# Patient Record
Sex: MALE | Race: WHITE | NOT HISPANIC OR LATINO | Employment: UNEMPLOYED | URBAN - METROPOLITAN AREA
[De-identification: names, ages, dates, MRNs, and addresses within clinical notes are randomized per-mention and may not be internally consistent; named-entity substitution may affect disease eponyms.]

---

## 2017-01-03 ENCOUNTER — APPOINTMENT (OUTPATIENT)
Dept: PHYSICAL THERAPY | Facility: REHABILITATION | Age: 52
End: 2017-01-03
Payer: COMMERCIAL

## 2017-01-05 ENCOUNTER — APPOINTMENT (OUTPATIENT)
Dept: PHYSICAL THERAPY | Facility: REHABILITATION | Age: 52
End: 2017-01-05
Payer: COMMERCIAL

## 2017-01-05 PROCEDURE — 97110 THERAPEUTIC EXERCISES: CPT

## 2017-01-10 ENCOUNTER — APPOINTMENT (OUTPATIENT)
Dept: PHYSICAL THERAPY | Facility: REHABILITATION | Age: 52
End: 2017-01-10
Payer: COMMERCIAL

## 2017-01-11 ENCOUNTER — ALLSCRIPTS OFFICE VISIT (OUTPATIENT)
Dept: OTHER | Facility: OTHER | Age: 52
End: 2017-01-11

## 2017-01-12 ENCOUNTER — APPOINTMENT (OUTPATIENT)
Dept: PHYSICAL THERAPY | Facility: REHABILITATION | Age: 52
End: 2017-01-12
Payer: COMMERCIAL

## 2017-01-13 ENCOUNTER — ALLSCRIPTS OFFICE VISIT (OUTPATIENT)
Dept: OTHER | Facility: OTHER | Age: 52
End: 2017-01-13

## 2017-01-17 ENCOUNTER — APPOINTMENT (OUTPATIENT)
Dept: PHYSICAL THERAPY | Facility: REHABILITATION | Age: 52
End: 2017-01-17
Payer: COMMERCIAL

## 2017-01-17 PROCEDURE — 97110 THERAPEUTIC EXERCISES: CPT

## 2017-01-17 PROCEDURE — 97164 PT RE-EVAL EST PLAN CARE: CPT

## 2017-01-18 ENCOUNTER — ALLSCRIPTS OFFICE VISIT (OUTPATIENT)
Dept: OTHER | Facility: OTHER | Age: 52
End: 2017-01-18

## 2017-01-19 ENCOUNTER — APPOINTMENT (OUTPATIENT)
Dept: PHYSICAL THERAPY | Facility: REHABILITATION | Age: 52
End: 2017-01-19
Payer: COMMERCIAL

## 2017-01-19 PROCEDURE — 97110 THERAPEUTIC EXERCISES: CPT

## 2017-01-24 ENCOUNTER — APPOINTMENT (OUTPATIENT)
Dept: PHYSICAL THERAPY | Facility: REHABILITATION | Age: 52
End: 2017-01-24
Payer: COMMERCIAL

## 2017-01-26 ENCOUNTER — APPOINTMENT (OUTPATIENT)
Dept: PHYSICAL THERAPY | Facility: REHABILITATION | Age: 52
End: 2017-01-26
Payer: COMMERCIAL

## 2017-01-31 ENCOUNTER — APPOINTMENT (OUTPATIENT)
Dept: PHYSICAL THERAPY | Facility: REHABILITATION | Age: 52
End: 2017-01-31
Payer: COMMERCIAL

## 2017-02-03 ENCOUNTER — ALLSCRIPTS OFFICE VISIT (OUTPATIENT)
Dept: OTHER | Facility: OTHER | Age: 52
End: 2017-02-03

## 2017-02-03 DIAGNOSIS — I10 ESSENTIAL (PRIMARY) HYPERTENSION: ICD-10-CM

## 2017-02-03 DIAGNOSIS — M10.9 GOUT: ICD-10-CM

## 2017-02-21 ENCOUNTER — ALLSCRIPTS OFFICE VISIT (OUTPATIENT)
Dept: OTHER | Facility: OTHER | Age: 52
End: 2017-02-21

## 2017-02-22 ENCOUNTER — APPOINTMENT (OUTPATIENT)
Dept: LAB | Facility: HOSPITAL | Age: 52
End: 2017-02-22
Payer: COMMERCIAL

## 2017-02-22 DIAGNOSIS — M10.9 GOUT: ICD-10-CM

## 2017-02-22 LAB
ANION GAP SERPL CALCULATED.3IONS-SCNC: 9 MMOL/L (ref 4–13)
BASOPHILS # BLD AUTO: 0.01 THOUSANDS/ΜL (ref 0–0.1)
BASOPHILS NFR BLD AUTO: 0 % (ref 0–1)
BUN SERPL-MCNC: 18 MG/DL (ref 5–25)
CALCIUM SERPL-MCNC: 9 MG/DL (ref 8.3–10.1)
CHLORIDE SERPL-SCNC: 101 MMOL/L (ref 100–108)
CO2 SERPL-SCNC: 27 MMOL/L (ref 21–32)
CREAT SERPL-MCNC: 1.07 MG/DL (ref 0.6–1.3)
EOSINOPHIL # BLD AUTO: 0.04 THOUSAND/ΜL (ref 0–0.61)
EOSINOPHIL NFR BLD AUTO: 1 % (ref 0–6)
ERYTHROCYTE [DISTWIDTH] IN BLOOD BY AUTOMATED COUNT: 14.4 % (ref 11.6–15.1)
GFR SERPL CREATININE-BSD FRML MDRD: >60 ML/MIN/1.73SQ M
GLUCOSE SERPL-MCNC: 95 MG/DL (ref 65–140)
HCT VFR BLD AUTO: 38.9 % (ref 36.5–49.3)
HGB BLD-MCNC: 13.2 G/DL (ref 12–17)
LYMPHOCYTES # BLD AUTO: 1.39 THOUSANDS/ΜL (ref 0.6–4.47)
LYMPHOCYTES NFR BLD AUTO: 21 % (ref 14–44)
MCH RBC QN AUTO: 30.1 PG (ref 26.8–34.3)
MCHC RBC AUTO-ENTMCNC: 33.9 G/DL (ref 31.4–37.4)
MCV RBC AUTO: 89 FL (ref 82–98)
MONOCYTES # BLD AUTO: 0.52 THOUSAND/ΜL (ref 0.17–1.22)
MONOCYTES NFR BLD AUTO: 8 % (ref 4–12)
NEUTROPHILS # BLD AUTO: 4.76 THOUSANDS/ΜL (ref 1.85–7.62)
NEUTS SEG NFR BLD AUTO: 70 % (ref 43–75)
NRBC BLD AUTO-RTO: 0 /100 WBCS
PLATELET # BLD AUTO: 254 THOUSANDS/UL (ref 149–390)
PMV BLD AUTO: 9.6 FL (ref 8.9–12.7)
POTASSIUM SERPL-SCNC: 3.6 MMOL/L (ref 3.5–5.3)
RBC # BLD AUTO: 4.38 MILLION/UL (ref 3.88–5.62)
SODIUM SERPL-SCNC: 137 MMOL/L (ref 136–145)
URATE SERPL-MCNC: 4.8 MG/DL (ref 4.2–8)
WBC # BLD AUTO: 6.73 THOUSAND/UL (ref 4.31–10.16)

## 2017-02-22 PROCEDURE — 36415 COLL VENOUS BLD VENIPUNCTURE: CPT

## 2017-02-22 PROCEDURE — 85025 COMPLETE CBC W/AUTO DIFF WBC: CPT

## 2017-02-22 PROCEDURE — 80048 BASIC METABOLIC PNL TOTAL CA: CPT

## 2017-02-22 PROCEDURE — 84550 ASSAY OF BLOOD/URIC ACID: CPT

## 2017-02-28 ENCOUNTER — ALLSCRIPTS OFFICE VISIT (OUTPATIENT)
Dept: OTHER | Facility: OTHER | Age: 52
End: 2017-02-28

## 2017-04-07 ENCOUNTER — OFFICE VISIT (OUTPATIENT)
Dept: URGENT CARE | Age: 52
End: 2017-04-07
Payer: COMMERCIAL

## 2017-04-07 PROCEDURE — G0382 LEV 3 HOSP TYPE B ED VISIT: HCPCS | Performed by: FAMILY MEDICINE

## 2017-04-07 PROCEDURE — S9083 URGENT CARE CENTER GLOBAL: HCPCS | Performed by: FAMILY MEDICINE

## 2017-04-20 ENCOUNTER — ALLSCRIPTS OFFICE VISIT (OUTPATIENT)
Dept: OTHER | Facility: OTHER | Age: 52
End: 2017-04-20

## 2017-04-26 ENCOUNTER — ALLSCRIPTS OFFICE VISIT (OUTPATIENT)
Dept: OTHER | Facility: OTHER | Age: 52
End: 2017-04-26

## 2017-04-26 DIAGNOSIS — E78.5 HYPERLIPIDEMIA: ICD-10-CM

## 2017-04-26 DIAGNOSIS — K21.9 GASTRO-ESOPHAGEAL REFLUX DISEASE WITHOUT ESOPHAGITIS: ICD-10-CM

## 2017-04-26 DIAGNOSIS — I10 ESSENTIAL (PRIMARY) HYPERTENSION: ICD-10-CM

## 2017-04-26 DIAGNOSIS — M17.12 PRIMARY OSTEOARTHRITIS OF LEFT KNEE: ICD-10-CM

## 2017-05-22 ENCOUNTER — HOSPITAL ENCOUNTER (OUTPATIENT)
Dept: RADIOLOGY | Facility: CLINIC | Age: 52
Discharge: HOME | End: 2017-05-22
Payer: COMMERCIAL

## 2017-05-22 ENCOUNTER — ALLSCRIPTS OFFICE VISIT (OUTPATIENT)
Dept: OTHER | Facility: OTHER | Age: 52
End: 2017-05-22

## 2017-05-22 DIAGNOSIS — M17.12 PRIMARY OSTEOARTHRITIS OF LEFT KNEE: ICD-10-CM

## 2017-05-22 PROCEDURE — 77073 BONE LENGTH STUDIES: CPT

## 2017-06-07 ENCOUNTER — GENERIC CONVERSION - ENCOUNTER (OUTPATIENT)
Dept: OTHER | Facility: OTHER | Age: 52
End: 2017-06-07

## 2017-06-08 ENCOUNTER — ALLSCRIPTS OFFICE VISIT (OUTPATIENT)
Dept: OTHER | Facility: OTHER | Age: 52
End: 2017-06-08

## 2017-06-28 ENCOUNTER — GENERIC CONVERSION - ENCOUNTER (OUTPATIENT)
Dept: OTHER | Facility: OTHER | Age: 52
End: 2017-06-28

## 2017-06-29 ENCOUNTER — GENERIC CONVERSION - ENCOUNTER (OUTPATIENT)
Dept: OTHER | Facility: OTHER | Age: 52
End: 2017-06-29

## 2017-06-30 ENCOUNTER — GENERIC CONVERSION - ENCOUNTER (OUTPATIENT)
Dept: OTHER | Facility: OTHER | Age: 52
End: 2017-06-30

## 2017-07-05 ENCOUNTER — ALLSCRIPTS OFFICE VISIT (OUTPATIENT)
Dept: OTHER | Facility: OTHER | Age: 52
End: 2017-07-05

## 2017-07-18 ENCOUNTER — ALLSCRIPTS OFFICE VISIT (OUTPATIENT)
Dept: OTHER | Facility: OTHER | Age: 52
End: 2017-07-18

## 2017-07-18 DIAGNOSIS — E78.5 HYPERLIPIDEMIA: ICD-10-CM

## 2017-07-18 DIAGNOSIS — M10.9 GOUT: ICD-10-CM

## 2017-07-18 DIAGNOSIS — K21.9 GASTRO-ESOPHAGEAL REFLUX DISEASE WITHOUT ESOPHAGITIS: ICD-10-CM

## 2017-07-21 ENCOUNTER — ALLSCRIPTS OFFICE VISIT (OUTPATIENT)
Dept: OTHER | Facility: OTHER | Age: 52
End: 2017-07-21

## 2017-10-09 ENCOUNTER — GENERIC CONVERSION - ENCOUNTER (OUTPATIENT)
Dept: OTHER | Facility: OTHER | Age: 52
End: 2017-10-09

## 2017-10-17 ENCOUNTER — ALLSCRIPTS OFFICE VISIT (OUTPATIENT)
Dept: OTHER | Facility: OTHER | Age: 52
End: 2017-10-17

## 2017-10-18 NOTE — PROGRESS NOTES
Assessment  1  Benign essential hypertension (401 1) (I10)   2  Allergic rhinitis (477 9) (J30 9)   3  GERD without esophagitis (530 81) (K21 9)    Discussion/Summary  Discussion Summary:   SEE MED LIST  Counseling Documentation With Imm: The patient was counseled regarding diagnostic results,-- instructions for management,-- risk factor reductions,-- prognosis,-- impressions  Chief Complaint  Chief Complaint Free Text Note Form: PT here for follow up, pt stated having sore throat, and off and on headaches, and issues with asthma stated he was in [facility] was in ER on 10/9/2017 due to asthma  History of Present Illness  Hypertension (Follow-Up): The patient presents for follow-up of essential hypertension  The patient states he has been stable with his blood pressure control since the last visit  He has no comorbid illnesses  Symptoms: The patient is currently asymptomatic  Associated symptoms include no headache,-- no focal neurologic deficits-- and-- no memory loss  Medications: The patient is not currently on any medications for his hypertension--lifestyle modification only  -- the patient is adherent with his medication regimen  Medication(s): a calcium channel blocker  The patient is due for a serum creatinine  Review of Systems  Complete-Male:   Constitutional: No fever or chills, feels well, no tiredness, no recent weight gain or weight loss  Eyes: No complaints of eye pain, no red eyes, no discharge from eyes, no itchy eyes  ENT: nasal discharge  Cardiovascular: No complaints of slow heart rate, no fast heart rate, no chest pain, no palpitations, no leg claudication, no lower extremity  Respiratory: No complaints of shortness of breath, no wheezing, no cough, no SOB on exertion, no orthopnea or PND  Gastrointestinal: No complaints of abdominal pain, no constipation, no nausea or vomiting, no diarrhea or bloody stools     Musculoskeletal: joint stiffness, but-- no arthralgias-- and-- no myalgias  Neurological: No compliants of headache, no confusion, no convulsions, no numbness or tingling, no dizziness or fainting, no limb weakness, no difficulty walking  Psychiatric: Is not suicidal, no sleep disturbances, no anxiety or depression, no change in personality, no emotional problems  Endocrine: No complaints of proptosis, no hot flashes, no muscle weakness, no erectile dysfunction, no deepening of the voice, no feelings of weakness  Hematologic/Lymphatic: No complaints of swollen glands, no swollen glands in the neck, does not bleed easily, no easy bruising  Active Problems  1  Allergic rhinitis (477 9) (J30 9)   2  Asthma (493 90) (J45 909)   3  Benign essential hypertension (401 1) (I10)   4  Chest pain (786 50) (R07 9)   5  Chronic bilateral low back pain without sciatica (724 2,338 29) (M54 5,G89 29)   6  DJD (degenerative joint disease) of knee (715 36) (M17 10)   7  Dyspnea on exertion (786 09) (R06 09)   8  GERD without esophagitis (530 81) (K21 9)   9  Globus hystericus (300 11) (F45 8)   10  Gout (274 9) (M10 9)   11  Hyperlipidemia (272 4) (E78 5)   12  Palpitations (785 1) (R00 2)   13  Primary localized osteoarthritis of left knee (715 16) (M17 12)   14  Primary localized osteoarthritis of right knee (715 16) (M17 11)   15  Right ankle effusion (719 07) (M25 471)   16  Right ankle pain, unspecified chronicity (719 47) (M25 571)   17  Tenosynovitis of foot and ankle (727 06) (M65 9)   18  Thyroid disorder (246 9) (E07 9)   19  Ventricular tachycardia (427 1) (I47 2)   20  Vertigo (780 4) (R42)    Past Medical History  1  History of Acute idiopathic gout of left ankle (274 01) (M10 072)   2  History of Acute left ankle pain (719 47) (M25 572)   3  History of Acute middle ear effusion, bilateral (381 00) (H65 193)   4  History of Acute recurrent maxillary sinusitis (461 0) (J01 01)   5  History of Acute URI (465 9) (J67 9)   6  History of Acute URI (443 9) (J25 9)   7  History of Arthritis (V13 4)   8  History of Back spasm (724 8) (M62 830)   9  History of Chronic rhinitis (472 0) (J31 0)   10  History of Chronic Serous Otitis Media (381 10)   11  History of Dyspnea (786 09) (R06 00)   12  History of Foot Pain (Soft Tissue) (729 5)   13  History of Functional murmur (R01 0)   14  History of Hearing problem (V41 2) (H91 90)   15  History of acute bronchitis (V12 69) (Z87 09)   16  History of acute bronchitis (V12 69) (Z87 09)   17  History of allergy (V15 09) (Z88 9)   18  History of backache (V13 59) (Z87 39)   19  History of candidiasis of mouth (V12 09) (Z86 19)   20  History of chronic sinusitis (V12 69) (Z87 09)   21  History of dizziness (V13 89) (Z87 898)   22  History of fatigue (V13 89) (Z87 898)   23  History of gastroesophageal reflux (GERD) (V12 79) (Z87 19)   24  History of gastroesophageal reflux (GERD) (V12 79) (Z87 19)   25  History of gout (V12 29) (Z87 39)   26  History of gout (V12 29) (Z87 39)   27  History of hiatal hernia (V12 79) (Z87 19)   28  History of hypercholesterolemia (V12 29) (Z86 39)   29  History of hypertension (V12 59) (Z86 79)   30  History of laryngitis (V12 69) (Z87 09)   31  History of low back pain (V13 59) (Z87 39)   32  History of sinusitis (V12 69) (Z87 09)   33  History of urinary frequency (V13 09) (Z87 898)   34  History of Knee pain (719 46) (M25 569)   35  History of Left foot pain (729 5) (M79 672)   36  History of Low grade fever (780 60) (R50 9)   37  History of Malaise (780 79) (R53 81)   38  History of Middle ear effusion (381 4) (H65 90)   39  History of Neck pain (723 1) (M54 2)   40  History of Need for influenza vaccination (V04 81) (Z23)   41  History of Need for prophylactic vaccination and inoculation against influenza (V04 81) (Z23)   42  History of Non-toxic multinodular goiter (241 1) (E04 2)   43  History of Palpitations (785 1) (R00 2)   44  History of Papule of skin (709 8) (R23 8)   45   History of Screening for colon cancer (V76 51) (Z12 11)   46  History of Testicular hypogonadism (257 2) (E29 1)   47  History of Urinary urgency (788 63) (R39 15)  Active Problems And Past Medical History Reviewed: The active problems and past medical history were reviewed and updated today  Surgical History  1  History of Appendectomy   2  History of Hernia Repair   3  History of Thyroid Surgery Total Thyroidectomy   4  History of Tonsillectomy  Surgical History Reviewed: The surgical history was reviewed and updated today  Family History  Father    1  Family history of Cancer   2  Family history of Coronary Artery Disease (V17 49)   3  Family history of Lung Cancer (V16 1)  Maternal Grandmother    4  Family history of Stroke Syndrome (V17 1)  Maternal Grandfather    5  Family history of Stroke Syndrome (V17 1)  Paternal Grandfather    6  Family history of Cancer  Cousin    7  Family history of lung cancer (V16 1) (Z80 1)  Family History    8  Family history of Heart Disease (V17 49)   9  Family history of Hypertension (V17 49)  Family History Reviewed: The family history was reviewed and updated today  Social History   · Denied: History of Drug Use   · Marital History - Single   · Never A Smoker   · No alcohol use   · No drug use   · Occupation:   · Travel history  Social History Reviewed: The social history was reviewed and updated today  Current Meds   1  Advair Diskus 500-50 MCG/DOSE Inhalation Aerosol Powder Breath Activated; INHALE 1 PUFF   TWICE DAILY; Therapy: 14LNM9694 to (Claudetta Altes) Recorded   2  Albuterol Sulfate (2 5 MG/3ML) 0 083% Inhalation Nebulization Solution; USE 1 UNIT DOSE IN   NEBULIZER EVERY 4 TO 6 HOURS AS NEEDED; Therapy: 33QYO5528 to (Last Rx:62Ovu9748)  Requested for: 06OBW6965 Ordered   3  Cyclobenzaprine HCl - 5 MG Oral Tablet; TAKE 1 TABLET 3 TIMES DAILY AS NEEDED; Therapy: 05FMT3992 to (Evaluate:14Mar2017)  Requested for: 83CAL6066; Last Rx:90Ofw4494   Ordered   4  DuoNeb 0 5-2 5 (3) MG/3ML Inhalation Solution; Therapy: (Recorded:08Jun2017) to Recorded   5  Esomeprazole Magnesium 40 MG Oral Capsule Delayed Release; TAKE 1 CAPSULE ONCE DAILY; Therapy: 06ACV7421 to (Evaluate:06Jan2018)  Requested for: 15Zzp6015; Last Rx:59Xen2550   Ordered   6  Lisinopril 20 MG Oral Tablet; Take 1 tablet daily  Requested for: 77Dbt2639; Last Rx:00Pgf9479   Ordered   7  Meclizine HCl - 25 MG Oral Tablet; TAKE 1 TABLET 3 times daily; Therapy: 95Ejy9144 to Recorded   8  Meloxicam 15 MG Oral Tablet; TAKE 1 TABLET DAILY AS NEEDED; Therapy: 43JOW3233 to (Evaluate:12Feb2017)  Requested for: 41PHC9217; Last Rx:13Jan2017   Ordered   9  Montelukast Sodium 10 MG Oral Tablet; TAKE 1 TABLET DAILY  Requested for: 43NNL3855; Last   Rx:01Pmk0406 Ordered   10  Nasacort Allergy 24HR 55 MCG/ACT Nasal Aerosol; To be used as directed; Therapy: 64TUH5731 to Recorded   11  Ocean Nasal Spray 0 65 % Nasal Solution; Inhale 1-2 sprays as needed; Therapy: 39LTQ0411 to (Last Rx:08Jun2017)  Requested for: 12IIS0446 Ordered   12  Ocean Nasal Spray 0 65 % Nasal Solution; Inhale 1-2 sprays as needed; Therapy: 03Soa1219 to (Last Rx:87Mig1466)  Requested for: 21Udc8872 Ordered   13  Uloric 40 MG Oral Tablet; TAKE 1 TABLET DAILY; Therapy: 25RJV1767 to (Evaluate:69Yow0215)  Requested for: 77IQD6785; Last Rx:13Jun2017    Ordered   14  Ventolin  (90 Base) MCG/ACT Inhalation Aerosol Solution; INHALE 2 PUFFS Twice daily; Therapy: 27Mst8987 to Recorded   15  Verapamil HCl  MG Oral Tablet Extended Release; TAKE 1 TABLET DAILY; Therapy: 51CBJ6256 to Recorded   16  ZyrTEC Allergy 10 MG Oral Tablet; Take 1 tablet daily; Therapy: 02ZNV0724 to (Last Rx:08Jun2017)  Requested for: 70FEQ2347 Ordered  Medication List Reviewed: The medication list was reviewed and updated today  Allergies  1  Penicillins   2  Sulfa Drugs   3  Cephalosporins   4  omeprazole   5  Pepcid TABS   6   Sulfa Drugs    Vitals  Vital Signs    Recorded: 55JGJ1843 02:37PM   Temperature 98 5 F, Oral   Heart Rate 98   Systolic 665, RUE, Sitting   Diastolic 82, RUE, Sitting   Height 5 ft 10 in   Weight 224 lb 4 oz   BMI Calculated 32 18   BSA Calculated 2 19   O2 Saturation 96, RA     Physical Exam    Constitutional   General appearance: No acute distress, well appearing and well nourished  Eyes   Conjunctiva and lids: No swelling, erythema, or discharge  Ears, Nose, Mouth, and Throat   Otoscopic examination: Tympanic membrance translucent with normal light reflex  Canals patent without erythema  Nasal mucosa, septum, and turbinates: Normal without edema or erythema  Oropharynx: Normal with no erythema, edema, exudate or lesions  Pulmonary   Respiratory effort: No increased work of breathing or signs of respiratory distress  Auscultation of lungs: Abnormal   Auscultation of the lungs revealed decreased breath sounds diffusely  Cardiovascular   Auscultation of heart: Normal rate and rhythm, normal S1 and S2, without murmurs  Examination of extremities for edema and/or varicosities: Normal     Carotid pulses: Normal     Abdomen   Abdomen: Non-tender, no masses  Liver and spleen: No hepatomegaly or splenomegaly  Musculoskeletal   Gait and station: Normal     Digits and nails: Normal without clubbing or cyanosis  Neurologic   Cranial nerves: Cranial nerves 2-12 intact  Psychiatric   Orientation to person, place and time: Normal     Mood and affect: Normal          Health Management  History of Depression screening   *VB-Depression Screening; every 1 year; Last 21APV4129; Next Due: 22LGE1251; Active    Future Appointments    Date/Time Provider Specialty Site   10/24/2017 02:30 PM Romina Crowell DO Orthopedic Surgery [facility] [address] [place] [address]     Signatures   Electronically signed by :  Efren Rosenberg MD; Oct 17 2017  2:56PM EST                       (Author)

## 2017-10-23 ENCOUNTER — ALLSCRIPTS OFFICE VISIT (OUTPATIENT)
Dept: OTHER | Facility: OTHER | Age: 52
End: 2017-10-23

## 2017-10-24 NOTE — PROGRESS NOTES
Assessment  1  Allergic rhinitis (477 9) (J30 9)   2  Benign essential hypertension (401 1) (I10)   3  Vertigo (780 4) (R42)    Plan  Allergic rhinitis, Vertigo    · Meclizine HCl - 12 5 MG Oral Tablet; TAKE 1 TABLET 3 TIMES DAILY AS NEEDED    Discussion/Summary    Patient was advised symptomatic treatment for his upper respiratory symptoms with Cepacol lozenges and warm saline gargles  Patient requested a renewal of his meclizine at a lower dosage of 12 5 mg every 8 hours as needed for episodic dizziness  Chief Complaint  PT here stated has had headache, and sinus pressure and congestion scene Friday  PT stated sore throat is not getting better, and is having periods were he is dizzy and light headed, also stated has productive cough  PT also stated he has ringing in both ears      History of Present Illness  HPI: Patient presents to the office with complaints of sinus pressure, intermittent headache and nasal congestion for the past 3 days  He denies fever but states that he may have had a fever  Presently he has a low-grade temp at 99 1  He denies any chills or night sweats  He denies any dyspnea  He denies wheezing  He complains of a mild nonproductive cough  Review of Systems    Constitutional: fever-- and-- feeling tired, but-- no chills  ENT: earache,-- sore throat,-- hearing loss-- and-- nasal discharge, but-- no nosebleeds-- and-- no hoarseness  Cardiovascular: no complaints of slow or fast heart rate, no chest pain, no palpitations, no leg claudication or lower extremity edema  Respiratory: cough, but-- no orthopnea,-- no wheezing-- and-- no shortness of breath during exertion  Gastrointestinal: no complaints of abdominal pain, no constipation, no nausea or vomiting, no diarrhea or bloody stools  Genitourinary: no complaints of dysuria or incontinence, no hesitancy, no nocturia, no genital lesion, no inadequacy of penile erection     Musculoskeletal: no complaints of arthralgia, no myalgia, no joint swelling or stiffness, no limb pain or swelling  Integumentary: no complaints of skin rash or lesion, no itching or dry skin, no skin wounds  Neurological: no complaints of headache, no confusion, no numbness or tingling, no dizziness or fainting  Active Problems  1  Allergic rhinitis (477 9) (J30 9)   2  Asthma (493 90) (J45 909)   3  Benign essential hypertension (401 1) (I10)   4  Chest pain (786 50) (R07 9)   5  Chronic bilateral low back pain without sciatica (724 2,338 29) (M54 5,G89 29)   6  DJD (degenerative joint disease) of knee (715 36) (M17 10)   7  Dyspnea on exertion (786 09) (R06 09)   8  GERD without esophagitis (530 81) (K21 9)   9  Globus hystericus (300 11) (F45 8)   10  Gout (274 9) (M10 9)   11  Hyperlipidemia (272 4) (E78 5)   12  Palpitations (785 1) (R00 2)   13  Primary localized osteoarthritis of left knee (715 16) (M17 12)   14  Primary localized osteoarthritis of right knee (715 16) (M17 11)   15  Right ankle effusion (719 07) (M25 471)   16  Right ankle pain, unspecified chronicity (719 47) (M25 571)   17  Tenosynovitis of foot and ankle (727 06) (M65 9)   18  Thyroid disorder (246 9) (E07 9)   19  Ventricular tachycardia (427 1) (I47 2)   20  Vertigo (780 4) (R42)    Past Medical History  Active Problems And Past Medical History Reviewed: The active problems and past medical history were reviewed and updated today  Surgical History  Surgical History Reviewed: The surgical history was reviewed and updated today  Social History   · Denied: History of Drug Use   · Experimented with Marijuana 1982   · Marital History - Single   · Never A Smoker   · No alcohol use   · No drug use   · Occupation:   · UNEMPLOYED   · Travel history   · Pt denies being out of the country during 1/22014-10/  The social history was reviewed and updated today  The social history was reviewed and is unchanged  Family History  Family History Reviewed:    The family history was reviewed and updated today  Current Meds   1  Advair Diskus 500-50 MCG/DOSE Inhalation Aerosol Powder Breath Activated; INHALE 1   PUFF TWICE DAILY; Therapy: 61KZG0341 to (Rena Grace) Recorded   2  Albuterol Sulfate (2 5 MG/3ML) 0 083% Inhalation Nebulization Solution; USE 1 UNIT   DOSE IN NEBULIZER EVERY 4 TO 6 HOURS AS NEEDED; Therapy: 49GDY5934 to (Last Rx:63Spa7924)  Requested for: 05EIN4223 Ordered   3  Cyclobenzaprine HCl - 5 MG Oral Tablet; TAKE 1 TABLET 3 TIMES DAILY AS NEEDED; Therapy: 68NNI7256 to (Evaluate:14Mar2017)  Requested for: 67VMY9998; Last   Rx:13Jan2017 Ordered   4  DuoNeb 0 5-2 5 (3) MG/3ML Inhalation Solution; Therapy: (Recorded:08Jun2017) to Recorded   5  Esomeprazole Magnesium 40 MG Oral Capsule Delayed Release; TAKE 1 CAPSULE   ONCE DAILY; Therapy: 67FGR5775 to (Evaluate:06Jan2018)  Requested for: 91Jst7721; Last   Rx:04Dxu4891 Ordered   6  Lisinopril 20 MG Oral Tablet; Take 1 tablet daily  Requested for: 96Rdl5324; Last   Rx:50Voj4297 Ordered   7  Meclizine HCl - 25 MG Oral Tablet; TAKE 1 TABLET 3 times daily; Therapy: 19Jic2617 to Recorded   8  Meloxicam 15 MG Oral Tablet; TAKE 1 TABLET DAILY AS NEEDED; Therapy: 52VIL4935 to (Evaluate:12Feb2017)  Requested for: 41IWX4629; Last   Rx:13Jan2017 Ordered   9  Montelukast Sodium 10 MG Oral Tablet; TAKE 1 TABLET DAILY  Requested for:   34ODK1914; Last Rx:88Nka5504 Ordered   10  Nasacort Allergy 24HR 55 MCG/ACT Nasal Aerosol; To be used as directed; Therapy: 72TMI1798 to Recorded   11  Ocean Nasal Spray 0 65 % Nasal Solution; Inhale 1-2 sprays as needed; Therapy: 97CEI8638 to (Last Rx:08Jun2017)  Requested for: 44XLB1728 Ordered   12  Ocean Nasal Spray 0 65 % Nasal Solution; Inhale 1-2 sprays as needed; Therapy: 31Nih2477 to (Last Rx:21Feb2017)  Requested for: 51Jqj3000 Ordered   13  Uloric 40 MG Oral Tablet; TAKE 1 TABLET DAILY;     Therapy: 95HQY7573 to (Evaluate:56Gba6303)  Requested for: 10WQH4723; Last    Rx:13Jun2017 Ordered   14  Ventolin  (90 Base) MCG/ACT Inhalation Aerosol Solution; INHALE 2 PUFFS    Twice daily; Therapy: 02Swd8976 to Recorded   15  Verapamil HCl - 120 MG Oral Tablet; TAKE 1 TABLET DAILY; Last Rx:23Oct2017; Status:    ACTIVE - Retrospective Authorization Ordered   16  ZyrTEC Allergy 10 MG Oral Tablet; Take 1 tablet daily; Therapy: 57CWB0346 to (Last Rx:08Jun2017)  Requested for: 08Jun2017 Ordered    The medication list was reviewed and updated today  Allergies  1  Penicillins   2  Sulfa Drugs   3  Cephalosporins   4  omeprazole   5  Pepcid TABS   6  Sulfa Drugs    Vitals   Recorded: 62BQO3053 04:03PM   Temperature 99 1 F, Oral   Heart Rate 98   Systolic 385, LUE, Sitting   Diastolic 90, LUE, Sitting   Height 5 ft 10 in   Weight 220 lb    BMI Calculated 31 57   BSA Calculated 2 17   O2 Saturation 97, RA     Physical Exam    Constitutional   General appearance: No acute distress, well appearing and well nourished  well developed,-- chronically ill,-- comfortable,-- well nourished,-- overweight,-- disheveled clothing,-- well groomed,-- well hydrated-- and-- appearance reflects stated age  Eyes   Conjunctiva and lids: No swelling, erythema, or discharge  Pupils and irises: Equal, round and reactive to light  Ears, Nose, Mouth, and Throat   External inspection of ears and nose: Normal     Otoscopic examination: Abnormal  -- Mild hyperemia of the left tympanic membrane without evidence of otitis media  Nasal mucosa, septum, and turbinates: Normal without edema or erythema  -- Probable chronic mucosal thickening  Oropharynx: Normal with no erythema, edema, exudate or lesions  Pulmonary   Respiratory effort: No increased work of breathing or signs of respiratory distress  Auscultation of lungs: Clear to auscultation, equal breath sounds bilaterally, no wheezes, no rales, no rhonci      Cardiovascular   Auscultation of heart: Normal rate and rhythm, normal S1 and S2, without murmurs  Examination of extremities for edema and/or varicosities: Normal     Abdomen   Abdomen: Non-tender, no masses  Lymphatic   Palpation of lymph nodes in neck: No lymphadenopathy  Musculoskeletal   Gait and station: Normal     Digits and nails: Normal without clubbing or cyanosis  Skin   Skin and subcutaneous tissue: Normal without rashes or lesions  Neurologic Grossly, no neurological deficits are appreciated     Psychiatric   Orientation to person, place and time: Normal     Mood and affect: Normal          Future Appointments    Date/Time Provider Specialty Site   01/31/2018 03:45 PM Abe Turcios MD [facility]   10/31/2017 03:00 PM Ita Saravia DO Orthopedic Surgery [facility] [address] [place] [address]     Signatures   Electronically signed by : Steff Yost MD; Oct 23 2017  4:55PM EST                       (Author)

## 2017-10-31 ENCOUNTER — GENERIC CONVERSION - ENCOUNTER (OUTPATIENT)
Dept: OTHER | Facility: OTHER | Age: 52
End: 2017-10-31

## 2017-11-03 ENCOUNTER — OFFICE VISIT (OUTPATIENT)
Dept: URGENT CARE | Age: 52
End: 2017-11-03
Payer: MEDICARE

## 2017-11-03 PROCEDURE — 99213 OFFICE O/P EST LOW 20 MIN: CPT | Performed by: FAMILY MEDICINE

## 2017-11-03 PROCEDURE — G0463 HOSPITAL OUTPT CLINIC VISIT: HCPCS | Performed by: FAMILY MEDICINE

## 2017-11-04 NOTE — PROGRESS NOTES
Assessment  1  Acute sinusitis (461 9) (J01 90)    Plan  Acute sinusitis    · LevoFLOXacin 500 MG Oral Tablet (Levaquin); TAKE 1 TABLET DAILY UNTIL  FINISHED    Discussion/Summary  Discussion Summary:   Levaquin once daily until finished ( please take probiotics)  cold/cough/sinus medication as needed ( try Flonase nasal spray 1 spray in each nostril twice a day)  medication as needed  follow-up with family physician or ENT  [phone]  go to the hospital emergency department if worse  Medication Side Effects Reviewed: Possible side effects of new medications were reviewed with the patient/guardian today  Understands and agrees with treatment plan: The treatment plan was reviewed with the patient/guardian  The patient/guardian understands and agrees with the treatment plan   Counseling Documentation With Imm: The patient was counseled regarding  Chief Complaint  1  Cold Symptoms   2  Ear Pain  Chief Complaint Free Text Note Form: c/o L ear pain and fullness x 5 days with occ  cough and PND and feels off-balance  Saw PCP last week - no antibiotic  Taking Claritin, Sudafed and Tylenol (last dose at 12 noon)  Temp  99 5 today  History of Present Illness  HPI: Congestion, cough, left ear discomfort   Hospital Based Practices Required Assessment:   Pain Assessment   the patient states they have pain  The pain is located in the L ear  (on a scale of 0 to 10, the patient rates the pain at 3 )   Abuse And Domestic Violence Screen    Yes, the patient is safe at home  -- The patient states no one is hurting them  Depression And Suicide Screen  No, the patient has not had thoughts of hurting themself  No, the patient has not felt depressed in the past 7 days  Prefered Language is  [place]  Primary Language is  English  Review of Systems  Focused-Male:   Constitutional: as noted in HPI    ENT: earache-- and-- nasal discharge, but-- as noted in HPI     Cardiovascular: no complaints of slow or fast heart rate, no chest pain, no palpitations, no leg claudication or lower extremity edema  Respiratory: cough, but-- as noted in HPI  Gastrointestinal: no complaints of abdominal pain, no constipation, no nausea or vomiting, no diarrhea or bloody stools  Genitourinary: no complaints of dysuria or incontinence, no hesitancy, no nocturia, no genital lesion, no inadequacy of penile erection  Musculoskeletal: no complaints of arthralgia, no myalgia, no joint swelling or stiffness, no limb pain or swelling  Integumentary: no complaints of skin rash or lesion, no itching or dry skin, no skin wounds  Neurological: no complaints of headache, no confusion, no numbness or tingling, no dizziness or fainting  ROS Reviewed:   ROS reviewed  Active Problems  1  Allergic rhinitis (477 9) (J30 9)   2  Asthma (493 90) (J45 909)   3  Benign essential hypertension (401 1) (I10)   4  Chest pain (786 50) (R07 9)   5  Chronic bilateral low back pain without sciatica (724 2,338 29) (M54 5,G89 29)   6  DJD (degenerative joint disease) of knee (715 36) (M17 10)   7  Dyspnea on exertion (786 09) (R06 09)   8  GERD without esophagitis (530 81) (K21 9)   9  Globus hystericus (300 11) (F45 8)   10  Gout (274 9) (M10 9)   11  Hyperlipidemia (272 4) (E78 5)   12  Palpitations (785 1) (R00 2)   13  Primary localized osteoarthritis of left knee (715 16) (M17 12)   14  Primary localized osteoarthritis of right knee (715 16) (M17 11)   15  Right ankle effusion (719 07) (M25 471)   16  Right ankle pain, unspecified chronicity (719 47) (M25 571)   17  Tenosynovitis of foot and ankle (727 06) (M65 9)   18  Thyroid disorder (246 9) (E07 9)   19  Ventricular tachycardia (427 1) (I47 2)   20  Vertigo (780 4) (R42)    Past Medical History  1  History of Acute idiopathic gout of left ankle (274 01) (M10 072)   2  History of Acute left ankle pain (719 47) (M25 572)   3   History of Acute middle ear effusion, bilateral (381 00) (O02 939) 4  History of Acute recurrent maxillary sinusitis (461 0) (J01 01)   5  History of Acute URI (465 9) (J06 9)   6  History of Acute URI (465 9) (J06 9)   7  History of Arthritis (V13 4)   8  History of Back spasm (724 8) (M62 830)   9  History of Chronic rhinitis (472 0) (J31 0)   10  History of Chronic Serous Otitis Media (381 10)   11  History of Dyspnea (786 09) (R06 00)   12  History of Foot Pain (Soft Tissue) (729 5)   13  History of Functional murmur (R01 0)   14  History of Hearing problem (V41 2) (H91 90)   15  History of acute bronchitis (V12 69) (Z87 09)   16  History of acute bronchitis (V12 69) (Z87 09)   17  History of allergy (V15 09) (Z88 9)   18  History of backache (V13 59) (Z87 39)   19  History of candidiasis of mouth (V12 09) (Z86 19)   20  History of chronic sinusitis (V12 69) (Z87 09)   21  History of dizziness (V13 89) (Z87 898)   22  History of fatigue (V13 89) (Z87 898)   23  History of gastroesophageal reflux (GERD) (V12 79) (Z87 19)   24  History of gastroesophageal reflux (GERD) (V12 79) (Z87 19)   25  History of gout (V12 29) (Z87 39)   26  History of gout (V12 29) (Z87 39)   27  History of hiatal hernia (V12 79) (Z87 19)   28  History of hypercholesterolemia (V12 29) (Z86 39)   29  History of hypertension (V12 59) (Z86 79)   30  History of laryngitis (V12 69) (Z87 09)   31  History of low back pain (V13 59) (Z87 39)   32  History of sinusitis (V12 69) (Z87 09)   33  History of urinary frequency (V13 09) (Z87 898)   34  History of Knee pain (719 46) (M25 569)   35  History of Left foot pain (729 5) (M79 672)   36  History of Low grade fever (780 60) (R50 9)   37  History of Malaise (780 79) (R53 81)   38  History of Middle ear effusion (381 4) (H65 90)   39  History of Neck pain (723 1) (M54 2)   40  History of Need for influenza vaccination (V04 81) (Z23)   41  History of Need for prophylactic vaccination and inoculation against influenza (V04 81)    (Z23)   42   History of Non-toxic multinodular goiter (241 1) (E04 2)   43  History of Palpitations (785 1) (R00 2)   44  History of Papule of skin (709 8) (R23 8)   45  History of Screening for colon cancer (V76 51) (Z12 11)   46  History of Testicular hypogonadism (257 2) (E29 1)   47  History of Urinary urgency (788 63) (R39 15)  Active Problems And Past Medical History Reviewed: The active problems and past medical history were reviewed and updated today  Family History  Father    1  Family history of Cancer   2  Family history of Coronary Artery Disease (V17 49)   3  Family history of Lung Cancer (V16 1)  Maternal Grandmother    4  Family history of Stroke Syndrome (V17 1)  Maternal Grandfather    5  Family history of Stroke Syndrome (V17 1)  Paternal Grandfather    6  Family history of Cancer  Cousin    7  Family history of lung cancer (V16 1) (Z80 1)  Family History    8  Family history of Heart Disease (V17 49)   9  Family history of Hypertension (V17 49)  Family History Reviewed: The family history was reviewed and updated today  Social History   · Denied: History of Drug Use   · Marital History - Single   · Never A Smoker   · No alcohol use   · No drug use   · Occupation:   · Travel history  Social History Reviewed: The social history was reviewed and updated today  The social history was reviewed and is unchanged  Surgical History  1  History of Anesthesia For Tenodesis Of Ruptured Long Tendon Of Biceps   2  History of Appendectomy   3  History of Hernia Repair   4  History of Thyroid Surgery Total Thyroidectomy   5  History of Tonsillectomy  Surgical History Reviewed: The surgical history was reviewed and updated today  Current Meds   1  Advair Diskus 500-50 MCG/DOSE Inhalation Aerosol Powder Breath Activated; INHALE 1   PUFF TWICE DAILY; Therapy: 97UBO6113 to (Teresita Friend) Recorded   2   Albuterol Sulfate (2 5 MG/3ML) 0 083% Inhalation Nebulization Solution; USE 1 UNIT   DOSE IN NEBULIZER EVERY 4 TO 6 HOURS AS NEEDED; Therapy: 19TVG0378 to (Last Rx:03Llw3158)  Requested for: 30QTT1289 Ordered   3  Cyclobenzaprine HCl - 5 MG Oral Tablet; TAKE 1 TABLET 3 TIMES DAILY AS NEEDED; Therapy: 73JPX0679 to (Evaluate:14Mar2017)  Requested for: 99SZP8545; Last   Rx:13Jan2017 Ordered   4  DuoNeb 0 5-2 5 (3) MG/3ML Inhalation Solution; Therapy: (Recorded:08Jun2017) to Recorded   5  Esomeprazole Magnesium 40 MG Oral Capsule Delayed Release; TAKE 1 CAPSULE   ONCE DAILY; Therapy: 41YLM6190 to (Evaluate:06Jan2018)  Requested for: 57Mxm8514; Last   Rx:43Fdh6049 Ordered   6  Lisinopril 20 MG Oral Tablet; Take 1 tablet daily  Requested for: 18Umu3355; Last   Rx:18Bnk1880 Ordered   7  Meclizine HCl - 12 5 MG Oral Tablet; TAKE 1 TABLET 3 TIMES DAILY AS NEEDED; Therapy: 38QDS8739 to (Evaluate:12Nov2017)  Requested for: 23Oct2017; Last   Rx:23Oct2017 Ordered   8  Montelukast Sodium 10 MG Oral Tablet; TAKE 1 TABLET DAILY  Requested for:   18BCO0405; Last Rx:83Xuy1722 Ordered   9  Nasacort Allergy 24HR 55 MCG/ACT Nasal Aerosol; To be used as directed; Therapy: 85VGW9123 to Recorded   10  Ocean Nasal Spray 0 65 % Nasal Solution; Inhale 1-2 sprays as needed; Therapy: 36TEO0510 to (Last Rx:08Jun2017)  Requested for: 69XQY6300 Ordered   11  Ocean Nasal Spray 0 65 % Nasal Solution; Inhale 1-2 sprays as needed; Therapy: 87Zdv4817 to (Last Rx:06Mko9024)  Requested for: 42Ypu0376 Ordered   12  Uloric 40 MG Oral Tablet; TAKE 1 TABLET DAILY; Therapy: 15FAK5496 to (Evaluate:20Jwr6639)  Requested for: 65AOE0285; Last    Rx:13Jun2017 Ordered   13  Ventolin  (90 Base) MCG/ACT Inhalation Aerosol Solution; INHALE 2 PUFFS    Twice daily; Therapy: 24Twn2190 to Recorded   14  Verapamil HCl - 120 MG Oral Tablet; TAKE 1 TABLET DAILY; Last Rx:23Oct2017 Ordered   15  ZyrTEC Allergy 10 MG Oral Tablet; Take 1 tablet daily; Therapy: 54CPN9919 to (Last Rx:08Jun2017)  Requested for: 86DZM2697 Ordered  Medication List Reviewed:    The medication list was reviewed and updated today  Allergies  1  Cephalosporins   2  omeprazole   3  Penicillins   4  Pepcid TABS   5  Sulfa Drugs   6  Sulfa Drugs    Vitals  Signs   Recorded: 72EMQ6431 05:03PM   Temperature: 99 3 F  Heart Rate: 78  Respiration: 18  Systolic: 979  Diastolic: 78  Height: 5 ft 9 5 in  Weight: 218 lb 12 8 oz  BMI Calculated: 31 85  BSA Calculated: 2 16  O2 Saturation: 96  Pain Scale: 3    Physical Exam    Constitutional patient appears uncomfortable  Ears, Nose, Mouth, and Throat   External inspection of ears and nose: Normal  -- erythema of the left eardrum  -- nasal congestion, tenderness over the sinuses with palpation  -- injection of the oropharynx  Pulmonary   Respiratory effort: No increased work of breathing or signs of respiratory distress  Auscultation of lungs: Clear to auscultation  Cardiovascular   Auscultation of heart: Normal rate and rhythm, normal S1 and S2, without murmurs  Lymphatic   Palpation of lymph nodes in neck: No lymphadenopathy  Skin good color and turgor  Neurologic grossly intact, no nuchal rigidity     Psychiatric   Orientation to person, place and time: Normal     Mood and affect: Normal        Future Appointments    Date/Time Provider Specialty Site   01/31/2018 03:45 PM Regina Milligan MD Internal Medicine [facility]   02/05/2018 03:00 PM Diana Bishop DO Orthopedic Surgery [facility] [facility] [address]     Signatures   Electronically signed by : Kassi Servin DO; Nov  3 2017  5:24PM EST                       (Author)

## 2017-11-15 ENCOUNTER — APPOINTMENT (OUTPATIENT)
Dept: URGENT CARE | Age: 52
End: 2017-11-15
Payer: MEDICARE

## 2017-11-15 ENCOUNTER — OFFICE VISIT (OUTPATIENT)
Dept: URGENT CARE | Age: 52
End: 2017-11-15
Payer: MEDICARE

## 2017-11-15 PROCEDURE — G0463 HOSPITAL OUTPT CLINIC VISIT: HCPCS | Performed by: FAMILY MEDICINE

## 2017-11-15 PROCEDURE — G0008 ADMIN INFLUENZA VIRUS VAC: HCPCS | Performed by: FAMILY MEDICINE

## 2017-11-15 PROCEDURE — 99213 OFFICE O/P EST LOW 20 MIN: CPT | Performed by: FAMILY MEDICINE

## 2017-11-15 PROCEDURE — 90686 IIV4 VACC NO PRSV 0.5 ML IM: CPT

## 2017-11-17 NOTE — PROGRESS NOTES
Assessment    1  Benign paroxysmal positional vertigo (386 11) (H81 10)    Discussion/Summary  Discussion Summary:   Sinusitis: Symptoms resolved after finishing LevaquinVertigo symptoms: Patient has long history of vertigo symptoms that he has followed with ENT  He has meclizine 12 5 mg at home that he uses on an as needed basis  Advised him to use that and if his symptoms persist he can follow up with ENT  They had recommended balance therapy in the past and he never did do the treatment  Understands and agrees with treatment plan: The treatment plan was reviewed with the patient/guardian  The patient/guardian understands and agrees with the treatment plan      Chief Complaint    1  Cold Symptoms  Chief Complaint Free Text Note Form: Pt finished a course of antibiotic 2 days ago for sinusitis and ear infection/vertigo  Vertigo continues  Ears ringingof chronic vertigo  History of Present Illness  HPI: Patient presents with complaint of dizziness  He states he was treated for a sinus infection here by Dr Munoz approximately 2 weeks ago  He took 10 days of Levaquin and feels his infection cleared  He has a long history of vertigo  He has been following ENT for number of years and has meclizine to use as needed  He feels there may be fluid behind his ear and he typically gets vertigo with infections  He did not take any of the meclizine at home  He has not seen ENT for number of years now  He denies any present cold symptoms such as fever, chills, sweats, headache, congestion, sore throat, cough  Hospital Based Practices Required Assessment:  Pain Assessment  the patient states they do not have pain  Abuse And Domestic Violence Screen   Yes, the patient is safe at home  -- The patient states no one is hurting them  Depression And Suicide Screen  No, the patient has not had thoughts of hurting themself  No, the patient has not felt depressed in the past 7 days  Active Problems  1   Acute sinusitis (461 9) (J01 90)   2  Allergic rhinitis (477 9) (J30 9)   3  Asthma (493 90) (J45 909)   4  Benign essential hypertension (401 1) (I10)   5  Chest pain (786 50) (R07 9)   6  Chronic bilateral low back pain without sciatica (724 2,338 29) (M54 5,G89 29)   7  DJD (degenerative joint disease) of knee (715 36) (M17 10)   8  Dyspnea on exertion (786 09) (R06 09)   9  GERD without esophagitis (530 81) (K21 9)   10  Globus hystericus (300 11) (F45 8)   11  Gout (274 9) (M10 9)   12  Hyperlipidemia (272 4) (E78 5)   13  Palpitations (785 1) (R00 2)   14  Primary localized osteoarthritis of left knee (715 16) (M17 12)   15  Primary localized osteoarthritis of right knee (715 16) (M17 11)   16  Right ankle effusion (719 07) (M25 471)   17  Right ankle pain, unspecified chronicity (719 47) (M25 571)   18  Tenosynovitis of foot and ankle (727 06) (M65 9)   19  Thyroid disorder (246 9) (E07 9)   20  Ventricular tachycardia (427 1) (I47 2)   21  Vertigo (780 4) (R42)    Past Medical History    1  History of Acute idiopathic gout of left ankle (274 01) (M10 072)   2  History of Acute left ankle pain (719 47) (M25 572)   3  History of Acute middle ear effusion, bilateral (381 00) (H65 193)   4  History of Acute recurrent maxillary sinusitis (461 0) (J01 01)   5  History of Acute URI (465 9) (J06 9)   6  History of Acute URI (465 9) (J06 9)   7  History of Arthritis (V13 4)   8  History of Back spasm (724 8) (M62 830)   9  History of Chronic rhinitis (472 0) (J31 0)   10  History of Chronic Serous Otitis Media (381 10)   11  History of Dyspnea (786 09) (R06 00)   12  History of Foot Pain (Soft Tissue) (729 5)   13  History of Functional murmur (R01 0)   14  History of Hearing problem (V41 2) (H91 90)   15  History of acute bronchitis (V12 69) (Z87 09)   16  History of acute bronchitis (V12 69) (Z87 09)   17  History of allergy (V15 09) (Z88 9)   18  History of backache (V13 59) (Z87 39)   19   History of candidiasis of mouth (V12 09) (Z86 19)   20  History of chronic sinusitis (V12 69) (Z87 09)   21  History of dizziness (V13 89) (Z87 898)   22  History of fatigue (V13 89) (Z87 898)   23  History of gastroesophageal reflux (GERD) (V12 79) (Z87 19)   24  History of gastroesophageal reflux (GERD) (V12 79) (Z87 19)   25  History of gout (V12 29) (Z87 39)   26  History of gout (V12 29) (Z87 39)   27  History of hiatal hernia (V12 79) (Z87 19)   28  History of hypercholesterolemia (V12 29) (Z86 39)   29  History of hypertension (V12 59) (Z86 79)   30  History of laryngitis (V12 69) (Z87 09)   31  History of low back pain (V13 59) (Z87 39)   32  History of sinusitis (V12 69) (Z87 09)   33  History of urinary frequency (V13 09) (Z87 898)   34  History of Knee pain (719 46) (M25 569)   35  History of Left foot pain (729 5) (M79 672)   36  History of Low grade fever (780 60) (R50 9)   37  History of Malaise (780 79) (R53 81)   38  History of Middle ear effusion (381 4) (H65 90)   39  History of Neck pain (723 1) (M54 2)   40  History of Need for influenza vaccination (V04 81) (Z23)   41  History of Need for prophylactic vaccination and inoculation against influenza (V04 81)  (Z23)   42  History of Non-toxic multinodular goiter (241 1) (E04 2)   43  History of Palpitations (785 1) (R00 2)   44  History of Papule of skin (709 8) (R23 8)   45  History of Screening for colon cancer (V76 51) (Z12 11)   46  History of Testicular hypogonadism (257 2) (E29 1)   47  History of Urinary urgency (788 63) (R39 15)    Family History  Father    1  Family history of Cancer   2  Family history of Coronary Artery Disease (V17 49)   3  Family history of Lung Cancer (V16 1)  Maternal Grandmother    4  Family history of Stroke Syndrome (V17 1)  Maternal Grandfather    5  Family history of Stroke Syndrome (V17 1)  Paternal Grandfather    6  Family history of Cancer  Cousin    7  Family history of lung cancer (V16 1) (Z80 1)  Family History    8   Family history of Heart Disease (V17 49)   9  Family history of Hypertension (V17 49)    Social History   · Denied: History of Drug Use   · Marital History - Single   · Never A Smoker   · No alcohol use   · No drug use   · Occupation:   · Travel history    Surgical History    1  History of Anesthesia For Tenodesis Of Ruptured Long Tendon Of Biceps   2  History of Appendectomy   3  History of Hernia Repair   4  History of Thyroid Surgery Total Thyroidectomy   5  History of Tonsillectomy    Current Meds   1  Advair Diskus 500-50 MCG/DOSE Inhalation Aerosol Powder Breath Activated; INHALE 1 PUFF TWICE DAILY; Therapy: 19LTA1328 to (Nura Shen) Recorded   2  Albuterol Sulfate (2 5 MG/3ML) 0 083% Inhalation Nebulization Solution; USE 1 UNIT DOSE IN NEBULIZER EVERY 4 TO 6 HOURS AS NEEDED; Therapy: 26CAI5825 to (Last Rx:54Kvr8682)  Requested for: 81YKA8803 Ordered   3  Cyclobenzaprine HCl - 5 MG Oral Tablet; TAKE 1 TABLET 3 TIMES DAILY AS NEEDED; Therapy: 54UPW0941 to (Evaluate:14Mar2017)  Requested for: 65PWH7308; Last Rx:13Jan2017 Ordered   4  DuoNeb 0 5-2 5 (3) MG/3ML Inhalation Solution; Therapy: (Recorded:08Jun2017) to Recorded   5  Esomeprazole Magnesium 40 MG Oral Capsule Delayed Release; TAKE 1 CAPSULE ONCE DAILY; Therapy: 02DKB8144 to (Evaluate:06Jan2018)  Requested for: 33Nse5742; Last Rx:64Zpm9451 Ordered   6  Lisinopril 20 MG Oral Tablet; Take 1 tablet daily  Requested for: 12XZK1993; Last Rx:12Svr1158 Ordered   7  Meclizine HCl - 12 5 MG Oral Tablet; TAKE 1 TABLET 3 TIMES DAILY AS NEEDED; Therapy: 34DGW3574 to (Evaluate:12Nov2017)  Requested for: 23Oct2017; Last Rx:23Oct2017 Ordered   8  Montelukast Sodium 10 MG Oral Tablet; TAKE 1 TABLET DAILY  Requested for: 87HRF9328; Last Rx:23Vyq3976 Ordered   9  Nasacort Allergy 24HR 55 MCG/ACT Nasal Aerosol; To be used as directed; Therapy: 54VTD6948 to Recorded   10  Ocean Nasal Spray 0 65 % Nasal Solution; Inhale 1-2 sprays as needed;   Therapy: 19ASE3534 to (Last Rx:17Tbr8692) Requested for: 42NWW0674 Ordered   11  Ocean Nasal Spray 0 65 % Nasal Solution; Inhale 1-2 sprays as needed; Therapy: 58Cuz9516 to (Last Rx:97Fhm0420)  Requested for: 51Rcz0100 Ordered   12  Sudafed TABS; Therapy: (Recorded:86Voo3671) to Recorded   13  Uloric 40 MG Oral Tablet; TAKE 1 TABLET DAILY; Therapy: 71EVE1660 to (Evaluate:22Qvr2268)  Requested for: 74EGX4036; Last  Rx:87Nkr3964 Ordered   14  Ventolin  (90 Base) MCG/ACT Inhalation Aerosol Solution; INHALE 2 PUFFS  Twice daily; Therapy: 60Gzj7404 to Recorded   15  Verapamil HCl - 120 MG Oral Tablet; TAKE 1 TABLET DAILY; Last Rx:11Ojr9005 Ordered   16  ZyrTEC Allergy 10 MG Oral Tablet; Take 1 tablet daily; Therapy: 87IFY6456 to (Last Rx:64Gxv8053)  Requested for: 38JVD5192 Ordered    Allergies    1  Cephalosporins   2  omeprazole   3  Penicillins   4  Pepcid TABS   5  Sulfa Drugs   6  Sulfa Drugs    Vitals  Signs   Recorded: 08MMB5824 04:56PM   Temperature: 99 F, Temporal  Heart Rate: 85  Respiration: 18  Systolic: 655  Diastolic: 80  Height: 5 ft 9 in  Weight: 218 lb   BMI Calculated: 32 19  BSA Calculated: 2 14  O2 Saturation: 95  Pain Scale: 0    Physical Exam   Constitutional  General appearance: No acute distress, well appearing and well nourished  Eyes  Conjunctiva and lids: No swelling, erythema, or discharge  Pupils and irises: Equal, round and reactive to light  Ears, Nose, Mouth, and Throat  External inspection of ears and nose: Normal    Otoscopic examination: Abnormal  -- Serous fluid behind left TM  Nasal mucosa, septum, and turbinates: Normal without edema or erythema  Oropharynx: Normal with no erythema, edema, exudate or lesions  Pulmonary  Respiratory effort: No increased work of breathing or signs of respiratory distress  Auscultation of lungs: Clear to auscultation  Cardiovascular  Auscultation of heart: Normal rate and rhythm, normal S1 and S2, without murmurs     Lymphatic  Palpation of lymph nodes in neck: No lymphadenopathy         Future Appointments    Date/Time Provider Specialty Site   01/31/2018 03:45 PM Marisela Murcia MD Internal Medicine [facility]   02/05/2018 03:00 PM Brent Esposito DO Orthopedic Surgery [facility]       Signatures   Electronically signed by : Oscar Bernabe [facility]; Nov 15 2017  5:54PM EST                       (Author)    Electronically signed by : SAYRA Negrete ; Nov 16 2017 11:24AM EST

## 2017-12-11 ENCOUNTER — GENERIC CONVERSION - ENCOUNTER (OUTPATIENT)
Dept: OTHER | Facility: OTHER | Age: 52
End: 2017-12-11

## 2018-01-10 NOTE — PROGRESS NOTES
History of Present Illness  Care Coordination Encounter Information:   Type of Encounter: Telephonic   Contact: Initial Contact    Spoke to Patient  Care Coordination  Nurse Mathews Lowers:   The reason for call is to discuss outreach for follow up/needed services  Patient was evaluated at the emergency department at [facility] on 10/23/16 and [facility] on 10/25/16 for right ankle pain and swelling  Patient has a history of chronic gout  Patient had a follow up appointment with Dr Mary Montesinos on 10/26/16  Called patient at this time to assess progress and address any questions or concerns  Tonia Polanco states that he is doing much better  He states that the pain to his right ankle is very minimal and he denies any swelling, redness fever or chills  He states that he is ambulating without difficulty  Tonia Polanco states that he had an appointment with sports medicine two days ago and was told that he has a "tendon inflamed"  He states that he has a podiatry appointment tomorrow with Dr Sallie Polanco states that he is doing well and denies any questions or concerns  He is encouraged to call if needed  Active Problems    1  Acute idiopathic gout of left ankle (274 01) (M10 072)   2  Acute left ankle pain (719 47) (M25 572)   3  Acute URI (465 9) (J06 9)   4  Allergic rhinitis (477 9) (J30 9)   5  Asthma (493 90) (J45 909)   6  Benign essential hypertension (401 1) (I10)   7  DJD (degenerative joint disease) of knee (715 36) (M17 9)   8  GERD without esophagitis (530 81) (K21 9)   9  Gout (274 9) (M10 9)   10  Hyperlipidemia (272 4) (E78 5)   11  Knee pain (719 46) (M25 569)   12  Need for influenza vaccination (V04 81) (Z23)   13  Palpitations (785 1) (R00 2)   14  Primary localized osteoarthritis of left knee (715 16) (M17 12)   15  Primary localized osteoarthritis of right knee (715 16) (M17 11)   16  Right ankle effusion (719 07) (M25 471)   17  Right ankle pain, unspecified chronicity (719 47) (M25 571)   18  Tenosynovitis of foot and ankle (727 06) (M65 9)   19  Ventricular tachycardia (427 1) (I47 2)   20  Vertigo (780 4) (R42)    Past Medical History    1  History of Acute URI (465 9) (J06 9)   2  History of Arthritis (V13 4)   3  History of Back spasm (724 8) (M62 830)   4  History of Chronic rhinitis (472 0) (J31 0)   5  History of Chronic Serous Otitis Media (381 10)   6  History of Dyspnea (786 09) (R06 00)   7  History of Foot Pain (Soft Tissue) (729 5)   8  History of Functional murmur (R01 0)   9  History of acute bronchitis (V12 69) (Z87 09)   10  History of acute bronchitis (V12 69) (Z87 09)   11  History of allergy (V15 09) (Z88 9)   12  History of backache (V13 59) (Z87 39)   13  History of candidiasis of mouth (V12 09) (Z86 19)   14  History of chronic sinusitis (V12 69) (Z87 09)   15  History of dizziness (V13 89) (Z87 898)   16  History of fatigue (V13 89) (Z87 898)   17  History of gastroesophageal reflux (GERD) (V12 79) (Z87 19)   18  History of gastroesophageal reflux (GERD) (V12 79) (Z87 19)   19  History of gout (V12 29) (Z87 39)   20  History of hypercholesterolemia (V12 29) (Z86 39)   21  History of hypertension (V12 59) (Z86 79)   22  History of low back pain (V13 59) (Z87 39)   23  History of urinary frequency (V13 09) (Z87 898)   24  History of Left foot pain (729 5) (M79 672)   25  History of Low grade fever (780 60) (R50 9)   26  History of Malaise (780 79) (R53 81)   27  History of Middle ear effusion (381 4) (H65 90)   28  History of Neck pain (723 1) (M54 2)   29  History of Need for prophylactic vaccination and inoculation against influenza (V04 81)    (Z23)   30  History of Non-toxic multinodular goiter (241 1) (E04 2)   31  History of Palpitations (785 1) (R00 2)   32  History of Papule of skin (709 8) (R23 8)   33  History of Testicular hypogonadism (257 2) (E29 1)   34  History of Urinary urgency (788 63) (R39 15)    Surgical History    1  History of Appendectomy   2   History of Hernia Repair   3  History of Thyroid Surgery Total Thyroidectomy   4  History of Tonsillectomy    Family History  Father    1  Family history of Coronary Artery Disease (V17 49)   2  Family history of Lung Cancer (V16 1)  Maternal Grandmother    3  Family history of Stroke Syndrome (V17 1)  Maternal Grandfather    4  Family history of Stroke Syndrome (V17 1)  Cousin    5  Family history of lung cancer (V16 1) (Z80 1)  Family History    6  Family history of Cancer   7  Family history of Heart Disease (V17 49)   8  Family history of Hypertension (V17 49)    Social History    · Being A Social Drinker   · Denied: History of Drug Use   · Marital History - Single   · Never A Smoker   · No drug use   · Occupation:   · Travel history    Current Meds    1  Albuterol Sulfate (2 5 MG/3ML) 0 083% Inhalation Nebulization Solution; USE 1 UNIT   DOSE IN NEBULIZER EVERY 4 TO 6 HOURS AS NEEDED; Therapy: 72WGC6906 to (Last Rx:11Eou1177)  Requested for: 75XGO8773 Ordered    2  Lisinopril 20 MG Oral Tablet (Zestril); Take 1 tablet daily  Requested for: 32Djw7548; Last   Rx:61Yyc3560 Ordered   3  Ventolin  (90 Base) MCG/ACT Inhalation Aerosol Solution; INHALE 2 PUFFS   Twice daily; Therapy: 67Cmw8533 to Recorded    4  Advair Diskus 500-50 MCG/DOSE Inhalation Aerosol Powder Breath Activated; INHALE 1   PUFF TWICE DAILY; Therapy: 54OWI9000 to (Evaluate:57Ahn5383) Recorded   5  Colcrys 0 6 MG Oral Tablet; Take 1 tablet daily; Therapy: 48Dlj2079 to (Evaluate:96Gcv8783)  Requested for: 27Oct2016; Last   Rx:27Oct2016 Ordered   6  Nasacort Allergy 24HR 55 MCG/ACT Nasal Aerosol; To be used as directed; Therapy: 23QDT3791 to Recorded   7  Uloric 40 MG Oral Tablet; TAKE 1 TABLET DAILY; Therapy: 84KZI7246 to (Sushma Khalil)  Requested for: 74LDL7652; Last   Rx:26Oct2016 Ordered    8  Montelukast Sodium 10 MG Oral Tablet (Singulair); TAKE 1 TABLET DAILY  Requested   for: 90RBT3762; Last Rx:91Qia4317 Ordered    9  Esomeprazole Magnesium 40 MG Oral Capsule Delayed Release (NexIUM); TAKE 1   CAPSULE ONCE DAILY; Therapy: 39DBY7785 to (Evaluate:92Nwk3382)  Requested for: 38Jbz7536; Last   Rx:97Brn4454; Status: ACTIVE - Renewal Denied, Transmit to [facility] Ordered    10  Verapamil HCl  MG Oral Tablet Extended Release; TAKE 1 TABLET DAILY; Therapy: 23ZPY4006 to Recorded    Allergies    1  Penicillins   2  Sulfa Drugs   3  Cephalosporins   4  omeprazole   5  Pepcid TABS   6  Sulfa Drugs    Health Management   *VB-Depression Screening; every 1 year; Last 42BHG8386; Next Due: 63JYE7955; Active    End of Encounter Meds    1  Albuterol Sulfate (2 5 MG/3ML) 0 083% Inhalation Nebulization Solution; USE 1 UNIT   DOSE IN NEBULIZER EVERY 4 TO 6 HOURS AS NEEDED; Therapy: 03WKB1259 to (Last Rx:50Qnp8290)  Requested for: 75ENF8357 Ordered    2  Lisinopril 20 MG Oral Tablet (Zestril); Take 1 tablet daily  Requested for: 74Kcg0675; Last   Rx:59Jgx1683 Ordered   3  Ventolin  (90 Base) MCG/ACT Inhalation Aerosol Solution; INHALE 2 PUFFS   Twice daily; Therapy: 05Rlo6085 to Recorded    4  Advair Diskus 500-50 MCG/DOSE Inhalation Aerosol Powder Breath Activated; INHALE 1   PUFF TWICE DAILY; Therapy: 41QUF5477 to (Evaluate:90Lvi7498) Recorded   5  Colcrys 0 6 MG Oral Tablet; Take 1 tablet daily; Therapy: 95Yxp9264 to (Evaluate:14Hir0844)  Requested for: 27Oct2016; Last   Rx:86Rod2696 Ordered   6  Nasacort Allergy 24HR 55 MCG/ACT Nasal Aerosol; To be used as directed; Therapy: 54LYV2927 to Recorded   7  Uloric 40 MG Oral Tablet; TAKE 1 TABLET DAILY; Therapy: 49FAG1034 to (Montine August)  Requested for: 09JGQ7478; Last   Rx:26Oct2016 Ordered    8  Montelukast Sodium 10 MG Oral Tablet (Singulair); TAKE 1 TABLET DAILY  Requested   for: 86CTP5916; Last Rx:60Lza8356 Ordered    9  Esomeprazole Magnesium 40 MG Oral Capsule Delayed Release (NexIUM); TAKE 1   CAPSULE ONCE DAILY;    Therapy: 07TTR3657 to (Evaluate:10Kyj6672)  Requested for: 50Foe3840; Last   Rx:22Kow6436; Status: ACTIVE - Renewal Denied, Transmit to [place]   Verification Ordered    10  Verapamil HCl  MG Oral Tablet Extended Release; TAKE 1 TABLET DAILY; Therapy: 72IDE6698 to Recorded    Future Appointments    Date/Time Provider Specialty Site   12/02/2016 04:45 PM Chay Dejesus MD Internal Medicine [facility]   11/11/2016 02:30 PM Aaron Munguia DO Orthopedic Surgery  [address]     Patient Care Team    Care Team Member Role Specialty Office Number   Roberth MONACO    Cardiology [phone]   [facility] Specialist Orthopedic Surgery [phone]   Len Lucas MD  Pulmonary Medicine [phone]     Signatures   Electronically signed by : Yvonne Vega; Nov 9 2016  2:54PM EST                       (Author)

## 2018-01-12 VITALS
HEIGHT: 70 IN | SYSTOLIC BLOOD PRESSURE: 118 MMHG | DIASTOLIC BLOOD PRESSURE: 68 MMHG | WEIGHT: 224.25 LBS | OXYGEN SATURATION: 98 % | TEMPERATURE: 99.1 F | HEART RATE: 78 BPM | BODY MASS INDEX: 32.1 KG/M2

## 2018-01-12 VITALS
DIASTOLIC BLOOD PRESSURE: 79 MMHG | WEIGHT: 225 LBS | BODY MASS INDEX: 30.48 KG/M2 | HEART RATE: 80 BPM | HEIGHT: 72 IN | SYSTOLIC BLOOD PRESSURE: 133 MMHG

## 2018-01-12 VITALS
DIASTOLIC BLOOD PRESSURE: 80 MMHG | WEIGHT: 223.13 LBS | SYSTOLIC BLOOD PRESSURE: 126 MMHG | HEIGHT: 70 IN | TEMPERATURE: 99.4 F | OXYGEN SATURATION: 98 % | BODY MASS INDEX: 31.94 KG/M2 | HEART RATE: 81 BPM

## 2018-01-12 NOTE — PROGRESS NOTES
Assessment    1  Benign essential hypertension (401 1) (I10)   2  Hyperlipidemia (272 4) (E78 5)   3  Gout (274 9) (M10 9)   4  Asthma (493 90) (J45 909)    Plan  Asthma    · Follow-up visit in 3 months Evaluation and Treatment  Follow-up  Status: Hold For - Scheduling   Requested for: 35Khd5605   · Complete PFT; Status:Active; Requested WFX:76NUU4151;     Discussion/Summary  Discussion Summary:   See med list    Counseling Documentation With Imm: The patient was counseled regarding diagnostic results, instructions for management, risk factor reductions, prognosis  Chief Complaint  Chief Complaint Free Text Note Form: Pt is here for a f/u appt for HTN, hyperlipidemia, & gout  Pt was in the ER @  [facility] on 9/5 for a gout attack  Pt had BW done 8/30/2016  History of Present Illness  Hypertension (Follow-Up): The patient presents for follow-up of essential hypertension  The patient states he has been stable with his blood pressure control since the last visit  He has no comorbid illnesses  Symptoms: The patient is currently asymptomatic  Associated symptoms include no headache, no focal neurologic deficits and no memory loss  Medications: The patient is not currently on any medications for his hypertension--lifestyle modification only  the patient is adherent with his medication regimen  Medication(s): a calcium channel blocker  The patient is due for a serum creatinine  Active Problems    1  Acute idiopathic gout of left ankle (274 01) (M10 072)   2  Acute left ankle pain (719 47) (M25 572)   3  Allergic rhinitis (477 9) (J30 9)   4  Asthma (493 90) (J45 909)   5  Benign essential hypertension (401 1) (I10)   6  DJD (degenerative joint disease) of knee (715 36) (M17 9)   7  GERD without esophagitis (530 81) (K21 9)   8  Gout (274 9) (M10 9)   9  Hyperlipidemia (272 4) (E78 5)    Past Medical History    1  History of Acute URI (465 9) (J06 9)   2  History of Arthritis (V13 4)   3   History of Back spasm (724 8) (M62 830)   4  History of Chronic rhinitis (472 0) (J31 0)   5  History of Chronic Serous Otitis Media (381 10)   6  History of Dyspnea (786 09) (R06 00)   7  History of Foot Pain (Soft Tissue) (729 5)   8  History of Functional murmur (R01 0)   9  History of acute bronchitis (V12 69) (Z87 09)   10  History of acute bronchitis (V12 69) (Z87 09)   11  History of allergy (V15 09) (Z88 9)   12  History of backache (V13 59) (Z87 39)   13  History of candidiasis of mouth (V12 09) (Z86 19)   14  History of chronic sinusitis (V12 69) (Z87 09)   15  History of dizziness (V13 89) (Z87 898)   16  History of fatigue (V13 89) (Z87 898)   17  History of gastroesophageal reflux (GERD) (V12 79) (Z87 19)   18  History of gastroesophageal reflux (GERD) (V12 79) (Z87 19)   19  History of gout (V12 29) (Z87 39)   20  History of hypercholesterolemia (V12 29) (Z86 39)   21  History of hypertension (V12 59) (Z86 79)   22  History of low back pain (V13 59) (Z87 39)   23  History of urinary frequency (V13 09) (Z87 898)   24  History of Left foot pain (729 5) (M79 672)   25  History of Low grade fever (780 60) (R50 9)   26  History of Malaise (780 79) (R53 81)   27  History of Middle ear effusion (381 4) (H65 90)   28  History of Neck pain (723 1) (M54 2)   29  History of Need for prophylactic vaccination and inoculation against influenza (V04 81) (Z23)   30  History of Non-toxic multinodular goiter (241 1) (E04 2)   31  History of Palpitations (785 1) (R00 2)   32  History of Palpitations (785 1) (R00 2)   33  History of Papule of skin (709 8) (R23 8)   34  History of Testicular hypogonadism (257 2) (E29 1)   35  History of Urinary urgency (788 63) (R39 15)  Active Problems And Past Medical History Reviewed: The active problems and past medical history were reviewed and updated today  Surgical History    1  History of Appendectomy   2  History of Hernia Repair   3   History of Thyroid Surgery Total Thyroidectomy 4  History of Tonsillectomy  Surgical History Reviewed: The surgical history was reviewed and updated today  Family History  Father    1  Family history of Coronary Artery Disease (V17 49)   2  Family history of Lung Cancer (V16 1)  Maternal Grandmother    3  Family history of Stroke Syndrome (V17 1)  Maternal Grandfather    4  Family history of Stroke Syndrome (V17 1)  Cousin    5  Family history of lung cancer (V16 1) (Z80 1)  Family History    6  Family history of Cancer   7  Family history of Heart Disease (V17 49)   8  Family history of Hypertension (V17 49)  Family History Reviewed: The family history was reviewed and updated today  Social History    · Being A Social Drinker   · Denied: History of Drug Use   · Marital History - Single   · Never A Smoker   · No drug use   · Occupation:   · Travel history  Social History Reviewed: The social history was reviewed and updated today  Current Meds   1  Advair Diskus 500-50 MCG/DOSE Inhalation Aerosol Powder Breath Activated; INHALE 1 PUFF   TWICE DAILY; Therapy: 95NKU6373 to (Evaluate:94Bsc3200) Recorded   2  Albuterol Sulfate (2 5 MG/3ML) 0 083% Inhalation Nebulization Solution; USE 1 UNIT DOSE IN   NEBULIZER EVERY 4 TO 6 HOURS AS NEEDED; Therapy: 52ZLK5676 to (Last Rx:65Hud4626)  Requested for: 66WYZ2434 Ordered   3  Allopurinol 300 MG Oral Tablet; TAKE 1 TABLET DAILY  Requested for: 84SOW9244; Last   Rx:17Mar2016 Ordered   4  Esomeprazole Magnesium 40 MG Oral Capsule Delayed Release; TAKE 1 CAPSULE ONCE DAILY; Therapy: 35CSM9787 to (Evaluate:90Baw2935)  Requested for: 15Tzj2273; Last Rx:53Azz2859;   Status: ACTIVE - Renewal Denied, Transmit to Pharmacy - Awaiting Verification Ordered   5  Ipratropium-Albuterol 0 5-2 5 (3) MG/3ML Inhalation Solution; ADMINISTER ONE 3 ML VIAL 4 TIMES   DAILY VIA NEBULIZATION  Requested for: 71YGI4665; Last Rx:11Mar2015 Ordered   6  Lisinopril 20 MG Oral Tablet;  Take 1 tablet daily  Requested for: 76FEQ8806; Last Rx:21Mar2016   Ordered   7  Montelukast Sodium 10 MG Oral Tablet; TAKE 1 TABLET DAILY  Requested for: 09DUX8210; Last   Rx:28Mar2016 Ordered   8  Nasacort Allergy 24HR 55 MCG/ACT Nasal Aerosol; To be used as directed; Therapy: 77JKZ6940 to Recorded   9  PredniSONE 20 MG Oral Tablet; TAKE 1 TABLET Daily 1 tab for 11 days; Therapy: (Recorded:16Qrd9619) to Recorded   10  Ventolin  (90 Base) MCG/ACT Inhalation Aerosol Solution; INHALE 2 PUFFS Twice daily; Therapy: 35Emx3789 to Recorded   11  Verapamil HCl  MG Oral Tablet Extended Release; TAKE 1 TABLET DAILY; Therapy: 75ZSU3534 to Recorded  Medication List Reviewed: The medication list was reviewed and updated today  Allergies    1  Penicillins   2  Sulfa Drugs   3  Cephalosporins   4  omeprazole   5  Pepcid TABS   6  Sulfa Drugs    Vitals  Vital Signs    Recorded: 88Olk6576 05:38PM Recorded: 27LZA1944 75:15IY   Systolic 309 261, LUE, Sitting   Diastolic 80 88, LUE, Sitting   Heart Rate  95   Temperature  99 2 F, Oral   O2 Saturation  97, RA   Height  5 ft 9 5 in   Weight  211 lb 2 oz   BMI Calculated  30 73   BSA Calculated  2 12     Physical Exam    Constitutional   General appearance: No acute distress, well appearing and well nourished  Eyes   Conjunctiva and lids: No swelling, erythema, or discharge  Ears, Nose, Mouth, and Throat   Otoscopic examination: Tympanic membrance translucent with normal light reflex  Canals patent without erythema  Nasal mucosa, septum, and turbinates: Normal without edema or erythema  Oropharynx: Normal with no erythema, edema, exudate or lesions  Pulmonary   Auscultation of lungs: Clear to auscultation, equal breath sounds bilaterally, no wheezes, no rales, no rhonci  Cardiovascular   Palpation of heart: Normal PMI, no thrills  Auscultation of heart: Normal rate and rhythm, normal S1 and S2, without murmurs      Examination of extremities for edema and/or varicosities: Normal     Carotid pulses: Normal     Abdomen   Abdomen: Non-tender, no masses  Liver and spleen: No hepatomegaly or splenomegaly  Musculoskeletal   Gait and station: Normal     Digits and nails: Normal without clubbing or cyanosis  Neurologic   Cranial nerves: Cranial nerves 2-12 intact  Reflexes: 2+ and symmetric  Psychiatric   Orientation to person, place and time: Normal     Mood and affect: Normal          Health Management  History of Depression screening   *VB-Depression Screening; every 1 year; Last 06SWA3270; Next Due: 42DJW1728; Active    Future Appointments    Date/Time Provider Specialty Site   09/16/2016 01:45 PM Ron Morgan DO Orthopedic Surgery [facility] [address]     Signatures   Electronically signed by :  Willie Bowser MD; Sep  7 2016  5:40PM EST                       (Author)

## 2018-01-13 VITALS
OXYGEN SATURATION: 98 % | HEIGHT: 69 IN | SYSTOLIC BLOOD PRESSURE: 128 MMHG | BODY MASS INDEX: 33.53 KG/M2 | WEIGHT: 226.38 LBS | HEART RATE: 84 BPM | TEMPERATURE: 99.4 F | DIASTOLIC BLOOD PRESSURE: 74 MMHG

## 2018-01-13 VITALS
DIASTOLIC BLOOD PRESSURE: 90 MMHG | HEART RATE: 99 BPM | BODY MASS INDEX: 30.89 KG/M2 | HEIGHT: 72 IN | OXYGEN SATURATION: 98 % | WEIGHT: 228.06 LBS | SYSTOLIC BLOOD PRESSURE: 140 MMHG | TEMPERATURE: 99.2 F

## 2018-01-13 VITALS
HEART RATE: 87 BPM | SYSTOLIC BLOOD PRESSURE: 148 MMHG | DIASTOLIC BLOOD PRESSURE: 81 MMHG | WEIGHT: 229 LBS | BODY MASS INDEX: 33.92 KG/M2 | HEIGHT: 69 IN

## 2018-01-13 VITALS
DIASTOLIC BLOOD PRESSURE: 80 MMHG | SYSTOLIC BLOOD PRESSURE: 128 MMHG | TEMPERATURE: 99.1 F | HEART RATE: 87 BPM | BODY MASS INDEX: 31.85 KG/M2 | OXYGEN SATURATION: 97 % | HEIGHT: 70 IN | WEIGHT: 222.5 LBS

## 2018-01-13 VITALS
HEIGHT: 69 IN | SYSTOLIC BLOOD PRESSURE: 126 MMHG | WEIGHT: 224 LBS | HEART RATE: 72 BPM | DIASTOLIC BLOOD PRESSURE: 76 MMHG | BODY MASS INDEX: 33.18 KG/M2

## 2018-01-14 VITALS
DIASTOLIC BLOOD PRESSURE: 90 MMHG | OXYGEN SATURATION: 97 % | HEIGHT: 70 IN | BODY MASS INDEX: 31.5 KG/M2 | HEART RATE: 98 BPM | WEIGHT: 220 LBS | TEMPERATURE: 99.1 F | SYSTOLIC BLOOD PRESSURE: 136 MMHG

## 2018-01-14 VITALS
WEIGHT: 221.25 LBS | HEART RATE: 90 BPM | DIASTOLIC BLOOD PRESSURE: 82 MMHG | OXYGEN SATURATION: 98 % | SYSTOLIC BLOOD PRESSURE: 128 MMHG | TEMPERATURE: 99.1 F | HEIGHT: 72 IN | BODY MASS INDEX: 29.97 KG/M2

## 2018-01-14 VITALS
DIASTOLIC BLOOD PRESSURE: 83 MMHG | SYSTOLIC BLOOD PRESSURE: 130 MMHG | BODY MASS INDEX: 34.12 KG/M2 | WEIGHT: 230.38 LBS | HEIGHT: 69 IN | HEART RATE: 85 BPM

## 2018-01-14 VITALS
WEIGHT: 227.5 LBS | TEMPERATURE: 99 F | BODY MASS INDEX: 33.69 KG/M2 | SYSTOLIC BLOOD PRESSURE: 130 MMHG | HEART RATE: 80 BPM | OXYGEN SATURATION: 97 % | HEIGHT: 69 IN | DIASTOLIC BLOOD PRESSURE: 86 MMHG

## 2018-01-15 VITALS
HEART RATE: 101 BPM | WEIGHT: 222 LBS | HEIGHT: 70 IN | SYSTOLIC BLOOD PRESSURE: 110 MMHG | DIASTOLIC BLOOD PRESSURE: 73 MMHG | BODY MASS INDEX: 31.78 KG/M2

## 2018-01-15 VITALS
BODY MASS INDEX: 32.1 KG/M2 | TEMPERATURE: 98.5 F | HEART RATE: 98 BPM | HEIGHT: 70 IN | OXYGEN SATURATION: 96 % | WEIGHT: 224.25 LBS | DIASTOLIC BLOOD PRESSURE: 82 MMHG | SYSTOLIC BLOOD PRESSURE: 136 MMHG

## 2018-01-15 VITALS
BODY MASS INDEX: 32.25 KG/M2 | WEIGHT: 225.25 LBS | HEART RATE: 83 BPM | HEIGHT: 70 IN | OXYGEN SATURATION: 97 % | TEMPERATURE: 98.9 F | SYSTOLIC BLOOD PRESSURE: 140 MMHG | DIASTOLIC BLOOD PRESSURE: 70 MMHG

## 2018-01-15 NOTE — MISCELLANEOUS
History of Present Illness  TCM Communication [facility]: The patient is being contacted for follow-up after hospitalization and Pt declined to schedule KATHERYN  He wishes to keep[ regular scheduled appt  He was hospitalized at [facility]  The date of admission: 6/28/17, date of discharge: 6/29/17  Diagnosis: chest pain, CAMP, asthma, HTN, allergic rhinitis, chronic sinusitis  He was discharged to home  Medications were not reviewed today  He did not schedule a follow up appointment  The patient is currently asymptomatic  Counseling was provided to the patient  Communication performed and completed by kb      Active Problems     1  Allergic rhinitis (477 9) (J30 9)   2  Asthma (493 90) (J45 909)   3  Chronic bilateral low back pain without sciatica (724 2,338 29) (M54 5,G89 29)   4  DJD (degenerative joint disease) of knee (715 36) (M17 10)   5  GERD without esophagitis (530 81) (K21 9)   6  Globus hystericus (300 11) (F45 8)   7  Gout (274 9) (M10 9)   8  Hyperlipidemia (272 4) (E78 5)   9  Primary localized osteoarthritis of left knee (715 16) (M17 12)   10  Primary localized osteoarthritis of right knee (715 16) (M17 11)   11  Right ankle effusion (719 07) (M25 471)   12  Right ankle pain, unspecified chronicity (719 47) (M25 571)   13  Sinusitis (473 9) (J32 9)   14  Tenosynovitis of foot and ankle (727 06) (M65 9)   15  Thyroid disorder (246 9) (E07 9)   16  Ventricular tachycardia (427 1) (I47 2)   17  Vertigo (780 4) (R42)    Palpitations (785 1) (R00 2)       Benign essential hypertension (401 1) (I10)       Chest pain (786 50) (R07 9)       Dyspnea on exertion (786 09) (R06 09)          Past Medical History    1  History of Acute idiopathic gout of left ankle (274 01) (M10 072)   2  History of Acute left ankle pain (719 47) (M25 572)   3  History of Acute middle ear effusion, bilateral (381 00) (H65 193)   4  History of Acute recurrent maxillary sinusitis (461 0) (J01 01)   5   History of Acute URI (465 9) (J06 9) 6  History of Acute URI (465 9) (J06 9)   7  History of Arthritis (V13 4)   8  History of Back spasm (724 8) (M62 830)   9  History of Chronic rhinitis (472 0) (J31 0)   10  History of Chronic Serous Otitis Media (381 10)   11  History of Dyspnea (786 09) (R06 00)   12  History of Foot Pain (Soft Tissue) (729 5)   13  History of Functional murmur (R01 0)   14  History of Hearing problem (V41 2) (H91 90)   15  History of acute bronchitis (V12 69) (Z87 09)   16  History of acute bronchitis (V12 69) (Z87 09)   17  History of allergy (V15 09) (Z88 9)   18  History of backache (V13 59) (Z87 39)   19  History of candidiasis of mouth (V12 09) (Z86 19)   20  History of chronic sinusitis (V12 69) (Z87 09)   21  History of dizziness (V13 89) (Z87 898)   22  History of fatigue (V13 89) (Z87 898)   23  History of gastroesophageal reflux (GERD) (V12 79) (Z87 19)   24  History of gastroesophageal reflux (GERD) (V12 79) (Z87 19)   25  History of gout (V12 29) (Z87 39)   26  History of gout (V12 29) (Z87 39)   27  History of hiatal hernia (V12 79) (Z87 19)   28  History of hypercholesterolemia (V12 29) (Z86 39)   29  History of hypertension (V12 59) (Z86 79)   30  History of laryngitis (V12 69) (Z87 09)   31  History of low back pain (V13 59) (Z87 39)   32  History of urinary frequency (V13 09) (Z87 898)   33  History of Knee pain (719 46) (M25 569)   34  History of Left foot pain (729 5) (M79 672)   35  History of Low grade fever (780 60) (R50 9)   36  History of Malaise (780 79) (R53 81)   37  History of Middle ear effusion (381 4) (H65 90)   38  History of Neck pain (723 1) (M54 2)   39  History of Need for influenza vaccination (V04 81) (Z23)   40  History of Need for prophylactic vaccination and inoculation against influenza (V04 81)    (Z23)   41  History of Non-toxic multinodular goiter (241 1) (E04 2)   42  History of Palpitations (785 1) (R00 2)   43  History of Papule of skin (709 8) (R23 8)   44   History of Screening for colon cancer (V76 51) (Z12 11)   45  History of Testicular hypogonadism (257 2) (E29 1)   46  History of Urinary urgency (788 63) (R39 15)    Surgical History    1  History of Appendectomy   2  History of Hernia Repair   3  History of Thyroid Surgery Total Thyroidectomy   4  History of Tonsillectomy    Family History  Father    1  Family history of Cancer   2  Family history of Coronary Artery Disease (V17 49)   3  Family history of Lung Cancer (V16 1)  Maternal Grandmother    4  Family history of Stroke Syndrome (V17 1)  Maternal Grandfather    5  Family history of Stroke Syndrome (V17 1)  Paternal Grandfather    6  Family history of Cancer  Cousin    7  Family history of lung cancer (V16 1) (Z80 1)  Family History    8  Family history of Heart Disease (V17 49)   9  Family history of Hypertension (V17 49)    Social History    · Denied: History of Drug Use   · Marital History - Single   · Never A Smoker   · No alcohol use   · No drug use   · Occupation:   · Travel history    Current Meds   1  Advair Diskus 500-50 MCG/DOSE Inhalation Aerosol Powder Breath Activated; INHALE 1   PUFF TWICE DAILY; Therapy: 28OHT0808 to (Douglas Hinojosa) Recorded   2  Albuterol Sulfate (2 5 MG/3ML) 0 083% Inhalation Nebulization Solution; USE 1 UNIT   DOSE IN NEBULIZER EVERY 4 TO 6 HOURS AS NEEDED; Therapy: 50PBP7632 to (Last Rx:02Atx7002)  Requested for: 01NMC0202 Ordered   3  Azithromycin 250 MG Oral Tablet; Take 2 tablets today, then 1 tablet daily for 4 days; Therapy: 49ZDQ0563 to (Last Rx:08Jun2017)  Requested for: 98DTR3516 Ordered   4  Cyclobenzaprine HCl - 5 MG Oral Tablet; TAKE 1 TABLET 3 TIMES DAILY AS NEEDED; Therapy: 65YTH3658 to (Evaluate:14Mar2017)  Requested for: 22TZS8884; Last   Rx:13Jan2017 Ordered   5  DuoNeb 0 5-2 5 (3) MG/3ML Inhalation Solution; Therapy: (Recorded:08Jun2017) to Recorded   6  Esomeprazole Magnesium 40 MG Oral Capsule Delayed Release; TAKE 1 CAPSULE   ONCE DAILY;    Therapy: 37VGM7985 to (Evaluate:42Hpc1675)  Requested for: 99PZE7895; Last   Rx:03Feb2017 Ordered   7  Lisinopril 20 MG Oral Tablet; Take 1 tablet daily  Requested for: 21Mar2017; Last   Rx:21Mar2017 Ordered   8  Meclizine HCl - 25 MG Oral Tablet; TAKE 1 TABLET 3 times daily; Therapy: 33UVZ3376 to Recorded   9  Meloxicam 15 MG Oral Tablet; TAKE 1 TABLET DAILY AS NEEDED; Therapy: 67NLK5656 to (Evaluate:38Wee4175)  Requested for: 32CFP9724; Last   Rx:13Jan2017 Ordered   10  Montelukast Sodium 10 MG Oral Tablet; TAKE 1 TABLET DAILY  Requested for:    03NIJ6510; Last Rx:28Mar2017 Ordered   11  Nasacort Allergy 24HR 55 MCG/ACT Nasal Aerosol; To be used as directed; Therapy: 22CQI6233 to Recorded   12  Ocean Nasal Spray 0 65 % Nasal Solution; Inhale 1-2 sprays as needed; Therapy: 94RZC4167 to (Last Rx:08Jun2017)  Requested for: 54IXR5897 Ordered   13  Ocean Nasal Spray 0 65 % Nasal Solution; Inhale 1-2 sprays as needed; Therapy: 13Ubn0800 to (Last Rx:21Feb2017)  Requested for: 21Feb2017 Ordered   14  Uloric 40 MG Oral Tablet; TAKE 1 TABLET DAILY; Therapy: 89FYU2490 to (Evaluate:90Lee1809)  Requested for: 49DZW6771; Last    Rx:13Jun2017 Ordered   15  Ventolin  (90 Base) MCG/ACT Inhalation Aerosol Solution; INHALE 2 PUFFS    Twice daily; Therapy: 35Lex5089 to Recorded   16  Verapamil HCl  MG Oral Tablet Extended Release; TAKE 1 TABLET DAILY; Therapy: 33NSO5710 to Recorded   17  ZyrTEC Allergy 10 MG Oral Tablet; Take 1 tablet daily; Therapy: 77MLT7155 to (Last Rx:08Jun2017)  Requested for: 33BRQ9805 Ordered    Allergies    1  Penicillins   2  Sulfa Drugs   3  Cephalosporins   4  omeprazole   5  Pepcid TABS   6  Sulfa Drugs    Health Management  History of Depression screening   *VB-Depression Screening; every 1 year; Last 92IWS4314; Next Due: 59WMZ3800;  Overdue    Future Appointments    Date/Time Provider Specialty Site   07/18/2017 03:30 PM Pham Edwards MD Internal Medicine [facility]   07/21/2017 03:00 PM Miroslava Schulz DO Orthopedic Surgery [facility] [address]     Signatures   Electronically signed by : Ortiz Solorio, ; Jun 30 2017 12:18PM EST                       (Co-author)    Electronically signed by :  Jed Lundborg, MD; Jul 9 2017  6:10PM EST                       (Author)

## 2018-01-16 NOTE — PROGRESS NOTES
Assessment    1  Acute URI (465 9) (J06 9)    Plan  PMH: History of gastroesophageal reflux (GERD)    · From  Esomeprazole Magnesium 40 MG Oral Capsule Delayed Release  TAKE 1 CAPSULE TWICE DAILY To Esomeprazole Magnesium 40 MG Oral Capsule  Delayed Release (NexIUM) TAKE 1 CAPSULE ONCE DAILY    Discussion/Summary    1  Acute URI- likely viral  Continue conservative therapy  Drink 6-8 cups of water per day and plenty of rest  Take inhalers as prescribed  Flu vaccine up to date  The patient was counseled regarding instructions for management, risk factor reductions, prognosis, patient and family education, impressions, risks and benefits of treatment options, importance of compliance with treatment  Possible side effects of new medications were reviewed with the patient/guardian today  The treatment plan was reviewed with the patient/guardian  The patient/guardian understands and agrees with the treatment plan      Chief Complaint  pt is here with c/o chest tightness, congestion, vertigo and he says he has had some irritation in his L eye, loss of appetite  he says it started 12/17/15  pt would also like ot discuss nexium  History of Present Illness  Cold Symptoms:   Moni Wilde presents with complaints of gradual onset of constant episodes of moderate cold symptoms (SYmptoms started with sinus congestion in mid December  Symptoms got better but now returned  )   Associated symptoms include runny nose, dry cough, plugged ear(s), wheezing, shortness of breath and fever, but no sneezing, no nasal congestion, no post nasal drainage, no scratchy throat, no sore throat, no hoarseness, no productive cough, no facial pressure, no facial pain, no headache, no ear pain, no swollen lymph nodes, no fatigue, no weakness, no nausea, no vomiting, no diarrhea and no chills  Review of Systems    Constitutional: fever, chills and feeling tired, but not feeling poorly  ENT: no sore throat and no hoarseness  Cardiovascular: no chest pain and no palpitations  Respiratory: cough, wheezing and shortness of breath during exertion, but no shortness of breath  Gastrointestinal: poor appetie, but no abdominal pain, no nausea and no diarrhea  Musculoskeletal: no arthralgias and no myalgias  Integumentary: no rashes  Neurological: veritgo, but no headache and no numbness  ROS reviewed  Active Problems    1  Asthma (493 90) (J45 909)   2  Benign essential hypertension (401 1) (I10)   3  DJD (degenerative joint disease) of knee (715 36) (M17 9)   4  GERD (gastroesophageal reflux disease) (530 81) (K21 9)   5  Gout (274 9) (M10 9)   6  Hyperlipidemia (272 4) (E78 5)   7  Need for influenza vaccination (V04 81) (Z23)   8  Papule of skin (709 8) (R23 8)    Past Medical History  Active Problems And Past Medical History Reviewed: The active problems and past medical history were reviewed and updated today  Social History    · Being A Social Drinker   · Denied: History of Drug Use   · Experimented with Marijuana 1982   · Marital History - Single   · Never A Smoker   · No drug use   · Occupation:   · UNEMPLOYED   · Travel history   · Pt denies being out of the country during 1/22014-10/  The social history was reviewed and updated today  Family History  Family History Reviewed: The family history was reviewed and updated today  Current Meds   1  Advair -21 MCG/ACT Inhalation Aerosol; INHALE 2 PUFFS,  BY MOUTH, TWICE   DAILY; Therapy: (Recorded:20Nov2015) to Recorded   2  Albuterol Sulfate (2 5 MG/3ML) 0 083% Inhalation Nebulization Solution; USE 1 UNIT   DOSE IN NEBULIZER EVERY 4 TO 6 HOURS AS NEEDED; Therapy: 58TDG3172 to (Last Rx:26Mar2015)  Requested for: 26Mar2015 Ordered   3  Allopurinol 100 MG Oral Tablet; Take 1 tablet in the morning  Take 2 tablets in the   evening  Requested for: 12Jan2016; Last Rx:12Jan2016 Ordered   4   Colchicine 0 6 MG Oral Tablet; TAKE 1 TABLET TWICE DAILY AS DIRECTED; Therapy: 01Hlb8804 to (Evaluate:15Gsl6261)  Requested for: 25Mog7782; Last   Rx:63Pql2647 Ordered   5  Cyclobenzaprine HCl - 5 MG Oral Tablet; take one tablet three times daily as needed for   pain  Requested for: 89Crg1769; Last Rx:62Zxo3877 Ordered   6  Ipratropium-Albuterol 0 5-2 5 (3) MG/3ML Inhalation Solution; ADMINISTER ONE 3 ML   VIAL 4 TIMES DAILY VIA NEBULIZATION  Requested for: 98WFB8506; Last Rx:41Vax3881   Ordered   7  Lisinopril 20 MG Oral Tablet; Take 1 tablet daily  Requested for: 22Bcf5373; Last   Rx:14Wdo0082 Ordered   8  Montelukast Sodium 10 MG Oral Tablet; TAKE 1 TABLET DAILY  Requested for:   66QNZ1292; Last Rx:03Hgh7742 Ordered   9  Nasacort AQ 55 MCG/ACT AERS; INSTILL 2 SPRAYS IN EACH NOSTRIL ONCE DAILY; Therapy: (Recorded:69Miu3906) to Recorded   10  Verapamil HCl  MG Oral Tablet Extended Release; TAKE 1 TABLET DAILY; Therapy: 62RTQ3152 to Recorded    The medication list was reviewed and updated today  Allergies    1  Penicillins   2  Sulfa Drugs   3  Cephalosporins   4  omeprazole   5  Sulfa Drugs    Vitals   Recorded: 74JNB1985 04:33PM   Temperature 99 3 F, Oral   Heart Rate 90   Systolic 520, LUE, Sitting   Diastolic 82, LUE, Sitting   BP Cuff Size Large   Height 5 ft 11 in   Weight 215 lb 6 oz   BMI Calculated 30 04   BSA Calculated 2 18   O2 Saturation 98, RA     Physical Exam    Constitutional   General appearance: Abnormal   appears tired  Eyes   Conjunctiva and lids: No swelling, erythema, or discharge  Ears, Nose, Mouth, and Throat   Otoscopic examination: Tympanic membrance translucent with normal light reflex  Canals patent without erythema  Nasal mucosa, septum, and turbinates: Normal without edema or erythema  Oropharynx: Normal with no erythema, edema, exudate or lesions  Pulmonary   Respiratory effort: Abnormal   Respiratory Findings: dry cough     Auscultation of lungs: Clear to auscultation, equal breath sounds bilaterally, no wheezes, no rales, no rhonci  no rales or crackles were heard bilaterally  no rhonchi  no friction rub  no wheezing  Cardiovascular   Auscultation of heart: Normal rate and rhythm, normal S1 and S2, without murmurs  Examination of extremities for edema and/or varicosities: Normal     Abdomen   Abdomen: Non-tender, no masses  Liver and spleen: No hepatomegaly or splenomegaly  Musculoskeletal   Gait and station: Normal     Neurologic   Cranial nerves: Cranial nerves 2-12 intact      Psychiatric   Orientation to person, place and time: Normal     Mood and affect: Normal          Future Appointments    Date/Time Provider Specialty Site   02/24/2016 04:30 PM Hank Larson MD Internal Medicine [facility]     Signatures   Electronically signed by : Karla Rojas, [address] [place] [address]; Jan 13 2016  5:10PM EST                       (Author)    Electronically signed by : Sekou Olsen MD; Abimael 15 2016  5:21PM EST

## 2018-01-18 NOTE — PROGRESS NOTES
Assessment    1  Acute middle ear effusion, bilateral (381 00) (O14 235)    Discussion/Summary  Discussion Summary:   Your ears do not appear infected  There is fluid behind the ear drum  You need an antihistamine to dry up the fluid  Take zyrtec, claritin or allegra as discussed  Stop the sudafed  Medication Side Effects Reviewed: Possible side effects of new medications were reviewed with the patient/guardian today  Understands and agrees with treatment plan: The treatment plan was reviewed with the patient/guardian  The patient/guardian understands and agrees with the treatment plan   Follow Up Instructions: Follow Up with your Primary Care Provider in 2 days  If your symptoms worsen, go to the nearest [facility] Emergency Department  Chief Complaint    1  Cold Symptoms  Chief Complaint Free Text Note Form: c/o hoarse voice, nasal congestion, swollen glands and left ear pain (on and off)  Took Tylenol and Sudafed at 3:30  Recently finished Doxycycline for sinus infection  History of Present Illness  HPI: This is a 49year old male who states he has left ear pain  He states he has a left narrow eustachian tube  He states he has had congestion and PND  Has been taking sudafed for weeks  Finished Doxy 1 month ago  Denies fevers  Hospital Based Practices Required Assessment:   Pain Assessment   the patient states they have pain  The pain is located in the throat  (on a scale of 0 to 10, the patient rates the pain at 3 )   Abuse And Domestic Violence Screen    Yes, the patient is safe at home  The patient states no one is hurting them  Depression And Suicide Screen  No, the patient has not had thoughts of hurting themself  No, the patient has not felt depressed in the past 7 days  Prefered Language is  [place]  Primary Language is  English  Review of Systems  Focused-Male:   Constitutional: as noted in HPI  Active Problems    1   Acute idiopathic gout of left ankle (274 01) (M10 072)   2  Allergic rhinitis (477 9) (J30 9)   3  Asthma (493 90) (J45 909)   4  Benign essential hypertension (401 1) (I10)   5  DJD (degenerative joint disease) of knee (715 36) (M17 9)   6  GERD without esophagitis (530 81) (K21 9)   7  Gout (274 9) (M10 9)   8  Hyperlipidemia (272 4) (E78 5)   9  Palpitations (785 1) (R00 2)   10  Primary localized osteoarthritis of left knee (715 16) (M17 12)   11  Primary localized osteoarthritis of right knee (715 16) (M17 11)   12  Right ankle effusion (719 07) (M25 471)   13  Right ankle pain, unspecified chronicity (719 47) (M25 571)   14  Tenosynovitis of foot and ankle (727 06) (M65 9)   15  Thyroid disorder (246 9) (E07 9)   16  Ventricular tachycardia (427 1) (I47 2)   17  Vertigo (780 4) (R42)    Past Medical History    1  History of Acute left ankle pain (719 47) (M25 572)   2  History of Acute recurrent maxillary sinusitis (461 0) (J01 01)   3  History of Acute URI (465 9) (J06 9)   4  History of Acute URI (465 9) (J06 9)   5  History of Arthritis (V13 4)   6  History of Back spasm (724 8) (M62 830)   7  History of Chronic rhinitis (472 0) (J31 0)   8  History of Chronic Serous Otitis Media (381 10)   9  History of Dyspnea (786 09) (R06 00)   10  History of Foot Pain (Soft Tissue) (729 5)   11  History of Functional murmur (R01 0)   12  History of Hearing problem (V41 2) (H91 90)   13  History of acute bronchitis (V12 69) (Z87 09)   14  History of acute bronchitis (V12 69) (Z87 09)   15  History of allergy (V15 09) (Z88 9)   16  History of backache (V13 59) (Z87 39)   17  History of candidiasis of mouth (V12 09) (Z86 19)   18  History of chronic sinusitis (V12 69) (Z87 09)   19  History of dizziness (V13 89) (Z87 898)   20  History of fatigue (V13 89) (Z87 898)   21  History of gastroesophageal reflux (GERD) (V12 79) (Z87 19)   22  History of gastroesophageal reflux (GERD) (V12 79) (Z87 19)   23  History of gout (V12 29) (Z87 39)   24   History of hiatal hernia (V12 79) (Z87 19)   25  History of hypercholesterolemia (V12 29) (Z86 39)   26  History of hypertension (V12 59) (Z86 79)   27  History of low back pain (V13 59) (Z87 39)   28  History of urinary frequency (V13 09) (Z87 898)   29  History of Knee pain (719 46) (M25 569)   30  History of Left foot pain (729 5) (M79 672)   31  History of Low grade fever (780 60) (R50 9)   32  History of Malaise (780 79) (R53 81)   33  History of Middle ear effusion (381 4) (H65 90)   34  History of Neck pain (723 1) (M54 2)   35  History of Need for influenza vaccination (V04 81) (Z23)   36  History of Need for prophylactic vaccination and inoculation against influenza (V04 81)    (Z23)   37  History of Non-toxic multinodular goiter (241 1) (E04 2)   38  History of Palpitations (785 1) (R00 2)   39  History of Papule of skin (709 8) (R23 8)   40  History of Screening for colon cancer (V76 51) (Z12 11)   41  History of Testicular hypogonadism (257 2) (E29 1)   42  History of Urinary urgency (788 63) (R39 15)  Active Problems And Past Medical History Reviewed: The active problems and past medical history were reviewed and updated today  Family History  Father    1  Family history of Cancer   2  Family history of Coronary Artery Disease (V17 49)   3  Family history of Lung Cancer (V16 1)  Maternal Grandmother    4  Family history of Stroke Syndrome (V17 1)  Maternal Grandfather    5  Family history of Stroke Syndrome (V17 1)  Paternal Grandfather    6  Family history of Cancer  Cousin    7  Family history of lung cancer (V16 1) (Z80 1)  Family History    8  Family history of Heart Disease (V17 49)   9  Family history of Hypertension (V17 49)  Family History Reviewed: The family history was reviewed and updated today  Social History    · Denied: History of Drug Use   · Marital History - Single   · Never A Smoker   · No alcohol use   · No drug use   · Occupation:   · Travel history  Social History Reviewed:  The social history was reviewed and updated today  The social history was reviewed and is unchanged  Surgical History    1  History of Appendectomy   2  History of Hernia Repair   3  History of Thyroid Surgery Total Thyroidectomy   4  History of Tonsillectomy  Surgical History Reviewed: The surgical history was reviewed and updated today  Current Meds   1  Advair Diskus 500-50 MCG/DOSE Inhalation Aerosol Powder Breath Activated; INHALE 1   PUFF TWICE DAILY; Therapy: 79TTT6829 to (Evaluate:36Vmx0820) Recorded   2  Albuterol Sulfate (2 5 MG/3ML) 0 083% Inhalation Nebulization Solution; USE 1 UNIT   DOSE IN NEBULIZER EVERY 4 TO 6 HOURS AS NEEDED; Therapy: 89TIQ3514 to (Last Rx:19Iiq7219)  Requested for: 83WTL0481 Ordered   3  Colcrys 0 6 MG Oral Tablet; Take 1 tablet daily; Therapy: 00Yxr9266 to (Last Rx:28Nov2016)  Requested for: 28Nov2016 Ordered   4  Esomeprazole Magnesium 40 MG Oral Capsule Delayed Release; TAKE 1 CAPSULE   ONCE DAILY; Therapy: 31POK2731 to (Evaluate:79Tev2579)  Requested for: 31Tze2344; Last   Rx:[id number]; Status: ACTIVE - Renewal Denied, Transmit to Pharmacy - Awaiting   Verification Ordered   5  Lisinopril 20 MG Oral Tablet; Take 1 tablet daily  Requested for: 55Cgb3184; Last   Rx:[id number] Ordered   6  Montelukast Sodium 10 MG Oral Tablet; TAKE 1 TABLET DAILY  Requested for:   61DKJ0348; Last Rx:80Dpt4857 Ordered   7  Nasacort Allergy 24HR 55 MCG/ACT Nasal Aerosol; To be used as directed; Therapy: 29XSU2876 to Recorded   8  Uloric 40 MG Oral Tablet; TAKE 1 TABLET DAILY; Therapy: 95TLH2279 to (Evaluate:10Jun2017)  Requested for: 72Sby3939; Last   Rx:[id number] Ordered   9  Ventolin  (90 Base) MCG/ACT Inhalation Aerosol Solution; INHALE 2 PUFFS   Twice daily; Therapy: 33Fft8873 to Recorded   10  Verapamil HCl  MG Oral Tablet Extended Release; TAKE 1 TABLET DAILY; Therapy: 38ICK4319 to Recorded  Medication List Reviewed:    The medication list was reviewed and updated today  Allergies    1  Penicillins   2  Sulfa Drugs   3  Cephalosporins   4  omeprazole   5  Pepcid TABS   6  Sulfa Drugs    Vitals  Signs   Recorded: 12Fko4927 06:34PM   Temperature: 99 2 F, Oral  Heart Rate: 76  Pulse Quality: Regular  Respiration: 18  Systolic: 557, RUE, Sitting  Diastolic: 76, RUE, Sitting  Height: 5 ft 10 in  Weight: 222 lb   BMI Calculated: 31 85  BSA Calculated: 2 18  O2 Saturation: 96  Pain Scale: 3    Physical Exam    Constitutional   General appearance: No acute distress, well appearing and well nourished  Eyes   Conjunctiva and lids: No swelling, erythema, or discharge  Pupils and irises: Equal, round and reactive to light  Ears, Nose, Mouth, and Throat   External inspection of ears and nose: Normal     Otoscopic examination: Abnormal   Left TM bulging  Right TM bulging  NO redness  Nasal mucosa, septum, and turbinates: Normal without edema or erythema  Oropharynx: Abnormal   + PND  Pulmonary   Respiratory effort: No increased work of breathing or signs of respiratory distress  Auscultation of lungs: Clear to auscultation  Cardiovascular   Auscultation of heart: Normal rate and rhythm, normal S1 and S2, without murmurs  Examination of extremities for edema and/or varicosities: Normal     Abdomen   Abdomen: Non-tender, no masses  Liver and spleen: No hepatomegaly or splenomegaly  Lymphatic   Palpation of lymph nodes in neck: No lymphadenopathy  Musculoskeletal   Gait and station: Normal     Digits and nails: Normal without clubbing or cyanosis  Inspection/palpation of joints, bones, and muscles: Normal     Skin   Skin and subcutaneous tissue: Normal without rashes or lesions  Neurologic   Cranial nerves: Cranial nerves 2-12 intact  Reflexes: 2+ and symmetric  Sensation: No sensory loss      Psychiatric   Orientation to person, place and time: Normal     Mood and affect: Normal        Future Appointments    Date/Time Provider Specialty Site   02/03/2017 03:30 PM Fabian Ellison MD Internal Medicine [facility]   01/18/2017 01:45 PM Jessica Lopez DO Orthopedic Surgery [facility] SPECIALISTS [facility]     Signatures   Electronically signed by : [employer], [facility]; Dec 27 2016  6:48PM EST                       (Author)    Electronically signed by : JACINDA Holley ; Dec 30 2016  4:42PM EST                       (Co-author)

## 2018-01-22 VITALS
DIASTOLIC BLOOD PRESSURE: 78 MMHG | WEIGHT: 224 LBS | BODY MASS INDEX: 32.07 KG/M2 | SYSTOLIC BLOOD PRESSURE: 132 MMHG | HEIGHT: 70 IN | HEART RATE: 80 BPM

## 2018-01-24 VITALS
TEMPERATURE: 100.1 F | OXYGEN SATURATION: 98 % | HEIGHT: 69 IN | WEIGHT: 219 LBS | HEART RATE: 86 BPM | SYSTOLIC BLOOD PRESSURE: 128 MMHG | DIASTOLIC BLOOD PRESSURE: 82 MMHG | BODY MASS INDEX: 32.44 KG/M2

## 2018-02-13 DIAGNOSIS — I10 HYPERTENSION, UNSPECIFIED TYPE: Primary | ICD-10-CM

## 2018-02-13 RX ORDER — LISINOPRIL 20 MG/1
1 TABLET ORAL DAILY
COMMUNITY
End: 2018-02-13 | Stop reason: SDUPTHER

## 2018-02-13 RX ORDER — LISINOPRIL 20 MG/1
20 TABLET ORAL DAILY
Qty: 90 TABLET | Refills: 1 | Status: SHIPPED | OUTPATIENT
Start: 2018-02-13 | End: 2018-08-07 | Stop reason: SDUPTHER

## 2018-02-16 ENCOUNTER — OFFICE VISIT (OUTPATIENT)
Dept: INTERNAL MEDICINE CLINIC | Facility: CLINIC | Age: 53
End: 2018-02-16
Payer: MEDICARE

## 2018-02-16 VITALS
WEIGHT: 220.4 LBS | BODY MASS INDEX: 31.55 KG/M2 | HEART RATE: 78 BPM | OXYGEN SATURATION: 98 % | SYSTOLIC BLOOD PRESSURE: 136 MMHG | TEMPERATURE: 99.3 F | DIASTOLIC BLOOD PRESSURE: 88 MMHG | HEIGHT: 70 IN

## 2018-02-16 DIAGNOSIS — J40 BRONCHITIS: Primary | ICD-10-CM

## 2018-02-16 DIAGNOSIS — J06.9 VIRAL URI: ICD-10-CM

## 2018-02-16 PROBLEM — J01.90 ACUTE SINUSITIS: Status: ACTIVE | Noted: 2017-11-03

## 2018-02-16 PROBLEM — R07.9 CHEST PAIN: Status: ACTIVE | Noted: 2017-06-30

## 2018-02-16 PROBLEM — M54.50 CHRONIC BILATERAL LOW BACK PAIN WITHOUT SCIATICA: Status: ACTIVE | Noted: 2017-01-13

## 2018-02-16 PROBLEM — R06.00 DYSPNEA ON EXERTION: Status: ACTIVE | Noted: 2017-06-30

## 2018-02-16 PROBLEM — G89.29 CHRONIC BILATERAL LOW BACK PAIN WITHOUT SCIATICA: Status: ACTIVE | Noted: 2017-01-13

## 2018-02-16 PROBLEM — F45.8 GLOBUS HYSTERICUS: Status: ACTIVE | Noted: 2017-02-21

## 2018-02-16 PROBLEM — R06.09 DYSPNEA ON EXERTION: Status: ACTIVE | Noted: 2017-06-30

## 2018-02-16 PROBLEM — R09.89 GLOBUS HYSTERICUS: Status: ACTIVE | Noted: 2017-02-21

## 2018-02-16 PROBLEM — H81.10 BENIGN PAROXYSMAL POSITIONAL VERTIGO: Status: ACTIVE | Noted: 2017-11-15

## 2018-02-16 PROBLEM — R09.A2 GLOBUS HYSTERICUS: Status: ACTIVE | Noted: 2017-02-21

## 2018-02-16 PROBLEM — M10.9 GOUT: Status: ACTIVE | Noted: 2017-02-21

## 2018-02-16 PROCEDURE — 99213 OFFICE O/P EST LOW 20 MIN: CPT | Performed by: PHYSICIAN ASSISTANT

## 2018-02-16 RX ORDER — ESOMEPRAZOLE MAGNESIUM 40 MG/1
1 CAPSULE, DELAYED RELEASE ORAL DAILY
COMMUNITY
Start: 2013-11-01 | End: 2018-05-21 | Stop reason: SDUPTHER

## 2018-02-16 RX ORDER — ECHINACEA PURPUREA EXTRACT 125 MG
1-2 TABLET ORAL
COMMUNITY
Start: 2017-02-21 | End: 2018-08-31

## 2018-02-16 RX ORDER — VERAPAMIL HYDROCHLORIDE 120 MG/1
120 TABLET, FILM COATED ORAL DAILY
Refills: 3 | COMMUNITY
Start: 2018-01-15 | End: 2018-10-07 | Stop reason: SDUPTHER

## 2018-02-16 RX ORDER — MECLIZINE HCL 12.5 MG/1
1 TABLET ORAL 3 TIMES DAILY PRN
COMMUNITY
Start: 2017-02-28 | End: 2018-12-14

## 2018-02-16 RX ORDER — FEBUXOSTAT 40 MG/1
40 TABLET ORAL DAILY
Refills: 1 | COMMUNITY
Start: 2017-12-11 | End: 2018-06-12 | Stop reason: SDUPTHER

## 2018-02-16 RX ORDER — CYCLOBENZAPRINE HCL 5 MG
1 TABLET ORAL 3 TIMES DAILY PRN
COMMUNITY
Start: 2017-01-13 | End: 2019-02-15 | Stop reason: SDUPTHER

## 2018-02-16 RX ORDER — MONTELUKAST SODIUM 10 MG/1
10 TABLET ORAL DAILY
Refills: 1 | COMMUNITY
Start: 2017-12-24 | End: 2018-03-20 | Stop reason: SDUPTHER

## 2018-02-16 RX ORDER — CETIRIZINE HYDROCHLORIDE 5 MG/1
5 TABLET ORAL AS NEEDED
COMMUNITY
End: 2018-12-14

## 2018-02-16 RX ORDER — COLCHICINE 0.6 MG/1
1 TABLET ORAL AS NEEDED
COMMUNITY
Start: 2015-09-17 | End: 2019-10-16 | Stop reason: ALTCHOICE

## 2018-02-16 RX ORDER — ALBUTEROL SULFATE 90 UG/1
1 AEROSOL, METERED RESPIRATORY (INHALATION) 4 TIMES DAILY
COMMUNITY

## 2018-02-16 RX ORDER — IPRATROPIUM BROMIDE AND ALBUTEROL SULFATE 2.5; .5 MG/3ML; MG/3ML
3 SOLUTION RESPIRATORY (INHALATION) 2 TIMES WEEKLY
COMMUNITY

## 2018-02-16 NOTE — PATIENT INSTRUCTIONS
Viral URI   Symptoms appear to be secondary to a viral URI  Discussed the importance of increased rest increase fluids and supportive care  May use over-the-counter cough drops to help sooth throat  Increase hydration  Hand hygiene to prevent spread  May continue with albuterol inhaler 1 puff every 6 hours as needed for cough or wheeze  No signs or symptoms of pneumonia  Gave reassurance does not appear bacterial at this time monitor symptoms and report back if any worsening of symptoms do not fully resolve  Discussed the risk of multiple antibiotic course treatment  and discuss that antibiotics do not work for viral illnesses  Continue nasocort once daily  Start ocean nasal spray 1 spray to each nostril every 6 hours  Run humidifier in bedroom at home  Bronchitis  Supportive care  Appears viral as listed about  Advair to be used as directed

## 2018-02-16 NOTE — PROGRESS NOTES
Assessment/Plan:    Viral URI   Symptoms appear to be secondary to a viral URI  Discussed the importance of increased rest increase fluids and supportive care  May use over-the-counter cough drops to help sooth throat  Increase hydration  Hand hygiene to prevent spread  May continue with albuterol inhaler 1 puff every 6 hours as needed for cough or wheeze  No signs or symptoms of pneumonia  Gave reassurance does not appear bacterial at this time monitor symptoms and report back if any worsening of symptoms do not fully resolve  Discussed the risk of multiple antibiotic course treatment  and discuss that antibiotics do not work for viral illnesses  Continue nasocort once daily  Start ocean nasal spray 1 spray to each nostril every 6 hours  Run humidifier in bedroom at home  Bronchitis  Supportive care  Appears viral as listed about  Advair to be used as directed  Diagnoses and all orders for this visit:    Bronchitis    Viral URI    Other orders  -     esomeprazole (NexIUM) 40 MG capsule; Take 1 capsule by mouth daily  -     ULORIC 40 MG tablet; Take 40 mg by mouth daily  -     montelukast (SINGULAIR) 10 mg tablet; Take 10 mg by mouth daily  -     verapamil (CALAN) 120 mg tablet; Take 120 mg by mouth daily  -     fluticasone-salmeterol (ADVAIR DISKUS) 500-50 mcg/dose; Inhale 1 puff 2 (two) times a day  -     ipratropium-albuterol (DUO-NEB) 0 5-2 5 mg/3 mL; Inhale 3 mL 4 (four) times a day As needed  -     Triamcinolone Acetonide (NASACORT ALLERGY 24HR NA); into each nostril  -     sodium chloride (OCEAN NASAL SPRAY) 0 65 % nasal spray; 1-2 sprays into each nostril  -     albuterol (PROVENTIL HFA,VENTOLIN HFA) 90 mcg/act inhaler; Inhale 1 puff every 6 (six) hours  -     cetirizine (ZyrTEC) 5 MG tablet; Take 5 mg by mouth every 24 hours  -     colchicine (COLCRYS) 0 6 mg tablet; Take 1 tablet by mouth daily  -     cyclobenzaprine (FLEXERIL) 5 mg tablet;  Take 1 tablet by mouth 3 (three) times a day as needed  -     meclizine (ANTIVERT) 12 5 MG tablet; Take 1 tablet by mouth 3 (three) times a day as needed          Subjective:          Patient ID: Julio Nunez is a 47 y o  male  46 yo male for bhavya of cough and mucus during the morning  Notes his sinuses feel dry, stuffy  Nasal congestion and sinus pressure  NO fever  Intermittent ear pain  Cough which is dry, non-productive  No wheeze, using albuterol PRN  Seen at [facility] and had treated 2 courses of abx levaquin and then doxycycline  Has been having sinus symptoms for the last 2-3 days  Started  After he was shoveling snow  Runny nose      Sinusitis   This is a recurrent problem  The current episode started more than 1 year ago  The problem has been gradually worsening since onset  There has been no fever (low grade temperature  )  The pain is mild  Associated symptoms include chills, congestion, coughing, shortness of breath (when coughing  No CP or palpitations ) and sinus pressure  Pertinent negatives include no headaches or sore throat  The treatment provided no relief  The following portions of the patient's history were reviewed and updated as appropriate: allergies, current medications, past family history, past medical history, past social history, past surgical history and problem list     Review of Systems   Constitutional: Positive for chills and fatigue  HENT: Positive for congestion, postnasal drip, rhinorrhea and sinus pressure  Negative for ear discharge, facial swelling, nosebleeds, sinus pain and sore throat  Respiratory: Positive for cough and shortness of breath (when coughing  No CP or palpitations  )  Negative for wheezing  Cardiovascular: Negative for chest pain and palpitations  Gastrointestinal: Negative for abdominal distention, nausea and vomiting  Skin: Negative for rash  Neurological: Negative for headaches           Past Medical History:   Diagnosis Date    Arthritis     Chronic rhinitis last assessed 2/21/14    Chronic serous otitis media     last assessed 11/20/13    Chronic sinusitis     last assessed 8/19/14    Functional heart murmur     GERD (gastroesophageal reflux disease)     Gout     Hearing problem     last assessed 12/1/16    Hiatal hernia     Hypercholesterolemia     Hypertension     Palpitations     Testicular hypogonadism     last assessed 10/4/13         Current Outpatient Prescriptions:     albuterol (PROVENTIL HFA,VENTOLIN HFA) 90 mcg/act inhaler, Inhale 1 puff every 6 (six) hours, Disp: , Rfl:     cetirizine (ZyrTEC) 5 MG tablet, Take 5 mg by mouth every 24 hours, Disp: , Rfl:     colchicine (COLCRYS) 0 6 mg tablet, Take 1 tablet by mouth daily, Disp: , Rfl:     cyclobenzaprine (FLEXERIL) 5 mg tablet, Take 1 tablet by mouth 3 (three) times a day as needed, Disp: , Rfl:     esomeprazole (NexIUM) 40 MG capsule, Take 1 capsule by mouth daily, Disp: , Rfl:     fluticasone-salmeterol (ADVAIR DISKUS) 500-50 mcg/dose, Inhale 1 puff 2 (two) times a day, Disp: , Rfl:     ipratropium-albuterol (DUO-NEB) 0 5-2 5 mg/3 mL, Inhale 3 mL 4 (four) times a day As needed, Disp: , Rfl:     lisinopril (ZESTRIL) 20 mg tablet, Take 1 tablet (20 mg total) by mouth daily, Disp: 90 tablet, Rfl: 1    meclizine (ANTIVERT) 12 5 MG tablet, Take 1 tablet by mouth 3 (three) times a day as needed, Disp: , Rfl:     montelukast (SINGULAIR) 10 mg tablet, Take 10 mg by mouth daily, Disp: , Rfl: 1    oxyCODONE-acetaminophen (PERCOCET) 5-325 mg per tablet, Take 1 tablet by mouth every 4 (four) hours as needed for moderate pain Max Daily Amount: 6 tablets, Disp: 5 tablet, Rfl: 0    sodium chloride (OCEAN NASAL SPRAY) 0 65 % nasal spray, 1-2 sprays into each nostril, Disp: , Rfl:     Triamcinolone Acetonide (NASACORT ALLERGY 24HR NA), into each nostril, Disp: , Rfl:     ULORIC 40 MG tablet, Take 40 mg by mouth daily, Disp: , Rfl: 1    verapamil (CALAN) 120 mg tablet, Take 120 mg by mouth daily, Disp: , Rfl: 3    Allergies   Allergen Reactions    Acetazolamide      Other reaction(s): Stomach Ache    Cephalosporins     Famotidine     Omeprazole     Sulfa Antibiotics      Category: Allergy; Annotation - [id number]: STOMACH UPSET    Penicillins Rash       Social History   Past Surgical History:   Procedure Laterality Date    APPENDECTOMY      BICEPS TENODESIS      anesthesia for tenodesis- of ruptured long tendon of biceps     HERNIA REPAIR      THYROIDECTOMY      TONSILLECTOMY       Family History   Problem Relation Age of Onset    Cancer Father     Lung cancer Father     Coronary artery disease Father     Stroke Maternal Grandmother     Stroke Maternal Grandfather     Cancer Paternal Grandfather     Heart disease Family     Hypertension Family     Lung cancer Cousin        Objective:  /92 (BP Location: Right arm, Patient Position: Sitting, Cuff Size: Adult)   Pulse 78   Temp 99 3 °F (37 4 °C) (Oral)   Ht 5' 10" (1 778 m)   Wt 100 kg (220 lb 6 4 oz)   SpO2 98%   BMI 31 62 kg/m²   Body mass index is 31 62 kg/m²  Physical Exam   Constitutional: He appears well-developed and well-nourished  No distress  HENT:   Head: Normocephalic and atraumatic  Mouth/Throat: Oropharynx is clear and moist  No oropharyngeal exudate  Mild injection of left Tm  No bluging or erythema  No retraction  Eyes: Pupils are equal, round, and reactive to light  Right eye exhibits no discharge  Left eye exhibits no discharge  No scleral icterus  Neck: Neck supple  Cardiovascular: Normal rate and regular rhythm  Pulmonary/Chest: Effort normal and breath sounds normal  No respiratory distress  He has no wheezes  Abdominal: Soft  There is no tenderness  There is no guarding  Musculoskeletal: He exhibits no edema  Neurological: He is alert  Skin: Skin is warm and dry  He is not diaphoretic  Psychiatric: He has a normal mood and affect   His behavior is normal  Thought content normal  Nursing note and vitals reviewed            shantell

## 2018-02-16 NOTE — ASSESSMENT & PLAN NOTE
Symptoms appear to be secondary to a viral URI  Discussed the importance of increased rest increase fluids and supportive care  May use over-the-counter cough drops to help sooth throat  Increase hydration  Hand hygiene to prevent spread  May continue with albuterol inhaler 1 puff every 6 hours as needed for cough or wheeze  No signs or symptoms of pneumonia  Gave reassurance does not appear bacterial at this time monitor symptoms and report back if any worsening of symptoms do not fully resolve  Discussed the risk of multiple antibiotic course treatment  and discuss that antibiotics do not work for viral illnesses  Continue nasocort once daily  Start ocean nasal spray 1 spray to each nostril every 6 hours  Run humidifier in bedroom at home

## 2018-02-21 ENCOUNTER — OFFICE VISIT (OUTPATIENT)
Dept: OBGYN CLINIC | Facility: MEDICAL CENTER | Age: 53
End: 2018-02-21
Payer: MEDICARE

## 2018-02-21 ENCOUNTER — APPOINTMENT (OUTPATIENT)
Dept: RADIOLOGY | Facility: CLINIC | Age: 53
End: 2018-02-21
Payer: MEDICARE

## 2018-02-21 VITALS
SYSTOLIC BLOOD PRESSURE: 130 MMHG | WEIGHT: 218 LBS | DIASTOLIC BLOOD PRESSURE: 80 MMHG | HEIGHT: 70 IN | BODY MASS INDEX: 31.21 KG/M2 | HEART RATE: 74 BPM

## 2018-02-21 DIAGNOSIS — M17.12 OSTEOARTHRITIS OF LEFT KNEE, UNSPECIFIED OSTEOARTHRITIS TYPE: ICD-10-CM

## 2018-02-21 DIAGNOSIS — Z01.89 ENCOUNTER FOR LOWER EXTREMITY COMPARISON IMAGING STUDY: Primary | ICD-10-CM

## 2018-02-21 PROCEDURE — 20610 DRAIN/INJ JOINT/BURSA W/O US: CPT | Performed by: PHYSICIAN ASSISTANT

## 2018-02-21 PROCEDURE — 73560 X-RAY EXAM OF KNEE 1 OR 2: CPT

## 2018-02-21 PROCEDURE — 73564 X-RAY EXAM KNEE 4 OR MORE: CPT

## 2018-02-21 PROCEDURE — 99213 OFFICE O/P EST LOW 20 MIN: CPT | Performed by: PHYSICIAN ASSISTANT

## 2018-02-21 RX ADMIN — METHYLPREDNISOLONE ACETATE 2 ML: 40 INJECTION, SUSPENSION INTRA-ARTICULAR; INTRALESIONAL; INTRAMUSCULAR; SOFT TISSUE at 13:08

## 2018-02-21 RX ADMIN — LIDOCAINE HYDROCHLORIDE 3 ML: 10 INJECTION, SOLUTION INFILTRATION; PERINEURAL at 13:08

## 2018-02-21 NOTE — PROGRESS NOTES
Assessment/Plan:    1  Encounter for lower extremity comparison imaging study  XR knee 1 or 2 vw right   2  Osteoarthritis of left knee, unspecified osteoarthritis type  XR knee 4+ vw left injury     Discussion:  - left knee steroid injections today  he should avoid strenuous activities rest and ice the knee for 1-2 days   -continue with home exercises  -continue wear knee brace as needed for comfort  -continue with ibuprofen and Tylenol as needed for pain control  Return in about 3 months (around 5/21/2018)  Subjective    History of Present Illness   No chief complaint on file  Elif Barton is a 47 y o  male who presents for follow-up of left knee pain today  He had a left knee steroid injection on 10/31/2017  He states he got relief until a few weeks ago  He states that he still having some medial knee pain  He states it comes and goes  He states it is a the painful jabbing pain  The pain does not radiate  He notes his knee to not be unstable  He has been doing home exercises which seems to be helping  He has a knee brace which does help  He has been taking ibuprofen occasionally as needed for pain control which does help  M*Modal software was used to dictate this note  It may contain errors with dictating incorrect words/spelling  Please contact physician directly for any questions       Review of Systems  Constitutional: negative  Eyes: negative  Respiratory: negative- no audible wheezing   Musculoskeletal: as noted in HPI  Neurological: negative for numbness, tingling, strength intact   Behavioral/Psych: negative    History:    Past Medical History:   Diagnosis Date    Arthritis     Chronic rhinitis     last assessed 2/21/14    Chronic serous otitis media     last assessed 11/20/13    Chronic sinusitis     last assessed 8/19/14    Functional heart murmur     GERD (gastroesophageal reflux disease)     Gout     Hearing problem     last assessed 12/1/16    Hiatal hernia     Hypercholesterolemia     Hypertension     Palpitations     Testicular hypogonadism     last assessed 10/4/13     Past Surgical History:   Procedure Laterality Date    APPENDECTOMY      BICEPS TENODESIS      anesthesia for tenodesis- of ruptured long tendon of biceps     HERNIA REPAIR      THYROIDECTOMY      TONSILLECTOMY       Social History   History   Alcohol Use    Yes     Comment: occassional; no alcohol use as per [employer]     History   Drug Use No     Comment: experimented with Marijuana in 1982 as per [employer]     History   Smoking Status    Never Smoker   Smokeless Tobacco    Never Used     Family History:   Family History   Problem Relation Age of Onset    Cancer Father     Lung cancer Father     Coronary artery disease Father     Stroke Maternal Grandmother     Stroke Maternal Grandfather     Cancer Paternal Grandfather     Heart disease Family     Hypertension Family     Lung cancer Cousin        Meds/Allergies   Allergies   Allergen Reactions    Acetazolamide      Other reaction(s): Stomach Ache    Cephalosporins     Famotidine     Omeprazole     Sulfa Antibiotics      Category: Allergy;  Annotation - [id number]: STOMACH UPSET    Penicillins Rash          Objective     /80   Pulse 74   Ht 5' 9 5" (1 765 m)   Wt 98 9 kg (218 lb)   BMI 31 73 kg/m²     Exam   Ortho Exam  Large joint arthrocentesis  Date/Time: 2/21/2018 1:08 PM  Consent given by: patient  Site marked: site marked  Timeout: Immediately prior to procedure a time out was called to verify the correct patient, procedure, equipment, support staff and site/side marked as required   Supporting Documentation  Indications: pain   Procedure Details  Location: knee - L knee  Preparation: Patient was prepped and draped in the usual sterile fashion  Needle size: 22 G  Approach: anterolateral  Medications administered: 3 mL lidocaine 1 %; 2 mL methylPREDNISolone acetate 40 mg/mL    Patient tolerance: patient tolerated the procedure well with no immediate complications  Dressing:  Sterile dressing applied      Knee Exam    Appearance:  Normal with no swelling or bruising and no obvious joint deformity  Palpation/Tenderness:  medial joint line tenderness  Active Range of Motion:  Flexion: No limitation and Extension: No limitation  Special Tests:  Valgus Stress Test:  negative  Varus Stress Test:  negative    Imaging Studies: I have personally reviewed pertinent reports     and I have personally reviewed pertinent films in PACS  X-ray left knee- severe DJD

## 2018-02-23 RX ORDER — METHYLPREDNISOLONE ACETATE 40 MG/ML
2 INJECTION, SUSPENSION INTRA-ARTICULAR; INTRALESIONAL; INTRAMUSCULAR; SOFT TISSUE
Status: COMPLETED | OUTPATIENT
Start: 2018-02-21 | End: 2018-02-21

## 2018-02-23 RX ORDER — LIDOCAINE HYDROCHLORIDE 10 MG/ML
3 INJECTION, SOLUTION INFILTRATION; PERINEURAL
Status: COMPLETED | OUTPATIENT
Start: 2018-02-21 | End: 2018-02-21

## 2018-03-02 ENCOUNTER — TELEPHONE (OUTPATIENT)
Dept: INTERNAL MEDICINE CLINIC | Facility: CLINIC | Age: 53
End: 2018-03-02

## 2018-03-05 ENCOUNTER — OFFICE VISIT (OUTPATIENT)
Dept: INTERNAL MEDICINE CLINIC | Facility: CLINIC | Age: 53
End: 2018-03-05
Payer: MEDICARE

## 2018-03-05 VITALS
BODY MASS INDEX: 32.32 KG/M2 | HEART RATE: 84 BPM | OXYGEN SATURATION: 97 % | WEIGHT: 218.2 LBS | HEIGHT: 69 IN | SYSTOLIC BLOOD PRESSURE: 138 MMHG | TEMPERATURE: 99.5 F | DIASTOLIC BLOOD PRESSURE: 80 MMHG

## 2018-03-05 DIAGNOSIS — I10 ESSENTIAL HYPERTENSION: Primary | ICD-10-CM

## 2018-03-05 DIAGNOSIS — K21.9 GERD WITHOUT ESOPHAGITIS: ICD-10-CM

## 2018-03-05 DIAGNOSIS — Z12.11 SCREENING FOR COLON CANCER: ICD-10-CM

## 2018-03-05 DIAGNOSIS — J45.909 MODERATE ASTHMA, UNSPECIFIED WHETHER COMPLICATED, UNSPECIFIED WHETHER PERSISTENT: ICD-10-CM

## 2018-03-05 DIAGNOSIS — J30.89 CHRONIC NON-SEASONAL ALLERGIC RHINITIS, UNSPECIFIED TRIGGER: ICD-10-CM

## 2018-03-05 DIAGNOSIS — M1A.9XX0 CHRONIC GOUT WITHOUT TOPHUS, UNSPECIFIED CAUSE, UNSPECIFIED SITE: ICD-10-CM

## 2018-03-05 PROBLEM — J06.9 VIRAL URI: Status: RESOLVED | Noted: 2018-02-16 | Resolved: 2018-03-05

## 2018-03-05 PROCEDURE — 99214 OFFICE O/P EST MOD 30 MIN: CPT | Performed by: INTERNAL MEDICINE

## 2018-03-05 RX ORDER — MELOXICAM 15 MG/1
15 TABLET ORAL DAILY
COMMUNITY
End: 2019-02-15

## 2018-03-05 NOTE — PROGRESS NOTES
Assessment/Plan:  1  Essential hypertension     blood pressure is well controlled on present regimen  We will continue verapamil 120 mg daily lisinopril 20 mg daily       2  Gout     continue on Uloric 40 mg daily     3  Bronchial asthma     continue on present inhaler and patient is also being followed by a pulmonologist       4  Chronic rhinitis     continue with Nasacort and Banks nasal spray     Diagnoses and all orders for this visit:    Essential hypertension  -     CBC and differential; Future  -     Basic metabolic panel; Future    Screening for colon cancer  -     Ambulatory referral to Gastroenterology; Future    GERD without esophagitis    Moderate asthma, unspecified whether complicated, unspecified whether persistent    Chronic non-seasonal allergic rhinitis, unspecified trigger    Chronic gout without tophus, unspecified cause, unspecified site  -     Uric acid; Future    Other orders  -     meloxicam (MOBIC) 15 mg tablet; Take 15 mg by mouth daily          Subjective:          Patient ID: Luis M Chan is a 47 y o  male  Hypertension   This is a chronic problem  The problem is controlled  Pertinent negatives include no anxiety, chest pain, headaches, malaise/fatigue, neck pain, palpitations, peripheral edema or shortness of breath  There are no associated agents to hypertension  Risk factors for coronary artery disease include dyslipidemia and male gender  Past treatments include calcium channel blockers  There are no compliance problems  There is no history of angina or CAD/MI  The following portions of the patient's history were reviewed and updated as appropriate: allergies, current medications, past family history, past medical history, past social history, past surgical history and problem list     Review of Systems   Constitutional: Negative for fatigue, fever and malaise/fatigue     HENT: Negative for congestion, ear discharge, ear pain, postnasal drip, sinus pressure, sore throat, tinnitus and trouble swallowing  Eyes: Negative for discharge, itching and visual disturbance  Respiratory: Negative for cough and shortness of breath  Cardiovascular: Negative for chest pain and palpitations  Gastrointestinal: Negative for abdominal pain, diarrhea, nausea and vomiting  Endocrine: Negative for cold intolerance and polyuria  Genitourinary: Negative for difficulty urinating, dysuria and urgency  Musculoskeletal: Negative for arthralgias and neck pain  Skin: Negative for rash  Allergic/Immunologic: Negative for environmental allergies  Neurological: Negative for dizziness, weakness and headaches  Psychiatric/Behavioral: Negative  The patient is not nervous/anxious            Past Medical History:   Diagnosis Date    Arthritis     Chronic rhinitis     last assessed 2/21/14    Chronic serous otitis media     last assessed 11/20/13    Chronic sinusitis     last assessed 8/19/14    Functional heart murmur     GERD (gastroesophageal reflux disease)     Gout     Hearing problem     last assessed 12/1/16    Hiatal hernia     Hypercholesterolemia     Hypertension     Palpitations     Testicular hypogonadism     last assessed 10/4/13         Current Outpatient Prescriptions:     albuterol (PROVENTIL HFA,VENTOLIN HFA) 90 mcg/act inhaler, Inhale 1 puff every 6 (six) hours, Disp: , Rfl:     colchicine (COLCRYS) 0 6 mg tablet, Take 1 tablet by mouth daily, Disp: , Rfl:     cyclobenzaprine (FLEXERIL) 5 mg tablet, Take 1 tablet by mouth 3 (three) times a day as needed, Disp: , Rfl:     esomeprazole (NexIUM) 40 MG capsule, Take 1 capsule by mouth daily, Disp: , Rfl:     fluticasone-salmeterol (ADVAIR DISKUS) 500-50 mcg/dose, Inhale 1 puff 2 (two) times a day, Disp: , Rfl:     ipratropium-albuterol (DUO-NEB) 0 5-2 5 mg/3 mL, Inhale 3 mL 4 (four) times a day As needed, Disp: , Rfl:     lisinopril (ZESTRIL) 20 mg tablet, Take 1 tablet (20 mg total) by mouth daily, Disp: 90 tablet, Rfl: 1    meclizine (ANTIVERT) 12 5 MG tablet, Take 1 tablet by mouth 3 (three) times a day as needed, Disp: , Rfl:     meloxicam (MOBIC) 15 mg tablet, Take 15 mg by mouth daily, Disp: , Rfl:     montelukast (SINGULAIR) 10 mg tablet, Take 10 mg by mouth daily, Disp: , Rfl: 1    sodium chloride (OCEAN NASAL SPRAY) 0 65 % nasal spray, 1-2 sprays into each nostril, Disp: , Rfl:     Triamcinolone Acetonide (NASACORT ALLERGY 24HR NA), into each nostril, Disp: , Rfl:     ULORIC 40 MG tablet, Take 40 mg by mouth daily, Disp: , Rfl: 1    verapamil (CALAN) 120 mg tablet, Take 120 mg by mouth daily, Disp: , Rfl: 3    cetirizine (ZyrTEC) 5 MG tablet, Take 5 mg by mouth every 24 hours, Disp: , Rfl:     oxyCODONE-acetaminophen (PERCOCET) 5-325 mg per tablet, Take 1 tablet by mouth every 4 (four) hours as needed for moderate pain Max Daily Amount: 6 tablets, Disp: 5 tablet, Rfl: 0    Allergies   Allergen Reactions    Acetazolamide      Other reaction(s): Stomach Ache    Cephalosporins     Famotidine     Omeprazole     Sulfa Antibiotics      Category: Allergy; Annotation - [id number]: STOMACH UPSET    Penicillins Rash       Social History   Past Surgical History:   Procedure Laterality Date    APPENDECTOMY      BICEPS TENODESIS      anesthesia for tenodesis- of ruptured long tendon of biceps     HERNIA REPAIR      THYROIDECTOMY      TONSILLECTOMY       Family History   Problem Relation Age of Onset    Cancer Father     Lung cancer Father     Coronary artery disease Father     Stroke Maternal Grandmother     Stroke Maternal Grandfather     Cancer Paternal Grandfather     Heart disease Family     Hypertension Family     Lung cancer Cousin        Objective:  /80 (BP Location: Right arm, Patient Position: Sitting, Cuff Size: Adult)   Pulse 84   Temp 99 5 °F (37 5 °C) (Oral)   Ht 5' 9" (1 753 m)   Wt 99 kg (218 lb 3 2 oz)   SpO2 97%   BMI 32 22 kg/m²   Body mass index is 32 22 kg/m²  Physical Exam   Constitutional: He appears well-developed  HENT:   Head: Normocephalic  Right Ear: External ear normal    Left Ear: External ear normal    Mouth/Throat: Oropharynx is clear and moist    Eyes: Pupils are equal, round, and reactive to light  No scleral icterus  Neck: Normal range of motion  Neck supple  No tracheal deviation present  No thyromegaly present  Cardiovascular: Normal rate, regular rhythm and normal heart sounds  Pulmonary/Chest: Effort normal and breath sounds normal  No respiratory distress  He exhibits no tenderness  Abdominal: Soft  Bowel sounds are normal  He exhibits no mass  There is no tenderness  Musculoskeletal: Normal range of motion  He exhibits no edema  Lymphadenopathy:     He has no cervical adenopathy  Neurological: He is alert  No cranial nerve deficit  Skin: Skin is warm  Psychiatric: He has a normal mood and affect   His behavior is normal

## 2018-03-08 DIAGNOSIS — M10.9 GOUT, UNSPECIFIED CAUSE, UNSPECIFIED CHRONICITY, UNSPECIFIED SITE: Primary | ICD-10-CM

## 2018-03-12 RX ORDER — MELOXICAM 15 MG/1
TABLET ORAL
Qty: 15 TABLET | Refills: 1 | OUTPATIENT
Start: 2018-03-12

## 2018-03-20 DIAGNOSIS — J31.0 CHRONIC RHINITIS, UNSPECIFIED TYPE: Primary | ICD-10-CM

## 2018-03-20 RX ORDER — MONTELUKAST SODIUM 10 MG/1
10 TABLET ORAL DAILY
Qty: 30 TABLET | Refills: 1 | Status: SHIPPED | OUTPATIENT
Start: 2018-03-20 | End: 2018-05-21 | Stop reason: SDUPTHER

## 2018-03-23 DIAGNOSIS — J31.0 CHRONIC RHINITIS, UNSPECIFIED TYPE: ICD-10-CM

## 2018-03-26 RX ORDER — MONTELUKAST SODIUM 10 MG/1
TABLET ORAL
Qty: 90 TABLET | Refills: 1 | OUTPATIENT
Start: 2018-03-26

## 2018-04-24 ENCOUNTER — OFFICE VISIT (OUTPATIENT)
Dept: OBGYN CLINIC | Facility: MEDICAL CENTER | Age: 53
End: 2018-04-24
Payer: MEDICARE

## 2018-04-24 ENCOUNTER — APPOINTMENT (OUTPATIENT)
Dept: RADIOLOGY | Facility: CLINIC | Age: 53
End: 2018-04-24
Payer: MEDICARE

## 2018-04-24 VITALS
HEART RATE: 80 BPM | SYSTOLIC BLOOD PRESSURE: 138 MMHG | DIASTOLIC BLOOD PRESSURE: 89 MMHG | HEIGHT: 70 IN | BODY MASS INDEX: 30.06 KG/M2 | WEIGHT: 210 LBS

## 2018-04-24 DIAGNOSIS — Z01.89 ENCOUNTER FOR LOWER EXTREMITY COMPARISON IMAGING STUDY: ICD-10-CM

## 2018-04-24 DIAGNOSIS — M25.561 RIGHT KNEE PAIN, UNSPECIFIED CHRONICITY: Primary | ICD-10-CM

## 2018-04-24 DIAGNOSIS — M25.561 RIGHT KNEE PAIN, UNSPECIFIED CHRONICITY: ICD-10-CM

## 2018-04-24 PROCEDURE — 73560 X-RAY EXAM OF KNEE 1 OR 2: CPT

## 2018-04-24 PROCEDURE — 73564 X-RAY EXAM KNEE 4 OR MORE: CPT

## 2018-04-24 PROCEDURE — 99213 OFFICE O/P EST LOW 20 MIN: CPT | Performed by: PHYSICIAN ASSISTANT

## 2018-04-24 PROCEDURE — 20610 DRAIN/INJ JOINT/BURSA W/O US: CPT | Performed by: PHYSICIAN ASSISTANT

## 2018-04-24 RX ORDER — BUPIVACAINE HYDROCHLORIDE 2.5 MG/ML
2 INJECTION, SOLUTION INFILTRATION; PERINEURAL
Status: COMPLETED | OUTPATIENT
Start: 2018-04-24 | End: 2018-04-24

## 2018-04-24 RX ORDER — METHYLPREDNISOLONE ACETATE 40 MG/ML
2 INJECTION, SUSPENSION INTRA-ARTICULAR; INTRALESIONAL; INTRAMUSCULAR; SOFT TISSUE
Status: COMPLETED | OUTPATIENT
Start: 2018-04-24 | End: 2018-04-24

## 2018-04-24 RX ADMIN — BUPIVACAINE HYDROCHLORIDE 2 ML: 2.5 INJECTION, SOLUTION INFILTRATION; PERINEURAL at 16:12

## 2018-04-24 RX ADMIN — METHYLPREDNISOLONE ACETATE 2 ML: 40 INJECTION, SUSPENSION INTRA-ARTICULAR; INTRALESIONAL; INTRAMUSCULAR; SOFT TISSUE at 16:12

## 2018-04-24 NOTE — PROGRESS NOTES
Assessment/Plan:    1  Right knee pain, unspecified chronicity  XR knee 4+ vw right injury   2  Encounter for lower extremity comparison imaging study  XR knee 1 or 2 vw left     Discussion:  -patient received a right knee steroid injection today  He should avoid strenuous activities rest ice the knee for 2 days  -patient will continue with home exercises  -he will continue with ibuprofen as needed for pain control   -patient declined physical therapy  -a consider an MRI of his knee at his next visit if still having pain  Return in about 1 month (around 5/24/2018)  for a left knee steroid injection     Subjective    History of Present Illness   Chief Complaint   Patient presents with    Right Knee - Follow-up       Karolyn Hernández is a 47 y o  male who presents  for right knee pain  He states last Wednesday he was walking down high stairs for fast and then walk a few blocks the drugstore and started having increased knee pain posterior  He also noticed some swelling posteriorly  He does have a little bit of medial knee pain as well  Pain does not radiate  Pain comes and goes  He states 2 days later he was feeling somewhat better  He currently does take some ibuprofen and Tylenol as needed for pain control  His knee does feel stable to him  M*Modal software was used to dictate this note  It may contain errors with dictating incorrect words/spelling  Please contact physician directly for any questions       Review of Systems  Constitutional: negative  Eyes: negative  Respiratory: negative- no audible wheezing   Musculoskeletal: as noted in HPI  Neurological: negative for numbness, tingling, strength intact   Behavioral/Psych: negative    History:    Past Medical History:   Diagnosis Date    Arthritis     Chronic rhinitis     last assessed 2/21/14    Chronic serous otitis media     last assessed 11/20/13    Chronic sinusitis     last assessed 8/19/14    Functional heart murmur     GERD (gastroesophageal reflux disease)     Gout     Hearing problem     last assessed 12/1/16    Hiatal hernia     Hypercholesterolemia     Hypertension     Palpitations     Testicular hypogonadism     last assessed 10/4/13     Past Surgical History:   Procedure Laterality Date    APPENDECTOMY      BICEPS TENODESIS      anesthesia for tenodesis- of ruptured long tendon of biceps     HERNIA REPAIR      THYROIDECTOMY      TONSILLECTOMY       Social History   History   Alcohol Use    Yes     Comment: occassional; no alcohol use as per [employer]     History   Drug Use No     Comment: experimented with Marijuana in 1982 as per [employer]     History   Smoking Status    Never Smoker   Smokeless Tobacco    Never Used     Family History:   Family History   Problem Relation Age of Onset    Cancer Father     Lung cancer Father     Coronary artery disease Father     Stroke Maternal Grandmother     Stroke Maternal Grandfather     Cancer Paternal Grandfather     Heart disease Family     Hypertension Family     Lung cancer Cousin     No Known Problems Mother     No Known Problems Sister     No Known Problems Brother     No Known Problems Maternal Aunt     No Known Problems Maternal Uncle     No Known Problems Paternal Aunt     No Known Problems Paternal Uncle     No Known Problems Paternal Grandmother     ADD / ADHD Neg Hx     Anesthesia problems Neg Hx     Clotting disorder Neg Hx     Collagen disease Neg Hx     Diabetes Neg Hx     Dislocations Neg Hx     Learning disabilities Neg Hx     Neurological problems Neg Hx     Osteoporosis Neg Hx     Rheumatologic disease Neg Hx     Scoliosis Neg Hx     Vascular Disease Neg Hx        Meds/Allergies   Allergies   Allergen Reactions    Acetazolamide      Other reaction(s): Stomach Ache    Cephalosporins     Famotidine     Omeprazole     Sulfa Antibiotics      Category: Allergy;  Annotation - [id number]: STOMACH UPSET    Penicillins Rash Objective     /89   Pulse 80   Ht 5' 9 5" (1 765 m)   Wt 95 3 kg (210 lb)   BMI 30 57 kg/m²     Exam   Ortho Exam  Large joint arthrocentesis  Date/Time: 4/24/2018 4:12 PM  Consent given by: patient  Site marked: site marked  Timeout: Immediately prior to procedure a time out was called to verify the correct patient, procedure, equipment, support staff and site/side marked as required   Supporting Documentation  Indications: pain   Procedure Details  Location: knee - R knee  Needle size: 22 G  Approach: anterolateral  Medications administered: 2 mL methylPREDNISolone acetate 40 mg/mL; 2 mL bupivacaine 0 25 %    Patient tolerance: patient tolerated the procedure well with no immediate complications  Dressing:  Sterile dressing applied      Knee Exam    Appearance:  Normal with no swelling or bruising and no obvious joint deformity  Palpation/Tenderness:  medial joint line tenderness, posterior knee tenderness   Active Range of Motion:  Flexion: No limitation and Extension: No limitation  Special Tests: Rebeca's Test:  negative  Lachman's Test:  negative  Anterior Drawer Test:  negative  Posterior Drawer Test:  negative  Valgus Stress Test:  negative  Varus Stress Test:  negative    Imaging Studies: I have personally reviewed pertinent reports     and I have personally reviewed pertinent films in PACS    X-rays right knee-mild to moderate DJD

## 2018-05-15 DIAGNOSIS — J31.0 CHRONIC RHINITIS: ICD-10-CM

## 2018-05-15 RX ORDER — MONTELUKAST SODIUM 10 MG/1
TABLET ORAL
Qty: 30 TABLET | Refills: 1 | OUTPATIENT
Start: 2018-05-15

## 2018-05-20 DIAGNOSIS — J31.0 CHRONIC RHINITIS: ICD-10-CM

## 2018-05-20 RX ORDER — MONTELUKAST SODIUM 10 MG/1
TABLET ORAL
Qty: 30 TABLET | Refills: 1 | OUTPATIENT
Start: 2018-05-20

## 2018-05-20 RX ORDER — ESOMEPRAZOLE MAGNESIUM 40 MG/1
CAPSULE, DELAYED RELEASE ORAL
Qty: 90 CAPSULE | Refills: 1 | Status: CANCELLED | OUTPATIENT
Start: 2018-05-20

## 2018-05-21 ENCOUNTER — OFFICE VISIT (OUTPATIENT)
Dept: INTERNAL MEDICINE CLINIC | Facility: CLINIC | Age: 53
End: 2018-05-21
Payer: MEDICARE

## 2018-05-21 VITALS
WEIGHT: 211.2 LBS | TEMPERATURE: 98.7 F | OXYGEN SATURATION: 98 % | DIASTOLIC BLOOD PRESSURE: 84 MMHG | HEART RATE: 68 BPM | HEIGHT: 69 IN | SYSTOLIC BLOOD PRESSURE: 130 MMHG | BODY MASS INDEX: 31.28 KG/M2

## 2018-05-21 DIAGNOSIS — J31.0 CHRONIC RHINITIS: ICD-10-CM

## 2018-05-21 DIAGNOSIS — B00.9 HERPES SIMPLEX: Primary | ICD-10-CM

## 2018-05-21 DIAGNOSIS — K21.9 GASTROESOPHAGEAL REFLUX DISEASE WITHOUT ESOPHAGITIS: Primary | ICD-10-CM

## 2018-05-21 PROCEDURE — 99213 OFFICE O/P EST LOW 20 MIN: CPT | Performed by: INTERNAL MEDICINE

## 2018-05-21 RX ORDER — VALACYCLOVIR HYDROCHLORIDE 1 G/1
1000 TABLET, FILM COATED ORAL 2 TIMES DAILY
Qty: 6 TABLET | Refills: 2 | Status: SHIPPED | OUTPATIENT
Start: 2018-05-21 | End: 2018-08-03 | Stop reason: ALTCHOICE

## 2018-05-21 RX ORDER — MONTELUKAST SODIUM 10 MG/1
10 TABLET ORAL DAILY
Qty: 90 TABLET | Refills: 1 | Status: SHIPPED | OUTPATIENT
Start: 2018-05-21 | End: 2018-11-19 | Stop reason: SDUPTHER

## 2018-05-21 RX ORDER — ESOMEPRAZOLE MAGNESIUM 40 MG/1
40 CAPSULE, DELAYED RELEASE ORAL DAILY
Qty: 90 CAPSULE | Refills: 1 | Status: SHIPPED | OUTPATIENT
Start: 2018-05-21 | End: 2018-10-26 | Stop reason: SDUPTHER

## 2018-05-21 NOTE — PROGRESS NOTES
Assessment/Plan:    Herpes simplex  Will prescribe Valacyclovir b i d  Diagnoses and all orders for this visit:    Herpes simplex  -     Discontinue: valACYclovir (VALTREX) 1,000 mg tablet; Take 1 tablet (1,000 mg total) by mouth 2 (two) times a day for 9 days          Subjective:      Patient ID: India Egan is a 47 y o  male  Patient presents to the office with complaints of cold sores and ear fullness  He denies fevers or chills, he denies cough, sore throat, wheezing or chest pain  He denies any headache or sinus congestion  He denies nausea or vomiting          Family History   Problem Relation Age of Onset    Cancer Father     Lung cancer Father     Coronary artery disease Father     Stroke Maternal Grandmother     Stroke Maternal Grandfather     Cancer Paternal Grandfather     Heart disease Family     Hypertension Family     Lung cancer Cousin     No Known Problems Mother     No Known Problems Sister     No Known Problems Brother     No Known Problems Maternal Aunt     No Known Problems Maternal Uncle     No Known Problems Paternal Aunt     No Known Problems Paternal Uncle     No Known Problems Paternal Grandmother     ADD / ADHD Neg Hx     Anesthesia problems Neg Hx     Clotting disorder Neg Hx     Collagen disease Neg Hx     Diabetes Neg Hx     Dislocations Neg Hx     Learning disabilities Neg Hx     Neurological problems Neg Hx     Osteoporosis Neg Hx     Rheumatologic disease Neg Hx     Scoliosis Neg Hx     Vascular Disease Neg Hx      Social History     Social History    Marital status: Single     Spouse name: N/A    Number of children: N/A    Years of education: N/A     Occupational History    unemployed      Social History Main Topics    Smoking status: Never Smoker    Smokeless tobacco: Never Used    Alcohol use Yes      Comment: occassional    Drug use: No    Sexual activity: Not on file     Other Topics Concern    Not on file     Social History Narrative    Travel hx- pt denies being out of the country during 1/2014-10/28/14     Past Medical History:   Diagnosis Date    Arthritis     Chronic rhinitis     last assessed 2/21/14    Chronic serous otitis media     last assessed 11/20/13    Chronic sinusitis     last assessed 8/19/14    Functional heart murmur     GERD (gastroesophageal reflux disease)     Gout     Hearing problem     last assessed 12/1/16    Hiatal hernia     Hypercholesterolemia     Hypertension     Palpitations     Testicular hypogonadism     last assessed 10/4/13       Current Outpatient Prescriptions:     albuterol (PROVENTIL HFA,VENTOLIN HFA) 90 mcg/act inhaler, Inhale 1 puff every 6 (six) hours, Disp: , Rfl:     cetirizine (ZyrTEC) 5 MG tablet, Take 5 mg by mouth as needed  , Disp: , Rfl:     colchicine (COLCRYS) 0 6 mg tablet, Take 1 tablet by mouth as needed  , Disp: , Rfl:     cyclobenzaprine (FLEXERIL) 5 mg tablet, Take 1 tablet by mouth 3 (three) times a day as needed, Disp: , Rfl:     fluticasone-salmeterol (ADVAIR DISKUS) 500-50 mcg/dose, Inhale 1 puff 2 (two) times a day, Disp: , Rfl:     ipratropium-albuterol (DUO-NEB) 0 5-2 5 mg/3 mL, Inhale 3 mL 4 (four) times a day As needed, Disp: , Rfl:     meclizine (ANTIVERT) 12 5 MG tablet, Take 1 tablet by mouth 3 (three) times a day as needed, Disp: , Rfl:     Triamcinolone Acetonide (NASACORT ALLERGY 24HR NA), into each nostril, Disp: , Rfl:     esomeprazole (NexIUM) 40 MG capsule, Take 1 capsule (40 mg total) by mouth daily, Disp: 90 capsule, Rfl: 1    lisinopril (ZESTRIL) 20 mg tablet, Take 1 tablet (20 mg total) by mouth daily, Disp: 90 tablet, Rfl: 1    meloxicam (MOBIC) 15 mg tablet, Take 15 mg by mouth daily, Disp: , Rfl:     montelukast (SINGULAIR) 10 mg tablet, Take 1 tablet (10 mg total) by mouth daily, Disp: 90 tablet, Rfl: 1    ULORIC 40 MG tablet, Take 1 tablet (40 mg total) by mouth daily, Disp: 90 tablet, Rfl: 1    verapamil (CALAN) 120 mg tablet, Take 1 tablet (120 mg total) by mouth daily, Disp: 90 tablet, Rfl: 2  Allergies   Allergen Reactions    Penicillins Rash and Anaphylaxis    Acetazolamide      Other reaction(s): Stomach Ache    Cephalosporins Other (See Comments)     headache    Doxycycline      Capsules only  Tablets are ok to take, per pt   Sulfa Antibiotics Other (See Comments)     Category: Allergy; Annotation - [id number]: STOMACH UPSET    Famotidine Palpitations    Omeprazole Palpitations     Painful urination     Past Surgical History:   Procedure Laterality Date    APPENDECTOMY      BICEPS TENODESIS      anesthesia for tenodesis- of ruptured long tendon of biceps     HERNIA REPAIR      THYROIDECTOMY      TONSILLECTOMY           Review of Systems   Constitutional: Negative  HENT: Positive for mouth sores and sinus pressure  Negative for postnasal drip, sore throat, tinnitus and voice change  Eyes: Negative  Respiratory: Negative  Cardiovascular: Negative  Gastrointestinal: Negative  All other systems reviewed and are negative  Objective:      /84 (BP Location: Left arm, Patient Position: Sitting, Cuff Size: Adult)   Pulse 68   Temp 98 7 °F (37 1 °C) (Oral)   Ht 5' 9" (1 753 m)   Wt 95 8 kg (211 lb 3 2 oz)   SpO2 98%   BMI 31 19 kg/m²          Physical Exam   Constitutional: He is oriented to person, place, and time  He appears well-developed and well-nourished  No distress  HENT:   Head: Normocephalic and atraumatic  Right Ear: External ear normal    Left Ear: External ear normal    Nose: Nose normal    Mouth/Throat: Oropharynx is clear and moist  No oropharyngeal exudate  Eyes: Conjunctivae and EOM are normal  Pupils are equal, round, and reactive to light  Left eye exhibits no discharge  No scleral icterus  Neck: Normal range of motion  Neck supple  No JVD present  No tracheal deviation present  No thyromegaly present     Cardiovascular: Normal rate, regular rhythm, normal heart sounds and intact distal pulses  Pulmonary/Chest: Effort normal and breath sounds normal  No respiratory distress  Abdominal: Soft  He exhibits no distension  Musculoskeletal: Normal range of motion  He exhibits no edema or deformity  Lymphadenopathy:     He has no cervical adenopathy  Neurological: He is alert and oriented to person, place, and time  Skin: Skin is warm and dry  He is not diaphoretic  Psychiatric: He has a normal mood and affect  Thought content normal    Speech is very rapid and almost pressure-like in nature but not to the point of panda  Vitals reviewed

## 2018-05-25 ENCOUNTER — OFFICE VISIT (OUTPATIENT)
Dept: OBGYN CLINIC | Facility: MEDICAL CENTER | Age: 53
End: 2018-05-25
Payer: MEDICARE

## 2018-05-25 VITALS
WEIGHT: 211.2 LBS | HEART RATE: 83 BPM | SYSTOLIC BLOOD PRESSURE: 125 MMHG | DIASTOLIC BLOOD PRESSURE: 77 MMHG | HEIGHT: 70 IN | BODY MASS INDEX: 30.24 KG/M2

## 2018-05-25 DIAGNOSIS — M25.561 RIGHT KNEE PAIN, UNSPECIFIED CHRONICITY: ICD-10-CM

## 2018-05-25 DIAGNOSIS — M17.12 PRIMARY OSTEOARTHRITIS OF LEFT KNEE: Primary | ICD-10-CM

## 2018-05-25 DIAGNOSIS — M17.11 PRIMARY OSTEOARTHRITIS OF RIGHT KNEE: ICD-10-CM

## 2018-05-25 PROCEDURE — 99213 OFFICE O/P EST LOW 20 MIN: CPT | Performed by: ORTHOPAEDIC SURGERY

## 2018-05-25 PROCEDURE — 20610 DRAIN/INJ JOINT/BURSA W/O US: CPT | Performed by: ORTHOPAEDIC SURGERY

## 2018-05-25 RX ORDER — BUPIVACAINE HYDROCHLORIDE 5 MG/ML
2 INJECTION, SOLUTION EPIDURAL; INTRACAUDAL
Status: COMPLETED | OUTPATIENT
Start: 2018-05-25 | End: 2018-05-25

## 2018-05-25 RX ORDER — METHYLPREDNISOLONE ACETATE 40 MG/ML
2 INJECTION, SUSPENSION INTRA-ARTICULAR; INTRALESIONAL; INTRAMUSCULAR; SOFT TISSUE
Status: COMPLETED | OUTPATIENT
Start: 2018-05-25 | End: 2018-05-25

## 2018-05-25 RX ADMIN — BUPIVACAINE HYDROCHLORIDE 2 ML: 5 INJECTION, SOLUTION EPIDURAL; INTRACAUDAL at 15:32

## 2018-05-25 RX ADMIN — METHYLPREDNISOLONE ACETATE 2 ML: 40 INJECTION, SUSPENSION INTRA-ARTICULAR; INTRALESIONAL; INTRAMUSCULAR; SOFT TISSUE at 15:32

## 2018-05-25 NOTE — PROGRESS NOTES
Assessment/Plan:  1  Primary osteoarthritis of left knee    2  Right knee pain, unspecified chronicity    3  Primary osteoarthritis of right knee      Orders Placed This Encounter   Procedures    Large joint arthrocentesis    Ambulatory referral to Physical Therapy     Received L knee steroid injection today  Patient knows to ice and avoid strenuous activity for 1-2 days if needed  Continue ibuprofen and/or tylenol prn pain  Declined MRI R knee, will call if wants MRI  PT x 1 session the home exercises    Return in about 3 months (around 8/25/2018)  or sooner if needed  I answered all of the patient's questions during the visit and provided education of the patient's condition during the visit  The patient verbalized understanding of the information given and agrees with the plan  This note was dictated using Front App software  It may contain errors including improperly dictated words  Please contact physician directly for any questions  Subjective   Chief Complaint:   Chief Complaint   Patient presents with    Left Knee - Follow-up       Alfred Cr is a 47 y o  male who presents for follow up for follow-up bilateral knee pain  He has severe left knee osteoarthritis  He received a left knee steroid injection about 3 months ago  He would like another injection today  He continues to have left knee pain  He also continues to have right knee pain  He received steroid injection on 04/24 which helps somewhat but he continues to have mostly posterior right knee pain  He has not gone to physical therapy  He takes ibuprofen and/or Tylenol as needed for pain control  Review of Systems  ROS:    See HPI for musculoskeletal review     All other systems reviewed are negative     History:  Past Medical History:   Diagnosis Date    Arthritis     Chronic rhinitis     last assessed 2/21/14    Chronic serous otitis media     last assessed 11/20/13    Chronic sinusitis     last assessed 8/19/14    Functional heart murmur     GERD (gastroesophageal reflux disease)     Gout     Hearing problem     last assessed 12/1/16    Hiatal hernia     Hypercholesterolemia     Hypertension     Palpitations     Testicular hypogonadism     last assessed 10/4/13     Past Surgical History:   Procedure Laterality Date    APPENDECTOMY      BICEPS TENODESIS      anesthesia for tenodesis- of ruptured long tendon of biceps     HERNIA REPAIR      THYROIDECTOMY      TONSILLECTOMY       Social History   History   Alcohol Use    Yes     Comment: occassional; no alcohol use as per [employer]     History   Drug Use No     Comment: experimented with Marijuana in 1982 as per [employer]     History   Smoking Status    Never Smoker   Smokeless Tobacco    Never Used     Family History:   Family History   Problem Relation Age of Onset    Cancer Father     Lung cancer Father     Coronary artery disease Father     Stroke Maternal Grandmother     Stroke Maternal Grandfather     Cancer Paternal Grandfather     Heart disease Family     Hypertension Family     Lung cancer Cousin     No Known Problems Mother     No Known Problems Sister     No Known Problems Brother     No Known Problems Maternal Aunt     No Known Problems Maternal Uncle     No Known Problems Paternal Aunt     No Known Problems Paternal Uncle     No Known Problems Paternal Grandmother     ADD / ADHD Neg Hx     Anesthesia problems Neg Hx     Clotting disorder Neg Hx     Collagen disease Neg Hx     Diabetes Neg Hx     Dislocations Neg Hx     Learning disabilities Neg Hx     Neurological problems Neg Hx     Osteoporosis Neg Hx     Rheumatologic disease Neg Hx     Scoliosis Neg Hx     Vascular Disease Neg Hx        Meds/Allergies     (Not in a hospital admission)  Allergies   Allergen Reactions    Acetazolamide      Other reaction(s): Stomach Ache    Cephalosporins     Famotidine     Omeprazole     Sulfa Antibiotics      Category:  Allergy; Annotation - [id number]: STOMACH UPSET    Penicillins Rash          Objective     /77   Pulse 83   Ht 5' 10" (1 778 m)   Wt 95 8 kg (211 lb 3 2 oz)   BMI 30 30 kg/m²      PE:  AAOx 3  WDWN  Hearing intact, no drainage from eyes  no audible wheezing  no abdominal distension  LE compartments soft, skin intact    Ortho Exam:  left Knee:   No erythema  no swelling  no effusion  no warmth  AROM: full  Stable to varus/valgus stress    rightknee:    Appearance:  no swelling   No bruising  no obvious joint deformity   No effusion  Palpation/Tenderness:  No TTP over medial joint line, no TTP over lateral joint line or over patella/patellar tendon  Active Range of Motion:  AROM: full  Special Tests:  Medial Rebeca's Test:  Positive  Lateral Rebeca's Test:  Positive  Apley's compression test:  Positive  Lachman's Test:  negative  Anterior Drawer Test:  negative  Valgus Stress Test:  negative  Varus Stress Test:  negative     No R hip pain with ROM      Large joint arthrocentesis  Date/Time: 5/25/2018 3:32 PM  Consent given by: patient  Site marked: site marked  Timeout: Immediately prior to procedure a time out was called to verify the correct patient, procedure, equipment, support staff and site/side marked as required   Supporting Documentation  Indications: pain   Procedure Details  Location: knee - L knee  Preparation: Patient was prepped and draped in the usual sterile fashion  Needle size: 22 G  Ultrasound guidance: no  Approach: anterolateral  Medications administered: 2 mL methylPREDNISolone acetate 40 mg/mL; 2 mL bupivacaine (PF) 0 5 %    Patient tolerance: patient tolerated the procedure well with no immediate complications  Dressing:  Sterile dressing applied

## 2018-05-30 RX ORDER — FEBUXOSTAT 40 MG/1
TABLET ORAL
Qty: 90 TABLET | Refills: 1 | OUTPATIENT
Start: 2018-05-30

## 2018-06-12 DIAGNOSIS — M10.9 GOUTY ARTHRITIS: Primary | ICD-10-CM

## 2018-06-12 RX ORDER — FEBUXOSTAT 40 MG/1
40 TABLET ORAL DAILY
Qty: 90 TABLET | Refills: 1 | Status: SHIPPED | OUTPATIENT
Start: 2018-06-12 | End: 2018-12-11 | Stop reason: SDUPTHER

## 2018-06-26 ENCOUNTER — CLINICAL SUPPORT (OUTPATIENT)
Dept: INTERNAL MEDICINE CLINIC | Facility: CLINIC | Age: 53
End: 2018-06-26
Payer: MEDICARE

## 2018-06-26 DIAGNOSIS — M1A.9XX0 CHRONIC GOUT WITHOUT TOPHUS, UNSPECIFIED CAUSE, UNSPECIFIED SITE: Primary | ICD-10-CM

## 2018-06-26 DIAGNOSIS — I10 ESSENTIAL HYPERTENSION: ICD-10-CM

## 2018-06-26 LAB
ANION GAP SERPL CALCULATED.3IONS-SCNC: 11 MMOL/L (ref 4–13)
BASOPHILS # BLD AUTO: 0.03 THOUSANDS/ΜL (ref 0–0.1)
BASOPHILS NFR BLD AUTO: 0 % (ref 0–1)
BUN SERPL-MCNC: 19 MG/DL (ref 5–25)
CALCIUM SERPL-MCNC: 9.3 MG/DL (ref 8.3–10.1)
CHLORIDE SERPL-SCNC: 101 MMOL/L (ref 100–108)
CO2 SERPL-SCNC: 27 MMOL/L (ref 21–32)
CREAT SERPL-MCNC: 1.04 MG/DL (ref 0.6–1.3)
EOSINOPHIL # BLD AUTO: 0.1 THOUSAND/ΜL (ref 0–0.61)
EOSINOPHIL NFR BLD AUTO: 2 % (ref 0–6)
ERYTHROCYTE [DISTWIDTH] IN BLOOD BY AUTOMATED COUNT: 13.5 % (ref 11.6–15.1)
GFR SERPL CREATININE-BSD FRML MDRD: 82 ML/MIN/1.73SQ M
GLUCOSE P FAST SERPL-MCNC: 86 MG/DL (ref 65–99)
HCT VFR BLD AUTO: 41.7 % (ref 36.5–49.3)
HGB BLD-MCNC: 13.8 G/DL (ref 12–17)
IMM GRANULOCYTES # BLD AUTO: 0.04 THOUSAND/UL (ref 0–0.2)
IMM GRANULOCYTES NFR BLD AUTO: 1 % (ref 0–2)
LYMPHOCYTES # BLD AUTO: 1.5 THOUSANDS/ΜL (ref 0.6–4.47)
LYMPHOCYTES NFR BLD AUTO: 22 % (ref 14–44)
MCH RBC QN AUTO: 30.3 PG (ref 26.8–34.3)
MCHC RBC AUTO-ENTMCNC: 33.1 G/DL (ref 31.4–37.4)
MCV RBC AUTO: 92 FL (ref 82–98)
MONOCYTES # BLD AUTO: 0.62 THOUSAND/ΜL (ref 0.17–1.22)
MONOCYTES NFR BLD AUTO: 9 % (ref 4–12)
NEUTROPHILS # BLD AUTO: 4.4 THOUSANDS/ΜL (ref 1.85–7.62)
NEUTS SEG NFR BLD AUTO: 66 % (ref 43–75)
NRBC BLD AUTO-RTO: 0 /100 WBCS
PLATELET # BLD AUTO: 216 THOUSANDS/UL (ref 149–390)
PMV BLD AUTO: 9.3 FL (ref 8.9–12.7)
POTASSIUM SERPL-SCNC: 4 MMOL/L (ref 3.5–5.3)
RBC # BLD AUTO: 4.55 MILLION/UL (ref 3.88–5.62)
SODIUM SERPL-SCNC: 139 MMOL/L (ref 136–145)
URATE SERPL-MCNC: 5.2 MG/DL (ref 4.2–8)
WBC # BLD AUTO: 6.69 THOUSAND/UL (ref 4.31–10.16)

## 2018-06-26 PROCEDURE — 84550 ASSAY OF BLOOD/URIC ACID: CPT | Performed by: INTERNAL MEDICINE

## 2018-06-26 PROCEDURE — 85025 COMPLETE CBC W/AUTO DIFF WBC: CPT | Performed by: INTERNAL MEDICINE

## 2018-06-26 PROCEDURE — 80048 BASIC METABOLIC PNL TOTAL CA: CPT | Performed by: INTERNAL MEDICINE

## 2018-06-26 PROCEDURE — 36415 COLL VENOUS BLD VENIPUNCTURE: CPT

## 2018-07-03 ENCOUNTER — OFFICE VISIT (OUTPATIENT)
Dept: INTERNAL MEDICINE CLINIC | Facility: CLINIC | Age: 53
End: 2018-07-03
Payer: MEDICARE

## 2018-07-03 VITALS
HEART RATE: 93 BPM | BODY MASS INDEX: 30.38 KG/M2 | OXYGEN SATURATION: 98 % | SYSTOLIC BLOOD PRESSURE: 138 MMHG | TEMPERATURE: 99 F | DIASTOLIC BLOOD PRESSURE: 80 MMHG | WEIGHT: 212.2 LBS | HEIGHT: 70 IN

## 2018-07-03 DIAGNOSIS — J45.909 MODERATE ASTHMA, UNSPECIFIED WHETHER COMPLICATED, UNSPECIFIED WHETHER PERSISTENT: ICD-10-CM

## 2018-07-03 DIAGNOSIS — K21.9 GERD WITHOUT ESOPHAGITIS: ICD-10-CM

## 2018-07-03 DIAGNOSIS — I10 BENIGN ESSENTIAL HYPERTENSION: Primary | ICD-10-CM

## 2018-07-03 PROCEDURE — 99214 OFFICE O/P EST MOD 30 MIN: CPT | Performed by: INTERNAL MEDICINE

## 2018-07-03 NOTE — PROGRESS NOTES
Assessment/Plan:    1  Essential hypertension   blood pressure is well controlled on present combination of verapamil and lisinopril     2  Gout   uric acid level is within normal range  Continue with Uloric     3  Bronchial asthma   doing well on present inhalers  Also being followed by pulmonologist     4  GERD   he is on Nexium 40 mg daily  Advised to continue same dose and also discussed about certain diets which exacerbate the problem     Diagnoses and all orders for this visit:    Benign essential hypertension    GERD without esophagitis    Moderate asthma, unspecified whether complicated, unspecified whether persistent          Subjective:          Patient ID: Brijesh Obregon is a 47 y o  male  Hypertension   This is a chronic problem  The current episode started more than 1 year ago  The problem is controlled  Pertinent negatives include no anxiety, chest pain, headaches, malaise/fatigue, neck pain, palpitations, peripheral edema or shortness of breath  There are no associated agents to hypertension  Risk factors for coronary artery disease include male gender  Past treatments include calcium channel blockers  Compliance problems include exercise  There is no history of angina, CVA or PVD  There is no history of chronic renal disease  The following portions of the patient's history were reviewed and updated as appropriate: allergies, current medications, past family history, past medical history, past social history, past surgical history and problem list     Review of Systems   Constitutional: Negative for fatigue, fever and malaise/fatigue  HENT: Negative for congestion, ear discharge, ear pain, postnasal drip, sinus pressure, sore throat, tinnitus and trouble swallowing  Eyes: Negative for discharge, itching and visual disturbance  Respiratory: Negative for cough and shortness of breath  Cardiovascular: Negative for chest pain and palpitations     Gastrointestinal: Negative for abdominal pain, diarrhea, nausea and vomiting  Endocrine: Negative for cold intolerance and polyuria  Genitourinary: Negative for difficulty urinating, dysuria and urgency  Musculoskeletal: Negative for arthralgias and neck pain  Skin: Negative for rash  Allergic/Immunologic: Negative for environmental allergies  Neurological: Negative for dizziness, weakness and headaches  Psychiatric/Behavioral: Negative for agitation and behavioral problems  The patient is not nervous/anxious            Past Medical History:   Diagnosis Date    Arthritis     Chronic rhinitis     last assessed 2/21/14    Chronic serous otitis media     last assessed 11/20/13    Chronic sinusitis     last assessed 8/19/14    Functional heart murmur     GERD (gastroesophageal reflux disease)     Gout     Hearing problem     last assessed 12/1/16    Hiatal hernia     Hypercholesterolemia     Hypertension     Palpitations     Testicular hypogonadism     last assessed 10/4/13         Current Outpatient Prescriptions:     albuterol (PROVENTIL HFA,VENTOLIN HFA) 90 mcg/act inhaler, Inhale 1 puff every 6 (six) hours, Disp: , Rfl:     cetirizine (ZyrTEC) 5 MG tablet, Take 5 mg by mouth every 24 hours, Disp: , Rfl:     colchicine (COLCRYS) 0 6 mg tablet, Take 1 tablet by mouth daily, Disp: , Rfl:     cyclobenzaprine (FLEXERIL) 5 mg tablet, Take 1 tablet by mouth 3 (three) times a day as needed, Disp: , Rfl:     esomeprazole (NexIUM) 40 MG capsule, Take 1 capsule (40 mg total) by mouth daily, Disp: 90 capsule, Rfl: 1    fluticasone-salmeterol (ADVAIR DISKUS) 500-50 mcg/dose, Inhale 1 puff 2 (two) times a day, Disp: , Rfl:     ipratropium-albuterol (DUO-NEB) 0 5-2 5 mg/3 mL, Inhale 3 mL 4 (four) times a day As needed, Disp: , Rfl:     lisinopril (ZESTRIL) 20 mg tablet, Take 1 tablet (20 mg total) by mouth daily, Disp: 90 tablet, Rfl: 1    meclizine (ANTIVERT) 12 5 MG tablet, Take 1 tablet by mouth 3 (three) times a day as needed, Disp: , Rfl:     meloxicam (MOBIC) 15 mg tablet, Take 15 mg by mouth daily, Disp: , Rfl:     montelukast (SINGULAIR) 10 mg tablet, Take 1 tablet (10 mg total) by mouth daily, Disp: 90 tablet, Rfl: 1    sodium chloride (OCEAN NASAL SPRAY) 0 65 % nasal spray, 1-2 sprays into each nostril, Disp: , Rfl:     Triamcinolone Acetonide (NASACORT ALLERGY 24HR NA), into each nostril, Disp: , Rfl:     ULORIC 40 MG tablet, Take 1 tablet (40 mg total) by mouth daily, Disp: 90 tablet, Rfl: 1    verapamil (CALAN) 120 mg tablet, Take 120 mg by mouth daily, Disp: , Rfl: 3    oxyCODONE-acetaminophen (PERCOCET) 5-325 mg per tablet, Take 1 tablet by mouth every 4 (four) hours as needed for moderate pain Max Daily Amount: 6 tablets, Disp: 5 tablet, Rfl: 0    valACYclovir (VALTREX) 1,000 mg tablet, Take 1 tablet (1,000 mg total) by mouth 2 (two) times a day for 9 days, Disp: 6 tablet, Rfl: 2    Allergies   Allergen Reactions    Penicillins Rash and Anaphylaxis    Acetazolamide      Other reaction(s): Stomach Ache    Cephalosporins Other (See Comments)     headache    Sulfa Antibiotics Other (See Comments)     Category: Allergy;  Annotation - [id number]: STOMACH UPSET    Famotidine Palpitations    Omeprazole Palpitations     Painful urination       Social History   Past Surgical History:   Procedure Laterality Date    APPENDECTOMY      BICEPS TENODESIS      anesthesia for tenodesis- of ruptured long tendon of biceps     HERNIA REPAIR      THYROIDECTOMY      TONSILLECTOMY       Family History   Problem Relation Age of Onset    Cancer Father     Lung cancer Father     Coronary artery disease Father     Stroke Maternal Grandmother     Stroke Maternal Grandfather     Cancer Paternal Grandfather     Heart disease Family     Hypertension Family     Lung cancer Cousin     No Known Problems Mother     No Known Problems Sister     No Known Problems Brother     No Known Problems Maternal Aunt     No Known Problems Maternal Uncle     No Known Problems Paternal Aunt     No Known Problems Paternal Uncle     No Known Problems Paternal Grandmother     ADD / ADHD Neg Hx     Anesthesia problems Neg Hx     Clotting disorder Neg Hx     Collagen disease Neg Hx     Diabetes Neg Hx     Dislocations Neg Hx     Learning disabilities Neg Hx     Neurological problems Neg Hx     Osteoporosis Neg Hx     Rheumatologic disease Neg Hx     Scoliosis Neg Hx     Vascular Disease Neg Hx        Objective:  /80 (BP Location: Right arm, Patient Position: Sitting, Cuff Size: Adult)   Pulse 93   Temp 99 °F (37 2 °C) (Oral)   Ht 5' 10" (1 778 m)   Wt 96 3 kg (212 lb 3 2 oz)   SpO2 98%   BMI 30 45 kg/m²   Body mass index is 30 45 kg/m²  Physical Exam   Constitutional: He appears well-developed  HENT:   Head: Normocephalic  Right Ear: External ear normal    Left Ear: External ear normal    Mouth/Throat: Oropharynx is clear and moist    Eyes: Pupils are equal, round, and reactive to light  No scleral icterus  Neck: Normal range of motion  Neck supple  No tracheal deviation present  No thyromegaly present  Cardiovascular: Normal rate, regular rhythm and normal heart sounds  No murmur heard  Pulmonary/Chest: Effort normal and breath sounds normal  No respiratory distress  He has no wheezes  He exhibits no tenderness  Abdominal: Soft  Bowel sounds are normal  He exhibits no mass  There is no tenderness  Musculoskeletal: Normal range of motion  Lymphadenopathy:     He has no cervical adenopathy  Neurological: He is alert  No cranial nerve deficit  Skin: Skin is warm  No erythema  Psychiatric: He has a normal mood and affect

## 2018-07-27 ENCOUNTER — OFFICE VISIT (OUTPATIENT)
Dept: OBGYN CLINIC | Facility: MEDICAL CENTER | Age: 53
End: 2018-07-27
Payer: MEDICARE

## 2018-07-27 VITALS
BODY MASS INDEX: 30.49 KG/M2 | WEIGHT: 213 LBS | DIASTOLIC BLOOD PRESSURE: 95 MMHG | HEIGHT: 70 IN | HEART RATE: 83 BPM | SYSTOLIC BLOOD PRESSURE: 137 MMHG

## 2018-07-27 DIAGNOSIS — M17.11 PRIMARY OSTEOARTHRITIS OF RIGHT KNEE: Primary | ICD-10-CM

## 2018-07-27 PROCEDURE — 99213 OFFICE O/P EST LOW 20 MIN: CPT | Performed by: PHYSICIAN ASSISTANT

## 2018-07-27 PROCEDURE — 20610 DRAIN/INJ JOINT/BURSA W/O US: CPT | Performed by: PHYSICIAN ASSISTANT

## 2018-07-27 RX ORDER — METHYLPREDNISOLONE ACETATE 40 MG/ML
2 INJECTION, SUSPENSION INTRA-ARTICULAR; INTRALESIONAL; INTRAMUSCULAR; SOFT TISSUE
Status: COMPLETED | OUTPATIENT
Start: 2018-07-27 | End: 2018-07-27

## 2018-07-27 RX ORDER — BUPIVACAINE HYDROCHLORIDE 2.5 MG/ML
2 INJECTION, SOLUTION INFILTRATION; PERINEURAL
Status: COMPLETED | OUTPATIENT
Start: 2018-07-27 | End: 2018-07-27

## 2018-07-27 RX ADMIN — BUPIVACAINE HYDROCHLORIDE 2 ML: 2.5 INJECTION, SOLUTION INFILTRATION; PERINEURAL at 15:27

## 2018-07-27 RX ADMIN — METHYLPREDNISOLONE ACETATE 2 ML: 40 INJECTION, SUSPENSION INTRA-ARTICULAR; INTRALESIONAL; INTRAMUSCULAR; SOFT TISSUE at 15:27

## 2018-07-27 NOTE — PROGRESS NOTES
Assessment/Plan:  No diagnosis found  Orders Placed This Encounter   Procedures    Large joint arthrocentesis     -patient received a right knee steroid injection today  He should avoid strenuous activities rest ice the knee for 1-2 days   -continue ibuprofen as needed for pain control  -continue with home exercises  Return in about 3 months (around 10/27/2018)  1 month for left knee steroid injection      Subjective   Chief Complaint:   Chief Complaint   Patient presents with    Right Knee - Follow-up       Christopher Acosta is a 47 y o  male who presents for follow up for right knee pain today  He had a right knee steroid injection on 4/24/2018  He states this did help relieve his pain to recently  He states last 2 days he started having a sharp pain is lateral knee  The pain does not radiate  He did not do physical therapy was been doing home exercises from physical therapy in the past but his right knee does feel stable to him  She takes ibuprofen as needed for pain control  He had a left knee steroid injection on 5/25/2018 he has an appointment in August follow-up for this  Review of Systems  ROS:    See HPI for musculoskeletal review     All other systems reviewed are negative     History:  Past Medical History:   Diagnosis Date    Arthritis     Chronic rhinitis     last assessed 2/21/14    Chronic serous otitis media     last assessed 11/20/13    Chronic sinusitis     last assessed 8/19/14    Functional heart murmur     GERD (gastroesophageal reflux disease)     Gout     Hearing problem     last assessed 12/1/16    Hiatal hernia     Hypercholesterolemia     Hypertension     Palpitations     Testicular hypogonadism     last assessed 10/4/13     Past Surgical History:   Procedure Laterality Date    APPENDECTOMY      BICEPS TENODESIS      anesthesia for tenodesis- of ruptured long tendon of biceps     HERNIA REPAIR      THYROIDECTOMY      TONSILLECTOMY       Social History   History Alcohol Use    Yes     Comment: occassional; no alcohol use as per [employer]     History   Drug Use No     Comment: experimented with Marijuana in 1982 as per [employer]     History   Smoking Status    Never Smoker   Smokeless Tobacco    Never Used     Family History:   Family History   Problem Relation Age of Onset    Cancer Father     Lung cancer Father     Coronary artery disease Father     Stroke Maternal Grandmother     Stroke Maternal Grandfather     Cancer Paternal Grandfather     Heart disease Family     Hypertension Family     Lung cancer Cousin     No Known Problems Mother     No Known Problems Sister     No Known Problems Brother     No Known Problems Maternal Aunt     No Known Problems Maternal Uncle     No Known Problems Paternal Aunt     No Known Problems Paternal Uncle     No Known Problems Paternal Grandmother     ADD / ADHD Neg Hx     Anesthesia problems Neg Hx     Clotting disorder Neg Hx     Collagen disease Neg Hx     Diabetes Neg Hx     Dislocations Neg Hx     Learning disabilities Neg Hx     Neurological problems Neg Hx     Osteoporosis Neg Hx     Rheumatologic disease Neg Hx     Scoliosis Neg Hx     Vascular Disease Neg Hx        Meds/Allergies     (Not in a hospital admission)  Allergies   Allergen Reactions    Penicillins Rash and Anaphylaxis    Acetazolamide      Other reaction(s): Stomach Ache    Cephalosporins Other (See Comments)     headache    Sulfa Antibiotics Other (See Comments)     Category: Allergy;  Annotation - [id number]: STOMACH UPSET    Famotidine Palpitations    Omeprazole Palpitations     Painful urination          Objective     /95   Pulse 83   Ht 5' 9 5" (1 765 m)   Wt 96 6 kg (213 lb)   BMI 31 00 kg/m²      PE:  AAOx 3  WDWN  Hearing intact, no drainage from eyes  no audible wheezing  no abdominal distension  LE compartments soft, skin intact    Ortho Exam:  right Knee:   No erythema  no swelling  no effusion  no warmth  AROM: full  Stable to varus/valgus stress  TTP over lateral joint line     Large joint arthrocentesis  Date/Time: 7/27/2018 3:27 PM  Consent given by: patient  Site marked: site marked  Timeout: Immediately prior to procedure a time out was called to verify the correct patient, procedure, equipment, support staff and site/side marked as required   Supporting Documentation  Indications: pain   Procedure Details  Location: knee - R knee  Needle size: 22 G  Approach: anterolateral  Medications administered: 2 mL methylPREDNISolone acetate 40 mg/mL; 2 mL bupivacaine 0 25 %    Patient tolerance: patient tolerated the procedure well with no immediate complications  Dressing:  Sterile dressing applied

## 2018-08-03 ENCOUNTER — OFFICE VISIT (OUTPATIENT)
Dept: CARDIOLOGY CLINIC | Facility: CLINIC | Age: 53
End: 2018-08-03
Payer: MEDICARE

## 2018-08-03 VITALS
DIASTOLIC BLOOD PRESSURE: 84 MMHG | HEART RATE: 77 BPM | WEIGHT: 209.9 LBS | HEIGHT: 70 IN | BODY MASS INDEX: 30.05 KG/M2 | SYSTOLIC BLOOD PRESSURE: 132 MMHG

## 2018-08-03 DIAGNOSIS — I10 BENIGN ESSENTIAL HYPERTENSION: Primary | ICD-10-CM

## 2018-08-03 DIAGNOSIS — I47.2 VENTRICULAR TACHYCARDIA (HCC): ICD-10-CM

## 2018-08-03 DIAGNOSIS — R07.89 ATYPICAL CHEST PAIN: ICD-10-CM

## 2018-08-03 DIAGNOSIS — R00.2 PALPITATIONS: ICD-10-CM

## 2018-08-03 PROCEDURE — 99213 OFFICE O/P EST LOW 20 MIN: CPT | Performed by: INTERNAL MEDICINE

## 2018-08-03 PROCEDURE — 93000 ELECTROCARDIOGRAM COMPLETE: CPT | Performed by: INTERNAL MEDICINE

## 2018-08-03 NOTE — PROGRESS NOTES
Cardiology Follow Up    Katy Eng  1965  [id number]  [address]  [address] [facility]  [phone]    1  Benign essential hypertension  POCT ECG   2  Ventricular tachycardia (HCC)     3  Palpitations     4  Atypical chest pain           Discussion/Summary: All of his assessed cardiac problems are stable  I have reviewed his medications and made no changes  No cardiac testing is ordered  RTO 1 year  Interval History: he has not had any cardiac problems since his last OV  He is more active and denies CP, SOB, palpitations  ECG is normal   He had a stress ECHO a few years ago which was normal   BP is well controlled      Patient Active Problem List   Diagnosis    Acute sinusitis    Allergic rhinitis    Asthma    Benign essential hypertension    Benign paroxysmal positional vertigo    Chronic bilateral low back pain without sciatica    Chest pain    DJD (degenerative joint disease) of knee    Dyspnea on exertion    GERD without esophagitis    Globus hystericus    Gout    Hyperlipidemia    Palpitations    Primary localized osteoarthritis of left knee    Primary localized osteoarthritis of right knee    Right ankle effusion    Right ankle pain    Tenosynovitis of foot    Thyroid disorder    Ventricular tachycardia (HCC)    Vertigo    Herpes simplex    Atypical chest pain     Past Medical History:   Diagnosis Date    Arthritis     Chronic rhinitis     last assessed 2/21/14    Chronic serous otitis media     last assessed 11/20/13    Chronic sinusitis     last assessed 8/19/14    Functional heart murmur     GERD (gastroesophageal reflux disease)     Gout     Hearing problem     last assessed 12/1/16    Hiatal hernia     Hypercholesterolemia     Hypertension     Palpitations     Testicular hypogonadism     last assessed 10/4/13     Social History     Social History    Marital status: Single     Spouse name: N/A    Number of children: N/A    Years of education: N/A     Occupational History    unemployed      Social History Main Topics    Smoking status: Never Smoker    Smokeless tobacco: Never Used    Alcohol use Yes      Comment: occassional; no alcohol use as per [employer]    Drug use: No      Comment: experimented with Marijuana in 1982 as per Allscripts    Sexual activity: Not on file     Other Topics Concern    Not on file     Social History Narrative    Travel hx- pt denies being out of the country during 1/2014-10/28/14      Family History   Problem Relation Age of Onset    Cancer Father     Lung cancer Father     Coronary artery disease Father     Stroke Maternal Grandmother     Stroke Maternal Grandfather     Cancer Paternal Grandfather     Heart disease Family     Hypertension Family     Lung cancer Cousin     No Known Problems Mother     No Known Problems Sister     No Known Problems Brother     No Known Problems Maternal Aunt     No Known Problems Maternal Uncle     No Known Problems Paternal Aunt     No Known Problems Paternal Uncle     No Known Problems Paternal Grandmother     ADD / ADHD Neg Hx     Anesthesia problems Neg Hx     Clotting disorder Neg Hx     Collagen disease Neg Hx     Diabetes Neg Hx     Dislocations Neg Hx     Learning disabilities Neg Hx     Neurological problems Neg Hx     Osteoporosis Neg Hx     Rheumatologic disease Neg Hx     Scoliosis Neg Hx     Vascular Disease Neg Hx      Past Surgical History:   Procedure Laterality Date    APPENDECTOMY      BICEPS TENODESIS      anesthesia for tenodesis- of ruptured long tendon of biceps     HERNIA REPAIR      THYROIDECTOMY      TONSILLECTOMY         Current Outpatient Prescriptions:     albuterol (PROVENTIL HFA,VENTOLIN HFA) 90 mcg/act inhaler, Inhale 1 puff every 6 (six) hours, Disp: , Rfl:     esomeprazole (NexIUM) 40 MG capsule, Take 1 capsule (40 mg total) by mouth daily, Disp: 90 capsule, Rfl: 1    fluticasone-salmeterol (ADVAIR DISKUS) 500-50 mcg/dose, Inhale 1 puff 2 (two) times a day, Disp: , Rfl:     ipratropium-albuterol (DUO-NEB) 0 5-2 5 mg/3 mL, Inhale 3 mL 4 (four) times a day As needed, Disp: , Rfl:     lisinopril (ZESTRIL) 20 mg tablet, Take 1 tablet (20 mg total) by mouth daily, Disp: 90 tablet, Rfl: 1    meclizine (ANTIVERT) 12 5 MG tablet, Take 1 tablet by mouth 3 (three) times a day as needed, Disp: , Rfl:     montelukast (SINGULAIR) 10 mg tablet, Take 1 tablet (10 mg total) by mouth daily, Disp: 90 tablet, Rfl: 1    sodium chloride (OCEAN NASAL SPRAY) 0 65 % nasal spray, 1-2 sprays into each nostril, Disp: , Rfl:     Triamcinolone Acetonide (NASACORT ALLERGY 24HR NA), into each nostril, Disp: , Rfl:     ULORIC 40 MG tablet, Take 1 tablet (40 mg total) by mouth daily, Disp: 90 tablet, Rfl: 1    verapamil (CALAN) 120 mg tablet, Take 120 mg by mouth daily, Disp: , Rfl: 3    cetirizine (ZyrTEC) 5 MG tablet, Take 5 mg by mouth every 24 hours, Disp: , Rfl:     colchicine (COLCRYS) 0 6 mg tablet, Take 1 tablet by mouth daily, Disp: , Rfl:     cyclobenzaprine (FLEXERIL) 5 mg tablet, Take 1 tablet by mouth 3 (three) times a day as needed, Disp: , Rfl:     meloxicam (MOBIC) 15 mg tablet, Take 15 mg by mouth daily, Disp: , Rfl:   Allergies   Allergen Reactions    Penicillins Rash and Anaphylaxis    Acetazolamide      Other reaction(s): Stomach Ache    Cephalosporins Other (See Comments)     headache    Sulfa Antibiotics Other (See Comments)     Category: Allergy;  Annotation - [id number]: STOMACH UPSET    Famotidine Palpitations    Omeprazole Palpitations     Painful urination     Vitals:    08/03/18 1504   BP: 132/84   BP Location: Right arm   Patient Position: Sitting   Cuff Size: Standard   Pulse: 77   Weight: 95 2 kg (209 lb 14 4 oz)   Height: 5' 9 5" (1 765 m)     Weight (last 2 days)     Date/Time   Weight    08/03/18 1504 95 2 (209 9)             Blood pressure 132/84, pulse 77, height 5' 9 5" (1 765 m), weight 95 2 kg (209 lb 14 4 oz)  , Body mass index is 30 55 kg/m²  Labs:  Clinical Support on 06/26/2018   Component Date Value    Uric Acid 06/26/2018 5 2     Sodium 06/26/2018 139     Potassium 06/26/2018 4 0     Chloride 06/26/2018 101     CO2 06/26/2018 27     Anion Gap 06/26/2018 11     BUN 06/26/2018 19     Creatinine 06/26/2018 1 04     Glucose, Fasting 06/26/2018 86     Calcium 06/26/2018 9 3     eGFR 06/26/2018 82     WBC 06/26/2018 6 69     RBC 06/26/2018 4 55     Hemoglobin 06/26/2018 13 8     Hematocrit 06/26/2018 41 7     MCV 06/26/2018 92     MCH 06/26/2018 30 3     MCHC 06/26/2018 33 1     RDW 06/26/2018 13 5     MPV 06/26/2018 9 3     Platelets 57/26/5552 216     nRBC 06/26/2018 0     Neutrophils Relative 06/26/2018 66     Immat GRANS % 06/26/2018 1     Lymphocytes Relative 06/26/2018 22     Monocytes Relative 06/26/2018 9     Eosinophils Relative 06/26/2018 2     Basophils Relative 06/26/2018 0     Neutrophils Absolute 06/26/2018 4 40     Immature Grans Absolute 06/26/2018 0 04     Lymphocytes Absolute 06/26/2018 1 50     Monocytes Absolute 06/26/2018 0 62     Eosinophils Absolute 06/26/2018 0 10     Basophils Absolute 06/26/2018 0 03      Imaging: No results found  Review of Systems:  Review of Systems   Constitutional: Negative for diaphoresis, fatigue, fever and unexpected weight change  HENT: Negative  Respiratory: Negative for cough, shortness of breath and wheezing  Cardiovascular: Negative for chest pain, palpitations and leg swelling  Gastrointestinal: Negative for abdominal pain, diarrhea and nausea  Musculoskeletal: Negative for gait problem and myalgias  Skin: Negative for rash  Neurological: Negative for dizziness and numbness  Psychiatric/Behavioral: Negative          Physical Exam:  Physical Exam   Constitutional: He is oriented to person, place, and time  He appears well-developed and well-nourished  HENT:   Head: Normocephalic and atraumatic  Eyes: Pupils are equal, round, and reactive to light  Neck: Normal range of motion  Neck supple  No JVD present  Cardiovascular: Regular rhythm, S1 normal, S2 normal and normal pulses  Pulses:       Carotid pulses are 2+ on the right side, and 2+ on the left side  Pulmonary/Chest: Effort normal and breath sounds normal  He has no wheezes  He has no rales  Abdominal: Soft  Bowel sounds are normal  There is no tenderness  Musculoskeletal: Normal range of motion  He exhibits no edema or tenderness  Neurological: He is alert and oriented to person, place, and time  He has normal reflexes  No cranial nerve deficit  Skin: Skin is warm  Psychiatric: He has a normal mood and affect

## 2018-08-07 DIAGNOSIS — I10 HYPERTENSION, UNSPECIFIED TYPE: ICD-10-CM

## 2018-08-07 RX ORDER — LISINOPRIL 20 MG/1
20 TABLET ORAL DAILY
Qty: 90 TABLET | Refills: 1 | Status: SHIPPED | OUTPATIENT
Start: 2018-08-07 | End: 2019-02-04 | Stop reason: SDUPTHER

## 2018-08-28 ENCOUNTER — OFFICE VISIT (OUTPATIENT)
Dept: OBGYN CLINIC | Facility: MEDICAL CENTER | Age: 53
End: 2018-08-28
Payer: MEDICARE

## 2018-08-28 VITALS
WEIGHT: 215 LBS | DIASTOLIC BLOOD PRESSURE: 84 MMHG | HEART RATE: 78 BPM | BODY MASS INDEX: 30.78 KG/M2 | SYSTOLIC BLOOD PRESSURE: 124 MMHG | HEIGHT: 70 IN

## 2018-08-28 DIAGNOSIS — M17.11 PRIMARY LOCALIZED OSTEOARTHRITIS OF RIGHT KNEE: ICD-10-CM

## 2018-08-28 DIAGNOSIS — M17.12 PRIMARY LOCALIZED OSTEOARTHRITIS OF LEFT KNEE: Primary | ICD-10-CM

## 2018-08-28 PROCEDURE — 20610 DRAIN/INJ JOINT/BURSA W/O US: CPT | Performed by: PHYSICIAN ASSISTANT

## 2018-08-28 PROCEDURE — 99213 OFFICE O/P EST LOW 20 MIN: CPT | Performed by: ORTHOPAEDIC SURGERY

## 2018-08-28 RX ORDER — BUPIVACAINE HYDROCHLORIDE 2.5 MG/ML
2 INJECTION, SOLUTION INFILTRATION; PERINEURAL
Status: COMPLETED | OUTPATIENT
Start: 2018-08-28 | End: 2018-08-28

## 2018-08-28 RX ORDER — METHYLPREDNISOLONE ACETATE 40 MG/ML
2 INJECTION, SUSPENSION INTRA-ARTICULAR; INTRALESIONAL; INTRAMUSCULAR; SOFT TISSUE
Status: COMPLETED | OUTPATIENT
Start: 2018-08-28 | End: 2018-08-28

## 2018-08-28 RX ADMIN — METHYLPREDNISOLONE ACETATE 2 ML: 40 INJECTION, SUSPENSION INTRA-ARTICULAR; INTRALESIONAL; INTRAMUSCULAR; SOFT TISSUE at 16:15

## 2018-08-28 RX ADMIN — BUPIVACAINE HYDROCHLORIDE 2 ML: 2.5 INJECTION, SOLUTION INFILTRATION; PERINEURAL at 16:15

## 2018-08-28 NOTE — PROGRESS NOTES
Assessment/Plan:  1  Primary localized osteoarthritis of left knee    2  Primary localized osteoarthritis of right knee      Orders Placed This Encounter   Procedures    Large joint arthrocentesis     -patient received a left knee steroid injection today  He should avoid strenuous activities rest ice her knee for 1-2 days   -continue with home exercises  -continue with ibuprofen as needed for pain control  Return in about 2 months (around 10/28/2018)  For right knee steroid injection, 3 months for left knee steroid injection      Subjective   Chief Complaint:   Chief Complaint   Patient presents with    Left Knee - Follow-up       Donny Pierre is a 47 y o  male who presents for follow up for left knee pain today  He had a left knee steroid injection on 5/25  He states this helped relieve his pain until recently  He has some medial knee pain  His knee does feel stable to him  He has been walking 3 to 3-1/2 miles a day when the weather is nice  He has been doing home exercises  He has been taking ibuprofen as needed for pain control  He had a right knee steroid injection on 07/27 and states that is helping him  He currently does not have right knee pain    Review of Systems  ROS:    See HPI for musculoskeletal review     All other systems reviewed are negative     History:  Past Medical History:   Diagnosis Date    Arthritis     Chronic rhinitis     last assessed 2/21/14    Chronic serous otitis media     last assessed 11/20/13    Chronic sinusitis     last assessed 8/19/14    Functional heart murmur     GERD (gastroesophageal reflux disease)     Gout     Hearing problem     last assessed 12/1/16    Hiatal hernia     Hypercholesterolemia     Hypertension     Palpitations     Testicular hypogonadism     last assessed 10/4/13     Past Surgical History:   Procedure Laterality Date    APPENDECTOMY      BICEPS TENODESIS      anesthesia for tenodesis- of ruptured long tendon of biceps     HERNIA REPAIR      THYROIDECTOMY      TONSILLECTOMY       Social History   History   Alcohol Use    Yes     Comment: occassional; no alcohol use as per [employer]     History   Drug Use No     Comment: experimented with Marijuana in 1982 as per [employer]     History   Smoking Status    Never Smoker   Smokeless Tobacco    Never Used     Family History:   Family History   Problem Relation Age of Onset    Cancer Father     Lung cancer Father     Coronary artery disease Father     Stroke Maternal Grandmother     Stroke Maternal Grandfather     Cancer Paternal Grandfather     Heart disease Family     Hypertension Family     Lung cancer Cousin     No Known Problems Mother     No Known Problems Sister     No Known Problems Brother     No Known Problems Maternal Aunt     No Known Problems Maternal Uncle     No Known Problems Paternal Aunt     No Known Problems Paternal Uncle     No Known Problems Paternal Grandmother     ADD / ADHD Neg Hx     Anesthesia problems Neg Hx     Clotting disorder Neg Hx     Collagen disease Neg Hx     Diabetes Neg Hx     Dislocations Neg Hx     Learning disabilities Neg Hx     Neurological problems Neg Hx     Osteoporosis Neg Hx     Rheumatologic disease Neg Hx     Scoliosis Neg Hx     Vascular Disease Neg Hx        Meds/Allergies     (Not in a hospital admission)  Allergies   Allergen Reactions    Penicillins Rash and Anaphylaxis    Acetazolamide      Other reaction(s): Stomach Ache    Cephalosporins Other (See Comments)     headache    Sulfa Antibiotics Other (See Comments)     Category: Allergy;  Annotation - [id number]: STOMACH UPSET    Famotidine Palpitations    Omeprazole Palpitations     Painful urination          Objective     /84   Pulse 78   Ht 5' 9 5" (1 765 m)   Wt 97 5 kg (215 lb)   BMI 31 29 kg/m²      PE:  AAOx 3  WDWN  Hearing intact, no drainage from eyes  no audible wheezing  no abdominal distension  LE compartments soft, skin intact    Ortho Exam:  left Knee:   No erythema  no swelling  no effusion  no warmth  AROM: full  Stable to varus/valgus stress  Large joint arthrocentesis  Date/Time: 8/28/2018 4:15 PM  Consent given by: patient  Site marked: site marked  Timeout: Immediately prior to procedure a time out was called to verify the correct patient, procedure, equipment, support staff and site/side marked as required   Supporting Documentation  Indications: pain   Procedure Details  Location: knee - L knee  Preparation: Patient was prepped and draped in the usual sterile fashion  Needle size: 22 G  Approach: anterolateral  Medications administered: 2 mL methylPREDNISolone acetate 40 mg/mL; 2 mL bupivacaine 0 25 %    Patient tolerance: patient tolerated the procedure well with no immediate complications  Dressing:  Sterile dressing applied

## 2018-08-31 ENCOUNTER — OFFICE VISIT (OUTPATIENT)
Dept: INTERNAL MEDICINE CLINIC | Facility: CLINIC | Age: 53
End: 2018-08-31
Payer: MEDICARE

## 2018-08-31 VITALS
BODY MASS INDEX: 30.26 KG/M2 | HEIGHT: 70 IN | OXYGEN SATURATION: 98 % | HEART RATE: 80 BPM | SYSTOLIC BLOOD PRESSURE: 140 MMHG | WEIGHT: 211.4 LBS | DIASTOLIC BLOOD PRESSURE: 90 MMHG | TEMPERATURE: 99.6 F

## 2018-08-31 DIAGNOSIS — I10 BENIGN ESSENTIAL HYPERTENSION: ICD-10-CM

## 2018-08-31 DIAGNOSIS — J40 BRONCHITIS: Primary | ICD-10-CM

## 2018-08-31 PROCEDURE — 99214 OFFICE O/P EST MOD 30 MIN: CPT | Performed by: INTERNAL MEDICINE

## 2018-08-31 RX ORDER — PREDNISONE 20 MG/1
20 TABLET ORAL DAILY
Qty: 10 TABLET | Refills: 0 | Status: SHIPPED | OUTPATIENT
Start: 2018-08-31 | End: 2018-10-02 | Stop reason: HOSPADM

## 2018-08-31 RX ORDER — LEVOFLOXACIN 500 MG/1
500 TABLET, FILM COATED ORAL EVERY 24 HOURS
Qty: 7 TABLET | Refills: 0 | Status: SHIPPED | OUTPATIENT
Start: 2018-08-31 | End: 2018-09-07

## 2018-08-31 NOTE — PROGRESS NOTES
Assessment/Plan:    1  acute asthmatic bronchitis   will start patient on Levaquin 500 mg daily for 7 days and prednisone 400 mg daily for 5 days  Also advised to continue to use the nebulizer as scheduled  2  Benign essential hypertension   will continue with present regimen of lisinopril and verapamil  Diagnoses and all orders for this visit:    Bronchitis    Benign essential hypertension          Subjective:          Patient ID: Nadeem Paulino is a 47 y o  male  Sinus Problem   Associated symptoms include coughing and a sore throat  Pertinent negatives include no chills, congestion, ear pain, headaches, neck pain, shortness of breath or sinus pressure  Sore Throat    Associated symptoms include coughing  Pertinent negatives include no abdominal pain, congestion, diarrhea, ear discharge, ear pain, headaches, neck pain, shortness of breath, trouble swallowing or vomiting  Cough   This is a new problem  The current episode started in the past 7 days  The problem has been gradually worsening  The problem occurs every few minutes  The cough is productive of brown sputum  Associated symptoms include a fever, postnasal drip and a sore throat  Pertinent negatives include no chest pain, chills, ear pain, headaches, rash or shortness of breath  The symptoms are aggravated by exercise and dust  He has tried a beta-agonist inhaler for the symptoms  The treatment provided mild relief  His past medical history is significant for asthma  There is no history of environmental allergies  Fatigue   Associated symptoms include coughing, fatigue, a fever and a sore throat  Pertinent negatives include no abdominal pain, arthralgias, chest pain, chills, congestion, headaches, nausea, neck pain, rash, vomiting or weakness         The following portions of the patient's history were reviewed and updated as appropriate: allergies, current medications, past family history, past medical history, past social history, past surgical history and problem list     Review of Systems   Constitutional: Positive for fatigue and fever  Negative for chills  HENT: Positive for postnasal drip and sore throat  Negative for congestion, ear discharge, ear pain, sinus pressure, tinnitus and trouble swallowing  Eyes: Negative for discharge, itching and visual disturbance  Respiratory: Positive for cough  Negative for shortness of breath  Cardiovascular: Negative for chest pain and palpitations  Gastrointestinal: Negative for abdominal pain, diarrhea, nausea and vomiting  Endocrine: Negative for cold intolerance and polyuria  Genitourinary: Negative for difficulty urinating, dysuria and urgency  Musculoskeletal: Negative for arthralgias and neck pain  Skin: Negative for rash  Allergic/Immunologic: Negative for environmental allergies  Neurological: Negative for dizziness, weakness and headaches  Psychiatric/Behavioral: The patient is not nervous/anxious            Past Medical History:   Diagnosis Date    Arthritis     Chronic rhinitis     last assessed 2/21/14    Chronic serous otitis media     last assessed 11/20/13    Chronic sinusitis     last assessed 8/19/14    Functional heart murmur     GERD (gastroesophageal reflux disease)     Gout     Hearing problem     last assessed 12/1/16    Hiatal hernia     Hypercholesterolemia     Hypertension     Palpitations     Testicular hypogonadism     last assessed 10/4/13         Current Outpatient Prescriptions:     albuterol (PROVENTIL HFA,VENTOLIN HFA) 90 mcg/act inhaler, Inhale 1 puff every 6 (six) hours, Disp: , Rfl:     cetirizine (ZyrTEC) 5 MG tablet, Take 5 mg by mouth every 24 hours, Disp: , Rfl:     colchicine (COLCRYS) 0 6 mg tablet, Take 1 tablet by mouth daily, Disp: , Rfl:     esomeprazole (NexIUM) 40 MG capsule, Take 1 capsule (40 mg total) by mouth daily, Disp: 90 capsule, Rfl: 1    fluticasone-salmeterol (ADVAIR DISKUS) 500-50 mcg/dose, Inhale 1 puff 2 (two) times a day, Disp: , Rfl:     ipratropium-albuterol (DUO-NEB) 0 5-2 5 mg/3 mL, Inhale 3 mL 4 (four) times a day As needed, Disp: , Rfl:     lisinopril (ZESTRIL) 20 mg tablet, Take 1 tablet (20 mg total) by mouth daily, Disp: 90 tablet, Rfl: 1    montelukast (SINGULAIR) 10 mg tablet, Take 1 tablet (10 mg total) by mouth daily, Disp: 90 tablet, Rfl: 1    Triamcinolone Acetonide (NASACORT ALLERGY 24HR NA), into each nostril, Disp: , Rfl:     ULORIC 40 MG tablet, Take 1 tablet (40 mg total) by mouth daily, Disp: 90 tablet, Rfl: 1    verapamil (CALAN) 120 mg tablet, Take 120 mg by mouth daily, Disp: , Rfl: 3    cyclobenzaprine (FLEXERIL) 5 mg tablet, Take 1 tablet by mouth 3 (three) times a day as needed, Disp: , Rfl:     meclizine (ANTIVERT) 12 5 MG tablet, Take 1 tablet by mouth 3 (three) times a day as needed, Disp: , Rfl:     meloxicam (MOBIC) 15 mg tablet, Take 15 mg by mouth daily, Disp: , Rfl:     Allergies   Allergen Reactions    Penicillins Rash and Anaphylaxis    Acetazolamide      Other reaction(s): Stomach Ache    Cephalosporins Other (See Comments)     headache    Sulfa Antibiotics Other (See Comments)     Category: Allergy;  Annotation - [id number]: STOMACH UPSET    Famotidine Palpitations    Omeprazole Palpitations     Painful urination       Social History   Past Surgical History:   Procedure Laterality Date    APPENDECTOMY      BICEPS TENODESIS      anesthesia for tenodesis- of ruptured long tendon of biceps     HERNIA REPAIR      THYROIDECTOMY      TONSILLECTOMY       Family History   Problem Relation Age of Onset    Cancer Father     Lung cancer Father     Coronary artery disease Father     Stroke Maternal Grandmother     Stroke Maternal Grandfather     Cancer Paternal Grandfather     Heart disease Family     Hypertension Family     Lung cancer Cousin     No Known Problems Mother     No Known Problems Sister     No Known Problems Brother     No Known Problems Maternal Aunt     No Known Problems Maternal Uncle     No Known Problems Paternal Aunt     No Known Problems Paternal Uncle     No Known Problems Paternal Grandmother     ADD / ADHD Neg Hx     Anesthesia problems Neg Hx     Clotting disorder Neg Hx     Collagen disease Neg Hx     Diabetes Neg Hx     Dislocations Neg Hx     Learning disabilities Neg Hx     Neurological problems Neg Hx     Osteoporosis Neg Hx     Rheumatologic disease Neg Hx     Scoliosis Neg Hx     Vascular Disease Neg Hx        Objective:  /90 (BP Location: Right arm, Patient Position: Sitting, Cuff Size: Adult)   Pulse 80   Temp 99 6 °F (37 6 °C) (Oral)   Ht 5' 9 5" (1 765 m)   Wt 95 9 kg (211 lb 6 4 oz)   SpO2 98%   BMI 30 77 kg/m²   Body mass index is 30 77 kg/m²  Physical Exam   Constitutional: He appears well-developed  HENT:   Head: Normocephalic  Right Ear: External ear normal    Left Ear: External ear normal    Mouth/Throat: Oropharynx is clear and moist  No oropharyngeal exudate  Eyes: Pupils are equal, round, and reactive to light  No scleral icterus  Neck: Normal range of motion  Neck supple  No tracheal deviation present  No thyromegaly present  Cardiovascular: Normal rate, regular rhythm and normal heart sounds  Pulmonary/Chest: Effort normal  No respiratory distress  He has wheezes  He exhibits no tenderness  Abdominal: Soft  Bowel sounds are normal  He exhibits no mass  There is no tenderness  Musculoskeletal: Normal range of motion  Lymphadenopathy:     He has no cervical adenopathy  Neurological: He is alert  No cranial nerve deficit  Skin: Skin is warm  Psychiatric: He has a normal mood and affect

## 2018-09-13 ENCOUNTER — OFFICE VISIT (OUTPATIENT)
Dept: INTERNAL MEDICINE CLINIC | Facility: CLINIC | Age: 53
End: 2018-09-13
Payer: MEDICARE

## 2018-09-13 VITALS
OXYGEN SATURATION: 98 % | SYSTOLIC BLOOD PRESSURE: 144 MMHG | TEMPERATURE: 98.4 F | WEIGHT: 216 LBS | HEIGHT: 70 IN | HEART RATE: 86 BPM | BODY MASS INDEX: 30.92 KG/M2 | DIASTOLIC BLOOD PRESSURE: 88 MMHG

## 2018-09-13 DIAGNOSIS — J30.89 NON-SEASONAL ALLERGIC RHINITIS, UNSPECIFIED TRIGGER: Primary | ICD-10-CM

## 2018-09-13 DIAGNOSIS — J45.909 MODERATE ASTHMA, UNSPECIFIED WHETHER COMPLICATED, UNSPECIFIED WHETHER PERSISTENT: ICD-10-CM

## 2018-09-13 PROBLEM — J01.90 ACUTE SINUSITIS: Status: RESOLVED | Noted: 2017-11-03 | Resolved: 2018-09-13

## 2018-09-13 PROBLEM — R07.89 ATYPICAL CHEST PAIN: Status: RESOLVED | Noted: 2018-08-03 | Resolved: 2018-09-13

## 2018-09-13 PROCEDURE — 99213 OFFICE O/P EST LOW 20 MIN: CPT | Performed by: INTERNAL MEDICINE

## 2018-09-13 NOTE — PROGRESS NOTES
Assessment/Plan:    Allergic rhinitis  Recommend he continue with Claritin or Zyrtec, Nasacort, Singulair, and current asthma inhalers  No need for further antibiotics at this time  Diagnoses and all orders for this visit:    Non-seasonal allergic rhinitis, unspecified trigger    Moderate asthma, unspecified whether complicated, unspecified whether persistent          Subjective:      Patient ID: Janet Mccain is a 47 y o  male  47year old male is seen today for evaluation of dry throat, nasal congestion, and fatigue of 5-7 days ago  He was recently on Levaquin and prednisone for acute asthmatic bronchitis  He was also evaluated by his pulmonologist, Dr Cordell Joy, who recommended he continue with low dose prednisone with a taper  He completed Prednisone on 9/8 and completed Levaquin on 9/6  He has also been having stuffiness, admits to not taking any allergy medications  The following portions of the patient's history were reviewed and updated as appropriate: allergies, current medications, past family history, past medical history, past social history, past surgical history and problem list     Review of Systems   Constitutional: Negative for activity change, appetite change, chills, diaphoresis, fatigue and fever  HENT: Positive for postnasal drip  Negative for congestion, dental problem, drooling, ear discharge, ear pain, facial swelling, hearing loss, mouth sores, nosebleeds, rhinorrhea, sinus pain, sinus pressure, sneezing and sore throat  Eyes: Negative for visual disturbance  Respiratory: Negative for apnea, cough, choking, chest tightness, shortness of breath and wheezing  Cardiovascular: Negative for chest pain, palpitations and leg swelling  Gastrointestinal: Negative for abdominal distention, abdominal pain, anal bleeding, blood in stool, constipation, diarrhea, nausea and vomiting  Endocrine: Negative for cold intolerance and heat intolerance     Genitourinary: Negative for difficulty urinating, dysuria and hematuria  Musculoskeletal: Negative  Skin: Negative  Neurological: Negative for dizziness, weakness, light-headedness, numbness and headaches  Hematological: Negative for adenopathy  Psychiatric/Behavioral: Negative for agitation, sleep disturbance and suicidal ideas  All other systems reviewed and are negative          Past Medical History:   Diagnosis Date    Arthritis     Chronic rhinitis     last assessed 2/21/14    Chronic serous otitis media     last assessed 11/20/13    Chronic sinusitis     last assessed 8/19/14    Functional heart murmur     GERD (gastroesophageal reflux disease)     Gout     Hearing problem     last assessed 12/1/16    Hiatal hernia     Hypercholesterolemia     Hypertension     Palpitations     Testicular hypogonadism     last assessed 10/4/13         Current Outpatient Prescriptions:     albuterol (PROVENTIL HFA,VENTOLIN HFA) 90 mcg/act inhaler, Inhale 1 puff every 6 (six) hours, Disp: , Rfl:     cetirizine (ZyrTEC) 5 MG tablet, Take 5 mg by mouth every 24 hours, Disp: , Rfl:     colchicine (COLCRYS) 0 6 mg tablet, Take 1 tablet by mouth daily, Disp: , Rfl:     cyclobenzaprine (FLEXERIL) 5 mg tablet, Take 1 tablet by mouth 3 (three) times a day as needed, Disp: , Rfl:     esomeprazole (NexIUM) 40 MG capsule, Take 1 capsule (40 mg total) by mouth daily, Disp: 90 capsule, Rfl: 1    fluticasone-salmeterol (ADVAIR DISKUS) 500-50 mcg/dose, Inhale 1 puff 2 (two) times a day, Disp: , Rfl:     ipratropium-albuterol (DUO-NEB) 0 5-2 5 mg/3 mL, Inhale 3 mL 4 (four) times a day As needed, Disp: , Rfl:     lisinopril (ZESTRIL) 20 mg tablet, Take 1 tablet (20 mg total) by mouth daily, Disp: 90 tablet, Rfl: 1    meclizine (ANTIVERT) 12 5 MG tablet, Take 1 tablet by mouth 3 (three) times a day as needed, Disp: , Rfl:     meloxicam (MOBIC) 15 mg tablet, Take 15 mg by mouth daily, Disp: , Rfl:     montelukast (SINGULAIR) 10 mg tablet, Take 1 tablet (10 mg total) by mouth daily, Disp: 90 tablet, Rfl: 1    predniSONE 20 mg tablet, Take 1 tablet (20 mg total) by mouth daily, Disp: 10 tablet, Rfl: 0    Triamcinolone Acetonide (NASACORT ALLERGY 24HR NA), into each nostril, Disp: , Rfl:     ULORIC 40 MG tablet, Take 1 tablet (40 mg total) by mouth daily, Disp: 90 tablet, Rfl: 1    verapamil (CALAN) 120 mg tablet, Take 120 mg by mouth daily, Disp: , Rfl: 3    Allergies   Allergen Reactions    Penicillins Rash and Anaphylaxis    Acetazolamide      Other reaction(s): Stomach Ache    Cephalosporins Other (See Comments)     headache    Sulfa Antibiotics Other (See Comments)     Category: Allergy;  Annotation - [id number]: STOMACH UPSET    Famotidine Palpitations    Omeprazole Palpitations     Painful urination       Social History   Past Surgical History:   Procedure Laterality Date    APPENDECTOMY      BICEPS TENODESIS      anesthesia for tenodesis- of ruptured long tendon of biceps     HERNIA REPAIR      THYROIDECTOMY      TONSILLECTOMY       Family History   Problem Relation Age of Onset    Cancer Father     Lung cancer Father     Coronary artery disease Father     Stroke Maternal Grandmother     Stroke Maternal Grandfather     Cancer Paternal Grandfather     Heart disease Family     Hypertension Family     Lung cancer Cousin     No Known Problems Mother     No Known Problems Sister     No Known Problems Brother     No Known Problems Maternal Aunt     No Known Problems Maternal Uncle     No Known Problems Paternal Aunt     No Known Problems Paternal Uncle     No Known Problems Paternal Grandmother     ADD / ADHD Neg Hx     Anesthesia problems Neg Hx     Clotting disorder Neg Hx     Collagen disease Neg Hx     Diabetes Neg Hx     Dislocations Neg Hx     Learning disabilities Neg Hx     Neurological problems Neg Hx     Osteoporosis Neg Hx     Rheumatologic disease Neg Hx     Scoliosis Neg Hx     Vascular Disease Neg Hx        Objective:  /88 (BP Location: Right arm, Patient Position: Sitting, Cuff Size: Standard)   Pulse 86   Temp 98 4 °F (36 9 °C) (Oral)   Ht 5' 9 5" (1 765 m)   Wt 98 kg (216 lb)   SpO2 98%   BMI 31 44 kg/m²     Recent Results (from the past 1344 hour(s))   POCT ECG    Collection Time: 08/03/18  3:22 PM   Result Value Ref Range    INTERPRETATIONS              Physical Exam   Constitutional: He is oriented to person, place, and time  He appears well-developed and well-nourished  No distress  HENT:   Head: Normocephalic and atraumatic  Eyes: Conjunctivae and EOM are normal  Pupils are equal, round, and reactive to light  Right eye exhibits no discharge  Left eye exhibits no discharge  No scleral icterus  Neck: Normal range of motion  Neck supple  No JVD present  No thyromegaly present  Cardiovascular: Normal rate, regular rhythm, normal heart sounds and intact distal pulses  Exam reveals no gallop and no friction rub  No murmur heard  Pulmonary/Chest: Effort normal and breath sounds normal  No respiratory distress  He has no wheezes  He has no rales  He exhibits no tenderness  Abdominal: Soft  Bowel sounds are normal  He exhibits no distension and no mass  There is no tenderness  There is no rebound and no guarding  Musculoskeletal: Normal range of motion  He exhibits no edema, tenderness or deformity  Lymphadenopathy:     He has no cervical adenopathy  Neurological: He is alert and oriented to person, place, and time  He has normal reflexes  No cranial nerve deficit  Coordination normal    Skin: Skin is warm and dry  No rash noted  He is not diaphoretic  No erythema  No pallor  Psychiatric: He has a normal mood and affect  His behavior is normal  Judgment and thought content normal    Nursing note and vitals reviewed

## 2018-09-13 NOTE — ASSESSMENT & PLAN NOTE
Recommend he continue with Claritin or Zyrtec, Nasacort, Singulair, and current asthma inhalers  No need for further antibiotics at this time

## 2018-10-02 ENCOUNTER — OFFICE VISIT (OUTPATIENT)
Dept: INTERNAL MEDICINE CLINIC | Facility: CLINIC | Age: 53
End: 2018-10-02
Payer: MEDICARE

## 2018-10-02 VITALS
WEIGHT: 216.4 LBS | BODY MASS INDEX: 32.05 KG/M2 | SYSTOLIC BLOOD PRESSURE: 112 MMHG | TEMPERATURE: 99.1 F | DIASTOLIC BLOOD PRESSURE: 80 MMHG | OXYGEN SATURATION: 96 % | HEIGHT: 69 IN | HEART RATE: 97 BPM

## 2018-10-02 DIAGNOSIS — E78.2 MIXED HYPERLIPIDEMIA: ICD-10-CM

## 2018-10-02 DIAGNOSIS — I10 BENIGN ESSENTIAL HYPERTENSION: ICD-10-CM

## 2018-10-02 DIAGNOSIS — K21.9 GERD WITHOUT ESOPHAGITIS: Primary | ICD-10-CM

## 2018-10-02 DIAGNOSIS — Z23 NEEDS FLU SHOT: ICD-10-CM

## 2018-10-02 DIAGNOSIS — M1A.9XX0 CHRONIC GOUT WITHOUT TOPHUS, UNSPECIFIED CAUSE, UNSPECIFIED SITE: ICD-10-CM

## 2018-10-02 PROCEDURE — G0008 ADMIN INFLUENZA VIRUS VAC: HCPCS

## 2018-10-02 PROCEDURE — 99214 OFFICE O/P EST MOD 30 MIN: CPT | Performed by: INTERNAL MEDICINE

## 2018-10-02 PROCEDURE — 90686 IIV4 VACC NO PRSV 0.5 ML IM: CPT

## 2018-10-02 NOTE — PROGRESS NOTES
Assessment/Plan:    1  Essential  Hypertension   blood pressure is well controlled on present regimen of verapamil and amlodipine  Will also continue with present regimen which also include lisinopril 20 mg daily     2  History of gout   continue with the Uloric 40 mg daily  Will check uric acid level before next visit   3  Bronchial asthma   stable on present regimen  Patient is also being followed by pulmonologist         There are no diagnoses linked to this encounter  Subjective:          Patient ID: Mervin Anaya is a 47 y o  male      HPI    The following portions of the patient's history were reviewed and updated as appropriate: allergies, current medications, past family history, past medical history, past social history, past surgical history and problem list     Review of Systems      Past Medical History:   Diagnosis Date    Arthritis     Chronic rhinitis     last assessed 2/21/14    Chronic serous otitis media     last assessed 11/20/13    Chronic sinusitis     last assessed 8/19/14    Functional heart murmur     GERD (gastroesophageal reflux disease)     Gout     Hearing problem     last assessed 12/1/16    Hiatal hernia     Hypercholesterolemia     Hypertension     Palpitations     Testicular hypogonadism     last assessed 10/4/13         Current Outpatient Prescriptions:     albuterol (PROVENTIL HFA,VENTOLIN HFA) 90 mcg/act inhaler, Inhale 1 puff every 6 (six) hours, Disp: , Rfl:     cetirizine (ZyrTEC) 5 MG tablet, Take 5 mg by mouth every 24 hours, Disp: , Rfl:     colchicine (COLCRYS) 0 6 mg tablet, Take 1 tablet by mouth daily, Disp: , Rfl:     cyclobenzaprine (FLEXERIL) 5 mg tablet, Take 1 tablet by mouth 3 (three) times a day as needed, Disp: , Rfl:     esomeprazole (NexIUM) 40 MG capsule, Take 1 capsule (40 mg total) by mouth daily, Disp: 90 capsule, Rfl: 1    fluticasone-salmeterol (ADVAIR DISKUS) 500-50 mcg/dose, Inhale 1 puff 2 (two) times a day, Disp: , Rfl:    ipratropium-albuterol (DUO-NEB) 0 5-2 5 mg/3 mL, Inhale 3 mL 4 (four) times a day As needed, Disp: , Rfl:     lisinopril (ZESTRIL) 20 mg tablet, Take 1 tablet (20 mg total) by mouth daily, Disp: 90 tablet, Rfl: 1    meclizine (ANTIVERT) 12 5 MG tablet, Take 1 tablet by mouth 3 (three) times a day as needed, Disp: , Rfl:     meloxicam (MOBIC) 15 mg tablet, Take 15 mg by mouth daily, Disp: , Rfl:     montelukast (SINGULAIR) 10 mg tablet, Take 1 tablet (10 mg total) by mouth daily, Disp: 90 tablet, Rfl: 1    Triamcinolone Acetonide (NASACORT ALLERGY 24HR NA), into each nostril, Disp: , Rfl:     ULORIC 40 MG tablet, Take 1 tablet (40 mg total) by mouth daily, Disp: 90 tablet, Rfl: 1    verapamil (CALAN) 120 mg tablet, Take 120 mg by mouth daily, Disp: , Rfl: 3    predniSONE 20 mg tablet, Take 1 tablet (20 mg total) by mouth daily (Patient not taking: Reported on 10/2/2018 ), Disp: 10 tablet, Rfl: 0    Allergies   Allergen Reactions    Penicillins Rash and Anaphylaxis    Acetazolamide      Other reaction(s): Stomach Ache    Cephalosporins Other (See Comments)     headache    Sulfa Antibiotics Other (See Comments)     Category: Allergy;  Annotation - [id number]: STOMACH UPSET    Famotidine Palpitations    Omeprazole Palpitations     Painful urination       Social History   Past Surgical History:   Procedure Laterality Date    APPENDECTOMY      BICEPS TENODESIS      anesthesia for tenodesis- of ruptured long tendon of biceps     HERNIA REPAIR      THYROIDECTOMY      TONSILLECTOMY       Family History   Problem Relation Age of Onset    Cancer Father     Lung cancer Father     Coronary artery disease Father     Stroke Maternal Grandmother     Stroke Maternal Grandfather     Cancer Paternal Grandfather     Heart disease Family     Hypertension Family     Lung cancer Cousin     No Known Problems Mother     No Known Problems Sister     No Known Problems Brother     No Known Problems Maternal Aunt  No Known Problems Maternal Uncle     No Known Problems Paternal Aunt     No Known Problems Paternal Uncle     No Known Problems Paternal Grandmother     ADD / ADHD Neg Hx     Anesthesia problems Neg Hx     Clotting disorder Neg Hx     Collagen disease Neg Hx     Diabetes Neg Hx     Dislocations Neg Hx     Learning disabilities Neg Hx     Neurological problems Neg Hx     Osteoporosis Neg Hx     Rheumatologic disease Neg Hx     Scoliosis Neg Hx     Vascular Disease Neg Hx        Objective:  /80 (BP Location: Left arm, Patient Position: Sitting, Cuff Size: Adult)   Pulse 97   Temp 99 1 °F (37 3 °C) (Oral)   Ht 5' 9" (1 753 m)   Wt 98 2 kg (216 lb 6 4 oz)   SpO2 96%   BMI 31 96 kg/m²   Body mass index is 31 96 kg/m²       Physical Exam

## 2018-10-07 DIAGNOSIS — I10 ESSENTIAL HYPERTENSION: Primary | ICD-10-CM

## 2018-10-08 DIAGNOSIS — I10 ESSENTIAL HYPERTENSION: ICD-10-CM

## 2018-10-08 RX ORDER — VERAPAMIL HYDROCHLORIDE 120 MG/1
TABLET, FILM COATED ORAL
Qty: 90 TABLET | Refills: 3 | Status: SHIPPED | OUTPATIENT
Start: 2018-10-08 | End: 2018-10-08 | Stop reason: SDUPTHER

## 2018-10-09 RX ORDER — VERAPAMIL HYDROCHLORIDE 120 MG/1
120 TABLET, FILM COATED ORAL DAILY
Qty: 90 TABLET | Refills: 2 | Status: SHIPPED | OUTPATIENT
Start: 2018-10-09 | End: 2019-10-07

## 2018-10-24 ENCOUNTER — OFFICE VISIT (OUTPATIENT)
Dept: INTERNAL MEDICINE CLINIC | Age: 53
End: 2018-10-24
Payer: MEDICARE

## 2018-10-24 VITALS
BODY MASS INDEX: 32.58 KG/M2 | WEIGHT: 220.6 LBS | SYSTOLIC BLOOD PRESSURE: 146 MMHG | HEART RATE: 92 BPM | OXYGEN SATURATION: 94 % | DIASTOLIC BLOOD PRESSURE: 82 MMHG

## 2018-10-24 DIAGNOSIS — J01.00 ACUTE NON-RECURRENT MAXILLARY SINUSITIS: ICD-10-CM

## 2018-10-24 DIAGNOSIS — I10 BENIGN ESSENTIAL HYPERTENSION: Primary | ICD-10-CM

## 2018-10-24 PROCEDURE — 99213 OFFICE O/P EST LOW 20 MIN: CPT | Performed by: INTERNAL MEDICINE

## 2018-10-24 RX ORDER — DOXYCYCLINE 100 MG/1
100 TABLET ORAL 2 TIMES DAILY
Qty: 20 TABLET | Refills: 0 | Status: SHIPPED | OUTPATIENT
Start: 2018-10-24 | End: 2018-11-03

## 2018-10-24 NOTE — PROGRESS NOTES
Assessment/Plan:    1  Acute bilateral maxillary sinusitis   will use doxycycline 100 mg b i d  For 10 days  Also advised to continue with the Nasonex nasal spray daily  2  Essential hypertension   continue with the present dose of verapamil and lisinopril   3  Bronchial  Asthma   continue with present inhaler       Diagnoses and all orders for this visit:    Benign essential hypertension    Acute non-recurrent maxillary sinusitis          Subjective:          Patient ID: Bobby Meza is a 47 y o  male  Sinusitis   This is a chronic problem  The current episode started yesterday  The problem has been gradually worsening since onset  The maximum temperature recorded prior to his arrival was 100 4 - 100 9 F  The fever has been present for 1 to 2 days  The pain is moderate  Associated symptoms include chills, congestion, coughing, headaches and sinus pressure  Pertinent negatives include no ear pain, neck pain, shortness of breath or sore throat  Past treatments include acetaminophen and saline sprays  The treatment provided mild relief  The following portions of the patient's history were reviewed and updated as appropriate: allergies, current medications, past family history, past medical history, past social history, past surgical history and problem list     Review of Systems   Constitutional: Positive for chills  Negative for fatigue and fever  HENT: Positive for congestion and sinus pressure  Negative for ear discharge, ear pain, postnasal drip, sore throat, tinnitus and trouble swallowing  Eyes: Negative for discharge, itching and visual disturbance  Respiratory: Positive for cough  Negative for shortness of breath  Cardiovascular: Negative for chest pain and palpitations  Gastrointestinal: Negative for abdominal pain, diarrhea, nausea and vomiting  Endocrine: Negative for cold intolerance and polyuria  Genitourinary: Negative for difficulty urinating, dysuria and urgency  Musculoskeletal: Negative for arthralgias and neck pain  Skin: Negative for rash  Allergic/Immunologic: Negative for environmental allergies  Neurological: Positive for headaches  Negative for dizziness and weakness  Psychiatric/Behavioral: The patient is not nervous/anxious            Past Medical History:   Diagnosis Date    Arthritis     Chronic rhinitis     last assessed 2/21/14    Chronic serous otitis media     last assessed 11/20/13    Chronic sinusitis     last assessed 8/19/14    Functional heart murmur     GERD (gastroesophageal reflux disease)     Gout     Hearing problem     last assessed 12/1/16    Hiatal hernia     Hypercholesterolemia     Hypertension     Palpitations     Testicular hypogonadism     last assessed 10/4/13         Current Outpatient Prescriptions:     albuterol (PROVENTIL HFA,VENTOLIN HFA) 90 mcg/act inhaler, Inhale 1 puff every 6 (six) hours, Disp: , Rfl:     cetirizine (ZyrTEC) 5 MG tablet, Take 5 mg by mouth as needed  , Disp: , Rfl:     colchicine (COLCRYS) 0 6 mg tablet, Take 1 tablet by mouth as needed  , Disp: , Rfl:     esomeprazole (NexIUM) 40 MG capsule, Take 1 capsule (40 mg total) by mouth daily, Disp: 90 capsule, Rfl: 1    fluticasone-salmeterol (ADVAIR DISKUS) 500-50 mcg/dose, Inhale 1 puff 2 (two) times a day, Disp: , Rfl:     ipratropium-albuterol (DUO-NEB) 0 5-2 5 mg/3 mL, Inhale 3 mL 4 (four) times a day As needed, Disp: , Rfl:     lisinopril (ZESTRIL) 20 mg tablet, Take 1 tablet (20 mg total) by mouth daily, Disp: 90 tablet, Rfl: 1    meclizine (ANTIVERT) 12 5 MG tablet, Take 1 tablet by mouth 3 (three) times a day as needed, Disp: , Rfl:     montelukast (SINGULAIR) 10 mg tablet, Take 1 tablet (10 mg total) by mouth daily, Disp: 90 tablet, Rfl: 1    Triamcinolone Acetonide (NASACORT ALLERGY 24HR NA), into each nostril, Disp: , Rfl:     ULORIC 40 MG tablet, Take 1 tablet (40 mg total) by mouth daily, Disp: 90 tablet, Rfl: 1   verapamil (CALAN) 120 mg tablet, Take 1 tablet (120 mg total) by mouth daily, Disp: 90 tablet, Rfl: 2    cyclobenzaprine (FLEXERIL) 5 mg tablet, Take 1 tablet by mouth 3 (three) times a day as needed, Disp: , Rfl:     meloxicam (MOBIC) 15 mg tablet, Take 15 mg by mouth daily, Disp: , Rfl:     Allergies   Allergen Reactions    Penicillins Rash and Anaphylaxis    Acetazolamide      Other reaction(s): Stomach Ache    Cephalosporins Other (See Comments)     headache    Doxycycline      Capsules only  Tablets are ok to take, per pt   Sulfa Antibiotics Other (See Comments)     Category: Allergy;  Annotation - [id number]: STOMACH UPSET    Famotidine Palpitations    Omeprazole Palpitations     Painful urination       Social History   Past Surgical History:   Procedure Laterality Date    APPENDECTOMY      BICEPS TENODESIS      anesthesia for tenodesis- of ruptured long tendon of biceps     HERNIA REPAIR      THYROIDECTOMY      TONSILLECTOMY       Family History   Problem Relation Age of Onset    Cancer Father     Lung cancer Father     Coronary artery disease Father     Stroke Maternal Grandmother     Stroke Maternal Grandfather     Cancer Paternal Grandfather     Heart disease Family     Hypertension Family     Lung cancer Cousin     No Known Problems Mother     No Known Problems Sister     No Known Problems Brother     No Known Problems Maternal Aunt     No Known Problems Maternal Uncle     No Known Problems Paternal Aunt     No Known Problems Paternal Uncle     No Known Problems Paternal Grandmother     ADD / ADHD Neg Hx     Anesthesia problems Neg Hx     Clotting disorder Neg Hx     Collagen disease Neg Hx     Diabetes Neg Hx     Dislocations Neg Hx     Learning disabilities Neg Hx     Neurological problems Neg Hx     Osteoporosis Neg Hx     Rheumatologic disease Neg Hx     Scoliosis Neg Hx     Vascular Disease Neg Hx        Objective:  /82 (BP Location: Right arm, Patient Position: Sitting, Cuff Size: Adult)   Pulse 92   Wt 100 kg (220 lb 9 6 oz) Comment: with sneakers  SpO2 94% Comment: room air  BMI 32 58 kg/m²   Body mass index is 32 58 kg/m²  Physical Exam   Constitutional: He appears well-developed  HENT:   Head: Normocephalic  Right Ear: External ear normal    Left Ear: External ear normal    Mouth/Throat: Oropharynx is clear and moist  No oropharyngeal exudate  Bilateral positive tenderness on both maxillary sinuses noted   no lymphadenopathy   Eyes: Pupils are equal, round, and reactive to light  No scleral icterus  Neck: Normal range of motion  Neck supple  No tracheal deviation present  No thyromegaly present  Cardiovascular: Normal rate, regular rhythm and normal heart sounds  Pulmonary/Chest: Effort normal and breath sounds normal  No respiratory distress  He has no wheezes  He exhibits no tenderness  Abdominal: Soft  Bowel sounds are normal  He exhibits no mass  There is no tenderness  Musculoskeletal: Normal range of motion  Lymphadenopathy:     He has no cervical adenopathy  Neurological: He is alert  No cranial nerve deficit  Skin: Skin is warm  Psychiatric: He has a normal mood and affect

## 2018-10-26 ENCOUNTER — TELEPHONE (OUTPATIENT)
Dept: INTERNAL MEDICINE CLINIC | Facility: CLINIC | Age: 53
End: 2018-10-26

## 2018-10-26 DIAGNOSIS — K21.9 GASTROESOPHAGEAL REFLUX DISEASE WITHOUT ESOPHAGITIS: ICD-10-CM

## 2018-10-26 RX ORDER — ESOMEPRAZOLE MAGNESIUM 40 MG/1
40 CAPSULE, DELAYED RELEASE ORAL DAILY
Qty: 90 CAPSULE | Refills: 1 | Status: SHIPPED | OUTPATIENT
Start: 2018-10-26 | End: 2019-04-22 | Stop reason: SDUPTHER

## 2018-10-26 NOTE — TELEPHONE ENCOUNTER
Patient called and is requesting his nexium prescription for 40 mg's be called in  Please advise     - Dr Zeinab Head filled  Thank you

## 2018-10-30 ENCOUNTER — OFFICE VISIT (OUTPATIENT)
Dept: OBGYN CLINIC | Facility: MEDICAL CENTER | Age: 53
End: 2018-10-30
Payer: MEDICARE

## 2018-10-30 VITALS
BODY MASS INDEX: 31.07 KG/M2 | WEIGHT: 217 LBS | SYSTOLIC BLOOD PRESSURE: 130 MMHG | DIASTOLIC BLOOD PRESSURE: 70 MMHG | HEIGHT: 70 IN | HEART RATE: 80 BPM

## 2018-10-30 DIAGNOSIS — M17.11 PRIMARY OSTEOARTHRITIS OF RIGHT KNEE: Primary | ICD-10-CM

## 2018-10-30 PROCEDURE — 99213 OFFICE O/P EST LOW 20 MIN: CPT | Performed by: ORTHOPAEDIC SURGERY

## 2018-10-30 PROCEDURE — 20610 DRAIN/INJ JOINT/BURSA W/O US: CPT | Performed by: PHYSICIAN ASSISTANT

## 2018-10-30 RX ORDER — METHYLPREDNISOLONE ACETATE 40 MG/ML
2 INJECTION, SUSPENSION INTRA-ARTICULAR; INTRALESIONAL; INTRAMUSCULAR; SOFT TISSUE
Status: COMPLETED | OUTPATIENT
Start: 2018-10-30 | End: 2018-10-30

## 2018-10-30 RX ORDER — LIDOCAINE HYDROCHLORIDE 5 MG/ML
3 INJECTION, SOLUTION INFILTRATION; PERINEURAL
Status: COMPLETED | OUTPATIENT
Start: 2018-10-30 | End: 2018-10-30

## 2018-10-30 RX ADMIN — METHYLPREDNISOLONE ACETATE 2 ML: 40 INJECTION, SUSPENSION INTRA-ARTICULAR; INTRALESIONAL; INTRAMUSCULAR; SOFT TISSUE at 15:53

## 2018-10-30 RX ADMIN — LIDOCAINE HYDROCHLORIDE 3 ML: 5 INJECTION, SOLUTION INFILTRATION; PERINEURAL at 15:53

## 2018-11-09 DIAGNOSIS — J31.0 CHRONIC RHINITIS: ICD-10-CM

## 2018-11-09 RX ORDER — MONTELUKAST SODIUM 10 MG/1
TABLET ORAL
Qty: 90 TABLET | Refills: 1 | OUTPATIENT
Start: 2018-11-09

## 2018-11-19 DIAGNOSIS — J31.0 CHRONIC RHINITIS: ICD-10-CM

## 2018-11-19 RX ORDER — MONTELUKAST SODIUM 10 MG/1
10 TABLET ORAL DAILY
Qty: 90 TABLET | Refills: 1 | Status: SHIPPED | OUTPATIENT
Start: 2018-11-19 | End: 2019-05-17 | Stop reason: SDUPTHER

## 2018-11-30 ENCOUNTER — OFFICE VISIT (OUTPATIENT)
Dept: OBGYN CLINIC | Facility: MEDICAL CENTER | Age: 53
End: 2018-11-30
Payer: MEDICARE

## 2018-11-30 VITALS
HEART RATE: 78 BPM | HEIGHT: 70 IN | WEIGHT: 222.2 LBS | BODY MASS INDEX: 31.81 KG/M2 | DIASTOLIC BLOOD PRESSURE: 74 MMHG | SYSTOLIC BLOOD PRESSURE: 122 MMHG

## 2018-11-30 DIAGNOSIS — M17.12 PRIMARY LOCALIZED OSTEOARTHRITIS OF LEFT KNEE: Primary | ICD-10-CM

## 2018-11-30 DIAGNOSIS — M10.9 GOUTY ARTHRITIS: ICD-10-CM

## 2018-11-30 PROCEDURE — 99213 OFFICE O/P EST LOW 20 MIN: CPT | Performed by: ORTHOPAEDIC SURGERY

## 2018-11-30 PROCEDURE — 20610 DRAIN/INJ JOINT/BURSA W/O US: CPT | Performed by: ORTHOPAEDIC SURGERY

## 2018-11-30 RX ORDER — LIDOCAINE HYDROCHLORIDE 10 MG/ML
3 INJECTION, SOLUTION INFILTRATION; PERINEURAL
Status: COMPLETED | OUTPATIENT
Start: 2018-11-30 | End: 2018-11-30

## 2018-11-30 RX ORDER — METHYLPREDNISOLONE ACETATE 40 MG/ML
2 INJECTION, SUSPENSION INTRA-ARTICULAR; INTRALESIONAL; INTRAMUSCULAR; SOFT TISSUE
Status: COMPLETED | OUTPATIENT
Start: 2018-11-30 | End: 2018-11-30

## 2018-11-30 RX ADMIN — LIDOCAINE HYDROCHLORIDE 3 ML: 10 INJECTION, SOLUTION INFILTRATION; PERINEURAL at 15:33

## 2018-11-30 RX ADMIN — METHYLPREDNISOLONE ACETATE 2 ML: 40 INJECTION, SUSPENSION INTRA-ARTICULAR; INTRALESIONAL; INTRAMUSCULAR; SOFT TISSUE at 15:33

## 2018-11-30 NOTE — PROGRESS NOTES
Assessment/Plan:  1  Primary localized osteoarthritis of left knee      Orders Placed This Encounter   Procedures    Large joint arthrocentesis   ·  Left knee steroid injection was given today  Patient was encouraged to avoid strenuous activity, rest, ice as needed for comfort  · Continue over-the-counter pain medication p r n  Pain  · Continue walking and exercise as tolerated    Return in about 3 months (around 2/28/2019)  Subjective   Chief Complaint:   Chief Complaint   Patient presents with    Left Knee - Follow-up       Kiki Hinds is a 47 y o  male who presents for follow up for left knee pain  Patient's last left knee injection was given on 08/28/2018, he reports approximately 2 to 2-1/2 months of pain relief from each injection  He continues to walk 3-3 5 miles a day for exercise  He continues to do home exercise  He has been taking ibuprofen as needed for pain  His pain does not radiate  He states his knee feels stable to him  Review of Systems  ROS:    See HPI for musculoskeletal review     All other systems reviewed are negative     History:  Past Medical History:   Diagnosis Date    Arthritis     Chronic rhinitis     last assessed 2/21/14    Chronic serous otitis media     last assessed 11/20/13    Chronic sinusitis     last assessed 8/19/14    Functional heart murmur     GERD (gastroesophageal reflux disease)     Gout     Hearing problem     last assessed 12/1/16    Hiatal hernia     Hypercholesterolemia     Hypertension     Palpitations     Testicular hypogonadism     last assessed 10/4/13     Past Surgical History:   Procedure Laterality Date    APPENDECTOMY      BICEPS TENODESIS      anesthesia for tenodesis- of ruptured long tendon of biceps     HERNIA REPAIR      THYROIDECTOMY      TONSILLECTOMY       Social History   History   Alcohol Use    Yes     Comment: occassional     History   Drug Use No     History   Smoking Status    Never Smoker   Smokeless Tobacco    Never Used     Family History:   Family History   Problem Relation Age of Onset    Cancer Father     Lung cancer Father     Coronary artery disease Father     Stroke Maternal Grandmother     Stroke Maternal Grandfather     Cancer Paternal Grandfather     Heart disease Family     Hypertension Family     Lung cancer Cousin     No Known Problems Mother     No Known Problems Sister     No Known Problems Brother     No Known Problems Maternal Aunt     No Known Problems Maternal Uncle     No Known Problems Paternal Aunt     No Known Problems Paternal Uncle     No Known Problems Paternal Grandmother     ADD / ADHD Neg Hx     Anesthesia problems Neg Hx     Clotting disorder Neg Hx     Collagen disease Neg Hx     Diabetes Neg Hx     Dislocations Neg Hx     Learning disabilities Neg Hx     Neurological problems Neg Hx     Osteoporosis Neg Hx     Rheumatologic disease Neg Hx     Scoliosis Neg Hx     Vascular Disease Neg Hx        Meds/Allergies     (Not in a hospital admission)  Allergies   Allergen Reactions    Penicillins Rash and Anaphylaxis    Acetazolamide      Other reaction(s): Stomach Ache    Cephalosporins Other (See Comments)     headache    Doxycycline      Capsules only  Tablets are ok to take, per pt   Sulfa Antibiotics Other (See Comments)     Category: Allergy;  Annotation - [id number]: STOMACH UPSET    Famotidine Palpitations    Omeprazole Palpitations     Painful urination          Objective     /74   Pulse 78   Ht 5' 9 5" (1 765 m)   Wt 101 kg (222 lb 3 2 oz)   BMI 32 34 kg/m²      PE:  AAOx 3  WDWN  Hearing intact, no drainage from eyes  no audible wheezing  no abdominal distension  LE compartments soft, skin intact    Ortho Exam:  left Knee:   No erythema  no swelling  no effusion  no warmth  AROM: full  Stable to varus/valgus stress    Large joint arthrocentesis  Date/Time: 11/30/2018 3:33 PM  Consent given by: patient  Site marked: site marked  Timeout: Immediately prior to procedure a time out was called to verify the correct patient, procedure, equipment, support staff and site/side marked as required   Supporting Documentation  Indications: pain   Procedure Details  Location: knee - L knee  Preparation: Patient was prepped and draped in the usual sterile fashion  Needle size: 22 G  Ultrasound guidance: no  Approach: anterolateral  Medications administered: 3 mL lidocaine 1 %; 2 mL methylPREDNISolone acetate 40 mg/mL    Patient tolerance: patient tolerated the procedure well with no immediate complications  Dressing:  Sterile dressing applied

## 2018-12-02 RX ORDER — FEBUXOSTAT 40 MG/1
TABLET ORAL
Qty: 90 TABLET | Refills: 1 | OUTPATIENT
Start: 2018-12-02

## 2018-12-04 ENCOUNTER — OFFICE VISIT (OUTPATIENT)
Dept: INTERNAL MEDICINE CLINIC | Facility: CLINIC | Age: 53
End: 2018-12-04
Payer: MEDICARE

## 2018-12-04 VITALS
TEMPERATURE: 99.1 F | WEIGHT: 219.6 LBS | BODY MASS INDEX: 31.96 KG/M2 | HEART RATE: 79 BPM | OXYGEN SATURATION: 98 % | SYSTOLIC BLOOD PRESSURE: 130 MMHG | DIASTOLIC BLOOD PRESSURE: 80 MMHG

## 2018-12-04 DIAGNOSIS — I10 BENIGN ESSENTIAL HYPERTENSION: Primary | ICD-10-CM

## 2018-12-04 DIAGNOSIS — H81.12 BENIGN PAROXYSMAL POSITIONAL VERTIGO OF LEFT EAR: ICD-10-CM

## 2018-12-04 DIAGNOSIS — K21.9 GERD WITHOUT ESOPHAGITIS: ICD-10-CM

## 2018-12-04 PROBLEM — R09.89 GLOBUS HYSTERICUS: Status: RESOLVED | Noted: 2017-02-21 | Resolved: 2018-12-04

## 2018-12-04 PROBLEM — R09.A2 GLOBUS HYSTERICUS: Status: RESOLVED | Noted: 2017-02-21 | Resolved: 2018-12-04

## 2018-12-04 PROBLEM — F45.8 GLOBUS HYSTERICUS: Status: RESOLVED | Noted: 2017-02-21 | Resolved: 2018-12-04

## 2018-12-04 PROCEDURE — 99213 OFFICE O/P EST LOW 20 MIN: CPT | Performed by: INTERNAL MEDICINE

## 2018-12-04 RX ORDER — PREDNISONE 20 MG/1
40 TABLET ORAL DAILY
Qty: 10 TABLET | Refills: 0 | Status: SHIPPED | OUTPATIENT
Start: 2018-12-04 | End: 2018-12-09

## 2018-12-04 NOTE — ASSESSMENT & PLAN NOTE
No change in current medications  May benefit by the addition of a beta-blocker given his history of palpitations    But this may be more secondary to his use of beta adrenergics

## 2018-12-04 NOTE — PROGRESS NOTES
Assessment/Plan:    Benign essential hypertension  No change in current medications  May benefit by the addition of a beta-blocker given his history of palpitations  But this may be more secondary to his use of beta adrenergics    Benign paroxysmal positional vertigo  Short course of prednisone as been prescribed  GERD without esophagitis  Symptoms remained stable with Nexium 40 mg daily  Diagnoses and all orders for this visit:    Benign essential hypertension    Benign paroxysmal positional vertigo of left ear  -     predniSONE 20 mg tablet; Take 2 tablets (40 mg total) by mouth daily for 5 days    GERD without esophagitis          Subjective:      Patient ID: Prieto Landaverde is a 47 y o  male  Patient presents to the office because of some episodes of recurrent mild vertigo  He denies any symptoms of tenderness  He denies falling  He states his symptoms get worse when the weather turns cold or damp  He has been taking meclizine and oral decongestants with mixed results and some mild improvement  He denies any associated nausea  No headaches no fevers or chills  He denies any associated visual symptoms such as blurring  He denies sore throat  He has been using his inhalers quite a bit along with his nebulizers which at times produce some episodes of mild tachycardia  He denies any associated chest pain or dyspnea          Family History   Problem Relation Age of Onset    Cancer Father     Lung cancer Father     Coronary artery disease Father     Stroke Maternal Grandmother     Stroke Maternal Grandfather     Cancer Paternal Grandfather     Heart disease Family     Hypertension Family     Lung cancer Cousin     No Known Problems Mother     No Known Problems Sister     No Known Problems Brother     No Known Problems Maternal Aunt     No Known Problems Maternal Uncle     No Known Problems Paternal Aunt     No Known Problems Paternal Uncle     No Known Problems Paternal Grandmother     ADD / ADHD Neg Hx     Anesthesia problems Neg Hx     Clotting disorder Neg Hx     Collagen disease Neg Hx     Diabetes Neg Hx     Dislocations Neg Hx     Learning disabilities Neg Hx     Neurological problems Neg Hx     Osteoporosis Neg Hx     Rheumatologic disease Neg Hx     Scoliosis Neg Hx     Vascular Disease Neg Hx      Social History     Social History    Marital status: Single     Spouse name: N/A    Number of children: N/A    Years of education: N/A     Occupational History    unemployed      Social History Main Topics    Smoking status: Never Smoker    Smokeless tobacco: Never Used    Alcohol use Yes      Comment: occassional    Drug use: No    Sexual activity: Not on file     Other Topics Concern    Not on file     Social History Narrative    Travel hx- pt denies being out of the country during 1/2014-10/28/14     Past Medical History:   Diagnosis Date    Arthritis     Chronic rhinitis     last assessed 2/21/14    Chronic serous otitis media     last assessed 11/20/13    Chronic sinusitis     last assessed 8/19/14    Functional heart murmur     GERD (gastroesophageal reflux disease)     Gout     Hearing problem     last assessed 12/1/16    Hiatal hernia     Hypercholesterolemia     Hypertension     Palpitations     Testicular hypogonadism     last assessed 10/4/13       Current Outpatient Prescriptions:     albuterol (PROVENTIL HFA,VENTOLIN HFA) 90 mcg/act inhaler, Inhale 1 puff every 6 (six) hours, Disp: , Rfl:     cetirizine (ZyrTEC) 5 MG tablet, Take 5 mg by mouth as needed  , Disp: , Rfl:     colchicine (COLCRYS) 0 6 mg tablet, Take 1 tablet by mouth as needed  , Disp: , Rfl:     cyclobenzaprine (FLEXERIL) 5 mg tablet, Take 1 tablet by mouth 3 (three) times a day as needed, Disp: , Rfl:     esomeprazole (NexIUM) 40 MG capsule, Take 1 capsule (40 mg total) by mouth daily, Disp: 90 capsule, Rfl: 1    fluticasone-salmeterol (ADVAIR DISKUS) 500-50 mcg/dose, Inhale 1 puff 2 (two) times a day, Disp: , Rfl:     ipratropium-albuterol (DUO-NEB) 0 5-2 5 mg/3 mL, Inhale 3 mL 4 (four) times a day As needed, Disp: , Rfl:     lisinopril (ZESTRIL) 20 mg tablet, Take 1 tablet (20 mg total) by mouth daily, Disp: 90 tablet, Rfl: 1    meclizine (ANTIVERT) 12 5 MG tablet, Take 1 tablet by mouth 3 (three) times a day as needed, Disp: , Rfl:     meloxicam (MOBIC) 15 mg tablet, Take 15 mg by mouth daily, Disp: , Rfl:     montelukast (SINGULAIR) 10 mg tablet, Take 1 tablet (10 mg total) by mouth daily, Disp: 90 tablet, Rfl: 1    Triamcinolone Acetonide (NASACORT ALLERGY 24HR NA), into each nostril, Disp: , Rfl:     ULORIC 40 MG tablet, Take 1 tablet (40 mg total) by mouth daily, Disp: 90 tablet, Rfl: 1    verapamil (CALAN) 120 mg tablet, Take 1 tablet (120 mg total) by mouth daily, Disp: 90 tablet, Rfl: 2    predniSONE 20 mg tablet, Take 2 tablets (40 mg total) by mouth daily for 5 days, Disp: 10 tablet, Rfl: 0  Allergies   Allergen Reactions    Penicillins Rash and Anaphylaxis    Acetazolamide      Other reaction(s): Stomach Ache    Cephalosporins Other (See Comments)     headache    Doxycycline      Capsules only  Tablets are ok to take, per pt   Sulfa Antibiotics Other (See Comments)     Category: Allergy; Annotation - [id number]: STOMACH UPSET    Famotidine Palpitations    Omeprazole Palpitations     Painful urination     Past Surgical History:   Procedure Laterality Date    APPENDECTOMY      BICEPS TENODESIS      anesthesia for tenodesis- of ruptured long tendon of biceps     HERNIA REPAIR      THYROIDECTOMY      TONSILLECTOMY           Review of Systems   Constitutional: Negative for activity change, chills, diaphoresis and fever  HENT: Negative for ear discharge, ear pain, rhinorrhea, sinus pain, sinus pressure and sore throat  Eyes: Negative  Respiratory: Positive for cough and wheezing  Cardiovascular: Positive for palpitations  Negative for chest pain  Gastrointestinal: Negative  Endocrine: Negative  Genitourinary: Negative  Musculoskeletal: Negative  Neurological: Positive for dizziness (Mild)  Hematological: Negative  Psychiatric/Behavioral: Negative  Objective:      /80 (BP Location: Right arm, Patient Position: Sitting, Cuff Size: Large)   Pulse 79   Temp 99 1 °F (37 3 °C) (Oral)   Wt 99 6 kg (219 lb 9 6 oz)   SpO2 98%   BMI 31 96 kg/m²          Physical Exam   Constitutional: He is oriented to person, place, and time  He appears well-developed and well-nourished  No distress  HENT:   Head: Normocephalic and atraumatic  Right Ear: Tympanic membrane, external ear and ear canal normal    Left Ear: Tympanic membrane, external ear and ear canal normal    Mouth/Throat: Oropharynx is clear and moist    Eyes: Conjunctivae are normal    Neck: Neck supple  No JVD present  No tracheal deviation present  No thyromegaly present  Cardiovascular: Normal rate, regular rhythm and normal heart sounds  No murmur heard  Pulmonary/Chest: Effort normal and breath sounds normal  No respiratory distress  He has no wheezes  He has no rales  Abdominal: Soft  He exhibits no distension  Musculoskeletal: He exhibits no edema or deformity  Lymphadenopathy:     He has no cervical adenopathy  Neurological: He is alert and oriented to person, place, and time  No focal motor deficits are appreciated  Skin: Skin is warm and dry  He is not diaphoretic  Psychiatric: He has a normal mood and affect  Vitals reviewed

## 2018-12-04 NOTE — PATIENT INSTRUCTIONS
Will provide the patient a short course of oral prednisone which may also help his intermittent wheezing  The patient does not have a diagnosis of asthma despite prescriptions for Duonebs, Proventil, Advair Diskus and Singulair

## 2018-12-11 DIAGNOSIS — M10.9 GOUTY ARTHRITIS: ICD-10-CM

## 2018-12-11 RX ORDER — FEBUXOSTAT 40 MG/1
40 TABLET ORAL DAILY
Qty: 90 TABLET | Refills: 1 | Status: SHIPPED | OUTPATIENT
Start: 2018-12-11 | End: 2019-06-06 | Stop reason: SDUPTHER

## 2018-12-14 ENCOUNTER — OFFICE VISIT (OUTPATIENT)
Dept: INTERNAL MEDICINE CLINIC | Facility: CLINIC | Age: 53
End: 2018-12-14
Payer: MEDICARE

## 2018-12-14 VITALS
WEIGHT: 221.6 LBS | HEIGHT: 69 IN | TEMPERATURE: 99.1 F | SYSTOLIC BLOOD PRESSURE: 132 MMHG | OXYGEN SATURATION: 96 % | HEART RATE: 80 BPM | DIASTOLIC BLOOD PRESSURE: 88 MMHG | BODY MASS INDEX: 32.82 KG/M2

## 2018-12-14 DIAGNOSIS — J30.89 NON-SEASONAL ALLERGIC RHINITIS, UNSPECIFIED TRIGGER: Primary | ICD-10-CM

## 2018-12-14 PROCEDURE — 99213 OFFICE O/P EST LOW 20 MIN: CPT | Performed by: INTERNAL MEDICINE

## 2018-12-14 NOTE — PROGRESS NOTES
Assessment/Plan:    Allergic rhinitis  Advise he restart Nasacort, Singulair, guaifenesin and an antihistamine as needed for congestion  Defer on starting any antibiotics at this time  Also advised he drink plenty of water  Diagnoses and all orders for this visit:    Non-seasonal allergic rhinitis, unspecified trigger          Subjective:      Patient ID: Jun Thompson is a 47 y o  male  47year old male is seen today with complaint of symptoms of dizziness, sinus congestion, and clear productive cough  He was evaluated at [facility] yesterday, to which he was given tylenol and Sudafed with improvement of symptoms  He was recently seen for vertigo and completed a course of prednisone  He reported seasonal allergies, however has not been compliant with antihistamines  The following portions of the patient's history were reviewed and updated as appropriate: allergies, current medications, past family history, past medical history, past social history, past surgical history and problem list     Review of Systems   Constitutional: Negative  Negative for chills, fatigue and fever  HENT: Negative for congestion, ear pain, postnasal drip, rhinorrhea and sore throat  Eyes: Negative  Respiratory: Negative for cough, chest tightness, shortness of breath and wheezing  Cardiovascular: Negative for chest pain and palpitations  Gastrointestinal: Negative for abdominal distention, abdominal pain, blood in stool, constipation, diarrhea and nausea  Endocrine: Negative  Genitourinary: Negative for difficulty urinating, dysuria and hematuria  Musculoskeletal: Negative  Skin: Negative  Allergic/Immunologic: Negative for environmental allergies and food allergies  Neurological: Negative  Hematological: Negative for adenopathy  Psychiatric/Behavioral: Negative for agitation, behavioral problems, confusion and sleep disturbance           Past Medical History: Diagnosis Date    Arthritis     Chronic rhinitis     last assessed 2/21/14    Chronic serous otitis media     last assessed 11/20/13    Chronic sinusitis     last assessed 8/19/14    Functional heart murmur     GERD (gastroesophageal reflux disease)     Gout     Hearing problem     last assessed 12/1/16    Hiatal hernia     Hypercholesterolemia     Hypertension     Palpitations     Testicular hypogonadism     last assessed 10/4/13         Current Outpatient Prescriptions:     albuterol (PROVENTIL HFA,VENTOLIN HFA) 90 mcg/act inhaler, Inhale 1 puff every 4 (four) hours as needed  , Disp: , Rfl:     colchicine (COLCRYS) 0 6 mg tablet, Take 1 tablet by mouth as needed  , Disp: , Rfl:     esomeprazole (NexIUM) 40 MG capsule, Take 1 capsule (40 mg total) by mouth daily, Disp: 90 capsule, Rfl: 1    fluticasone-salmeterol (ADVAIR DISKUS) 500-50 mcg/dose, Inhale 1 puff 2 (two) times a day, Disp: , Rfl:     ipratropium-albuterol (DUO-NEB) 0 5-2 5 mg/3 mL, Inhale 3 mL 4 (four) times a day As needed, Disp: , Rfl:     lisinopril (ZESTRIL) 20 mg tablet, Take 1 tablet (20 mg total) by mouth daily, Disp: 90 tablet, Rfl: 1    montelukast (SINGULAIR) 10 mg tablet, Take 1 tablet (10 mg total) by mouth daily, Disp: 90 tablet, Rfl: 1    Triamcinolone Acetonide (NASACORT ALLERGY 24HR NA), 1 spray into each nostril daily  , Disp: , Rfl:     ULORIC 40 MG tablet, Take 1 tablet (40 mg total) by mouth daily, Disp: 90 tablet, Rfl: 1    verapamil (CALAN) 120 mg tablet, Take 1 tablet (120 mg total) by mouth daily, Disp: 90 tablet, Rfl: 2    cyclobenzaprine (FLEXERIL) 5 mg tablet, Take 1 tablet by mouth 3 (three) times a day as needed, Disp: , Rfl:     meloxicam (MOBIC) 15 mg tablet, Take 15 mg by mouth daily, Disp: , Rfl:     Allergies   Allergen Reactions    Penicillins Rash and Anaphylaxis    Acetazolamide      Other reaction(s): Stomach Ache    Cephalosporins Other (See Comments)     headache    Doxycycline Capsules only  Tablets are ok to take, per pt   Sulfa Antibiotics Other (See Comments)     Category: Allergy; Annotation - [id number]: STOMACH UPSET    Famotidine Palpitations    Omeprazole Palpitations     Painful urination       Social History   Past Surgical History:   Procedure Laterality Date    APPENDECTOMY      BICEPS TENODESIS      anesthesia for tenodesis- of ruptured long tendon of biceps     HERNIA REPAIR      THYROIDECTOMY      TONSILLECTOMY       Family History   Problem Relation Age of Onset    Cancer Father     Lung cancer Father     Coronary artery disease Father     Stroke Maternal Grandmother     Stroke Maternal Grandfather     Cancer Paternal Grandfather     Heart disease Family     Hypertension Family     Lung cancer Cousin     No Known Problems Mother     No Known Problems Sister     No Known Problems Brother     No Known Problems Maternal Aunt     No Known Problems Maternal Uncle     No Known Problems Paternal Aunt     No Known Problems Paternal Uncle     No Known Problems Paternal Grandmother     ADD / ADHD Neg Hx     Anesthesia problems Neg Hx     Clotting disorder Neg Hx     Collagen disease Neg Hx     Diabetes Neg Hx     Dislocations Neg Hx     Learning disabilities Neg Hx     Neurological problems Neg Hx     Osteoporosis Neg Hx     Rheumatologic disease Neg Hx     Scoliosis Neg Hx     Vascular Disease Neg Hx        Objective:  /88 (BP Location: Right arm, Patient Position: Sitting, Cuff Size: Adult)   Pulse 80   Temp 99 1 °F (37 3 °C) (Oral)   Ht 5' 9" (1 753 m)   Wt 101 kg (221 lb 9 6 oz)   SpO2 96% Comment: room air  BMI 32 72 kg/m²     No results found for this or any previous visit (from the past 1344 hour(s))  Physical Exam   Constitutional: He is oriented to person, place, and time  He appears well-developed and well-nourished  No distress  HENT:   Head: Normocephalic and atraumatic     Eyes: Pupils are equal, round, and reactive to light  Conjunctivae and EOM are normal  Right eye exhibits no discharge  Left eye exhibits no discharge  No scleral icterus  Neck: Normal range of motion  Neck supple  No JVD present  No thyromegaly present  Cardiovascular: Normal rate, regular rhythm, normal heart sounds and intact distal pulses  Exam reveals no gallop and no friction rub  No murmur heard  Pulmonary/Chest: Effort normal and breath sounds normal  No respiratory distress  He has no wheezes  He has no rales  He exhibits no tenderness  Abdominal: Soft  Bowel sounds are normal  He exhibits no distension and no mass  There is no tenderness  There is no rebound and no guarding  Musculoskeletal: Normal range of motion  He exhibits no edema, tenderness or deformity  Lymphadenopathy:     He has no cervical adenopathy  Neurological: He is alert and oriented to person, place, and time  He has normal reflexes  No cranial nerve deficit  Coordination normal    Skin: Skin is warm and dry  No rash noted  He is not diaphoretic  No erythema  No pallor  Psychiatric: He has a normal mood and affect  His behavior is normal  Judgment and thought content normal    Nursing note and vitals reviewed

## 2018-12-14 NOTE — ASSESSMENT & PLAN NOTE
Advise he restart Nasacort, Singulair, guaifenesin and an antihistamine as needed for congestion  Defer on starting any antibiotics at this time  Also advised he drink plenty of water

## 2019-01-04 ENCOUNTER — OFFICE VISIT (OUTPATIENT)
Dept: INTERNAL MEDICINE CLINIC | Age: 54
End: 2019-01-04
Payer: MEDICARE

## 2019-01-04 VITALS
SYSTOLIC BLOOD PRESSURE: 112 MMHG | HEIGHT: 70 IN | HEART RATE: 84 BPM | OXYGEN SATURATION: 95 % | WEIGHT: 218.4 LBS | BODY MASS INDEX: 31.26 KG/M2 | DIASTOLIC BLOOD PRESSURE: 70 MMHG | TEMPERATURE: 99.4 F

## 2019-01-04 DIAGNOSIS — K21.9 GERD WITHOUT ESOPHAGITIS: Primary | ICD-10-CM

## 2019-01-04 DIAGNOSIS — I10 ESSENTIAL HYPERTENSION: ICD-10-CM

## 2019-01-04 PROCEDURE — 99214 OFFICE O/P EST MOD 30 MIN: CPT | Performed by: INTERNAL MEDICINE

## 2019-01-04 NOTE — PROGRESS NOTES
Assessment/Plan:  1  Essential hypertension  Well controlled on present regimen  2  Bronchial asthma  Doing well on present combination of inhaler  3  Gastroesophageal reflux disease  Continue with Nexium 40 mg daily  4  Gout  Continue with Uloric  Diagnoses and all orders for this visit:    GERD without esophagitis    Essential hypertension          Subjective:          Patient ID: Topher Wong is a 47 y o  male  Hypertension   This is a chronic problem  The current episode started more than 1 year ago  The problem is controlled  Pertinent negatives include no anxiety, chest pain, headaches, malaise/fatigue, neck pain, palpitations or shortness of breath  There are no associated agents to hypertension  Risk factors for coronary artery disease include dyslipidemia, male gender and obesity  Past treatments include calcium channel blockers  The current treatment provides significant improvement  Compliance problems include exercise  There is no history of angina  There is no history of chronic renal disease  The following portions of the patient's history were reviewed and updated as appropriate: allergies, current medications, past family history, past medical history, past social history, past surgical history and problem list     Review of Systems   Constitutional: Negative for fatigue, fever and malaise/fatigue  HENT: Negative for congestion, ear discharge, ear pain, postnasal drip, sinus pressure, sore throat, tinnitus and trouble swallowing  Eyes: Negative for discharge, itching and visual disturbance  Respiratory: Negative for cough and shortness of breath  Cardiovascular: Negative for chest pain and palpitations  Gastrointestinal: Negative for abdominal pain, diarrhea, nausea and vomiting  Endocrine: Negative for cold intolerance and polyuria  Genitourinary: Negative for difficulty urinating, dysuria and urgency  Musculoskeletal: Negative for arthralgias and neck pain  Skin: Negative for rash  Allergic/Immunologic: Negative for environmental allergies  Neurological: Negative for dizziness, weakness and headaches  Psychiatric/Behavioral: Negative for agitation and behavioral problems  The patient is not nervous/anxious            Past Medical History:   Diagnosis Date    Arthritis     Chronic rhinitis     last assessed 2/21/14    Chronic serous otitis media     last assessed 11/20/13    Chronic sinusitis     last assessed 8/19/14    Functional heart murmur     GERD (gastroesophageal reflux disease)     Gout     Hearing problem     last assessed 12/1/16    Hiatal hernia     Hypercholesterolemia     Hypertension     Palpitations     Testicular hypogonadism     last assessed 10/4/13         Current Outpatient Prescriptions:     albuterol (PROVENTIL HFA,VENTOLIN HFA) 90 mcg/act inhaler, Inhale 1 puff every 4 (four) hours as needed  , Disp: , Rfl:     esomeprazole (NexIUM) 40 MG capsule, Take 1 capsule (40 mg total) by mouth daily, Disp: 90 capsule, Rfl: 1    fluticasone-salmeterol (ADVAIR DISKUS) 500-50 mcg/dose, Inhale 1 puff 2 (two) times a day, Disp: , Rfl:     lisinopril (ZESTRIL) 20 mg tablet, Take 1 tablet (20 mg total) by mouth daily, Disp: 90 tablet, Rfl: 1    montelukast (SINGULAIR) 10 mg tablet, Take 1 tablet (10 mg total) by mouth daily, Disp: 90 tablet, Rfl: 1    Triamcinolone Acetonide (NASACORT ALLERGY 24HR NA), 1 spray into each nostril daily  , Disp: , Rfl:     ULORIC 40 MG tablet, Take 1 tablet (40 mg total) by mouth daily, Disp: 90 tablet, Rfl: 1    verapamil (CALAN) 120 mg tablet, Take 1 tablet (120 mg total) by mouth daily, Disp: 90 tablet, Rfl: 2    colchicine (COLCRYS) 0 6 mg tablet, Take 1 tablet by mouth as needed  , Disp: , Rfl:     cyclobenzaprine (FLEXERIL) 5 mg tablet, Take 1 tablet by mouth 3 (three) times a day as needed, Disp: , Rfl:     ipratropium-albuterol (DUO-NEB) 0 5-2 5 mg/3 mL, Inhale 3 mL 4 (four) times a day As needed, Disp: , Rfl:     meloxicam (MOBIC) 15 mg tablet, Take 15 mg by mouth daily, Disp: , Rfl:     Allergies   Allergen Reactions    Penicillins Rash and Anaphylaxis    Acetazolamide      Other reaction(s): Stomach Ache    Cephalosporins Other (See Comments)     headache    Doxycycline      Capsules only  Tablets are ok to take, per pt   Sulfa Antibiotics Other (See Comments)     Category: Allergy;  Annotation - [id number]: STOMACH UPSET    Famotidine Palpitations    Omeprazole Palpitations     Painful urination       Social History   Past Surgical History:   Procedure Laterality Date    APPENDECTOMY      BICEPS TENODESIS      anesthesia for tenodesis- of ruptured long tendon of biceps     HERNIA REPAIR      THYROIDECTOMY      TONSILLECTOMY       Family History   Problem Relation Age of Onset    Cancer Father     Lung cancer Father     Coronary artery disease Father     Stroke Maternal Grandmother     Stroke Maternal Grandfather     Cancer Paternal Grandfather     Heart disease Family     Hypertension Family     Lung cancer Cousin     No Known Problems Mother     No Known Problems Sister     No Known Problems Brother     No Known Problems Maternal Aunt     No Known Problems Maternal Uncle     No Known Problems Paternal Aunt     No Known Problems Paternal Uncle     No Known Problems Paternal Grandmother     ADD / ADHD Neg Hx     Anesthesia problems Neg Hx     Clotting disorder Neg Hx     Collagen disease Neg Hx     Diabetes Neg Hx     Dislocations Neg Hx     Learning disabilities Neg Hx     Neurological problems Neg Hx     Osteoporosis Neg Hx     Rheumatologic disease Neg Hx     Scoliosis Neg Hx     Vascular Disease Neg Hx        Objective:  /70 (BP Location: Left arm, Patient Position: Sitting, Cuff Size: Standard)   Pulse 84   Temp 99 4 °F (37 4 °C) (Tympanic)   Ht 5' 10 08" (1 78 m) Comment: shoes on  Wt 99 1 kg (218 lb 6 4 oz) Comment: shoes on  SpO2 95% BMI 31 27 kg/m²   Body mass index is 31 27 kg/m²  Physical Exam   Constitutional: He appears well-developed  HENT:   Head: Normocephalic  Right Ear: External ear normal    Left Ear: External ear normal    Mouth/Throat: Oropharynx is clear and moist    Eyes: Pupils are equal, round, and reactive to light  No scleral icterus  Neck: Normal range of motion  Neck supple  No tracheal deviation present  No thyromegaly present  Cardiovascular: Normal rate, regular rhythm and normal heart sounds  No murmur heard  Pulmonary/Chest: Effort normal and breath sounds normal  No respiratory distress  He exhibits no tenderness  Abdominal: Soft  Bowel sounds are normal  He exhibits no mass  There is no tenderness  Musculoskeletal: Normal range of motion  He exhibits no edema or tenderness  Lymphadenopathy:     He has no cervical adenopathy  Neurological: He is alert  No cranial nerve deficit  Skin: Skin is warm  Psychiatric: He has a normal mood and affect   His behavior is normal

## 2019-01-08 ENCOUNTER — CLINICAL SUPPORT (OUTPATIENT)
Dept: INTERNAL MEDICINE CLINIC | Facility: CLINIC | Age: 54
End: 2019-01-08
Payer: MEDICARE

## 2019-01-08 DIAGNOSIS — I10 BENIGN ESSENTIAL HTN: ICD-10-CM

## 2019-01-08 DIAGNOSIS — K21.9 GERD WITHOUT ESOPHAGITIS: Primary | ICD-10-CM

## 2019-01-08 DIAGNOSIS — M1A.9XX0 CHRONIC GOUT WITHOUT TOPHUS, UNSPECIFIED CAUSE, UNSPECIFIED SITE: ICD-10-CM

## 2019-01-08 LAB
ANION GAP SERPL CALCULATED.3IONS-SCNC: 10 MMOL/L (ref 4–13)
BUN SERPL-MCNC: 14 MG/DL (ref 5–25)
CALCIUM SERPL-MCNC: 9.5 MG/DL (ref 8.3–10.1)
CHLORIDE SERPL-SCNC: 101 MMOL/L (ref 100–108)
CO2 SERPL-SCNC: 27 MMOL/L (ref 21–32)
CREAT SERPL-MCNC: 1.08 MG/DL (ref 0.6–1.3)
ERYTHROCYTE [DISTWIDTH] IN BLOOD BY AUTOMATED COUNT: 13 % (ref 11.6–15.1)
GFR SERPL CREATININE-BSD FRML MDRD: 78 ML/MIN/1.73SQ M
GLUCOSE P FAST SERPL-MCNC: 91 MG/DL (ref 65–99)
HCT VFR BLD AUTO: 44.2 % (ref 36.5–49.3)
HGB BLD-MCNC: 14.9 G/DL (ref 12–17)
MCH RBC QN AUTO: 30.7 PG (ref 26.8–34.3)
MCHC RBC AUTO-ENTMCNC: 33.7 G/DL (ref 31.4–37.4)
MCV RBC AUTO: 91 FL (ref 82–98)
PLATELET # BLD AUTO: 238 THOUSANDS/UL (ref 149–390)
PMV BLD AUTO: 9.5 FL (ref 8.9–12.7)
POTASSIUM SERPL-SCNC: 3.9 MMOL/L (ref 3.5–5.3)
RBC # BLD AUTO: 4.85 MILLION/UL (ref 3.88–5.62)
SODIUM SERPL-SCNC: 138 MMOL/L (ref 136–145)
TSH SERPL DL<=0.05 MIU/L-ACNC: 1.21 UIU/ML (ref 0.36–3.74)
URATE SERPL-MCNC: 5.5 MG/DL (ref 4.2–8)
WBC # BLD AUTO: 5.89 THOUSAND/UL (ref 4.31–10.16)

## 2019-01-08 PROCEDURE — 84443 ASSAY THYROID STIM HORMONE: CPT | Performed by: INTERNAL MEDICINE

## 2019-01-08 PROCEDURE — 80048 BASIC METABOLIC PNL TOTAL CA: CPT | Performed by: INTERNAL MEDICINE

## 2019-01-08 PROCEDURE — 84550 ASSAY OF BLOOD/URIC ACID: CPT | Performed by: INTERNAL MEDICINE

## 2019-01-08 PROCEDURE — 36415 COLL VENOUS BLD VENIPUNCTURE: CPT

## 2019-01-08 PROCEDURE — 85027 COMPLETE CBC AUTOMATED: CPT | Performed by: INTERNAL MEDICINE

## 2019-01-15 ENCOUNTER — OFFICE VISIT (OUTPATIENT)
Dept: INTERNAL MEDICINE CLINIC | Facility: CLINIC | Age: 54
End: 2019-01-15
Payer: MEDICARE

## 2019-01-15 VITALS
TEMPERATURE: 99.8 F | OXYGEN SATURATION: 98 % | WEIGHT: 222.6 LBS | HEART RATE: 95 BPM | HEIGHT: 69 IN | BODY MASS INDEX: 32.97 KG/M2 | SYSTOLIC BLOOD PRESSURE: 144 MMHG | DIASTOLIC BLOOD PRESSURE: 88 MMHG

## 2019-01-15 DIAGNOSIS — F41.9 ANXIETY: ICD-10-CM

## 2019-01-15 DIAGNOSIS — J45.909 MODERATE ASTHMA, UNSPECIFIED WHETHER COMPLICATED, UNSPECIFIED WHETHER PERSISTENT: Primary | ICD-10-CM

## 2019-01-15 PROCEDURE — 99214 OFFICE O/P EST MOD 30 MIN: CPT | Performed by: INTERNAL MEDICINE

## 2019-01-15 RX ORDER — LORAZEPAM 0.5 MG/1
0.25 TABLET ORAL 2 TIMES DAILY PRN
Qty: 30 TABLET | Refills: 0 | Status: SHIPPED | OUTPATIENT
Start: 2019-01-15 | End: 2019-04-22 | Stop reason: HOSPADM

## 2019-01-15 RX ORDER — MECLIZINE HCL 12.5 MG/1
12.5 TABLET ORAL 3 TIMES DAILY PRN
COMMUNITY
End: 2019-07-16 | Stop reason: SDUPTHER

## 2019-01-15 NOTE — ASSESSMENT & PLAN NOTE
Defer on antibiotic or steroid therapy at this time  Continue with current regimen (Advair, albuterol inhaler, and duo nebs)

## 2019-01-15 NOTE — ASSESSMENT & PLAN NOTE
I believe there is a component of anxiety that is driving his shortness of breath  Will prescribe Ativan 0 25 mg twice a day as needed for anxiety/agitation or shortness of breath (panic)

## 2019-01-15 NOTE — PROGRESS NOTES
Assessment/Plan:    Asthma  Defer on antibiotic or steroid therapy at this time  Continue with current regimen (Advair, albuterol inhaler, and duo nebs)  Anxiety  I believe there is a component of anxiety that is driving his shortness of breath  Will prescribe Ativan 0 25 mg twice a day as needed for anxiety/agitation or shortness of breath (panic)  Diagnoses and all orders for this visit:    Moderate asthma, unspecified whether complicated, unspecified whether persistent    Anxiety  -     LORazepam (ATIVAN) 0 5 mg tablet; Take 0 5 tablets (0 25 mg total) by mouth 2 (two) times a day as needed for anxiety (shortness of breath)    Other orders  -     meclizine (ANTIVERT) 12 5 MG tablet; Take 12 5 mg by mouth Three times daily as needed        Time spent during encounter: 25 minutes (counseling and discussing symptoms and treatment options)    Subjective:      Patient ID: Kiki Hinds is a 47 y o  male  55-year-old male is seen today with concern for shortness of breath that he has been having over the past 5-7 days  Symptoms consist of sinus/chest congestion, mild nonproductive cough, lightheadedness, and rhinorrhea  He has been using Mucenex/Sudafed and tylenol  He reports the symptoms are improving  He continues to use Advair, Ventolin, and nebulization treatments  He denies any wheezing  He has not been checking his peak flow meter  Lab studies reviewed today, uric acid, CBC, TSH, and BMP were within normal range  Shortness of Breath   This is a new problem  The current episode started in the past 7 days  The problem occurs intermittently  The problem has been unchanged  Pertinent negatives include no abdominal pain, chest pain, claudication, coryza, ear pain, fever, headaches, hemoptysis, leg pain, leg swelling, neck pain, orthopnea, PND, rash, rhinorrhea, sore throat, sputum production, swollen glands, syncope, vomiting or wheezing   He has tried beta agonist inhalers, steroid inhalers and ipratropium inhalers for the symptoms  The treatment provided mild relief  Anxiety   Presents for initial visit  Symptoms include shortness of breath  Patient reports no chest pain, compulsions, confusion, decreased concentration, depressed mood, dizziness, dry mouth, excessive worry, feeling of choking, hyperventilation, impotence, insomnia, irritability, malaise, muscle tension, nausea, nervous/anxious behavior, obsessions, palpitations, panic, restlessness or suicidal ideas  The following portions of the patient's history were reviewed and updated as appropriate: allergies, current medications, past family history, past medical history, past social history, past surgical history and problem list     Review of Systems   Constitutional: Negative for activity change, appetite change, chills, diaphoresis, fatigue, fever and irritability  HENT: Negative for congestion, ear pain, postnasal drip, rhinorrhea, sinus pain, sinus pressure, sneezing and sore throat  Eyes: Negative for visual disturbance  Respiratory: Positive for shortness of breath  Negative for apnea, cough, hemoptysis, sputum production, choking, chest tightness and wheezing  Cardiovascular: Negative for chest pain, palpitations, orthopnea, claudication, leg swelling, syncope and PND  Gastrointestinal: Negative for abdominal distention, abdominal pain, anal bleeding, blood in stool, constipation, diarrhea, nausea and vomiting  Endocrine: Negative for cold intolerance and heat intolerance  Genitourinary: Negative for difficulty urinating, dysuria, hematuria and impotence  Musculoskeletal: Negative  Negative for neck pain  Skin: Negative  Negative for rash  Neurological: Negative for dizziness, weakness, light-headedness, numbness and headaches  Hematological: Negative for adenopathy  Psychiatric/Behavioral: Negative for agitation, confusion, decreased concentration, sleep disturbance and suicidal ideas  The patient is not nervous/anxious and does not have insomnia  All other systems reviewed and are negative          Past Medical History:   Diagnosis Date    Anxiety 1/15/2019    Arthritis     Chronic rhinitis     last assessed 2/21/14    Chronic serous otitis media     last assessed 11/20/13    Chronic sinusitis     last assessed 8/19/14    Functional heart murmur     GERD (gastroesophageal reflux disease)     Gout     Hearing problem     last assessed 12/1/16    Hiatal hernia     Hypercholesterolemia     Hypertension     Palpitations     Testicular hypogonadism     last assessed 10/4/13         Current Outpatient Prescriptions:     albuterol (PROVENTIL HFA,VENTOLIN HFA) 90 mcg/act inhaler, Inhale 1 puff every 4 (four) hours as needed  , Disp: , Rfl:     colchicine (COLCRYS) 0 6 mg tablet, Take 1 tablet by mouth as needed  , Disp: , Rfl:     cyclobenzaprine (FLEXERIL) 5 mg tablet, Take 1 tablet by mouth 3 (three) times a day as needed, Disp: , Rfl:     esomeprazole (NexIUM) 40 MG capsule, Take 1 capsule (40 mg total) by mouth daily, Disp: 90 capsule, Rfl: 1    fluticasone-salmeterol (ADVAIR DISKUS) 500-50 mcg/dose, Inhale 1 puff 2 (two) times a day, Disp: , Rfl:     ipratropium-albuterol (DUO-NEB) 0 5-2 5 mg/3 mL, Inhale 3 mL 4 (four) times a day As needed, Disp: , Rfl:     lisinopril (ZESTRIL) 20 mg tablet, Take 1 tablet (20 mg total) by mouth daily, Disp: 90 tablet, Rfl: 1    meclizine (ANTIVERT) 12 5 MG tablet, Take 12 5 mg by mouth Three times daily as needed, Disp: , Rfl:     meloxicam (MOBIC) 15 mg tablet, Take 15 mg by mouth daily, Disp: , Rfl:     montelukast (SINGULAIR) 10 mg tablet, Take 1 tablet (10 mg total) by mouth daily, Disp: 90 tablet, Rfl: 1    Triamcinolone Acetonide (NASACORT ALLERGY 24HR NA), 1 spray into each nostril daily  , Disp: , Rfl:     ULORIC 40 MG tablet, Take 1 tablet (40 mg total) by mouth daily, Disp: 90 tablet, Rfl: 1    verapamil (CALAN) 120 mg tablet, Take 1 tablet (120 mg total) by mouth daily, Disp: 90 tablet, Rfl: 2    LORazepam (ATIVAN) 0 5 mg tablet, Take 0 5 tablets (0 25 mg total) by mouth 2 (two) times a day as needed for anxiety (shortness of breath), Disp: 30 tablet, Rfl: 0    Allergies   Allergen Reactions    Penicillins Rash and Anaphylaxis    Acetazolamide      Other reaction(s): Stomach Ache    Cephalosporins Other (See Comments)     headache    Doxycycline      Capsules only  Tablets are ok to take, per pt  Capsules only  Tablets are ok to take, per pt   Sulfa Antibiotics Other (See Comments)     Category: Allergy;  Annotation - [id number]: STOMACH UPSET    Famotidine Palpitations    Omeprazole Palpitations     Painful urination       Social History   Past Surgical History:   Procedure Laterality Date    APPENDECTOMY      BICEPS TENODESIS      anesthesia for tenodesis- of ruptured long tendon of biceps     HERNIA REPAIR      THYROIDECTOMY      TONSILLECTOMY       Family History   Problem Relation Age of Onset    Cancer Father     Lung cancer Father     Coronary artery disease Father     Stroke Maternal Grandmother     Stroke Maternal Grandfather     Cancer Paternal Grandfather     Heart disease Family     Hypertension Family     Lung cancer Cousin     No Known Problems Mother     No Known Problems Sister     No Known Problems Brother     No Known Problems Maternal Aunt     No Known Problems Maternal Uncle     No Known Problems Paternal Aunt     No Known Problems Paternal Uncle     No Known Problems Paternal Grandmother     ADD / ADHD Neg Hx     Anesthesia problems Neg Hx     Clotting disorder Neg Hx     Collagen disease Neg Hx     Diabetes Neg Hx     Dislocations Neg Hx     Learning disabilities Neg Hx     Neurological problems Neg Hx     Osteoporosis Neg Hx     Rheumatologic disease Neg Hx     Scoliosis Neg Hx     Vascular Disease Neg Hx        Objective:  /88 (BP Location: Left arm, Patient Position: Sitting, Cuff Size: Large)   Pulse 95   Temp 99 8 °F (37 7 °C) (Oral)   Ht 5' 9" (1 753 m)   Wt 101 kg (222 lb 9 6 oz)   SpO2 98%   BMI 32 87 kg/m²     Recent Results (from the past 1344 hour(s))   Basic metabolic panel    Collection Time: 01/08/19  2:47 PM   Result Value Ref Range    Sodium 138 136 - 145 mmol/L    Potassium 3 9 3 5 - 5 3 mmol/L    Chloride 101 100 - 108 mmol/L    CO2 27 21 - 32 mmol/L    ANION GAP 10 4 - 13 mmol/L    BUN 14 5 - 25 mg/dL    Creatinine 1 08 0 60 - 1 30 mg/dL    Glucose, Fasting 91 65 - 99 mg/dL    Calcium 9 5 8 3 - 10 1 mg/dL    eGFR 78 ml/min/1 73sq m   CBC    Collection Time: 01/08/19  2:47 PM   Result Value Ref Range    WBC 5 89 4 31 - 10 16 Thousand/uL    RBC 4 85 3 88 - 5 62 Million/uL    Hemoglobin 14 9 12 0 - 17 0 g/dL    Hematocrit 44 2 36 5 - 49 3 %    MCV 91 82 - 98 fL    MCH 30 7 26 8 - 34 3 pg    MCHC 33 7 31 4 - 37 4 g/dL    RDW 13 0 11 6 - 15 1 %    Platelets 249 052 - 704 Thousands/uL    MPV 9 5 8 9 - 12 7 fL   TSH, 3rd generation with Free T4 reflex    Collection Time: 01/08/19  2:47 PM   Result Value Ref Range    TSH 3RD GENERATON 1 210 0 358 - 3 740 uIU/mL   Uric acid    Collection Time: 01/08/19  2:47 PM   Result Value Ref Range    Uric Acid 5 5 4 2 - 8 0 mg/dL            Physical Exam   Constitutional: He is oriented to person, place, and time  He appears well-developed and well-nourished  No distress  HENT:   Head: Normocephalic and atraumatic  Eyes: Pupils are equal, round, and reactive to light  Conjunctivae and EOM are normal  Right eye exhibits no discharge  Left eye exhibits no discharge  No scleral icterus  Neck: Normal range of motion  Neck supple  No JVD present  No thyromegaly present  Cardiovascular: Normal rate, regular rhythm, normal heart sounds and intact distal pulses  Exam reveals no gallop and no friction rub  No murmur heard  Pulmonary/Chest: Effort normal and breath sounds normal  No respiratory distress   He has no wheezes  He has no rales  He exhibits no tenderness  Abdominal: Soft  Bowel sounds are normal  He exhibits no distension and no mass  There is no tenderness  There is no rebound and no guarding  Musculoskeletal: Normal range of motion  He exhibits no edema, tenderness or deformity  Lymphadenopathy:     He has no cervical adenopathy  Neurological: He is alert and oriented to person, place, and time  He has normal reflexes  No cranial nerve deficit  Coordination normal    Skin: Skin is warm and dry  No rash noted  He is not diaphoretic  No erythema  No pallor  Psychiatric: He has a normal mood and affect  His behavior is normal  Judgment and thought content normal    Nursing note and vitals reviewed

## 2019-01-27 DIAGNOSIS — I10 HYPERTENSION, UNSPECIFIED TYPE: ICD-10-CM

## 2019-01-28 ENCOUNTER — OFFICE VISIT (OUTPATIENT)
Dept: URGENT CARE | Age: 54
End: 2019-01-28
Payer: MEDICARE

## 2019-01-28 VITALS
TEMPERATURE: 99 F | DIASTOLIC BLOOD PRESSURE: 88 MMHG | RESPIRATION RATE: 18 BRPM | SYSTOLIC BLOOD PRESSURE: 145 MMHG | OXYGEN SATURATION: 96 % | HEART RATE: 95 BPM

## 2019-01-28 DIAGNOSIS — J01.01 ACUTE RECURRENT MAXILLARY SINUSITIS: Primary | ICD-10-CM

## 2019-01-28 PROCEDURE — G0463 HOSPITAL OUTPT CLINIC VISIT: HCPCS | Performed by: FAMILY MEDICINE

## 2019-01-28 PROCEDURE — 99213 OFFICE O/P EST LOW 20 MIN: CPT | Performed by: FAMILY MEDICINE

## 2019-01-28 RX ORDER — DOXYCYCLINE 100 MG/1
100 TABLET ORAL 2 TIMES DAILY
Qty: 20 TABLET | Refills: 0 | Status: SHIPPED | OUTPATIENT
Start: 2019-01-28 | End: 2019-02-07

## 2019-01-28 RX ORDER — LISINOPRIL 20 MG/1
TABLET ORAL
Qty: 90 TABLET | Refills: 1 | OUTPATIENT
Start: 2019-01-28

## 2019-01-28 NOTE — PROGRESS NOTES
[facility]        NAME: Kaitlynn Oneal is a 47 y o  male  : 1965    MRN: [id number]  DATE: 2019  TIME: 6:46 PM    Assessment and Plan   Acute recurrent maxillary sinusitis [J01 01]  1  Acute recurrent maxillary sinusitis  doxycycline (ADOXA) 100 MG tablet         Patient Instructions     Patient Instructions   Rest and drink extra fluids  Start antibiotic as prescribed  Take probiotic while on antibiotic and a week after  Tylenol or Motrin as needed for pain or fever  Continue to use her inhaler every 4-6 hours as needed for wheezing or chest tightness follow up with pulmonologist if no improvement and chest tightness  Continue Nasacort you can also try allergy medication daily to help with the sinus congestion  Follow up with family doctor if no improvement go to the ER with worsening symptoms, chest pain, shortness of breath, difficulty breathing  Chief Complaint     Chief Complaint   Patient presents with    Sinusitis     sinus congestion since december  History of Present Illness   Kaitlynn Oneal presents to the clinic c/o    This is a 47year old male here today with complaints of sinus pressure and congestion  He has had symptoms x 1 month  He is also have post nasal drip  He has dry cough  He has had slight chest tightness  No wheezing  It has been getting progessively worse  He gets frequent sinus infections and symptoms seem worse  He has been using pseudoped and nasocort  He has asthma  He has been using neb and rescue inhaler  He was recently on steroid about 1 week ago  He has put on by pulmonologist          Review of Systems   Review of Systems   Constitutional: Positive for activity change, chills, fatigue and fever  HENT: Positive for congestion, sinus pain and sinus pressure  Respiratory: Positive for cough and chest tightness  Negative for shortness of breath and wheezing  Cardiovascular: Negative  Neurological: Negative  Psychiatric/Behavioral: Negative  Current Medications     Long-Term Prescriptions   Medication Sig Dispense Refill    colchicine (COLCRYS) 0 6 mg tablet Take 1 tablet by mouth as needed        cyclobenzaprine (FLEXERIL) 5 mg tablet Take 1 tablet by mouth 3 (three) times a day as needed      esomeprazole (NexIUM) 40 MG capsule Take 1 capsule (40 mg total) by mouth daily 90 capsule 1    lisinopril (ZESTRIL) 20 mg tablet Take 1 tablet (20 mg total) by mouth daily 90 tablet 1    LORazepam (ATIVAN) 0 5 mg tablet Take 0 5 tablets (0 25 mg total) by mouth 2 (two) times a day as needed for anxiety (shortness of breath) 30 tablet 0    meloxicam (MOBIC) 15 mg tablet Take 15 mg by mouth daily      montelukast (SINGULAIR) 10 mg tablet Take 1 tablet (10 mg total) by mouth daily 90 tablet 1    ULORIC 40 MG tablet Take 1 tablet (40 mg total) by mouth daily 90 tablet 1    verapamil (CALAN) 120 mg tablet Take 1 tablet (120 mg total) by mouth daily 90 tablet 2       Current Allergies     Allergies as of 01/28/2019 - Reviewed 01/28/2019   Allergen Reaction Noted    Penicillins Rash and Anaphylaxis 08/17/2004    Acetazolamide  10/25/2016    Cephalosporins Other (See Comments) 08/17/2004    Doxycycline  10/24/2018    Sulfa antibiotics Other (See Comments) 08/17/2004    Famotidine Palpitations 09/05/2016    Omeprazole Palpitations 09/05/2016            The following portions of the patient's history were reviewed and updated as appropriate: allergies, current medications, past family history, past medical history, past social history, past surgical history and problem list     Objective   /88 (BP Location: Right arm, Patient Position: Sitting)   Pulse 95   Temp 99 °F (37 2 °C) (Temporal)   Resp 18   SpO2 96%        Physical Exam     Physical Exam   Constitutional: He is oriented to person, place, and time  He appears well-developed and well-nourished  HENT:   Head: Normocephalic     Right Ear: External ear normal    Left Ear: External ear normal    Tenderness to palpate over the maxillary sinuses   Cardiovascular: Normal rate, regular rhythm and normal heart sounds  Pulmonary/Chest: Effort normal and breath sounds normal  No respiratory distress  He has no wheezes  He has no rales  He exhibits no tenderness  Neurological: He is alert and oriented to person, place, and time  Psychiatric: He has a normal mood and affect  His behavior is normal    Nursing note and vitals reviewed

## 2019-01-28 NOTE — PATIENT INSTRUCTIONS
Rest and drink extra fluids  Start antibiotic as prescribed  Take probiotic while on antibiotic and a week after  Tylenol or Motrin as needed for pain or fever  Continue to use her inhaler every 4-6 hours as needed for wheezing or chest tightness follow up with pulmonologist if no improvement and chest tightness  Continue Nasacort you can also try allergy medication daily to help with the sinus congestion  Follow up with family doctor if no improvement go to the ER with worsening symptoms, chest pain, shortness of breath, difficulty breathing

## 2019-02-04 DIAGNOSIS — I10 HYPERTENSION, UNSPECIFIED TYPE: ICD-10-CM

## 2019-02-04 RX ORDER — LISINOPRIL 20 MG/1
20 TABLET ORAL DAILY
Qty: 90 TABLET | Refills: 1 | Status: SHIPPED | OUTPATIENT
Start: 2019-02-04 | End: 2019-07-30 | Stop reason: SDUPTHER

## 2019-02-05 ENCOUNTER — TELEPHONE (OUTPATIENT)
Dept: INTERNAL MEDICINE CLINIC | Facility: CLINIC | Age: 54
End: 2019-02-05

## 2019-02-15 ENCOUNTER — OFFICE VISIT (OUTPATIENT)
Dept: INTERNAL MEDICINE CLINIC | Facility: CLINIC | Age: 54
End: 2019-02-15
Payer: MEDICARE

## 2019-02-15 VITALS
OXYGEN SATURATION: 98 % | DIASTOLIC BLOOD PRESSURE: 82 MMHG | HEIGHT: 70 IN | HEART RATE: 88 BPM | BODY MASS INDEX: 31.9 KG/M2 | SYSTOLIC BLOOD PRESSURE: 140 MMHG | TEMPERATURE: 99.5 F | WEIGHT: 222.8 LBS

## 2019-02-15 DIAGNOSIS — M62.830 SPASM OF MUSCLE OF LOWER BACK: ICD-10-CM

## 2019-02-15 DIAGNOSIS — Z12.11 SCREENING FOR COLON CANCER: Primary | ICD-10-CM

## 2019-02-15 DIAGNOSIS — J30.89 NON-SEASONAL ALLERGIC RHINITIS, UNSPECIFIED TRIGGER: ICD-10-CM

## 2019-02-15 PROBLEM — R06.09 DYSPNEA ON EXERTION: Status: RESOLVED | Noted: 2017-06-30 | Resolved: 2019-02-15

## 2019-02-15 PROBLEM — R06.00 DYSPNEA ON EXERTION: Status: RESOLVED | Noted: 2017-06-30 | Resolved: 2019-02-15

## 2019-02-15 PROCEDURE — 99213 OFFICE O/P EST LOW 20 MIN: CPT | Performed by: INTERNAL MEDICINE

## 2019-02-15 RX ORDER — CYCLOBENZAPRINE HCL 5 MG
5 TABLET ORAL 3 TIMES DAILY PRN
Qty: 30 TABLET | Refills: 0 | Status: SHIPPED | OUTPATIENT
Start: 2019-02-15 | End: 2020-03-06

## 2019-02-15 NOTE — ASSESSMENT & PLAN NOTE
Continue with Singulair and OTC second generation antihistamine  Continue with PRN meclizine  Defer on antibiotics or steroids at this time

## 2019-02-15 NOTE — PROGRESS NOTES
Assessment/Plan:    Allergic rhinitis  Continue with Singulair and OTC second generation antihistamine  Continue with PRN meclizine  Defer on antibiotics or steroids at this time  Spasm of muscle of lower back  Will prescribe cyclobenzaprine and continue with NSAIDs/Tylenol PRN for pain  Diagnoses and all orders for this visit:    Screening for colon cancer    Non-seasonal allergic rhinitis, unspecified trigger    Spasm of muscle of lower back  -     cyclobenzaprine (FLEXERIL) 5 mg tablet; Take 1 tablet (5 mg total) by mouth 3 (three) times a day as needed for muscle spasms    Other orders  -     Cancel: Ambulatory referral to Gastroenterology; Future          Subjective:      Patient ID: Liz Fontaine is a 47 y o  male  47year old male is seen today with acute symptoms of dizziness, sinus congestion, and persistent tinnitus  His dizziness started yesterday and occurs with driving  He deneis any N/V, diarrhea, constipation, fevers/chills, SOB, chest pain, or blurred vision  He was treated with doxycycline after evaluation by an urgent care center to which he was diagnosed with acute sinusitis  He also completed a course of steroids for suspected asthma exacerbation  He also developed a back spasm yesterday while walking up the stairs  He took advil with improvement of pain  He feels stiff today  The following portions of the patient's history were reviewed and updated as appropriate: allergies, current medications, past family history, past medical history, past social history, past surgical history and problem list     Review of Systems   Constitutional: Negative for activity change, appetite change, chills, diaphoresis, fatigue and fever  HENT: Negative for congestion, postnasal drip, rhinorrhea, sinus pressure, sinus pain, sneezing and sore throat  Eyes: Negative for visual disturbance     Respiratory: Negative for apnea, cough, choking, chest tightness, shortness of breath and wheezing  Cardiovascular: Negative for chest pain, palpitations and leg swelling  Gastrointestinal: Negative for abdominal distention, abdominal pain, anal bleeding, blood in stool, constipation, diarrhea, nausea and vomiting  Endocrine: Negative for cold intolerance and heat intolerance  Genitourinary: Negative for difficulty urinating, dysuria and hematuria  Musculoskeletal: Negative  Skin: Negative  Neurological: Positive for dizziness  Negative for weakness, light-headedness, numbness and headaches  Hematological: Negative for adenopathy  Psychiatric/Behavioral: Negative for agitation, sleep disturbance and suicidal ideas  All other systems reviewed and are negative          Past Medical History:   Diagnosis Date    Anxiety 1/15/2019    Arthritis     Chronic rhinitis     last assessed 2/21/14    Chronic serous otitis media     last assessed 11/20/13    Chronic sinusitis     last assessed 8/19/14    Functional heart murmur     GERD (gastroesophageal reflux disease)     Gout     Hearing problem     last assessed 12/1/16    Hiatal hernia     Hypercholesterolemia     Hypertension     Palpitations     Testicular hypogonadism     last assessed 10/4/13         Current Outpatient Medications:     albuterol (PROVENTIL HFA,VENTOLIN HFA) 90 mcg/act inhaler, Inhale 1 puff every 4 (four) hours as needed  , Disp: , Rfl:     colchicine (COLCRYS) 0 6 mg tablet, Take 1 tablet by mouth as needed  , Disp: , Rfl:     esomeprazole (NexIUM) 40 MG capsule, Take 1 capsule (40 mg total) by mouth daily, Disp: 90 capsule, Rfl: 1    fluticasone-salmeterol (ADVAIR DISKUS) 500-50 mcg/dose, Inhale 1 puff 2 (two) times a day, Disp: , Rfl:     ipratropium-albuterol (DUO-NEB) 0 5-2 5 mg/3 mL, Inhale 3 mL 4 (four) times a day As needed, Disp: , Rfl:     lisinopril (ZESTRIL) 20 mg tablet, Take 1 tablet (20 mg total) by mouth daily, Disp: 90 tablet, Rfl: 1    meclizine (ANTIVERT) 12 5 MG tablet, Take 12 5 mg by mouth Three times daily as needed, Disp: , Rfl:     montelukast (SINGULAIR) 10 mg tablet, Take 1 tablet (10 mg total) by mouth daily, Disp: 90 tablet, Rfl: 1    Triamcinolone Acetonide (NASACORT ALLERGY 24HR NA), 1 spray into each nostril daily  , Disp: , Rfl:     ULORIC 40 MG tablet, Take 1 tablet (40 mg total) by mouth daily, Disp: 90 tablet, Rfl: 1    verapamil (CALAN) 120 mg tablet, Take 1 tablet (120 mg total) by mouth daily, Disp: 90 tablet, Rfl: 2    cyclobenzaprine (FLEXERIL) 5 mg tablet, Take 1 tablet (5 mg total) by mouth 3 (three) times a day as needed for muscle spasms, Disp: 30 tablet, Rfl: 0    LORazepam (ATIVAN) 0 5 mg tablet, Take 0 5 tablets (0 25 mg total) by mouth 2 (two) times a day as needed for anxiety (shortness of breath) (Patient not taking: Reported on 2/15/2019), Disp: 30 tablet, Rfl: 0    Allergies   Allergen Reactions    Penicillins Rash and Anaphylaxis    Acetazolamide      Other reaction(s): Stomach Ache    Cephalosporins Other (See Comments)     headache    Doxycycline      Capsules only  Tablets are ok to take, per pt  Capsules only  Tablets are ok to take, per pt   Sulfa Antibiotics Other (See Comments)     Category: Allergy;  Annotation - [id number]: STOMACH UPSET    Famotidine Palpitations    Omeprazole Palpitations     Painful urination       Social History   Past Surgical History:   Procedure Laterality Date    APPENDECTOMY      BICEPS TENODESIS      anesthesia for tenodesis- of ruptured long tendon of biceps     HERNIA REPAIR      THYROIDECTOMY      TONSILLECTOMY       Family History   Problem Relation Age of Onset    Cancer Father     Lung cancer Father     Coronary artery disease Father     Stroke Maternal Grandmother     Stroke Maternal Grandfather     Cancer Paternal Grandfather     Heart disease Family     Hypertension Family     Lung cancer Cousin     No Known Problems Mother     No Known Problems Sister     No Known Problems Brother  No Known Problems Maternal Aunt     No Known Problems Maternal Uncle     No Known Problems Paternal Aunt     No Known Problems Paternal Uncle     No Known Problems Paternal Grandmother     ADD / ADHD Neg Hx     Anesthesia problems Neg Hx     Clotting disorder Neg Hx     Collagen disease Neg Hx     Diabetes Neg Hx     Dislocations Neg Hx     Learning disabilities Neg Hx     Neurological problems Neg Hx     Osteoporosis Neg Hx     Rheumatologic disease Neg Hx     Scoliosis Neg Hx     Vascular Disease Neg Hx        Objective:  /82 (BP Location: Right arm, Patient Position: Sitting, Cuff Size: Large)   Pulse 88   Temp 99 5 °F (37 5 °C) (Oral)   Ht 5' 10" (1 778 m) Comment: with shoes on  Wt 101 kg (222 lb 12 8 oz) Comment: with shoes on  SpO2 98% Comment: room air  BMI 31 97 kg/m²     Recent Results (from the past 1344 hour(s))   Basic metabolic panel    Collection Time: 01/08/19  2:47 PM   Result Value Ref Range    Sodium 138 136 - 145 mmol/L    Potassium 3 9 3 5 - 5 3 mmol/L    Chloride 101 100 - 108 mmol/L    CO2 27 21 - 32 mmol/L    ANION GAP 10 4 - 13 mmol/L    BUN 14 5 - 25 mg/dL    Creatinine 1 08 0 60 - 1 30 mg/dL    Glucose, Fasting 91 65 - 99 mg/dL    Calcium 9 5 8 3 - 10 1 mg/dL    eGFR 78 ml/min/1 73sq m   CBC    Collection Time: 01/08/19  2:47 PM   Result Value Ref Range    WBC 5 89 4 31 - 10 16 Thousand/uL    RBC 4 85 3 88 - 5 62 Million/uL    Hemoglobin 14 9 12 0 - 17 0 g/dL    Hematocrit 44 2 36 5 - 49 3 %    MCV 91 82 - 98 fL    MCH 30 7 26 8 - 34 3 pg    MCHC 33 7 31 4 - 37 4 g/dL    RDW 13 0 11 6 - 15 1 %    Platelets 208 251 - 451 Thousands/uL    MPV 9 5 8 9 - 12 7 fL   TSH, 3rd generation with Free T4 reflex    Collection Time: 01/08/19  2:47 PM   Result Value Ref Range    TSH 3RD GENERATON 1 210 0 358 - 3 740 uIU/mL   Uric acid    Collection Time: 01/08/19  2:47 PM   Result Value Ref Range    Uric Acid 5 5 4 2 - 8 0 mg/dL            Physical Exam   Constitutional: He is oriented to person, place, and time  He appears well-developed and well-nourished  No distress  HENT:   Head: Normocephalic and atraumatic  Negative [place]-Hallpike maneuver   Eyes: Pupils are equal, round, and reactive to light  Conjunctivae and EOM are normal  Right eye exhibits no discharge  Left eye exhibits no discharge  No scleral icterus  Neck: Normal range of motion  Neck supple  No JVD present  No thyromegaly present  Cardiovascular: Normal rate, regular rhythm, normal heart sounds and intact distal pulses  Exam reveals no gallop and no friction rub  No murmur heard  Pulmonary/Chest: Effort normal and breath sounds normal  No respiratory distress  He has no wheezes  He has no rales  He exhibits no tenderness  Abdominal: Soft  Bowel sounds are normal  He exhibits no distension and no mass  There is no tenderness  There is no rebound and no guarding  Musculoskeletal: Normal range of motion  He exhibits no edema, tenderness or deformity  Lymphadenopathy:     He has no cervical adenopathy  Neurological: He is alert and oriented to person, place, and time  He has normal reflexes  No cranial nerve deficit  Coordination normal    Skin: Skin is warm and dry  No rash noted  He is not diaphoretic  No erythema  No pallor  Psychiatric: He has a normal mood and affect  His behavior is normal  Judgment and thought content normal    Nursing note and vitals reviewed

## 2019-02-19 ENCOUNTER — OFFICE VISIT (OUTPATIENT)
Dept: URGENT CARE | Age: 54
End: 2019-02-19
Payer: MEDICARE

## 2019-02-19 VITALS
HEART RATE: 92 BPM | OXYGEN SATURATION: 95 % | TEMPERATURE: 98.2 F | RESPIRATION RATE: 18 BRPM | DIASTOLIC BLOOD PRESSURE: 82 MMHG | SYSTOLIC BLOOD PRESSURE: 144 MMHG

## 2019-02-19 DIAGNOSIS — M25.562 CHRONIC PAIN OF BOTH KNEES: ICD-10-CM

## 2019-02-19 DIAGNOSIS — M25.561 CHRONIC PAIN OF BOTH KNEES: ICD-10-CM

## 2019-02-19 DIAGNOSIS — J01.90 ACUTE SINUSITIS, RECURRENCE NOT SPECIFIED, UNSPECIFIED LOCATION: Primary | ICD-10-CM

## 2019-02-19 DIAGNOSIS — G89.29 CHRONIC PAIN OF BOTH KNEES: ICD-10-CM

## 2019-02-19 PROCEDURE — G0463 HOSPITAL OUTPT CLINIC VISIT: HCPCS | Performed by: FAMILY MEDICINE

## 2019-02-19 PROCEDURE — 99213 OFFICE O/P EST LOW 20 MIN: CPT | Performed by: FAMILY MEDICINE

## 2019-02-19 RX ORDER — PREDNISONE 10 MG/1
TABLET ORAL
Qty: 21 TABLET | Refills: 0 | Status: SHIPPED | OUTPATIENT
Start: 2019-02-19 | End: 2019-04-22 | Stop reason: HOSPADM

## 2019-02-20 NOTE — PATIENT INSTRUCTIONS
Follow-up with your primary care physician and orthopedist as directed    Go to emergency room if symptoms are worsening

## 2019-02-20 NOTE — PROGRESS NOTES
[facility]        NAME: Selam Cabrera is a 47 y o  male  : 1965    MRN: [id number]  DATE: 2019  TIME: 10:14 PM    Assessment and Plan   Acute sinusitis, recurrence not specified, unspecified location [J01 90]  1  Acute sinusitis, recurrence not specified, unspecified location  predniSONE 10 mg tablet   2  Chronic pain of both knees  predniSONE 10 mg tablet         Patient Instructions       Follow up with PCP in 3-5 days  Proceed to  ER if symptoms worsen  Chief Complaint     Chief Complaint   Patient presents with    Dizziness     pt has hx of vertigo and having a flare up do to congestion he states  History of Present Illness       Patient is here for evaluation of sinus congestion, pressure, left ear pain  Patient also has some chronic pain in his knees and back for which she sees a specialist for  He states in the past when he has had the sinus infections he has been on prednisone which has helped    He states he feels that might help with his other aches and pains as well as it has helped in the past   He does have follow-up appointments with specialist as needed      Review of Systems   Review of Systems      Current Medications       Current Outpatient Medications:     albuterol (PROVENTIL HFA,VENTOLIN HFA) 90 mcg/act inhaler, Inhale 1 puff every 4 (four) hours as needed  , Disp: , Rfl:     colchicine (COLCRYS) 0 6 mg tablet, Take 1 tablet by mouth as needed  , Disp: , Rfl:     cyclobenzaprine (FLEXERIL) 5 mg tablet, Take 1 tablet (5 mg total) by mouth 3 (three) times a day as needed for muscle spasms, Disp: 30 tablet, Rfl: 0    esomeprazole (NexIUM) 40 MG capsule, Take 1 capsule (40 mg total) by mouth daily, Disp: 90 capsule, Rfl: 1    fluticasone-salmeterol (ADVAIR DISKUS) 500-50 mcg/dose, Inhale 1 puff 2 (two) times a day, Disp: , Rfl:     ipratropium-albuterol (DUO-NEB) 0 5-2 5 mg/3 mL, Inhale 3 mL 4 (four) times a day As needed, Disp: , Rfl:    lisinopril (ZESTRIL) 20 mg tablet, Take 1 tablet (20 mg total) by mouth daily, Disp: 90 tablet, Rfl: 1    LORazepam (ATIVAN) 0 5 mg tablet, Take 0 5 tablets (0 25 mg total) by mouth 2 (two) times a day as needed for anxiety (shortness of breath) (Patient not taking: Reported on 2/15/2019), Disp: 30 tablet, Rfl: 0    meclizine (ANTIVERT) 12 5 MG tablet, Take 12 5 mg by mouth Three times daily as needed, Disp: , Rfl:     montelukast (SINGULAIR) 10 mg tablet, Take 1 tablet (10 mg total) by mouth daily, Disp: 90 tablet, Rfl: 1    predniSONE 10 mg tablet, 4 tablets once daily for 5 days, Disp: 21 tablet, Rfl: 0    Triamcinolone Acetonide (NASACORT ALLERGY 24HR NA), 1 spray into each nostril daily  , Disp: , Rfl:     ULORIC 40 MG tablet, Take 1 tablet (40 mg total) by mouth daily, Disp: 90 tablet, Rfl: 1    verapamil (CALAN) 120 mg tablet, Take 1 tablet (120 mg total) by mouth daily, Disp: 90 tablet, Rfl: 2    Current Allergies     Allergies as of 02/19/2019 - Reviewed 02/19/2019   Allergen Reaction Noted    Penicillins Rash and Anaphylaxis 08/17/2004    Acetazolamide  10/25/2016    Cephalosporins Other (See Comments) 08/17/2004    Doxycycline  10/24/2018    Sulfa antibiotics Other (See Comments) 08/17/2004    Famotidine Palpitations 09/05/2016    Omeprazole Palpitations 09/05/2016            The following portions of the patient's history were reviewed and updated as appropriate: allergies, current medications, past family history, past medical history, past social history, past surgical history and problem list      Past Medical History:   Diagnosis Date    Anxiety 1/15/2019    Arthritis     Chronic rhinitis     last assessed 2/21/14    Chronic serous otitis media     last assessed 11/20/13    Chronic sinusitis     last assessed 8/19/14    Functional heart murmur     GERD (gastroesophageal reflux disease)     Gout     Hearing problem     last assessed 12/1/16    Hiatal hernia     Hypercholesterolemia     Hypertension     Palpitations     Testicular hypogonadism     last assessed 10/4/13       Past Surgical History:   Procedure Laterality Date    APPENDECTOMY      BICEPS TENODESIS      anesthesia for tenodesis- of ruptured long tendon of biceps     HERNIA REPAIR      THYROIDECTOMY      TONSILLECTOMY         Family History   Problem Relation Age of Onset    Cancer Father     Lung cancer Father     Coronary artery disease Father     Stroke Maternal Grandmother     Stroke Maternal Grandfather     Cancer Paternal Grandfather     Heart disease Family     Hypertension Family     Lung cancer Cousin     No Known Problems Mother     No Known Problems Sister     No Known Problems Brother     No Known Problems Maternal Aunt     No Known Problems Maternal Uncle     No Known Problems Paternal Aunt     No Known Problems Paternal Uncle     No Known Problems Paternal Grandmother     ADD / ADHD Neg Hx     Anesthesia problems Neg Hx     Clotting disorder Neg Hx     Collagen disease Neg Hx     Diabetes Neg Hx     Dislocations Neg Hx     Learning disabilities Neg Hx     Neurological problems Neg Hx     Osteoporosis Neg Hx     Rheumatologic disease Neg Hx     Scoliosis Neg Hx     Vascular Disease Neg Hx          Medications have been verified  Objective   /82 (BP Location: Left arm, Patient Position: Sitting)   Pulse 92   Temp 98 2 °F (36 8 °C) (Temporal)   Resp 18   SpO2 95%        Physical Exam     Physical Exam   Constitutional: He is oriented to person, place, and time  He appears well-developed and well-nourished  No distress  HENT:   Head: Normocephalic and atraumatic  Right Ear: External ear normal    Left Ear: External ear normal    TMs intact bilaterally with clear fluid in the middle ear bilaterally  Bilateral nasal congestion erythema with clear discharge  Bilateral maxillary sinus tenderness    Bilateral tonsillar erythema with no soft tissue swelling  No exudate  Eyes: Pupils are equal, round, and reactive to light  Conjunctivae and EOM are normal    Pulmonary/Chest: Effort normal and breath sounds normal  No stridor  No respiratory distress  He has no wheezes  He has no rales  Lymphadenopathy:     He has no cervical adenopathy  Neurological: He is alert and oriented to person, place, and time  Skin: Skin is warm and dry  Nursing note and vitals reviewed

## 2019-03-15 ENCOUNTER — OFFICE VISIT (OUTPATIENT)
Dept: OBGYN CLINIC | Facility: MEDICAL CENTER | Age: 54
End: 2019-03-15
Payer: MEDICARE

## 2019-03-15 VITALS
DIASTOLIC BLOOD PRESSURE: 82 MMHG | BODY MASS INDEX: 32.35 KG/M2 | WEIGHT: 226 LBS | HEART RATE: 82 BPM | HEIGHT: 70 IN | SYSTOLIC BLOOD PRESSURE: 129 MMHG

## 2019-03-15 DIAGNOSIS — M17.11 PRIMARY OSTEOARTHRITIS OF RIGHT KNEE: Primary | ICD-10-CM

## 2019-03-15 DIAGNOSIS — M25.561 CHRONIC PAIN OF RIGHT KNEE: ICD-10-CM

## 2019-03-15 DIAGNOSIS — G89.29 CHRONIC PAIN OF RIGHT KNEE: ICD-10-CM

## 2019-03-15 DIAGNOSIS — M25.562 LEFT KNEE PAIN, UNSPECIFIED CHRONICITY: ICD-10-CM

## 2019-03-15 DIAGNOSIS — M17.12 PRIMARY OSTEOARTHRITIS OF LEFT KNEE: ICD-10-CM

## 2019-03-15 DIAGNOSIS — Z01.89 ENCOUNTER FOR LOWER EXTREMITY COMPARISON IMAGING STUDY: ICD-10-CM

## 2019-03-15 DIAGNOSIS — G89.29 OTHER CHRONIC PAIN: ICD-10-CM

## 2019-03-15 PROCEDURE — 20610 DRAIN/INJ JOINT/BURSA W/O US: CPT | Performed by: ORTHOPAEDIC SURGERY

## 2019-03-15 PROCEDURE — 99213 OFFICE O/P EST LOW 20 MIN: CPT | Performed by: ORTHOPAEDIC SURGERY

## 2019-03-15 RX ORDER — LIDOCAINE HYDROCHLORIDE 10 MG/ML
3 INJECTION, SOLUTION INFILTRATION; PERINEURAL
Status: COMPLETED | OUTPATIENT
Start: 2019-03-15 | End: 2019-03-15

## 2019-03-15 RX ORDER — METHYLPREDNISOLONE ACETATE 40 MG/ML
2 INJECTION, SUSPENSION INTRA-ARTICULAR; INTRALESIONAL; INTRAMUSCULAR; SOFT TISSUE
Status: COMPLETED | OUTPATIENT
Start: 2019-03-15 | End: 2019-03-15

## 2019-03-15 RX ADMIN — METHYLPREDNISOLONE ACETATE 2 ML: 40 INJECTION, SUSPENSION INTRA-ARTICULAR; INTRALESIONAL; INTRAMUSCULAR; SOFT TISSUE at 18:29

## 2019-03-15 RX ADMIN — LIDOCAINE HYDROCHLORIDE 3 ML: 10 INJECTION, SOLUTION INFILTRATION; PERINEURAL at 18:29

## 2019-03-15 NOTE — PROGRESS NOTES
Assessment/Plan:  1  Primary osteoarthritis of right knee    2  Encounter for lower extremity comparison imaging study    3  Left knee pain, unspecified chronicity    4  Primary osteoarthritis of left knee    5  Chronic pain of right knee    6  Other chronic pain      Orders Placed This Encounter   Procedures    Large joint arthrocentesis: L knee    Large joint arthrocentesis: R knee     Received steroid injection today  Patient knows to ice and avoid strenuous activity for 1-2 days if needed  Continue ibuprofen prn pain  Continue home exercises    Return if symptoms worsen or fail to improve  I answered all of the patient's questions during the visit and provided education of the patient's condition during the visit  The patient verbalized understanding of the information given and agrees with the plan  This note was dictated using [employer] software  It may contain errors including improperly dictated words  Please contact physician directly for any questions  Subjective   Chief Complaint:   Chief Complaint   Patient presents with    Left Knee - Follow-up    Right Knee - Follow-up       Brian Zarate is a 49 y o  male who presents for follow up for bilateral knee OA  He last received a right knee steroid injection on 10/30 which was helpful and a left knee steroid injection on 11/30 which was helpful  He is here today for bilateral knee steroid injections  He has noticed an increase in his pain due to weather changes over the winter  He continues do home exercises but has limited his walking due to the pain  He takes ibuprofen as needed for pain control and is happy with this  Review of Systems  ROS:    See HPI for musculoskeletal review     All other systems reviewed are negative     History:  Past Medical History:   Diagnosis Date    Anxiety 1/15/2019    Arthritis     Chronic rhinitis     last assessed 2/21/14    Chronic serous otitis media     last assessed 11/20/13    Chronic sinusitis     last assessed 8/19/14    Functional heart murmur     GERD (gastroesophageal reflux disease)     Gout     Hearing problem     last assessed 12/1/16    Hiatal hernia     Hypercholesterolemia     Hypertension     Palpitations     Testicular hypogonadism     last assessed 10/4/13     Past Surgical History:   Procedure Laterality Date    APPENDECTOMY      BICEPS TENODESIS      anesthesia for tenodesis- of ruptured long tendon of biceps     HERNIA REPAIR      THYROIDECTOMY      TONSILLECTOMY       Social History   Social History     Substance and Sexual Activity   Alcohol Use Yes    Frequency: 2-3 times a week    Drinks per session: 5 or 6    Binge frequency: Weekly    Comment: occassionally     Social History     Substance and Sexual Activity   Drug Use No     Social History     Tobacco Use   Smoking Status Never Smoker   Smokeless Tobacco Never Used     Family History:   Family History   Problem Relation Age of Onset    Cancer Father     Lung cancer Father     Coronary artery disease Father     Stroke Maternal Grandmother     Stroke Maternal Grandfather     Cancer Paternal Grandfather     Heart disease Family     Hypertension Family     Lung cancer Cousin     No Known Problems Mother     No Known Problems Sister     No Known Problems Brother     No Known Problems Maternal Aunt     No Known Problems Maternal Uncle     No Known Problems Paternal Aunt     No Known Problems Paternal Uncle     No Known Problems Paternal Grandmother     ADD / ADHD Neg Hx     Anesthesia problems Neg Hx     Clotting disorder Neg Hx     Collagen disease Neg Hx     Diabetes Neg Hx     Dislocations Neg Hx     Learning disabilities Neg Hx     Neurological problems Neg Hx     Osteoporosis Neg Hx     Rheumatologic disease Neg Hx     Scoliosis Neg Hx     Vascular Disease Neg Hx        Current Outpatient Medications on File Prior to Visit   Medication Sig Dispense Refill    albuterol (PROVENTIL HFA,VENTOLIN HFA) 90 mcg/act inhaler Inhale 1 puff every 4 (four) hours as needed        colchicine (COLCRYS) 0 6 mg tablet Take 1 tablet by mouth as needed        cyclobenzaprine (FLEXERIL) 5 mg tablet Take 1 tablet (5 mg total) by mouth 3 (three) times a day as needed for muscle spasms 30 tablet 0    esomeprazole (NexIUM) 40 MG capsule Take 1 capsule (40 mg total) by mouth daily 90 capsule 1    fluticasone-salmeterol (ADVAIR DISKUS) 500-50 mcg/dose Inhale 1 puff 2 (two) times a day      ipratropium-albuterol (DUO-NEB) 0 5-2 5 mg/3 mL Inhale 3 mL 4 (four) times a day As needed      lisinopril (ZESTRIL) 20 mg tablet Take 1 tablet (20 mg total) by mouth daily 90 tablet 1    LORazepam (ATIVAN) 0 5 mg tablet Take 0 5 tablets (0 25 mg total) by mouth 2 (two) times a day as needed for anxiety (shortness of breath) (Patient not taking: Reported on 2/15/2019) 30 tablet 0    meclizine (ANTIVERT) 12 5 MG tablet Take 12 5 mg by mouth Three times daily as needed      montelukast (SINGULAIR) 10 mg tablet Take 1 tablet (10 mg total) by mouth daily 90 tablet 1    predniSONE 10 mg tablet 4 tablets once daily for 5 days 21 tablet 0    Triamcinolone Acetonide (NASACORT ALLERGY 24HR NA) 1 spray into each nostril daily        ULORIC 40 MG tablet Take 1 tablet (40 mg total) by mouth daily 90 tablet 1    verapamil (CALAN) 120 mg tablet Take 1 tablet (120 mg total) by mouth daily 90 tablet 2     No current facility-administered medications on file prior to visit  Allergies   Allergen Reactions    Penicillins Rash and Anaphylaxis    Acetazolamide      Other reaction(s): Stomach Ache    Cephalosporins Other (See Comments)     headache    Doxycycline      Capsules only  Tablets are ok to take, per pt  Capsules only  Tablets are ok to take, per pt   Sulfa Antibiotics Other (See Comments)     Category: Allergy;  Annotation - [id number]: STOMACH UPSET    Famotidine Palpitations    Omeprazole Palpitations Painful urination        Objective     /82   Pulse 82   Ht 5' 9 5" (1 765 m)   Wt 103 kg (226 lb)   BMI 32 90 kg/m²      PE:  AAOx 3  WDWN  Hearing intact, no drainage from eyes  no audible wheezing  no abdominal distension  LE compartments soft, skin intact    Ortho Exam:  bilateral Knee:   No erythema  no swelling  no effusion  no warmth  AROM: full    Large joint arthrocentesis: L knee  Date/Time: 3/15/2019 6:29 PM  Consent given by: patient  Site marked: site marked  Timeout: Immediately prior to procedure a time out was called to verify the correct patient, procedure, equipment, support staff and site/side marked as required   Supporting Documentation  Indications: pain   Procedure Details  Location: knee - L knee  Preparation: Patient was prepped and draped in the usual sterile fashion  Needle size: 22 G  Ultrasound guidance: no  Approach: anterolateral  Medications administered: 3 mL lidocaine 1 %; 2 mL methylPREDNISolone acetate 40 mg/mL    Patient tolerance: patient tolerated the procedure well with no immediate complications  Dressing:  Sterile dressing applied    Large joint arthrocentesis: R knee  Date/Time: 3/15/2019 6:29 PM  Consent given by: patient  Site marked: site marked  Timeout: Immediately prior to procedure a time out was called to verify the correct patient, procedure, equipment, support staff and site/side marked as required   Supporting Documentation  Indications: pain   Procedure Details  Location: knee - R knee  Preparation: Patient was prepped and draped in the usual sterile fashion  Needle size: 22 G  Ultrasound guidance: no  Approach: anterolateral  Medications administered: 3 mL lidocaine 1 %; 2 mL methylPREDNISolone acetate 40 mg/mL    Patient tolerance: patient tolerated the procedure well with no immediate complications  Dressing:  Sterile dressing applied

## 2019-04-22 ENCOUNTER — OFFICE VISIT (OUTPATIENT)
Dept: INTERNAL MEDICINE CLINIC | Facility: CLINIC | Age: 54
End: 2019-04-22
Payer: MEDICARE

## 2019-04-22 VITALS
HEIGHT: 70 IN | SYSTOLIC BLOOD PRESSURE: 136 MMHG | OXYGEN SATURATION: 97 % | BODY MASS INDEX: 32.78 KG/M2 | TEMPERATURE: 98.4 F | HEART RATE: 81 BPM | DIASTOLIC BLOOD PRESSURE: 86 MMHG | WEIGHT: 229 LBS

## 2019-04-22 DIAGNOSIS — I10 ESSENTIAL HYPERTENSION: ICD-10-CM

## 2019-04-22 DIAGNOSIS — J45.909 MODERATE ASTHMA, UNSPECIFIED WHETHER COMPLICATED, UNSPECIFIED WHETHER PERSISTENT: ICD-10-CM

## 2019-04-22 DIAGNOSIS — K21.9 GASTROESOPHAGEAL REFLUX DISEASE WITHOUT ESOPHAGITIS: ICD-10-CM

## 2019-04-22 DIAGNOSIS — Z12.11 SCREENING FOR COLON CANCER: Primary | ICD-10-CM

## 2019-04-22 DIAGNOSIS — K21.9 GERD WITHOUT ESOPHAGITIS: ICD-10-CM

## 2019-04-22 DIAGNOSIS — J30.89 NON-SEASONAL ALLERGIC RHINITIS, UNSPECIFIED TRIGGER: ICD-10-CM

## 2019-04-22 PROCEDURE — 99214 OFFICE O/P EST MOD 30 MIN: CPT | Performed by: INTERNAL MEDICINE

## 2019-04-22 RX ORDER — ESOMEPRAZOLE MAGNESIUM 40 MG/1
40 CAPSULE, DELAYED RELEASE ORAL DAILY
Qty: 90 CAPSULE | Refills: 1 | Status: SHIPPED | OUTPATIENT
Start: 2019-04-22 | End: 2019-09-27 | Stop reason: SDUPTHER

## 2019-05-08 DIAGNOSIS — J31.0 CHRONIC RHINITIS: ICD-10-CM

## 2019-05-08 RX ORDER — MONTELUKAST SODIUM 10 MG/1
TABLET ORAL
Qty: 90 TABLET | Refills: 1 | OUTPATIENT
Start: 2019-05-08

## 2019-05-17 DIAGNOSIS — J31.0 CHRONIC RHINITIS: ICD-10-CM

## 2019-05-17 RX ORDER — MONTELUKAST SODIUM 10 MG/1
10 TABLET ORAL DAILY
Qty: 90 TABLET | Refills: 1 | Status: SHIPPED | OUTPATIENT
Start: 2019-05-17 | End: 2019-11-08 | Stop reason: SDUPTHER

## 2019-06-05 DIAGNOSIS — M10.9 GOUTY ARTHRITIS: ICD-10-CM

## 2019-06-05 RX ORDER — FEBUXOSTAT 40 MG/1
TABLET ORAL
Qty: 90 TABLET | Refills: 1 | OUTPATIENT
Start: 2019-06-05

## 2019-06-06 DIAGNOSIS — M10.9 GOUTY ARTHRITIS: ICD-10-CM

## 2019-06-06 RX ORDER — FEBUXOSTAT 40 MG/1
40 TABLET ORAL DAILY
Qty: 90 TABLET | Refills: 1 | Status: SHIPPED | OUTPATIENT
Start: 2019-06-06 | End: 2019-12-02 | Stop reason: SDUPTHER

## 2019-06-19 ENCOUNTER — OFFICE VISIT (OUTPATIENT)
Dept: OBGYN CLINIC | Facility: MEDICAL CENTER | Age: 54
End: 2019-06-19
Payer: MEDICARE

## 2019-06-19 ENCOUNTER — APPOINTMENT (OUTPATIENT)
Dept: RADIOLOGY | Facility: CLINIC | Age: 54
End: 2019-06-19
Payer: MEDICARE

## 2019-06-19 VITALS
DIASTOLIC BLOOD PRESSURE: 87 MMHG | HEART RATE: 84 BPM | WEIGHT: 225 LBS | BODY MASS INDEX: 32.21 KG/M2 | HEIGHT: 70 IN | SYSTOLIC BLOOD PRESSURE: 145 MMHG

## 2019-06-19 DIAGNOSIS — M17.11 PRIMARY LOCALIZED OSTEOARTHRITIS OF RIGHT KNEE: Primary | ICD-10-CM

## 2019-06-19 DIAGNOSIS — M17.12 PRIMARY LOCALIZED OSTEOARTHRITIS OF LEFT KNEE: ICD-10-CM

## 2019-06-19 DIAGNOSIS — M17.11 PRIMARY LOCALIZED OSTEOARTHRITIS OF RIGHT KNEE: ICD-10-CM

## 2019-06-19 PROCEDURE — 99213 OFFICE O/P EST LOW 20 MIN: CPT | Performed by: PHYSICIAN ASSISTANT

## 2019-06-19 PROCEDURE — 73564 X-RAY EXAM KNEE 4 OR MORE: CPT

## 2019-06-19 PROCEDURE — 20610 DRAIN/INJ JOINT/BURSA W/O US: CPT | Performed by: PHYSICIAN ASSISTANT

## 2019-06-19 RX ORDER — LIDOCAINE HYDROCHLORIDE 10 MG/ML
3 INJECTION, SOLUTION INFILTRATION; PERINEURAL
Status: COMPLETED | OUTPATIENT
Start: 2019-06-19 | End: 2019-06-19

## 2019-06-19 RX ORDER — METHYLPREDNISOLONE ACETATE 40 MG/ML
2 INJECTION, SUSPENSION INTRA-ARTICULAR; INTRALESIONAL; INTRAMUSCULAR; SOFT TISSUE
Status: COMPLETED | OUTPATIENT
Start: 2019-06-19 | End: 2019-06-19

## 2019-06-19 RX ADMIN — LIDOCAINE HYDROCHLORIDE 3 ML: 10 INJECTION, SOLUTION INFILTRATION; PERINEURAL at 15:20

## 2019-06-19 RX ADMIN — METHYLPREDNISOLONE ACETATE 2 ML: 40 INJECTION, SUSPENSION INTRA-ARTICULAR; INTRALESIONAL; INTRAMUSCULAR; SOFT TISSUE at 15:20

## 2019-06-25 ENCOUNTER — OFFICE VISIT (OUTPATIENT)
Dept: URGENT CARE | Age: 54
End: 2019-06-25
Payer: MEDICARE

## 2019-06-25 DIAGNOSIS — J01.90 ACUTE SINUSITIS, RECURRENCE NOT SPECIFIED, UNSPECIFIED LOCATION: Primary | ICD-10-CM

## 2019-06-25 PROCEDURE — 99213 OFFICE O/P EST LOW 20 MIN: CPT | Performed by: PHYSICIAN ASSISTANT

## 2019-06-25 PROCEDURE — G0463 HOSPITAL OUTPT CLINIC VISIT: HCPCS | Performed by: PHYSICIAN ASSISTANT

## 2019-06-25 RX ORDER — LEVOFLOXACIN 500 MG/1
500 TABLET, FILM COATED ORAL EVERY 24 HOURS
Qty: 10 TABLET | Refills: 0 | Status: SHIPPED | OUTPATIENT
Start: 2019-06-25 | End: 2019-07-05

## 2019-06-25 RX ORDER — LORAZEPAM 0.5 MG/1
TABLET ORAL AS NEEDED
COMMUNITY
End: 2019-10-16 | Stop reason: HOSPADM

## 2019-07-03 ENCOUNTER — OFFICE VISIT (OUTPATIENT)
Dept: URGENT CARE | Age: 54
End: 2019-07-03
Payer: MEDICARE

## 2019-07-03 VITALS
SYSTOLIC BLOOD PRESSURE: 132 MMHG | DIASTOLIC BLOOD PRESSURE: 88 MMHG | BODY MASS INDEX: 31.75 KG/M2 | HEART RATE: 90 BPM | TEMPERATURE: 98.5 F | HEIGHT: 70 IN | OXYGEN SATURATION: 96 % | WEIGHT: 221.8 LBS | RESPIRATION RATE: 18 BRPM

## 2019-07-03 DIAGNOSIS — J45.901 ASTHMA WITH ACUTE EXACERBATION, UNSPECIFIED ASTHMA SEVERITY, UNSPECIFIED WHETHER PERSISTENT: Primary | ICD-10-CM

## 2019-07-03 PROCEDURE — 99213 OFFICE O/P EST LOW 20 MIN: CPT | Performed by: PHYSICIAN ASSISTANT

## 2019-07-03 PROCEDURE — G0463 HOSPITAL OUTPT CLINIC VISIT: HCPCS | Performed by: PHYSICIAN ASSISTANT

## 2019-07-03 RX ORDER — PREDNISONE 10 MG/1
TABLET ORAL
Qty: 20 TABLET | Refills: 0 | Status: SHIPPED | OUTPATIENT
Start: 2019-07-03 | End: 2019-10-16 | Stop reason: HOSPADM

## 2019-07-04 NOTE — PROGRESS NOTES
[address]        NAME: Beth Boyle is a 49 y o  male  : 1965    MRN: [id number]  DATE: July 3, 2019  TIME: 8:53 PM    Assessment and Plan   Asthma with acute exacerbation, unspecified asthma severity, unspecified whether persistent [J45 [id number]]  1  Asthma with acute exacerbation, unspecified asthma severity, unspecified whether persistent  predniSONE 10 mg tablet         Patient Instructions       Follow up with PCP in 3-5 days  Proceed to  ER if symptoms worsen  Chief Complaint     Chief Complaint   Patient presents with    Shortness of Breath     Pt states that he is taking the antibiotics he was prescribed last time he was here and took antihistamine on friday, and after that he began experiencing shortness of breath, coughing, dry throat, trouble swallowing, sinus pressure and he is now losing his voice  He is taking sudafed everyday  History of Present Illness       Patient is here for evaluation of persistent cough, chest congestion, wheezing  Patient states it feels like as asthma flare up  Patient has 2 days of antibiotics left and overall does feel better with increased energy but now has some episodes of wheezing and cough  He is using his inhalers as directed with some relief but symptoms persist       Review of Systems   Review of Systems   Constitutional: Negative  HENT: Positive for voice change  Negative for congestion, ear pain, postnasal drip, rhinorrhea, sinus pressure, sinus pain, sore throat, tinnitus and trouble swallowing  Eyes: Negative  Respiratory: Positive for cough, shortness of breath and wheezing  Cardiovascular: Negative            Current Medications       Current Outpatient Medications:     albuterol (PROVENTIL HFA,VENTOLIN HFA) 90 mcg/act inhaler, Inhale 1 puff every 4 (four) hours as needed  , Disp: , Rfl:     cyclobenzaprine (FLEXERIL) 5 mg tablet, Take 1 tablet (5 mg total) by mouth 3 (three) times a day as needed for muscle spasms (Patient taking differently: Take 5 mg by mouth as needed for muscle spasms ), Disp: 30 tablet, Rfl: 0    esomeprazole (NexIUM) 40 MG capsule, Take 1 capsule (40 mg total) by mouth daily, Disp: 90 capsule, Rfl: 1    fluticasone-salmeterol (ADVAIR DISKUS) 500-50 mcg/dose, Inhale 1 puff 2 (two) times a day, Disp: , Rfl:     ipratropium-albuterol (DUO-NEB) 0 5-2 5 mg/3 mL, Inhale 3 mL 4 (four) times a day As needed, Disp: , Rfl:     levofloxacin (LEVAQUIN) 500 mg tablet, Take 1 tablet (500 mg total) by mouth every 24 hours for 10 days, Disp: 10 tablet, Rfl: 0    lisinopril (ZESTRIL) 20 mg tablet, Take 1 tablet (20 mg total) by mouth daily, Disp: 90 tablet, Rfl: 1    LORazepam (ATIVAN) 0 5 mg tablet, Take by mouth as needed for anxiety , Disp: , Rfl:     meclizine (ANTIVERT) 12 5 MG tablet, Take 12 5 mg by mouth Three times daily as needed, Disp: , Rfl:     montelukast (SINGULAIR) 10 mg tablet, Take 1 tablet (10 mg total) by mouth daily, Disp: 90 tablet, Rfl: 1    Triamcinolone Acetonide (NASACORT ALLERGY 24HR NA), 1 spray into each nostril daily  , Disp: , Rfl:     ULORIC 40 MG tablet, Take 1 tablet (40 mg total) by mouth daily, Disp: 90 tablet, Rfl: 1    verapamil (CALAN) 120 mg tablet, Take 1 tablet (120 mg total) by mouth daily, Disp: 90 tablet, Rfl: 2    colchicine (COLCRYS) 0 6 mg tablet, Take 1 tablet by mouth as needed  , Disp: , Rfl:     predniSONE 10 mg tablet, 4 tablets once daily for 5 days, Disp: 20 tablet, Rfl: 0    Current Allergies     Allergies as of 07/03/2019 - Reviewed 07/03/2019   Allergen Reaction Noted    Penicillins Rash and Anaphylaxis 08/17/2004    Acetazolamide  10/25/2016    Cephalosporins Other (See Comments) 08/17/2004    Doxycycline  10/24/2018    Sulfa antibiotics Other (See Comments) 08/17/2004    Famotidine Palpitations 09/05/2016    Omeprazole Palpitations 09/05/2016            The following portions of the patient's history were reviewed and updated as appropriate: allergies, current medications, past family history, past medical history, past social history, past surgical history and problem list      Past Medical History:   Diagnosis Date    Anxiety 1/15/2019    Arthritis     Chronic rhinitis     last assessed 2/21/14    Chronic serous otitis media     last assessed 11/20/13    Chronic sinusitis     last assessed 8/19/14    Functional heart murmur     GERD (gastroesophageal reflux disease)     Gout     Hearing problem     last assessed 12/1/16    Hiatal hernia     Hypercholesterolemia     Hypertension     Palpitations     Testicular hypogonadism     last assessed 10/4/13    [facility])        Past Surgical History:   Procedure Laterality Date    APPENDECTOMY      BICEPS TENODESIS      anesthesia for tenodesis- of ruptured long tendon of biceps     HERNIA REPAIR      THYROIDECTOMY      TONSILLECTOMY         Family History   Problem Relation Age of Onset    Cancer Father     Lung cancer Father     Coronary artery disease Father     Stroke Maternal Grandmother     Stroke Maternal Grandfather     Cancer Paternal Grandfather     Heart disease Family     Hypertension Family     Lung cancer Cousin     No Known Problems Mother     No Known Problems Sister     No Known Problems Brother     No Known Problems Maternal Aunt     No Known Problems Maternal Uncle     No Known Problems Paternal Aunt     No Known Problems Paternal Uncle     No Known Problems Paternal Grandmother     ADD / ADHD Neg Hx     Anesthesia problems Neg Hx     Clotting disorder Neg Hx     Collagen disease Neg Hx     Diabetes Neg Hx     Dislocations Neg Hx     Learning disabilities Neg Hx     Neurological problems Neg Hx     Osteoporosis Neg Hx     Rheumatologic disease Neg Hx     Scoliosis Neg Hx     Vascular Disease Neg Hx          Medications have been verified          Objective   /88   Pulse 90   Temp 98 5 °F (36 9 °C) (Temporal)   Resp 18   Ht 5' 10" (1 778 m)   Wt 101 kg (221 lb 12 8 oz)   SpO2 96%   BMI 31 82 kg/m²        Physical Exam     Physical Exam   Constitutional: He is oriented to person, place, and time  He appears well-developed and well-nourished  No distress  HENT:   Head: Normocephalic and atraumatic  Right Ear: External ear normal    Left Ear: External ear normal    Nose: Nose normal    Mouth/Throat: Oropharynx is clear and moist  No oropharyngeal exudate  Eyes: Pupils are equal, round, and reactive to light  Conjunctivae and EOM are normal    Cardiovascular: Normal rate, regular rhythm and normal heart sounds  No murmur heard  Pulmonary/Chest: Effort normal and breath sounds normal  No stridor  No respiratory distress  He has no wheezes  He has no rales  Patient with a tight cough and raspy voice  Neurological: He is alert and oriented to person, place, and time  Skin: Skin is warm and dry  He is not diaphoretic  Psychiatric: He has a normal mood and affect  His behavior is normal  Judgment and thought content normal    Nursing note and vitals reviewed

## 2019-07-04 NOTE — PATIENT INSTRUCTIONS
Continue current medications as directed    Follow-up with your primary care physician if symptoms persist    Go to emergency room if symptoms are worsening

## 2019-07-16 ENCOUNTER — OFFICE VISIT (OUTPATIENT)
Dept: INTERNAL MEDICINE CLINIC | Facility: CLINIC | Age: 54
End: 2019-07-16
Payer: MEDICARE

## 2019-07-16 VITALS
HEART RATE: 89 BPM | WEIGHT: 225 LBS | TEMPERATURE: 99.6 F | OXYGEN SATURATION: 98 % | BODY MASS INDEX: 32.21 KG/M2 | HEIGHT: 70 IN

## 2019-07-16 DIAGNOSIS — J45.909 MODERATE ASTHMA, UNSPECIFIED WHETHER COMPLICATED, UNSPECIFIED WHETHER PERSISTENT: Primary | ICD-10-CM

## 2019-07-16 DIAGNOSIS — I10 ESSENTIAL HYPERTENSION: ICD-10-CM

## 2019-07-16 DIAGNOSIS — H81.12 BENIGN PAROXYSMAL POSITIONAL VERTIGO OF LEFT EAR: ICD-10-CM

## 2019-07-16 DIAGNOSIS — I47.2 VENTRICULAR TACHYCARDIA (HCC): ICD-10-CM

## 2019-07-16 PROCEDURE — 99213 OFFICE O/P EST LOW 20 MIN: CPT | Performed by: INTERNAL MEDICINE

## 2019-07-16 RX ORDER — MECLIZINE HCL 12.5 MG/1
12.5 TABLET ORAL EVERY 8 HOURS SCHEDULED
Qty: 30 TABLET | Refills: 0 | Status: SHIPPED | OUTPATIENT
Start: 2019-07-16 | End: 2020-07-15 | Stop reason: SDUPTHER

## 2019-07-16 NOTE — PROGRESS NOTES
Assessment/Plan:    1  Bronchial asthma  Stable  Will continue with present regimen of inhaler    2  Essential hypertension  Blood pressure is well controlled on present regimen    3  Gout  Continue with Uloric  Will check uric acid level before next visit     Diagnoses and all orders for this visit:    Moderate asthma, unspecified whether complicated, unspecified whether persistent    Essential hypertension          Subjective:          Patient ID: Nitish Vaughan is a 49 y o  male  This is a follow-up visit from emergency room visit to [facility]  He was seen by a shortness of breath and also not feeling well after exposure to heat  In the emergency room EKG, chest x-ray and labs were done and he was discharge  The following portions of the patient's history were reviewed and updated as appropriate: allergies, current medications, past family history, past medical history, past social history, past surgical history and problem list     Review of Systems   Constitutional: Negative for diaphoresis, fatigue and fever  HENT: Negative for congestion, ear discharge, ear pain, postnasal drip, sinus pressure, sore throat, tinnitus and trouble swallowing  Eyes: Negative for discharge, itching and visual disturbance  Respiratory: Negative for cough and shortness of breath  Cardiovascular: Negative for chest pain and palpitations  Gastrointestinal: Negative for abdominal pain, diarrhea, nausea and vomiting  Endocrine: Negative for cold intolerance and polyuria  Genitourinary: Negative for difficulty urinating, dysuria and urgency  Musculoskeletal: Negative for arthralgias and neck pain  Skin: Negative for rash  Allergic/Immunologic: Negative for environmental allergies  Neurological: Negative for dizziness, weakness and headaches  Psychiatric/Behavioral: The patient is not nervous/anxious            Past Medical History:   Diagnosis Date    Anxiety 1/15/2019    Arthritis  Chronic rhinitis     last assessed 2/21/14    Chronic serous otitis media     last assessed 11/20/13    Chronic sinusitis     last assessed 8/19/14    Functional heart murmur     GERD (gastroesophageal reflux disease)     Gout     Hearing problem     last assessed 12/1/16    Hiatal hernia     Hypercholesterolemia     Hypertension     Palpitations     Testicular hypogonadism     last assessed 10/4/13    [facility]ach [facility])          Current Outpatient Medications:     albuterol (PROVENTIL HFA,VENTOLIN HFA) 90 mcg/act inhaler, Inhale 1 puff every 4 (four) hours as needed  , Disp: , Rfl:     colchicine (COLCRYS) 0 6 mg tablet, Take 1 tablet by mouth as needed  , Disp: , Rfl:     cyclobenzaprine (FLEXERIL) 5 mg tablet, Take 1 tablet (5 mg total) by mouth 3 (three) times a day as needed for muscle spasms (Patient taking differently: Take 5 mg by mouth as needed for muscle spasms ), Disp: 30 tablet, Rfl: 0    esomeprazole (NexIUM) 40 MG capsule, Take 1 capsule (40 mg total) by mouth daily, Disp: 90 capsule, Rfl: 1    fluticasone-salmeterol (ADVAIR DISKUS) 500-50 mcg/dose, Inhale 1 puff 2 (two) times a day, Disp: , Rfl:     ipratropium-albuterol (DUO-NEB) 0 5-2 5 mg/3 mL, Inhale 3 mL 4 (four) times a day As needed, Disp: , Rfl:     lisinopril (ZESTRIL) 20 mg tablet, Take 1 tablet (20 mg total) by mouth daily, Disp: 90 tablet, Rfl: 1    LORazepam (ATIVAN) 0 5 mg tablet, Take by mouth as needed for anxiety , Disp: , Rfl:     meclizine (ANTIVERT) 12 5 MG tablet, Take 12 5 mg by mouth Three times daily as needed, Disp: , Rfl:     montelukast (SINGULAIR) 10 mg tablet, Take 1 tablet (10 mg total) by mouth daily, Disp: 90 tablet, Rfl: 1    predniSONE 10 mg tablet, 4 tablets once daily for 5 days, Disp: 20 tablet, Rfl: 0    Triamcinolone Acetonide (NASACORT ALLERGY 24HR NA), 1 spray into each nostril daily  , Disp: , Rfl:     ULORIC 40 MG tablet, Take 1 tablet (40 mg total) by mouth daily, Disp: 90 tablet, Rfl: 1    verapamil (CALAN) 120 mg tablet, Take 1 tablet (120 mg total) by mouth daily, Disp: 90 tablet, Rfl: 2    Allergies   Allergen Reactions    Penicillins Rash and Anaphylaxis    Acetazolamide      Other reaction(s): Stomach Ache    Cephalosporins Other (See Comments)     headache    Doxycycline      Capsules only  Tablets are ok to take, per pt  Capsules only  Tablets are ok to take, per pt   Sulfa Antibiotics Other (See Comments)     Category: Allergy; Annotation - [id number]: STOMACH UPSET    Famotidine Palpitations    Omeprazole Palpitations     Painful urination       Social History   Past Surgical History:   Procedure Laterality Date    APPENDECTOMY      BICEPS TENODESIS      anesthesia for tenodesis- of ruptured long tendon of biceps     HERNIA REPAIR      THYROIDECTOMY      TONSILLECTOMY       Family History   Problem Relation Age of Onset    Cancer Father     Lung cancer Father     Coronary artery disease Father     Stroke Maternal Grandmother     Stroke Maternal Grandfather     Cancer Paternal Grandfather     Heart disease Family     Hypertension Family     Lung cancer Cousin     No Known Problems Mother     No Known Problems Sister     No Known Problems Brother     No Known Problems Maternal Aunt     No Known Problems Maternal Uncle     No Known Problems Paternal Aunt     No Known Problems Paternal Uncle     No Known Problems Paternal Grandmother     ADD / ADHD Neg Hx     Anesthesia problems Neg Hx     Clotting disorder Neg Hx     Collagen disease Neg Hx     Diabetes Neg Hx     Dislocations Neg Hx     Learning disabilities Neg Hx     Neurological problems Neg Hx     Osteoporosis Neg Hx     Rheumatologic disease Neg Hx     Scoliosis Neg Hx     Vascular Disease Neg Hx        Objective:  Pulse 89   Temp 99 6 °F (37 6 °C) (Oral)   Ht 5' 10" (1 778 m)   Wt 102 kg (225 lb)   SpO2 98%   BMI 32 28 kg/m²   Body mass index is 32 28 kg/m²       Physical Exam Constitutional: He appears well-developed  HENT:   Head: Normocephalic  Right Ear: External ear normal    Left Ear: External ear normal    Mouth/Throat: Oropharynx is clear and moist    Eyes: Pupils are equal, round, and reactive to light  No scleral icterus  Neck: Normal range of motion  Neck supple  No tracheal deviation present  No thyromegaly present  Cardiovascular: Normal rate, regular rhythm and normal heart sounds  Pulmonary/Chest: Effort normal and breath sounds normal  No respiratory distress  He exhibits no tenderness  Abdominal: Soft  Bowel sounds are normal  He exhibits no mass  There is no tenderness  Musculoskeletal: Normal range of motion  He exhibits no edema or tenderness  Lymphadenopathy:     He has no cervical adenopathy  Neurological: He is alert  No cranial nerve deficit  Skin: Skin is warm  Psychiatric: He has a normal mood and affect

## 2019-07-22 ENCOUNTER — CLINICAL SUPPORT (OUTPATIENT)
Dept: INTERNAL MEDICINE CLINIC | Facility: CLINIC | Age: 54
End: 2019-07-22
Payer: MEDICARE

## 2019-07-22 ENCOUNTER — TELEPHONE (OUTPATIENT)
Dept: INTERNAL MEDICINE CLINIC | Facility: CLINIC | Age: 54
End: 2019-07-22

## 2019-07-22 DIAGNOSIS — I10 ESSENTIAL HYPERTENSION: Primary | ICD-10-CM

## 2019-07-22 LAB
CHOLEST SERPL-MCNC: 291 MG/DL (ref 50–200)
HDLC SERPL-MCNC: 58 MG/DL (ref 40–60)
LDLC SERPL CALC-MCNC: 195 MG/DL (ref 0–100)
TRIGL SERPL-MCNC: 191 MG/DL
URATE SERPL-MCNC: 5 MG/DL (ref 4.2–8)

## 2019-07-22 PROCEDURE — 84550 ASSAY OF BLOOD/URIC ACID: CPT | Performed by: INTERNAL MEDICINE

## 2019-07-22 PROCEDURE — 36415 COLL VENOUS BLD VENIPUNCTURE: CPT

## 2019-07-22 PROCEDURE — 80061 LIPID PANEL: CPT | Performed by: INTERNAL MEDICINE

## 2019-07-24 ENCOUNTER — OFFICE VISIT (OUTPATIENT)
Dept: INTERNAL MEDICINE CLINIC | Facility: CLINIC | Age: 54
End: 2019-07-24
Payer: MEDICARE

## 2019-07-24 VITALS
BODY MASS INDEX: 31.73 KG/M2 | HEIGHT: 70 IN | WEIGHT: 221.6 LBS | TEMPERATURE: 99.4 F | DIASTOLIC BLOOD PRESSURE: 84 MMHG | OXYGEN SATURATION: 98 % | SYSTOLIC BLOOD PRESSURE: 130 MMHG | HEART RATE: 74 BPM

## 2019-07-24 DIAGNOSIS — M1A.9XX0 CHRONIC GOUT WITHOUT TOPHUS, UNSPECIFIED CAUSE, UNSPECIFIED SITE: ICD-10-CM

## 2019-07-24 DIAGNOSIS — I10 ESSENTIAL HYPERTENSION: ICD-10-CM

## 2019-07-24 DIAGNOSIS — E78.2 MIXED HYPERLIPIDEMIA: ICD-10-CM

## 2019-07-24 DIAGNOSIS — K21.9 GERD WITHOUT ESOPHAGITIS: Primary | ICD-10-CM

## 2019-07-24 DIAGNOSIS — Z00.00 MEDICARE ANNUAL WELLNESS VISIT, SUBSEQUENT: ICD-10-CM

## 2019-07-24 PROBLEM — J01.90 ACUTE SINUSITIS: Status: RESOLVED | Noted: 2019-02-19 | Resolved: 2019-07-24

## 2019-07-24 PROCEDURE — 99214 OFFICE O/P EST MOD 30 MIN: CPT | Performed by: INTERNAL MEDICINE

## 2019-07-24 PROCEDURE — G0439 PPPS, SUBSEQ VISIT: HCPCS | Performed by: INTERNAL MEDICINE

## 2019-07-24 RX ORDER — ROSUVASTATIN CALCIUM 10 MG/1
10 TABLET, COATED ORAL DAILY
Qty: 90 TABLET | Refills: 3 | Status: SHIPPED | OUTPATIENT
Start: 2019-07-24 | End: 2019-11-26 | Stop reason: SINTOL

## 2019-07-24 NOTE — PROGRESS NOTES
Assessment and Plan:     Problem List Items Addressed This Visit     None         History of Present Illness:     Patient presents for Medicare Annual Wellness visit    Patient Care Team:  Ranulfo Isabel MD as PCP - DO Basilio Tubbs MD Carline Skeens, MD (Gastroenterology)     Problem List:     Patient Active Problem List   Diagnosis    Allergic rhinitis    Asthma    Essential hypertension    Benign paroxysmal positional vertigo    Chronic bilateral low back pain without sciatica    Chest pain    DJD (degenerative joint disease) of knee    GERD without esophagitis    Gout    Hyperlipidemia    Palpitations    Primary localized osteoarthritis of left knee    Primary localized osteoarthritis of right knee    Right ankle effusion    Tenosynovitis of foot    Thyroid disorder    Ventricular tachycardia ([address] )    Herpes simplex    Anxiety    Spasm of muscle of lower back    Acute sinusitis    Chronic pain of both knees    Asthma with acute exacerbation      Past Medical and Surgical History:     Past Medical History:   Diagnosis Date    Anxiety 1/15/2019    Arthritis     Chronic rhinitis     last assessed 2/21/14    Chronic serous otitis media     last assessed 11/20/13    Chronic sinusitis     last assessed 8/19/14    Functional heart murmur     GERD (gastroesophageal reflux disease)     Gout     Hearing problem     last assessed 12/1/16    Hiatal hernia     Hypercholesterolemia     Hypertension     Palpitations     Testicular hypogonadism     last assessed 10/4/13    Vtach [facility])      Past Surgical History:   Procedure Laterality Date    APPENDECTOMY      BICEPS TENODESIS      anesthesia for tenodesis- of ruptured long tendon of biceps     HERNIA REPAIR      THYROIDECTOMY      TONSILLECTOMY        Family History:     Family History   Problem Relation Age of Onset    Cancer Father     Lung cancer Father     Coronary artery disease Father     Stroke Maternal Grandmother     Stroke Maternal Grandfather     Cancer Paternal Grandfather     Heart disease Family     Hypertension Family     Lung cancer Cousin     No Known Problems Mother     No Known Problems Sister     No Known Problems Brother     No Known Problems Maternal Aunt     No Known Problems Maternal Uncle     No Known Problems Paternal Aunt     No Known Problems Paternal Uncle     No Known Problems Paternal Grandmother     ADD / ADHD Neg Hx     Anesthesia problems Neg Hx     Clotting disorder Neg Hx     Collagen disease Neg Hx     Diabetes Neg Hx     Dislocations Neg Hx     Learning disabilities Neg Hx     Neurological problems Neg Hx     Osteoporosis Neg Hx     Rheumatologic disease Neg Hx     Scoliosis Neg Hx     Vascular Disease Neg Hx       Social History:     Social History     Tobacco Use   Smoking Status Never Smoker   Smokeless Tobacco Never Used     Social History     Substance and Sexual Activity   Alcohol Use Yes    Frequency: 2-3 times a week    Drinks per session: 5 or 6    Binge frequency: Weekly    Comment: occassionally     Social History     Substance and Sexual Activity   Drug Use No      Medications and Allergies:     Current Outpatient Medications   Medication Sig Dispense Refill    albuterol (PROVENTIL HFA,VENTOLIN HFA) 90 mcg/act inhaler Inhale 1 puff every 4 (four) hours as needed        colchicine (COLCRYS) 0 6 mg tablet Take 1 tablet by mouth as needed        cyclobenzaprine (FLEXERIL) 5 mg tablet Take 1 tablet (5 mg total) by mouth 3 (three) times a day as needed for muscle spasms (Patient taking differently: Take 5 mg by mouth as needed for muscle spasms ) 30 tablet 0    esomeprazole (NexIUM) 40 MG capsule Take 1 capsule (40 mg total) by mouth daily 90 capsule 1    fluticasone-salmeterol (ADVAIR DISKUS) 500-50 mcg/dose Inhale 1 puff 2 (two) times a day      ipratropium-albuterol (DUO-NEB) 0 5-2 5 mg/3 mL Inhale 3 mL 4 (four) times a day As needed      lisinopril (ZESTRIL) 20 mg tablet Take 1 tablet (20 mg total) by mouth daily 90 tablet 1    LORazepam (ATIVAN) 0 5 mg tablet Take by mouth as needed for anxiety       meclizine (ANTIVERT) 12 5 MG tablet Take 1 tablet (12 5 mg total) by mouth every 8 (eight) hours 30 tablet 0    montelukast (SINGULAIR) 10 mg tablet Take 1 tablet (10 mg total) by mouth daily 90 tablet 1    predniSONE 10 mg tablet 4 tablets once daily for 5 days 20 tablet 0    Triamcinolone Acetonide (NASACORT ALLERGY 24HR NA) 1 spray into each nostril daily        ULORIC 40 MG tablet Take 1 tablet (40 mg total) by mouth daily 90 tablet 1    verapamil (CALAN) 120 mg tablet Take 1 tablet (120 mg total) by mouth daily 90 tablet 2     No current facility-administered medications for this visit  Allergies   Allergen Reactions    Penicillins Rash and Anaphylaxis    Acetazolamide      Other reaction(s): Stomach Ache    Cephalosporins Other (See Comments)     headache    Doxycycline      Capsules only  Tablets are ok to take, per pt  Capsules only  Tablets are ok to take, per pt   Sulfa Antibiotics Other (See Comments)     Category: Allergy; Annotation - [id number]: STOMACH UPSET    Famotidine Palpitations    Omeprazole Palpitations     Painful urination      Immunizations:     Immunization History   Administered Date(s) Administered    INFLUENZA 10/29/2008, 10/29/2008, 10/23/2009, 10/23/2009    Influenza Quadrivalent Preservative Free 3 years and older IM 10/20/2015    Influenza Quadrivalent, 6-35 Months IM 10/29/2014, 11/04/2016    Influenza TIV (IM) 10/06/2010, 10/06/2010, 10/11/2011, 10/11/2011, 09/27/2012, 09/27/2012, 11/06/2013, 11/15/2017    Influenza, injectable, quadrivalent, preservative free 0 5 mL 10/02/2018    Td (adult), adsorbed 02/14/2006, 02/14/2006      Medicare Screening Tests and Risk Assessments:     Columba Carranza is here for his Subsequent Wellness visit        Health Risk Assessment:  Patient rates overall health as good  Patient feels that their physical health rating is Same  Eyesight was rated as Same  Hearing was rated as Same  Patient feels that their emotional and mental health rating is Same  Pain experienced by patient in the last 7 days has been None  Emotional/Mental Health:  Patient has not been feeling nervous/anxious  PHQ-9 Depression Screening:    Frequency of the following problems over the past two weeks:      1  Little interest or pleasure in doing things: 0 - not at all      2  Feeling down, depressed, or hopeless: 0 - not at all  PHQ-2 Score: 0          Broken Bones/Falls: Fall Risk Assessment:    In the past year, patient has experienced: No history of falling in past year          Bladder/Bowel:  Patient has leaked urine accidently in the last six months  Patient reports no loss of bowel control  Immunizations:  Patient has had a flu vaccination within the last year  Patient has not received a pneumonia shot  Patient has not received a shingles shot  Patient has not received tetanus/diphtheria shot  Home Safety:  Patient does not have trouble with stairs inside or outside of their home  Patient currently reports that there are no safety hazards present in home, working smoke alarms, working carbon monoxide detectors  Preventative Screenings:   colon cancer screen completed, cholesterol screen completed, glaucoma eye exam completed,     Nutrition:  Current diet: Regular with servings of the following:    Medications:  Patient is currently taking over-the-counter supplements  Patient is able to manage medications  Lifestyle Choices:  Patient reports no tobacco use  Patient has not smoked or used tobacco in the past   Patient reports alcohol use  Patient drives a vehicle  Patient wears seat belt          Activities of Daily Living:  Can get out of bed by his or her self, able to dress self, able to make own meals, able to do own shopping, able to bathe self, can do own laundry/housekeeping, can manage own money, pay bills and track expenses    Previous Hospitalizations:  No hospitalization or ED visit in past 12 months        Advanced Directives:  Patient has not decided on power of   Patient has not completed advanced directive  Preventative Screening/Counseling:      Cardiovascular:      General: Risks and Benefits Discussed and Screening Current      Counseling: Healthy Diet and Healthy Weight          Diabetes:      General: Risks and Benefits Discussed and Screening Current      Counseling: Healthy Diet and Healthy Weight          Colorectal Cancer:      General: Risks and Benefits Discussed      Counseling: high fiber diet      Due for studies: Colonoscopy - Low Risk          Prostate Cancer:      General: Risks and Benefits Discussed      Due for labs: PSA          Osteoporosis:      General: Patient Declines          AAA:      General: Screening Not Indicated          Glaucoma:      General: Risks and Benefits Discussed and Patient Declines          HIV:      General: Patient Declines          Hepatitis C:      General: Risks and Benefits Discussed      Counseling: has received general HCV counseling        Advanced Directives:   Patient has no living will for healthcare, does not have durable POA for healthcare, patient does not have an advanced directive  Information on ACP and/or AD provided  5 wishes given       Immunizations:      Influenza: Risks & Benefits Discussed, Influenza UTD This Year and Influenza Recommended Annually      Pneumococcal: Risks & Benefits Discussed      Shingrix: Vaccine Status Unknown and Risks & Benefits Discussed      Hepatitis B (Low risk patients): Prevention Counseling      Hepatitis B (Medium to high risk patients): Risks & Benefits Discussed      Zostavax: Vaccine Status Unknown      TD: Risks & Benefits Discussed and Td Vaccine Needed Today      TDAP: Patient Declines      Other Preventative Counseling (Non-Medicare):  Alcohol Use, Fall Prevention, Increase physical activity, Nutrition Counseling, Skin self-exam and Sunscreen use      Referrals:  Referral(s) to: Cardiologist    Physical Exam   Constitutional: He appears well-developed  HENT:   Head: Normocephalic  Right Ear: External ear normal    Left Ear: External ear normal    Mouth/Throat: Oropharynx is clear and moist    Eyes: Pupils are equal, round, and reactive to light  No scleral icterus  Neck: Normal range of motion  Neck supple  No tracheal deviation present  No thyromegaly present  Cardiovascular: Normal rate, regular rhythm and normal heart sounds  Pulmonary/Chest: Effort normal and breath sounds normal  No respiratory distress  He exhibits no tenderness  Abdominal: Soft  Bowel sounds are normal  He exhibits no mass  There is no tenderness  Musculoskeletal: Normal range of motion  He exhibits no edema  Lymphadenopathy:     He has no cervical adenopathy  Neurological: He is alert  No cranial nerve deficit  Skin: Skin is warm  Psychiatric: He has a normal mood and affect  Assessment and plan    Advised for tetanus vaccination and shingles vaccination  He can get the stool through pharmacy

## 2019-07-24 NOTE — PROGRESS NOTES
Assessment/Plan:    1  Hyperlipidemia  His lipid profile is still very high  No significant difference as compared to last 1 actually little bit on higher side  Will start him on Crestor 10 mg daily  Probably he will need 20 mg  Again advised for better diet control exercise and weight loss  Will check lipid profile before next visit    2  Bronchial asthma  Stable  Will continue with present combination of inhalers    3  Essential hypertension  Pressure is stable on present regimen    4  Chronic gout  Uric acid Will is 5 0  Will continue with present dose of Uloric 40 mg daily     There are no diagnoses linked to this encounter  Subjective:          Patient ID: Tia Peterson is a 49 y o  male  Patient is here for regular follow-up  Does have blood work done last week would like to discuss results  Still complaining of stuffy nose  And sinus congestion      The following portions of the patient's history were reviewed and updated as appropriate: allergies, current medications, past family history, past medical history, past social history, past surgical history and problem list     Review of Systems   Constitutional: Negative for fatigue and fever  HENT: Positive for congestion  Negative for ear discharge, ear pain, postnasal drip, sinus pressure, sore throat, tinnitus and trouble swallowing  Eyes: Negative for discharge, itching and visual disturbance  Respiratory: Negative for cough and shortness of breath  Cardiovascular: Negative for chest pain and palpitations  Gastrointestinal: Negative for abdominal pain, diarrhea, nausea and vomiting  Endocrine: Negative for cold intolerance and polyuria  Genitourinary: Negative for difficulty urinating, dysuria and urgency  Musculoskeletal: Negative for arthralgias and neck pain  Skin: Negative for rash  Allergic/Immunologic: Negative for environmental allergies  Neurological: Negative for dizziness, weakness and headaches  Psychiatric/Behavioral: The patient is not nervous/anxious            Past Medical History:   Diagnosis Date    Anxiety 1/15/2019    Arthritis     Chronic rhinitis     last assessed 2/21/14    Chronic serous otitis media     last assessed 11/20/13    Chronic sinusitis     last assessed 8/19/14    Functional heart murmur     GERD (gastroesophageal reflux disease)     Gout     Hearing problem     last assessed 12/1/16    Hiatal hernia     Hypercholesterolemia     Hypertension     Palpitations     Testicular hypogonadism     last assessed 10/4/13    tach [facility])          Current Outpatient Medications:     albuterol (PROVENTIL HFA,VENTOLIN HFA) 90 mcg/act inhaler, Inhale 1 puff every 4 (four) hours as needed  , Disp: , Rfl:     colchicine (COLCRYS) 0 6 mg tablet, Take 1 tablet by mouth as needed  , Disp: , Rfl:     cyclobenzaprine (FLEXERIL) 5 mg tablet, Take 1 tablet (5 mg total) by mouth 3 (three) times a day as needed for muscle spasms (Patient taking differently: Take 5 mg by mouth as needed for muscle spasms ), Disp: 30 tablet, Rfl: 0    esomeprazole (NexIUM) 40 MG capsule, Take 1 capsule (40 mg total) by mouth daily, Disp: 90 capsule, Rfl: 1    fluticasone-salmeterol (ADVAIR DISKUS) 500-50 mcg/dose, Inhale 1 puff 2 (two) times a day, Disp: , Rfl:     ipratropium-albuterol (DUO-NEB) 0 5-2 5 mg/3 mL, Inhale 3 mL 4 (four) times a day As needed, Disp: , Rfl:     lisinopril (ZESTRIL) 20 mg tablet, Take 1 tablet (20 mg total) by mouth daily, Disp: 90 tablet, Rfl: 1    LORazepam (ATIVAN) 0 5 mg tablet, Take by mouth as needed for anxiety , Disp: , Rfl:     meclizine (ANTIVERT) 12 5 MG tablet, Take 1 tablet (12 5 mg total) by mouth every 8 (eight) hours, Disp: 30 tablet, Rfl: 0    montelukast (SINGULAIR) 10 mg tablet, Take 1 tablet (10 mg total) by mouth daily, Disp: 90 tablet, Rfl: 1    predniSONE 10 mg tablet, 4 tablets once daily for 5 days, Disp: 20 tablet, Rfl: 0    Triamcinolone Acetonide (NASACORT ALLERGY 24HR NA), 1 spray into each nostril daily  , Disp: , Rfl:     ULORIC 40 MG tablet, Take 1 tablet (40 mg total) by mouth daily, Disp: 90 tablet, Rfl: 1    verapamil (CALAN) 120 mg tablet, Take 1 tablet (120 mg total) by mouth daily, Disp: 90 tablet, Rfl: 2    Allergies   Allergen Reactions    Penicillins Rash and Anaphylaxis    Acetazolamide      Other reaction(s): Stomach Ache    Cephalosporins Other (See Comments)     headache    Doxycycline      Capsules only  Tablets are ok to take, per pt  Capsules only  Tablets are ok to take, per pt   Sulfa Antibiotics Other (See Comments)     Category: Allergy;  Annotation - [id number]: STOMACH UPSET    Famotidine Palpitations    Omeprazole Palpitations     Painful urination       Social History   Past Surgical History:   Procedure Laterality Date    APPENDECTOMY      BICEPS TENODESIS      anesthesia for tenodesis- of ruptured long tendon of biceps     HERNIA REPAIR      THYROIDECTOMY      TONSILLECTOMY       Family History   Problem Relation Age of Onset    Cancer Father     Lung cancer Father     Coronary artery disease Father     Stroke Maternal Grandmother     Stroke Maternal Grandfather     Cancer Paternal Grandfather     Heart disease Family     Hypertension Family     Lung cancer Cousin     No Known Problems Mother     No Known Problems Sister     No Known Problems Brother     No Known Problems Maternal Aunt     No Known Problems Maternal Uncle     No Known Problems Paternal Aunt     No Known Problems Paternal Uncle     No Known Problems Paternal Grandmother     ADD / ADHD Neg Hx     Anesthesia problems Neg Hx     Clotting disorder Neg Hx     Collagen disease Neg Hx     Diabetes Neg Hx     Dislocations Neg Hx     Learning disabilities Neg Hx     Neurological problems Neg Hx     Osteoporosis Neg Hx     Rheumatologic disease Neg Hx     Scoliosis Neg Hx     Vascular Disease Neg Hx Objective:  /84 (BP Location: Left arm, Patient Position: Sitting, Cuff Size: Large)   Pulse 74   Temp 99 4 °F (37 4 °C) (Oral)   Ht 5' 10" (1 778 m)   Wt 101 kg (221 lb 9 6 oz)   SpO2 98%   BMI 31 80 kg/m²   Body mass index is 31 8 kg/m²  Physical Exam   Constitutional: He appears well-developed  HENT:   Head: Normocephalic  Mouth/Throat: Oropharynx is clear and moist    Eyes: Pupils are equal, round, and reactive to light  No scleral icterus  Neck: Normal range of motion  Neck supple  No tracheal deviation present  No thyromegaly present  Cardiovascular: Normal rate, regular rhythm and normal heart sounds  No murmur heard  Pulmonary/Chest: Effort normal and breath sounds normal  No respiratory distress  He exhibits no tenderness  Abdominal: Soft  Bowel sounds are normal  He exhibits no mass  There is no tenderness  Musculoskeletal: Normal range of motion  He exhibits no edema  Lymphadenopathy:     He has no cervical adenopathy  Neurological: He is alert  No cranial nerve deficit  Skin: Skin is warm  Psychiatric: He has a normal mood and affect

## 2019-07-30 DIAGNOSIS — I10 HYPERTENSION, UNSPECIFIED TYPE: ICD-10-CM

## 2019-07-30 RX ORDER — LISINOPRIL 20 MG/1
20 TABLET ORAL DAILY
Qty: 90 TABLET | Refills: 1 | Status: SHIPPED | OUTPATIENT
Start: 2019-07-30 | End: 2020-01-27 | Stop reason: SDUPTHER

## 2019-07-30 NOTE — TELEPHONE ENCOUNTER
MED: Lisinopril 20mg  SUPPLY: 90Day  PHARMACY: [facility]  PATIENT PHONE #: [phone]  LAST OV: 7/24/2019  UPCOMING OV: 10/16/2019

## 2019-09-18 ENCOUNTER — OFFICE VISIT (OUTPATIENT)
Dept: OBGYN CLINIC | Facility: MEDICAL CENTER | Age: 54
End: 2019-09-18
Payer: MEDICARE

## 2019-09-18 VITALS
WEIGHT: 220.8 LBS | HEART RATE: 85 BPM | SYSTOLIC BLOOD PRESSURE: 107 MMHG | HEIGHT: 70 IN | BODY MASS INDEX: 31.61 KG/M2 | DIASTOLIC BLOOD PRESSURE: 70 MMHG

## 2019-09-18 DIAGNOSIS — M17.11 PRIMARY LOCALIZED OSTEOARTHRITIS OF RIGHT KNEE: ICD-10-CM

## 2019-09-18 DIAGNOSIS — M17.12 PRIMARY LOCALIZED OSTEOARTHRITIS OF LEFT KNEE: Primary | ICD-10-CM

## 2019-09-18 PROCEDURE — 99213 OFFICE O/P EST LOW 20 MIN: CPT | Performed by: ORTHOPAEDIC SURGERY

## 2019-09-18 PROCEDURE — 20610 DRAIN/INJ JOINT/BURSA W/O US: CPT | Performed by: ORTHOPAEDIC SURGERY

## 2019-09-18 RX ORDER — LIDOCAINE HYDROCHLORIDE 10 MG/ML
3 INJECTION, SOLUTION INFILTRATION; PERINEURAL
Status: COMPLETED | OUTPATIENT
Start: 2019-09-18 | End: 2019-09-18

## 2019-09-18 RX ORDER — METHYLPREDNISOLONE ACETATE 40 MG/ML
2 INJECTION, SUSPENSION INTRA-ARTICULAR; INTRALESIONAL; INTRAMUSCULAR; SOFT TISSUE
Status: COMPLETED | OUTPATIENT
Start: 2019-09-18 | End: 2019-09-18

## 2019-09-18 RX ADMIN — METHYLPREDNISOLONE ACETATE 2 ML: 40 INJECTION, SUSPENSION INTRA-ARTICULAR; INTRALESIONAL; INTRAMUSCULAR; SOFT TISSUE at 16:22

## 2019-09-18 RX ADMIN — METHYLPREDNISOLONE ACETATE 2 ML: 40 INJECTION, SUSPENSION INTRA-ARTICULAR; INTRALESIONAL; INTRAMUSCULAR; SOFT TISSUE at 16:23

## 2019-09-18 RX ADMIN — LIDOCAINE HYDROCHLORIDE 3 ML: 10 INJECTION, SOLUTION INFILTRATION; PERINEURAL at 16:22

## 2019-09-18 RX ADMIN — LIDOCAINE HYDROCHLORIDE 3 ML: 10 INJECTION, SOLUTION INFILTRATION; PERINEURAL at 16:23

## 2019-09-18 NOTE — PROGRESS NOTES
Assessment/Plan:  1  Primary localized osteoarthritis of left knee    2  Primary localized osteoarthritis of right knee      Orders Placed This Encounter   Procedures    Large joint arthrocentesis    Large joint arthrocentesis     Bilateral knee osteoarthritis  · Received steroid injection today  Patient knows to ice and avoid strenuous activity for 1-2 days if needed  · Use tylenol, NSAIDs for breakthrough due to cardiac history  · Patient was advised to drink water for low blood pressure  He will continue to monitor and call PCP for further advice    Return in about 3 months (around 12/18/2019)  I answered all of the patient's questions during the visit and provided education of the patient's condition during the visit  The patient verbalized understanding of the information given and agrees with the plan  This note was dictated using [employer] software  It may contain errors including improperly dictated words  Please contact physician directly for any questions  Subjective   Chief Complaint:   Chief Complaint   Patient presents with    Left Knee - Follow-up    Right Knee - Follow-up       Randy Henao is a 49 y o  male who presents to the office for follow up of bilateral knee osteoarthritis  He reports doing well with his current treatment plan  He has been getting relief with previous cortisone injections  He is interested in discussing repeat cortisone injections today in the office  He does note new onset of left foot and ankle pain for which he has an appointment scheduled later today  Review of Systems  ROS:    See HPI for musculoskeletal review     All other systems reviewed are negative     History:  Past Medical History:   Diagnosis Date    Anxiety 1/15/2019    Arthritis     Chronic rhinitis     last assessed 2/21/14    Chronic serous otitis media     last assessed 11/20/13    Chronic sinusitis     last assessed 8/19/14    Functional heart murmur     GERD (gastroesophageal reflux disease)     Gout     Hearing problem     last assessed 12/1/16    Hiatal hernia     Hypercholesterolemia     Hypertension     Palpitations     Testicular hypogonadism     last assessed 10/4/13    [facility]ach [facility])      Past Surgical History:   Procedure Laterality Date    APPENDECTOMY      BICEPS TENODESIS      anesthesia for tenodesis- of ruptured long tendon of biceps     HERNIA REPAIR      THYROIDECTOMY      TONSILLECTOMY       Social History   Social History     Substance and Sexual Activity   Alcohol Use Yes    Frequency: 2-3 times a week    Drinks per session: 5 or 6    Binge frequency: Weekly    Comment: occassionally     Social History     Substance and Sexual Activity   Drug Use No     Social History     Tobacco Use   Smoking Status Never Smoker   Smokeless Tobacco Never Used     Family History:   Family History   Problem Relation Age of Onset    Cancer Father     Lung cancer Father     Coronary artery disease Father     Stroke Maternal Grandmother     Stroke Maternal Grandfather     Cancer Paternal Grandfather     Heart disease Family     Hypertension Family     Lung cancer Cousin     No Known Problems Mother     No Known Problems Sister     No Known Problems Brother     No Known Problems Maternal Aunt     No Known Problems Maternal Uncle     No Known Problems Paternal Aunt     No Known Problems Paternal Uncle     No Known Problems Paternal Grandmother     ADD / ADHD Neg Hx     Anesthesia problems Neg Hx     Clotting disorder Neg Hx     Collagen disease Neg Hx     Diabetes Neg Hx     Dislocations Neg Hx     Learning disabilities Neg Hx     Neurological problems Neg Hx     Osteoporosis Neg Hx     Rheumatologic disease Neg Hx     Scoliosis Neg Hx     Vascular Disease Neg Hx        Current Outpatient Medications on File Prior to Visit   Medication Sig Dispense Refill    albuterol (PROVENTIL HFA,VENTOLIN HFA) 90 mcg/act inhaler Inhale 1 puff every 4 (four) hours as needed        colchicine (COLCRYS) 0 6 mg tablet Take 1 tablet by mouth as needed        cyclobenzaprine (FLEXERIL) 5 mg tablet Take 1 tablet (5 mg total) by mouth 3 (three) times a day as needed for muscle spasms (Patient taking differently: Take 5 mg by mouth as needed for muscle spasms ) 30 tablet 0    esomeprazole (NexIUM) 40 MG capsule Take 1 capsule (40 mg total) by mouth daily 90 capsule 1    fluticasone-salmeterol (ADVAIR DISKUS) 500-50 mcg/dose Inhale 1 puff 2 (two) times a day      ipratropium-albuterol (DUO-NEB) 0 5-2 5 mg/3 mL Inhale 3 mL 4 (four) times a day As needed      lisinopril (ZESTRIL) 20 mg tablet Take 1 tablet (20 mg total) by mouth daily 90 tablet 1    LORazepam (ATIVAN) 0 5 mg tablet Take by mouth as needed for anxiety       meclizine (ANTIVERT) 12 5 MG tablet Take 1 tablet (12 5 mg total) by mouth every 8 (eight) hours 30 tablet 0    montelukast (SINGULAIR) 10 mg tablet Take 1 tablet (10 mg total) by mouth daily 90 tablet 1    predniSONE 10 mg tablet 4 tablets once daily for 5 days 20 tablet 0    rosuvastatin (CRESTOR) 10 MG tablet Take 1 tablet (10 mg total) by mouth daily 90 tablet 3    Triamcinolone Acetonide (NASACORT ALLERGY 24HR NA) 1 spray into each nostril daily        ULORIC 40 MG tablet Take 1 tablet (40 mg total) by mouth daily 90 tablet 1    verapamil (CALAN) 120 mg tablet Take 1 tablet (120 mg total) by mouth daily 90 tablet 2     No current facility-administered medications on file prior to visit  Allergies   Allergen Reactions    Penicillins Rash and Anaphylaxis    Acetazolamide      Other reaction(s): Stomach Ache    Cephalosporins Other (See Comments)     headache    Doxycycline      Capsules only  Tablets are ok to take, per pt  Capsules only  Tablets are ok to take, per pt   Sulfa Antibiotics Other (See Comments)     Category: Allergy;  Annotation - [id number]: STOMACH UPSET    Famotidine Palpitations    Omeprazole Palpitations     Painful urination        Objective     /70   Pulse 85   Ht 5' 9 5" (1 765 m)   Wt 100 kg (220 lb 12 8 oz)   BMI 32 14 kg/m²      PE:  AAOx 3  WDWN  Hearing intact, no drainage from eyes  no audible wheezing  no abdominal distension  LE compartments soft, skin intact    Ortho Exam:  bilateral Knee:   No erythema  no swelling  no effusion  no warmth  AROM: full  Stable to varus/valgus stress    Imaging Studies: I have personally reviewed pertinent films in PACS      Large joint arthrocentesis: R knee  Date/Time: 9/18/2019 4:22 PM  Consent given by: patient  Site marked: site marked  Timeout: Immediately prior to procedure a time out was called to verify the correct patient, procedure, equipment, support staff and site/side marked as required   Supporting Documentation  Indications: pain   Procedure Details  Location: knee - R knee  Preparation: Patient was prepped and draped in the usual sterile fashion  Needle size: 22 G  Ultrasound guidance: no  Approach: anterolateral  Medications administered: 3 mL lidocaine 1 %; 2 mL methylPREDNISolone acetate 40 mg/mL    Patient tolerance: patient tolerated the procedure well with no immediate complications  Dressing:  Sterile dressing applied    Large joint arthrocentesis: L knee  Date/Time: 9/18/2019 4:23 PM  Consent given by: patient  Site marked: site marked  Timeout: Immediately prior to procedure a time out was called to verify the correct patient, procedure, equipment, support staff and site/side marked as required   Supporting Documentation  Indications: pain   Procedure Details  Location: knee - L knee  Preparation: Patient was prepped and draped in the usual sterile fashion  Needle size: 22 G  Ultrasound guidance: no  Approach: anterolateral  Medications administered: 3 mL lidocaine 1 %; 2 mL methylPREDNISolone acetate 40 mg/mL    Patient tolerance: patient tolerated the procedure well with no immediate complications  Dressing:  Sterile dressing applied        Scribe Attestation    I,:   Óscar Cruz am acting as a scribe while in the presence of the attending physician :        I,:   Amarilys Mayen, DO personally performed the services described in this documentation    as scribed in my presence :

## 2019-09-27 DIAGNOSIS — K21.9 GASTROESOPHAGEAL REFLUX DISEASE WITHOUT ESOPHAGITIS: ICD-10-CM

## 2019-09-27 RX ORDER — ESOMEPRAZOLE MAGNESIUM 40 MG/1
CAPSULE, DELAYED RELEASE ORAL
Qty: 90 CAPSULE | Refills: 1 | Status: SHIPPED | OUTPATIENT
Start: 2019-09-27 | End: 2020-03-10 | Stop reason: SDUPTHER

## 2019-09-27 NOTE — TELEPHONE ENCOUNTER
MED: Esomeprazole 40mg  SUPPLY: 90Day  PHARMACY: [facility]  PATIENT PHONE #: [phone]  LAST OV: 7/24/2019  UPCOMING OV: 10/16/2019

## 2019-10-07 DIAGNOSIS — I10 ESSENTIAL HYPERTENSION: ICD-10-CM

## 2019-10-08 ENCOUNTER — CLINICAL SUPPORT (OUTPATIENT)
Dept: INTERNAL MEDICINE CLINIC | Facility: CLINIC | Age: 54
End: 2019-10-08
Payer: MEDICARE

## 2019-10-08 DIAGNOSIS — E78.2 MIXED HYPERLIPIDEMIA: ICD-10-CM

## 2019-10-08 DIAGNOSIS — K21.9 GERD WITHOUT ESOPHAGITIS: Primary | ICD-10-CM

## 2019-10-08 LAB
ALT SERPL W P-5'-P-CCNC: 41 U/L (ref 12–78)
ANION GAP SERPL CALCULATED.3IONS-SCNC: 7 MMOL/L (ref 4–13)
AST SERPL W P-5'-P-CCNC: 19 U/L (ref 5–45)
BUN SERPL-MCNC: 15 MG/DL (ref 5–25)
CALCIUM SERPL-MCNC: 9.5 MG/DL (ref 8.3–10.1)
CHLORIDE SERPL-SCNC: 103 MMOL/L (ref 100–108)
CHOLEST SERPL-MCNC: 299 MG/DL (ref 50–200)
CO2 SERPL-SCNC: 27 MMOL/L (ref 21–32)
CREAT SERPL-MCNC: 1.06 MG/DL (ref 0.6–1.3)
ERYTHROCYTE [DISTWIDTH] IN BLOOD BY AUTOMATED COUNT: 13.2 % (ref 11.6–15.1)
GFR SERPL CREATININE-BSD FRML MDRD: 80 ML/MIN/1.73SQ M
GLUCOSE P FAST SERPL-MCNC: 95 MG/DL (ref 65–99)
HCT VFR BLD AUTO: 43.7 % (ref 36.5–49.3)
HDLC SERPL-MCNC: 66 MG/DL (ref 40–60)
HGB BLD-MCNC: 14.4 G/DL (ref 12–17)
LDLC SERPL CALC-MCNC: 195 MG/DL (ref 0–100)
MCH RBC QN AUTO: 30.8 PG (ref 26.8–34.3)
MCHC RBC AUTO-ENTMCNC: 33 G/DL (ref 31.4–37.4)
MCV RBC AUTO: 93 FL (ref 82–98)
PLATELET # BLD AUTO: 237 THOUSANDS/UL (ref 149–390)
PMV BLD AUTO: 9.1 FL (ref 8.9–12.7)
POTASSIUM SERPL-SCNC: 4 MMOL/L (ref 3.5–5.3)
RBC # BLD AUTO: 4.68 MILLION/UL (ref 3.88–5.62)
SODIUM SERPL-SCNC: 137 MMOL/L (ref 136–145)
TRIGL SERPL-MCNC: 188 MG/DL
WBC # BLD AUTO: 7.98 THOUSAND/UL (ref 4.31–10.16)

## 2019-10-08 PROCEDURE — 85027 COMPLETE CBC AUTOMATED: CPT | Performed by: INTERNAL MEDICINE

## 2019-10-08 PROCEDURE — 80048 BASIC METABOLIC PNL TOTAL CA: CPT | Performed by: INTERNAL MEDICINE

## 2019-10-08 PROCEDURE — 80061 LIPID PANEL: CPT | Performed by: INTERNAL MEDICINE

## 2019-10-08 PROCEDURE — 36415 COLL VENOUS BLD VENIPUNCTURE: CPT

## 2019-10-08 PROCEDURE — 84450 TRANSFERASE (AST) (SGOT): CPT | Performed by: INTERNAL MEDICINE

## 2019-10-08 PROCEDURE — 84460 ALANINE AMINO (ALT) (SGPT): CPT | Performed by: INTERNAL MEDICINE

## 2019-10-08 RX ORDER — VERAPAMIL HYDROCHLORIDE 120 MG/1
120 TABLET, FILM COATED ORAL DAILY
Qty: 90 TABLET | Refills: 3 | Status: SHIPPED | OUTPATIENT
Start: 2019-10-08 | End: 2020-09-29 | Stop reason: SDUPTHER

## 2019-10-09 ENCOUNTER — IMMUNIZATIONS (OUTPATIENT)
Dept: INTERNAL MEDICINE CLINIC | Facility: CLINIC | Age: 54
End: 2019-10-09
Payer: MEDICARE

## 2019-10-09 DIAGNOSIS — Z23 FLU VACCINE NEED: Primary | ICD-10-CM

## 2019-10-09 PROCEDURE — 90682 RIV4 VACC RECOMBINANT DNA IM: CPT

## 2019-10-09 PROCEDURE — 90471 IMMUNIZATION ADMIN: CPT

## 2019-10-16 ENCOUNTER — OFFICE VISIT (OUTPATIENT)
Dept: INTERNAL MEDICINE CLINIC | Facility: CLINIC | Age: 54
End: 2019-10-16
Payer: MEDICARE

## 2019-10-16 VITALS
BODY MASS INDEX: 31.21 KG/M2 | HEART RATE: 84 BPM | WEIGHT: 218 LBS | DIASTOLIC BLOOD PRESSURE: 86 MMHG | HEIGHT: 70 IN | SYSTOLIC BLOOD PRESSURE: 130 MMHG | OXYGEN SATURATION: 95 % | TEMPERATURE: 99.7 F

## 2019-10-16 DIAGNOSIS — I10 ESSENTIAL HYPERTENSION: ICD-10-CM

## 2019-10-16 DIAGNOSIS — M62.830 SPASM OF MUSCLE OF LOWER BACK: ICD-10-CM

## 2019-10-16 DIAGNOSIS — K21.9 GERD WITHOUT ESOPHAGITIS: Primary | ICD-10-CM

## 2019-10-16 DIAGNOSIS — E78.2 MIXED HYPERLIPIDEMIA: ICD-10-CM

## 2019-10-16 DIAGNOSIS — J45.909 MODERATE ASTHMA, UNSPECIFIED WHETHER COMPLICATED, UNSPECIFIED WHETHER PERSISTENT: ICD-10-CM

## 2019-10-16 PROBLEM — B00.9 HERPES SIMPLEX: Status: RESOLVED | Noted: 2018-05-21 | Resolved: 2019-10-16

## 2019-10-16 PROBLEM — R07.9 CHEST PAIN: Status: RESOLVED | Noted: 2017-06-30 | Resolved: 2019-10-16

## 2019-10-16 PROCEDURE — 99214 OFFICE O/P EST MOD 30 MIN: CPT | Performed by: INTERNAL MEDICINE

## 2019-10-16 NOTE — PROGRESS NOTES
Assessment/Plan:    1  Hyperlipidemia  His lipid profile is still very high  He was supposed to be on Crestor 10 mg daily  He took it only for 2 days and then stop it  He thinks that that gives him muscle discomfort and also craving for eggs  Again discussed in detail regarding his lipid profile and advised him that he will need statins to lower it  Advised him to take Crestor 5 mg for 2 weeks then 10 mg after that  Will check lipid profile in 3 months  Again advised for low fat diet exercise and weight loss  2  Essential hypertension  Blood pressure is acceptable on present regimen    3  Bronchial asthma  Continue with present combination of inhalers  Patient is also being followed by pulmonologist       Diagnoses and all orders for this visit:    GERD without esophagitis    Moderate asthma, unspecified whether complicated, unspecified whether persistent    Essential hypertension    Mixed hyperlipidemia  -     Lipid Panel with Direct LDL reflex; Future    Spasm of muscle of lower back               Subjective:          Patient ID: Diamante Gonzalez is a 49 y o  male  HPI    The following portions of the patient's history were reviewed and updated as appropriate: allergies, current medications, past family history, past medical history, past social history, past surgical history and problem list     Review of Systems   Constitutional: Negative for fatigue and fever  HENT: Negative for congestion, ear discharge, ear pain, postnasal drip, sinus pressure, sore throat, tinnitus and trouble swallowing  Eyes: Negative for discharge, itching and visual disturbance  Respiratory: Negative for cough and shortness of breath  Cardiovascular: Negative for chest pain and palpitations  Gastrointestinal: Negative for abdominal pain, diarrhea, nausea and vomiting  Endocrine: Negative for cold intolerance and polyuria  Genitourinary: Negative for difficulty urinating, dysuria and urgency  Musculoskeletal: Positive for arthralgias  Negative for neck pain  Skin: Negative for rash  Allergic/Immunologic: Negative for environmental allergies  Neurological: Negative for dizziness, weakness and headaches  Psychiatric/Behavioral: Negative for agitation and behavioral problems  The patient is not nervous/anxious            Past Medical History:   Diagnosis Date    Anxiety 1/15/2019    Arthritis     Chronic rhinitis     last assessed 2/21/14    Chronic serous otitis media     last assessed 11/20/13    Chronic sinusitis     last assessed 8/19/14    Functional heart murmur     GERD (gastroesophageal reflux disease)     Gout     Hearing problem     last assessed 12/1/16    Hiatal hernia     Hypercholesterolemia     Hypertension     Palpitations     Testicular hypogonadism     last assessed 10/4/13    [facility]ach [facility])          Current Outpatient Medications:     albuterol (PROVENTIL HFA,VENTOLIN HFA) 90 mcg/act inhaler, Inhale 1 puff every 4 (four) hours as needed  , Disp: , Rfl:     cyclobenzaprine (FLEXERIL) 5 mg tablet, Take 1 tablet (5 mg total) by mouth 3 (three) times a day as needed for muscle spasms (Patient taking differently: Take 5 mg by mouth as needed for muscle spasms ), Disp: 30 tablet, Rfl: 0    esomeprazole (NexIUM) 40 MG capsule, TAKE 1 CAPSULE BY MOUTH EVERY DAY, Disp: 90 capsule, Rfl: 1    fluticasone-salmeterol (ADVAIR DISKUS) 500-50 mcg/dose, Inhale 1 puff 2 (two) times a day, Disp: , Rfl:     ipratropium-albuterol (DUO-NEB) 0 5-2 5 mg/3 mL, Inhale 3 mL 4 (four) times a day As needed, Disp: , Rfl:     lisinopril (ZESTRIL) 20 mg tablet, Take 1 tablet (20 mg total) by mouth daily, Disp: 90 tablet, Rfl: 1    meclizine (ANTIVERT) 12 5 MG tablet, Take 1 tablet (12 5 mg total) by mouth every 8 (eight) hours, Disp: 30 tablet, Rfl: 0    montelukast (SINGULAIR) 10 mg tablet, Take 1 tablet (10 mg total) by mouth daily, Disp: 90 tablet, Rfl: 1    Triamcinolone Acetonide (NASACORT ALLERGY 24HR NA), 1 spray into each nostril daily  , Disp: , Rfl:     ULORIC 40 MG tablet, Take 1 tablet (40 mg total) by mouth daily, Disp: 90 tablet, Rfl: 1    verapamil (CALAN) 120 mg tablet, Take 1 tablet (120 mg total) by mouth daily, Disp: 90 tablet, Rfl: 3    rosuvastatin (CRESTOR) 10 MG tablet, Take 1 tablet (10 mg total) by mouth daily (Patient not taking: Reported on 10/16/2019), Disp: 90 tablet, Rfl: 3    Allergies   Allergen Reactions    Penicillins Rash and Anaphylaxis    Acetazolamide      Other reaction(s): Stomach Ache    Cephalosporins Other (See Comments)     headache    Doxycycline      Capsules only  Tablets are ok to take, per pt  Capsules only  Tablets are ok to take, per pt   Other     Sulfa Antibiotics Other (See Comments)     Category: Allergy;  Annotation - [id number]: STOMACH UPSET    Famotidine Palpitations    Omeprazole Palpitations     Painful urination       Social History   Past Surgical History:   Procedure Laterality Date    APPENDECTOMY      BICEPS TENODESIS      anesthesia for tenodesis- of ruptured long tendon of biceps     HERNIA REPAIR      THYROIDECTOMY      TONSILLECTOMY       Family History   Problem Relation Age of Onset    Cancer Father     Lung cancer Father     Coronary artery disease Father     Stroke Maternal Grandmother     Stroke Maternal Grandfather     Cancer Paternal Grandfather     Heart disease Family     Hypertension Family     Lung cancer Cousin     No Known Problems Mother     No Known Problems Sister     No Known Problems Brother     No Known Problems Maternal Aunt     No Known Problems Maternal Uncle     No Known Problems Paternal Aunt     No Known Problems Paternal Uncle     No Known Problems Paternal Grandmother     ADD / ADHD Neg Hx     Anesthesia problems Neg Hx     Clotting disorder Neg Hx     Collagen disease Neg Hx     Diabetes Neg Hx     Dislocations Neg Hx     Learning disabilities Neg Hx     Neurological problems Neg Hx     Osteoporosis Neg Hx     Rheumatologic disease Neg Hx     Scoliosis Neg Hx     Vascular Disease Neg Hx        Objective:  /86 (BP Location: Left arm, Patient Position: Sitting, Cuff Size: Standard)   Pulse 84   Temp 99 7 °F (37 6 °C) (Tympanic)   Ht 5' 9 5" (1 765 m)   Wt 98 9 kg (218 lb)   SpO2 95%   BMI 31 73 kg/m²   Body mass index is 31 73 kg/m²  Physical Exam   Constitutional: He appears well-developed  HENT:   Head: Normocephalic  Right Ear: External ear normal    Left Ear: External ear normal    Mouth/Throat: Oropharynx is clear and moist    Eyes: Pupils are equal, round, and reactive to light  No scleral icterus  Neck: Normal range of motion  Neck supple  No tracheal deviation present  No thyromegaly present  Cardiovascular: Normal rate, regular rhythm and normal heart sounds  Pulmonary/Chest: Effort normal and breath sounds normal  No respiratory distress  He exhibits no tenderness  Abdominal: Soft  Bowel sounds are normal  He exhibits no mass  There is no tenderness  Musculoskeletal: Normal range of motion  Lymphadenopathy:     He has no cervical adenopathy  Neurological: He is alert  No cranial nerve deficit  Skin: Skin is warm  No erythema  Psychiatric: He has a normal mood and affect

## 2019-10-17 ENCOUNTER — OFFICE VISIT (OUTPATIENT)
Dept: CARDIOLOGY CLINIC | Facility: CLINIC | Age: 54
End: 2019-10-17
Payer: MEDICARE

## 2019-10-17 VITALS
BODY MASS INDEX: 31.35 KG/M2 | DIASTOLIC BLOOD PRESSURE: 82 MMHG | SYSTOLIC BLOOD PRESSURE: 110 MMHG | HEIGHT: 70 IN | HEART RATE: 84 BPM | WEIGHT: 219 LBS

## 2019-10-17 DIAGNOSIS — E78.2 MIXED HYPERLIPIDEMIA: ICD-10-CM

## 2019-10-17 DIAGNOSIS — I47.2 VENTRICULAR TACHYCARDIA (HCC): ICD-10-CM

## 2019-10-17 DIAGNOSIS — I10 ESSENTIAL HYPERTENSION: Primary | ICD-10-CM

## 2019-10-17 PROCEDURE — 99213 OFFICE O/P EST LOW 20 MIN: CPT | Performed by: INTERNAL MEDICINE

## 2019-10-17 NOTE — PROGRESS NOTES
Cardiology Follow Up    Lucille Dillon  1965  [id number]  [address]  [facility]  [place] [address]   [phone]    1  Essential hypertension  CANCELED: POCT ECG   2  Ventricular tachycardia (HCC)  CANCELED: POCT ECG   3  Mixed hyperlipidemia           Discussion/Summary: All of his assessed cardiac problems are stable  I have reviewed his medications and made no changes  No cardiac testing is ordered  RTO 1 year  Interval History: he has not had any cardiac problems since his last OV  He is active and denies CP, SOB, palpitations  He is walking 2x a week  He was recently started on Crestor 10 mg daily  Previous nuclear stress test showed a normal EF with no ischemia  BP is controlled  He apparently had VT in the past but I have no documentation of this      Patient Active Problem List   Diagnosis    Allergic rhinitis    Asthma    Essential hypertension    Benign paroxysmal positional vertigo    Chronic bilateral low back pain without sciatica    DJD (degenerative joint disease) of knee    GERD without esophagitis    Gout    Hyperlipidemia    Palpitations    Primary localized osteoarthritis of left knee    Primary localized osteoarthritis of right knee    Right ankle effusion    Tenosynovitis of foot    Thyroid disorder    Ventricular tachycardia ([address] )    Anxiety    Chronic pain of both knees    Asthma with acute exacerbation     Past Medical History:   Diagnosis Date    Anxiety 1/15/2019    Arthritis     Chronic rhinitis     last assessed 2/21/14    Chronic serous otitis media     last assessed 11/20/13    Chronic sinusitis     last assessed 8/19/14    Functional heart murmur     GERD (gastroesophageal reflux disease)     Gout     Hearing problem     last assessed 12/1/16    Hiatal hernia     Hypercholesterolemia     Hypertension     Palpitations     Testicular hypogonadism     last assessed 10/4/13    [facility])      Social History     Socioeconomic History    Marital status: Single     Spouse name: Not on file    Number of children: Not on file    Years of education: Not on file    Highest education level: Not on file   Occupational History    Occupation: unemployed   Social Needs    Financial resource strain: Not on file    Food insecurity:     Worry: Not on file     Inability: Not on file    Transportation needs:     Medical: Not on file     Non-medical: Not on file   Tobacco Use    Smoking status: Never Smoker    Smokeless tobacco: Never Used   Substance and Sexual Activity    Alcohol use: Yes     Frequency: 2-3 times a week     Drinks per session: 5 or 6     Binge frequency: Weekly     Comment: occassionally    Drug use: No    Sexual activity: Not on file   Lifestyle    Physical activity:     Days per week: Not on file     Minutes per session: Not on file    Stress: Not on file   Relationships    Social connections:     Talks on phone: Not on file     Gets together: Not on file     Attends [employer] service: Not on file     Active member of club or organization: Not on file     Attends meetings of clubs or organizations: Not on file     Relationship status: Not on file    Intimate partner violence:     Fear of current or ex partner: Not on file     Emotionally abused: Not on file     Physically abused: Not on file     Forced sexual activity: Not on file   Other Topics Concern    Not on file   Social History Narrative    Travel hx- pt denies being out of the country during 1/2014-10/28/14      Family History   Problem Relation Age of Onset    Cancer Father     Lung cancer Father     Coronary artery disease Father     Stroke Maternal Grandmother     Stroke Maternal Grandfather     Cancer Paternal Grandfather     Heart disease Family     Hypertension Family     Lung cancer Cousin     No Known Problems Mother     No Known Problems Sister     No Known Problems Brother     No Known Problems Maternal Aunt     No Known Problems Maternal Uncle     No Known Problems Paternal Aunt     No Known Problems Paternal Uncle     No Known Problems Paternal Grandmother     ADD / ADHD Neg Hx     Anesthesia problems Neg Hx     Clotting disorder Neg Hx     Collagen disease Neg Hx     Diabetes Neg Hx     Dislocations Neg Hx     Learning disabilities Neg Hx     Neurological problems Neg Hx     Osteoporosis Neg Hx     Rheumatologic disease Neg Hx     Scoliosis Neg Hx     Vascular Disease Neg Hx      Past Surgical History:   Procedure Laterality Date    APPENDECTOMY      BICEPS TENODESIS      anesthesia for tenodesis- of ruptured long tendon of biceps     HERNIA REPAIR      THYROIDECTOMY      TONSILLECTOMY         Current Outpatient Medications:     albuterol (PROVENTIL HFA,VENTOLIN HFA) 90 mcg/act inhaler, Inhale 1 puff every 4 (four) hours as needed  , Disp: , Rfl:     esomeprazole (NexIUM) 40 MG capsule, TAKE 1 CAPSULE BY MOUTH EVERY DAY, Disp: 90 capsule, Rfl: 1    fluticasone-salmeterol (ADVAIR DISKUS) 500-50 mcg/dose, Inhale 1 puff 2 (two) times a day, Disp: , Rfl:     ipratropium-albuterol (DUO-NEB) 0 5-2 5 mg/3 mL, Inhale 3 mL 4 (four) times a day As needed, Disp: , Rfl:     lisinopril (ZESTRIL) 20 mg tablet, Take 1 tablet (20 mg total) by mouth daily, Disp: 90 tablet, Rfl: 1    meclizine (ANTIVERT) 12 5 MG tablet, Take 1 tablet (12 5 mg total) by mouth every 8 (eight) hours, Disp: 30 tablet, Rfl: 0    montelukast (SINGULAIR) 10 mg tablet, Take 1 tablet (10 mg total) by mouth daily, Disp: 90 tablet, Rfl: 1    rosuvastatin (CRESTOR) 10 MG tablet, Take 1 tablet (10 mg total) by mouth daily (Patient taking differently: Take 5 mg by mouth daily 1/2 tablet daily), Disp: 90 tablet, Rfl: 3    Triamcinolone Acetonide (NASACORT ALLERGY 24HR NA), 1 spray into each nostril daily  , Disp: , Rfl:     ULORIC 40 MG tablet, Take 1 tablet (40 mg total) by mouth daily, Disp: 90 tablet, Rfl: 1    verapamil (CALAN) 120 mg tablet, Take 1 tablet (120 mg total) by mouth daily, Disp: 90 tablet, Rfl: 3    cyclobenzaprine (FLEXERIL) 5 mg tablet, Take 1 tablet (5 mg total) by mouth 3 (three) times a day as needed for muscle spasms (Patient not taking: Reported on 10/17/2019), Disp: 30 tablet, Rfl: 0  Allergies   Allergen Reactions    Penicillins Rash and Anaphylaxis    Acetazolamide      Other reaction(s): Stomach Ache    Cephalosporins Other (See Comments)     headache    Doxycycline      Capsules only  Tablets are ok to take, per pt  Capsules only  Tablets are ok to take, per pt   Other     Sulfa Antibiotics Other (See Comments)     Category: Allergy; Annotation - [id number]: STOMACH UPSET    Famotidine Palpitations    Omeprazole Palpitations     Painful urination     Vitals:    10/17/19 1522   BP: 110/82   BP Location: Right arm   Cuff Size: Large   Pulse: 84   Weight: 99 3 kg (219 lb)   Height: 5' 9 5" (1 765 m)     Weight (last 2 days)     Date/Time   Weight    10/17/19 1522   99 3 (219)             Blood pressure 110/82, pulse 84, height 5' 9 5" (1 765 m), weight 99 3 kg (219 lb)  , Body mass index is 31 88 kg/m²      Labs:  Clinical Support on 10/08/2019   Component Date Value    WBC 10/08/2019 7 98     RBC 10/08/2019 4 68     Hemoglobin 10/08/2019 14 4     Hematocrit 10/08/2019 43 7     MCV 10/08/2019 93     MCH 10/08/2019 30 8     MCHC 10/08/2019 33 0     RDW 10/08/2019 13 2     Platelets 87/06/9341 237     MPV 10/08/2019 9 1     Sodium 10/08/2019 137     Potassium 10/08/2019 4 0     Chloride 10/08/2019 103     CO2 10/08/2019 27     ANION GAP 10/08/2019 7     BUN 10/08/2019 15     Creatinine 10/08/2019 1 06     Glucose, Fasting 10/08/2019 95     Calcium 10/08/2019 9 5     eGFR 10/08/2019 80     Cholesterol 10/08/2019 299*    Triglycerides 10/08/2019 188*    HDL, Direct 10/08/2019 66*    LDL Calculated 10/08/2019 195*    AST 10/08/2019 19     ALT 10/08/2019 41    Clinical Support on 07/22/2019   Component Date Value    Cholesterol 07/22/2019 291*    Triglycerides 07/22/2019 191*    HDL, Direct 07/22/2019 58     LDL Calculated 07/22/2019 195*    Uric Acid 07/22/2019 5 0      Imaging: No results found  Review of Systems:  Review of Systems   Constitutional: Negative for diaphoresis, fatigue, fever and unexpected weight change  HENT: Negative  Respiratory: Negative for cough, shortness of breath and wheezing  Cardiovascular: Negative for chest pain, palpitations and leg swelling  Gastrointestinal: Negative for abdominal pain, diarrhea and nausea  Musculoskeletal: Negative for gait problem and myalgias  Skin: Negative for rash  Neurological: Negative for dizziness and numbness  Psychiatric/Behavioral: Negative  Physical Exam:  Physical Exam   Constitutional: He is oriented to person, place, and time  He appears well-developed and well-nourished  HENT:   Head: Normocephalic and atraumatic  Eyes: Pupils are equal, round, and reactive to light  Neck: Normal range of motion  Neck supple  No JVD present  Cardiovascular: Regular rhythm, S1 normal, S2 normal and normal pulses  Pulses:       Carotid pulses are 2+ on the right side, and 2+ on the left side  Pulmonary/Chest: Effort normal and breath sounds normal  He has no wheezes  He has no rales  Abdominal: Soft  Bowel sounds are normal  There is no tenderness  Musculoskeletal: Normal range of motion  He exhibits no edema or tenderness  Neurological: He is alert and oriented to person, place, and time  He has normal reflexes  No cranial nerve deficit  Skin: Skin is warm  Psychiatric: He has a normal mood and affect

## 2019-10-26 DIAGNOSIS — Z12.11 ENCOUNTER FOR SCREENING FOR MALIGNANT NEOPLASM OF COLON: Primary | ICD-10-CM

## 2019-11-01 ENCOUNTER — OFFICE VISIT (OUTPATIENT)
Dept: INTERNAL MEDICINE CLINIC | Facility: CLINIC | Age: 54
End: 2019-11-01
Payer: MEDICARE

## 2019-11-01 VITALS
BODY MASS INDEX: 31.18 KG/M2 | TEMPERATURE: 99 F | WEIGHT: 217.8 LBS | OXYGEN SATURATION: 96 % | SYSTOLIC BLOOD PRESSURE: 138 MMHG | DIASTOLIC BLOOD PRESSURE: 96 MMHG | HEIGHT: 70 IN | HEART RATE: 82 BPM

## 2019-11-01 DIAGNOSIS — J45.909 MODERATE ASTHMA, UNSPECIFIED WHETHER COMPLICATED, UNSPECIFIED WHETHER PERSISTENT: ICD-10-CM

## 2019-11-01 DIAGNOSIS — J01.00 ACUTE NON-RECURRENT MAXILLARY SINUSITIS: ICD-10-CM

## 2019-11-01 DIAGNOSIS — I10 ESSENTIAL HYPERTENSION: Primary | ICD-10-CM

## 2019-11-01 PROCEDURE — 99213 OFFICE O/P EST LOW 20 MIN: CPT | Performed by: INTERNAL MEDICINE

## 2019-11-01 RX ORDER — DOXYCYCLINE HYCLATE 100 MG
100 TABLET ORAL 2 TIMES DAILY
Qty: 20 TABLET | Refills: 0 | Status: SHIPPED | OUTPATIENT
Start: 2019-11-01 | End: 2019-11-11

## 2019-11-01 NOTE — PROGRESS NOTES
Assessment/Plan:    1  Maxillary sinusitis  Will start patient on doxycycline 100 mg tablet 1 p o  B i d  For 10 days  Also advised to continue with Nasacort  Also advised for steam inhalation  2  Essential hypertension  Blood pressure is acceptable  Will continue with present regimen     Diagnoses and all orders for this visit:    Essential hypertension    Acute non-recurrent maxillary sinusitis  -     doxycycline hyclate (VIBRA-TABS) 100 mg tablet; Take 1 tablet (100 mg total) by mouth 2 (two) times a day for 10 days    Moderate asthma, unspecified whether complicated, unspecified whether persistent               Subjective:          Patient ID: Bhargavi Bain is a 49 y o  male  Came to the clinic with a complain of a sinus symptoms along with congestion headache stuffy nose      The following portions of the patient's history were reviewed and updated as appropriate: allergies, current medications, past family history, past medical history, past social history, past surgical history and problem list     Review of Systems   Constitutional: Negative for fatigue and fever  HENT: Positive for congestion and sinus pressure  Negative for ear discharge, ear pain, postnasal drip, sore throat, tinnitus and trouble swallowing  Eyes: Negative for discharge, itching and visual disturbance  Respiratory: Positive for cough  Negative for shortness of breath  Cardiovascular: Negative for chest pain and palpitations  Gastrointestinal: Negative for abdominal pain, diarrhea, nausea and vomiting  Endocrine: Negative for cold intolerance and polyuria  Genitourinary: Negative for difficulty urinating, dysuria and urgency  Musculoskeletal: Negative for arthralgias and neck pain  Skin: Negative for rash  Allergic/Immunologic: Negative for environmental allergies  Neurological: Negative for dizziness, weakness and headaches  Psychiatric/Behavioral: Negative for agitation and behavioral problems   The patient is not nervous/anxious            Past Medical History:   Diagnosis Date    Anxiety 1/15/2019    Arthritis     Chronic rhinitis     last assessed 2/21/14    Chronic serous otitis media     last assessed 11/20/13    Chronic sinusitis     last assessed 8/19/14    Functional heart murmur     GERD (gastroesophageal reflux disease)     Gout     Hearing problem     last assessed 12/1/16    Hiatal hernia     Hypercholesterolemia     Hypertension     Palpitations     Testicular hypogonadism     last assessed 10/4/13    [facility])          Current Outpatient Medications:     albuterol (PROVENTIL HFA,VENTOLIN HFA) 90 mcg/act inhaler, Inhale 1 puff every 4 (four) hours as needed  , Disp: , Rfl:     esomeprazole (NexIUM) 40 MG capsule, TAKE 1 CAPSULE BY MOUTH EVERY DAY, Disp: 90 capsule, Rfl: 1    fluticasone-salmeterol (ADVAIR DISKUS) 500-50 mcg/dose, Inhale 1 puff 2 (two) times a day, Disp: , Rfl:     ipratropium-albuterol (DUO-NEB) 0 5-2 5 mg/3 mL, Inhale 3 mL 4 (four) times a day As needed, Disp: , Rfl:     lisinopril (ZESTRIL) 20 mg tablet, Take 1 tablet (20 mg total) by mouth daily, Disp: 90 tablet, Rfl: 1    meclizine (ANTIVERT) 12 5 MG tablet, Take 1 tablet (12 5 mg total) by mouth every 8 (eight) hours, Disp: 30 tablet, Rfl: 0    montelukast (SINGULAIR) 10 mg tablet, Take 1 tablet (10 mg total) by mouth daily, Disp: 90 tablet, Rfl: 1    rosuvastatin (CRESTOR) 10 MG tablet, Take 1 tablet (10 mg total) by mouth daily (Patient taking differently: Take 5 mg by mouth daily 1/2 tablet daily), Disp: 90 tablet, Rfl: 3    Triamcinolone Acetonide (NASACORT ALLERGY 24HR NA), 1 spray into each nostril daily  , Disp: , Rfl:     ULORIC 40 MG tablet, Take 1 tablet (40 mg total) by mouth daily, Disp: 90 tablet, Rfl: 1    verapamil (CALAN) 120 mg tablet, Take 1 tablet (120 mg total) by mouth daily, Disp: 90 tablet, Rfl: 3    cyclobenzaprine (FLEXERIL) 5 mg tablet, Take 1 tablet (5 mg total) by mouth 3 (three) times a day as needed for muscle spasms (Patient not taking: Reported on 10/17/2019), Disp: 30 tablet, Rfl: 0    doxycycline hyclate (VIBRA-TABS) 100 mg tablet, Take 1 tablet (100 mg total) by mouth 2 (two) times a day for 10 days, Disp: 20 tablet, Rfl: 0    Allergies   Allergen Reactions    Penicillins Rash and Anaphylaxis    Acetazolamide      Other reaction(s): Stomach Ache    Cephalosporins Other (See Comments)     headache    Doxycycline      Capsules only  Tablets are ok to take, per pt  Capsules only  Tablets are ok to take, per pt   Other     Sulfa Antibiotics Other (See Comments)     Category: Allergy;  Annotation - [id number]: STOMACH UPSET    Famotidine Palpitations    Omeprazole Palpitations     Painful urination       Social History   Past Surgical History:   Procedure Laterality Date    APPENDECTOMY      BICEPS TENODESIS      anesthesia for tenodesis- of ruptured long tendon of biceps     HERNIA REPAIR      THYROIDECTOMY      TONSILLECTOMY       Family History   Problem Relation Age of Onset    Cancer Father     Lung cancer Father     Coronary artery disease Father     Stroke Maternal Grandmother     Stroke Maternal Grandfather     Cancer Paternal Grandfather     Heart disease Family     Hypertension Family     Lung cancer Cousin     No Known Problems Mother     No Known Problems Sister     No Known Problems Brother     No Known Problems Maternal Aunt     No Known Problems Maternal Uncle     No Known Problems Paternal Aunt     No Known Problems Paternal Uncle     No Known Problems Paternal Grandmother     ADD / ADHD Neg Hx     Anesthesia problems Neg Hx     Clotting disorder Neg Hx     Collagen disease Neg Hx     Diabetes Neg Hx     Dislocations Neg Hx     Learning disabilities Neg Hx     Neurological problems Neg Hx     Osteoporosis Neg Hx     Rheumatologic disease Neg Hx     Scoliosis Neg Hx     Vascular Disease Neg Hx        Objective:  BP 138/96 (BP Location: Left arm, Patient Position: Sitting, Cuff Size: Standard)   Pulse 82   Temp 99 °F (37 2 °C) (Tympanic)   Ht 5' 9 5" (1 765 m)   Wt 98 8 kg (217 lb 12 8 oz)   SpO2 96%   BMI 31 70 kg/m²   Body mass index is 31 7 kg/m²  Physical Exam   Constitutional: He appears well-developed  HENT:   Head: Normocephalic  Right Ear: External ear normal    Left Ear: External ear normal    Mouth/Throat: Oropharynx is clear and moist    Moderate maxillary and frontal sinus tenderness present   Eyes: Pupils are equal, round, and reactive to light  No scleral icterus  Neck: Normal range of motion  Neck supple  No tracheal deviation present  No thyromegaly present  Cardiovascular: Normal rate, regular rhythm and normal heart sounds  Pulmonary/Chest: Effort normal and breath sounds normal  No respiratory distress  He exhibits no tenderness  Abdominal: Soft  Bowel sounds are normal  He exhibits no mass  There is no tenderness  Musculoskeletal: Normal range of motion  Lymphadenopathy:     He has no cervical adenopathy  Neurological: He is alert  No cranial nerve deficit  Coordination normal    Skin: Skin is warm  Psychiatric: He has a normal mood and affect

## 2019-11-08 DIAGNOSIS — J31.0 CHRONIC RHINITIS: ICD-10-CM

## 2019-11-11 RX ORDER — MONTELUKAST SODIUM 10 MG/1
TABLET ORAL
Qty: 90 TABLET | Refills: 1 | Status: SHIPPED | OUTPATIENT
Start: 2019-11-11 | End: 2020-05-12 | Stop reason: SDUPTHER

## 2019-11-22 ENCOUNTER — OFFICE VISIT (OUTPATIENT)
Dept: URGENT CARE | Age: 54
End: 2019-11-22
Payer: MEDICARE

## 2019-11-22 VITALS
SYSTOLIC BLOOD PRESSURE: 120 MMHG | WEIGHT: 218 LBS | TEMPERATURE: 98.3 F | HEART RATE: 71 BPM | BODY MASS INDEX: 32.29 KG/M2 | OXYGEN SATURATION: 97 % | HEIGHT: 69 IN | RESPIRATION RATE: 16 BRPM | DIASTOLIC BLOOD PRESSURE: 75 MMHG

## 2019-11-22 DIAGNOSIS — J32.9 SINOBRONCHITIS: Primary | ICD-10-CM

## 2019-11-22 DIAGNOSIS — J40 SINOBRONCHITIS: Primary | ICD-10-CM

## 2019-11-22 PROCEDURE — 99213 OFFICE O/P EST LOW 20 MIN: CPT | Performed by: PHYSICIAN ASSISTANT

## 2019-11-22 PROCEDURE — G0463 HOSPITAL OUTPT CLINIC VISIT: HCPCS | Performed by: PHYSICIAN ASSISTANT

## 2019-11-22 RX ORDER — PREDNISONE 10 MG/1
TABLET ORAL
Qty: 20 TABLET | Refills: 0 | Status: SHIPPED | OUTPATIENT
Start: 2019-11-22 | End: 2019-11-30

## 2019-11-22 NOTE — PROGRESS NOTES
[facility] [employer] [facility]        NAME: Ness Olmos is a 49 y o  male  : 1965    MRN: [id number]  DATE: 2019  TIME: 5:46 PM    Assessment and Plan   Sinobronchitis [J32 9, J40]  1  Sinobronchitis  predniSONE 10 mg tablet         Patient Instructions       Follow up with PCP in 3-5 days  Proceed to  ER if symptoms worsen  Chief Complaint     Chief Complaint   Patient presents with    Cough     Pt complaining of cough, postnasal drip, feeling short of breath and loss of voice  History of Present Illness       Patient here for evaluation of persistent cough, congestion, runny nose  Patient was on a course of doxycycline and finished it on the   Patient has been feeling overall better but still has cough, chest congestion  He has been using his nebulizer with good results  Review of Systems   Review of Systems   Constitutional: Negative  HENT: Positive for congestion, postnasal drip and sinus pressure  Negative for ear pain, rhinorrhea, sore throat and trouble swallowing  Eyes: Negative  Respiratory: Positive for cough, shortness of breath and wheezing            Current Medications       Current Outpatient Medications:     albuterol (PROVENTIL HFA,VENTOLIN HFA) 90 mcg/act inhaler, Inhale 1 puff every 4 (four) hours as needed  , Disp: , Rfl:     cyclobenzaprine (FLEXERIL) 5 mg tablet, Take 1 tablet (5 mg total) by mouth 3 (three) times a day as needed for muscle spasms, Disp: 30 tablet, Rfl: 0    esomeprazole (NexIUM) 40 MG capsule, TAKE 1 CAPSULE BY MOUTH EVERY DAY, Disp: 90 capsule, Rfl: 1    fluticasone-salmeterol (ADVAIR DISKUS) 500-50 mcg/dose, Inhale 1 puff 2 (two) times a day, Disp: , Rfl:     ipratropium-albuterol (DUO-NEB) 0 5-2 5 mg/3 mL, Inhale 3 mL 4 (four) times a day As needed, Disp: , Rfl:     lisinopril (ZESTRIL) 20 mg tablet, Take 1 tablet (20 mg total) by mouth daily, Disp: 90 tablet, Rfl: 1    meclizine (ANTIVERT) 12 5 MG tablet, Take 1 tablet (12 5 mg total) by mouth every 8 (eight) hours, Disp: 30 tablet, Rfl: 0    montelukast (SINGULAIR) 10 mg tablet, TAKE 1 TABLET BY MOUTH EVERY DAY, Disp: 90 tablet, Rfl: 1    rosuvastatin (CRESTOR) 10 MG tablet, Take 1 tablet (10 mg total) by mouth daily (Patient taking differently: Take 5 mg by mouth daily 1/2 tablet daily), Disp: 90 tablet, Rfl: 3    Triamcinolone Acetonide (NASACORT ALLERGY 24HR NA), 1 spray into each nostril daily  , Disp: , Rfl:     ULORIC 40 MG tablet, Take 1 tablet (40 mg total) by mouth daily, Disp: 90 tablet, Rfl: 1    verapamil (CALAN) 120 mg tablet, Take 1 tablet (120 mg total) by mouth daily, Disp: 90 tablet, Rfl: 3    predniSONE 10 mg tablet, Five tablets once daily days 1-4, Disp: 20 tablet, Rfl: 0    Current Allergies     Allergies as of 11/22/2019 - Reviewed 11/22/2019   Allergen Reaction Noted    Penicillins Rash and Anaphylaxis 08/17/2004    Acetazolamide  10/25/2016    Cephalosporins Other (See Comments) 08/17/2004    Doxycycline  10/24/2018    Other  04/28/2014    Sulfa antibiotics Other (See Comments) 08/17/2004    Famotidine Palpitations 09/05/2016    Omeprazole Palpitations 09/05/2016            The following portions of the patient's history were reviewed and updated as appropriate: allergies, current medications, past family history, past medical history, past social history, past surgical history and problem list      Past Medical History:   Diagnosis Date    Anxiety 1/15/2019    Arthritis     Chronic rhinitis     last assessed 2/21/14    Chronic serous otitis media     last assessed 11/20/13    Chronic sinusitis     last assessed 8/19/14    Functional heart murmur     GERD (gastroesophageal reflux disease)     Gout     Hearing problem     last assessed 12/1/16    Hiatal hernia     Hypercholesterolemia     Hypertension     Palpitations     Testicular hypogonadism     last assessed 10/4/13    [facility])        Past Surgical History: Procedure Laterality Date    APPENDECTOMY      BICEPS TENODESIS      anesthesia for tenodesis- of ruptured long tendon of biceps     HERNIA REPAIR      THYROIDECTOMY      TONSILLECTOMY         Family History   Problem Relation Age of Onset    Cancer Father     Lung cancer Father     Coronary artery disease Father     Stroke Maternal Grandmother     Stroke Maternal Grandfather     Cancer Paternal Grandfather     Heart disease Family     Hypertension Family     Lung cancer Cousin     No Known Problems Mother     No Known Problems Sister     No Known Problems Brother     No Known Problems Maternal Aunt     No Known Problems Maternal Uncle     No Known Problems Paternal Aunt     No Known Problems Paternal Uncle     No Known Problems Paternal Grandmother     ADD / ADHD Neg Hx     Anesthesia problems Neg Hx     Clotting disorder Neg Hx     Collagen disease Neg Hx     Diabetes Neg Hx     Dislocations Neg Hx     Learning disabilities Neg Hx     Neurological problems Neg Hx     Osteoporosis Neg Hx     Rheumatologic disease Neg Hx     Scoliosis Neg Hx     Vascular Disease Neg Hx          Medications have been verified  Objective   /75 (BP Location: Right arm, Patient Position: Sitting)   Pulse 71   Temp 98 3 °F (36 8 °C) (Temporal)   Resp 16   Ht 5' 9" (1 753 m)   Wt 98 9 kg (218 lb)   SpO2 97%   BMI 32 19 kg/m²        Physical Exam     Physical Exam   Constitutional: He is oriented to person, place, and time  He appears well-developed and well-nourished  No distress  HENT:   Head: Normocephalic and atraumatic  Right Ear: External ear normal    Left Ear: External ear normal    Mouth/Throat: Oropharynx is clear and moist  No oropharyngeal exudate  Bilateral nasal congestion erythema with mucopurulent drainage  Bilateral maxillary sinus tenderness  Eyes: Pupils are equal, round, and reactive to light   Conjunctivae and EOM are normal  Right eye exhibits no discharge  Left eye exhibits no discharge  Cardiovascular: Normal rate, regular rhythm and normal heart sounds  No murmur heard  Pulmonary/Chest: Effort normal and breath sounds normal  No stridor  No respiratory distress  He has no wheezes  He has no rales  Lymphadenopathy:     He has no cervical adenopathy  Neurological: He is alert and oriented to person, place, and time  Skin: Skin is warm and dry  He is not diaphoretic  Psychiatric: He has a normal mood and affect  His behavior is normal  Judgment and thought content normal    Nursing note and vitals reviewed

## 2019-11-22 NOTE — PATIENT INSTRUCTIONS
1  Drink plenty fluids  2   Humidifier at bedtime    3  Over-the-counter medications as needed for symptomatic care  4    Advance activities as tolerated  5    Follow-up with your primary care physician in 3-4 days  6   Go to emergency room if symptoms are worsening

## 2019-11-26 ENCOUNTER — TELEPHONE (OUTPATIENT)
Dept: INTERNAL MEDICINE CLINIC | Facility: CLINIC | Age: 54
End: 2019-11-26

## 2019-11-26 DIAGNOSIS — E78.2 MIXED HYPERLIPIDEMIA: Primary | ICD-10-CM

## 2019-11-26 RX ORDER — PRAVASTATIN SODIUM 20 MG
20 TABLET ORAL
Qty: 90 TABLET | Refills: 3 | Status: SHIPPED | OUTPATIENT
Start: 2019-11-26 | End: 2020-08-26 | Stop reason: ALTCHOICE

## 2019-11-26 NOTE — TELEPHONE ENCOUNTER
The medication crestor, He took 1/2 for 3 weeks  Then whole pill for 1 week  He stopped it on Sunday  He was having bladder issues  he couldn't hold his urine  Hard to control and frequency  He said that you had recommended pravastatin being sent in if any issues  He uses [facility]   Please call him

## 2019-11-30 ENCOUNTER — APPOINTMENT (OUTPATIENT)
Dept: RADIOLOGY | Age: 54
End: 2019-11-30
Attending: PHYSICIAN ASSISTANT
Payer: MEDICARE

## 2019-11-30 ENCOUNTER — OFFICE VISIT (OUTPATIENT)
Dept: URGENT CARE | Age: 54
End: 2019-11-30
Payer: MEDICARE

## 2019-11-30 VITALS
BODY MASS INDEX: 32.29 KG/M2 | SYSTOLIC BLOOD PRESSURE: 130 MMHG | RESPIRATION RATE: 18 BRPM | TEMPERATURE: 97.9 F | DIASTOLIC BLOOD PRESSURE: 80 MMHG | HEIGHT: 69 IN | HEART RATE: 92 BPM | OXYGEN SATURATION: 98 % | WEIGHT: 218 LBS

## 2019-11-30 DIAGNOSIS — R06.02 SHORTNESS OF BREATH: ICD-10-CM

## 2019-11-30 DIAGNOSIS — J20.8 ACUTE BACTERIAL BRONCHITIS: Primary | ICD-10-CM

## 2019-11-30 DIAGNOSIS — B96.89 ACUTE BACTERIAL BRONCHITIS: Primary | ICD-10-CM

## 2019-11-30 DIAGNOSIS — R05.9 COUGH: ICD-10-CM

## 2019-11-30 PROCEDURE — 99213 OFFICE O/P EST LOW 20 MIN: CPT | Performed by: PHYSICIAN ASSISTANT

## 2019-11-30 PROCEDURE — 71046 X-RAY EXAM CHEST 2 VIEWS: CPT

## 2019-11-30 PROCEDURE — G0463 HOSPITAL OUTPT CLINIC VISIT: HCPCS | Performed by: PHYSICIAN ASSISTANT

## 2019-11-30 RX ORDER — AZITHROMYCIN 250 MG/1
TABLET, FILM COATED ORAL
Qty: 6 TABLET | Refills: 0 | Status: SHIPPED | OUTPATIENT
Start: 2019-11-30 | End: 2019-12-04

## 2019-11-30 RX ORDER — PREDNISONE 10 MG/1
TABLET ORAL
Qty: 21 TABLET | Refills: 0 | Status: SHIPPED | OUTPATIENT
Start: 2019-11-30 | End: 2020-01-08 | Stop reason: ALTCHOICE

## 2019-12-01 NOTE — PROGRESS NOTES
[facility]        NAME: Santos Trujillo is a 49 y o  male  : 1965    MRN: [id number]  DATE: 2019  TIME: 8:00 PM    Assessment and Plan   Acute bacterial bronchitis [J20 8, B96 89]  1  Acute bacterial bronchitis  XR chest pa & lateral    azithromycin (ZITHROMAX) 250 mg tablet    predniSONE 10 mg tablet   2  Shortness of breath  XR chest pa & lateral    predniSONE 10 mg tablet         Patient Instructions       Follow up with PCP in 3-5 days  Proceed to  ER if symptoms worsen  Chief Complaint     Chief Complaint   Patient presents with    shortness of breath     pt reports he was seen here about 1 week ago    feels fatigued, winded and S O B  when walking, chest tightness with coughing    Other     pt reports sinus pressure with  bloody nasal mucus         History of Present Illness       Patient here for persistent cough, chest congestion, shortness of breath  Patient finished the steroids and was feeling better but now he has persistent cough and congestion  Review of Systems   Review of Systems   Constitutional: Negative  HENT: Positive for congestion  Negative for ear pain, postnasal drip, rhinorrhea, sinus pressure, sinus pain, sore throat and trouble swallowing  Eyes: Negative  Respiratory: Positive for cough, shortness of breath and wheezing  Cardiovascular: Negative            Current Medications       Current Outpatient Medications:     albuterol (PROVENTIL HFA,VENTOLIN HFA) 90 mcg/act inhaler, Inhale 1 puff every 4 (four) hours as needed  , Disp: , Rfl:     azithromycin (ZITHROMAX) 250 mg tablet, Take 2 tablets day 1 then 1 tab days 2-5, Disp: 6 tablet, Rfl: 0    cyclobenzaprine (FLEXERIL) 5 mg tablet, Take 1 tablet (5 mg total) by mouth 3 (three) times a day as needed for muscle spasms, Disp: 30 tablet, Rfl: 0    esomeprazole (NexIUM) 40 MG capsule, TAKE 1 CAPSULE BY MOUTH EVERY DAY, Disp: 90 capsule, Rfl: 1    fluticasone-salmeterol (ADVAIR DISKUS) 500-50 mcg/dose, Inhale 1 puff 2 (two) times a day, Disp: , Rfl:     ipratropium-albuterol (DUO-NEB) 0 5-2 5 mg/3 mL, Inhale 3 mL 4 (four) times a day As needed, Disp: , Rfl:     lisinopril (ZESTRIL) 20 mg tablet, Take 1 tablet (20 mg total) by mouth daily, Disp: 90 tablet, Rfl: 1    meclizine (ANTIVERT) 12 5 MG tablet, Take 1 tablet (12 5 mg total) by mouth every 8 (eight) hours, Disp: 30 tablet, Rfl: 0    montelukast (SINGULAIR) 10 mg tablet, TAKE 1 TABLET BY MOUTH EVERY DAY, Disp: 90 tablet, Rfl: 1    pravastatin (PRAVACHOL) 20 mg tablet, Take 1 tablet (20 mg total) by mouth daily at bedtime, Disp: 90 tablet, Rfl: 3    predniSONE 10 mg tablet, Six tablets day 1; 5 tablets day to; 4 tablets day 3; 3 tablets day 4; 2 tablets day 5; and 1 tablet day 6, Disp: 21 tablet, Rfl: 0    Triamcinolone Acetonide (NASACORT ALLERGY 24HR NA), 1 spray into each nostril daily  , Disp: , Rfl:     ULORIC 40 MG tablet, Take 1 tablet (40 mg total) by mouth daily, Disp: 90 tablet, Rfl: 1    verapamil (CALAN) 120 mg tablet, Take 1 tablet (120 mg total) by mouth daily, Disp: 90 tablet, Rfl: 3    Current Allergies     Allergies as of 11/30/2019 - Reviewed 11/30/2019   Allergen Reaction Noted    Penicillins Rash and Anaphylaxis 08/17/2004    Acetazolamide  10/25/2016    Cephalosporins Other (See Comments) 08/17/2004    Doxycycline  10/24/2018    Other  04/28/2014    Sulfa antibiotics Other (See Comments) 08/17/2004    Famotidine Palpitations 09/05/2016    Omeprazole Palpitations 09/05/2016            The following portions of the patient's history were reviewed and updated as appropriate: allergies, current medications, past family history, past medical history, past social history, past surgical history and problem list      Past Medical History:   Diagnosis Date    Anxiety 1/15/2019    Arthritis     Chronic rhinitis     last assessed 2/21/14    Chronic serous otitis media     last assessed 11/20/13    Chronic sinusitis     last assessed 8/19/14    Functional heart murmur     GERD (gastroesophageal reflux disease)     Gout     Hearing problem     last assessed 12/1/16    Hiatal hernia     Hypercholesterolemia     Hypertension     Palpitations     Testicular hypogonadism     last assessed 10/4/13    [facility])        Past Surgical History:   Procedure Laterality Date    APPENDECTOMY      BICEPS TENODESIS      anesthesia for tenodesis- of ruptured long tendon of biceps     HERNIA REPAIR      THYROIDECTOMY      TONSILLECTOMY         Family History   Problem Relation Age of Onset    Cancer Father     Lung cancer Father     Coronary artery disease Father     Stroke Maternal Grandmother     Stroke Maternal Grandfather     Cancer Paternal Grandfather     Heart disease Family     Hypertension Family     Lung cancer Cousin     No Known Problems Mother     No Known Problems Sister     No Known Problems Brother     No Known Problems Maternal Aunt     No Known Problems Maternal Uncle     No Known Problems Paternal Aunt     No Known Problems Paternal Uncle     No Known Problems Paternal Grandmother     ADD / ADHD Neg Hx     Anesthesia problems Neg Hx     Clotting disorder Neg Hx     Collagen disease Neg Hx     Diabetes Neg Hx     Dislocations Neg Hx     Learning disabilities Neg Hx     Neurological problems Neg Hx     Osteoporosis Neg Hx     Rheumatologic disease Neg Hx     Scoliosis Neg Hx     Vascular Disease Neg Hx          Medications have been verified  Objective   /80 (BP Location: Right arm, Patient Position: Sitting, Cuff Size: Standard)   Pulse 92   Temp 97 9 °F (36 6 °C) (Temporal)   Resp 18   Ht 5' 9" (1 753 m)   Wt 98 9 kg (218 lb)   SpO2 98%   BMI 32 19 kg/m²        Physical Exam     Physical Exam   Constitutional: He is oriented to person, place, and time  He appears well-developed and well-nourished  No distress     HENT:   Head: Normocephalic and atraumatic  Right Ear: External ear normal    Left Ear: External ear normal    Mouth/Throat: Oropharynx is clear and moist  No oropharyngeal exudate  Bilateral nasal congestion and mild erythema with no discharge  Eyes: Pupils are equal, round, and reactive to light  Conjunctivae and EOM are normal    Cardiovascular: Normal rate, regular rhythm and normal heart sounds  No murmur heard  Pulmonary/Chest: Effort normal  No stridor  No respiratory distress  He has no wheezes  He has no rales  Coarse breath sounds bilateral upper airway  Lymphadenopathy:     He has cervical adenopathy  Neurological: He is alert and oriented to person, place, and time  Skin: Skin is warm and dry  He is not diaphoretic  Psychiatric: He has a normal mood and affect  His behavior is normal  Judgment and thought content normal    Nursing note and vitals reviewed      Chest x-ray shows no acute findings

## 2019-12-02 DIAGNOSIS — M10.9 GOUTY ARTHRITIS: ICD-10-CM

## 2019-12-02 RX ORDER — FEBUXOSTAT 40 MG/1
40 TABLET ORAL DAILY
Qty: 90 TABLET | Refills: 1 | Status: SHIPPED | OUTPATIENT
Start: 2019-12-02 | End: 2020-05-29 | Stop reason: SDUPTHER

## 2019-12-18 ENCOUNTER — OFFICE VISIT (OUTPATIENT)
Dept: OBGYN CLINIC | Facility: MEDICAL CENTER | Age: 54
End: 2019-12-18
Payer: MEDICARE

## 2019-12-18 VITALS
HEART RATE: 79 BPM | DIASTOLIC BLOOD PRESSURE: 75 MMHG | WEIGHT: 224 LBS | HEIGHT: 70 IN | SYSTOLIC BLOOD PRESSURE: 120 MMHG | BODY MASS INDEX: 32.07 KG/M2

## 2019-12-18 DIAGNOSIS — M17.12 PRIMARY LOCALIZED OSTEOARTHRITIS OF LEFT KNEE: Primary | ICD-10-CM

## 2019-12-18 DIAGNOSIS — M17.11 PRIMARY LOCALIZED OSTEOARTHRITIS OF RIGHT KNEE: ICD-10-CM

## 2019-12-18 PROCEDURE — 20610 DRAIN/INJ JOINT/BURSA W/O US: CPT | Performed by: ORTHOPAEDIC SURGERY

## 2019-12-18 PROCEDURE — 99213 OFFICE O/P EST LOW 20 MIN: CPT | Performed by: ORTHOPAEDIC SURGERY

## 2019-12-18 RX ORDER — METHYLPREDNISOLONE ACETATE 40 MG/ML
2 INJECTION, SUSPENSION INTRA-ARTICULAR; INTRALESIONAL; INTRAMUSCULAR; SOFT TISSUE
Status: COMPLETED | OUTPATIENT
Start: 2019-12-18 | End: 2019-12-18

## 2019-12-18 RX ORDER — LIDOCAINE HYDROCHLORIDE 10 MG/ML
1 INJECTION, SOLUTION INFILTRATION; PERINEURAL
Status: COMPLETED | OUTPATIENT
Start: 2019-12-18 | End: 2019-12-18

## 2019-12-18 RX ADMIN — LIDOCAINE HYDROCHLORIDE 1 ML: 10 INJECTION, SOLUTION INFILTRATION; PERINEURAL at 16:13

## 2019-12-18 RX ADMIN — METHYLPREDNISOLONE ACETATE 2 ML: 40 INJECTION, SUSPENSION INTRA-ARTICULAR; INTRALESIONAL; INTRAMUSCULAR; SOFT TISSUE at 16:13

## 2019-12-18 NOTE — PROGRESS NOTES
Assessment/Plan:  1  Primary localized osteoarthritis of left knee    2  Primary localized osteoarthritis of right knee      Orders Placed This Encounter   Procedures    Large joint arthrocentesis: bilateral knee     - Bilateral cortisone injection administered today  - Instructed to ice and use the tylenol   - A medial  brace was placed in system, patient wants to wait at this time  Return in about 3 months (around 3/18/2020) for Recheck bilateral knees  I answered all of the patient's questions during the visit and provided education of the patient's condition during the visit  The patient verbalized understanding of the information given and agrees with the plan  This note was dictated using [facility] software  It may contain errors including improperly dictated words  Please contact physician directly for any questions  Subjective   Chief Complaint: No chief complaint on file  HPI  Alyson Swenson is a 49 y o  male who presents for a 3 month follow up for his bilateral knee osteoarthritis  At his last visit, bilateral cortisone injections were done, 9/18/19  He reports doing well with his current treatment plan  He takes motrin and Tylenol as needed  He is hoping to be eligible for a new brace today  Review of Systems  ROS:    See HPI for musculoskeletal review     All other systems reviewed are negative     History:  Past Medical History:   Diagnosis Date    Anxiety 1/15/2019    Arthritis     Chronic rhinitis     last assessed 2/21/14    Chronic serous otitis media     last assessed 11/20/13    Chronic sinusitis     last assessed 8/19/14    Functional heart murmur     GERD (gastroesophageal reflux disease)     Gout     Hearing problem     last assessed 12/1/16    Hiatal hernia     Hypercholesterolemia     Hypertension     Palpitations     Testicular hypogonadism     last assessed 10/4/13    [facility])      Past Surgical History:   Procedure Laterality Date    APPENDECTOMY      BICEPS TENODESIS      anesthesia for tenodesis- of ruptured long tendon of biceps     HERNIA REPAIR      THYROIDECTOMY      TONSILLECTOMY       Social History   Social History     Substance and Sexual Activity   Alcohol Use Yes    Frequency: 2-3 times a week    Drinks per session: 5 or 6    Binge frequency: Weekly    Comment: occassionally     Social History     Substance and Sexual Activity   Drug Use No     Social History     Tobacco Use   Smoking Status Never Smoker   Smokeless Tobacco Never Used     Family History:   Family History   Problem Relation Age of Onset    Cancer Father     Lung cancer Father     Coronary artery disease Father     Stroke Maternal Grandmother     Stroke Maternal Grandfather     Cancer Paternal Grandfather     Heart disease Family     Hypertension Family     Lung cancer Cousin     No Known Problems Mother     No Known Problems Sister     No Known Problems Brother     No Known Problems Maternal Aunt     No Known Problems Maternal Uncle     No Known Problems Paternal Aunt     No Known Problems Paternal Uncle     No Known Problems Paternal Grandmother     ADD / ADHD Neg Hx     Anesthesia problems Neg Hx     Clotting disorder Neg Hx     Collagen disease Neg Hx     Diabetes Neg Hx     Dislocations Neg Hx     Learning disabilities Neg Hx     Neurological problems Neg Hx     Osteoporosis Neg Hx     Rheumatologic disease Neg Hx     Scoliosis Neg Hx     Vascular Disease Neg Hx        Current Outpatient Medications on File Prior to Visit   Medication Sig Dispense Refill    albuterol (PROVENTIL HFA,VENTOLIN HFA) 90 mcg/act inhaler Inhale 1 puff every 4 (four) hours as needed        cyclobenzaprine (FLEXERIL) 5 mg tablet Take 1 tablet (5 mg total) by mouth 3 (three) times a day as needed for muscle spasms 30 tablet 0    esomeprazole (NexIUM) 40 MG capsule TAKE 1 CAPSULE BY MOUTH EVERY DAY 90 capsule 1    fluticasone-salmeterol (ADVAIR DISKUS) 500-50 mcg/dose Inhale 1 puff 2 (two) times a day      ipratropium-albuterol (DUO-NEB) 0 5-2 5 mg/3 mL Inhale 3 mL 4 (four) times a day As needed      lisinopril (ZESTRIL) 20 mg tablet Take 1 tablet (20 mg total) by mouth daily 90 tablet 1    meclizine (ANTIVERT) 12 5 MG tablet Take 1 tablet (12 5 mg total) by mouth every 8 (eight) hours 30 tablet 0    montelukast (SINGULAIR) 10 mg tablet TAKE 1 TABLET BY MOUTH EVERY DAY 90 tablet 1    Triamcinolone Acetonide (NASACORT ALLERGY 24HR NA) 1 spray into each nostril daily        ULORIC 40 MG tablet Take 1 tablet (40 mg total) by mouth daily 90 tablet 1    verapamil (CALAN) 120 mg tablet Take 1 tablet (120 mg total) by mouth daily 90 tablet 3    pravastatin (PRAVACHOL) 20 mg tablet Take 1 tablet (20 mg total) by mouth daily at bedtime (Patient not taking: Reported on 12/18/2019) 90 tablet 3    predniSONE 10 mg tablet Six tablets day 1; 5 tablets day to; 4 tablets day 3; 3 tablets day 4; 2 tablets day 5; and 1 tablet day 6 (Patient not taking: Reported on 12/18/2019) 21 tablet 0     No current facility-administered medications on file prior to visit  Allergies   Allergen Reactions    Penicillins Rash and Anaphylaxis    Acetazolamide      Other reaction(s): Stomach Ache    Cephalosporins Other (See Comments)     headache    Doxycycline      Capsules only  Tablets are ok to take, per pt  Capsules only  Tablets are ok to take, per pt   Other     Sulfa Antibiotics Other (See Comments)     Category: Allergy;  Annotation - [id number]: STOMACH UPSET    Famotidine Palpitations    Omeprazole Palpitations     Painful urination        Objective     /75   Pulse 79   Ht 5' 9 5" (1 765 m)   Wt 102 kg (224 lb)   BMI 32 60 kg/m²      PE:  AAOx 3  WDWN  Hearing intact, no drainage from eyes  no audible wheezing  no abdominal distension  LE compartments soft, skin intact    Ortho Exam:  bilateral Knee:   No erythema  no swelling  no effusion  no warmth  AROM: full 0-120 with patellar crepitus    Large joint arthrocentesis: bilateral knee  Date/Time: 12/18/2019 4:13 PM  Consent given by: patient  Site marked: site marked  Timeout: Immediately prior to procedure a time out was called to verify the correct patient, procedure, equipment, support staff and site/side marked as required   Supporting Documentation  Indications: pain   Procedure Details  Location: knee - bilateral knee  Preparation: Patient was prepped and draped in the usual sterile fashion  Needle size: 22 G  Ultrasound guidance: no  Approach: anterolateral    Medications (Right): 1 mL lidocaine 1 %; 2 mL methylPREDNISolone acetate 40 mg/mLMedications (Left): 1 mL lidocaine 1 %; 2 mL methylPREDNISolone acetate 40 mg/mL   Patient tolerance: patient tolerated the procedure well with no immediate complications  Dressing:  Sterile dressing applied          Imaging Studies: none reviewed today      Scribe Attestation    I,:   Manasa Mendez am acting as a scribe while in the presence of the attending physician :        I,:   Johanna Kenney, DO personally performed the services described in this documentation    as scribed in my presence :

## 2020-01-08 ENCOUNTER — OFFICE VISIT (OUTPATIENT)
Dept: INTERNAL MEDICINE CLINIC | Facility: CLINIC | Age: 55
End: 2020-01-08
Payer: MEDICARE

## 2020-01-08 VITALS
TEMPERATURE: 99 F | OXYGEN SATURATION: 96 % | WEIGHT: 220.4 LBS | DIASTOLIC BLOOD PRESSURE: 86 MMHG | HEART RATE: 78 BPM | SYSTOLIC BLOOD PRESSURE: 130 MMHG | HEIGHT: 69 IN | BODY MASS INDEX: 32.64 KG/M2

## 2020-01-08 DIAGNOSIS — J30.89 NON-SEASONAL ALLERGIC RHINITIS, UNSPECIFIED TRIGGER: ICD-10-CM

## 2020-01-08 DIAGNOSIS — K21.9 GERD WITHOUT ESOPHAGITIS: Primary | ICD-10-CM

## 2020-01-08 DIAGNOSIS — E78.2 MIXED HYPERLIPIDEMIA: ICD-10-CM

## 2020-01-08 DIAGNOSIS — I10 ESSENTIAL HYPERTENSION: ICD-10-CM

## 2020-01-08 DIAGNOSIS — J45.909 MODERATE ASTHMA, UNSPECIFIED WHETHER COMPLICATED, UNSPECIFIED WHETHER PERSISTENT: ICD-10-CM

## 2020-01-08 PROBLEM — J32.9 SINOBRONCHITIS: Status: RESOLVED | Noted: 2019-11-22 | Resolved: 2020-01-08

## 2020-01-08 PROBLEM — J40 SINOBRONCHITIS: Status: RESOLVED | Noted: 2019-11-22 | Resolved: 2020-01-08

## 2020-01-08 PROBLEM — B96.89 ACUTE BACTERIAL BRONCHITIS: Status: RESOLVED | Noted: 2019-11-30 | Resolved: 2020-01-08

## 2020-01-08 PROBLEM — J20.8 ACUTE BACTERIAL BRONCHITIS: Status: RESOLVED | Noted: 2019-11-30 | Resolved: 2020-01-08

## 2020-01-08 PROCEDURE — 99214 OFFICE O/P EST MOD 30 MIN: CPT | Performed by: INTERNAL MEDICINE

## 2020-01-08 NOTE — PROGRESS NOTES
Assessment/Plan:    1  Hyperlipidemia  He stop taking his Crestor after questionable muscle pain  Advised him to start Pravachol  Prescription was given in the past he still have 90 days supply at home  Will repeat lipid profile in 3 months  Also advised him to take over-the-counter Co Q10 along with the pravastatin  2  Essential hypertension  Blood pressure is acceptable  Will continue with present regimen    3  Bronchial asthma  Continue with present combination of inhalers  4  Gout  Continue with the Uloric 40 mg daily  Diagnoses and all orders for this visit:    GERD without esophagitis    Non-seasonal allergic rhinitis, unspecified trigger    Moderate asthma, unspecified whether complicated, unspecified whether persistent    Essential hypertension    Mixed hyperlipidemia          BMI Counseling: Body mass index is 32 55 kg/m²  The BMI is above normal  Nutrition recommendations include decreasing portion sizes, encouraging healthy choices of fruits and vegetables, decreasing fast food intake, consuming healthier snacks, limiting drinks that contain sugar, moderation in carbohydrate intake and increasing intake of lean protein  Exercise recommendations include vigorous physical activity 75 minutes/week  No pharmacotherapy was ordered  Subjective:          Patient ID: Ceasar Napier is a 49 y o  male  Patient is here for regular follow-up  No blood work prior to this visit  Otherwise no new complaints  The following portions of the patient's history were reviewed and updated as appropriate: allergies, current medications, past family history, past medical history, past social history, past surgical history and problem list     Review of Systems   Constitutional: Negative for fatigue and fever  HENT: Negative for congestion, ear discharge, ear pain, postnasal drip, sinus pressure, sore throat, tinnitus and trouble swallowing      Eyes: Negative for discharge, itching and visual disturbance  Respiratory: Negative for cough and shortness of breath  Cardiovascular: Negative for chest pain and palpitations  Gastrointestinal: Negative for abdominal pain, diarrhea, nausea and vomiting  Endocrine: Negative for cold intolerance and polyuria  Genitourinary: Negative for difficulty urinating, dysuria and urgency  Musculoskeletal: Negative for arthralgias and neck pain  Skin: Negative for rash  Allergic/Immunologic: Negative for environmental allergies  Neurological: Negative for dizziness, weakness and headaches  Psychiatric/Behavioral: Negative for agitation and behavioral problems  The patient is not nervous/anxious            Past Medical History:   Diagnosis Date    Anxiety 1/15/2019    Arthritis     Chronic rhinitis     last assessed 2/21/14    Chronic serous otitis media     last assessed 11/20/13    Chronic sinusitis     last assessed 8/19/14    Functional heart murmur     GERD (gastroesophageal reflux disease)     Gout     Hearing problem     last assessed 12/1/16    Hiatal hernia     Hypercholesterolemia     Hypertension     Palpitations     Testicular hypogonadism     last assessed 10/4/13    [facility])          Current Outpatient Medications:     albuterol (PROVENTIL HFA,VENTOLIN HFA) 90 mcg/act inhaler, Inhale 1 puff every 4 (four) hours as needed  , Disp: , Rfl:     cyclobenzaprine (FLEXERIL) 5 mg tablet, Take 1 tablet (5 mg total) by mouth 3 (three) times a day as needed for muscle spasms, Disp: 30 tablet, Rfl: 0    esomeprazole (NexIUM) 40 MG capsule, TAKE 1 CAPSULE BY MOUTH EVERY DAY, Disp: 90 capsule, Rfl: 1    fluticasone-salmeterol (ADVAIR DISKUS) 500-50 mcg/dose, Inhale 1 puff 2 (two) times a day, Disp: , Rfl:     ipratropium-albuterol (DUO-NEB) 0 5-2 5 mg/3 mL, Inhale 3 mL 4 (four) times a day As needed, Disp: , Rfl:     lisinopril (ZESTRIL) 20 mg tablet, Take 1 tablet (20 mg total) by mouth daily, Disp: 90 tablet, Rfl: 1    meclizine (ANTIVERT) 12 5 MG tablet, Take 1 tablet (12 5 mg total) by mouth every 8 (eight) hours (Patient taking differently: Take 12 5 mg by mouth every 8 (eight) hours as needed ), Disp: 30 tablet, Rfl: 0    montelukast (SINGULAIR) 10 mg tablet, TAKE 1 TABLET BY MOUTH EVERY DAY, Disp: 90 tablet, Rfl: 1    Triamcinolone Acetonide (NASACORT ALLERGY 24HR NA), 1 spray into each nostril daily  , Disp: , Rfl:     ULORIC 40 MG tablet, Take 1 tablet (40 mg total) by mouth daily, Disp: 90 tablet, Rfl: 1    verapamil (CALAN) 120 mg tablet, Take 1 tablet (120 mg total) by mouth daily, Disp: 90 tablet, Rfl: 3    pravastatin (PRAVACHOL) 20 mg tablet, Take 1 tablet (20 mg total) by mouth daily at bedtime (Patient not taking: Reported on 12/18/2019), Disp: 90 tablet, Rfl: 3    Allergies   Allergen Reactions    Penicillins Rash and Anaphylaxis    Acetazolamide      Other reaction(s): Stomach Ache    Cephalosporins Other (See Comments)     headache    Doxycycline      Capsules only  Tablets are ok to take, per pt  Capsules only  Tablets are ok to take, per pt   Other     Sulfa Antibiotics Other (See Comments)     Category: Allergy;  Annotation - [id number]: STOMACH UPSET    Famotidine Palpitations    Omeprazole Palpitations     Painful urination       Social History   Past Surgical History:   Procedure Laterality Date    APPENDECTOMY      BICEPS TENODESIS      anesthesia for tenodesis- of ruptured long tendon of biceps     HERNIA REPAIR      THYROIDECTOMY      TONSILLECTOMY       Family History   Problem Relation Age of Onset    Lung cancer Father     Coronary artery disease Father         blockages    Stroke Maternal Grandmother     Cancer Paternal Grandfather         metastasized    Heart disease Family     Lung cancer Cousin     No Known Problems Mother     No Known Problems Sister     No Known Problems Brother     No Known Problems Maternal Aunt     No Known Problems Maternal Uncle     No Known Problems Paternal Aunt     No Known Problems Paternal Uncle     No Known Problems Paternal Grandmother     ADD / ADHD Neg Hx     Anesthesia problems Neg Hx     Clotting disorder Neg Hx     Collagen disease Neg Hx     Diabetes Neg Hx     Dislocations Neg Hx     Learning disabilities Neg Hx     Neurological problems Neg Hx     Osteoporosis Neg Hx     Rheumatologic disease Neg Hx     Scoliosis Neg Hx     Vascular Disease Neg Hx        Objective:  /86 (BP Location: Left arm, Patient Position: Sitting, Cuff Size: Adult)   Pulse 78   Temp 99 °F (37 2 °C) (Oral)   Ht 5' 9" (1 753 m)   Wt 100 kg (220 lb 6 4 oz) Comment: w shoes  SpO2 96% Comment: ra  BMI 32 55 kg/m²   Body mass index is 32 55 kg/m²  Physical Exam   Constitutional: He appears well-developed  HENT:   Head: Normocephalic  Right Ear: External ear normal    Left Ear: External ear normal    Mouth/Throat: Oropharynx is clear and moist    Eyes: Pupils are equal, round, and reactive to light  No scleral icterus  Neck: Normal range of motion  Neck supple  No tracheal deviation present  No thyromegaly present  Cardiovascular: Normal rate, regular rhythm and normal heart sounds  No murmur heard  Pulmonary/Chest: Effort normal and breath sounds normal  No respiratory distress  He exhibits no tenderness  Abdominal: Soft  Bowel sounds are normal  He exhibits no mass  There is no tenderness  Musculoskeletal: Normal range of motion  Lymphadenopathy:     He has no cervical adenopathy  Neurological: He is alert  No cranial nerve deficit  Skin: Skin is warm  Psychiatric: He has a normal mood and affect

## 2020-01-14 ENCOUNTER — OFFICE VISIT (OUTPATIENT)
Dept: INTERNAL MEDICINE CLINIC | Facility: CLINIC | Age: 55
End: 2020-01-14
Payer: MEDICARE

## 2020-01-14 VITALS
DIASTOLIC BLOOD PRESSURE: 88 MMHG | WEIGHT: 220.8 LBS | HEIGHT: 69 IN | SYSTOLIC BLOOD PRESSURE: 142 MMHG | BODY MASS INDEX: 32.7 KG/M2 | HEART RATE: 91 BPM | OXYGEN SATURATION: 98 % | TEMPERATURE: 98.8 F

## 2020-01-14 DIAGNOSIS — H60.312 ACUTE DIFFUSE OTITIS EXTERNA OF LEFT EAR: Primary | ICD-10-CM

## 2020-01-14 PROCEDURE — 99213 OFFICE O/P EST LOW 20 MIN: CPT | Performed by: INTERNAL MEDICINE

## 2020-01-14 NOTE — ASSESSMENT & PLAN NOTE
Will treat with neomycin-polymyxin-hydrocortisone ear drops for 7 days  He is to contact office for worsening symptoms  Continue with over-the-counter decongestant

## 2020-01-14 NOTE — PROGRESS NOTES
Assessment/Plan:    Acute diffuse otitis externa of left ear  Will treat with neomycin-polymyxin-hydrocortisone ear drops for 7 days  He is to contact office for worsening symptoms  Continue with over-the-counter decongestant  Diagnoses and all orders for this visit:    Acute diffuse otitis externa of left ear  -     neomycin-polymyxin-hydrocortisone (CORTISPORIN) otic solution; Administer 4 drops into the left ear every 6 (six) hours for 7 days                Time spent during encounter: 15 minutes (counseling, reviewing medications, and discussing treatment and plan)    Subjective:      Patient ID: Tammy Hurley is a 49 y o  male  Chief Complaint   Patient presents with    Follow-up     Vertigo, dizziness, inflammed ear pain       25-year-old male is seen today with concern for vertigo as he has been experiencing symptoms of dizziness and left ear pain since 10 days  Earache    There is pain in the left ear  This is a recurrent problem  The problem has been unchanged  There has been no fever  The pain is moderate  Associated symptoms include rhinorrhea  Pertinent negatives include no abdominal pain, coughing, diarrhea, ear discharge, headaches, hearing loss, neck pain, rash, sore throat or vomiting  Associated symptoms comments: Post nasal drip  Treatments tried: Sudafed  The treatment provided mild relief  The following portions of the patient's history were reviewed and updated as appropriate: allergies, current medications, past family history, past medical history, past social history, past surgical history and problem list     Review of Systems   Constitutional: Negative for activity change, appetite change, chills, diaphoresis, fatigue and fever  HENT: Positive for ear pain, postnasal drip and rhinorrhea  Negative for congestion, ear discharge, hearing loss, sinus pressure, sinus pain, sneezing and sore throat  Eyes: Negative for visual disturbance     Respiratory: Negative for apnea, cough, choking, chest tightness, shortness of breath and wheezing  Cardiovascular: Negative for chest pain, palpitations and leg swelling  Gastrointestinal: Negative for abdominal distention, abdominal pain, anal bleeding, blood in stool, constipation, diarrhea, nausea and vomiting  Endocrine: Negative for cold intolerance and heat intolerance  Genitourinary: Negative for difficulty urinating, dysuria and hematuria  Musculoskeletal: Negative  Negative for neck pain  Skin: Negative  Negative for rash  Neurological: Negative for dizziness, weakness, light-headedness, numbness and headaches  Hematological: Negative for adenopathy  Psychiatric/Behavioral: Negative for agitation, sleep disturbance and suicidal ideas  All other systems reviewed and are negative          Past Medical History:   Diagnosis Date    Anxiety 1/15/2019    Arthritis     Chronic rhinitis     last assessed 2/21/14    Chronic serous otitis media     last assessed 11/20/13    Chronic sinusitis     last assessed 8/19/14    Functional heart murmur     GERD (gastroesophageal reflux disease)     Gout     Hearing problem     last assessed 12/1/16    Hiatal hernia     Hypercholesterolemia     Hypertension     Palpitations     Testicular hypogonadism     last assessed 10/4/13    [facility])          Current Outpatient Medications:     albuterol (PROVENTIL HFA,VENTOLIN HFA) 90 mcg/act inhaler, Inhale 1 puff every 4 (four) hours as needed  , Disp: , Rfl:     cyclobenzaprine (FLEXERIL) 5 mg tablet, Take 1 tablet (5 mg total) by mouth 3 (three) times a day as needed for muscle spasms, Disp: 30 tablet, Rfl: 0    esomeprazole (NexIUM) 40 MG capsule, TAKE 1 CAPSULE BY MOUTH EVERY DAY, Disp: 90 capsule, Rfl: 1    fluticasone-salmeterol (ADVAIR DISKUS) 500-50 mcg/dose, Inhale 1 puff 2 (two) times a day, Disp: , Rfl:     ipratropium-albuterol (DUO-NEB) 0 5-2 5 mg/3 mL, Inhale 3 mL 4 (four) times a day As needed, Disp: , Rfl:     lisinopril (ZESTRIL) 20 mg tablet, Take 1 tablet (20 mg total) by mouth daily, Disp: 90 tablet, Rfl: 1    meclizine (ANTIVERT) 12 5 MG tablet, Take 1 tablet (12 5 mg total) by mouth every 8 (eight) hours (Patient taking differently: Take 12 5 mg by mouth every 8 (eight) hours as needed ), Disp: 30 tablet, Rfl: 0    montelukast (SINGULAIR) 10 mg tablet, TAKE 1 TABLET BY MOUTH EVERY DAY, Disp: 90 tablet, Rfl: 1    neomycin-polymyxin-hydrocortisone (CORTISPORIN) otic solution, Administer 4 drops into the left ear every 6 (six) hours for 7 days, Disp: 10 mL, Rfl: 0    pravastatin (PRAVACHOL) 20 mg tablet, Take 1 tablet (20 mg total) by mouth daily at bedtime (Patient not taking: Reported on 12/18/2019), Disp: 90 tablet, Rfl: 3    Triamcinolone Acetonide (NASACORT ALLERGY 24HR NA), 1 spray into each nostril daily  , Disp: , Rfl:     ULORIC 40 MG tablet, Take 1 tablet (40 mg total) by mouth daily, Disp: 90 tablet, Rfl: 1    verapamil (CALAN) 120 mg tablet, Take 1 tablet (120 mg total) by mouth daily, Disp: 90 tablet, Rfl: 3    Allergies   Allergen Reactions    Penicillins Rash and Anaphylaxis    Acetazolamide      Other reaction(s): Stomach Ache    Cephalosporins Other (See Comments)     headache    Doxycycline      Capsules only  Tablets are ok to take, per pt  Capsules only  Tablets are ok to take, per pt   Other     Sulfa Antibiotics Other (See Comments)     Category: Allergy;  Annotation - [id number]: STOMACH UPSET    Famotidine Palpitations    Omeprazole Palpitations     Painful urination       Social History   Past Surgical History:   Procedure Laterality Date    APPENDECTOMY      BICEPS TENODESIS      anesthesia for tenodesis- of ruptured long tendon of biceps     HERNIA REPAIR      THYROIDECTOMY      TONSILLECTOMY       Family History   Problem Relation Age of Onset    Lung cancer Father     Coronary artery disease Father         blockages    Stroke Maternal Grandmother     Cancer Paternal Grandfather         metastasized    Heart disease Family     Lung cancer Cousin     No Known Problems Mother     No Known Problems Sister     No Known Problems Brother     No Known Problems Maternal Aunt     No Known Problems Maternal Uncle     No Known Problems Paternal Aunt     No Known Problems Paternal Uncle     No Known Problems Paternal Grandmother     ADD / ADHD Neg Hx     Anesthesia problems Neg Hx     Clotting disorder Neg Hx     Collagen disease Neg Hx     Diabetes Neg Hx     Dislocations Neg Hx     Learning disabilities Neg Hx     Neurological problems Neg Hx     Osteoporosis Neg Hx     Rheumatologic disease Neg Hx     Scoliosis Neg Hx     Vascular Disease Neg Hx        Objective:  /88 (BP Location: Right arm, Patient Position: Sitting, Cuff Size: Adult)   Pulse 91   Temp 98 8 °F (37 1 °C)   Ht 5' 9" (1 753 m)   Wt 100 kg (220 lb 12 8 oz)   SpO2 98%   BMI 32 61 kg/m²     No results found for this or any previous visit (from the past 1344 hour(s))  Physical Exam   Constitutional: He is oriented to person, place, and time  He appears well-developed and well-nourished  No distress  HENT:   Head: Normocephalic and atraumatic  Left Ear: There is tenderness  A middle ear effusion is present  Eyes: Pupils are equal, round, and reactive to light  Conjunctivae and EOM are normal  Right eye exhibits no discharge  Left eye exhibits no discharge  No scleral icterus  Neck: Normal range of motion  Neck supple  No JVD present  No thyromegaly present  Cardiovascular: Normal rate, regular rhythm, normal heart sounds and intact distal pulses  Exam reveals no gallop and no friction rub  No murmur heard  Pulmonary/Chest: Effort normal and breath sounds normal  No respiratory distress  He has no wheezes  He has no rales  He exhibits no tenderness  Abdominal: Soft  Bowel sounds are normal  He exhibits no distension and no mass  There is no tenderness  There is no rebound and no guarding  Musculoskeletal: Normal range of motion  He exhibits no edema, tenderness or deformity  Lymphadenopathy:     He has no cervical adenopathy  Neurological: He is alert and oriented to person, place, and time  He has normal reflexes  No cranial nerve deficit  Coordination normal    Skin: Skin is warm and dry  No rash noted  He is not diaphoretic  No erythema  No pallor  Psychiatric: He has a normal mood and affect  His behavior is normal  Judgment and thought content normal    Nursing note and vitals reviewed

## 2020-01-24 DIAGNOSIS — I10 HYPERTENSION, UNSPECIFIED TYPE: ICD-10-CM

## 2020-01-24 RX ORDER — LISINOPRIL 20 MG/1
TABLET ORAL
Qty: 90 TABLET | Refills: 0 | OUTPATIENT
Start: 2020-01-24

## 2020-01-27 DIAGNOSIS — I10 HYPERTENSION, UNSPECIFIED TYPE: ICD-10-CM

## 2020-01-27 RX ORDER — LISINOPRIL 20 MG/1
20 TABLET ORAL DAILY
Qty: 90 TABLET | Refills: 1 | Status: SHIPPED | OUTPATIENT
Start: 2020-01-27 | End: 2020-07-27 | Stop reason: SDUPTHER

## 2020-03-06 ENCOUNTER — OFFICE VISIT (OUTPATIENT)
Dept: URGENT CARE | Age: 55
End: 2020-03-06
Payer: MEDICARE

## 2020-03-06 VITALS
TEMPERATURE: 98.4 F | HEART RATE: 75 BPM | SYSTOLIC BLOOD PRESSURE: 146 MMHG | RESPIRATION RATE: 20 BRPM | DIASTOLIC BLOOD PRESSURE: 76 MMHG | OXYGEN SATURATION: 97 %

## 2020-03-06 DIAGNOSIS — M54.50 ACUTE LOW BACK PAIN WITHOUT SCIATICA, UNSPECIFIED BACK PAIN LATERALITY: ICD-10-CM

## 2020-03-06 DIAGNOSIS — M25.562 ACUTE PAIN OF LEFT KNEE: Primary | ICD-10-CM

## 2020-03-06 PROCEDURE — G0463 HOSPITAL OUTPT CLINIC VISIT: HCPCS | Performed by: PHYSICIAN ASSISTANT

## 2020-03-06 PROCEDURE — 99213 OFFICE O/P EST LOW 20 MIN: CPT | Performed by: PHYSICIAN ASSISTANT

## 2020-03-06 RX ORDER — CYCLOBENZAPRINE HCL 5 MG
5 TABLET ORAL 3 TIMES DAILY PRN
Qty: 15 TABLET | Refills: 0 | Status: SHIPPED | OUTPATIENT
Start: 2020-03-06 | End: 2021-09-01 | Stop reason: SDUPTHER

## 2020-03-06 RX ORDER — PREDNISONE 10 MG/1
TABLET ORAL
Qty: 21 TABLET | Refills: 0 | Status: SHIPPED | OUTPATIENT
Start: 2020-03-06 | End: 2020-08-26 | Stop reason: ALTCHOICE

## 2020-03-07 NOTE — PATIENT INSTRUCTIONS
Follow-up with your orthopedist as scheduled    Follow-up with your primary care physician if symptoms persist    Use a cool mist humidifier at bedtime as directed    Topical treatment to the left knee as directed

## 2020-03-07 NOTE — PROGRESS NOTES
[id number]        NAME: Alfonso Solomon is a 49 y o  male  : 1965    MRN: [id number]  DATE: 2020  TIME: 8:25 PM    Assessment and Plan   Acute pain of left knee [M25 562]  1  Acute pain of left knee  predniSONE 10 mg tablet   2  Acute low back pain without sciatica, unspecified back pain laterality  predniSONE 10 mg tablet    cyclobenzaprine (FLEXERIL) 5 mg tablet         Patient Instructions       Follow up with PCP in 3-5 days  Proceed to  ER if symptoms worsen  Chief Complaint     Chief Complaint   Patient presents with    Knee Pain     left knee pain since Monday  History of Present Illness       Patient here for evaluation of pain in his left knee  Patient's pain started a few days ago  He has been out walking more since the weather been nicer  He is due for a knee injection due to chronic arthritis on   He states that sometimes he does get these flare ups when is getting closer to the time of the injection  Patient has been walking with a slight limp and has increased discomfort in his low back  Patient has had back issues in the past and usually takes Flexeril 5 milligrams for spasms  He states he has been getting some tightness over last few days  He denies any numbness, tingling, new injury  Patient also with some sinus congestion and clear drainage  He has been using some saline nasal spray with some relief  Review of Systems   Review of Systems   Constitutional: Negative  Musculoskeletal: Positive for back pain, gait problem and joint swelling  Neurological: Negative for tremors, weakness and numbness           Current Medications       Current Outpatient Medications:     albuterol (PROVENTIL HFA,VENTOLIN HFA) 90 mcg/act inhaler, Inhale 1 puff every 4 (four) hours as needed  , Disp: , Rfl:     esomeprazole (NexIUM) 40 MG capsule, TAKE 1 CAPSULE BY MOUTH EVERY DAY, Disp: 90 capsule, Rfl: 1    fluticasone-salmeterol (ADVAIR DISKUS) 500-50 mcg/dose, Inhale 1 puff 2 (two) times a day, Disp: , Rfl:     ipratropium-albuterol (DUO-NEB) 0 5-2 5 mg/3 mL, Inhale 3 mL 4 (four) times a day As needed, Disp: , Rfl:     lisinopril (ZESTRIL) 20 mg tablet, Take 1 tablet (20 mg total) by mouth daily, Disp: 90 tablet, Rfl: 1    meclizine (ANTIVERT) 12 5 MG tablet, Take 1 tablet (12 5 mg total) by mouth every 8 (eight) hours (Patient taking differently: Take 12 5 mg by mouth every 8 (eight) hours as needed ), Disp: 30 tablet, Rfl: 0    montelukast (SINGULAIR) 10 mg tablet, TAKE 1 TABLET BY MOUTH EVERY DAY, Disp: 90 tablet, Rfl: 1    neomycin-polymyxin-hydrocortisone (CORTISPORIN) otic solution, Administer 4 drops into the left ear every 6 (six) hours for 7 days, Disp: 10 mL, Rfl: 0    pravastatin (PRAVACHOL) 20 mg tablet, Take 1 tablet (20 mg total) by mouth daily at bedtime, Disp: 90 tablet, Rfl: 3    Triamcinolone Acetonide (NASACORT ALLERGY 24HR NA), 1 spray into each nostril daily  , Disp: , Rfl:     ULORIC 40 MG tablet, Take 1 tablet (40 mg total) by mouth daily, Disp: 90 tablet, Rfl: 1    verapamil (CALAN) 120 mg tablet, Take 1 tablet (120 mg total) by mouth daily, Disp: 90 tablet, Rfl: 3    cyclobenzaprine (FLEXERIL) 5 mg tablet, Take 1 tablet (5 mg total) by mouth 3 (three) times a day as needed for muscle spasms, Disp: 15 tablet, Rfl: 0    predniSONE 10 mg tablet, Six tablets day 1; 5 tablets day to; 4 tablets day 3; 3 tablets day 4; 2 tablets day 5; and 1 tablet day 6, Disp: 21 tablet, Rfl: 0    Current Allergies     Allergies as of 03/06/2020 - Reviewed 03/06/2020   Allergen Reaction Noted    Penicillins Rash and Anaphylaxis 08/17/2004    Acetazolamide  10/25/2016    Cephalosporins Other (See Comments) 08/17/2004    Doxycycline  10/24/2018    Other  04/28/2014    Sulfa antibiotics Other (See Comments) 08/17/2004    Famotidine Palpitations 09/05/2016    Omeprazole Palpitations 09/05/2016            The following portions of the patient's history were reviewed and updated as appropriate: allergies, current medications, past family history, past medical history, past social history, past surgical history and problem list      Past Medical History:   Diagnosis Date    Anxiety 1/15/2019    Arthritis     Chronic rhinitis     last assessed 2/21/14    Chronic serous otitis media     last assessed 11/20/13    Chronic sinusitis     last assessed 8/19/14    Functional heart murmur     GERD (gastroesophageal reflux disease)     Gout     Hearing problem     last assessed 12/1/16    Hiatal hernia     Hypercholesterolemia     Hypertension     Palpitations     Testicular hypogonadism     last assessed 10/4/13    [facility])        Past Surgical History:   Procedure Laterality Date    APPENDECTOMY      BICEPS TENODESIS      anesthesia for tenodesis- of ruptured long tendon of biceps     HERNIA REPAIR      THYROIDECTOMY      TONSILLECTOMY         Family History   Problem Relation Age of Onset    Lung cancer Father     Coronary artery disease Father         blockages    Stroke Maternal Grandmother     Cancer Paternal Grandfather         metastasized    Heart disease Family     Lung cancer Cousin     No Known Problems Mother     No Known Problems Sister     No Known Problems Brother     No Known Problems Maternal Aunt     No Known Problems Maternal Uncle     No Known Problems Paternal Aunt     No Known Problems Paternal Uncle     No Known Problems Paternal Grandmother     ADD / ADHD Neg Hx     Anesthesia problems Neg Hx     Clotting disorder Neg Hx     Collagen disease Neg Hx     Diabetes Neg Hx     Dislocations Neg Hx     Learning disabilities Neg Hx     Neurological problems Neg Hx     Osteoporosis Neg Hx     Rheumatologic disease Neg Hx     Scoliosis Neg Hx     Vascular Disease Neg Hx          Medications have been verified          Objective   /76 (BP Location: Right arm, Patient Position: Sitting, Cuff Size: Large)   Pulse 75   Temp 98 4 °F (36 9 °C) (Temporal)   Resp 20   SpO2 97%        Physical Exam     Physical Exam   Constitutional: He is oriented to person, place, and time  He appears well-developed and well-nourished  No distress  HENT:   Head: Normocephalic and atraumatic  Mouth/Throat: Oropharynx is clear and moist  No oropharyngeal exudate  Mild bilateral nasal congestion with no erythema  No discharge  Eyes: Pupils are equal, round, and reactive to light  Conjunctivae and EOM are normal    Musculoskeletal:   Range of motion of the left knee intact with small effusion present  No erythema  No warmth  No ecchymosis  No laxity  Positive crepitation  Full range of motion of the low back with mild tenderness  No current spasm  Strength 5/5 bilaterally lower extremity  Normal gait  Lymphadenopathy:     He has no cervical adenopathy  Neurological: He is alert and oriented to person, place, and time  Skin: Skin is warm and dry  He is not diaphoretic  Psychiatric: He has a normal mood and affect  His behavior is normal  Judgment and thought content normal    Nursing note and vitals reviewed

## 2020-03-10 DIAGNOSIS — K21.9 GASTROESOPHAGEAL REFLUX DISEASE WITHOUT ESOPHAGITIS: ICD-10-CM

## 2020-03-10 RX ORDER — ESOMEPRAZOLE MAGNESIUM 40 MG/1
40 CAPSULE, DELAYED RELEASE ORAL DAILY
Qty: 90 CAPSULE | Refills: 1 | Status: SHIPPED | OUTPATIENT
Start: 2020-03-10 | End: 2020-03-13 | Stop reason: CLARIF

## 2020-03-11 RX ORDER — ESOMEPRAZOLE MAGNESIUM 40 MG/1
40 CAPSULE, DELAYED RELEASE ORAL DAILY
Qty: 90 CAPSULE | Refills: 1 | Status: CANCELLED | OUTPATIENT
Start: 2020-03-11

## 2020-03-13 DIAGNOSIS — K21.9 GERD WITHOUT ESOPHAGITIS: Primary | ICD-10-CM

## 2020-03-13 RX ORDER — OMEPRAZOLE 40 MG/1
40 CAPSULE, DELAYED RELEASE ORAL DAILY
Qty: 90 CAPSULE | Refills: 1 | Status: SHIPPED | OUTPATIENT
Start: 2020-03-13 | End: 2020-08-26 | Stop reason: ALTCHOICE

## 2020-03-13 NOTE — TELEPHONE ENCOUNTER
received notification that esomeprazole 40mg will no longer be covered     Please advise     [employer] Prior Auth      Or     Change:   Omeprazole

## 2020-03-15 ENCOUNTER — OFFICE VISIT (OUTPATIENT)
Dept: URGENT CARE | Age: 55
End: 2020-03-15
Payer: MEDICARE

## 2020-03-15 VITALS
BODY MASS INDEX: 33.18 KG/M2 | SYSTOLIC BLOOD PRESSURE: 143 MMHG | RESPIRATION RATE: 18 BRPM | TEMPERATURE: 98.9 F | DIASTOLIC BLOOD PRESSURE: 86 MMHG | OXYGEN SATURATION: 96 % | HEIGHT: 69 IN | HEART RATE: 86 BPM | WEIGHT: 224 LBS

## 2020-03-15 DIAGNOSIS — R60.0 EDEMA OF LEFT LOWER EXTREMITY: Primary | ICD-10-CM

## 2020-03-15 PROCEDURE — 99213 OFFICE O/P EST LOW 20 MIN: CPT | Performed by: NURSE PRACTITIONER

## 2020-03-15 PROCEDURE — G0463 HOSPITAL OUTPT CLINIC VISIT: HCPCS | Performed by: NURSE PRACTITIONER

## 2020-03-15 NOTE — PATIENT INSTRUCTIONS
Edema   WHAT YOU NEED TO KNOW:   Edema is swelling throughout your body  Edema is usually a sign that you are retaining fluid  The swelling may be caused by heart failure or kidney, thyroid, or liver disease  It may also be caused by medicines such as antidepressants, blood pressure medicines, or hormones  Sudden swelling around the lips or face may be a sign of a severe allergic reaction  Swelling of an arm or leg may be caused by blockage of your veins  DISCHARGE INSTRUCTIONS:   Return to the emergency department if:   · You have shortness of breath at rest, especially when you lie down  · You cough up pink, foamy sputum  · You have chest pain  · Your heartbeat is fast or uneven  Contact your healthcare provider if:   · The swollen area feels cold and is pale or blue in color  · The swollen area feels warm, painful, and is red in color  · You have increased swelling or swelling in other parts of your body  · You have questions or concerns about your condition or care  Medicines:   · Medicines  help to get rid of extra body fluid  · Take your medicine as directed  Contact your healthcare provider if you think your medicine is not helping or if you have side effects  Tell him or her if you are allergic to any medicine  Keep a list of the medicines, vitamins, and herbs you take  Include the amounts, and when and why you take them  Bring the list or the pill bottles to follow-up visits  Carry your medicine list with you in case of an emergency  Follow up with your healthcare provider as directed:  Write down your questions so you remember to ask them during your visits  Manage edema:   · Elevate  your arms or legs as directed  Raise them above the level of your heart as often as you can  This will help decrease swelling and pain  Prop them on pillows or blankets to keep them elevated comfortably  · Wear pressure stockings as directed    The stockings are tight and put pressure on your legs  This helps to keep fluid from collecting in your legs or ankles  · Limit your salt intake  Salt causes your body to hold water  Ask about any other changes to your diet  · Stay active  Do not stand or sit for long periods of time  Ask your healthcare provider about the best exercise plan for you  · Keep your skin moist  using lotion, cream, or ointment  Ask your healthcare provider what to use and how often to use it  © 2017 [facility] Information is for End User's use only and may not be sold, redistributed or otherwise used for commercial purposes  All illustrations and images included in CareNotes® are the copyrighted property of [employer]  or [employer]  The above information is an  only  It is not intended as medical advice for individual conditions or treatments  Talk to your doctor, nurse or pharmacist before following any medical regimen to see if it is safe and effective for you

## 2020-03-15 NOTE — PROGRESS NOTES
[facility]        NAME: Fidencio Rubalcava is a 48 y o  male  : 1965    MRN: [id number]  DATE: March 15, 2020  TIME: 2:43 PM    Assessment and Plan   Edema of left lower extremity [R60 0]  1  Edema of left lower extremity           Patient Instructions       Advised to go to the emergency department for possible ultrasound  He is going to [facility]    Chief Complaint     Chief Complaint   Patient presents with   Manuel Rio swelling     Pt states has noticed his shin on left side was swollen  Pt states has been using knee brace when he walks         History of Present Illness       HPI   Presents to clinic with complaint of swelling on the left lower extremity  Started yesterday and getting worse today  Denies shortness of breath  No chest pain  No history of heart disease  Family history of heart disease is positive  Denies previous history of blood clots  Review of Systems   Review of Systems   HENT: Negative for trouble swallowing  Respiratory: Negative for shortness of breath  Gastrointestinal: Negative for vomiting  Musculoskeletal: Negative for neck stiffness          Swelling of left lower leg         Current Medications       Current Outpatient Medications:     albuterol (PROVENTIL HFA,VENTOLIN HFA) 90 mcg/act inhaler, Inhale 1 puff every 4 (four) hours as needed  , Disp: , Rfl:     cyclobenzaprine (FLEXERIL) 5 mg tablet, Take 1 tablet (5 mg total) by mouth 3 (three) times a day as needed for muscle spasms, Disp: 15 tablet, Rfl: 0    fluticasone-salmeterol (ADVAIR DISKUS) 500-50 mcg/dose, Inhale 1 puff 2 (two) times a day, Disp: , Rfl:     ipratropium-albuterol (DUO-NEB) 0 5-2 5 mg/3 mL, Inhale 3 mL 4 (four) times a day As needed, Disp: , Rfl:     lisinopril (ZESTRIL) 20 mg tablet, Take 1 tablet (20 mg total) by mouth daily, Disp: 90 tablet, Rfl: 1    meclizine (ANTIVERT) 12 5 MG tablet, Take 1 tablet (12 5 mg total) by mouth every 8 (eight) hours (Patient taking differently: Take 12 5 mg by mouth every 8 (eight) hours as needed ), Disp: 30 tablet, Rfl: 0    montelukast (SINGULAIR) 10 mg tablet, TAKE 1 TABLET BY MOUTH EVERY DAY, Disp: 90 tablet, Rfl: 1    omeprazole (PriLOSEC) 40 MG capsule, Take 1 capsule (40 mg total) by mouth daily, Disp: 90 capsule, Rfl: 1    Triamcinolone Acetonide (NASACORT ALLERGY 24HR NA), 1 spray into each nostril daily  , Disp: , Rfl:     ULORIC 40 MG tablet, Take 1 tablet (40 mg total) by mouth daily, Disp: 90 tablet, Rfl: 1    verapamil (CALAN) 120 mg tablet, Take 1 tablet (120 mg total) by mouth daily, Disp: 90 tablet, Rfl: 3    neomycin-polymyxin-hydrocortisone (CORTISPORIN) otic solution, Administer 4 drops into the left ear every 6 (six) hours for 7 days, Disp: 10 mL, Rfl: 0    pravastatin (PRAVACHOL) 20 mg tablet, Take 1 tablet (20 mg total) by mouth daily at bedtime (Patient not taking: Reported on 3/15/2020), Disp: 90 tablet, Rfl: 3    predniSONE 10 mg tablet, Six tablets day 1; 5 tablets day to; 4 tablets day 3; 3 tablets day 4; 2 tablets day 5; and 1 tablet day 6 (Patient not taking: Reported on 3/15/2020), Disp: 21 tablet, Rfl: 0    Current Allergies     Allergies as of 03/15/2020 - Reviewed 03/15/2020   Allergen Reaction Noted    Penicillins Rash and Anaphylaxis 08/17/2004    Acetazolamide  10/25/2016    Cephalosporins Other (See Comments) 08/17/2004    Doxycycline  10/24/2018    Other  04/28/2014    Sulfa antibiotics Other (See Comments) 08/17/2004    Famotidine Palpitations 09/05/2016    Omeprazole Palpitations 09/05/2016            The following portions of the patient's history were reviewed and updated as appropriate: allergies, current medications, past family history, past medical history, past social history, past surgical history and problem list      Past Medical History:   Diagnosis Date    Anxiety 1/15/2019    Arthritis     Chronic rhinitis     last assessed 2/21/14    Chronic serous otitis media last assessed 11/20/13    Chronic sinusitis     last assessed 8/19/14    Functional heart murmur     GERD (gastroesophageal reflux disease)     Gout     Hearing problem     last assessed 12/1/16    Hiatal hernia     Hypercholesterolemia     Hypertension     Palpitations     Testicular hypogonadism     last assessed 10/4/13    [facility])        Past Surgical History:   Procedure Laterality Date    APPENDECTOMY      BICEPS TENODESIS      anesthesia for tenodesis- of ruptured long tendon of biceps     HERNIA REPAIR      THYROIDECTOMY      TONSILLECTOMY         Family History   Problem Relation Age of Onset    Lung cancer Father     Coronary artery disease Father         blockages    Stroke Maternal Grandmother     Cancer Paternal Grandfather         metastasized    Heart disease Family     Lung cancer Cousin     No Known Problems Mother     No Known Problems Sister     No Known Problems Brother     No Known Problems Maternal Aunt     No Known Problems Maternal Uncle     No Known Problems Paternal Aunt     No Known Problems Paternal Uncle     No Known Problems Paternal Grandmother     ADD / ADHD Neg Hx     Anesthesia problems Neg Hx     Clotting disorder Neg Hx     Collagen disease Neg Hx     Diabetes Neg Hx     Dislocations Neg Hx     Learning disabilities Neg Hx     Neurological problems Neg Hx     Osteoporosis Neg Hx     Rheumatologic disease Neg Hx     Scoliosis Neg Hx     Vascular Disease Neg Hx          Medications have been verified  Objective   /86   Pulse 86   Temp 98 9 °F (37 2 °C) (Oral)   Resp 18   Ht 5' 9" (1 753 m)   Wt 102 kg (224 lb)   SpO2 96%   BMI 33 08 kg/m²        Physical Exam     Physical Exam   Cardiovascular: Normal rate and regular rhythm     Pulmonary/Chest: Effort normal and breath sounds normal    Musculoskeletal: He exhibits edema (Left lower lateral shin is swollen, while the right shin is normal   In addition med from his socks on L leg is much deeper than on the right leg)  There is also a lump on the left lower extremity, just proximal to the left ankle, lateral aspect  Measures about 10 centimeters in diameter  Nontender to palpation  No erythema    Patient reports this is new and 1st noticed today

## 2020-03-16 NOTE — TELEPHONE ENCOUNTER
After Visit Summary   2/28/2018    May Gunderson    MRN: [id number]           Patient Information     Date Of Birth          1953        Visit Information        Provider Department      2/28/2018 6:45 PM Rosanna Negron MD [facility]        Today's Diagnoses     Left non-suppurative otitis media    -  1    Sinus congestion        Acute bronchospasm          Care Instructions      Otitis Media (Middle-Ear Infection) in Adults  Otitis media is another name for a middle-ear infection. It means an infection behind your eardrum. This kind of ear infection can happen after any condition that keeps fluid from draining from the middle ear. These conditions include allergies, a cold, a sore throat, or a respiratory infection.  Middle-ear infections are common in children, but they can also happen in adults. An ear infection in an adult may mean a more serious problem than in a child. So you may need additional tests. If you have an ear infection, you should see your health care provider for treatment.  What are the types of middle-ear infections?  Infections can affect the middle ear in several ways. They are:    Acute otitis media. This middle-ear infection occurs suddenly. It causes swelling and redness. Fluid and mucus become trapped inside the ear. You can have a fever and ear pain.    Otitis media with effusion. Fluid (effusion) and mucus build up in the middle ear after the infection goes away. You may feel like your middle ear is full. This can continue for months and may affect your hearing.    Chronic otitis media with effusion. Fluid (effusion) remains in the middle ear for a long time. Or it builds up again and again, even though there is no infection. This type of middle-ear infection may be hard to treat. It may also affect your hearing.  Who is more likely to get a middle-ear infection?  You are more likely to get an ear infection if you:    Smoke or are around  Unable to reach pt about change in medication someone who smokes    Have seasonal or year-round allergy symptoms    Have a cold or other upper respiratory infection  What causes a middle-ear infection?  The middle ear connects to the throat by a canal called the eustachian tube. This tube helps even out the pressure between the outer ear and the inner ear. A cold or allergy can irritate the tube or cause the area around it to swell. This can keep fluid from draining from the middle ear. The fluid builds up behind the eardrum. Bacteria and viruses can grow in this fluid. The bacteria and viruses cause the middle-ear infection.  What are the symptoms of a middle-ear infection?  Common symptoms of a middle-ear infection in adults are:    Pain in 1 or both ears    Drainage from the ear    Muffled hearing    Sore throat   You may also have a fever. Rarely, your balance can be affected.  These symptoms may be the same as for other conditions. It s important to talk with your health care provider if you think you have a middle-ear infection. If you have a high fever, severe pain behind your ear, or paralysis in your face, see your provider as soon as you can.  How is a middle-ear infection diagnosed?  Your health care provider will take a medical history and do a physical exam. He or she will look at the outer ear and eardrum with an otoscope. The otoscope is a lighted tool that lets your provider see inside the ear. A pneumatic otoscope blows a puff of air into the ear to check how well your eardrum moves. If you eardrum doesn t move well, it may mean you have fluid behind it.  Your provider may also do a test called tympanometry. This test tells how well the middle ear is working. It can find any changes in pressure in the middle ear. Your provider may test your hearing with a tuning fork.  How is a middle-ear infection treated?  A middle-ear infection may be treated with:    Antibiotics, taken by mouth or as ear drops    Medication for pain    Decongestants,  antihistamines, or nasal steroids  Your health care provider may also have you try autoinsufflation. This helps adjust the air pressure in your ear. For this, you pinch your nose and gently exhale. This forces air back through the eustachian tube.  The exact treatment for your ear infection will depend on the type of infection you have. In general, if your symptoms don t get better in 48 to 72 hours, contact your health care provider.  Middle-ear infections can cause long-term problems if not treated. They can lead to:    Infection in other parts of the head    Permanent hearing loss    Paralysis of a nerve in your face  If you have a middle-ear infection that doesn t get better, you may need to see an ear, nose, and throat specialist (otolaryngologist). You may need a CT scan or MRI to check for head and neck cancer.  Ear tubes  Sometimes fluid stays in the middle ear even after you take antibiotics and the infection goes away. In this case, your health care provider may suggest that a small tube be placed in your ear. The tube is put at the opening of the eardrum. The tube keeps fluid from building up and relieves pressure in the middle ear. It can also help you hear better. This surgery is called myringotomy. It is not often done in adults.  The tubes usually fall out on their own after 6 months to a year.    0568-6642 [employer]. [address] [place], [place] [address]. All rights reserved. This information is not intended as a substitute for professional medical care. Always follow your healthcare professional's instructions.        Understanding Your Sinuses  Your sinuses are air-filled spaces between the bones in your head. They have small openings that connect to the nasal cavity. The sinuses make mucus that drains into the nose. This helps keep the nose moist and free of dust and germs.      Parts of the nasal cavity    The septum is the wall of cartilage and bone in the center of the nasal  "cavity.    The middle meatus is the intersection between the sinuses.    Turbinates are ridges on the sides of the nasal cavity.  Cilia keep sinuses clear    Air circulates freely though healthy sinuses. Tiny, hairlike structures called cilia line the sinuses. Cilia move the thin, watery mucus through the sinuses and into the nose. Sinuses are healthy when they drain freely. Sinus drainage can be blocked if the sinus lining is swollen or if mucus is too thick. Cilia that are damaged or don t work correctly can also lead to problems with drainage.  Date Last Reviewed: 10/1/2016    3632-5176 [employer]. [address] [place] [address]. All rights reserved. This information is not intended as a substitute for professional medical care. Always follow your healthcare professional's instructions.                Follow-ups after your visit        Who to contact     If you have questions or need follow up information about today's clinic visit or your schedule please contact [facility] directly at [phone].  Normal or non-critical lab and imaging results will be communicated to you by [employer], letter or phone within 4 business days after the clinic has received the results. If you do not hear from us within 7 days, please contact the clinic through [facility] or phone. If you have a critical or abnormal lab result, we will notify you by phone as soon as possible.  Submit refill requests through [facility] or call your pharmacy and they will forward the refill request to us. Please allow 3 business days for your refill to be completed.          Additional Information About Your Visit        [employer] Information     [employer] lets you send messages to your doctor, view your test results, renew your prescriptions, schedule appointments and more. To sign up, go to [email or web address] . Click on \"Log in\" on the left side of the screen, which will take you to the Welcome page. Then " "click on \"Sign up Now\" on the right side of the page.     You will be asked to enter the access code listed below, as well as some personal information. Please follow the directions to create your username and password.     Your access code is: [email or web address]  Expires: 2018  7:18 PM     Your access code will  in 90 days. If you need help or a new code, please call your [place] clinic or [phone].        Care EveryWhere ID     This is your Care EveryWhere ID. This could be used by other organizations to access your [place] medical records  [id number]        Your Vitals Were     Pulse Temperature Respirations Height Pulse Oximetry BMI (Body Mass Index)    62 98.6  F (37  C) (Oral) 16 5' 8\" (1.727 m) 98% 28.89 kg/m2       Blood Pressure from Last 3 Encounters:   18 128/84   14 132/82   14 136/82    Weight from Last 3 Encounters:   18 190 lb (86.2 kg)   14 204 lb 6.4 oz (92.7 kg)   14 210 lb (95.3 kg)              Today, you had the following     No orders found for display         Today's Medication Changes          These changes are accurate as of 18  7:18 PM.  If you have any questions, ask your nurse or doctor.               Start taking these medicines.        Dose/Directions    albuterol 108 (90 BASE) MCG/ACT Inhaler   Commonly known as:  PROAIR HFA/PROVENTIL HFA/VENTOLIN HFA   Used for:  Acute bronchospasm   Started by:  Rosanna Negron MD        Dose:  1-2 puff   Inhale 1-2 puffs into the lungs every 4 hours as needed for shortness of breath / dyspnea or wheezing   Quantity:  1 Inhaler   Refills:  0       benzonatate 200 MG capsule   Commonly known as:  TESSALON   Used for:  Acute bronchospasm   Started by:  Rosanna Negron MD        Dose:  200 mg   Take 1 capsule (200 mg) by mouth 3 times daily as needed for cough   Quantity:  21 capsule   Refills:  0       doxycycline 100 MG capsule   Commonly known as:  VIBRAMYCIN   Used for:  Left " non-suppurative otitis media   Started by:  Rosanna Negron MD        Dose:  100 mg   Take 1 capsule (100 mg) by mouth 2 times daily for 10 days   Quantity:  20 capsule   Refills:  0       fluticasone 50 MCG/ACT spray   Commonly known as:  FLONASE   Used for:  Sinus congestion   Started by:  Rosanna Negron MD        Dose:  1-2 spray   Spray 1-2 sprays into both nostrils daily   Quantity:  16 g   Refills:  0            Where to get your medicines      These medications were sent to [facility]/pharmacy #[id number] - [place], [place] - [address]  [address] [facility] [address]     Phone:  [phone]     albuterol 108 (90 BASE) MCG/ACT Inhaler    benzonatate 200 MG capsule    doxycycline 100 MG capsule    fluticasone 50 MCG/ACT spray                Primary Care Provider Office Phone # Fax #    Aleks Guillory [phone] [address] [facility] [address]        Equal Access to Services     [facility] AH: Hadii aad ku hadasho Soomaali, waaxda luqadaha, qaybta kaalmada adeegyada, waxay idiin hayjosen raisa pereyra . So [facility] [phone].    ATENCIÓN: Si habla español, tiene a horta disposición servicios gratuitos de asistencia lingüística. [facility] [phone].    We comply with applicable federal civil rights laws and [place] laws. We do not discriminate on the basis of race, color, national origin, age, disability, sex, sexual orientation, or gender identity.            Thank you!     Thank you for choosing [facility]  for your care. Our goal is always to provide you with excellent care. Hearing back from our patients is one way we can continue to improve our services. Please take a few minutes to complete the written survey that you may receive in the mail after your visit with us. Thank you!             Your Updated Medication List - Protect others around you: Learn how to safely use, store and throw away your medicines at www.disposemymeds.org.          This  list is accurate as of 2/28/18  7:18 PM.  Always use your most recent med list.                   Brand Name Dispense Instructions for use Diagnosis    albuterol 108 (90 BASE) MCG/ACT Inhaler    PROAIR HFA/PROVENTIL HFA/VENTOLIN HFA    1 Inhaler    Inhale 1-2 puffs into the lungs every 4 hours as needed for shortness of breath / dyspnea or wheezing    Acute bronchospasm       benzonatate 200 MG capsule    TESSALON    21 capsule    Take 1 capsule (200 mg) by mouth 3 times daily as needed for cough    Acute bronchospasm       doxycycline 100 MG capsule    VIBRAMYCIN    20 capsule    Take 1 capsule (100 mg) by mouth 2 times daily for 10 days    Left non-suppurative otitis media       fluticasone 50 MCG/ACT spray    FLONASE    16 g    Spray 1-2 sprays into both nostrils daily    Sinus congestion

## 2020-03-18 ENCOUNTER — TELEPHONE (OUTPATIENT)
Dept: INTERNAL MEDICINE CLINIC | Facility: CLINIC | Age: 55
End: 2020-03-18

## 2020-03-18 ENCOUNTER — TELEPHONE (OUTPATIENT)
Dept: OBGYN CLINIC | Facility: MEDICAL CENTER | Age: 55
End: 2020-03-18

## 2020-03-18 NOTE — TELEPHONE ENCOUNTER
Patient currently taking Uloric and his insurance formulary changed and this is no longer covered  [facility] that [facility]  He said their phone number [phone] and they should be able to explain better what they need

## 2020-03-20 ENCOUNTER — OFFICE VISIT (OUTPATIENT)
Dept: OBGYN CLINIC | Facility: MEDICAL CENTER | Age: 55
End: 2020-03-20
Payer: MEDICARE

## 2020-03-20 ENCOUNTER — TELEPHONE (OUTPATIENT)
Dept: OBGYN CLINIC | Facility: MEDICAL CENTER | Age: 55
End: 2020-03-20

## 2020-03-20 VITALS
HEART RATE: 82 BPM | SYSTOLIC BLOOD PRESSURE: 137 MMHG | BODY MASS INDEX: 32.7 KG/M2 | DIASTOLIC BLOOD PRESSURE: 82 MMHG | WEIGHT: 228.4 LBS | TEMPERATURE: 99 F | HEIGHT: 70 IN

## 2020-03-20 DIAGNOSIS — M17.0 BILATERAL PRIMARY OSTEOARTHRITIS OF KNEE: Primary | ICD-10-CM

## 2020-03-20 PROCEDURE — 3079F DIAST BP 80-89 MM HG: CPT | Performed by: PHYSICIAN ASSISTANT

## 2020-03-20 PROCEDURE — 99213 OFFICE O/P EST LOW 20 MIN: CPT | Performed by: PHYSICIAN ASSISTANT

## 2020-03-20 PROCEDURE — 3008F BODY MASS INDEX DOCD: CPT | Performed by: PHYSICIAN ASSISTANT

## 2020-03-20 PROCEDURE — 1036F TOBACCO NON-USER: CPT | Performed by: PHYSICIAN ASSISTANT

## 2020-03-20 PROCEDURE — 3075F SYST BP GE 130 - 139MM HG: CPT | Performed by: PHYSICIAN ASSISTANT

## 2020-03-20 PROCEDURE — 20610 DRAIN/INJ JOINT/BURSA W/O US: CPT | Performed by: PHYSICIAN ASSISTANT

## 2020-03-20 RX ORDER — METHYLPREDNISOLONE ACETATE 40 MG/ML
2 INJECTION, SUSPENSION INTRA-ARTICULAR; INTRALESIONAL; INTRAMUSCULAR; SOFT TISSUE
Status: COMPLETED | OUTPATIENT
Start: 2020-03-20 | End: 2020-03-20

## 2020-03-20 RX ORDER — LIDOCAINE HYDROCHLORIDE 10 MG/ML
3 INJECTION, SOLUTION INFILTRATION; PERINEURAL
Status: COMPLETED | OUTPATIENT
Start: 2020-03-20 | End: 2020-03-20

## 2020-03-20 RX ADMIN — LIDOCAINE HYDROCHLORIDE 3 ML: 10 INJECTION, SOLUTION INFILTRATION; PERINEURAL at 15:45

## 2020-03-20 RX ADMIN — METHYLPREDNISOLONE ACETATE 2 ML: 40 INJECTION, SUSPENSION INTRA-ARTICULAR; INTRALESIONAL; INTRAMUSCULAR; SOFT TISSUE at 15:45

## 2020-03-20 NOTE — TELEPHONE ENCOUNTER
I called patient to discuss appointment and to consider moving appointment or converting to virtual appointment in light of the coronavirus pandemic  No answer  I left a message for him to call back

## 2020-03-20 NOTE — PROGRESS NOTES
Assessment/Plan:  1  Bilateral primary osteoarthritis of knee      Orders Placed This Encounter   Procedures    Large joint arthrocentesis     · Patient received bilateral knee steroid injections in the office today  Tolerated the procedure well  Advised to apply ice and avoid strenuous activity for 1-2 days as needed  · Continue ibuprofen as needed for pain  · Continue knee brace as needed for comfort  Return in about 3 months (around 6/20/2020) for bilat knee CSI  I answered all of the patient's questions during the visit and provided education of the patient's condition during the visit  The patient verbalized understanding of the information given and agrees with the plan  This note was dictated using [employer] software  It may contain errors including improperly dictated words  Please contact physician directly for any questions  Subjective   Chief Complaint:   Chief Complaint   Patient presents with    Left Knee - Follow-up    Right Knee - Follow-up       HPI  Fidencio Rubalcava is a 48 y o  male who presents for follow up for bilateral knee osteoarthritis  Patient received bilateral knee steroid injections on 12/18/2019 and reports 100% pain relief for 7 weeks  Patient went to the urgent care last week due to increased knee pain and swelling and received a prednisone taper which he states helped his knee pain a lot  States he is still having intermittent, throbbing pain mainly when walking long distance and walking up hills  He notes resolution of pain with rest  Left knee is worse than right knee  Taking 600 mg ibuprofen for pain as needed  Wears brace on left knee  No PT but stretches before walking  Denies new injury  Review of Systems  ROS:    See HPI for musculoskeletal review     All other systems reviewed are negative     History:  Past Medical History:   Diagnosis Date    Anxiety 1/15/2019    Arthritis     Chronic rhinitis     last assessed 2/21/14    Chronic serous otitis media last assessed 11/20/13    Chronic sinusitis     last assessed 8/19/14    Functional heart murmur     GERD (gastroesophageal reflux disease)     Gout     Hearing problem     last assessed 12/1/16    Hiatal hernia     Hypercholesterolemia     Hypertension     Palpitations     Testicular hypogonadism     last assessed 10/4/13    V-tach [facility])      Past Surgical History:   Procedure Laterality Date    APPENDECTOMY      BICEPS TENODESIS      anesthesia for tenodesis- of ruptured long tendon of biceps     HERNIA REPAIR      THYROIDECTOMY      TONSILLECTOMY       Social History   Social History     Substance and Sexual Activity   Alcohol Use Yes    Frequency: 2-3 times a week    Drinks per session: 5 or 6    Binge frequency: Weekly    Comment: occassionally     Social History     Substance and Sexual Activity   Drug Use No     Social History     Tobacco Use   Smoking Status Never Smoker   Smokeless Tobacco Never Used     Family History:   Family History   Problem Relation Age of Onset    Lung cancer Father     Coronary artery disease Father         blockages    Stroke Maternal Grandmother     Cancer Paternal Grandfather         metastasized    Heart disease Family     Lung cancer Cousin     No Known Problems Mother     No Known Problems Sister     No Known Problems Brother     No Known Problems Maternal Aunt     No Known Problems Maternal Uncle     No Known Problems Paternal Aunt     No Known Problems Paternal Uncle     No Known Problems Paternal Grandmother     ADD / ADHD Neg Hx     Anesthesia problems Neg Hx     Clotting disorder Neg Hx     Collagen disease Neg Hx     Diabetes Neg Hx     Dislocations Neg Hx     Learning disabilities Neg Hx     Neurological problems Neg Hx     Osteoporosis Neg Hx     Rheumatologic disease Neg Hx     Scoliosis Neg Hx     Vascular Disease Neg Hx        Current Outpatient Medications on File Prior to Visit   Medication Sig Dispense Refill    albuterol (PROVENTIL HFA,VENTOLIN HFA) 90 mcg/act inhaler Inhale 1 puff every 4 (four) hours as needed        cyclobenzaprine (FLEXERIL) 5 mg tablet Take 1 tablet (5 mg total) by mouth 3 (three) times a day as needed for muscle spasms 15 tablet 0    fluticasone-salmeterol (ADVAIR DISKUS) 500-50 mcg/dose Inhale 1 puff 2 (two) times a day      ipratropium-albuterol (DUO-NEB) 0 5-2 5 mg/3 mL Inhale 3 mL 4 (four) times a day As needed      lisinopril (ZESTRIL) 20 mg tablet Take 1 tablet (20 mg total) by mouth daily 90 tablet 1    meclizine (ANTIVERT) 12 5 MG tablet Take 1 tablet (12 5 mg total) by mouth every 8 (eight) hours (Patient taking differently: Take 12 5 mg by mouth every 8 (eight) hours as needed ) 30 tablet 0    montelukast (SINGULAIR) 10 mg tablet TAKE 1 TABLET BY MOUTH EVERY DAY 90 tablet 1    neomycin-polymyxin-hydrocortisone (CORTISPORIN) otic solution Administer 4 drops into the left ear every 6 (six) hours for 7 days 10 mL 0    omeprazole (PriLOSEC) 40 MG capsule Take 1 capsule (40 mg total) by mouth daily 90 capsule 1    pravastatin (PRAVACHOL) 20 mg tablet Take 1 tablet (20 mg total) by mouth daily at bedtime (Patient not taking: Reported on 3/15/2020) 90 tablet 3    predniSONE 10 mg tablet Six tablets day 1; 5 tablets day to; 4 tablets day 3; 3 tablets day 4; 2 tablets day 5; and 1 tablet day 6 (Patient not taking: Reported on 3/15/2020) 21 tablet 0    Triamcinolone Acetonide (NASACORT ALLERGY 24HR NA) 1 spray into each nostril daily        ULORIC 40 MG tablet Take 1 tablet (40 mg total) by mouth daily 90 tablet 1    verapamil (CALAN) 120 mg tablet Take 1 tablet (120 mg total) by mouth daily 90 tablet 3     No current facility-administered medications on file prior to visit  Allergies   Allergen Reactions    Penicillins Rash and Anaphylaxis    Acetazolamide      Other reaction(s): Stomach Ache    Cephalosporins Other (See Comments)     headache    Doxycycline      Capsules only  Tablets are ok to take, per pt  Capsules only  Tablets are ok to take, per pt   Other     Sulfa Antibiotics Other (See Comments)     Category: Allergy;  Annotation - [id number]: STOMACH UPSET    Famotidine Palpitations    Omeprazole Palpitations     Painful urination        Objective     /82   Pulse 82   Temp 99 °F (37 2 °C)   Ht 5' 9 5" (1 765 m)   Wt 104 kg (228 lb 6 4 oz)   BMI 33 25 kg/m²      PE:  AAOx 3  WDWN  Hearing intact, no drainage from eyes  no audible wheezing  no abdominal distension  LE compartments soft, skin intact    Ortho Exam:  right Knee:   No erythema  no swelling  no effusion  no warmth  No TTP  AROM: 0- 120  Stable to varus/valgus stress    Left Knee:   No erythema  Mild swelling  no effusion  no warmth  No TTP  AROM: 0- 120  Stable to varus/valgus stress    Large joint arthrocentesis: bilateral knee  Date/Time: 3/20/2020 3:45 PM  Consent given by: patient  Site marked: site marked  Timeout: Immediately prior to procedure a time out was called to verify the correct patient, procedure, equipment, support staff and site/side marked as required   Supporting Documentation  Indications: pain   Procedure Details  Location: knee - bilateral knee  Preparation: Patient was prepped and draped in the usual sterile fashion  Needle size: 22 G  Ultrasound guidance: no  Approach: anterolateral    Medications (Right): 3 mL lidocaine 1 %; 2 mL methylPREDNISolone acetate 40 mg/mLMedications (Left): 3 mL lidocaine 1 %; 2 mL methylPREDNISolone acetate 40 mg/mL   Patient tolerance: patient tolerated the procedure well with no immediate complications  Dressing:  Sterile dressing applied

## 2020-03-23 NOTE — TELEPHONE ENCOUNTER
Started PA through [address] [place] [address] my meds for:    Uloric 40Mg      Sang OLVERA Case ID: [id number]

## 2020-03-24 ENCOUNTER — TELEPHONE (OUTPATIENT)
Dept: INTERNAL MEDICINE CLINIC | Facility: CLINIC | Age: 55
End: 2020-03-24

## 2020-03-24 NOTE — TELEPHONE ENCOUNTER
Went to start PA for Omeprazole on [email or web address]  [email or web address]  Stated on the web site prior auth not needed  Called [facility] and they ran medication one more time and did tell me it went through with his insurance  Medication will be 3 dollars for90 qty       Called patient and notified him      Also [place] was approved and patient and pharmacy are aware

## 2020-04-28 ENCOUNTER — OFFICE VISIT (OUTPATIENT)
Dept: URGENT CARE | Age: 55
End: 2020-04-28
Payer: MEDICARE

## 2020-04-28 VITALS — TEMPERATURE: 97.6 F | OXYGEN SATURATION: 97 %

## 2020-04-28 DIAGNOSIS — J20.9 ACUTE BRONCHITIS, UNSPECIFIED ORGANISM: ICD-10-CM

## 2020-04-28 DIAGNOSIS — J01.40 ACUTE NON-RECURRENT PANSINUSITIS: ICD-10-CM

## 2020-04-28 DIAGNOSIS — J22 ACUTE RESPIRATORY INFECTION: Primary | ICD-10-CM

## 2020-04-28 PROCEDURE — U0003 INFECTIOUS AGENT DETECTION BY NUCLEIC ACID (DNA OR RNA); SEVERE ACUTE RESPIRATORY SYNDROME CORONAVIRUS 2 (SARS-COV-2) (CORONAVIRUS DISEASE [COVID-19]), AMPLIFIED PROBE TECHNIQUE, MAKING USE OF HIGH THROUGHPUT TECHNOLOGIES AS DESCRIBED BY CMS-2020-01-R: HCPCS | Performed by: FAMILY MEDICINE

## 2020-04-28 PROCEDURE — G0463 HOSPITAL OUTPT CLINIC VISIT: HCPCS | Performed by: FAMILY MEDICINE

## 2020-04-28 PROCEDURE — 99213 OFFICE O/P EST LOW 20 MIN: CPT | Performed by: FAMILY MEDICINE

## 2020-04-28 RX ORDER — AZITHROMYCIN 250 MG/1
TABLET, FILM COATED ORAL
Qty: 6 TABLET | Refills: 0 | Status: SHIPPED | OUTPATIENT
Start: 2020-04-28 | End: 2020-05-02

## 2020-04-28 RX ORDER — METHYLPREDNISOLONE 4 MG/1
TABLET ORAL
Qty: 21 TABLET | Refills: 0 | Status: SHIPPED | OUTPATIENT
Start: 2020-04-28 | End: 2020-08-26 | Stop reason: ALTCHOICE

## 2020-04-30 ENCOUNTER — TELEPHONE (OUTPATIENT)
Dept: URGENT CARE | Age: 55
End: 2020-04-30

## 2020-04-30 LAB — SARS-COV-2 RNA SPEC QL NAA+PROBE: NOT DETECTED

## 2020-05-03 ENCOUNTER — OFFICE VISIT (OUTPATIENT)
Dept: URGENT CARE | Age: 55
End: 2020-05-03
Payer: MEDICARE

## 2020-05-03 VITALS
TEMPERATURE: 98 F | RESPIRATION RATE: 18 BRPM | BODY MASS INDEX: 32.21 KG/M2 | OXYGEN SATURATION: 97 % | WEIGHT: 225 LBS | HEIGHT: 70 IN | SYSTOLIC BLOOD PRESSURE: 156 MMHG | DIASTOLIC BLOOD PRESSURE: 90 MMHG | HEART RATE: 78 BPM

## 2020-05-03 DIAGNOSIS — J45.909 MODERATE ASTHMA WITHOUT COMPLICATION, UNSPECIFIED WHETHER PERSISTENT: ICD-10-CM

## 2020-05-03 DIAGNOSIS — G47.00 INSOMNIA, UNSPECIFIED TYPE: Primary | ICD-10-CM

## 2020-05-03 PROCEDURE — 99213 OFFICE O/P EST LOW 20 MIN: CPT | Performed by: PREVENTIVE MEDICINE

## 2020-05-03 PROCEDURE — G0463 HOSPITAL OUTPT CLINIC VISIT: HCPCS | Performed by: PREVENTIVE MEDICINE

## 2020-05-03 RX ORDER — LORAZEPAM 0.5 MG/1
0.5 TABLET ORAL
Qty: 10 TABLET | Refills: 0 | Status: SHIPPED | OUTPATIENT
Start: 2020-05-03 | End: 2020-08-26 | Stop reason: ALTCHOICE

## 2020-05-03 RX ORDER — AZITHROMYCIN 250 MG/1
TABLET, FILM COATED ORAL
Qty: 6 TABLET | Refills: 0 | Status: SHIPPED | OUTPATIENT
Start: 2020-05-03 | End: 2020-05-07

## 2020-05-03 RX ORDER — PREDNISONE 10 MG/1
TABLET ORAL
Qty: 12 TABLET | Refills: 0 | Status: SHIPPED | OUTPATIENT
Start: 2020-05-03 | End: 2020-05-08

## 2020-05-04 ENCOUNTER — TELEPHONE (OUTPATIENT)
Dept: URGENT CARE | Age: 55
End: 2020-05-04

## 2020-05-04 DIAGNOSIS — J45.909 MODERATE ASTHMA, UNSPECIFIED WHETHER COMPLICATED, UNSPECIFIED WHETHER PERSISTENT: Primary | ICD-10-CM

## 2020-05-04 RX ORDER — PREDNISONE 10 MG/1
TABLET ORAL
Qty: 15 TABLET | Refills: 0 | Status: SHIPPED | OUTPATIENT
Start: 2020-05-04 | End: 2020-08-26 | Stop reason: ALTCHOICE

## 2020-05-05 ENCOUNTER — TELEMEDICINE (OUTPATIENT)
Dept: INTERNAL MEDICINE CLINIC | Facility: CLINIC | Age: 55
End: 2020-05-05
Payer: MEDICARE

## 2020-05-05 VITALS
HEIGHT: 70 IN | TEMPERATURE: 98.4 F | WEIGHT: 220 LBS | DIASTOLIC BLOOD PRESSURE: 80 MMHG | BODY MASS INDEX: 31.5 KG/M2 | SYSTOLIC BLOOD PRESSURE: 116 MMHG

## 2020-05-05 DIAGNOSIS — I10 ESSENTIAL HYPERTENSION: ICD-10-CM

## 2020-05-05 DIAGNOSIS — E78.2 MIXED HYPERLIPIDEMIA: ICD-10-CM

## 2020-05-05 DIAGNOSIS — J45.909 MODERATE ASTHMA, UNSPECIFIED WHETHER COMPLICATED, UNSPECIFIED WHETHER PERSISTENT: ICD-10-CM

## 2020-05-05 DIAGNOSIS — K21.9 GERD WITHOUT ESOPHAGITIS: Primary | ICD-10-CM

## 2020-05-05 PROCEDURE — 99442 PR PHYS/QHP TELEPHONE EVALUATION 11-20 MIN: CPT | Performed by: INTERNAL MEDICINE

## 2020-05-05 RX ORDER — ESOMEPRAZOLE MAGNESIUM 40 MG/1
40 CAPSULE, DELAYED RELEASE ORAL
COMMUNITY

## 2020-05-12 DIAGNOSIS — J31.0 CHRONIC RHINITIS: ICD-10-CM

## 2020-05-13 RX ORDER — MONTELUKAST SODIUM 10 MG/1
10 TABLET ORAL DAILY
Qty: 90 TABLET | Refills: 1 | Status: SHIPPED | OUTPATIENT
Start: 2020-05-13 | End: 2020-11-06

## 2020-05-16 ENCOUNTER — OFFICE VISIT (OUTPATIENT)
Dept: URGENT CARE | Age: 55
End: 2020-05-16
Payer: MEDICARE

## 2020-05-16 VITALS
SYSTOLIC BLOOD PRESSURE: 145 MMHG | HEART RATE: 87 BPM | HEIGHT: 70 IN | TEMPERATURE: 98.8 F | RESPIRATION RATE: 20 BRPM | BODY MASS INDEX: 31.5 KG/M2 | DIASTOLIC BLOOD PRESSURE: 70 MMHG | WEIGHT: 220 LBS | OXYGEN SATURATION: 94 %

## 2020-05-16 DIAGNOSIS — R09.81 SINUS CONGESTION: Primary | ICD-10-CM

## 2020-05-16 PROCEDURE — G0463 HOSPITAL OUTPT CLINIC VISIT: HCPCS | Performed by: NURSE PRACTITIONER

## 2020-05-16 PROCEDURE — 99213 OFFICE O/P EST LOW 20 MIN: CPT | Performed by: NURSE PRACTITIONER

## 2020-05-28 ENCOUNTER — TELEMEDICINE (OUTPATIENT)
Dept: OBGYN CLINIC | Facility: MEDICAL CENTER | Age: 55
End: 2020-05-28
Payer: MEDICARE

## 2020-05-28 DIAGNOSIS — M17.11 PRIMARY OSTEOARTHRITIS OF RIGHT KNEE: Primary | ICD-10-CM

## 2020-05-28 DIAGNOSIS — G89.29 CHRONIC PAIN OF RIGHT KNEE: ICD-10-CM

## 2020-05-28 DIAGNOSIS — M17.12 PRIMARY OSTEOARTHRITIS OF LEFT KNEE: ICD-10-CM

## 2020-05-28 DIAGNOSIS — M25.561 CHRONIC PAIN OF RIGHT KNEE: ICD-10-CM

## 2020-05-28 PROCEDURE — 99214 OFFICE O/P EST MOD 30 MIN: CPT | Performed by: ORTHOPAEDIC SURGERY

## 2020-05-29 DIAGNOSIS — M10.9 GOUTY ARTHRITIS: ICD-10-CM

## 2020-05-29 RX ORDER — FEBUXOSTAT 40 MG/1
40 TABLET ORAL DAILY
Qty: 90 TABLET | Refills: 1 | Status: SHIPPED | OUTPATIENT
Start: 2020-05-29 | End: 2020-11-24 | Stop reason: SDUPTHER

## 2020-06-02 ENCOUNTER — OFFICE VISIT (OUTPATIENT)
Dept: INTERNAL MEDICINE CLINIC | Age: 55
End: 2020-06-02
Payer: MEDICARE

## 2020-06-02 VITALS
SYSTOLIC BLOOD PRESSURE: 108 MMHG | HEART RATE: 84 BPM | OXYGEN SATURATION: 96 % | WEIGHT: 227 LBS | HEIGHT: 70 IN | DIASTOLIC BLOOD PRESSURE: 68 MMHG | BODY MASS INDEX: 32.5 KG/M2 | TEMPERATURE: 98.4 F

## 2020-06-02 DIAGNOSIS — J45.909 MODERATE ASTHMA, UNSPECIFIED WHETHER COMPLICATED, UNSPECIFIED WHETHER PERSISTENT: ICD-10-CM

## 2020-06-02 DIAGNOSIS — I10 ESSENTIAL HYPERTENSION: ICD-10-CM

## 2020-06-02 DIAGNOSIS — Z00.00 MEDICARE ANNUAL WELLNESS VISIT, INITIAL: ICD-10-CM

## 2020-06-02 DIAGNOSIS — K21.9 GERD WITHOUT ESOPHAGITIS: Primary | ICD-10-CM

## 2020-06-02 DIAGNOSIS — E78.2 MIXED HYPERLIPIDEMIA: ICD-10-CM

## 2020-06-02 DIAGNOSIS — R79.89 ABNORMAL LIVER FUNCTION TEST: ICD-10-CM

## 2020-06-02 PROCEDURE — 1036F TOBACCO NON-USER: CPT | Performed by: INTERNAL MEDICINE

## 2020-06-02 PROCEDURE — 3078F DIAST BP <80 MM HG: CPT | Performed by: INTERNAL MEDICINE

## 2020-06-02 PROCEDURE — 99214 OFFICE O/P EST MOD 30 MIN: CPT | Performed by: INTERNAL MEDICINE

## 2020-06-02 PROCEDURE — G0438 PPPS, INITIAL VISIT: HCPCS | Performed by: INTERNAL MEDICINE

## 2020-06-02 PROCEDURE — 3008F BODY MASS INDEX DOCD: CPT | Performed by: INTERNAL MEDICINE

## 2020-06-02 PROCEDURE — 3074F SYST BP LT 130 MM HG: CPT | Performed by: INTERNAL MEDICINE

## 2020-06-15 ENCOUNTER — TELEPHONE (OUTPATIENT)
Dept: OBGYN CLINIC | Facility: HOSPITAL | Age: 55
End: 2020-06-15

## 2020-06-16 ENCOUNTER — TELEPHONE (OUTPATIENT)
Dept: OBGYN CLINIC | Facility: MEDICAL CENTER | Age: 55
End: 2020-06-16

## 2020-06-17 ENCOUNTER — APPOINTMENT (OUTPATIENT)
Dept: RADIOLOGY | Facility: MEDICAL CENTER | Age: 55
End: 2020-06-17
Payer: MEDICARE

## 2020-06-17 ENCOUNTER — OFFICE VISIT (OUTPATIENT)
Dept: OBGYN CLINIC | Facility: MEDICAL CENTER | Age: 55
End: 2020-06-17
Payer: MEDICARE

## 2020-06-17 VITALS
HEART RATE: 83 BPM | DIASTOLIC BLOOD PRESSURE: 83 MMHG | WEIGHT: 225 LBS | BODY MASS INDEX: 32.21 KG/M2 | HEIGHT: 70 IN | SYSTOLIC BLOOD PRESSURE: 129 MMHG

## 2020-06-17 DIAGNOSIS — M25.562 CHRONIC PAIN OF LEFT KNEE: ICD-10-CM

## 2020-06-17 DIAGNOSIS — M17.12 PRIMARY OSTEOARTHRITIS OF LEFT KNEE: ICD-10-CM

## 2020-06-17 DIAGNOSIS — M25.561 CHRONIC PAIN OF RIGHT KNEE: ICD-10-CM

## 2020-06-17 DIAGNOSIS — M17.11 PRIMARY OSTEOARTHRITIS OF RIGHT KNEE: Primary | ICD-10-CM

## 2020-06-17 DIAGNOSIS — G89.29 CHRONIC PAIN OF LEFT KNEE: ICD-10-CM

## 2020-06-17 DIAGNOSIS — M17.11 PRIMARY OSTEOARTHRITIS OF RIGHT KNEE: ICD-10-CM

## 2020-06-17 DIAGNOSIS — G89.29 CHRONIC PAIN OF RIGHT KNEE: ICD-10-CM

## 2020-06-17 PROCEDURE — 99213 OFFICE O/P EST LOW 20 MIN: CPT | Performed by: ORTHOPAEDIC SURGERY

## 2020-06-17 PROCEDURE — 1036F TOBACCO NON-USER: CPT | Performed by: ORTHOPAEDIC SURGERY

## 2020-06-17 PROCEDURE — 73564 X-RAY EXAM KNEE 4 OR MORE: CPT

## 2020-06-17 PROCEDURE — 20610 DRAIN/INJ JOINT/BURSA W/O US: CPT | Performed by: ORTHOPAEDIC SURGERY

## 2020-06-17 PROCEDURE — 3008F BODY MASS INDEX DOCD: CPT | Performed by: ORTHOPAEDIC SURGERY

## 2020-06-17 PROCEDURE — 3079F DIAST BP 80-89 MM HG: CPT | Performed by: ORTHOPAEDIC SURGERY

## 2020-06-17 PROCEDURE — 3074F SYST BP LT 130 MM HG: CPT | Performed by: ORTHOPAEDIC SURGERY

## 2020-06-17 RX ORDER — LIDOCAINE HYDROCHLORIDE 10 MG/ML
3 INJECTION, SOLUTION INFILTRATION; PERINEURAL
Status: COMPLETED | OUTPATIENT
Start: 2020-06-17 | End: 2020-06-17

## 2020-06-17 RX ORDER — METHYLPREDNISOLONE ACETATE 40 MG/ML
2 INJECTION, SUSPENSION INTRA-ARTICULAR; INTRALESIONAL; INTRAMUSCULAR; SOFT TISSUE
Status: COMPLETED | OUTPATIENT
Start: 2020-06-17 | End: 2020-06-17

## 2020-06-17 RX ADMIN — LIDOCAINE HYDROCHLORIDE 3 ML: 10 INJECTION, SOLUTION INFILTRATION; PERINEURAL at 15:21

## 2020-06-17 RX ADMIN — METHYLPREDNISOLONE ACETATE 2 ML: 40 INJECTION, SUSPENSION INTRA-ARTICULAR; INTRALESIONAL; INTRAMUSCULAR; SOFT TISSUE at 15:21

## 2020-07-15 DIAGNOSIS — H81.12 BENIGN PAROXYSMAL POSITIONAL VERTIGO OF LEFT EAR: ICD-10-CM

## 2020-07-15 RX ORDER — MECLIZINE HCL 12.5 MG/1
12.5 TABLET ORAL EVERY 8 HOURS SCHEDULED
Qty: 30 TABLET | Refills: 0 | Status: SHIPPED | OUTPATIENT
Start: 2020-07-15 | End: 2021-03-15 | Stop reason: SDUPTHER

## 2020-07-24 DIAGNOSIS — M17.11 PRIMARY OSTEOARTHRITIS OF RIGHT KNEE: ICD-10-CM

## 2020-07-24 DIAGNOSIS — M17.12 PRIMARY OSTEOARTHRITIS OF LEFT KNEE: ICD-10-CM

## 2020-07-25 DIAGNOSIS — I10 HYPERTENSION, UNSPECIFIED TYPE: ICD-10-CM

## 2020-07-26 RX ORDER — LISINOPRIL 20 MG/1
TABLET ORAL
Qty: 90 TABLET | Refills: 1 | OUTPATIENT
Start: 2020-07-26

## 2020-07-27 DIAGNOSIS — I10 HYPERTENSION, UNSPECIFIED TYPE: ICD-10-CM

## 2020-07-28 RX ORDER — LISINOPRIL 20 MG/1
20 TABLET ORAL DAILY
Qty: 90 TABLET | Refills: 1 | Status: SHIPPED | OUTPATIENT
Start: 2020-07-28 | End: 2021-01-20

## 2020-08-26 ENCOUNTER — OFFICE VISIT (OUTPATIENT)
Dept: INTERNAL MEDICINE CLINIC | Facility: CLINIC | Age: 55
End: 2020-08-26
Payer: MEDICARE

## 2020-08-26 VITALS
SYSTOLIC BLOOD PRESSURE: 110 MMHG | OXYGEN SATURATION: 97 % | BODY MASS INDEX: 30.92 KG/M2 | WEIGHT: 216 LBS | DIASTOLIC BLOOD PRESSURE: 80 MMHG | HEART RATE: 82 BPM | TEMPERATURE: 98.4 F | HEIGHT: 70 IN

## 2020-08-26 DIAGNOSIS — L03.113 CELLULITIS OF RIGHT UPPER EXTREMITY: Primary | ICD-10-CM

## 2020-08-26 DIAGNOSIS — K21.9 GERD WITHOUT ESOPHAGITIS: ICD-10-CM

## 2020-08-26 DIAGNOSIS — J45.909 MODERATE ASTHMA, UNSPECIFIED WHETHER COMPLICATED, UNSPECIFIED WHETHER PERSISTENT: ICD-10-CM

## 2020-08-26 DIAGNOSIS — I10 ESSENTIAL HYPERTENSION: ICD-10-CM

## 2020-08-26 PROCEDURE — 99214 OFFICE O/P EST MOD 30 MIN: CPT | Performed by: INTERNAL MEDICINE

## 2020-08-26 PROCEDURE — 3074F SYST BP LT 130 MM HG: CPT | Performed by: INTERNAL MEDICINE

## 2020-08-26 PROCEDURE — 1036F TOBACCO NON-USER: CPT | Performed by: INTERNAL MEDICINE

## 2020-08-26 PROCEDURE — 3008F BODY MASS INDEX DOCD: CPT | Performed by: INTERNAL MEDICINE

## 2020-08-26 PROCEDURE — 3079F DIAST BP 80-89 MM HG: CPT | Performed by: INTERNAL MEDICINE

## 2020-08-26 RX ORDER — CIPROFLOXACIN 500 MG/1
500 TABLET, FILM COATED ORAL EVERY 12 HOURS SCHEDULED
Qty: 14 TABLET | Refills: 0 | Status: SHIPPED | OUTPATIENT
Start: 2020-08-26 | End: 2020-09-02

## 2020-08-26 NOTE — PROGRESS NOTES
Assessment/Plan:    1  Cellulitis right forearm  Will start patient on Cipro 500 mg p o  B i d  for 7 days  That is the only antibiotic he can tolerate as per patient  Allergic to penicillin and cephalosporin  Also advised to apply triple antibiotic cream over infected area    2  Essential hypertension  Blood pressure is stable on present regimen    3  Bronchial asthma  Present inhaler         Diagnoses and all orders for this visit:    Cellulitis of right upper extremity  -     ciprofloxacin (CIPRO) 500 mg tablet; Take 1 tablet (500 mg total) by mouth every 12 (twelve) hours for 7 days    GERD without esophagitis    Moderate asthma, unspecified whether complicated, unspecified whether persistent    Essential hypertension               Subjective:          Patient ID: Braulio Marshall is a 48 y o  male  Came to office with a complain of redness over right upper extremity  It is on forearm he noticed the small area of a probably infected hair follicle about a week ago now is getting worse  No fever chills  The following portions of the patient's history were reviewed and updated as appropriate: allergies, current medications, past family history, past medical history, past social history, past surgical history and problem list     Review of Systems   Constitutional: Negative for fatigue and fever  HENT: Negative for congestion, ear discharge, ear pain, postnasal drip, sinus pressure, sore throat, tinnitus and trouble swallowing  Eyes: Negative for discharge, itching and visual disturbance  Respiratory: Negative for cough and shortness of breath  Cardiovascular: Negative for chest pain and palpitations  Gastrointestinal: Negative for abdominal pain, diarrhea, nausea and vomiting  Endocrine: Negative for cold intolerance and polyuria  Genitourinary: Negative for difficulty urinating, dysuria and urgency  Musculoskeletal: Negative for arthralgias and neck pain  Skin: Positive for rash  Allergic/Immunologic: Negative for environmental allergies  Neurological: Negative for dizziness, weakness and headaches  Psychiatric/Behavioral: The patient is not nervous/anxious            Past Medical History:   Diagnosis Date    Anxiety 1/15/2019    Arthritis     Chronic rhinitis     last assessed 2/21/14    Chronic serous otitis media     last assessed 11/20/13    Chronic sinusitis     last assessed 8/19/14    Functional heart murmur     GERD (gastroesophageal reflux disease)     Gout     Hearing problem     last assessed 12/1/16    Hiatal hernia     Hypercholesterolemia     Hypertension     Palpitations     Testicular hypogonadism     last assessed 10/4/13    Vtach [facility])          Current Outpatient Medications:     albuterol (PROVENTIL HFA,VENTOLIN HFA) 90 mcg/act inhaler, Inhale 1 puff 4 (four) times a day , Disp: , Rfl:     cyclobenzaprine (FLEXERIL) 5 mg tablet, Take 1 tablet (5 mg total) by mouth 3 (three) times a day as needed for muscle spasms, Disp: 15 tablet, Rfl: 0    diclofenac sodium (VOLTAREN) 1 %, Apply 2 g topically 4 (four) times a day as needed (pain), Disp: 100 g, Rfl: 2    esomeprazole (NexIUM) 40 MG capsule, Take 40 mg by mouth every morning before breakfast, Disp: , Rfl:     fluticasone-salmeterol (ADVAIR DISKUS) 500-50 mcg/dose, Inhale 1 puff 2 (two) times a day, Disp: , Rfl:     ipratropium-albuterol (DUO-NEB) 0 5-2 5 mg/3 mL, Inhale 3 mL 4 (four) times a day As needed, Disp: , Rfl:     lisinopril (ZESTRIL) 20 mg tablet, Take 1 tablet (20 mg total) by mouth daily, Disp: 90 tablet, Rfl: 1    meclizine (ANTIVERT) 12 5 MG tablet, Take 1 tablet (12 5 mg total) by mouth every 8 (eight) hours (Patient taking differently: Take 12 5 mg by mouth every 8 (eight) hours as needed ), Disp: 30 tablet, Rfl: 0    montelukast (SINGULAIR) 10 mg tablet, Take 1 tablet (10 mg total) by mouth daily, Disp: 90 tablet, Rfl: 1    Triamcinolone Acetonide (NASACORT ALLERGY 24HR NA), 1 spray into each nostril daily  , Disp: , Rfl:     ULORIC 40 MG tablet, Take 1 tablet (40 mg total) by mouth daily, Disp: 90 tablet, Rfl: 1    verapamil (CALAN) 120 mg tablet, Take 1 tablet (120 mg total) by mouth daily, Disp: 90 tablet, Rfl: 3    ciprofloxacin (CIPRO) 500 mg tablet, Take 1 tablet (500 mg total) by mouth every 12 (twelve) hours for 7 days, Disp: 14 tablet, Rfl: 0    Allergies   Allergen Reactions    Penicillins Rash and Anaphylaxis    Acetazolamide      Other reaction(s): Stomach Ache    Cephalosporins Other (See Comments)     headache    Doxycycline      Capsules only  Tablets are ok to take, per pt  Capsules only  Tablets are ok to take, per pt   Other     Sulfa Antibiotics Other (See Comments)     Category: Allergy;  Annotation - [id number]: STOMACH UPSET    Famotidine Palpitations    Omeprazole Palpitations     Painful urination       Social History   Past Surgical History:   Procedure Laterality Date    APPENDECTOMY      BICEPS TENODESIS      anesthesia for tenodesis- of ruptured long tendon of biceps     HERNIA REPAIR      THYROIDECTOMY      TONSILLECTOMY       Family History   Problem Relation Age of Onset    Lung cancer Father     Coronary artery disease Father         blockages    Stroke Maternal Grandmother     Cancer Paternal Grandfather         metastasized    Heart disease Family     Lung cancer Cousin     No Known Problems Mother     No Known Problems Sister     No Known Problems Brother     No Known Problems Maternal Aunt     No Known Problems Maternal Uncle     No Known Problems Paternal Aunt     No Known Problems Paternal Uncle     No Known Problems Paternal Grandmother     ADD / ADHD Neg Hx     Anesthesia problems Neg Hx     Clotting disorder Neg Hx     Collagen disease Neg Hx     Diabetes Neg Hx     Dislocations Neg Hx     Learning disabilities Neg Hx     Neurological problems Neg Hx     Osteoporosis Neg Hx     Rheumatologic disease Neg Hx  Scoliosis Neg Hx     Vascular Disease Neg Hx        Objective:  /80 (BP Location: Left arm, Patient Position: Sitting, Cuff Size: Adult)   Pulse 82   Temp 98 4 °F (36 9 °C) (Temporal)   Ht 5' 9 5" (1 765 m)   Wt 98 kg (216 lb) Comment: w eakers  SpO2 97% Comment: yannick  BMI 31 44 kg/m²   Body mass index is 31 44 kg/m²  Physical Exam  Constitutional:       Appearance: He is well-developed  HENT:      Head: Normocephalic  Right Ear: Ear canal and external ear normal       Left Ear: Ear canal and external ear normal    Eyes:      General: No scleral icterus  Pupils: Pupils are equal, round, and reactive to light  Neck:      Musculoskeletal: Normal range of motion and neck supple  Thyroid: No thyromegaly  Trachea: No tracheal deviation  Cardiovascular:      Rate and Rhythm: Normal rate and regular rhythm  Heart sounds: Normal heart sounds  Pulmonary:      Effort: Pulmonary effort is normal  No respiratory distress  Breath sounds: Normal breath sounds  Chest:      Chest wall: No tenderness  Abdominal:      General: Bowel sounds are normal       Palpations: Abdomen is soft  There is no mass  Tenderness: There is no abdominal tenderness  Musculoskeletal: Normal range of motion  Lymphadenopathy:      Cervical: No cervical adenopathy  Skin:     General: Skin is warm  Findings: Erythema and lesion present  Comments: Over right upper extremity small area also of hyperemia with in duration noted  Neurological:      General: No focal deficit present  Mental Status: He is alert and oriented to person, place, and time  Cranial Nerves: No cranial nerve deficit

## 2020-09-02 ENCOUNTER — OFFICE VISIT (OUTPATIENT)
Dept: INTERNAL MEDICINE CLINIC | Facility: CLINIC | Age: 55
End: 2020-09-02
Payer: MEDICARE

## 2020-09-02 VITALS
HEIGHT: 70 IN | BODY MASS INDEX: 31.07 KG/M2 | SYSTOLIC BLOOD PRESSURE: 124 MMHG | DIASTOLIC BLOOD PRESSURE: 80 MMHG | WEIGHT: 217 LBS | OXYGEN SATURATION: 96 % | TEMPERATURE: 98.8 F | HEART RATE: 83 BPM

## 2020-09-02 DIAGNOSIS — L72.0 EPIDERMOID CYST: Primary | ICD-10-CM

## 2020-09-02 PROBLEM — H60.312 ACUTE DIFFUSE OTITIS EXTERNA OF LEFT EAR: Status: RESOLVED | Noted: 2020-01-14 | Resolved: 2020-09-02

## 2020-09-02 PROBLEM — M25.562 ACUTE PAIN OF LEFT KNEE: Status: RESOLVED | Noted: 2020-03-06 | Resolved: 2020-09-02

## 2020-09-02 PROCEDURE — 99213 OFFICE O/P EST LOW 20 MIN: CPT | Performed by: INTERNAL MEDICINE

## 2020-09-02 PROCEDURE — 10060 I&D ABSCESS SIMPLE/SINGLE: CPT | Performed by: INTERNAL MEDICINE

## 2020-09-02 NOTE — PROGRESS NOTES
Assessment/Plan:    Epidermoid cyst  Performed incision and drainage today however only had bloody drainage  No sebaceous or purulent drainage was expressed  Nodular area likely representing inflammatory tissue versus lipoma  Advised to complete antibiotic therapy at this time  Follow-up in 1 week with his primary care doctor during routine follow-up visit  Diagnoses and all orders for this visit:    Epidermoid cyst    Other orders  -     Incision and Drainage          BMI Counseling: Body mass index is 31 59 kg/m²  The BMI is above normal  Nutrition recommendations include decreasing portion sizes, encouraging healthy choices of fruits and vegetables, decreasing fast food intake, consuming healthier snacks, limiting drinks that contain sugar, moderation in carbohydrate intake, increasing intake of lean protein, reducing intake of saturated and trans fat and reducing intake of cholesterol  Exercise recommendations include moderate physical activity 150 minutes/week and exercising 3-5 times per week  No pharmacotherapy was ordered  Patient referred to PCP due to patient being overweight  Time spent during encounter: 25 minutes (counseling, reviewing medications, and discussing treatment and plan)    Subjective:      Patient ID: Topher Wong is a 48 y o  male  Chief Complaint   Patient presents with    Follow-up     Patient is here for f/u  Cellulitis right forearm  Pt states cellulitis still the same  48year old male is seen today for follow up of cellulitis  He has been compliant with antibiotic therapy, ciprofloxacin  He has 1 more day of antibiotics  The inflammatory symptoms have improved however a cyst persists  He is willing to do an incision and drainage today        The following portions of the patient's history were reviewed and updated as appropriate: allergies, current medications, past family history, past medical history, past social history, past surgical history and problem list     Review of Systems   Constitutional: Negative for activity change, appetite change, chills, diaphoresis, fatigue and fever  HENT: Negative for congestion, postnasal drip, rhinorrhea, sinus pressure, sinus pain, sneezing and sore throat  Eyes: Negative for visual disturbance  Respiratory: Negative for apnea, cough, choking, chest tightness, shortness of breath and wheezing  Cardiovascular: Negative for chest pain, palpitations and leg swelling  Gastrointestinal: Negative for abdominal distention, abdominal pain, anal bleeding, blood in stool, constipation, diarrhea, nausea and vomiting  Endocrine: Negative for cold intolerance and heat intolerance  Genitourinary: Negative for difficulty urinating, dysuria and hematuria  Musculoskeletal: Negative  Skin: Negative  Neurological: Negative for dizziness, weakness, light-headedness, numbness and headaches  Hematological: Negative for adenopathy  Psychiatric/Behavioral: Negative for agitation, sleep disturbance and suicidal ideas  All other systems reviewed and are negative          Past Medical History:   Diagnosis Date    Anxiety 1/15/2019    Arthritis     Chronic rhinitis     last assessed 2/21/14    Chronic serous otitis media     last assessed 11/20/13    Chronic sinusitis     last assessed 8/19/14    Functional heart murmur     GERD (gastroesophageal reflux disease)     Gout     Hearing problem     last assessed 12/1/16    Hiatal hernia     Hypercholesterolemia     Hypertension     Palpitations     Testicular hypogonadism     last assessed 10/4/13    [facility])          Current Outpatient Medications:     albuterol (PROVENTIL HFA,VENTOLIN HFA) 90 mcg/act inhaler, Inhale 1 puff 4 (four) times a day , Disp: , Rfl:     ciprofloxacin (CIPRO) 500 mg tablet, Take 1 tablet (500 mg total) by mouth every 12 (twelve) hours for 7 days, Disp: 14 tablet, Rfl: 0    cyclobenzaprine (FLEXERIL) 5 mg tablet, Take 1 tablet (5 mg total) by mouth 3 (three) times a day as needed for muscle spasms, Disp: 15 tablet, Rfl: 0    diclofenac sodium (VOLTAREN) 1 %, Apply 2 g topically 4 (four) times a day as needed (pain), Disp: 100 g, Rfl: 2    esomeprazole (NexIUM) 40 MG capsule, Take 40 mg by mouth every morning before breakfast, Disp: , Rfl:     fluticasone-salmeterol (ADVAIR DISKUS) 500-50 mcg/dose, Inhale 1 puff 2 (two) times a day, Disp: , Rfl:     ipratropium-albuterol (DUO-NEB) 0 5-2 5 mg/3 mL, Inhale 3 mL 4 (four) times a day As needed, Disp: , Rfl:     lisinopril (ZESTRIL) 20 mg tablet, Take 1 tablet (20 mg total) by mouth daily, Disp: 90 tablet, Rfl: 1    meclizine (ANTIVERT) 12 5 MG tablet, Take 1 tablet (12 5 mg total) by mouth every 8 (eight) hours (Patient taking differently: Take 12 5 mg by mouth every 8 (eight) hours as needed ), Disp: 30 tablet, Rfl: 0    montelukast (SINGULAIR) 10 mg tablet, Take 1 tablet (10 mg total) by mouth daily, Disp: 90 tablet, Rfl: 1    Triamcinolone Acetonide (NASACORT ALLERGY 24HR NA), 1 spray into each nostril daily  , Disp: , Rfl:     ULORIC 40 MG tablet, Take 1 tablet (40 mg total) by mouth daily, Disp: 90 tablet, Rfl: 1    verapamil (CALAN) 120 mg tablet, Take 1 tablet (120 mg total) by mouth daily, Disp: 90 tablet, Rfl: 3    Allergies   Allergen Reactions    Penicillins Rash and Anaphylaxis    Acetazolamide      Other reaction(s): Stomach Ache    Cephalosporins Other (See Comments)     headache    Doxycycline      Capsules only  Tablets are ok to take, per pt  Capsules only  Tablets are ok to take, per pt   Other     Sulfa Antibiotics Other (See Comments)     Category: Allergy;  Annotation - [id number]: STOMACH UPSET    Famotidine Palpitations    Omeprazole Palpitations     Painful urination       Social History   Past Surgical History:   Procedure Laterality Date    APPENDECTOMY      BICEPS TENODESIS      anesthesia for tenodesis- of ruptured long tendon of biceps  HERNIA REPAIR      THYROIDECTOMY      TONSILLECTOMY       Family History   Problem Relation Age of Onset    Lung cancer Father     Coronary artery disease Father         blockages    Stroke Maternal Grandmother     Cancer Paternal Grandfather         metastasized    Heart disease Family     Lung cancer Cousin     No Known Problems Mother     No Known Problems Sister     No Known Problems Brother     No Known Problems Maternal Aunt     No Known Problems Maternal Uncle     No Known Problems Paternal Aunt     No Known Problems Paternal Uncle     No Known Problems Paternal Grandmother     ADD / ADHD Neg Hx     Anesthesia problems Neg Hx     Clotting disorder Neg Hx     Collagen disease Neg Hx     Diabetes Neg Hx     Dislocations Neg Hx     Learning disabilities Neg Hx     Neurological problems Neg Hx     Osteoporosis Neg Hx     Rheumatologic disease Neg Hx     Scoliosis Neg Hx     Vascular Disease Neg Hx        Objective:  /80 (BP Location: Left arm, Patient Position: Sitting, Cuff Size: Standard)   Pulse 83   Temp 98 8 °F (37 1 °C) (Temporal)   Ht 5' 9 5" (1 765 m)   Wt 98 4 kg (217 lb)   SpO2 96%   BMI 31 59 kg/m²     No results found for this or any previous visit (from the past 1344 hour(s))  Physical Exam  Vitals signs and nursing note reviewed  Constitutional:       General: He is not in acute distress  Appearance: He is well-developed  He is not diaphoretic  HENT:      Head: Normocephalic and atraumatic  Eyes:      General: No scleral icterus  Right eye: No discharge  Left eye: No discharge  Conjunctiva/sclera: Conjunctivae normal       Pupils: Pupils are equal, round, and reactive to light  Neck:      Musculoskeletal: Normal range of motion and neck supple  Thyroid: No thyromegaly  Vascular: No JVD  Cardiovascular:      Rate and Rhythm: Normal rate and regular rhythm  Heart sounds: Normal heart sounds   No murmur  No friction rub  No gallop  Pulmonary:      Effort: Pulmonary effort is normal  No respiratory distress  Breath sounds: Normal breath sounds  No wheezing or rales  Chest:      Chest wall: No tenderness  Abdominal:      General: Bowel sounds are normal  There is no distension  Palpations: Abdomen is soft  There is no mass  Tenderness: There is no abdominal tenderness  There is no guarding or rebound  Musculoskeletal: Normal range of motion  General: No tenderness or deformity  Lymphadenopathy:      Cervical: No cervical adenopathy  Skin:     General: Skin is warm and dry  Coloration: Skin is not pale  Findings: No erythema or rash  Neurological:      Mental Status: He is alert and oriented to person, place, and time  Cranial Nerves: No cranial nerve deficit  Coordination: Coordination normal       Deep Tendon Reflexes: Reflexes are normal and symmetric  Psychiatric:         Behavior: Behavior normal          Thought Content: Thought content normal          Judgment: Judgment normal        Incision and Drainage    Date/Time: 9/2/2020 3:42 PM  Performed by: Gina Hudsno MD  Authorized by: Gina uHdson MD     Patient location:  Clinic  Consent:     Consent obtained:  Verbal    Consent given by:  Patient    Risks discussed:  Bleeding, damage to other organs, incomplete drainage, infection and pain    Alternatives discussed:  Delayed treatment  Universal protocol:     Patient identity confirmed:  Verbally with patient  Location:     Type:  Cyst    Location:  Upper extremity    Upper extremity location: Right forearm  Pre-procedure details:     Skin preparation:  Betadine  Anesthesia (see MAR for exact dosages):      Anesthesia method:  Local infiltration    Local anesthetic:  Lidocaine 1% w/o epi  Procedure details:     Complexity:  Simple    Needle aspiration: no      Incision types:  Stab incision    Scalpel blade:  11    Approach:  Open Incision depth:  Subcutaneous    Wound management:  Probed and deloculated    Drainage:  Bloody    Drainage amount:  Scant    Wound treatment:  Wound left open    Packing materials:  None  Post-procedure details:     Patient tolerance of procedure:   Tolerated well, no immediate complications

## 2020-09-02 NOTE — ASSESSMENT & PLAN NOTE
Performed incision and drainage today however only had bloody drainage  No sebaceous or purulent drainage was expressed  Nodular area likely representing inflammatory tissue versus lipoma  Advised to complete antibiotic therapy at this time  Follow-up in 1 week with his primary care doctor during routine follow-up visit

## 2020-09-09 ENCOUNTER — OFFICE VISIT (OUTPATIENT)
Dept: OBGYN CLINIC | Facility: MEDICAL CENTER | Age: 55
End: 2020-09-09
Payer: MEDICARE

## 2020-09-09 VITALS
TEMPERATURE: 98.9 F | BODY MASS INDEX: 30.35 KG/M2 | HEART RATE: 82 BPM | WEIGHT: 212 LBS | HEIGHT: 70 IN | RESPIRATION RATE: 19 BRPM

## 2020-09-09 DIAGNOSIS — G89.29 CHRONIC PAIN OF BOTH KNEES: ICD-10-CM

## 2020-09-09 DIAGNOSIS — M25.561 CHRONIC PAIN OF BOTH KNEES: ICD-10-CM

## 2020-09-09 DIAGNOSIS — M17.0 BILATERAL PRIMARY OSTEOARTHRITIS OF KNEE: Primary | ICD-10-CM

## 2020-09-09 DIAGNOSIS — M25.562 CHRONIC PAIN OF BOTH KNEES: ICD-10-CM

## 2020-09-09 PROCEDURE — 20610 DRAIN/INJ JOINT/BURSA W/O US: CPT | Performed by: PHYSICIAN ASSISTANT

## 2020-09-09 NOTE — PROGRESS NOTES
Assessment/Plan:  1  Bilateral primary osteoarthritis of knee    2  Chronic pain of both knees      No orders of the defined types were placed in this encounter  · Patient received bilateral knee orthovisc injections 1/3 in the office today  Tolerated the procedures well  Advised to apply ice and avoid strenuous activity for 1-2 days as needed  · Continue tylenol and diclofenac gel as needed for pain  · Continue knee braces as needed for comfort  · Patient will return next week for injections with Dr Skyler Worthy  Return in about 1 week (around 9/16/2020) for bilat knee orthovisc 2/3  I answered all of the patient's questions during the visit and provided education of the patient's condition during the visit  The patient verbalized understanding of the information given and agrees with the plan  This note was dictated using [employer] software  It may contain errors including improperly dictated words  Please contact physician directly for any questions  Subjective   Chief Complaint:   Chief Complaint   Patient presents with    Left Knee - Follow-up    Right Knee - Follow-up       [facility]  Nimesh Rand is a 48 y o  male who presents for follow up for bilateral knee pain  Patient received bilateral knee CSI on 6/17/20 and reports significant pain relief in bilateral knees  As of today patient feels the left knee is worse than the right knee  Pain is on the medial and lateral aspects of the knees  Patient notes his knee pain is worse if he walks too much or if he wears the wrong shoes while walking  He notes alternating shoes helps his knee pain  He is using diclofenac gel for pain with relief  He wears an  brace on the left knee and he believes a compression brace on the right knee  He is not doing PT at this time but walks several miles per week  Review of Systems  ROS:    See HPI for musculoskeletal review     All other systems reviewed are negative     History:  Past Medical History: Diagnosis Date    Anxiety 1/15/2019    Arthritis     Chronic rhinitis     last assessed 2/21/14    Chronic serous otitis media     last assessed 11/20/13    Chronic sinusitis     last assessed 8/19/14    Functional heart murmur     GERD (gastroesophageal reflux disease)     Gout     Hearing problem     last assessed 12/1/16    Hiatal hernia     Hypercholesterolemia     Hypertension     Palpitations     Testicular hypogonadism     last assessed 10/4/13    [facility])      Past Surgical History:   Procedure Laterality Date    APPENDECTOMY      BICEPS TENODESIS      anesthesia for tenodesis- of ruptured long tendon of biceps     HERNIA REPAIR      THYROIDECTOMY      TONSILLECTOMY       Social History   Social History     Substance and Sexual Activity   Alcohol Use Yes    Frequency: 2-3 times a week    Drinks per session: 5 or 6    Binge frequency: Weekly    Comment: occassionally     Social History     Substance and Sexual Activity   Drug Use No     Social History     Tobacco Use   Smoking Status Never Smoker   Smokeless Tobacco Never Used     Family History:   Family History   Problem Relation Age of Onset    Lung cancer Father     Coronary artery disease Father         blockages    Stroke Maternal Grandmother     Cancer Paternal Grandfather         metastasized    Heart disease Family     Lung cancer Cousin     No Known Problems Mother     No Known Problems Sister     No Known Problems Brother     No Known Problems Maternal Aunt     No Known Problems Maternal Uncle     No Known Problems Paternal Aunt     No Known Problems Paternal Uncle     No Known Problems Paternal Grandmother     ADD / ADHD Neg Hx     Anesthesia problems Neg Hx     Clotting disorder Neg Hx     Collagen disease Neg Hx     Diabetes Neg Hx     Dislocations Neg Hx     Learning disabilities Neg Hx     Neurological problems Neg Hx     Osteoporosis Neg Hx     Rheumatologic disease Neg Hx     Scoliosis Neg Hx     Vascular Disease Neg Hx        Current Outpatient Medications on File Prior to Visit   Medication Sig Dispense Refill    albuterol (PROVENTIL HFA,VENTOLIN HFA) 90 mcg/act inhaler Inhale 1 puff 4 (four) times a day       cyclobenzaprine (FLEXERIL) 5 mg tablet Take 1 tablet (5 mg total) by mouth 3 (three) times a day as needed for muscle spasms 15 tablet 0    diclofenac sodium (VOLTAREN) 1 % Apply 2 g topically 4 (four) times a day as needed (pain) 100 g 2    esomeprazole (NexIUM) 40 MG capsule Take 40 mg by mouth every morning before breakfast      fluticasone-salmeterol (ADVAIR DISKUS) 500-50 mcg/dose Inhale 1 puff 2 (two) times a day      ipratropium-albuterol (DUO-NEB) 0 5-2 5 mg/3 mL Inhale 3 mL 4 (four) times a day As needed      lisinopril (ZESTRIL) 20 mg tablet Take 1 tablet (20 mg total) by mouth daily 90 tablet 1    meclizine (ANTIVERT) 12 5 MG tablet Take 1 tablet (12 5 mg total) by mouth every 8 (eight) hours (Patient taking differently: Take 12 5 mg by mouth every 8 (eight) hours as needed ) 30 tablet 0    montelukast (SINGULAIR) 10 mg tablet Take 1 tablet (10 mg total) by mouth daily 90 tablet 1    Triamcinolone Acetonide (NASACORT ALLERGY 24HR NA) 1 spray into each nostril daily        ULORIC 40 MG tablet Take 1 tablet (40 mg total) by mouth daily 90 tablet 1    verapamil (CALAN) 120 mg tablet Take 1 tablet (120 mg total) by mouth daily 90 tablet 3     No current facility-administered medications on file prior to visit  Allergies   Allergen Reactions    Penicillins Rash and Anaphylaxis    Acetazolamide      Other reaction(s): Stomach Ache    Cephalosporins Other (See Comments)     headache    Doxycycline      Capsules only  Tablets are ok to take, per pt  Capsules only  Tablets are ok to take, per pt   Other     Sulfa Antibiotics Other (See Comments)     Category: Allergy;  Annotation - [id number]: STOMACH UPSET    Famotidine Palpitations    Omeprazole Palpitations     Painful urination        Objective     Pulse 82   Temp 98 9 °F (37 2 °C)   Resp 19   Ht 5' 9 5" (1 765 m)   Wt 96 2 kg (212 lb)   BMI 30 86 kg/m²      PE:  AAOx 3  WDWN  Hearing intact, no drainage from eyes  no audible wheezing  no abdominal distension  LE compartments soft, skin intact    Ortho Exam:  right Knee:   No erythema  no swelling  no effusion  no warmth  No TTP  AROM: 5- 110  Stable to varus/valgus stress    Left Knee:   No erythema  no swelling  no effusion  no warmth  No TTP  AROM: 5- 110  Stable to varus/valgus stress    Large joint arthrocentesis: bilateral knee  Date/Time: 9/9/2020 6:03 PM  Consent given by: patient  Site marked: site marked  Timeout: Immediately prior to procedure a time out was called to verify the correct patient, procedure, equipment, support staff and site/side marked as required   Supporting Documentation  Indications: pain   Procedure Details  Location: knee - bilateral knee  Preparation: Patient was prepped and draped in the usual sterile fashion  Needle size: 22 G  Ultrasound guidance: no  Approach: anterolateral    Medications (Right): 30 mg sodium hyaluronate 30 mg/2 mLMedications (Left): 30 mg sodium hyaluronate 30 mg/2 mL   Patient tolerance: patient tolerated the procedure well with no immediate complications  Dressing:  Sterile dressing applied

## 2020-09-11 ENCOUNTER — OFFICE VISIT (OUTPATIENT)
Dept: INTERNAL MEDICINE CLINIC | Facility: CLINIC | Age: 55
End: 2020-09-11
Payer: MEDICARE

## 2020-09-11 VITALS
WEIGHT: 215.6 LBS | HEART RATE: 81 BPM | OXYGEN SATURATION: 98 % | BODY MASS INDEX: 30.86 KG/M2 | TEMPERATURE: 99.1 F | DIASTOLIC BLOOD PRESSURE: 84 MMHG | SYSTOLIC BLOOD PRESSURE: 138 MMHG | HEIGHT: 70 IN

## 2020-09-11 DIAGNOSIS — H60.332 ACUTE SWIMMER'S EAR OF LEFT SIDE: Primary | ICD-10-CM

## 2020-09-11 DIAGNOSIS — J32.0 CHRONIC MAXILLARY SINUSITIS: ICD-10-CM

## 2020-09-11 DIAGNOSIS — J30.89 NON-SEASONAL ALLERGIC RHINITIS, UNSPECIFIED TRIGGER: ICD-10-CM

## 2020-09-11 PROBLEM — M54.50 ACUTE LOW BACK PAIN WITHOUT SCIATICA: Status: RESOLVED | Noted: 2020-03-06 | Resolved: 2020-09-11

## 2020-09-11 PROCEDURE — 99213 OFFICE O/P EST LOW 20 MIN: CPT | Performed by: INTERNAL MEDICINE

## 2020-09-11 NOTE — PROGRESS NOTES
Assessment/Plan:    Acute swimmer's ear of left side  Will prescribe neomycin-polymyxin-hydrocortisone otic drops to be applied every 6 hours for 7 days  He is to contact office for worsening symptoms  Chronic maxillary sinusitis  continue current regimen  Defer on antibiotics at this time  Diagnoses and all orders for this visit:    Acute swimmer's ear of left side  -     neomycin-polymyxin-hydrocortisone (CORTISPORIN) otic solution; Administer 4 drops into the left ear every 6 (six) hours for 7 days    Non-seasonal allergic rhinitis, unspecified trigger    Chronic maxillary sinusitis                Subjective:      Patient ID: Deepak Greer is a 48 y o  male  Chief Complaint   Patient presents with    Earache     Patient is here c/o pressure on left ear, sinus pressure, sometimes SOB and chest tigness  Sx are going on for 1 month, on and off  Lose stool, no diarrhea, since yesterday  48year old male is seen today with concern for acute URI and left ear ache since 2 weeks  He has chronic sinus congestion and allergic rhinitis  URI    This is a new problem  The current episode started 1 to 4 weeks ago  The problem has been unchanged  There has been no fever  Associated symptoms include congestion, coughing (mild sputum ), ear pain (left) and rhinorrhea  Pertinent negatives include no abdominal pain, chest pain, diarrhea, dysuria, headaches, joint pain, joint swelling, nausea, neck pain, plugged ear sensation, rash, sinus pain, sneezing, sore throat, swollen glands, vomiting or wheezing  He has tried antihistamine and decongestant (Singular, Nasacort ) for the symptoms  The treatment provided mild relief  Earache    Associated symptoms include coughing (mild sputum ) and rhinorrhea  Pertinent negatives include no abdominal pain, diarrhea, headaches, neck pain, rash, sore throat or vomiting         The following portions of the patient's history were reviewed and updated as appropriate: allergies, current medications, past family history, past medical history, past social history, past surgical history and problem list     Review of Systems   Constitutional: Negative for activity change, appetite change, chills, diaphoresis, fatigue and fever  HENT: Positive for congestion, ear pain (left), postnasal drip, rhinorrhea and sinus pressure  Negative for sinus pain, sneezing and sore throat  Eyes: Negative for visual disturbance  Respiratory: Positive for cough (mild sputum )  Negative for apnea, choking, chest tightness, shortness of breath and wheezing  Cardiovascular: Negative for chest pain, palpitations and leg swelling  Gastrointestinal: Negative for abdominal distention, abdominal pain, anal bleeding, blood in stool, constipation, diarrhea, nausea and vomiting  Endocrine: Negative for cold intolerance and heat intolerance  Genitourinary: Negative for difficulty urinating, dysuria and hematuria  Musculoskeletal: Negative  Negative for joint pain and neck pain  Skin: Negative  Negative for rash  Neurological: Negative for dizziness, weakness, light-headedness, numbness and headaches  Hematological: Negative for adenopathy  Psychiatric/Behavioral: Negative for agitation, sleep disturbance and suicidal ideas  All other systems reviewed and are negative          Past Medical History:   Diagnosis Date    Anxiety 1/15/2019    Arthritis     Chronic rhinitis     last assessed 2/21/14    Chronic serous otitis media     last assessed 11/20/13    Chronic sinusitis     last assessed 8/19/14    Functional heart murmur     GERD (gastroesophageal reflux disease)     Gout     Hearing problem     last assessed 12/1/16    Hiatal hernia     Hypercholesterolemia     Hypertension     Palpitations     Testicular hypogonadism     last assessed 10/4/13    [facility])          Current Outpatient Medications:     albuterol (PROVENTIL HFA,VENTOLIN HFA) 90 mcg/act inhaler, Inhale 1 puff 4 (four) times a day , Disp: , Rfl:     cyclobenzaprine (FLEXERIL) 5 mg tablet, Take 1 tablet (5 mg total) by mouth 3 (three) times a day as needed for muscle spasms, Disp: 15 tablet, Rfl: 0    diclofenac sodium (VOLTAREN) 1 %, Apply 2 g topically 4 (four) times a day as needed (pain), Disp: 100 g, Rfl: 2    esomeprazole (NexIUM) 40 MG capsule, Take 40 mg by mouth every morning before breakfast, Disp: , Rfl:     fluticasone-salmeterol (ADVAIR DISKUS) 500-50 mcg/dose, Inhale 1 puff 2 (two) times a day, Disp: , Rfl:     ipratropium-albuterol (DUO-NEB) 0 5-2 5 mg/3 mL, Inhale 3 mL 4 (four) times a day As needed, Disp: , Rfl:     lisinopril (ZESTRIL) 20 mg tablet, Take 1 tablet (20 mg total) by mouth daily, Disp: 90 tablet, Rfl: 1    meclizine (ANTIVERT) 12 5 MG tablet, Take 1 tablet (12 5 mg total) by mouth every 8 (eight) hours (Patient taking differently: Take 12 5 mg by mouth every 8 (eight) hours as needed ), Disp: 30 tablet, Rfl: 0    montelukast (SINGULAIR) 10 mg tablet, Take 1 tablet (10 mg total) by mouth daily, Disp: 90 tablet, Rfl: 1    Triamcinolone Acetonide (NASACORT ALLERGY 24HR NA), 1 spray into each nostril daily  , Disp: , Rfl:     ULORIC 40 MG tablet, Take 1 tablet (40 mg total) by mouth daily, Disp: 90 tablet, Rfl: 1    verapamil (CALAN) 120 mg tablet, Take 1 tablet (120 mg total) by mouth daily, Disp: 90 tablet, Rfl: 3    neomycin-polymyxin-hydrocortisone (CORTISPORIN) otic solution, Administer 4 drops into the left ear every 6 (six) hours for 7 days, Disp: 10 mL, Rfl: 0    Allergies   Allergen Reactions    Penicillins Rash and Anaphylaxis    Acetazolamide      Other reaction(s): Stomach Ache    Cephalosporins Other (See Comments)     headache    Doxycycline      Capsules only  Tablets are ok to take, per pt  Capsules only  Tablets are ok to take, per pt   Other     Sulfa Antibiotics Other (See Comments)     Category: Allergy;  Annotation - [id number]: STOMACH UPSET    Famotidine Palpitations    Omeprazole Palpitations     Painful urination       Social History   Past Surgical History:   Procedure Laterality Date    APPENDECTOMY      BICEPS TENODESIS      anesthesia for tenodesis- of ruptured long tendon of biceps     HERNIA REPAIR      THYROIDECTOMY      TONSILLECTOMY       Family History   Problem Relation Age of Onset    Lung cancer Father     Coronary artery disease Father         blockages    Stroke Maternal Grandmother     Cancer Paternal Grandfather         metastasized    Heart disease Family     Lung cancer Cousin     No Known Problems Mother     No Known Problems Sister     No Known Problems Brother     No Known Problems Maternal Aunt     No Known Problems Maternal Uncle     No Known Problems Paternal Aunt     No Known Problems Paternal Uncle     No Known Problems Paternal Grandmother     ADD / ADHD Neg Hx     Anesthesia problems Neg Hx     Clotting disorder Neg Hx     Collagen disease Neg Hx     Diabetes Neg Hx     Dislocations Neg Hx     Learning disabilities Neg Hx     Neurological problems Neg Hx     Osteoporosis Neg Hx     Rheumatologic disease Neg Hx     Scoliosis Neg Hx     Vascular Disease Neg Hx        Objective:  /84 (BP Location: Left arm, Patient Position: Sitting, Cuff Size: Large)   Pulse 81   Temp 99 1 °F (37 3 °C) (Oral)   Ht 5' 9 5" (1 765 m)   Wt 97 8 kg (215 lb 9 6 oz)   SpO2 98%   BMI 31 38 kg/m²     No results found for this or any previous visit (from the past 1344 hour(s))  Physical Exam  Vitals signs and nursing note reviewed  Constitutional:       General: He is not in acute distress  Appearance: He is well-developed  He is not diaphoretic  HENT:      Head: Normocephalic and atraumatic  Left Ear: A middle ear effusion is present  There is no impacted cerumen  Eyes:      General: No scleral icterus  Right eye: No discharge  Left eye: No discharge  Conjunctiva/sclera: Conjunctivae normal       Pupils: Pupils are equal, round, and reactive to light  Neck:      Musculoskeletal: Normal range of motion and neck supple  Thyroid: No thyromegaly  Vascular: No JVD  Cardiovascular:      Rate and Rhythm: Normal rate and regular rhythm  Heart sounds: Normal heart sounds  No murmur  No friction rub  No gallop  Pulmonary:      Effort: Pulmonary effort is normal  No respiratory distress  Breath sounds: Normal breath sounds  No wheezing or rales  Chest:      Chest wall: No tenderness  Abdominal:      General: Bowel sounds are normal  There is no distension  Palpations: Abdomen is soft  There is no mass  Tenderness: There is no abdominal tenderness  There is no guarding or rebound  Musculoskeletal: Normal range of motion  General: No tenderness or deformity  Lymphadenopathy:      Cervical: No cervical adenopathy  Skin:     General: Skin is warm and dry  Coloration: Skin is not pale  Findings: No erythema or rash  Neurological:      Mental Status: He is alert and oriented to person, place, and time  Cranial Nerves: No cranial nerve deficit  Coordination: Coordination normal       Deep Tendon Reflexes: Reflexes are normal and symmetric  Psychiatric:         Behavior: Behavior normal          Thought Content:  Thought content normal          Judgment: Judgment normal

## 2020-09-11 NOTE — ASSESSMENT & PLAN NOTE
Will prescribe neomycin-polymyxin-hydrocortisone otic drops to be applied every 6 hours for 7 days  He is to contact office for worsening symptoms

## 2020-09-17 ENCOUNTER — OFFICE VISIT (OUTPATIENT)
Dept: OBGYN CLINIC | Facility: MEDICAL CENTER | Age: 55
End: 2020-09-17
Payer: MEDICARE

## 2020-09-17 VITALS
SYSTOLIC BLOOD PRESSURE: 119 MMHG | HEIGHT: 70 IN | WEIGHT: 215 LBS | TEMPERATURE: 98.7 F | HEART RATE: 80 BPM | BODY MASS INDEX: 30.78 KG/M2 | DIASTOLIC BLOOD PRESSURE: 75 MMHG

## 2020-09-17 DIAGNOSIS — M17.0 BILATERAL PRIMARY OSTEOARTHRITIS OF KNEE: Primary | ICD-10-CM

## 2020-09-17 PROCEDURE — 20610 DRAIN/INJ JOINT/BURSA W/O US: CPT | Performed by: PHYSICIAN ASSISTANT

## 2020-09-17 NOTE — PROGRESS NOTES
Ortho Sports Medicine Knee Visco Injection Visit     Assesment:   48 y o  male bilateral knee severe medial compartment osteoarthritis    Plan:    Injection:  The risks and benefits of the injection (which include but are not limited to: infection, bleeding,damage to nerve/artery, need for further intervention), as well as the risks and benefits of all alternative treatments were explained and understood  The patient elected to proceed with injection  The procedure was done with aseptic technique, and the patient tolerated the procedure well with no complications  A viscosupplementation injection was performed  Ice to the knee 1-2 times daily for 20 minutes, for next 24-48 hrs  Follow up:  Return for next week Euflexxa 3/3  Chief Complaint   Patient presents with    Left Knee - Injections    Right Knee - Injections       History of Present Illness: The patient is returns for  Orthovisc injection 2/3  visco supplementation injection  Since the prior visit, He reports no improvement  Patient denies fevers, chills, and sweats  Denies numbness and tingling  Denies chest pain, shortness of breath, calf pain, and warmth to the knee  I have reviewed the past medical, surgical, social and family history, medications and allergies as documented in the EMR  Review of systems: ROS is negative other than that noted in the HPI  Constitutional: Negative for fatigue and fever  Physical Exam:    Blood pressure 119/75, pulse 80, temperature 98 7 °F (37 1 °C), height 5' 9 5" (1 765 m), weight 97 5 kg (215 lb)      General/Constitutional: NAD, well developed, well nourished  HENT: Normocephalic, atraumatic  CV: Intact distal pulses, regular rate  Resp: No respiratory distress or labored breathing  Lymphatic: No lymphadenopathy palpated  Neuro: Alert and Oriented x 3, no focal deficits  Psych: Normal mood, normal affect, normal judgement, normal behavior  Skin: Warm, dry, no rashes, no erythema    Large joint arthrocentesis: bilateral knee  Date/Time: 9/17/2020 11:27 AM  Consent given by: patient  Site marked: site marked  Timeout: Immediately prior to procedure a time out was called to verify the correct patient, procedure, equipment, support staff and site/side marked as required   Supporting Documentation  Indications: pain   Procedure Details  Location: knee - bilateral knee  Preparation: Patient was prepped and draped in the usual sterile fashion  Needle size: 22 G  Ultrasound guidance: no  Approach: anterolateral    Medications (Right): 30 mg sodium hyaluronate 30 mg/2 mLMedications (Left): 30 mg sodium hyaluronate 30 mg/2 mL   Patient tolerance: patient tolerated the procedure well with no immediate complications  Dressing:  Sterile dressing applied         Liss Collier PA-C

## 2020-09-18 ENCOUNTER — CLINICAL SUPPORT (OUTPATIENT)
Dept: INTERNAL MEDICINE CLINIC | Facility: CLINIC | Age: 55
End: 2020-09-18
Payer: MEDICARE

## 2020-09-18 DIAGNOSIS — R79.89 ABNORMAL LIVER FUNCTION TEST: Primary | ICD-10-CM

## 2020-09-18 LAB
ALT SERPL W P-5'-P-CCNC: 39 U/L (ref 12–78)
AST SERPL W P-5'-P-CCNC: 25 U/L (ref 5–45)

## 2020-09-18 PROCEDURE — 84460 ALANINE AMINO (ALT) (SGPT): CPT | Performed by: INTERNAL MEDICINE

## 2020-09-18 PROCEDURE — 84450 TRANSFERASE (AST) (SGOT): CPT | Performed by: INTERNAL MEDICINE

## 2020-09-18 PROCEDURE — 36415 COLL VENOUS BLD VENIPUNCTURE: CPT

## 2020-09-24 ENCOUNTER — OFFICE VISIT (OUTPATIENT)
Dept: OBGYN CLINIC | Facility: MEDICAL CENTER | Age: 55
End: 2020-09-24
Payer: MEDICARE

## 2020-09-24 VITALS
SYSTOLIC BLOOD PRESSURE: 133 MMHG | HEART RATE: 79 BPM | WEIGHT: 215 LBS | DIASTOLIC BLOOD PRESSURE: 89 MMHG | BODY MASS INDEX: 30.78 KG/M2 | TEMPERATURE: 98.7 F | HEIGHT: 70 IN

## 2020-09-24 DIAGNOSIS — M17.0 BILATERAL PRIMARY OSTEOARTHRITIS OF KNEE: Primary | ICD-10-CM

## 2020-09-24 PROCEDURE — 20610 DRAIN/INJ JOINT/BURSA W/O US: CPT | Performed by: PHYSICIAN ASSISTANT

## 2020-09-24 NOTE — PROGRESS NOTES
Ortho Sports Medicine Knee Visco Injection Visit     Assesment:   48 y o  male bilateral knee severe medial compartment osteoarthritis     Plan:    Injection:  The risks and benefits of the injection (which include but are not limited to: infection, bleeding,damage to nerve/artery, need for further intervention), as well as the risks and benefits of all alternative treatments were explained and understood  The patient elected to proceed with injection  The procedure was done with aseptic technique, and the patient tolerated the procedure well with no complications  A viscosupplementation injection was performed  Ice to the knee 1-2 times daily for 20 minutes, for next 24-48 hrs  Follow up:  Return if symptoms worsen or fail to improve  Chief Complaint   Patient presents with    Right Knee - Injections    Left Knee - Injections       History of Present Illness: The patient is returns for  Euflexxa 3/3/ visco supplementation injection  Since the prior visit, He reports minimal improvement  Patient denies fevers, chills, and sweats  Denies numbness and tingling  Denies chest pain, shortness of breath, calf pain, and warmth to the knee  I have reviewed the past medical, surgical, social and family history, medications and allergies as documented in the EMR  Review of systems: ROS is negative other than that noted in the HPI  Constitutional: Negative for fatigue and fever  Physical Exam:    Blood pressure 133/89, pulse 79, temperature 98 7 °F (37 1 °C), height 5' 9 5" (1 765 m), weight 97 5 kg (215 lb)      General/Constitutional: NAD, well developed, well nourished  HENT: Normocephalic, atraumatic  CV: Intact distal pulses, regular rate  Resp: No respiratory distress or labored breathing  Lymphatic: No lymphadenopathy palpated  Neuro: Alert and Oriented x 3, no focal deficits  Psych: Normal mood, normal affect, normal judgement, normal behavior  Skin: Warm, dry, no rashes, no erythema    Large joint arthrocentesis: bilateral knee  Date/Time: 9/24/2020 11:55 AM  Consent given by: patient  Site marked: site marked  Timeout: Immediately prior to procedure a time out was called to verify the correct patient, procedure, equipment, support staff and site/side marked as required   Supporting Documentation  Indications: pain   Procedure Details  Location: knee - bilateral knee  Preparation: Patient was prepped and draped in the usual sterile fashion  Needle size: 22 G  Ultrasound guidance: no  Approach: anterolateral    Medications (Right): 30 mg sodium hyaluronate 30 mg/2 mLMedications (Left): 30 mg sodium hyaluronate 30 mg/2 mL   Patient tolerance: patient tolerated the procedure well with no immediate complications  Dressing:  Sterile dressing applied         Ruba Penaloza PA-C

## 2020-09-25 ENCOUNTER — OFFICE VISIT (OUTPATIENT)
Dept: INTERNAL MEDICINE CLINIC | Facility: CLINIC | Age: 55
End: 2020-09-25
Payer: MEDICARE

## 2020-09-25 VITALS
SYSTOLIC BLOOD PRESSURE: 118 MMHG | DIASTOLIC BLOOD PRESSURE: 76 MMHG | OXYGEN SATURATION: 95 % | BODY MASS INDEX: 30.72 KG/M2 | WEIGHT: 214.6 LBS | HEART RATE: 81 BPM | TEMPERATURE: 99.7 F | HEIGHT: 70 IN

## 2020-09-25 DIAGNOSIS — J30.89 NON-SEASONAL ALLERGIC RHINITIS, UNSPECIFIED TRIGGER: ICD-10-CM

## 2020-09-25 DIAGNOSIS — K21.9 GERD WITHOUT ESOPHAGITIS: Primary | ICD-10-CM

## 2020-09-25 DIAGNOSIS — J45.909 MODERATE ASTHMA, UNSPECIFIED WHETHER COMPLICATED, UNSPECIFIED WHETHER PERSISTENT: ICD-10-CM

## 2020-09-25 DIAGNOSIS — E78.2 MIXED HYPERLIPIDEMIA: ICD-10-CM

## 2020-09-25 DIAGNOSIS — M1A.9XX0 CHRONIC GOUT WITHOUT TOPHUS, UNSPECIFIED CAUSE, UNSPECIFIED SITE: ICD-10-CM

## 2020-09-25 DIAGNOSIS — I10 ESSENTIAL HYPERTENSION: ICD-10-CM

## 2020-09-25 PROCEDURE — 99214 OFFICE O/P EST MOD 30 MIN: CPT | Performed by: INTERNAL MEDICINE

## 2020-09-25 NOTE — PROGRESS NOTES
Assessment/Plan:    1  Essential hypertension  Blood pressure is well controlled on present regimen  Continue with low-salt diet and healthy lifestyle  2  Bronchial asthma    Stable on present combination of inhalers    3  Hyperuricemia  Continue with Uloric  4  Hyperlipidemia  Patient tried different statin in the past but was unable to tolerate  Continue with low-fat diet exercise and weight loss  Will repeat lipid profile before next visit    5  Resolving left otitis externa  Advised him to use the prescribed antibiotics for next 5 days  6  GERD  Stable on Prilosec 20 mg daily  7  Chronic allergic rhinitis  Continue with Nasacort  Diagnoses and all orders for this visit:    GERD without esophagitis    Moderate asthma, unspecified whether complicated, unspecified whether persistent    Essential hypertension    Mixed hyperlipidemia  -     Lipid Panel with Direct LDL reflex; Future    Chronic gout without tophus, unspecified cause, unspecified site    Non-seasonal allergic rhinitis, unspecified trigger               Subjective:          Patient ID: Korey Tam is a 48 y o  male  Is here for regular follow-up  Does have liver function test done last week would like to discuss results  Otherwise occasional allergy symptoms  The following portions of the patient's history were reviewed and updated as appropriate: allergies, current medications, past family history, past medical history, past social history, past surgical history and problem list     Review of Systems   Constitutional: Negative for fatigue and fever  HENT: Positive for congestion and ear pain  Negative for ear discharge, postnasal drip, sinus pressure, sore throat, tinnitus and trouble swallowing  Eyes: Negative for discharge, itching and visual disturbance  Respiratory: Negative for cough and shortness of breath  Cardiovascular: Negative for chest pain and palpitations     Gastrointestinal: Negative for abdominal pain, diarrhea, nausea and vomiting  Endocrine: Negative for cold intolerance and polyuria  Genitourinary: Negative for difficulty urinating, dysuria and urgency  Musculoskeletal: Positive for arthralgias  Negative for neck pain  Skin: Negative for rash  Allergic/Immunologic: Negative for environmental allergies  Neurological: Negative for dizziness, weakness and headaches  Psychiatric/Behavioral: Negative for agitation and behavioral problems  The patient is not nervous/anxious            Past Medical History:   Diagnosis Date    Anxiety 1/15/2019    Arthritis     Chronic rhinitis     last assessed 2/21/14    Chronic serous otitis media     last assessed 11/20/13    Chronic sinusitis     last assessed 8/19/14    Functional heart murmur     GERD (gastroesophageal reflux disease)     Gout     Hearing problem     last assessed 12/1/16    Hiatal hernia     Hypercholesterolemia     Hypertension     Palpitations     Testicular hypogonadism     last assessed 10/4/13    [facility])          Current Outpatient Medications:     albuterol (PROVENTIL HFA,VENTOLIN HFA) 90 mcg/act inhaler, Inhale 1 puff 4 (four) times a day , Disp: , Rfl:     cyclobenzaprine (FLEXERIL) 5 mg tablet, Take 1 tablet (5 mg total) by mouth 3 (three) times a day as needed for muscle spasms, Disp: 15 tablet, Rfl: 0    esomeprazole (NexIUM) 40 MG capsule, Take 40 mg by mouth every morning before breakfast, Disp: , Rfl:     fluticasone-salmeterol (ADVAIR DISKUS) 500-50 mcg/dose, Inhale 1 puff 2 (two) times a day, Disp: , Rfl:     ipratropium-albuterol (DUO-NEB) 0 5-2 5 mg/3 mL, Inhale 3 mL 4 (four) times a day As needed, Disp: , Rfl:     lisinopril (ZESTRIL) 20 mg tablet, Take 1 tablet (20 mg total) by mouth daily, Disp: 90 tablet, Rfl: 1    meclizine (ANTIVERT) 12 5 MG tablet, Take 1 tablet (12 5 mg total) by mouth every 8 (eight) hours (Patient taking differently: Take 12 5 mg by mouth every 8 (eight) hours as needed ), Disp: 30 tablet, Rfl: 0    montelukast (SINGULAIR) 10 mg tablet, Take 1 tablet (10 mg total) by mouth daily, Disp: 90 tablet, Rfl: 1    Triamcinolone Acetonide (NASACORT ALLERGY 24HR NA), 1 spray into each nostril daily  , Disp: , Rfl:     ULORIC 40 MG tablet, Take 1 tablet (40 mg total) by mouth daily, Disp: 90 tablet, Rfl: 1    verapamil (CALAN) 120 mg tablet, Take 1 tablet (120 mg total) by mouth daily, Disp: 90 tablet, Rfl: 3    diclofenac sodium (VOLTAREN) 1 %, Apply 2 g topically 4 (four) times a day as needed (pain) (Patient not taking: Reported on 9/25/2020), Disp: 100 g, Rfl: 2    Allergies   Allergen Reactions    Penicillins Rash and Anaphylaxis    Acetazolamide      Other reaction(s): Stomach Ache    Cephalosporins Other (See Comments)     headache    Doxycycline      Capsules only  Tablets are ok to take, per pt  Capsules only  Tablets are ok to take, per pt   Other     Sulfa Antibiotics Other (See Comments)     Category: Allergy;  Annotation - [id number]: STOMACH UPSET    Famotidine Palpitations    Omeprazole Palpitations     Painful urination       Social History   Past Surgical History:   Procedure Laterality Date    APPENDECTOMY      BICEPS TENODESIS      anesthesia for tenodesis- of ruptured long tendon of biceps     HERNIA REPAIR      THYROIDECTOMY      TONSILLECTOMY       Family History   Problem Relation Age of Onset    Lung cancer Father     Coronary artery disease Father         blockages    Stroke Maternal Grandmother     Cancer Paternal Grandfather         metastasized    Heart disease Family     Lung cancer Cousin     No Known Problems Mother     No Known Problems Sister     No Known Problems Brother     No Known Problems Maternal Aunt     No Known Problems Maternal Uncle     No Known Problems Paternal Aunt     No Known Problems Paternal Uncle     No Known Problems Paternal Grandmother     ADD / ADHD Neg Hx     Anesthesia problems Neg Hx     Clotting disorder Neg Hx     Collagen disease Neg Hx     Diabetes Neg Hx     Dislocations Neg Hx     Learning disabilities Neg Hx     Neurological problems Neg Hx     Osteoporosis Neg Hx     Rheumatologic disease Neg Hx     Scoliosis Neg Hx     Vascular Disease Neg Hx        Objective:  /76 (BP Location: Left arm, Patient Position: Sitting, Cuff Size: Adult)   Pulse 81   Temp 99 7 °F (37 6 °C) (Oral)   Ht 5' 9 5" (1 765 m)   Wt 97 3 kg (214 lb 9 6 oz) Comment: w sneakers  SpO2 95% Comment: ra  BMI 31 24 kg/m²   Body mass index is 31 24 kg/m²  Physical Exam  Constitutional:       Appearance: He is well-developed  HENT:      Head: Normocephalic  Right Ear: External ear normal       Left Ear: Ear canal and external ear normal       Ears:      Comments: Left ear canal is slightly hyperemic  Right ear canal is normal   Tympanic membranes of the right side is slightly bulging  Nose: Nose normal       Mouth/Throat:      Mouth: Mucous membranes are moist    Eyes:      General: No scleral icterus  Conjunctiva/sclera: Conjunctivae normal       Pupils: Pupils are equal, round, and reactive to light  Neck:      Musculoskeletal: Normal range of motion and neck supple  Thyroid: No thyromegaly  Trachea: No tracheal deviation  Cardiovascular:      Rate and Rhythm: Normal rate and regular rhythm  Heart sounds: Normal heart sounds  Pulmonary:      Effort: Pulmonary effort is normal  No respiratory distress  Breath sounds: Normal breath sounds  Chest:      Chest wall: No tenderness  Abdominal:      General: Bowel sounds are normal       Palpations: Abdomen is soft  There is no mass  Tenderness: There is no abdominal tenderness  Musculoskeletal: Normal range of motion  Lymphadenopathy:      Cervical: No cervical adenopathy  Skin:     General: Skin is warm  Neurological:      Mental Status: He is alert and oriented to person, place, and time  Cranial Nerves: No cranial nerve deficit  Psychiatric:         Mood and Affect: Mood normal          Behavior: Behavior normal          Thought Content:  Thought content normal          Judgment: Judgment normal

## 2020-09-29 ENCOUNTER — TELEPHONE (OUTPATIENT)
Dept: CARDIOLOGY CLINIC | Facility: CLINIC | Age: 55
End: 2020-09-29

## 2020-09-29 DIAGNOSIS — I10 ESSENTIAL HYPERTENSION: ICD-10-CM

## 2020-09-30 RX ORDER — VERAPAMIL HYDROCHLORIDE 120 MG/1
120 TABLET, FILM COATED ORAL DAILY
Qty: 90 TABLET | Refills: 4 | Status: SHIPPED | OUTPATIENT
Start: 2020-09-30 | End: 2021-12-26

## 2020-10-05 ENCOUNTER — OFFICE VISIT (OUTPATIENT)
Dept: URGENT CARE | Age: 55
End: 2020-10-05
Payer: MEDICARE

## 2020-10-05 VITALS
RESPIRATION RATE: 18 BRPM | OXYGEN SATURATION: 99 % | SYSTOLIC BLOOD PRESSURE: 137 MMHG | WEIGHT: 214 LBS | HEIGHT: 70 IN | HEART RATE: 80 BPM | TEMPERATURE: 97.8 F | BODY MASS INDEX: 30.64 KG/M2 | DIASTOLIC BLOOD PRESSURE: 84 MMHG

## 2020-10-05 DIAGNOSIS — H69.82 DYSFUNCTION OF LEFT EUSTACHIAN TUBE: Primary | ICD-10-CM

## 2020-10-05 PROBLEM — H69.92 DYSFUNCTION OF LEFT EUSTACHIAN TUBE: Status: ACTIVE | Noted: 2020-10-05

## 2020-10-05 PROCEDURE — 99213 OFFICE O/P EST LOW 20 MIN: CPT | Performed by: PHYSICIAN ASSISTANT

## 2020-10-05 PROCEDURE — G0463 HOSPITAL OUTPT CLINIC VISIT: HCPCS | Performed by: PHYSICIAN ASSISTANT

## 2020-10-05 RX ORDER — PREDNISONE 10 MG/1
TABLET ORAL
Qty: 20 TABLET | Refills: 0 | Status: SHIPPED | OUTPATIENT
Start: 2020-10-05 | End: 2020-10-26 | Stop reason: SDUPTHER

## 2020-10-12 ENCOUNTER — OFFICE VISIT (OUTPATIENT)
Dept: URGENT CARE | Age: 55
End: 2020-10-12
Payer: MEDICARE

## 2020-10-12 VITALS
WEIGHT: 212 LBS | HEART RATE: 84 BPM | DIASTOLIC BLOOD PRESSURE: 94 MMHG | TEMPERATURE: 97.4 F | HEIGHT: 69 IN | SYSTOLIC BLOOD PRESSURE: 146 MMHG | OXYGEN SATURATION: 96 % | BODY MASS INDEX: 31.4 KG/M2 | RESPIRATION RATE: 20 BRPM

## 2020-10-12 DIAGNOSIS — J01.00 ACUTE MAXILLARY SINUSITIS, RECURRENCE NOT SPECIFIED: Primary | ICD-10-CM

## 2020-10-12 DIAGNOSIS — H69.82 DYSFUNCTION OF LEFT EUSTACHIAN TUBE: ICD-10-CM

## 2020-10-12 PROCEDURE — G0463 HOSPITAL OUTPT CLINIC VISIT: HCPCS | Performed by: PHYSICIAN ASSISTANT

## 2020-10-12 PROCEDURE — 99213 OFFICE O/P EST LOW 20 MIN: CPT | Performed by: PHYSICIAN ASSISTANT

## 2020-10-12 RX ORDER — DOXYCYCLINE 100 MG/1
100 TABLET ORAL 2 TIMES DAILY
Qty: 20 TABLET | Refills: 0 | Status: SHIPPED | OUTPATIENT
Start: 2020-10-12 | End: 2020-10-22

## 2020-10-12 RX ORDER — AZITHROMYCIN 250 MG/1
TABLET, FILM COATED ORAL
Qty: 6 TABLET | Refills: 0 | Status: SHIPPED | OUTPATIENT
Start: 2020-10-12 | End: 2020-10-12 | Stop reason: ALTCHOICE

## 2020-10-26 ENCOUNTER — APPOINTMENT (OUTPATIENT)
Dept: RADIOLOGY | Age: 55
End: 2020-10-26
Attending: PHYSICIAN ASSISTANT
Payer: MEDICARE

## 2020-10-26 ENCOUNTER — OFFICE VISIT (OUTPATIENT)
Dept: URGENT CARE | Age: 55
End: 2020-10-26
Payer: MEDICARE

## 2020-10-26 DIAGNOSIS — R06.02 SHORTNESS OF BREATH: ICD-10-CM

## 2020-10-26 DIAGNOSIS — H69.82 DYSFUNCTION OF LEFT EUSTACHIAN TUBE: ICD-10-CM

## 2020-10-26 DIAGNOSIS — R05.9 COUGH: Primary | ICD-10-CM

## 2020-10-26 DIAGNOSIS — R05.9 COUGH: ICD-10-CM

## 2020-10-26 PROCEDURE — 71046 X-RAY EXAM CHEST 2 VIEWS: CPT

## 2020-10-26 PROCEDURE — U0003 INFECTIOUS AGENT DETECTION BY NUCLEIC ACID (DNA OR RNA); SEVERE ACUTE RESPIRATORY SYNDROME CORONAVIRUS 2 (SARS-COV-2) (CORONAVIRUS DISEASE [COVID-19]), AMPLIFIED PROBE TECHNIQUE, MAKING USE OF HIGH THROUGHPUT TECHNOLOGIES AS DESCRIBED BY CMS-2020-01-R: HCPCS | Performed by: PHYSICIAN ASSISTANT

## 2020-10-26 PROCEDURE — G0463 HOSPITAL OUTPT CLINIC VISIT: HCPCS | Performed by: PHYSICIAN ASSISTANT

## 2020-10-26 PROCEDURE — 99214 OFFICE O/P EST MOD 30 MIN: CPT | Performed by: PHYSICIAN ASSISTANT

## 2020-10-26 RX ORDER — PREDNISONE 10 MG/1
TABLET ORAL
Qty: 20 TABLET | Refills: 0 | Status: SHIPPED | OUTPATIENT
Start: 2020-10-26 | End: 2021-03-12

## 2020-10-28 LAB — SARS-COV-2 RNA SPEC QL NAA+PROBE: NOT DETECTED

## 2020-10-29 ENCOUNTER — TELEPHONE (OUTPATIENT)
Dept: URGENT CARE | Age: 55
End: 2020-10-29

## 2020-11-05 ENCOUNTER — OFFICE VISIT (OUTPATIENT)
Dept: INTERNAL MEDICINE CLINIC | Facility: CLINIC | Age: 55
End: 2020-11-05
Payer: MEDICARE

## 2020-11-05 VITALS
TEMPERATURE: 99.7 F | HEIGHT: 69 IN | HEART RATE: 93 BPM | DIASTOLIC BLOOD PRESSURE: 86 MMHG | SYSTOLIC BLOOD PRESSURE: 140 MMHG | BODY MASS INDEX: 30.51 KG/M2 | OXYGEN SATURATION: 98 % | WEIGHT: 206 LBS

## 2020-11-05 DIAGNOSIS — H92.02 LEFT EAR PAIN: Primary | ICD-10-CM

## 2020-11-05 DIAGNOSIS — Z23 NEED FOR INFLUENZA VACCINATION: ICD-10-CM

## 2020-11-05 PROCEDURE — G0008 ADMIN INFLUENZA VIRUS VAC: HCPCS

## 2020-11-05 PROCEDURE — 99213 OFFICE O/P EST LOW 20 MIN: CPT | Performed by: INTERNAL MEDICINE

## 2020-11-05 PROCEDURE — 90682 RIV4 VACC RECOMBINANT DNA IM: CPT

## 2020-11-06 DIAGNOSIS — J31.0 CHRONIC RHINITIS: ICD-10-CM

## 2020-11-06 RX ORDER — MONTELUKAST SODIUM 10 MG/1
TABLET ORAL
Qty: 90 TABLET | Refills: 1 | Status: SHIPPED | OUTPATIENT
Start: 2020-11-06 | End: 2021-05-11 | Stop reason: SDUPTHER

## 2020-11-24 DIAGNOSIS — M10.9 GOUTY ARTHRITIS: ICD-10-CM

## 2020-11-24 RX ORDER — FEBUXOSTAT 40 MG/1
40 TABLET ORAL DAILY
Qty: 90 TABLET | Refills: 1 | Status: SHIPPED | OUTPATIENT
Start: 2020-11-24 | End: 2021-03-23 | Stop reason: SDUPTHER

## 2020-11-30 ENCOUNTER — OFFICE VISIT (OUTPATIENT)
Dept: OBGYN CLINIC | Facility: MEDICAL CENTER | Age: 55
End: 2020-11-30
Payer: MEDICARE

## 2020-11-30 VITALS
SYSTOLIC BLOOD PRESSURE: 136 MMHG | HEART RATE: 84 BPM | WEIGHT: 210 LBS | DIASTOLIC BLOOD PRESSURE: 87 MMHG | TEMPERATURE: 98.5 F | BODY MASS INDEX: 30.06 KG/M2 | HEIGHT: 70 IN

## 2020-11-30 DIAGNOSIS — M17.12 PRIMARY OSTEOARTHRITIS OF LEFT KNEE: ICD-10-CM

## 2020-11-30 DIAGNOSIS — M17.11 PRIMARY OSTEOARTHRITIS OF RIGHT KNEE: Primary | ICD-10-CM

## 2020-11-30 PROCEDURE — 99213 OFFICE O/P EST LOW 20 MIN: CPT | Performed by: ORTHOPAEDIC SURGERY

## 2020-11-30 PROCEDURE — 20610 DRAIN/INJ JOINT/BURSA W/O US: CPT | Performed by: ORTHOPAEDIC SURGERY

## 2020-11-30 RX ORDER — METHYLPREDNISOLONE ACETATE 40 MG/ML
2 INJECTION, SUSPENSION INTRA-ARTICULAR; INTRALESIONAL; INTRAMUSCULAR; SOFT TISSUE
Status: COMPLETED | OUTPATIENT
Start: 2020-11-30 | End: 2020-11-30

## 2020-11-30 RX ORDER — LIDOCAINE HYDROCHLORIDE 10 MG/ML
3 INJECTION, SOLUTION INFILTRATION; PERINEURAL
Status: COMPLETED | OUTPATIENT
Start: 2020-11-30 | End: 2020-11-30

## 2020-11-30 RX ADMIN — LIDOCAINE HYDROCHLORIDE 3 ML: 10 INJECTION, SOLUTION INFILTRATION; PERINEURAL at 15:52

## 2020-11-30 RX ADMIN — METHYLPREDNISOLONE ACETATE 2 ML: 40 INJECTION, SUSPENSION INTRA-ARTICULAR; INTRALESIONAL; INTRAMUSCULAR; SOFT TISSUE at 15:52

## 2020-12-09 ENCOUNTER — OFFICE VISIT (OUTPATIENT)
Dept: INTERNAL MEDICINE CLINIC | Facility: CLINIC | Age: 55
End: 2020-12-09
Payer: MEDICARE

## 2020-12-09 VITALS
RESPIRATION RATE: 20 BRPM | HEART RATE: 83 BPM | DIASTOLIC BLOOD PRESSURE: 82 MMHG | OXYGEN SATURATION: 97 % | TEMPERATURE: 99.3 F | BODY MASS INDEX: 30.32 KG/M2 | HEIGHT: 70 IN | SYSTOLIC BLOOD PRESSURE: 118 MMHG | WEIGHT: 211.8 LBS

## 2020-12-09 DIAGNOSIS — K21.9 GERD WITHOUT ESOPHAGITIS: Primary | ICD-10-CM

## 2020-12-09 DIAGNOSIS — E78.2 MIXED HYPERLIPIDEMIA: ICD-10-CM

## 2020-12-09 DIAGNOSIS — I10 ESSENTIAL HYPERTENSION: ICD-10-CM

## 2020-12-09 DIAGNOSIS — J45.909 MODERATE ASTHMA, UNSPECIFIED WHETHER COMPLICATED, UNSPECIFIED WHETHER PERSISTENT: ICD-10-CM

## 2020-12-09 PROCEDURE — 99214 OFFICE O/P EST MOD 30 MIN: CPT | Performed by: INTERNAL MEDICINE

## 2020-12-09 RX ORDER — PSEUDOEPHEDRINE HYDROCHLORIDE 30 MG/1
60 TABLET ORAL DAILY
COMMUNITY
End: 2022-02-28

## 2020-12-09 RX ORDER — ALBUTEROL SULFATE 2.5 MG/3ML
2.5 SOLUTION RESPIRATORY (INHALATION) EVERY 6 HOURS PRN
COMMUNITY
End: 2021-06-28 | Stop reason: SDUPTHER

## 2020-12-09 RX ORDER — ACETAMINOPHEN 500 MG
1000 TABLET ORAL EVERY 6 HOURS PRN
COMMUNITY

## 2020-12-11 ENCOUNTER — OFFICE VISIT (OUTPATIENT)
Dept: CARDIOLOGY CLINIC | Facility: CLINIC | Age: 55
End: 2020-12-11
Payer: MEDICARE

## 2020-12-11 VITALS
WEIGHT: 212 LBS | BODY MASS INDEX: 30.35 KG/M2 | HEIGHT: 70 IN | DIASTOLIC BLOOD PRESSURE: 70 MMHG | SYSTOLIC BLOOD PRESSURE: 140 MMHG | HEART RATE: 84 BPM

## 2020-12-11 DIAGNOSIS — E78.2 MIXED HYPERLIPIDEMIA: ICD-10-CM

## 2020-12-11 DIAGNOSIS — I47.2 VENTRICULAR TACHYCARDIA (HCC): ICD-10-CM

## 2020-12-11 DIAGNOSIS — I10 ESSENTIAL HYPERTENSION: Primary | ICD-10-CM

## 2020-12-11 PROCEDURE — 99213 OFFICE O/P EST LOW 20 MIN: CPT | Performed by: INTERNAL MEDICINE

## 2021-01-20 DIAGNOSIS — I10 HYPERTENSION, UNSPECIFIED TYPE: ICD-10-CM

## 2021-01-20 RX ORDER — LISINOPRIL 20 MG/1
TABLET ORAL
Qty: 90 TABLET | Refills: 1 | Status: SHIPPED | OUTPATIENT
Start: 2021-01-20 | End: 2021-07-19 | Stop reason: SDUPTHER

## 2021-01-22 ENCOUNTER — TELEMEDICINE (OUTPATIENT)
Dept: INTERNAL MEDICINE CLINIC | Facility: CLINIC | Age: 56
End: 2021-01-22
Payer: MEDICARE

## 2021-01-22 VITALS — WEIGHT: 212 LBS | BODY MASS INDEX: 30.35 KG/M2 | HEIGHT: 70 IN

## 2021-01-22 DIAGNOSIS — J01.00 ACUTE MAXILLARY SINUSITIS, RECURRENCE NOT SPECIFIED: Primary | ICD-10-CM

## 2021-01-22 PROCEDURE — 99213 OFFICE O/P EST LOW 20 MIN: CPT | Performed by: PHYSICIAN ASSISTANT

## 2021-01-22 RX ORDER — AZITHROMYCIN 250 MG/1
TABLET, FILM COATED ORAL
Qty: 6 TABLET | Refills: 0 | Status: SHIPPED | OUTPATIENT
Start: 2021-01-22 | End: 2021-01-26

## 2021-01-22 NOTE — PROGRESS NOTES
Virtual Brief Visit    Assessment/Plan:    Problem List Items Addressed This Visit        Respiratory    Acute maxillary sinusitis - Primary     Virtual via phone  Patient does not have video capability  Patient has an extensive history of recurrent maxillary sinusitis  He has been treated with several antibiotic courses in the past   Patient repeatedly requests antibiotics and prednisone  I do not feel prednisone is indicated at this time due to short duration of symptoms and discuss the risks of prednisone especially during a current pandemic as it can reduce your immune system  Patient verbalized understanding  Due to recurrence of symptoms will treat with a Zithromax and to be taken as directed  Highly recommend patient go for COVID screening to rule out COVID cause for his current symptoms  Patient verbalized understanding and will go to [employer] [facility] for COVID screening  Caution patient on using over-the-counter Sudafed as it can increase heart rate  Patient verbalized understanding and will try and limit the use of Sudafed  Discussed importance of supportive care increased rest fluids and hydration  Can continue with nasal saline to help with nasal congestion  Discussed continued careful monitoring of sx  Discussed red flag sx and ER precautions which need immediate eval and tx should they develop  Discussed if COVID test is positive will need [facility] virtual follow up next week             Relevant Medications    azithromycin (ZITHROMAX) 250 mg tablet    Other Relevant Orders    Novel Coronavirus (Covid-19),PCR [facility] - Collected at   [facility]                Reason for visit is   Chief Complaint   Patient presents with    Sinus Problem     sinusitis issues/yellow mucus/pressure         Encounter provider Anh Scott PA-C    Provider located at [address] [place] [address] [address] [facility] [id number]    Recent Visits  No visits were found meeting these conditions  Showing recent visits within past 7 days and meeting all other requirements     Today's Visits  Date Type Provider Dept   01/22/21 Telemedicine Antonino Spencer PA-C [facility]   Showing today's visits and meeting all other requirements     Future Appointments  No visits were found meeting these conditions  Showing future appointments within next 150 days and meeting all other requirements        After connecting through telephone, the patient was identified by name and date of birth  Fidencio Rubalcava was informed that this is a telemedicine visit and that the visit is being conducted through telephone  My office door was closed  No one else was in the room  He acknowledged consent and understanding of privacy and security of the platform  The patient has agreed to participate and understands he can discontinue the visit at any time  Patient is aware this is a billable service  Subjective     Fidencio Rubalcava is a 48 y o  male eval of cold sx     For eval   Had ear pain a few days ago which resolved  Notes he has sinus pressure and nasal congestion  Some pressure below his eyes  Yellow drainage  Worse when he lowers his head to pick things up  Notes that he has nasal congestion  No fever 98 6  Ears do not feel painful or pressure  Using sudafed OTC  Tested x2 for COVID  Mother recently was in the hospital for rapid HR  Walks x2 per week  Patient has a history of recurrent sinusitis  He has completed several antibiotic courses over the course of the last year  His symptoms do improve after he is treated with antibiotics however they often do return  He was treated with azithromycin, Cipro and doxycycline in the past year  He notes that he had been doing relatively well and was symptom free until the last 1-2 weeks  His mother who lives at home with him is symptom free      BP has been controlled at home 126/80, 129/88 at home  Sinus Problem  This is a recurrent problem  The current episode started in the past 7 days  The problem has been waxing and waning since onset  There has been no fever  The fever has been present for less than 1 day  The pain is mild  Associated symptoms include congestion and sinus pressure  Pertinent negatives include no chills, coughing, ear pain, neck pain, shortness of breath or sore throat  Treatments tried: Sudafed over-the-counter  The treatment provided mild relief          Past Medical History:   Diagnosis Date    Anxiety 1/15/2019    Arthritis     Chronic rhinitis     last assessed 2/21/14    Chronic serous otitis media     last assessed 11/20/13    Chronic sinusitis     last assessed 8/19/14    Functional heart murmur     GERD (gastroesophageal reflux disease)     Gout     Hearing problem     last assessed 12/1/16    Hiatal hernia     Hypercholesterolemia     Hypertension     Palpitations     Testicular hypogonadism     last assessed 10/4/13    [facility])        Past Surgical History:   Procedure Laterality Date    APPENDECTOMY      BICEPS TENODESIS      anesthesia for tenodesis- of ruptured long tendon of biceps     HERNIA REPAIR      THYROIDECTOMY      TONSILLECTOMY         Current Outpatient Medications   Medication Sig Dispense Refill    acetaminophen (TYLENOL) 500 mg tablet Take 1,000 mg by mouth every 6 (six) hours as needed for mild pain      albuterol (2 5 mg/3 mL) 0 083 % nebulizer solution Take 2 5 mg by nebulization every 6 (six) hours as needed for wheezing or shortness of breath      albuterol (PROVENTIL HFA,VENTOLIN HFA) 90 mcg/act inhaler Inhale 1 puff 4 (four) times a day       cyclobenzaprine (FLEXERIL) 5 mg tablet Take 1 tablet (5 mg total) by mouth 3 (three) times a day as needed for muscle spasms 15 tablet 0    diclofenac sodium (VOLTAREN) 1 % Apply 2 g topically 4 (four) times a day as needed (pain) 100 g 2    esomeprazole (NexIUM) 40 MG capsule Take 40 mg by mouth every morning before breakfast      fluticasone-salmeterol (ADVAIR DISKUS) 500-50 mcg/dose Inhale 1 puff 2 (two) times a day      ipratropium-albuterol (DUO-NEB) 0 5-2 5 mg/3 mL Inhale 3 mL 4 (four) times a day As needed      lisinopril (ZESTRIL) 20 mg tablet TAKE 1 TABLET BY MOUTH EVERY DAY 90 tablet 1    meclizine (ANTIVERT) 12 5 MG tablet Take 1 tablet (12 5 mg total) by mouth every 8 (eight) hours 30 tablet 0    montelukast (SINGULAIR) 10 mg tablet TAKE 1 TABLET BY MOUTH EVERY DAY 90 tablet 1    predniSONE 10 mg tablet Four tablets once daily for 5 days 20 tablet 0    pseudoephedrine (SUDAFED) 30 mg tablet Take 60 mg by mouth every 4 (four) hours as needed for congestion      Triamcinolone Acetonide (NASACORT ALLERGY 24HR NA) 1 spray into each nostril daily        Uloric 40 MG tablet Take 1 tablet (40 mg total) by mouth daily 90 tablet 1    verapamil (CALAN) 120 mg tablet Take 1 tablet (120 mg total) by mouth daily 90 tablet 4    azithromycin (ZITHROMAX) 250 mg tablet Take 2 tablets today then 1 tablet daily x 4 days 6 tablet 0     No current facility-administered medications for this visit  Allergies   Allergen Reactions    Penicillins Rash and Anaphylaxis    Acetazolamide      Other reaction(s): Stomach Ache    Cephalosporins Other (See Comments)     headache    Doxycycline      Capsules only  Tablets are ok to take, per pt  Capsules only  Tablets are ok to take, per pt   Other     Sulfa Antibiotics Other (See Comments)     Category: Allergy; Annotation - [id number]: STOMACH UPSET    Famotidine Palpitations    Omeprazole Palpitations     Painful urination       Review of Systems   Constitutional: Negative for chills, fever and unexpected weight change  HENT: Positive for congestion and sinus pressure  Negative for ear pain and sore throat  No loss of taste or smell  No myalgias  No documented fever    No known positive sick contacts  Respiratory: Negative for cough, shortness of breath and wheezing  Patient notes his breathing symptoms have been well controlled  Occasional cough with post nasal drink but other than the occasion post nasal drip he has had no cough  no wheeze  No shortness of breath  Cardiovascular: Negative for chest pain and palpitations  Patient follows regularly with Cardio  Patient denies any cardiac symptoms   Gastrointestinal: Negative for abdominal pain, diarrhea, nausea and vomiting  Musculoskeletal: Negative for neck pain  Neurological: Positive for dizziness (Patient notes he has chronic dizziness worse during his sinus infections  Worse when he has increased sinus congestion and pressure  )  Vitals:    01/22/21 1208   Weight: 96 2 kg (212 lb)   Height: 5' 9 5" (1 765 m)       It was my intent to perform this visit via video technology but the patient was not able to do a video connection so the visit was completed via audio telephone only    I spent 15 minutes directly with the patient during this visit    VIRTUAL VISIT [facility] Minal Givens acknowledges that he has consented to an online visit or consultation  He understands that the online visit is based solely on information provided by him, and that, in the absence of a face-to-face physical evaluation by the physician, the diagnosis he receives is both limited and provisional in terms of accuracy and completeness  This is not intended to replace a full medical face-to-face evaluation by the physician  Nanci Muñoz understands and accepts these terms

## 2021-01-22 NOTE — ASSESSMENT & PLAN NOTE
Virtual via phone  Patient does not have video capability  Patient has an extensive history of recurrent maxillary sinusitis  He has been treated with several antibiotic courses in the past   Patient repeatedly requests antibiotics and prednisone  I do not feel prednisone is indicated at this time due to short duration of symptoms and discuss the risks of prednisone especially during a current pandemic as it can reduce your immune system  Patient verbalized understanding  Due to recurrence of symptoms will treat with a Zithromax and to be taken as directed  Highly recommend patient go for COVID screening to rule out COVID cause for his current symptoms  Patient verbalized understanding and will go to [facility] for COVID screening  Caution patient on using over-the-counter Sudafed as it can increase heart rate  Patient verbalized understanding and will try and limit the use of Sudafed  Discussed importance of supportive care increased rest fluids and hydration  Can continue with nasal saline to help with nasal congestion  Discussed continued careful monitoring of sx  Discussed red flag sx and ER precautions which need immediate eval and tx should they develop  Discussed if COVID test is positive will need [facility] virtual follow up next week

## 2021-01-22 NOTE — PATIENT INSTRUCTIONS
Acute maxillary sinusitis  Patient has an extensive history of recurrent maxillary sinusitis  He has been treated with several antibiotic courses in the past   Patient repeatedly requests antibiotics and prednisone  I do not feel prednisone is indicated at this time due to short duration of symptoms and discuss the risks of prednisone especially during a current pandemic as it can reduce your immune system  Patient verbalized understanding  Due to recurrence of symptoms will treat with a Zithromax and to be taken as directed  Highly recommend patient go for COVID screening to rule out COVID cause for his current symptoms  Patient verbalized understanding [facility]  Caution patient on using over-the-counter Sudafed as it can increase heart rate  Patient verbalized understanding and will try and limit the use of Sudafed  Discussed importance of supportive care increased rest fluids and hydration  Can continue with nasal saline to help with nasal congestion

## 2021-01-30 ENCOUNTER — OFFICE VISIT (OUTPATIENT)
Dept: URGENT CARE | Age: 56
End: 2021-01-30
Payer: MEDICARE

## 2021-01-30 VITALS
TEMPERATURE: 98.5 F | DIASTOLIC BLOOD PRESSURE: 82 MMHG | SYSTOLIC BLOOD PRESSURE: 158 MMHG | RESPIRATION RATE: 20 BRPM | OXYGEN SATURATION: 97 % | HEART RATE: 75 BPM

## 2021-01-30 DIAGNOSIS — R09.81 SINUS CONGESTION: Primary | ICD-10-CM

## 2021-01-30 PROCEDURE — G0463 HOSPITAL OUTPT CLINIC VISIT: HCPCS | Performed by: NURSE PRACTITIONER

## 2021-01-30 PROCEDURE — 99213 OFFICE O/P EST LOW 20 MIN: CPT | Performed by: NURSE PRACTITIONER

## 2021-01-30 RX ORDER — PREDNISONE 20 MG/1
20 TABLET ORAL 2 TIMES DAILY WITH MEALS
Qty: 10 TABLET | Refills: 0 | Status: SHIPPED | OUTPATIENT
Start: 2021-01-30 | End: 2021-02-04

## 2021-01-30 NOTE — PROGRESS NOTES
[facility]        NAME: Larisa Padron is a 48 y o  male  : 1965    MRN: [id number]  DATE: 2021  TIME: 3:59 PM    Assessment and Plan   Sinus congestion [R09 81]  1  Sinus congestion  predniSONE 20 mg tablet         Patient Instructions     Take meds as directed  Follow up with PCP in 3-5 days  Proceed to  ER if symptoms worsen  Chief Complaint     Chief Complaint   Patient presents with    Facial Pain     pt states has chronic sinus issues, has been having pain for 3 weeks, finished Zpak 2 days ago, c/o headache worse last night after walking outside, c/o dizziness, took a meclizine with some relief last night  Pt thinks he needs a course of steriods to fix his sinus problem         History of Present Illness       HPI   Reports he was treated for sinus infection, which he has had now for 3 weeks  Used z-iron  Finished it 2 days ago  Still having congestion  States in the past when he got an Rx for steroid, it went away  Review of Systems   Review of Systems   Constitutional: Negative for chills and fever  HENT: Positive for congestion (in the sinuses)  Negative for postnasal drip, rhinorrhea and sore throat  Respiratory: Negative for cough, shortness of breath and wheezing  Cardiovascular: Negative for chest pain  Gastrointestinal: Negative for nausea and vomiting  Neurological: Negative for dizziness and headaches           Current Medications       Current Outpatient Medications:     acetaminophen (TYLENOL) 500 mg tablet, Take 1,000 mg by mouth every 6 (six) hours as needed for mild pain, Disp: , Rfl:     albuterol (2 5 mg/3 mL) 0 083 % nebulizer solution, Take 2 5 mg by nebulization every 6 (six) hours as needed for wheezing or shortness of breath, Disp: , Rfl:     albuterol (PROVENTIL HFA,VENTOLIN HFA) 90 mcg/act inhaler, Inhale 1 puff 4 (four) times a day , Disp: , Rfl:     cyclobenzaprine (FLEXERIL) 5 mg tablet, Take 1 tablet (5 mg total) by mouth 3 (three) times a day as needed for muscle spasms, Disp: 15 tablet, Rfl: 0    diclofenac sodium (VOLTAREN) 1 %, Apply 2 g topically 4 (four) times a day as needed (pain), Disp: 100 g, Rfl: 2    esomeprazole (NexIUM) 40 MG capsule, Take 40 mg by mouth every morning before breakfast, Disp: , Rfl:     fluticasone-salmeterol (ADVAIR DISKUS) 500-50 mcg/dose, Inhale 1 puff 2 (two) times a day, Disp: , Rfl:     ipratropium-albuterol (DUO-NEB) 0 5-2 5 mg/3 mL, Inhale 3 mL 4 (four) times a day As needed, Disp: , Rfl:     lisinopril (ZESTRIL) 20 mg tablet, TAKE 1 TABLET BY MOUTH EVERY DAY, Disp: 90 tablet, Rfl: 1    meclizine (ANTIVERT) 12 5 MG tablet, Take 1 tablet (12 5 mg total) by mouth every 8 (eight) hours, Disp: 30 tablet, Rfl: 0    montelukast (SINGULAIR) 10 mg tablet, TAKE 1 TABLET BY MOUTH EVERY DAY, Disp: 90 tablet, Rfl: 1    predniSONE 10 mg tablet, Four tablets once daily for 5 days, Disp: 20 tablet, Rfl: 0    predniSONE 20 mg tablet, Take 1 tablet (20 mg total) by mouth 2 (two) times a day with meals for 5 days, Disp: 10 tablet, Rfl: 0    pseudoephedrine (SUDAFED) 30 mg tablet, Take 60 mg by mouth every 4 (four) hours as needed for congestion, Disp: , Rfl:     Triamcinolone Acetonide (NASACORT ALLERGY 24HR NA), 1 spray into each nostril daily  , Disp: , Rfl:     Uloric 40 MG tablet, Take 1 tablet (40 mg total) by mouth daily, Disp: 90 tablet, Rfl: 1    verapamil (CALAN) 120 mg tablet, Take 1 tablet (120 mg total) by mouth daily, Disp: 90 tablet, Rfl: 4    Current Allergies     Allergies as of 01/30/2021 - Reviewed 01/30/2021   Allergen Reaction Noted    Penicillins Rash and Anaphylaxis 08/17/2004    Acetazolamide  10/25/2016    Cephalosporins Other (See Comments) 08/17/2004    Doxycycline  10/24/2018    Other  04/28/2014    Sulfa antibiotics Other (See Comments) 08/17/2004    Famotidine Palpitations 09/05/2016    Omeprazole Palpitations 09/05/2016            The following portions of the patient's history were reviewed and updated as appropriate: allergies, current medications, past family history, past medical history, past social history, past surgical history and problem list      Past Medical History:   Diagnosis Date    Anxiety 1/15/2019    Arthritis     Chronic rhinitis     last assessed 2/21/14    Chronic serous otitis media     last assessed 11/20/13    Chronic sinusitis     last assessed 8/19/14    Functional heart murmur     GERD (gastroesophageal reflux disease)     Gout     Hearing problem     last assessed 12/1/16    Hiatal hernia     Hypercholesterolemia     Hypertension     Palpitations     Testicular hypogonadism     last assessed 10/4/13    V-tach [facility])        Past Surgical History:   Procedure Laterality Date    APPENDECTOMY      BICEPS TENODESIS      anesthesia for tenodesis- of ruptured long tendon of biceps     HERNIA REPAIR      THYROIDECTOMY      TONSILLECTOMY         Family History   Problem Relation Age of Onset    Lung cancer Father     Coronary artery disease Father         blockages    Stroke Maternal Grandmother     Cancer Paternal Grandfather         metastasized    Heart disease Family     Lung cancer Cousin     No Known Problems Mother     No Known Problems Sister     No Known Problems Brother     No Known Problems Maternal Aunt     No Known Problems Maternal Uncle     No Known Problems Paternal Aunt     No Known Problems Paternal Uncle     No Known Problems Paternal Grandmother     ADD / ADHD Neg Hx     Anesthesia problems Neg Hx     Clotting disorder Neg Hx     Collagen disease Neg Hx     Diabetes Neg Hx     Dislocations Neg Hx     Learning disabilities Neg Hx     Neurological problems Neg Hx     Osteoporosis Neg Hx     Rheumatologic disease Neg Hx     Scoliosis Neg Hx     Vascular Disease Neg Hx          Medications have been verified          Objective   /82   Pulse 75   Temp 98 5 °F (36 9 °C)   Resp 20   SpO2 97%   No LMP for male patient  Physical Exam     Physical Exam  Constitutional:       Appearance: He is not ill-appearing or diaphoretic  HENT:      Nose: No rhinorrhea  Cardiovascular:      Rate and Rhythm: Regular rhythm  Heart sounds: Normal heart sounds  Pulmonary:      Breath sounds: Normal breath sounds  Neurological:      Mental Status: He is alert

## 2021-01-30 NOTE — PATIENT INSTRUCTIONS

## 2021-02-21 ENCOUNTER — OFFICE VISIT (OUTPATIENT)
Dept: URGENT CARE | Age: 56
End: 2021-02-21
Payer: MEDICARE

## 2021-02-21 VITALS — TEMPERATURE: 97 F | OXYGEN SATURATION: 94 % | HEART RATE: 100 BPM | RESPIRATION RATE: 18 BRPM

## 2021-02-21 DIAGNOSIS — R23.8 REDNESS AND SWELLING OF ELBOW: Primary | ICD-10-CM

## 2021-02-21 DIAGNOSIS — M25.429 REDNESS AND SWELLING OF ELBOW: Primary | ICD-10-CM

## 2021-02-21 DIAGNOSIS — R50.9 FEVER, UNSPECIFIED FEVER CAUSE: ICD-10-CM

## 2021-02-21 PROCEDURE — 99214 OFFICE O/P EST MOD 30 MIN: CPT | Performed by: NURSE PRACTITIONER

## 2021-02-21 PROCEDURE — G0463 HOSPITAL OUTPT CLINIC VISIT: HCPCS | Performed by: NURSE PRACTITIONER

## 2021-02-21 NOTE — PATIENT INSTRUCTIONS
You have chosen to go to [facility] for evaluation of possible septic right elbow joint  Follow up with your PCP

## 2021-02-23 NOTE — ASSESSMENT & PLAN NOTE
Symptoms remained stable with Nexium 40 mg daily  [FreeTextEntry1] : 1. Digital mucous cyst, L 1st DIP \par -Education, no intervention \par -Counseled patient to notify us of any changes \par -Will observe for now\par \par 2. Minocycline induced hyperpigmentation, b/l shins\par -Education regarding nature and course\par -Anticipatory guidance provided\par \par 3. Skin cancer screening\par TBSE performed today\par -ABCDEs of melanoma discussed\par -SPF 30 or higher and reapply often\par -rtc q1yr for repeat skin exam or sooner if new/concerning lesion\par \par

## 2021-02-24 ENCOUNTER — TRANSITIONAL CARE MANAGEMENT (OUTPATIENT)
Dept: INTERNAL MEDICINE CLINIC | Age: 56
End: 2021-02-24

## 2021-02-24 ENCOUNTER — TELEPHONE (OUTPATIENT)
Dept: INTERNAL MEDICINE CLINIC | Facility: CLINIC | Age: 56
End: 2021-02-24

## 2021-02-24 RX ORDER — IBUPROFEN 600 MG/1
600 TABLET ORAL EVERY 8 HOURS
COMMUNITY
Start: 2021-02-23 | End: 2022-06-20

## 2021-02-24 NOTE — TELEPHONE ENCOUNTER
Pt called to schedule \[facility] [place] [facility]\""ital f/u   Dc 2/23/21  [facility]    He will be leaving at 1pm and be back later leave direct number he can reach you at

## 2021-03-02 ENCOUNTER — OFFICE VISIT (OUTPATIENT)
Dept: INTERNAL MEDICINE CLINIC | Facility: CLINIC | Age: 56
End: 2021-03-02
Payer: MEDICARE

## 2021-03-02 VITALS
DIASTOLIC BLOOD PRESSURE: 70 MMHG | SYSTOLIC BLOOD PRESSURE: 110 MMHG | OXYGEN SATURATION: 97 % | HEIGHT: 70 IN | TEMPERATURE: 99.3 F | BODY MASS INDEX: 31.07 KG/M2 | HEART RATE: 83 BPM | WEIGHT: 217 LBS

## 2021-03-02 DIAGNOSIS — K21.9 GERD WITHOUT ESOPHAGITIS: Primary | ICD-10-CM

## 2021-03-02 DIAGNOSIS — E78.2 MIXED HYPERLIPIDEMIA: ICD-10-CM

## 2021-03-02 DIAGNOSIS — I10 ESSENTIAL HYPERTENSION: ICD-10-CM

## 2021-03-02 PROCEDURE — 99495 TRANSJ CARE MGMT MOD F2F 14D: CPT | Performed by: INTERNAL MEDICINE

## 2021-03-02 NOTE — PROGRESS NOTES
Assessment/Plan:    1  Right elbow arthralgia  Resolved  Patient was admitted to hospital and was started on IV vancomycin but then was discharged on ibuprofen  Was seen by orthopedic surgeon and rheumatologist     2  Bronchial asthma  Continue with present combination of inhalers    3  GERD  Continue with Nexium 40 mg daily    4  Essential hypertension  Blood pressure is stable on present regimen     Diagnoses and all orders for this visit:    GERD without esophagitis    Essential hypertension    Mixed hyperlipidemia          Subjective:          Patient ID: Diamante Gonzalez is a 48 y o  male  TCM Call (since 1/30/2021)     Date and time call was made  2/24/2021 12:35 PM    Hospital care reviewed  Records reviewed    Patient was hospitialized at  [facility]        Date of Admission  02/21/21    Date of discharge  02/23/21    Diagnosis  Pyrogenic Arthritis of Right Elbow, Essential Hypertension    Disposition  Home    Were the patients medications reviewed and updated  Yes    Current Symptoms  None      TCM Call (since 1/30/2021)     Post hospital issues  Reduced activity    Should patient be enrolled in anticoag monitoring? No    Scheduled for follow up?   Yes    Did you obtain your prescribed medications  Yes    Do you need help managing your prescriptions or medications  No    Is transportation to your appointment needed  No    I have advised the patient to call PCP with any new or worsening symptoms  [id number] [facility] or [facility] other    Support System  Spouse    The type of support provided  Emotional; Physical    Do you have social support  Yes, quite a bit    Are you recieving any outpatient services  No    Are you recieving home care services  No    Are you using any community resources  No    Current waiver services  No    Have you fallen in the last 12 months  No    Interperter language line needed  No    Counseling  Patient    Counseling topics Activities of daily living; Importance of RX compliance; instructions for management          This is a follow-up visit after hospitalization  Patient was [facility] to [facility] with right elbow infection/a information  He initially was treated with IV vancomycin  His CRP and sed rate was elevated  Although also seen by rheumatologist and orthopedic surgeon  He was discharged on ibuprofen for 5 days  Feeling well now  His uric acid level was 5 1  The following portions of the patient's history were reviewed and updated as appropriate: allergies, current medications, past family history, past medical history, past social history, past surgical history and problem list BMI Counseling: Body mass index is 31 59 kg/m²  The BMI is above normal  Nutrition recommendations include decreasing portion sizes, encouraging healthy choices of fruits and vegetables, decreasing fast food intake, consuming healthier snacks and limiting drinks that contain sugar  Exercise recommendations include vigorous physical activity 75 minutes/week and exercising 3-5 times per week  No pharmacotherapy was ordered       Review of Systems   Constitutional: Negative for fatigue and fever  HENT: Negative for congestion, ear discharge, ear pain, postnasal drip, sinus pressure, sore throat, tinnitus and trouble swallowing  Eyes: Negative for discharge, itching and visual disturbance  Respiratory: Negative for cough and shortness of breath  Cardiovascular: Negative for chest pain and palpitations  Gastrointestinal: Negative for abdominal pain, diarrhea, nausea and vomiting  Endocrine: Negative for cold intolerance and polyuria  Genitourinary: Negative for difficulty urinating, dysuria and urgency  Musculoskeletal: Positive for arthralgias  Negative for neck pain  Skin: Negative for rash  Allergic/Immunologic: Negative for environmental allergies     Neurological: Negative for dizziness, weakness and headaches  Psychiatric/Behavioral: Negative for agitation and behavioral problems  The patient is not nervous/anxious            Past Medical History:   Diagnosis Date    Anxiety 1/15/2019    Arthritis     Chronic rhinitis     last assessed 2/21/14    Chronic serous otitis media     last assessed 11/20/13    Chronic sinusitis     last assessed 8/19/14    Functional heart murmur     GERD (gastroesophageal reflux disease)     Gout     Hearing problem     last assessed 12/1/16    Hiatal hernia     Hypercholesterolemia     Hypertension     Palpitations     Testicular hypogonadism     last assessed 10/4/13    tach [facility])          Current Outpatient Medications:     acetaminophen (TYLENOL) 500 mg tablet, Take 1,000 mg by mouth every 6 (six) hours as needed for mild pain, Disp: , Rfl:     albuterol (2 5 mg/3 mL) 0 083 % nebulizer solution, Take 2 5 mg by nebulization every 6 (six) hours as needed for wheezing or shortness of breath, Disp: , Rfl:     albuterol (PROVENTIL HFA,VENTOLIN HFA) 90 mcg/act inhaler, Inhale 1 puff 4 (four) times a day , Disp: , Rfl:     cyclobenzaprine (FLEXERIL) 5 mg tablet, Take 1 tablet (5 mg total) by mouth 3 (three) times a day as needed for muscle spasms, Disp: 15 tablet, Rfl: 0    diclofenac sodium (VOLTAREN) 1 %, Apply 2 g topically 4 (four) times a day as needed (pain), Disp: 100 g, Rfl: 2    esomeprazole (NexIUM) 40 MG capsule, Take 40 mg by mouth every morning before breakfast, Disp: , Rfl:     fluticasone-salmeterol (ADVAIR DISKUS) 500-50 mcg/dose, Inhale 1 puff 2 (two) times a day, Disp: , Rfl:     ipratropium-albuterol (DUO-NEB) 0 5-2 5 mg/3 mL, Inhale 3 mL 4 (four) times a day As needed, Disp: , Rfl:     lisinopril (ZESTRIL) 20 mg tablet, TAKE 1 TABLET BY MOUTH EVERY DAY, Disp: 90 tablet, Rfl: 1    meclizine (ANTIVERT) 12 5 MG tablet, Take 1 tablet (12 5 mg total) by mouth every 8 (eight) hours, Disp: 30 tablet, Rfl: 0    montelukast (SINGULAIR) 10 mg tablet, TAKE 1 TABLET BY MOUTH EVERY DAY, Disp: 90 tablet, Rfl: 1    pseudoephedrine (SUDAFED) 30 mg tablet, Take 60 mg by mouth every 4 (four) hours as needed for congestion, Disp: , Rfl:     Triamcinolone Acetonide (NASACORT ALLERGY 24HR NA), 1 spray into each nostril daily  , Disp: , Rfl:     Uloric 40 MG tablet, Take 1 tablet (40 mg total) by mouth daily, Disp: 90 tablet, Rfl: 1    verapamil (CALAN) 120 mg tablet, Take 1 tablet (120 mg total) by mouth daily, Disp: 90 tablet, Rfl: 4    ibuprofen (MOTRIN) 600 mg tablet, Take 600 mg by mouth every 8 (eight) hours, Disp: , Rfl:     predniSONE 10 mg tablet, Four tablets once daily for 5 days (Patient not taking: Reported on 2/21/2021), Disp: 20 tablet, Rfl: 0    Allergies   Allergen Reactions    Penicillins Rash and Anaphylaxis    Acetazolamide      Other reaction(s): Stomach Ache    Cephalosporins Other (See Comments)     headache    Doxycycline      Capsules only  Tablets are ok to take, per pt  Capsules only  Tablets are ok to take, per pt   Other     Sulfa Antibiotics Other (See Comments)     Category: Allergy;  Annotation - [id number]: STOMACH UPSET    Famotidine Palpitations    Omeprazole Palpitations     Painful urination       Social History   Past Surgical History:   Procedure Laterality Date    APPENDECTOMY      BICEPS TENODESIS      anesthesia for tenodesis- of ruptured long tendon of biceps     HERNIA REPAIR      THYROIDECTOMY      TONSILLECTOMY       Family History   Problem Relation Age of Onset    Lung cancer Father     Coronary artery disease Father         blockages    Stroke Maternal Grandmother     Cancer Paternal Grandfather         metastasized    Heart disease Family     Lung cancer Cousin     No Known Problems Mother     No Known Problems Sister     No Known Problems Brother     No Known Problems Maternal Aunt     No Known Problems Maternal Uncle     No Known Problems Paternal Aunt     No Known Problems Paternal Uncle     No Known Problems Paternal Grandmother     ADD / ADHD Neg Hx     Anesthesia problems Neg Hx     Clotting disorder Neg Hx     Collagen disease Neg Hx     Diabetes Neg Hx     Dislocations Neg Hx     Learning disabilities Neg Hx     Neurological problems Neg Hx     Osteoporosis Neg Hx     Rheumatologic disease Neg Hx     Scoliosis Neg Hx     Vascular Disease Neg Hx        Objective:  /70 (BP Location: Right arm, Patient Position: Sitting, Cuff Size: Large)   Pulse 83   Temp 99 3 °F (37 4 °C) (Temporal)   Ht 5' 9 5" (1 765 m)   Wt 98 4 kg (217 lb)   SpO2 97% Comment: ra  BMI 31 59 kg/m²   Body mass index is 31 59 kg/m²  Physical Exam  Constitutional:       Appearance: He is well-developed  HENT:      Head: Normocephalic  Right Ear: External ear normal       Left Ear: External ear normal    Eyes:      General: No scleral icterus  Pupils: Pupils are equal, round, and reactive to light  Neck:      Musculoskeletal: Normal range of motion and neck supple  Thyroid: No thyromegaly  Trachea: No tracheal deviation  Cardiovascular:      Rate and Rhythm: Normal rate and regular rhythm  Heart sounds: Normal heart sounds  Pulmonary:      Effort: Pulmonary effort is normal  No respiratory distress  Breath sounds: Normal breath sounds  Chest:      Chest wall: No tenderness  Abdominal:      General: Bowel sounds are normal       Palpations: Abdomen is soft  There is no mass  Tenderness: There is no abdominal tenderness  Musculoskeletal: Normal range of motion  Right lower leg: No edema  Left lower leg: No edema  Lymphadenopathy:      Cervical: No cervical adenopathy  Skin:     General: Skin is warm  Neurological:      Mental Status: He is alert and oriented to person, place, and time  Cranial Nerves: No cranial nerve deficit     Psychiatric:         Mood and Affect: Mood normal          Behavior: Behavior normal          Thought Content:  Thought content normal          Judgment: Judgment normal

## 2021-03-05 ENCOUNTER — OFFICE VISIT (OUTPATIENT)
Dept: OBGYN CLINIC | Facility: MEDICAL CENTER | Age: 56
End: 2021-03-05
Payer: MEDICARE

## 2021-03-05 VITALS
WEIGHT: 216.4 LBS | BODY MASS INDEX: 30.98 KG/M2 | HEIGHT: 70 IN | TEMPERATURE: 98.3 F | DIASTOLIC BLOOD PRESSURE: 82 MMHG | SYSTOLIC BLOOD PRESSURE: 129 MMHG | HEART RATE: 79 BPM

## 2021-03-05 DIAGNOSIS — M25.562 CHRONIC PAIN OF BOTH KNEES: ICD-10-CM

## 2021-03-05 DIAGNOSIS — M25.561 CHRONIC PAIN OF BOTH KNEES: ICD-10-CM

## 2021-03-05 DIAGNOSIS — M17.0 BILATERAL PRIMARY OSTEOARTHRITIS OF KNEE: Primary | ICD-10-CM

## 2021-03-05 DIAGNOSIS — G89.29 CHRONIC PAIN OF BOTH KNEES: ICD-10-CM

## 2021-03-05 PROCEDURE — 20610 DRAIN/INJ JOINT/BURSA W/O US: CPT | Performed by: PHYSICIAN ASSISTANT

## 2021-03-05 PROCEDURE — 99213 OFFICE O/P EST LOW 20 MIN: CPT | Performed by: PHYSICIAN ASSISTANT

## 2021-03-05 RX ORDER — METHYLPREDNISOLONE ACETATE 40 MG/ML
2 INJECTION, SUSPENSION INTRA-ARTICULAR; INTRALESIONAL; INTRAMUSCULAR; SOFT TISSUE
Status: COMPLETED | OUTPATIENT
Start: 2021-03-05 | End: 2021-03-05

## 2021-03-05 RX ORDER — LIDOCAINE HYDROCHLORIDE 10 MG/ML
3 INJECTION, SOLUTION INFILTRATION; PERINEURAL
Status: COMPLETED | OUTPATIENT
Start: 2021-03-05 | End: 2021-03-05

## 2021-03-05 RX ADMIN — METHYLPREDNISOLONE ACETATE 2 ML: 40 INJECTION, SUSPENSION INTRA-ARTICULAR; INTRALESIONAL; INTRAMUSCULAR; SOFT TISSUE at 15:35

## 2021-03-05 RX ADMIN — LIDOCAINE HYDROCHLORIDE 3 ML: 10 INJECTION, SOLUTION INFILTRATION; PERINEURAL at 15:35

## 2021-03-05 NOTE — PROGRESS NOTES
Assessment/Plan:  1  Bilateral primary osteoarthritis of knee    2  Chronic pain of both knees      Orders Placed This Encounter   Procedures    Large joint arthrocentesis       · Patient received bilateral knee steroid injections in the office today  Tolerated the procedures well  Advised to apply ice and avoid strenuous activity for 1-2 days  needed  · Continue OTC NSAIDs as needed for pain  · Continue knee brace as needed for comfort  · Patient will call prior to his next appointment if he would like to try repeat viscosupplementation injections instead of CSI  Patient is aware VS requires several weeks for prior authorization  Return in about 3 months (around 6/5/2021) for bilateral knee CSI  I answered all of the patient's questions during the visit and provided education of the patient's condition during the visit  The patient verbalized understanding of the information given and agrees with the plan  This note was dictated using Loop88 software  It may contain errors including improperly dictated words  Please contact physician directly for any questions  Subjective   Chief Complaint:   Chief Complaint   Patient presents with    Left Knee - Follow-up    Right Knee - Follow-up       PEDRO [place] Baires is a 48 y o  male who presents for follow up for   Bilateral knee pain  Patient received bilateral knee steroid injections on 11/30/2020 and reports several months of pain relief  He states his left knee pain is worse than the right knee at this time  Pain is described as diffuse about bilateral knees  Pain is worsened by weather changes including the current cold weather  He has been taking Motrin for the last several days and notes significantly improved knee pain  He is not currently doing physical therapy home exercises  He does have knee brace home that he wears at times    Patient has been happy with the results he gets from steroid injections and viscosupplementation injection  He states that vS made his knee feel much more stable  Review of Systems  ROS:    See HPI for musculoskeletal review     All other systems reviewed are negative     History:  Past Medical History:   Diagnosis Date    Anxiety 1/15/2019    Arthritis     Chronic rhinitis     last assessed 2/21/14    Chronic serous otitis media     last assessed 11/20/13    Chronic sinusitis     last assessed 8/19/14    Functional heart murmur     GERD (gastroesophageal reflux disease)     Gout     Hearing problem     last assessed 12/1/16    Hiatal hernia     Hypercholesterolemia     Hypertension     Palpitations     Testicular hypogonadism     last assessed 10/4/13    [facility])      Past Surgical History:   Procedure Laterality Date    APPENDECTOMY      BICEPS TENODESIS      anesthesia for tenodesis- of ruptured long tendon of biceps     HERNIA REPAIR      THYROIDECTOMY      TONSILLECTOMY       Social History   Social History     Substance and Sexual Activity   Alcohol Use Yes    Frequency: 2-3 times a week    Drinks per session: 5 or 6    Binge frequency: Weekly    Comment: occasionally     Social History     Substance and Sexual Activity   Drug Use No     Social History     Tobacco Use   Smoking Status Never Smoker   Smokeless Tobacco Never Used     Family History:   Family History   Problem Relation Age of Onset    Lung cancer Father     Coronary artery disease Father         blockages    Stroke Maternal Grandmother     Cancer Paternal Grandfather         metastasized    Heart disease Family     Lung cancer Cousin     No Known Problems Mother     No Known Problems Sister     No Known Problems Brother     No Known Problems Maternal Aunt     No Known Problems Maternal Uncle     No Known Problems Paternal Aunt     No Known Problems Paternal Uncle     No Known Problems Paternal Grandmother     ADD / ADHD Neg Hx     Anesthesia problems Neg Hx     Clotting disorder Neg Hx     Collagen disease Neg Hx     Diabetes Neg Hx     Dislocations Neg Hx     Learning disabilities Neg Hx     Neurological problems Neg Hx     Osteoporosis Neg Hx     Rheumatologic disease Neg Hx     Scoliosis Neg Hx     Vascular Disease Neg Hx        Current Outpatient Medications on File Prior to Visit   Medication Sig Dispense Refill    acetaminophen (TYLENOL) 500 mg tablet Take 1,000 mg by mouth every 6 (six) hours as needed for mild pain      albuterol (2 5 mg/3 mL) 0 083 % nebulizer solution Take 2 5 mg by nebulization every 6 (six) hours as needed for wheezing or shortness of breath      albuterol (PROVENTIL HFA,VENTOLIN HFA) 90 mcg/act inhaler Inhale 1 puff 4 (four) times a day       cyclobenzaprine (FLEXERIL) 5 mg tablet Take 1 tablet (5 mg total) by mouth 3 (three) times a day as needed for muscle spasms 15 tablet 0    diclofenac sodium (VOLTAREN) 1 % Apply 2 g topically 4 (four) times a day as needed (pain) 100 g 2    esomeprazole (NexIUM) 40 MG capsule Take 40 mg by mouth every morning before breakfast      fluticasone-salmeterol (ADVAIR DISKUS) 500-50 mcg/dose Inhale 1 puff 2 (two) times a day      ibuprofen (MOTRIN) 600 mg tablet Take 600 mg by mouth every 8 (eight) hours      ipratropium-albuterol (DUO-NEB) 0 5-2 5 mg/3 mL Inhale 3 mL 4 (four) times a day As needed      lisinopril (ZESTRIL) 20 mg tablet TAKE 1 TABLET BY MOUTH EVERY DAY 90 tablet 1    meclizine (ANTIVERT) 12 5 MG tablet Take 1 tablet (12 5 mg total) by mouth every 8 (eight) hours 30 tablet 0    montelukast (SINGULAIR) 10 mg tablet TAKE 1 TABLET BY MOUTH EVERY DAY 90 tablet 1    predniSONE 10 mg tablet Four tablets once daily for 5 days (Patient not taking: Reported on 2/21/2021) 20 tablet 0    pseudoephedrine (SUDAFED) 30 mg tablet Take 60 mg by mouth every 4 (four) hours as needed for congestion      Triamcinolone Acetonide (NASACORT ALLERGY 24HR NA) 1 spray into each nostril daily        Uloric 40 MG tablet Take 1 tablet (40 mg total) by mouth daily 90 tablet 1    verapamil (CALAN) 120 mg tablet Take 1 tablet (120 mg total) by mouth daily 90 tablet 4     No current facility-administered medications on file prior to visit  Allergies   Allergen Reactions    Penicillins Rash and Anaphylaxis    Acetazolamide      Other reaction(s): Stomach Ache    Cephalosporins Other (See Comments)     headache    Doxycycline      Capsules only  Tablets are ok to take, per pt  Capsules only  Tablets are ok to take, per pt   Other     Sulfa Antibiotics Other (See Comments)     Category: Allergy; Annotation - [id number]: STOMACH UPSET    Famotidine Palpitations    Omeprazole Palpitations     Painful urination        Objective     /82   Pulse 79   Temp 98 3 °F (36 8 °C)   Ht 5' 9 5" (1 765 m)   Wt 98 2 kg (216 lb 6 4 oz)   BMI 31 50 kg/m²      PE:  AAOx 3  WDWN  Hearing intact, no drainage from eyes  no audible wheezing  no abdominal distension  LE compartments soft, skin intact    Ortho Exam:  right Knee:   No erythema  no swelling  no effusion  no warmth    No TTP  AROM: 5- 120  Stable to varus/valgus stress    Left Knee:   No erythema  no swelling  no effusion  no warmth   no TTP  AROM: 5- 120  Stable to varus/valgus stress      Large joint arthrocentesis: bilateral knee  Universal Protocol:  Consent: Verbal consent obtained    Risks and benefits: risks, benefits and alternatives were discussed  Consent given by: patient  Site marked: the operative site was marked  Supporting Documentation  Indications: pain   Procedure Details  Location: knee - bilateral knee  Preparation: Patient was prepped and draped in the usual sterile fashion  Needle size: 22 G  Ultrasound guidance: no  Approach: anterolateral    Medications (Right): 3 mL lidocaine 1 %; 2 mL methylPREDNISolone acetate 40 mg/mLMedications (Left): 3 mL lidocaine 1 %; 2 mL methylPREDNISolone acetate 40 mg/mL   Patient tolerance: patient tolerated the procedure well with no immediate complications  Dressing:  Sterile dressing applied

## 2021-03-12 ENCOUNTER — OFFICE VISIT (OUTPATIENT)
Dept: URGENT CARE | Age: 56
End: 2021-03-12
Payer: MEDICARE

## 2021-03-12 VITALS
DIASTOLIC BLOOD PRESSURE: 82 MMHG | RESPIRATION RATE: 20 BRPM | HEART RATE: 88 BPM | TEMPERATURE: 98.1 F | OXYGEN SATURATION: 97 % | SYSTOLIC BLOOD PRESSURE: 130 MMHG

## 2021-03-12 DIAGNOSIS — H60.502 ACUTE OTITIS EXTERNA OF LEFT EAR, UNSPECIFIED TYPE: ICD-10-CM

## 2021-03-12 DIAGNOSIS — J45.21 INTERMITTENT ASTHMA WITH ACUTE EXACERBATION, UNSPECIFIED ASTHMA SEVERITY: Primary | ICD-10-CM

## 2021-03-12 PROCEDURE — G0463 HOSPITAL OUTPT CLINIC VISIT: HCPCS | Performed by: PHYSICIAN ASSISTANT

## 2021-03-12 PROCEDURE — 99213 OFFICE O/P EST LOW 20 MIN: CPT | Performed by: PHYSICIAN ASSISTANT

## 2021-03-12 RX ORDER — PREDNISONE 10 MG/1
50 TABLET ORAL DAILY
Qty: 25 TABLET | Refills: 0 | Status: SHIPPED | OUTPATIENT
Start: 2021-03-12 | End: 2021-03-17

## 2021-03-12 RX ORDER — CIPROFLOXACIN AND DEXAMETHASONE 3; 1 MG/ML; MG/ML
4 SUSPENSION/ DROPS AURICULAR (OTIC) 2 TIMES DAILY
Qty: 7.5 ML | Refills: 0 | Status: SHIPPED | OUTPATIENT
Start: 2021-03-12 | End: 2022-02-16

## 2021-03-13 NOTE — PROGRESS NOTES
[address]        NAME: Hugo Bowman is a 48 y o  male  : 1965    MRN: [id number]  DATE: 2021  TIME: 8:54 PM    Assessment and Plan   Intermittent asthma with acute exacerbation, unspecified asthma severity [J45 21]  1  Intermittent asthma with acute exacerbation, unspecified asthma severity  ciprofloxacin-dexamethasone (CIPRODEX) otic suspension    predniSONE 10 mg tablet   2  Acute otitis externa of left ear, unspecified type  ciprofloxacin-dexamethasone (CIPRODEX) otic suspension    predniSONE 10 mg tablet         Patient Instructions     Begin prednisone as prescribed  Take this medication the morning as it can cause difficulty with sleep if taken too close to bedtime  Begin prescribed ear drops  Do not put anything else in the ears such as Q-tips, water   continue to stay very well hydrated  Use over-the-counter cold and cough medications as needed help with symptoms  Follow-up with primary care physician 3-5 days for continue symptoms   proceed the ER with any worsening of your symptoms  Follow up with PCP in 3-5 days  Proceed to  ER if symptoms worsen  Chief Complaint     Chief Complaint   Patient presents with    Asthma     pt states thinks he has bronchitis, having difficulty breathing with exertion the past 2 weeks         History of Present Illness       48year old male presents to the office for c/o    Increasing asthma symptoms for last 2 weeks  Patient states that every year he gets similar episodes of this as he feels like it is primarily allergy induced  He has been using Advil as well as Singulair as well as over-the-counter cold medications to help with his symptoms  Patient's PCP told him to stay out of the cold as he thought he was exacerbating his symptoms  Patient states that he has been doing this  In addition to his symptoms of mild cough and shortness of breath with exertion the patient also has complaints of left ear pain and itchiness  Dr. Gonsalez's nurse will call you to schedule your post op appointment for approximately 2 weeks.  If you do not receive this call by lunch time tomorrow, please call  to follow up about it.        General Information:    1.  Do not drink alcoholic beverages including beer for 24 hours or as long as you are on pain medication..  2.  Do not drive a motor vehicle, operate machinery or power tools, or signs legal papers for 24 hours or as long as you are on pain medication.   3.  You may experience light-headedness, dizziness, and sleepiness following surgery. Please do not stay alone. A responsible adult should be with you for this 24 hour period.  4.  Go home and rest.    5. Progress slowly to a normal diet unless instructed.  Otherwise, begin with liquids such as soft drinks, then soup and crackers working up to solid foods. Drink plenty of nonalcoholic fluids.  6.  Certain anesthetics and pain medications produce nausea and vomiting in certain       individuals. If nausea becomes a problem at home, call you doctor.    7. A nurse will be calling you sometime after surgery. Do not be alarmed. This is our way of finding out how you are doing.    8. Several times every hour while you are awake, take 2-3 deep breaths and cough. If you had stomach surgery hold a pillow or rolled towel firmly against your stomach before you cough. This will help with any pain the cough might cause.  9. Several times every hour while you are awake, pump and flex your feet 5-6 times and do foot circles. This will help prevent blood clots.    10.Call your doctor for severe pain, bleeding, fever, or signs or symptoms of infection (pain, swelling, redness, foul odor, drainage).    Discharge Instructions: After Your Surgery/Procedure  Youve just had surgery. During surgery you were given medicine called anesthesia to keep you relaxed and free of pain. After surgery you may have some pain or nausea. This is common. Here are some tips  "for feeling better and getting well after surgery.     Stay on schedule with your medication.   Going home  Your doctor or nurse will show you how to take care of yourself when you go home. He or she will also answer your questions. Have an adult family member or friend drive you home.      For your safety we recommend these precaution for the first 24 hours after your procedure:  · Do not drive or use heavy equipment.  · Do not make important decisions or sign legal papers.  · Do not drink alcohol.  · Have someone stay with you, if needed. He or she can watch for problems and help keep you safe.  · Your concentration, balance, coordination, and judgement may be impaired for many hours after anesthesia.  Use caution when ambulating or standing up.     · You may feel weak and "washed out" after anesthesia and surgery.      Subtle residual effects of general anesthesia or sedation with regional / local anesthesia can last more than 24 hours.  Rest for the remainder of the day or longer if your Doctor/Surgeon has advised you to do so.  Although you may feel normal within the first 24 hours, your reflexes and mental ability may be impaired without you realizing it.  You may feel dizzy, lightheaded or sleepy for 24 hours or longer.      Be sure to go to all follow-up visits with your doctor. And rest after your surgery for as long as your doctor tells you to.  Coping with pain  If you have pain after surgery, pain medicine will help you feel better. Take it as told, before pain becomes severe. Also, ask your doctor or pharmacist about other ways to control pain. This might be with heat, ice, or relaxation. And follow any other instructions your surgeon or nurse gives you.  Tips for taking pain medicine  To get the best relief possible, remember these points:  · Pain medicines can upset your stomach. Taking them with a little food may help.  · Most pain relievers taken by mouth need at least 20 to 30 minutes to start to " He notes occasional drainage  Patient denies any specific fever, chills, chest pain or palpitations  He has also been using Nasacort to help with his symptoms which has helped  He denies any sick exposure  He denies any contact with coronavirus  Patient states that he does do daily walks outside  Asthma  He complains of cough (at times ), frequent throat clearing (with rapid talking ) and shortness of breath (with exertion; none at rest )  This is a new problem  The current episode started 1 to 4 weeks ago (x 2 weeks )  The problem occurs intermittently  The problem has been unchanged  The cough is non-productive  Associated symptoms include dyspnea on exertion, ear pain (left ), nasal congestion, postnasal drip and a sore throat (mild soreness to the area in morning / while using Nasacort)  Pertinent negatives include no appetite change, chest pain, fever, headaches, myalgias or trouble swallowing  His symptoms are aggravated by exercise and pollen (cold air)  His symptoms are alleviated by OTC cough suppressant (Advair inhaler, over-the-counter cold and cough medications, Nasacort)  He reports minimal improvement on treatment  Risk factors: Environmental allergies  His past medical history is significant for asthma and bronchitis  There is no history of COPD  Review of Systems   Review of Systems   Constitutional: Positive for fatigue  Negative for appetite change, chills and fever  HENT: Positive for congestion, ear pain (left ), postnasal drip, sinus pressure, sinus pain and sore throat (mild soreness to the area in morning / while using Nasacort)  Negative for trouble swallowing  No loss of taste/ smell    Respiratory: Positive for cough (at times ), chest tightness and shortness of breath (with exertion; none at rest )  Cardiovascular: Positive for dyspnea on exertion  Negative for chest pain and palpitations  Gastrointestinal: Negative  Genitourinary: Negative  Musculoskeletal: Negative for arthralgias and myalgias  Skin: Negative for rash  Neurological: Negative for dizziness, light-headedness and headaches           Current Medications       Current Outpatient Medications:     acetaminophen (TYLENOL) 500 mg tablet, Take 1,000 mg by mouth every 6 (six) hours as needed for mild pain, Disp: , Rfl:     albuterol (2 5 mg/3 mL) 0 083 % nebulizer solution, Take 2 5 mg by nebulization every 6 (six) hours as needed for wheezing or shortness of breath, Disp: , Rfl:     albuterol (PROVENTIL HFA,VENTOLIN HFA) 90 mcg/act inhaler, Inhale 1 puff 4 (four) times a day , Disp: , Rfl:     ciprofloxacin-dexamethasone (CIPRODEX) otic suspension, Administer 4 drops into the left ear 2 (two) times a day for 7 days, Disp: 7 5 mL, Rfl: 0    cyclobenzaprine (FLEXERIL) 5 mg tablet, Take 1 tablet (5 mg total) by mouth 3 (three) times a day as needed for muscle spasms, Disp: 15 tablet, Rfl: 0    diclofenac sodium (VOLTAREN) 1 %, Apply 2 g topically 4 (four) times a day as needed (pain), Disp: 100 g, Rfl: 2    esomeprazole (NexIUM) 40 MG capsule, Take 40 mg by mouth every morning before breakfast, Disp: , Rfl:     fluticasone-salmeterol (ADVAIR DISKUS) 500-50 mcg/dose, Inhale 1 puff 2 (two) times a day, Disp: , Rfl:     ibuprofen (MOTRIN) 600 mg tablet, Take 600 mg by mouth every 8 (eight) hours, Disp: , Rfl:     ipratropium-albuterol (DUO-NEB) 0 5-2 5 mg/3 mL, Inhale 3 mL 4 (four) times a day As needed, Disp: , Rfl:     lisinopril (ZESTRIL) 20 mg tablet, TAKE 1 TABLET BY MOUTH EVERY DAY, Disp: 90 tablet, Rfl: 1    meclizine (ANTIVERT) 12 5 MG tablet, Take 1 tablet (12 5 mg total) by mouth every 8 (eight) hours, Disp: 30 tablet, Rfl: 0    montelukast (SINGULAIR) 10 mg tablet, TAKE 1 TABLET BY MOUTH EVERY DAY, Disp: 90 tablet, Rfl: 1    predniSONE 10 mg tablet, Take 5 tablets (50 mg total) by mouth daily for 5 days, Disp: 25 tablet, Rfl: 0    pseudoephedrine (SUDAFED) 30 mg tablet, work.  · Taking medicine on a schedule can help you remember to take it. Try to time your medicine so that you can take it before starting an activity. This might be before you get dressed, go for a walk, or sit down for dinner.  · Constipation is a common side effect of pain medicines. Call your doctor before taking any medicines such as laxatives or stool softeners to help ease constipation. Also ask if you should skip any foods. Drinking lots of fluids and eating foods such as fruits and vegetables that are high in fiber can also help. Remember, do not take laxatives unless your surgeon has prescribed them.  · Drinking alcohol and taking pain medicine can cause dizziness and slow your breathing. It can even be deadly. Do not drink alcohol while taking pain medicine.  · Pain medicine can make you react more slowly to things. Do not drive or run machinery while taking pain medicine.  Your health care provider may tell you to take acetaminophen to help ease your pain. Ask him or her how much you are supposed to take each day. Acetaminophen or other pain relievers may interact with your prescription medicines or other over-the-counter (OTC) drugs. Some prescription medicines have acetaminophen and other ingredients. Using both prescription and OTC acetaminophen for pain can cause you to overdose. Read the labels on your OTC medicines with care. This will help you to clearly know the list of ingredients, how much to take, and any warnings. It may also help you not take too much acetaminophen. If you have questions or do not understand the information, ask your pharmacist or health care provider to explain it to you before you take the OTC medicine.  Managing nausea  Some people have an upset stomach after surgery. This is often because of anesthesia, pain, or pain medicine, or the stress of surgery. These tips will help you handle nausea and eat healthy foods as you get better. If you were on a special food plan  Take 60 mg by mouth every 4 (four) hours as needed for congestion, Disp: , Rfl:     Triamcinolone Acetonide (NASACORT ALLERGY 24HR NA), 1 spray into each nostril daily  , Disp: , Rfl:     Uloric 40 MG tablet, Take 1 tablet (40 mg total) by mouth daily, Disp: 90 tablet, Rfl: 1    verapamil (CALAN) 120 mg tablet, Take 1 tablet (120 mg total) by mouth daily, Disp: 90 tablet, Rfl: 4    Current Allergies     Allergies as of 03/12/2021 - Reviewed 03/12/2021   Allergen Reaction Noted    Penicillins Rash and Anaphylaxis 08/17/2004    Acetazolamide  10/25/2016    Cephalosporins Other (See Comments) 08/17/2004    Doxycycline  10/24/2018    Other  04/28/2014    Sulfa antibiotics Other (See Comments) 08/17/2004    Famotidine Palpitations 09/05/2016    Omeprazole Palpitations 09/05/2016            The following portions of the patient's history were reviewed and updated as appropriate: allergies, current medications, past family history, past medical history, past social history, past surgical history and problem list      Past Medical History:   Diagnosis Date    Anxiety 1/15/2019    Arthritis     Chronic rhinitis     last assessed 2/21/14    Chronic serous otitis media     last assessed 11/20/13    Chronic sinusitis     last assessed 8/19/14    Functional heart murmur     GERD (gastroesophageal reflux disease)     Gout     Hearing problem     last assessed 12/1/16    Hiatal hernia     Hypercholesterolemia     Hypertension     Palpitations     Testicular hypogonadism     last assessed 10/4/13    [facility])        Past Surgical History:   Procedure Laterality Date    APPENDECTOMY      BICEPS TENODESIS      anesthesia for tenodesis- of ruptured long tendon of biceps     HERNIA REPAIR      THYROIDECTOMY      TONSILLECTOMY         Family History   Problem Relation Age of Onset    Lung cancer Father     Coronary artery disease Father         blockages    Stroke Maternal Grandmother     Cancer before surgery, ask your doctor if you should follow it while you get better. These tips may help:  · Do not push yourself to eat. Your body will tell you when to eat and how much.  · Start off with clear liquids and soup. They are easier to digest.  · Next try semi-solid foods, such as mashed potatoes, applesauce, and gelatin, as you feel ready.  · Slowly move to solid foods. Dont eat fatty, rich, or spicy foods at first.  · Do not force yourself to have 3 large meals a day. Instead eat smaller amounts more often.  · Take pain medicines with a small amount of solid food, such as crackers or toast, to avoid nausea.     Call your surgeon if  · You still have pain an hour after taking medicine. The medicine may not be strong enough.  · You feel too sleepy, dizzy, or groggy. The medicine may be too strong.  · You have side effects like nausea, vomiting, or skin changes, such as rash, itching, or hives.       If you have obstructive sleep apnea  You were given anesthesia medicine during surgery to keep you comfortable and free of pain. After surgery, you may have more apnea spells because of this medicine and other medicines you were given. The spells may last longer than usual.   At home:  · Keep using the continuous positive airway pressure (CPAP) device when you sleep. Unless your health care provider tells you not to, use it when you sleep, day or night. CPAP is a common device used to treat obstructive sleep apnea.  · Talk with your provider before taking any pain medicine, muscle relaxants, or sedatives. Your provider will tell you about the possible dangers of taking these medicines.  © 8932-8299 [employer]. [address] [place] [address], [place] [address]. All rights reserved. This information is not intended as a substitute for professional medical care. Always follow your healthcare professional's instructions.  Post op instructions for prevention of DVT  What is deep vein thrombosis?  Deep vein  Paternal Grandfather         metastasized    Heart disease Family     Lung cancer Cousin     No Known Problems Mother     No Known Problems Sister     No Known Problems Brother     No Known Problems Maternal Aunt     No Known Problems Maternal Uncle     No Known Problems Paternal Aunt     No Known Problems Paternal Uncle     No Known Problems Paternal Grandmother     ADD / ADHD Neg Hx     Anesthesia problems Neg Hx     Clotting disorder Neg Hx     Collagen disease Neg Hx     Diabetes Neg Hx     Dislocations Neg Hx     Learning disabilities Neg Hx     Neurological problems Neg Hx     Osteoporosis Neg Hx     Rheumatologic disease Neg Hx     Scoliosis Neg Hx     Vascular Disease Neg Hx          Medications have been verified  Objective   /82   Pulse 88   Temp 98 1 °F (36 7 °C)   Resp 20   SpO2 97%   No LMP for male patient  Physical Exam     Physical Exam  Constitutional:       General: He is not in acute distress  Appearance: He is well-developed  He is not ill-appearing or diaphoretic  HENT:      Head: Normocephalic and atraumatic  Right Ear: Hearing, ear canal and external ear normal  No swelling  A middle ear effusion is present  No mastoid tenderness  Tympanic membrane is not perforated, erythematous or retracted  Left Ear: Hearing, ear canal and external ear normal  Swelling (  Localized swelling and erythema of left ear canal ) present  A middle ear effusion is present  No mastoid tenderness  Tympanic membrane is not perforated, erythematous or retracted  Nose: Nose normal  No mucosal edema or rhinorrhea  Mouth/Throat:      Pharynx: Uvula midline  No oropharyngeal exudate, posterior oropharyngeal erythema or uvula swelling  Eyes:      General: Lids are normal       Conjunctiva/sclera: Conjunctivae normal       Pupils: Pupils are equal, round, and reactive to light  Neck:      Musculoskeletal: Neck supple     Cardiovascular:      Rate and thrombosis (DVT) is the medical term for blood clots in the deep veins of the leg.  These blood clots can be dangerous.  A DVT can block a blood vessel and keep blood from getting where it needs to go.  Another problem is that the clot can travel to other parts of the body such as the lungs.  A clot that travels to the lungs is called a pulmonary embolus (PE) and can cause serious problems with breathing which can lead to death.  Am I at risk for DVT/PE?  If you are not very active, you are at risk of DVT.  Anyone confined to bed, sitting for long periods of time, recovering from surgery, etc. increases the risk of DVT.  Other risk factors are cancer diagnosis, certain medications, estrogen replacement in any form,older age, obesity, pregnancy, smoking, history of clotting disorders, and dehydration.  How will I know if I have a DVT?   Swelling in the lower leg   Pain   Warmth, redness, hardness or bulging of the vein  If you have any of these symptoms, call your doctors office right away.  Some people will not have any symptoms until the clot moves to the lungs.  What are the symptoms of a PE?   Panting, shortness of breath, or trouble breathing   Sharp, knife-like chest pain when you breathe   Coughing or coughing up blood   Rapid heartbeat  If you have any of these symptoms or get worse quickly, call 911 for emergency treatment.  How can I prevent a DVT?   Avoid long periods of inactivity and dont cross your legs--get up and walk around every hour or so.   Stay active--walking after surgery is highly encouraged.  This means you should get out of the house and walk in the neighborhood.  Going up and down stairs will not impair healing (unless advised against such activity by your doctor).     Drink plenty of noncaffeinated, nonalcoholic fluids each day to prevent dehydration.   Wear special support stockings, if they have been advised by your doctor.   If you travel, stop at least once an hour and  Rhythm: Normal rate and regular rhythm  No extrasystoles are present  Pulses: Normal pulses  Heart sounds: Normal heart sounds, S1 normal and S2 normal  No murmur  Pulmonary:      Effort: Pulmonary effort is normal  No accessory muscle usage or respiratory distress  Breath sounds: Normal breath sounds  No stridor or decreased air movement  No decreased breath sounds, wheezing, rhonchi or rales  Comments: There is no conversational dyspnea on exam   Patient is able to talk in multiple sentences without difficulty  Occasional cough heard on exam   Abdominal:      General: Bowel sounds are normal       Palpations: Abdomen is soft  Tenderness: There is no abdominal tenderness  Musculoskeletal:      Right lower leg: No edema  Left lower leg: No edema  Lymphadenopathy:      Cervical: No cervical adenopathy  Skin:     General: Skin is warm and dry  Findings: No rash  Neurological:      Mental Status: He is alert  Psychiatric:         Behavior: Behavior is cooperative  walk around.   Avoid smoking (assistance with stopping is available through your healthcare provider)  Always notify your doctor if you are not able to follow the post operative instructions that are given to you at the time of discharge.  It may be necessary to prescribe one of the medications available to prevent DVT.Using Opioids for Pain Management     Your doctor has given instructions for you to take an opioid.  This is a drug for bad pain.  It helps control pain without causing bleeding and kidney problems.  Common opioid names are morphine, hydromorphone, oxycodone, and methadone. These drugs are called narcotics.    There are several safety concerns you need to know.     · It is against the law to give or sell this drug to another person.  You must keep this medicine safely locked.    · You may have side effects from taking this medication.  These include nausea, itching, sweating, sleepiness, a change in your ability to breathe, and depression.  · Do not take alcohol or sleeping pills opioids.    · Long-term opoid use may no longer giver you relief from pain.  It can cause you stomach pain, mental anxiety, and headaches.  Long-term opoid use can potentially lead to unlawful street drug abuse and reduce your ability to stay employed.    · Your body may become opioid tolerant if you need to take more to get relief.    · You must stop taking opioids if you begin having more pain as a result of the medicine.    · Opioid withdrawal occurs when you have to stop taking the drug.  It can cause you to have nausea, vomiting, diarrhea, stomach pain, anxiety, and dilated pupils in your eyes. This condition means you are opioid dependent.    · Addiction is a drug induced brain disease. It means there are changes in how your brain is working.  Children, teens, and young adults under 25 years old are more likely to get addicted to opioids.      · Addiction can happen with repeated opioid use.  It does not happen with  short-term use of two weeks or less.       For more information, please speak with your doctor or pharmacist.      We hope your stay was comfortable as you heal now, mend and rest.    For we have enjoyed taking care of you by giving your our best.    And as you get better, by regaining your health and strength;   We count it as a privilege to have served you and hope your time at [facility] was well spent.      Thank  You!!!

## 2021-03-13 NOTE — PATIENT INSTRUCTIONS
Begin prednisone as prescribed  Take this medication the morning as it can cause difficulty with sleep if taken too close to bedtime  Begin prescribed ear drops  Do not put anything else in the ears such as Q-tips, water   continue to stay very well hydrated  Use over-the-counter cold and cough medications as needed help with symptoms  Follow-up with primary care physician 3-5 days for continue symptoms   proceed the ER with any worsening of your symptoms  Asthma   WHAT YOU NEED TO KNOW:   Asthma is a lung disease that makes breathing difficult  Chronic inflammation and reactions to triggers narrow the airways in the lungs  Asthma can become life-threatening if it is not managed  DISCHARGE INSTRUCTIONS:   Call your local emergency number (911 in the [address] [facility]) if:   · You have severe shortness of breath  · Your lips or nails turn blue or gray  · The skin around your neck and ribs pulls in with each breath  · You have shortness of breath, even after you take your short-term medicine as directed  · Your peak flow numbers are in the red zone of your AAP  Call your doctor if:   · You run out of medicine before your next refill is due  · Your symptoms get worse  · You need to take more medicine than usual to control your symptoms  · You have questions or concerns about your condition or care  Medicines:   · Medicines  decrease inflammation, open airways, and make it easier to breathe  Medicines may be inhaled, taken as a pill, or injected  Short-term medicines relieve your symptoms quickly  Long-term medicines are used to prevent future attacks  You may also need medicine to help control your allergies  Ask your healthcare provider for more information about the medicine you are given and how to take it safely  · Take your medicine as directed  Contact your healthcare provider if you think your medicine is not helping or if you have side effects   Tell him of her if you are allergic to any medicine  Keep a list of the medicines, vitamins, and herbs you take  Include the amounts, and when and why you take them  Bring the list or the pill bottles to follow-up visits  Carry your medicine list with you in case of an emergency  Follow up with your healthcare provider as directed: You will need to return to make sure your medicine is working and your symptoms are controlled  You may be referred to an asthma specialist  Shawna [address] may be asked to keep a record of your peak flow values and bring it with you to your appointments  Write down your questions so you remember to ask them during your visits  Manage your symptoms and prevent future attacks:   · Follow your Asthma Action Plan (AAP)  This is a written plan that you and your healthcare provider create  It explains which medicine you need and when to change doses if necessary  It also explains how you can monitor symptoms and use a peak flow meter  The meter measures how well your lungs are working  · Manage other health conditions , such as allergies, acid reflux, and sleep apnea  · Identify and avoid triggers  These may include pets, dust mites, mold, and cockroaches  · Do not smoke or be around others who smoke  Nicotine and other chemicals in cigarettes and cigars can cause lung damage  Ask your healthcare provider for information if you currently smoke and need help to quit  E-cigarettes or smokeless tobacco still contain nicotine  Talk to your healthcare provider before you use these products  · Ask about the flu vaccine  The flu can make your asthma worse  You may need a yearly flu shot  © Copyright [address] Information is for End User's use only and may not be sold, redistributed or otherwise used for commercial purposes  All illustrations and images included in CareNotes® are the copyrighted property of A D A M , Inc  or [facility] Shanique Santiago   The above information is an  only   It is not intended as medical advice for individual conditions or treatments  Talk to your doctor, nurse or pharmacist before following any medical regimen to see if it is safe and effective for you  Otitis Externa   WHAT YOU NEED TO KNOW:   Otitis externa, or swimmer's ear, is an infection in the outer ear canal  This canal goes from the outside of the ear to the eardrum  DISCHARGE INSTRUCTIONS:   Return to the emergency department if:   · You have severe ear pain  · You are suddenly unable to hear at all  · You have new swelling in your face, behind your ears, or in your neck  · You suddenly cannot move part of your face  · Your face suddenly feels numb  Contact your healthcare provider if:   · You have a fever  · Your signs and symptoms do not get better after 2 days of treatment  · Your signs and symptoms go away for a time, but then come back  · You have questions or concerns about your condition or care  Medicines:   · NSAIDs , such as ibuprofen, help decrease swelling, pain, and fever  This medicine is available with or without a doctor's order  NSAIDs can cause stomach bleeding or kidney problems in certain people  If you take blood thinner medicine, always ask if NSAIDs are safe for you  Always read the medicine label and follow directions  Do not give these medicines to children under 10months of age without direction from your child's healthcare provider  · Acetaminophen  decreases pain and fever  It is available without a doctor's order  Ask how much to take and how often to take it  Follow directions  Acetaminophen can cause liver damage if not taken correctly  · Ear drops  that contain an antibiotic may be given  The antibiotic helps treat a bacterial infection  You may also be given steroid medicine  The steroid helps decrease redness, swelling, and pain  · Take your medicine as directed    Contact your healthcare provider if you think your medicine is not helping or if you have side effects  Tell him or her if you are allergic to any medicine  Keep a list of the medicines, vitamins, and herbs you take  Include the amounts, and when and why you take them  Bring the list or the pill bottles to follow-up visits  Carry your medicine list with you in case of an emergency  Follow up with your healthcare provider as directed:  Write down your questions so you remember to ask them during your visits  How to use eardrops:   · Lie down on your side with your infected ear facing up  · Carefully drip the correct number of eardrops into your ear  Have another person help you if possible  · Gently move the outside part of your ear back and forth to help the medicine reach your ear canal      · Stay lying down in the same position (with your ear facing up) for 3 to 5 minutes  Prevent otitis externa:   · Do not put cotton swabs or foreign objects in your ears  · Wrap a clean moist washcloth around your finger, and use it to clean your outer ear and remove extra ear wax  · Use ear plugs when you swim  Dry your outer ears completely after you swim or bathe  © Copyright [address] Information is for End User's use only and may not be sold, redistributed or otherwise used for commercial purposes  All illustrations and images included in CareNotes® are the copyrighted property of A D A M , Inc  or [facility] Shanique Santiago   The above information is an  only  It is not intended as medical advice for individual conditions or treatments  Talk to your doctor, nurse or pharmacist before following any medical regimen to see if it is safe and effective for you

## 2021-03-15 DIAGNOSIS — H81.12 BENIGN PAROXYSMAL POSITIONAL VERTIGO OF LEFT EAR: ICD-10-CM

## 2021-03-15 RX ORDER — MECLIZINE HCL 12.5 MG/1
12.5 TABLET ORAL EVERY 8 HOURS SCHEDULED
Qty: 30 TABLET | Refills: 0 | Status: SHIPPED | OUTPATIENT
Start: 2021-03-15 | End: 2021-06-24 | Stop reason: SDUPTHER

## 2021-03-23 DIAGNOSIS — M10.9 GOUTY ARTHRITIS: ICD-10-CM

## 2021-03-23 RX ORDER — FEBUXOSTAT 40 MG/1
40 TABLET ORAL DAILY
Qty: 90 TABLET | Refills: 1 | Status: SHIPPED | OUTPATIENT
Start: 2021-03-23 | End: 2021-05-28 | Stop reason: SDUPTHER

## 2021-03-27 DIAGNOSIS — M10.9 GOUTY ARTHRITIS: ICD-10-CM

## 2021-03-29 RX ORDER — FEBUXOSTAT 40 MG/1
TABLET ORAL
Qty: 90 TABLET | Refills: 1 | OUTPATIENT
Start: 2021-03-29

## 2021-05-03 DIAGNOSIS — J31.0 CHRONIC RHINITIS: ICD-10-CM

## 2021-05-04 RX ORDER — MONTELUKAST SODIUM 10 MG/1
TABLET ORAL
Qty: 90 TABLET | Refills: 1 | OUTPATIENT
Start: 2021-05-04

## 2021-05-05 ENCOUNTER — OFFICE VISIT (OUTPATIENT)
Dept: URGENT CARE | Age: 56
End: 2021-05-05
Payer: MEDICARE

## 2021-05-05 VITALS
SYSTOLIC BLOOD PRESSURE: 127 MMHG | BODY MASS INDEX: 32.29 KG/M2 | TEMPERATURE: 98 F | HEIGHT: 69 IN | HEART RATE: 84 BPM | RESPIRATION RATE: 16 BRPM | DIASTOLIC BLOOD PRESSURE: 72 MMHG | OXYGEN SATURATION: 98 % | WEIGHT: 218 LBS

## 2021-05-05 DIAGNOSIS — M54.50 ACUTE LEFT-SIDED LOW BACK PAIN WITHOUT SCIATICA: Primary | ICD-10-CM

## 2021-05-05 PROCEDURE — 99213 OFFICE O/P EST LOW 20 MIN: CPT | Performed by: NURSE PRACTITIONER

## 2021-05-05 PROCEDURE — G0463 HOSPITAL OUTPT CLINIC VISIT: HCPCS | Performed by: NURSE PRACTITIONER

## 2021-05-05 RX ORDER — CYCLOBENZAPRINE HCL 5 MG
5 TABLET ORAL 3 TIMES DAILY PRN
Qty: 30 TABLET | Refills: 0 | Status: SHIPPED | OUTPATIENT
Start: 2021-05-05 | End: 2022-02-16

## 2021-05-05 NOTE — PATIENT INSTRUCTIONS
Eustachian Tube Dysfunction   AMBULATORY CARE:   Eustachian tube dysfunction (ETD)  is a condition that prevents your eustachian tubes from opening properly  It can also cause them to become blocked  Eustachian tubes connect your middle ear to the back of your nose and throat  These tubes open and allow air to flow in and out when you sneeze, swallow, or yawn  Common signs and symptoms include the following:   · Fullness or pressure in your ears    · Muffled hearing, or a feeling you are hearing under water or have clogged ears    · Pain in one or both ears    · Ringing in your ears    · Popping, crackling, or clicking feeling in your ears    · Trouble keeping your balance    Call your doctor or otolaryngologist if:   · Your symptoms do not improve or get worse  · You have a fever  · You have any hearing loss  · You have questions or concerns about your condition or care  Treatment:  ETD may get better on its own without any treatment  If it continues, you may need any of the following:  · Swallow, yawn, or chew gum  to help open your eustachian tubes  Your healthcare provider may also recommend you blow with your mouth shut and your nostrils pinched closed  · Air pressure devices  push air into your nose and eustachian tubes to help relieve air pressure in your ear  · Treatment for allergies  such as decongestants, antihistamines, and nasal steroids may improve ETD  They may help decrease swelling of the eustachian tubes  · A myringotomy  is surgery to make a hole in your eardrum  The hole relieves pressure and lets fluid drain from your ear  A pressure equalizing (PE) tube may be used to keep the hole open and to help drain fluid  · Tuboplasty  is a procedure to widen your eustachian tubes  Follow up with your doctor or otolaryngologist as directed:  Write down your questions so you remember to ask them during your visits    © Copyright [employer] 2020 Information is for End User's use only and may not be sold, redistributed or otherwise used for commercial purposes  All illustrations and images included in CareNotes® are the copyrighted property of A D A M , Inc  or Rasheed Chew  The above information is an  only  It is not intended as medical advice for individual conditions or treatments  Talk to your doctor, nurse or pharmacist before following any medical regimen to see if it is safe and effective for you

## 2021-05-05 NOTE — PROGRESS NOTES
[facility]        NAME: Prieto Peterson is a 55 y o  male  : 1965    MRN: [id number]  DATE: May 5, 2021  TIME: 6:33 PM    Assessment and Plan   Acute left-sided low back pain without sciatica [M54 5]  1  Acute left-sided low back pain without sciatica  cyclobenzaprine (FLEXERIL) 5 mg tablet         Patient Instructions     take med as directed  Follow up with PCP in 3-5 days  Proceed to  ER if symptoms worsen  Chief Complaint     Chief Complaint   Patient presents with    Spasms     Pt complaining of low back muscle spasms that began this afternoon    Earache     Pt also complaining of L ear "inflammation"         History of Present Illness       HPI   Reports low back spasms  Started this morning while standing from a sitting to a standing position, on the commode at home  Moderate severity  Certain activities that strain the lower back make it worse  Says this has happened in the past and flexeril worked for him  No incontinence  Also left ear pain  Review of Systems   Review of Systems   Constitutional: Negative for chills and fever  HENT: Positive for ear pain (left)  Negative for congestion, rhinorrhea and sore throat  Respiratory: Negative for cough, shortness of breath and wheezing  Cardiovascular: Negative for chest pain and palpitations  Musculoskeletal: Positive for back pain  Negative for gait problem and neck pain  Neurological: Negative for weakness and numbness           Current Medications       Current Outpatient Medications:     albuterol (2 5 mg/3 mL) 0 083 % nebulizer solution, Take 2 5 mg by nebulization every 6 (six) hours as needed for wheezing or shortness of breath, Disp: , Rfl:     albuterol (PROVENTIL HFA,VENTOLIN HFA) 90 mcg/act inhaler, Inhale 1 puff 4 (four) times a day , Disp: , Rfl:     esomeprazole (NexIUM) 40 MG capsule, Take 40 mg by mouth every morning before breakfast, Disp: , Rfl:     fluticasone-salmeterol (ADVAIR DISKUS) 500-50 mcg/dose, Inhale 1 puff 2 (two) times a day, Disp: , Rfl:     lisinopril (ZESTRIL) 20 mg tablet, TAKE 1 TABLET BY MOUTH EVERY DAY, Disp: 90 tablet, Rfl: 1    meclizine (ANTIVERT) 12 5 MG tablet, Take 1 tablet (12 5 mg total) by mouth every 8 (eight) hours, Disp: 30 tablet, Rfl: 0    montelukast (SINGULAIR) 10 mg tablet, TAKE 1 TABLET BY MOUTH EVERY DAY, Disp: 90 tablet, Rfl: 1    Triamcinolone Acetonide (NASACORT ALLERGY 24HR NA), 1 spray into each nostril daily  , Disp: , Rfl:     Uloric 40 MG tablet, Take 1 tablet (40 mg total) by mouth daily, Disp: 90 tablet, Rfl: 1    verapamil (CALAN) 120 mg tablet, Take 1 tablet (120 mg total) by mouth daily, Disp: 90 tablet, Rfl: 4    acetaminophen (TYLENOL) 500 mg tablet, Take 1,000 mg by mouth every 6 (six) hours as needed for mild pain, Disp: , Rfl:     ciprofloxacin-dexamethasone (CIPRODEX) otic suspension, Administer 4 drops into the left ear 2 (two) times a day for 7 days, Disp: 7 5 mL, Rfl: 0    cyclobenzaprine (FLEXERIL) 5 mg tablet, Take 1 tablet (5 mg total) by mouth 3 (three) times a day as needed for muscle spasms (Patient not taking: Reported on 5/5/2021), Disp: 15 tablet, Rfl: 0    cyclobenzaprine (FLEXERIL) 5 mg tablet, Take 1 tablet (5 mg total) by mouth 3 (three) times a day as needed for muscle spasms May cause drowsiness, Disp: 30 tablet, Rfl: 0    diclofenac sodium (VOLTAREN) 1 %, Apply 2 g topically 4 (four) times a day as needed (pain) (Patient not taking: Reported on 5/5/2021), Disp: 100 g, Rfl: 2    ibuprofen (MOTRIN) 600 mg tablet, Take 600 mg by mouth every 8 (eight) hours, Disp: , Rfl:     ipratropium-albuterol (DUO-NEB) 0 5-2 5 mg/3 mL, Inhale 3 mL 4 (four) times a day As needed, Disp: , Rfl:     pseudoephedrine (SUDAFED) 30 mg tablet, Take 60 mg by mouth every 4 (four) hours as needed for congestion, Disp: , Rfl:     Current Allergies     Allergies as of 05/05/2021 - Reviewed 05/05/2021   Allergen Reaction Noted    Penicillins Rash and Anaphylaxis 08/17/2004    Acetazolamide  10/25/2016    Cephalosporins Other (See Comments) 08/17/2004    Doxycycline  10/24/2018    Other  04/28/2014    Sulfa antibiotics Other (See Comments) 08/17/2004    Famotidine Palpitations 09/05/2016    Omeprazole Palpitations 09/05/2016            The following portions of the patient's history were reviewed and updated as appropriate: allergies, current medications, past family history, past medical history, past social history, past surgical history and problem list      Past Medical History:   Diagnosis Date    Anxiety 1/15/2019    Arthritis     Chronic rhinitis     last assessed 2/21/14    Chronic serous otitis media     last assessed 11/20/13    Chronic sinusitis     last assessed 8/19/14    Functional heart murmur     GERD (gastroesophageal reflux disease)     Gout     Hearing problem     last assessed 12/1/16    Hiatal hernia     Hypercholesterolemia     Hypertension     Palpitations     Testicular hypogonadism     last assessed 10/4/13    [facility])        Past Surgical History:   Procedure Laterality Date    APPENDECTOMY      BICEPS TENODESIS      anesthesia for tenodesis- of ruptured long tendon of biceps     HERNIA REPAIR      THYROIDECTOMY      TONSILLECTOMY         Family History   Problem Relation Age of Onset    Lung cancer Father     Coronary artery disease Father         blockages    Stroke Maternal Grandmother     Cancer Paternal Grandfather         metastasized    Heart disease Family     Lung cancer Cousin     No Known Problems Mother     No Known Problems Sister     No Known Problems Brother     No Known Problems Maternal Aunt     No Known Problems Maternal Uncle     No Known Problems Paternal Aunt     No Known Problems Paternal Uncle     No Known Problems Paternal Grandmother     ADD / ADHD Neg Hx     Anesthesia problems Neg Hx     Clotting disorder Neg Hx     Collagen disease Neg Hx     Diabetes Neg Hx     Dislocations Neg Hx     Learning disabilities Neg Hx     Neurological problems Neg Hx     Osteoporosis Neg Hx     Rheumatologic disease Neg Hx     Scoliosis Neg Hx     Vascular Disease Neg Hx          Medications have been verified  Objective   /72 (BP Location: Right arm, Patient Position: Sitting)   Pulse 84   Temp 98 °F (36 7 °C) (Temporal)   Resp 16   Ht 5' 9" (1 753 m)   Wt 98 9 kg (218 lb)   SpO2 98%   BMI 32 19 kg/m²   No LMP for male patient  Physical Exam     Physical Exam  Constitutional:       Appearance: He is not ill-appearing or diaphoretic  HENT:      Right Ear: Tympanic membrane and ear canal normal  There is no impacted cerumen  Left Ear: Tympanic membrane and ear canal normal  There is no impacted cerumen  Nose: No rhinorrhea  Musculoskeletal:         General: Tenderness (with palpation of the lumbar spine and left paraspinal muscles  ) present  No swelling  Neurological:      Mental Status: He is alert

## 2021-05-11 DIAGNOSIS — J31.0 CHRONIC RHINITIS: ICD-10-CM

## 2021-05-11 RX ORDER — MONTELUKAST SODIUM 10 MG/1
10 TABLET ORAL DAILY
Qty: 90 TABLET | Refills: 1 | Status: SHIPPED | OUTPATIENT
Start: 2021-05-11 | End: 2021-11-02 | Stop reason: SDUPTHER

## 2021-05-28 ENCOUNTER — HOSPITAL ENCOUNTER (OUTPATIENT)
Dept: RADIOLOGY | Facility: IMAGING CENTER | Age: 56
Discharge: HOME | End: 2021-05-28
Payer: MEDICARE

## 2021-05-28 DIAGNOSIS — M79.672 LEFT FOOT PAIN: ICD-10-CM

## 2021-05-28 DIAGNOSIS — M25.572 ARTHRALGIA OF LEFT ANKLE OR FOOT: ICD-10-CM

## 2021-05-28 DIAGNOSIS — M10.9 GOUTY ARTHRITIS: ICD-10-CM

## 2021-05-28 PROCEDURE — 73630 X-RAY EXAM OF FOOT: CPT

## 2021-05-28 PROCEDURE — 73610 X-RAY EXAM OF ANKLE: CPT

## 2021-05-28 RX ORDER — FEBUXOSTAT 40 MG/1
40 TABLET ORAL DAILY
Qty: 90 TABLET | Refills: 1 | Status: SHIPPED | OUTPATIENT
Start: 2021-05-28 | End: 2021-11-24 | Stop reason: SDUPTHER

## 2021-05-29 DIAGNOSIS — M10.9 GOUTY ARTHRITIS: ICD-10-CM

## 2021-05-31 RX ORDER — ALLOPURINOL 100 MG/1
TABLET ORAL
Refills: 0 | OUTPATIENT
Start: 2021-05-31

## 2021-06-01 ENCOUNTER — TELEPHONE (OUTPATIENT)
Dept: INTERNAL MEDICINE CLINIC | Facility: CLINIC | Age: 56
End: 2021-06-01

## 2021-06-01 ENCOUNTER — OFFICE VISIT (OUTPATIENT)
Dept: URGENT CARE | Age: 56
End: 2021-06-01
Payer: MEDICARE

## 2021-06-01 VITALS — TEMPERATURE: 97.9 F | HEART RATE: 90 BPM | RESPIRATION RATE: 18 BRPM | OXYGEN SATURATION: 96 %

## 2021-06-01 DIAGNOSIS — J06.9 ACUTE URI: Primary | ICD-10-CM

## 2021-06-01 PROCEDURE — 99213 OFFICE O/P EST LOW 20 MIN: CPT | Performed by: PHYSICIAN ASSISTANT

## 2021-06-01 PROCEDURE — G0463 HOSPITAL OUTPT CLINIC VISIT: HCPCS | Performed by: PHYSICIAN ASSISTANT

## 2021-06-01 PROCEDURE — U0003 INFECTIOUS AGENT DETECTION BY NUCLEIC ACID (DNA OR RNA); SEVERE ACUTE RESPIRATORY SYNDROME CORONAVIRUS 2 (SARS-COV-2) (CORONAVIRUS DISEASE [COVID-19]), AMPLIFIED PROBE TECHNIQUE, MAKING USE OF HIGH THROUGHPUT TECHNOLOGIES AS DESCRIBED BY CMS-2020-01-R: HCPCS | Performed by: PHYSICIAN ASSISTANT

## 2021-06-01 PROCEDURE — U0005 INFEC AGEN DETEC AMPLI PROBE: HCPCS | Performed by: PHYSICIAN ASSISTANT

## 2021-06-01 NOTE — TELEPHONE ENCOUNTER
Patient calling because his uloric was sent in to the pharmacy but he is stating that the pharmacy will not fill it for him until they get a call from us  He was stating something about the insurance needing some sort of letter? Patient would like a call when this has been figured out because he is almost out of his mediation

## 2021-06-01 NOTE — TELEPHONE ENCOUNTER
Spoke with Pharmacist, he stated  Uloric not covered by [employer]       Alternative medication would be Allopurinol    Please advise   Thank you

## 2021-06-02 LAB — SARS-COV-2 RNA RESP QL NAA+PROBE: NEGATIVE

## 2021-06-02 NOTE — PATIENT INSTRUCTIONS
Coronavirus testing done today  Please stay self isolated to results return  Results typically take anywhere from 1-2 days  Continue with inhalers and nebulizers  Continue with allergy medication  Use over-the-counter pain relief as needed to help with aches and pains, fever  Begin Vitamin D3 2000 IU daily, Vitamin C 1g twice a day,   Multi-vitamin daily  Follow-up with primary care physician 3-5 days via telephone for continued symptoms  Proceed to emergency room with any worsening of symptoms, shortness of breath, chest pain, difficulties breathing, difficulties tolerating oral intake  [address]    Your healthcare provider and/or public health staff have evaluated you and have determined that you do not need to remain in the hospital at this time  At this time you can be isolated at home where you will be monitored by staff from your local or state health department  You should carefully follow the prevention and isolation steps below until a healthcare provider or local or state health department says that you can return to your normal activities  Stay home except to get medical care    People who are mildly ill with COVID-19 are able to isolate at home during their illness  You should restrict activities outside your home, except for getting medical care  Do not go to work, school, or public areas  Avoid using public transportation, ride-sharing, or taxis  Separate yourself from other people and animals in your home    People: As much as possible, you should stay in a specific room and away from other people in your home  Also, you should use a separate bathroom, if available  Animals: You should restrict contact with pets and other animals while you are sick with COVID-19, just like you would around other people   Although there have not been reports of pets or other animals becoming sick with COVID-19, it is still recommended that people sick with COVID-19 limit contact with animals until more information is known about the virus  When possible, have another member of your household care for your animals while you are sick  If you are sick with COVID-19, avoid contact with your pet, including petting, snuggling, being kissed or licked, and sharing food  If you must care for your pet or be around animals while you are sick, wash your hands before and after you interact with pets and wear a facemask  See COVID-19 and Animals for more information  Call ahead before visiting your doctor    If you have a medical appointment, call the healthcare provider and tell them that you have or may have COVID-19  This will help the healthcare providers office take steps to keep other people from getting infected or exposed  Wear a facemask    You should wear a facemask when you are around other people (e g , sharing a room or vehicle) or pets and before you enter a healthcare providers office  If you are not able to wear a facemask (for example, because it causes trouble breathing), then people who live with you should not stay in the same room with you, or they should wear a facemask if they enter your room  Cover your coughs and sneezes    Cover your mouth and nose with a tissue when you cough or sneeze  Throw used tissues in a lined trash can  Immediately wash your hands with soap and water for at least 20 seconds or, if soap and water are not available, clean your hands with an alcohol-based hand  that contains at least 60% alcohol  Clean your hands often    Wash your hands often with soap and water for at least 20 seconds, especially after blowing your nose, coughing, or sneezing; going to the bathroom; and before eating or preparing food  If soap and water are not readily available, use an alcohol-based hand  with at least 60% alcohol, covering all surfaces of your hands and rubbing them together until they feel dry    Soap and water are the best option if hands are visibly dirty  Avoid touching your eyes, nose, and mouth with unwashed hands  Avoid sharing personal household items    You should not share dishes, drinking glasses, cups, eating utensils, towels, or bedding with other people or pets in your home  After using these items, they should be washed thoroughly with soap and water  Clean all high-touch surfaces everyday    High touch surfaces include counters, tabletops, doorknobs, bathroom fixtures, toilets, phones, keyboards, tablets, and bedside tables  Also, clean any surfaces that may have blood, stool, or body fluids on them  Use a household cleaning spray or wipe, according to the label instructions  Labels contain instructions for safe and effective use of the cleaning product including precautions you should take when applying the product, such as wearing gloves and making sure you have good ventilation during use of the product  Monitor your symptoms    Seek prompt medical attention if your illness is worsening (e g , difficulty breathing)  Before seeking care, call your healthcare provider and tell them that you have, or are being evaluated for, COVID-19  Put on a facemask before you enter the facility  These steps will help the healthcare providers office to keep other people in the office or waiting room from getting infected or exposed  Ask your healthcare provider to call the local or state health department  Persons who are placed under active monitoring or facilitated self-monitoring should follow instructions provided by their local health department or occupational health professionals, as appropriate  If you have a medical emergency and need to call 911, notify the dispatch personnel that you have, or are being evaluated for COVID-19  If possible, put on a facemask before emergency medical services arrive      Discontinuing home isolation    Patients with confirmed COVID-19 should remain under home isolation precautions until the following conditions are met:   - They have had no fever for at least 24 hours (that is one full day of no fever without the use medicine that reduces fevers)  AND  - other symptoms have improved (for example, when their cough or shortness of breath have improved)  AND  - at least 10 days have passed since their symptoms first appeared  Patients with confirmed COVID-19 should also notify close contacts (including their workplace) and ask that they self-quarantine  Currently, close contact is defined as being within 6 feet for 10 minutes or more from the period 48 hours before symptom onset to the time at which the patient went into isolation  Close contacts of patients diagnosed with COVID-19 should be instructed by the patient to self-quarantine for 14 days from the last time of their last contact with the patient       Source: [employer]

## 2021-06-02 NOTE — PROGRESS NOTES
[address] [facility]        NAME: Danielle Felix is a 55 y o  male  : 1965    MRN: [id number]  DATE: 2021  TIME: 11:22 PM    Assessment and Plan   Acute URI [J06 [id number]]  1  Acute URI  Novel Coronavirus (Covid-19),PCR [facility] - Office Collection         Patient Instructions     Coronavirus testing done today  Please stay self isolated to results return  Results typically take anywhere from 1-2 days  Continue with inhalers and nebulizers  Continue with allergy medication  Use over-the-counter pain relief as needed to help with aches and pains, fever  Begin Vitamin D3 2000 IU daily, Vitamin C 1g twice a day,   Multi-vitamin daily  Follow-up with primary care physician 3-5 days via telephone for continued symptoms  Proceed to emergency room with any worsening of symptoms, shortness of breath, chest pain, difficulties breathing, difficulties tolerating oral intake  Follow up with PCP in 3-5 days  Proceed to  ER if symptoms worsen  Chief Complaint     Chief Complaint   Patient presents with    Shortness of Breath     intermittent over since the weekend  hx of asthma         History of Present Illness       55year old male presents to the office for c/o  Shortness of breath that has been intermittent for him for the last few days  Patient has past medical history of asthma  He has been using Advair as well as allergy medication as well as nebulized albuterol  He notes that he does have relief with this  He has had occasional sinus headache  He notes that his symptoms all became worse after cutting the grass today  Patient does admit however he was recently at a large party and is concerned about coronavirus today  He denies any fever but has had some irritation of the throat which causes him to clear throat multiple times  He has also noticed a slight cough which has been dry in nature  He still able to tolerate oral intake without difficulty    Denies any changes in bowel movements, nausea, vomiting or diarrhea  Denies any chest pain, palpitations  Review of Systems   Review of Systems   Constitutional: Positive for appetite change (at times ), chills (week ago on and off ) and fatigue  HENT: Positive for congestion, postnasal drip, sinus pressure and sore throat ( irritation at times with throat clearing)  Negative for trouble swallowing  Respiratory: Positive for chest tightness (at times ) and shortness of breath (  At timesIntermittently after cutting grass)  Negative for cough  Cardiovascular: Negative for chest pain and palpitations  Gastrointestinal: Negative  Genitourinary: Negative  Musculoskeletal: Positive for arthralgias (bilaterally knees will be getting injections to the area  ) and myalgias (chronci  bilateral knees )  Skin: Negative  Allergic/Immunologic: Positive for environmental allergies  Neurological: Positive for headaches ( mild)  Negative for dizziness and light-headedness           Current Medications       Current Outpatient Medications:     acetaminophen (TYLENOL) 500 mg tablet, Take 1,000 mg by mouth every 6 (six) hours as needed for mild pain, Disp: , Rfl:     albuterol (2 5 mg/3 mL) 0 083 % nebulizer solution, Take 2 5 mg by nebulization every 6 (six) hours as needed for wheezing or shortness of breath, Disp: , Rfl:     albuterol (PROVENTIL HFA,VENTOLIN HFA) 90 mcg/act inhaler, Inhale 1 puff 4 (four) times a day , Disp: , Rfl:     ciprofloxacin-dexamethasone (CIPRODEX) otic suspension, Administer 4 drops into the left ear 2 (two) times a day for 7 days, Disp: 7 5 mL, Rfl: 0    cyclobenzaprine (FLEXERIL) 5 mg tablet, Take 1 tablet (5 mg total) by mouth 3 (three) times a day as needed for muscle spasms (Patient not taking: Reported on 5/5/2021), Disp: 15 tablet, Rfl: 0    cyclobenzaprine (FLEXERIL) 5 mg tablet, Take 1 tablet (5 mg total) by mouth 3 (three) times a day as needed for muscle spasms May cause drowsiness, Disp: 30 tablet, Rfl: 0    diclofenac sodium (VOLTAREN) 1 %, Apply 2 g topically 4 (four) times a day as needed (pain) (Patient not taking: Reported on 5/5/2021), Disp: 100 g, Rfl: 2    esomeprazole (NexIUM) 40 MG capsule, Take 40 mg by mouth every morning before breakfast, Disp: , Rfl:     fluticasone-salmeterol (ADVAIR DISKUS) 500-50 mcg/dose, Inhale 1 puff 2 (two) times a day, Disp: , Rfl:     ibuprofen (MOTRIN) 600 mg tablet, Take 600 mg by mouth every 8 (eight) hours, Disp: , Rfl:     ipratropium-albuterol (DUO-NEB) 0 5-2 5 mg/3 mL, Inhale 3 mL 4 (four) times a day As needed, Disp: , Rfl:     lisinopril (ZESTRIL) 20 mg tablet, TAKE 1 TABLET BY MOUTH EVERY DAY, Disp: 90 tablet, Rfl: 1    meclizine (ANTIVERT) 12 5 MG tablet, Take 1 tablet (12 5 mg total) by mouth every 8 (eight) hours, Disp: 30 tablet, Rfl: 0    montelukast (SINGULAIR) 10 mg tablet, Take 1 tablet (10 mg total) by mouth daily, Disp: 90 tablet, Rfl: 1    pseudoephedrine (SUDAFED) 30 mg tablet, Take 60 mg by mouth every 4 (four) hours as needed for congestion, Disp: , Rfl:     Triamcinolone Acetonide (NASACORT ALLERGY 24HR NA), 1 spray into each nostril daily  , Disp: , Rfl:     Uloric 40 MG tablet, Take 1 tablet (40 mg total) by mouth daily, Disp: 90 tablet, Rfl: 1    verapamil (CALAN) 120 mg tablet, Take 1 tablet (120 mg total) by mouth daily, Disp: 90 tablet, Rfl: 4    Current Allergies     Allergies as of 06/01/2021 - Reviewed 06/01/2021   Allergen Reaction Noted    Penicillins Rash and Anaphylaxis 08/17/2004    Acetazolamide  10/25/2016    Cephalosporins Other (See Comments) 08/17/2004    Doxycycline  10/24/2018    Other  04/28/2014    Sulfa antibiotics Other (See Comments) 08/17/2004    Famotidine Palpitations 09/05/2016    Omeprazole Palpitations 09/05/2016            The following portions of the patient's history were reviewed and updated as appropriate: allergies, current medications, past family history, past medical history, past social history, past surgical history and problem list      Past Medical History:   Diagnosis Date    Anxiety 1/15/2019    Arthritis     Chronic rhinitis     last assessed 2/21/14    Chronic serous otitis media     last assessed 11/20/13    Chronic sinusitis     last assessed 8/19/14    Functional heart murmur     GERD (gastroesophageal reflux disease)     Gout     Hearing problem     last assessed 12/1/16    Hiatal hernia     Hypercholesterolemia     Hypertension     Palpitations     Testicular hypogonadism     last assessed 10/4/13    [facility])        Past Surgical History:   Procedure Laterality Date    APPENDECTOMY      BICEPS TENODESIS      anesthesia for tenodesis- of ruptured long tendon of biceps     HERNIA REPAIR      THYROIDECTOMY      TONSILLECTOMY         Family History   Problem Relation Age of Onset    Lung cancer Father     Coronary artery disease Father         blockages    Stroke Maternal Grandmother     Cancer Paternal Grandfather         metastasized    Heart disease Family     Lung cancer Cousin     No Known Problems Mother     No Known Problems Sister     No Known Problems Brother     No Known Problems Maternal Aunt     No Known Problems Maternal Uncle     No Known Problems Paternal Aunt     No Known Problems Paternal Uncle     No Known Problems Paternal Grandmother     ADD / ADHD Neg Hx     Anesthesia problems Neg Hx     Clotting disorder Neg Hx     Collagen disease Neg Hx     Diabetes Neg Hx     Dislocations Neg Hx     Learning disabilities Neg Hx     Neurological problems Neg Hx     Osteoporosis Neg Hx     Rheumatologic disease Neg Hx     Scoliosis Neg Hx     Vascular Disease Neg Hx          Medications have been verified  Objective   Pulse 90   Temp 97 9 °F (36 6 °C)   Resp 18   SpO2 96%   No LMP for male patient  Physical Exam     Physical Exam  Constitutional:       General: He is not in acute distress       Appearance: He is well-developed  He is not ill-appearing or diaphoretic  HENT:      Head: Normocephalic and atraumatic  Right Ear: Hearing, ear canal and external ear normal  A middle ear effusion is present  Left Ear: Hearing, ear canal and external ear normal  A middle ear effusion is present  Nose: Mucosal edema present  No rhinorrhea  Mouth/Throat:      Pharynx: Uvula midline  No oropharyngeal exudate, posterior oropharyngeal erythema or uvula swelling  Eyes:      General: Lids are normal       Conjunctiva/sclera: Conjunctivae normal       Pupils: Pupils are equal, round, and reactive to light  Neck:      Musculoskeletal: Neck supple  Cardiovascular:      Rate and Rhythm: Normal rate and regular rhythm  Heart sounds: Normal heart sounds, S1 normal and S2 normal  No murmur  Pulmonary:      Effort: Pulmonary effort is normal  No respiratory distress  Breath sounds: Normal breath sounds  No stridor  No decreased breath sounds, wheezing, rhonchi or rales  Comments:  Dry cough heard on exam   No conversational dyspnea on exam   Patient is able to talk in full sentences without difficulty  Abdominal:      General: Bowel sounds are normal       Palpations: Abdomen is soft  Tenderness: There is no abdominal tenderness  Musculoskeletal:      Comments:   Gait slightly antalgic secondary to chronic knee pain   Lymphadenopathy:      Cervical: No cervical adenopathy  Skin:     General: Skin is warm and dry  Findings: No rash  Neurological:      Mental Status: He is alert  Psychiatric:         Behavior: Behavior is cooperative

## 2021-06-04 ENCOUNTER — OFFICE VISIT (OUTPATIENT)
Dept: URGENT CARE | Age: 56
End: 2021-06-04
Payer: MEDICARE

## 2021-06-04 VITALS
TEMPERATURE: 98 F | DIASTOLIC BLOOD PRESSURE: 76 MMHG | WEIGHT: 220 LBS | BODY MASS INDEX: 32.58 KG/M2 | HEIGHT: 69 IN | OXYGEN SATURATION: 96 % | RESPIRATION RATE: 16 BRPM | SYSTOLIC BLOOD PRESSURE: 131 MMHG | HEART RATE: 82 BPM

## 2021-06-04 DIAGNOSIS — J45.909 ASTHMA, UNSPECIFIED ASTHMA SEVERITY, UNSPECIFIED WHETHER COMPLICATED, UNSPECIFIED WHETHER PERSISTENT: Primary | ICD-10-CM

## 2021-06-04 PROCEDURE — 99213 OFFICE O/P EST LOW 20 MIN: CPT | Performed by: PHYSICIAN ASSISTANT

## 2021-06-04 PROCEDURE — G0463 HOSPITAL OUTPT CLINIC VISIT: HCPCS | Performed by: PHYSICIAN ASSISTANT

## 2021-06-04 RX ORDER — PREDNISONE 10 MG/1
TABLET ORAL
Qty: 18 TABLET | Refills: 0 | Status: SHIPPED | OUTPATIENT
Start: 2021-06-04 | End: 2021-06-22 | Stop reason: ALTCHOICE

## 2021-06-04 RX ORDER — ACETAMINOPHEN 325 MG/1
650 TABLET ORAL EVERY 6 HOURS PRN
COMMUNITY
End: 2021-09-01 | Stop reason: SDUPTHER

## 2021-06-04 NOTE — PROGRESS NOTES
[address]        NAME: Julio Nunez is a 55 y o  male  : 1965    MRN: [id number]  DATE: 2021  TIME: 6:48 PM    Assessment and Plan   Asthma, unspecified asthma severity, unspecified whether complicated, unspecified whether persistent [J45 909]  1  Asthma, unspecified asthma severity, unspecified whether complicated, unspecified whether persistent  predniSONE 10 mg tablet         Patient Instructions       Follow up with PCP in 3-5 days  Proceed to  ER if symptoms worsen  Chief Complaint   No chief complaint on file  History of Present Illness         Patient for evaluation of a flare-up of his asthma  Patient has been using his inhaler little more frequently and has had to use his nebulizer  Patient does get some temporary relief but still gets some tightness and wheezing off and on  Review of Systems   Review of Systems   Constitutional: Negative  HENT: Negative  Eyes: Negative  Respiratory: Positive for cough, chest tightness (Intermittent with the wheezing relieved by medication), shortness of breath (Intermittent with the wheezing) and wheezing  Cardiovascular: Negative  Gastrointestinal: Negative            Current Medications       Current Outpatient Medications:     acetaminophen (TYLENOL) 325 mg tablet, Take 650 mg by mouth every 6 (six) hours as needed for mild pain, Disp: , Rfl:     albuterol (2 5 mg/3 mL) 0 083 % nebulizer solution, Take 2 5 mg by nebulization every 6 (six) hours as needed for wheezing or shortness of breath, Disp: , Rfl:     albuterol (PROVENTIL HFA,VENTOLIN HFA) 90 mcg/act inhaler, Inhale 1 puff 4 (four) times a day , Disp: , Rfl:     Chlorpheniramine Maleate (CHLOR-TRIMETON ALLERGY PO), Take by mouth, Disp: , Rfl:     esomeprazole (NexIUM) 40 MG capsule, Take 40 mg by mouth every morning before breakfast, Disp: , Rfl:     fluticasone-salmeterol (ADVAIR DISKUS) 500-50 mcg/dose, Inhale 1 puff 2 (two) times a day, Disp: , Rfl:     ipratropium-albuterol (DUO-NEB) 0 5-2 5 mg/3 mL, Inhale 3 mL 4 (four) times a day As needed, Disp: , Rfl:     lisinopril (ZESTRIL) 20 mg tablet, TAKE 1 TABLET BY MOUTH EVERY DAY, Disp: 90 tablet, Rfl: 1    meclizine (ANTIVERT) 12 5 MG tablet, Take 1 tablet (12 5 mg total) by mouth every 8 (eight) hours, Disp: 30 tablet, Rfl: 0    montelukast (SINGULAIR) 10 mg tablet, Take 1 tablet (10 mg total) by mouth daily, Disp: 90 tablet, Rfl: 1    pseudoephedrine (SUDAFED) 30 mg tablet, Take 60 mg by mouth every 4 (four) hours as needed for congestion, Disp: , Rfl:     Triamcinolone Acetonide (NASACORT ALLERGY 24HR NA), 1 spray into each nostril daily  , Disp: , Rfl:     Uloric 40 MG tablet, Take 1 tablet (40 mg total) by mouth daily, Disp: 90 tablet, Rfl: 1    verapamil (CALAN) 120 mg tablet, Take 1 tablet (120 mg total) by mouth daily, Disp: 90 tablet, Rfl: 4    acetaminophen (TYLENOL) 500 mg tablet, Take 1,000 mg by mouth every 6 (six) hours as needed for mild pain, Disp: , Rfl:     ciprofloxacin-dexamethasone (CIPRODEX) otic suspension, Administer 4 drops into the left ear 2 (two) times a day for 7 days, Disp: 7 5 mL, Rfl: 0    cyclobenzaprine (FLEXERIL) 5 mg tablet, Take 1 tablet (5 mg total) by mouth 3 (three) times a day as needed for muscle spasms (Patient not taking: Reported on 5/5/2021), Disp: 15 tablet, Rfl: 0    cyclobenzaprine (FLEXERIL) 5 mg tablet, Take 1 tablet (5 mg total) by mouth 3 (three) times a day as needed for muscle spasms May cause drowsiness (Patient not taking: Reported on 6/4/2021), Disp: 30 tablet, Rfl: 0    diclofenac sodium (VOLTAREN) 1 %, Apply 2 g topically 4 (four) times a day as needed (pain) (Patient not taking: Reported on 6/4/2021), Disp: 100 g, Rfl: 2    ibuprofen (MOTRIN) 600 mg tablet, Take 600 mg by mouth every 8 (eight) hours, Disp: , Rfl:     predniSONE 10 mg tablet, Six tablets day 1; 5 tablets day 2; 4 tablets day 3 and 3 tablets day 4, Disp: 18 tablet, Rfl: 0    Current Allergies     Allergies as of 06/04/2021 - Reviewed 06/01/2021   Allergen Reaction Noted    Penicillins Rash and Anaphylaxis 08/17/2004    Acetazolamide  10/25/2016    Cephalosporins Other (See Comments) 08/17/2004    Doxycycline  10/24/2018    Other  04/28/2014    Sulfa antibiotics Other (See Comments) 08/17/2004    Famotidine Palpitations 09/05/2016    Omeprazole Palpitations 09/05/2016            The following portions of the patient's history were reviewed and updated as appropriate: allergies, current medications, past family history, past medical history, past social history, past surgical history and problem list      Past Medical History:   Diagnosis Date    Anxiety 1/15/2019    Arthritis     Chronic rhinitis     last assessed 2/21/14    Chronic serous otitis media     last assessed 11/20/13    Chronic sinusitis     last assessed 8/19/14    Functional heart murmur     GERD (gastroesophageal reflux disease)     Gout     Hearing problem     last assessed 12/1/16    Hiatal hernia     Hypercholesterolemia     Hypertension     Palpitations     Testicular hypogonadism     last assessed 10/4/13    Vtach [facility])        Past Surgical History:   Procedure Laterality Date    APPENDECTOMY      BICEPS TENODESIS      anesthesia for tenodesis- of ruptured long tendon of biceps     HERNIA REPAIR      THYROIDECTOMY      TONSILLECTOMY         Family History   Problem Relation Age of Onset    Lung cancer Father     Coronary artery disease Father         blockages    Stroke Maternal Grandmother     Cancer Paternal Grandfather         metastasized    Heart disease Family     Lung cancer Cousin     No Known Problems Mother     No Known Problems Sister     No Known Problems Brother     No Known Problems Maternal Aunt     No Known Problems Maternal Uncle     No Known Problems Paternal Aunt     No Known Problems Paternal Uncle     No Known Problems Paternal Grandmother    Tanisha Dhaliwal ADD / ADHD Neg Hx     Anesthesia problems Neg Hx     Clotting disorder Neg Hx     Collagen disease Neg Hx     Diabetes Neg Hx     Dislocations Neg Hx     Learning disabilities Neg Hx     Neurological problems Neg Hx     Osteoporosis Neg Hx     Rheumatologic disease Neg Hx     Scoliosis Neg Hx     Vascular Disease Neg Hx          Medications have been verified  Objective   /76   Pulse 82   Temp 98 °F (36 7 °C)   Resp 16   Ht 5' 9" (1 753 m)   Wt 99 8 kg (220 lb)   SpO2 96%   BMI 32 49 kg/m²   No LMP for male patient  Physical Exam     Physical Exam  Vitals signs and nursing note reviewed  Constitutional:       General: He is not in acute distress  Appearance: Normal appearance  He is well-developed  He is not ill-appearing, toxic-appearing or diaphoretic  HENT:      Head: Normocephalic and atraumatic  Right Ear: Tympanic membrane and ear canal normal       Left Ear: Tympanic membrane and ear canal normal       Nose: Nose normal  No congestion or rhinorrhea  Mouth/Throat:      Mouth: Mucous membranes are moist       Pharynx: Oropharynx is clear  No oropharyngeal exudate or posterior oropharyngeal erythema  Eyes:      General:         Right eye: No discharge  Left eye: No discharge  Extraocular Movements: Extraocular movements intact  Conjunctiva/sclera: Conjunctivae normal       Pupils: Pupils are equal, round, and reactive to light  Cardiovascular:      Rate and Rhythm: Normal rate and regular rhythm  Pulmonary:      Effort: Pulmonary effort is normal  No respiratory distress  Breath sounds: Normal breath sounds  No stridor  No wheezing, rhonchi or rales  Skin:     General: Skin is warm and dry  Neurological:      General: No focal deficit present  Mental Status: He is alert and oriented to person, place, and time     Psychiatric:         Mood and Affect: Mood normal          Behavior: Behavior normal          Thought Content: Thought content normal          Judgment: Judgment normal

## 2021-06-16 ENCOUNTER — OFFICE VISIT (OUTPATIENT)
Dept: OBGYN CLINIC | Facility: MEDICAL CENTER | Age: 56
End: 2021-06-16
Payer: MEDICARE

## 2021-06-16 VITALS
TEMPERATURE: 97.7 F | WEIGHT: 220 LBS | DIASTOLIC BLOOD PRESSURE: 77 MMHG | BODY MASS INDEX: 32.58 KG/M2 | SYSTOLIC BLOOD PRESSURE: 116 MMHG | HEART RATE: 73 BPM | HEIGHT: 69 IN

## 2021-06-16 DIAGNOSIS — M17.0 BILATERAL PRIMARY OSTEOARTHRITIS OF KNEE: Primary | ICD-10-CM

## 2021-06-16 PROCEDURE — 20610 DRAIN/INJ JOINT/BURSA W/O US: CPT | Performed by: ORTHOPAEDIC SURGERY

## 2021-06-16 PROCEDURE — 99213 OFFICE O/P EST LOW 20 MIN: CPT | Performed by: ORTHOPAEDIC SURGERY

## 2021-06-16 RX ORDER — METHYLPREDNISOLONE ACETATE 40 MG/ML
2 INJECTION, SUSPENSION INTRA-ARTICULAR; INTRALESIONAL; INTRAMUSCULAR; SOFT TISSUE
Status: COMPLETED | OUTPATIENT
Start: 2021-06-16 | End: 2021-06-16

## 2021-06-16 RX ORDER — LIDOCAINE HYDROCHLORIDE 10 MG/ML
3 INJECTION, SOLUTION INFILTRATION; PERINEURAL
Status: COMPLETED | OUTPATIENT
Start: 2021-06-16 | End: 2021-06-16

## 2021-06-16 RX ADMIN — METHYLPREDNISOLONE ACETATE 2 ML: 40 INJECTION, SUSPENSION INTRA-ARTICULAR; INTRALESIONAL; INTRAMUSCULAR; SOFT TISSUE at 16:10

## 2021-06-16 RX ADMIN — LIDOCAINE HYDROCHLORIDE 3 ML: 10 INJECTION, SOLUTION INFILTRATION; PERINEURAL at 16:10

## 2021-06-16 NOTE — PROGRESS NOTES
Assessment/Plan:  1  Bilateral primary osteoarthritis of knee      Orders Placed This Encounter   Procedures    Large joint arthrocentesis: bilateral knee     55 y o  male with bilateral knee osteoarthritis    -Patient was offered a bilateral knee corticosteroid injection in office today  Risks and benefits of the injections were dicussed at length    -Patient tolerated the injection well   -Patient knows to ice and avoid strenuous activity for 1-2 days if needed   -I discussed with the patient that I would recommend waiting 2 weeks before receiving his Covid vaccine    -I discussed that in regards to a knee replacement, he would need to see me back in office 6 weeks before he plans to have the surgery and must wait 3 months between the knee corticosteroid injections    -I will see him back in office in 12 weeks for a re-evaluation of the bilateral knees and a surgical discussion at that time  I answered all of the patient's questions during the visit and provided education of the patient's condition during the visit  The patient verbalized understanding of the information given and agrees with the plan  This note was dictated using [employer] software  It may contain errors including improperly dictated words  Please contact physician directly for any questions  Return in about 12 weeks (around 9/8/2021) for Recheck bilateral knees   I answered all of the patient's questions during the visit and provided education of the patient's condition during the visit  The patient verbalized understanding of the information given and agrees with the plan  This note was dictated using [employer] software  It may contain errors including improperly dictated words  Please contact physician directly for any questions      Subjective   Chief Complaint:   Chief Complaint   Patient presents with    Left Knee - Follow-up    Right Knee - Follow-up       [facility]  Nimesh Rand is a 55 y o  male who presents for follow up for his bilateral knee osteoarthritis  Patient stated that his bilateral knees are painful and stiff  He stated that his left knee is worse than his right  He stated he previously tried viscosupplementation which he did not like  He stated that he previously bought new sneakers which has been helping with his knee discomfort  He stated that the pain does not wake her up at night  He stated that he experiences pain going up an down the steps  He denied any new injuries about the bilateral knees  He denied any numbness or tingling  Patient stated that he stopped taking Prednisone last Tuesday for an asthma related issue  Review of Systems  ROS:    See HPI for musculoskeletal review     All other systems reviewed are negative     History:  Past Medical History:   Diagnosis Date    Anxiety 1/15/2019    Arthritis     Chronic rhinitis     last assessed 2/21/14    Chronic serous otitis media     last assessed 11/20/13    Chronic sinusitis     last assessed 8/19/14    Functional heart murmur     GERD (gastroesophageal reflux disease)     Gout     Hearing problem     last assessed 12/1/16    Hiatal hernia     Hypercholesterolemia     Hypertension     Palpitations     Testicular hypogonadism     last assessed 10/4/13    [facility])      Past Surgical History:   Procedure Laterality Date    APPENDECTOMY      BICEPS TENODESIS      anesthesia for tenodesis- of ruptured long tendon of biceps     HERNIA REPAIR      THYROIDECTOMY      TONSILLECTOMY       Social History   Social History     Substance and Sexual Activity   Alcohol Use Yes    Comment: occasionally     Social History     Substance and Sexual Activity   Drug Use No     Social History     Tobacco Use   Smoking Status Never Smoker   Smokeless Tobacco Never Used     Family History:   Family History   Problem Relation Age of Onset    Lung cancer Father     Coronary artery disease Father         blockages    Stroke Maternal Grandmother     Cancer Paternal Grandfather         metastasized    Heart disease Family     Lung cancer Cousin     No Known Problems Mother     No Known Problems Sister     No Known Problems Brother     No Known Problems Maternal Aunt     No Known Problems Maternal Uncle     No Known Problems Paternal Aunt     No Known Problems Paternal Uncle     No Known Problems Paternal Grandmother     ADD / ADHD Neg Hx     Anesthesia problems Neg Hx     Clotting disorder Neg Hx     Collagen disease Neg Hx     Diabetes Neg Hx     Dislocations Neg Hx     Learning disabilities Neg Hx     Neurological problems Neg Hx     Osteoporosis Neg Hx     Rheumatologic disease Neg Hx     Scoliosis Neg Hx     Vascular Disease Neg Hx        Current Outpatient Medications on File Prior to Visit   Medication Sig Dispense Refill    acetaminophen (TYLENOL) 325 mg tablet Take 650 mg by mouth every 6 (six) hours as needed for mild pain      acetaminophen (TYLENOL) 500 mg tablet Take 1,000 mg by mouth every 6 (six) hours as needed for mild pain      albuterol (2 5 mg/3 mL) 0 083 % nebulizer solution Take 2 5 mg by nebulization every 6 (six) hours as needed for wheezing or shortness of breath      albuterol (PROVENTIL HFA,VENTOLIN HFA) 90 mcg/act inhaler Inhale 1 puff 4 (four) times a day       Chlorpheniramine Maleate (CHLOR-TRIMETON ALLERGY PO) Take by mouth      ciprofloxacin-dexamethasone (CIPRODEX) otic suspension Administer 4 drops into the left ear 2 (two) times a day for 7 days 7 5 mL 0    cyclobenzaprine (FLEXERIL) 5 mg tablet Take 1 tablet (5 mg total) by mouth 3 (three) times a day as needed for muscle spasms (Patient not taking: Reported on 5/5/2021) 15 tablet 0    cyclobenzaprine (FLEXERIL) 5 mg tablet Take 1 tablet (5 mg total) by mouth 3 (three) times a day as needed for muscle spasms May cause drowsiness (Patient not taking: Reported on 6/4/2021) 30 tablet 0    diclofenac sodium (VOLTAREN) 1 % Apply 2 g topically 4 (four) times a day as needed (pain) (Patient not taking: Reported on 6/4/2021) 100 g 2    esomeprazole (NexIUM) 40 MG capsule Take 40 mg by mouth every morning before breakfast      fluticasone-salmeterol (ADVAIR DISKUS) 500-50 mcg/dose Inhale 1 puff 2 (two) times a day      ibuprofen (MOTRIN) 600 mg tablet Take 600 mg by mouth every 8 (eight) hours      ipratropium-albuterol (DUO-NEB) 0 5-2 5 mg/3 mL Inhale 3 mL 4 (four) times a day As needed      lisinopril (ZESTRIL) 20 mg tablet TAKE 1 TABLET BY MOUTH EVERY DAY 90 tablet 1    meclizine (ANTIVERT) 12 5 MG tablet Take 1 tablet (12 5 mg total) by mouth every 8 (eight) hours 30 tablet 0    montelukast (SINGULAIR) 10 mg tablet Take 1 tablet (10 mg total) by mouth daily 90 tablet 1    predniSONE 10 mg tablet Six tablets day 1; 5 tablets day 2; 4 tablets day 3 and 3 tablets day 4 18 tablet 0    pseudoephedrine (SUDAFED) 30 mg tablet Take 60 mg by mouth every 4 (four) hours as needed for congestion      Triamcinolone Acetonide (NASACORT ALLERGY 24HR NA) 1 spray into each nostril daily        Uloric 40 MG tablet Take 1 tablet (40 mg total) by mouth daily 90 tablet 1    verapamil (CALAN) 120 mg tablet Take 1 tablet (120 mg total) by mouth daily 90 tablet 4     No current facility-administered medications on file prior to visit  Allergies   Allergen Reactions    Penicillins Rash and Anaphylaxis    Acetazolamide      Other reaction(s): Stomach Ache    Cephalosporins Other (See Comments)     headache    Doxycycline      Capsules only  Tablets are ok to take, per pt  Capsules only  Tablets are ok to take, per pt   Other     Sulfa Antibiotics Other (See Comments)     Category: Allergy;  Annotation - [id number]: STOMACH UPSET    Famotidine Palpitations    Omeprazole Palpitations     Painful urination        Objective     /77   Pulse 73   Temp 97 7 °F (36 5 °C)   Ht 5' 9" (1 753 m)   Wt 99 8 kg (220 lb)   BMI 32 49 kg/m²      PE:  AAOx 3  WDWN  Hearing intact, no drainage from eyes  no audible wheezing  no abdominal distension  LE compartments soft, skin intact    Ortho Exam:  bilateral Knee:   No erythema  no swelling  no effusion  no warmth  AROM: 5-150 degrees left; 0-170 degrees right   Stable to varus/valgus stress    Imaging Studies: I have personally reviewed pertinent films in PACS   bilateral knee: severe DJD      Large joint arthrocentesis: bilateral knee  Universal Protocol:  Consent: Verbal consent obtained    Risks and benefits: risks, benefits and alternatives were discussed  Consent given by: patient  Timeout called at: 6/16/2021 4:06 PM   Patient understanding: patient states understanding of the procedure being performed  Site marked: the operative site was marked  Patient identity confirmed: verbally with patient    Supporting Documentation  Indications: pain   Procedure Details  Location: knee - bilateral knee  Preparation: Patient was prepped and draped in the usual sterile fashion  Needle size: 22 G  Ultrasound guidance: no  Approach: anterolateral    Medications (Right): 3 mL lidocaine 1 %; 2 mL methylPREDNISolone acetate 40 mg/mLMedications (Left): 3 mL lidocaine 1 %; 2 mL methylPREDNISolone acetate 40 mg/mL   Patient tolerance: patient tolerated the procedure well with no immediate complications  Dressing:  Sterile dressing applied                Scribe Attestation    I,:  Clyde King am acting as a scribe while in the presence of the attending physician :       I,:  Neil Camarillo DO personally performed the services described in this documentation    as scribed in my presence :

## 2021-06-22 ENCOUNTER — OFFICE VISIT (OUTPATIENT)
Dept: URGENT CARE | Age: 56
End: 2021-06-22
Payer: MEDICARE

## 2021-06-22 VITALS
OXYGEN SATURATION: 97 % | DIASTOLIC BLOOD PRESSURE: 81 MMHG | HEART RATE: 85 BPM | SYSTOLIC BLOOD PRESSURE: 127 MMHG | TEMPERATURE: 98.1 F

## 2021-06-22 DIAGNOSIS — B96.89 ACUTE BACTERIAL BRONCHITIS: Primary | ICD-10-CM

## 2021-06-22 DIAGNOSIS — J20.8 ACUTE BACTERIAL BRONCHITIS: Primary | ICD-10-CM

## 2021-06-22 PROCEDURE — G0463 HOSPITAL OUTPT CLINIC VISIT: HCPCS | Performed by: PHYSICIAN ASSISTANT

## 2021-06-22 PROCEDURE — 99213 OFFICE O/P EST LOW 20 MIN: CPT | Performed by: PHYSICIAN ASSISTANT

## 2021-06-22 RX ORDER — AZITHROMYCIN 250 MG/1
TABLET, FILM COATED ORAL
Qty: 6 TABLET | Refills: 0 | Status: SHIPPED | OUTPATIENT
Start: 2021-06-22 | End: 2021-06-26

## 2021-06-22 RX ORDER — PREDNISONE 10 MG/1
TABLET ORAL
Qty: 32 TABLET | Refills: 0 | Status: SHIPPED | OUTPATIENT
Start: 2021-06-22 | End: 2021-09-01

## 2021-06-23 NOTE — PROGRESS NOTES
[email or web address]        NAME: Prieto Landaverde is a 55 y o  male  : 1965    MRN: [id number]  DATE: 2021  TIME: 8:47 PM    Assessment and Plan   Acute bacterial bronchitis [J20 8, B96 89]  1  Acute bacterial bronchitis  predniSONE 10 mg tablet    azithromycin (ZITHROMAX) 250 mg tablet         Patient Instructions       Follow up with PCP in 3-5 days  Proceed to  ER if symptoms worsen  Chief Complaint     Chief Complaint   Patient presents with    Asthma     Patient stated that today his asthma has been worse today  He is feeling lightheaded, tired, and his throat is swollen  He is also feeling congested   Otitis Media     Patient stated his left ear has been itching for 3 weeks  He stated he has a lot of ear wax on that side  History of Present Illness         Patient has been having these ongoing symptoms for the last couple weeks  Patient was feeling better but went to the emergency room a few days ago and since then the symptoms are progressing  Review of Systems   Review of Systems   Constitutional: Negative  HENT: Positive for congestion and ear pain (Itching)  Negative for ear discharge, postnasal drip, rhinorrhea, sinus pressure, sinus pain, sore throat and trouble swallowing  Eyes: Negative  Respiratory: Positive for cough and wheezing  Negative for shortness of breath  Cardiovascular: Negative  Gastrointestinal: Negative            Current Medications       Current Outpatient Medications:     acetaminophen (TYLENOL) 325 mg tablet, Take 650 mg by mouth every 6 (six) hours as needed for mild pain, Disp: , Rfl:     acetaminophen (TYLENOL) 500 mg tablet, Take 1,000 mg by mouth every 6 (six) hours as needed for mild pain, Disp: , Rfl:     albuterol (2 5 mg/3 mL) 0 083 % nebulizer solution, Take 2 5 mg by nebulization every 6 (six) hours as needed for wheezing or shortness of breath, Disp: , Rfl:     albuterol (PROVENTIL HFA,VENTOLIN HFA) 90 mcg/act inhaler, Inhale 1 puff 4 (four) times a day , Disp: , Rfl:     azithromycin (ZITHROMAX) 250 mg tablet, Take 2 tablets day 1 then 1 tab days 2-5, Disp: 6 tablet, Rfl: 0    Chlorpheniramine Maleate (CHLOR-TRIMETON ALLERGY PO), Take by mouth, Disp: , Rfl:     ciprofloxacin-dexamethasone (CIPRODEX) otic suspension, Administer 4 drops into the left ear 2 (two) times a day for 7 days, Disp: 7 5 mL, Rfl: 0    cyclobenzaprine (FLEXERIL) 5 mg tablet, Take 1 tablet (5 mg total) by mouth 3 (three) times a day as needed for muscle spasms (Patient not taking: Reported on 5/5/2021), Disp: 15 tablet, Rfl: 0    cyclobenzaprine (FLEXERIL) 5 mg tablet, Take 1 tablet (5 mg total) by mouth 3 (three) times a day as needed for muscle spasms May cause drowsiness (Patient not taking: Reported on 6/4/2021), Disp: 30 tablet, Rfl: 0    diclofenac sodium (VOLTAREN) 1 %, Apply 2 g topically 4 (four) times a day as needed (pain) (Patient not taking: Reported on 6/4/2021), Disp: 100 g, Rfl: 2    esomeprazole (NexIUM) 40 MG capsule, Take 40 mg by mouth every morning before breakfast, Disp: , Rfl:     fluticasone-salmeterol (ADVAIR DISKUS) 500-50 mcg/dose, Inhale 1 puff 2 (two) times a day, Disp: , Rfl:     ibuprofen (MOTRIN) 600 mg tablet, Take 600 mg by mouth every 8 (eight) hours, Disp: , Rfl:     ipratropium-albuterol (DUO-NEB) 0 5-2 5 mg/3 mL, Inhale 3 mL 4 (four) times a day As needed, Disp: , Rfl:     lisinopril (ZESTRIL) 20 mg tablet, TAKE 1 TABLET BY MOUTH EVERY DAY, Disp: 90 tablet, Rfl: 1    meclizine (ANTIVERT) 12 5 MG tablet, Take 1 tablet (12 5 mg total) by mouth every 8 (eight) hours, Disp: 30 tablet, Rfl: 0    montelukast (SINGULAIR) 10 mg tablet, Take 1 tablet (10 mg total) by mouth daily, Disp: 90 tablet, Rfl: 1    predniSONE 10 mg tablet, Six tablets day 1 & 2; 5 tablets day 3 & 4; 4 tablets day 5; 3 tablets day 6; 2 tablets day 7; and 1 tablet day 8, Disp: 32 tablet, Rfl: 0    pseudoephedrine (SUDAFED) 30 mg tablet, Take 60 mg by mouth every 4 (four) hours as needed for congestion, Disp: , Rfl:     Triamcinolone Acetonide (NASACORT ALLERGY 24HR NA), 1 spray into each nostril daily  , Disp: , Rfl:     Uloric 40 MG tablet, Take 1 tablet (40 mg total) by mouth daily, Disp: 90 tablet, Rfl: 1    verapamil (CALAN) 120 mg tablet, Take 1 tablet (120 mg total) by mouth daily, Disp: 90 tablet, Rfl: 4    Current Allergies     Allergies as of 06/22/2021 - Reviewed 06/22/2021   Allergen Reaction Noted    Penicillins Rash and Anaphylaxis 08/17/2004    Acetazolamide  10/25/2016    Cephalosporins Other (See Comments) 08/17/2004    Doxycycline  10/24/2018    Other  04/28/2014    Sulfa antibiotics Other (See Comments) 08/17/2004    Famotidine Palpitations 09/05/2016    Omeprazole Palpitations 09/05/2016            The following portions of the patient's history were reviewed and updated as appropriate: allergies, current medications, past family history, past medical history, past social history, past surgical history and problem list      Past Medical History:   Diagnosis Date    Anxiety 1/15/2019    Arthritis     Chronic rhinitis     last assessed 2/21/14    Chronic serous otitis media     last assessed 11/20/13    Chronic sinusitis     last assessed 8/19/14    Functional heart murmur     GERD (gastroesophageal reflux disease)     Gout     Hearing problem     last assessed 12/1/16    Hiatal hernia     Hypercholesterolemia     Hypertension     Palpitations     Testicular hypogonadism     last assessed 10/4/13    [facility])        Past Surgical History:   Procedure Laterality Date    APPENDECTOMY      BICEPS TENODESIS      anesthesia for tenodesis- of ruptured long tendon of biceps     HERNIA REPAIR      THYROIDECTOMY      TONSILLECTOMY         Family History   Problem Relation Age of Onset    Lung cancer Father     Coronary artery disease Father         blockages    Stroke Maternal Grandmother     Cancer Paternal Grandfather         metastasized    Heart disease Family     Lung cancer Cousin     No Known Problems Mother     No Known Problems Sister     No Known Problems Brother     No Known Problems Maternal Aunt     No Known Problems Maternal Uncle     No Known Problems Paternal Aunt     No Known Problems Paternal Uncle     No Known Problems Paternal Grandmother     ADD / ADHD Neg Hx     Anesthesia problems Neg Hx     Clotting disorder Neg Hx     Collagen disease Neg Hx     Diabetes Neg Hx     Dislocations Neg Hx     Learning disabilities Neg Hx     Neurological problems Neg Hx     Osteoporosis Neg Hx     Rheumatologic disease Neg Hx     Scoliosis Neg Hx     Vascular Disease Neg Hx          Medications have been verified  Objective   /81   Pulse 85   Temp 98 1 °F (36 7 °C) (Temporal)   SpO2 97%   No LMP for male patient  Physical Exam     Physical Exam  Vitals and nursing note reviewed  Constitutional:       General: He is not in acute distress  Appearance: Normal appearance  He is well-developed  He is not ill-appearing, toxic-appearing or diaphoretic  HENT:      Head: Normocephalic and atraumatic  Right Ear: Tympanic membrane and ear canal normal  No middle ear effusion  Tympanic membrane is not perforated, erythematous, retracted or bulging  Left Ear: Ear canal normal  A middle ear effusion (Clear) is present  Tympanic membrane is not perforated, erythematous, retracted or bulging  Nose: Congestion present  No rhinorrhea  Mouth/Throat:      Mouth: Mucous membranes are moist       Pharynx: No oropharyngeal exudate or posterior oropharyngeal erythema  Eyes:      Extraocular Movements: Extraocular movements intact  Conjunctiva/sclera: Conjunctivae normal       Pupils: Pupils are equal, round, and reactive to light  Pulmonary:      Effort: Pulmonary effort is normal  No respiratory distress        Breath sounds: Normal breath sounds  No stridor  No wheezing, rhonchi or rales  Lymphadenopathy:      Cervical: Cervical adenopathy present  Skin:     General: Skin is warm and dry  Neurological:      General: No focal deficit present  Mental Status: He is alert and oriented to person, place, and time  Psychiatric:         Mood and Affect: Mood normal          Behavior: Behavior normal          Thought Content:  Thought content normal          Judgment: Judgment normal

## 2021-06-24 DIAGNOSIS — H81.12 BENIGN PAROXYSMAL POSITIONAL VERTIGO OF LEFT EAR: ICD-10-CM

## 2021-06-24 RX ORDER — MECLIZINE HCL 12.5 MG/1
12.5 TABLET ORAL EVERY 8 HOURS SCHEDULED
Qty: 30 TABLET | Refills: 1 | Status: SHIPPED | OUTPATIENT
Start: 2021-06-24

## 2021-06-28 DIAGNOSIS — J45.909 ASTHMA, UNSPECIFIED ASTHMA SEVERITY, UNSPECIFIED WHETHER COMPLICATED, UNSPECIFIED WHETHER PERSISTENT: Primary | ICD-10-CM

## 2021-06-28 RX ORDER — ALBUTEROL SULFATE 2.5 MG/3ML
2.5 SOLUTION RESPIRATORY (INHALATION) EVERY 6 HOURS
Qty: 360 ML | Refills: 0 | Status: SHIPPED | OUTPATIENT
Start: 2021-06-28 | End: 2021-07-28

## 2021-06-28 NOTE — TELEPHONE ENCOUNTER
Pt need albuterol nebulizer vials 0 083% #30 in box 3 boxes at time  His pulmonologist is not answering and he cant get through to them  He only has 6 left  He needs very soon  Can this be prescribed from us? He uses [facility]

## 2021-07-19 DIAGNOSIS — I10 HYPERTENSION, UNSPECIFIED TYPE: ICD-10-CM

## 2021-07-19 RX ORDER — LISINOPRIL 20 MG/1
20 TABLET ORAL DAILY
Qty: 90 TABLET | Refills: 1 | Status: SHIPPED | OUTPATIENT
Start: 2021-07-19 | End: 2022-01-27 | Stop reason: SDUPTHER

## 2021-07-22 DIAGNOSIS — Z12.11 ENCOUNTER FOR SCREENING FOR MALIGNANT NEOPLASM OF COLON: Primary | ICD-10-CM

## 2021-08-27 ENCOUNTER — OFFICE VISIT (OUTPATIENT)
Dept: URGENT CARE | Age: 56
End: 2021-08-27
Payer: MEDICARE

## 2021-08-27 VITALS
SYSTOLIC BLOOD PRESSURE: 148 MMHG | OXYGEN SATURATION: 96 % | HEART RATE: 80 BPM | RESPIRATION RATE: 16 BRPM | DIASTOLIC BLOOD PRESSURE: 87 MMHG | TEMPERATURE: 97.3 F

## 2021-08-27 DIAGNOSIS — H69.82 DYSFUNCTION OF LEFT EUSTACHIAN TUBE: Primary | ICD-10-CM

## 2021-08-27 PROCEDURE — G0463 HOSPITAL OUTPT CLINIC VISIT: HCPCS | Performed by: PHYSICIAN ASSISTANT

## 2021-08-27 PROCEDURE — 99213 OFFICE O/P EST LOW 20 MIN: CPT | Performed by: PHYSICIAN ASSISTANT

## 2021-08-27 NOTE — PROGRESS NOTES
[facility]        NAME: Evangelista Otoole is a 55 y o  male  : 1965    MRN: [id number]  DATE: 2021  TIME: 7:28 PM    Assessment and Plan   Dysfunction of left eustachian tube [H69 82]  1  Dysfunction of left eustachian tube           Patient Instructions       Follow up with PCP in 3-5 days  Proceed to  ER if symptoms worsen  Chief Complaint     Chief Complaint   Patient presents with    Ear Fullness     hx of vertigo    Mass     right elbow         History of Present Illness        Patient for evaluation of left ear fullness and episodes of intermittent dizziness  Patient has had this before and is taking the Nasacort and antihistamines as needed  He states the left ear physical more full and has more pressure  He denies any fevers or chills or cough  Review of Systems   Review of Systems   Constitutional: Negative  HENT: Positive for ear pain  Negative for congestion, ear discharge, postnasal drip, rhinorrhea, sinus pressure, sinus pain, sore throat and trouble swallowing  Eyes: Negative  Respiratory: Negative  Cardiovascular: Negative  Gastrointestinal: Negative  Neurological: Positive for dizziness  Negative for tremors, seizures, syncope, facial asymmetry, speech difficulty, weakness, light-headedness, numbness and headaches           Current Medications       Current Outpatient Medications:     acetaminophen (TYLENOL) 325 mg tablet, Take 650 mg by mouth every 6 (six) hours as needed for mild pain, Disp: , Rfl:     acetaminophen (TYLENOL) 500 mg tablet, Take 1,000 mg by mouth every 6 (six) hours as needed for mild pain, Disp: , Rfl:     albuterol (PROVENTIL HFA,VENTOLIN HFA) 90 mcg/act inhaler, Inhale 1 puff 4 (four) times a day , Disp: , Rfl:     Chlorpheniramine Maleate (CHLOR-TRIMETON ALLERGY PO), Take by mouth, Disp: , Rfl:     ciprofloxacin-dexamethasone (CIPRODEX) otic suspension, Administer 4 drops into the left ear 2 (two) times a day for 7 days, Disp: 7 5 mL, Rfl: 0    cyclobenzaprine (FLEXERIL) 5 mg tablet, Take 1 tablet (5 mg total) by mouth 3 (three) times a day as needed for muscle spasms (Patient not taking: Reported on 5/5/2021), Disp: 15 tablet, Rfl: 0    cyclobenzaprine (FLEXERIL) 5 mg tablet, Take 1 tablet (5 mg total) by mouth 3 (three) times a day as needed for muscle spasms May cause drowsiness (Patient not taking: Reported on 6/4/2021), Disp: 30 tablet, Rfl: 0    diclofenac sodium (VOLTAREN) 1 %, Apply 2 g topically 4 (four) times a day as needed (pain) (Patient not taking: Reported on 6/4/2021), Disp: 100 g, Rfl: 2    esomeprazole (NexIUM) 40 MG capsule, Take 40 mg by mouth every morning before breakfast, Disp: , Rfl:     fluticasone-salmeterol (ADVAIR DISKUS) 500-50 mcg/dose, Inhale 1 puff 2 (two) times a day, Disp: , Rfl:     ibuprofen (MOTRIN) 600 mg tablet, Take 600 mg by mouth every 8 (eight) hours, Disp: , Rfl:     ipratropium-albuterol (DUO-NEB) 0 5-2 5 mg/3 mL, Inhale 3 mL 4 (four) times a day As needed, Disp: , Rfl:     lisinopril (ZESTRIL) 20 mg tablet, Take 1 tablet (20 mg total) by mouth daily, Disp: 90 tablet, Rfl: 1    meclizine (ANTIVERT) 12 5 MG tablet, Take 1 tablet (12 5 mg total) by mouth every 8 (eight) hours, Disp: 30 tablet, Rfl: 1    montelukast (SINGULAIR) 10 mg tablet, Take 1 tablet (10 mg total) by mouth daily, Disp: 90 tablet, Rfl: 1    predniSONE 10 mg tablet, Six tablets day 1 & 2; 5 tablets day 3 & 4; 4 tablets day 5; 3 tablets day 6; 2 tablets day 7; and 1 tablet day 8, Disp: 32 tablet, Rfl: 0    pseudoephedrine (SUDAFED) 30 mg tablet, Take 60 mg by mouth every 4 (four) hours as needed for congestion, Disp: , Rfl:     Triamcinolone Acetonide (NASACORT ALLERGY 24HR NA), 1 spray into each nostril daily  , Disp: , Rfl:     Uloric 40 MG tablet, Take 1 tablet (40 mg total) by mouth daily, Disp: 90 tablet, Rfl: 1    verapamil (CALAN) 120 mg tablet, Take 1 tablet (120 mg total) by mouth daily, Disp: 90 tablet, Rfl: 4    Current Allergies     Allergies as of 08/27/2021 - Reviewed 08/27/2021   Allergen Reaction Noted    Penicillins Rash and Anaphylaxis 08/17/2004    Acetazolamide  10/25/2016    Cephalosporins Other (See Comments) 08/17/2004    Doxycycline  10/24/2018    Other  04/28/2014    Sulfa antibiotics Other (See Comments) 08/17/2004    Famotidine Palpitations 09/05/2016    Omeprazole Palpitations 09/05/2016            The following portions of the patient's history were reviewed and updated as appropriate: allergies, current medications, past family history, past medical history, past social history, past surgical history and problem list      Past Medical History:   Diagnosis Date    Anxiety 1/15/2019    Arthritis     Chronic rhinitis     last assessed 2/21/14    Chronic serous otitis media     last assessed 11/20/13    Chronic sinusitis     last assessed 8/19/14    Functional heart murmur     GERD (gastroesophageal reflux disease)     Gout     Hearing problem     last assessed 12/1/16    Hiatal hernia     Hypercholesterolemia     Hypertension     Palpitations     Testicular hypogonadism     last assessed 10/4/13    [facility])        Past Surgical History:   Procedure Laterality Date    APPENDECTOMY      BICEPS TENODESIS      anesthesia for tenodesis- of ruptured long tendon of biceps     HERNIA REPAIR      THYROIDECTOMY      TONSILLECTOMY         Family History   Problem Relation Age of Onset    Lung cancer Father     Coronary artery disease Father         blockages    Stroke Maternal Grandmother     Cancer Paternal Grandfather         metastasized    Heart disease Family     Lung cancer Cousin     No Known Problems Mother     No Known Problems Sister     No Known Problems Brother     No Known Problems Maternal Aunt     No Known Problems Maternal Uncle     No Known Problems Paternal Aunt     No Known Problems Paternal Uncle     No Known Problems Paternal Grandmother     ADD / ADHD Neg Hx     Anesthesia problems Neg Hx     Clotting disorder Neg Hx     Collagen disease Neg Hx     Diabetes Neg Hx     Dislocations Neg Hx     Learning disabilities Neg Hx     Neurological problems Neg Hx     Osteoporosis Neg Hx     Rheumatologic disease Neg Hx     Scoliosis Neg Hx     Vascular Disease Neg Hx          Medications have been verified  Objective   /87   Pulse 80   Temp (!) 97 3 °F (36 3 °C)   Resp 16   SpO2 96%   No LMP for male patient  Physical Exam     Physical Exam  Vitals and nursing note reviewed  Constitutional:       General: He is not in acute distress  Appearance: Normal appearance  He is well-developed  He is not ill-appearing, toxic-appearing or diaphoretic  HENT:      Head: Normocephalic and atraumatic  Right Ear: Tympanic membrane and ear canal normal  There is no impacted cerumen  Left Ear: No drainage, swelling or tenderness  A middle ear effusion (Clear) is present  There is no impacted cerumen  Tympanic membrane is not perforated, erythematous, retracted or bulging  Nose: No congestion or rhinorrhea  Mouth/Throat:      Pharynx: No oropharyngeal exudate or posterior oropharyngeal erythema  Eyes:      Extraocular Movements: Extraocular movements intact  Conjunctiva/sclera: Conjunctivae normal       Pupils: Pupils are equal, round, and reactive to light  Lymphadenopathy:      Cervical: No cervical adenopathy  Skin:     General: Skin is warm and dry  Neurological:      General: No focal deficit present  Mental Status: He is alert and oriented to person, place, and time  Psychiatric:         Mood and Affect: Mood normal          Behavior: Behavior normal          Thought Content:  Thought content normal          Judgment: Judgment normal

## 2021-08-27 NOTE — PATIENT INSTRUCTIONS
Continue your current medications as directed     take the meclizine at bedtime        Follow-up with your primary care physician if symptoms persist

## 2021-09-01 ENCOUNTER — OFFICE VISIT (OUTPATIENT)
Dept: INTERNAL MEDICINE CLINIC | Facility: CLINIC | Age: 56
End: 2021-09-01
Payer: MEDICARE

## 2021-09-01 VITALS
RESPIRATION RATE: 18 BRPM | BODY MASS INDEX: 34.39 KG/M2 | TEMPERATURE: 97.8 F | HEART RATE: 86 BPM | DIASTOLIC BLOOD PRESSURE: 76 MMHG | OXYGEN SATURATION: 96 % | HEIGHT: 69 IN | SYSTOLIC BLOOD PRESSURE: 122 MMHG | WEIGHT: 232.2 LBS

## 2021-09-01 DIAGNOSIS — H69.82 DYSFUNCTION OF LEFT EUSTACHIAN TUBE: ICD-10-CM

## 2021-09-01 DIAGNOSIS — J32.0 CHRONIC MAXILLARY SINUSITIS: Primary | ICD-10-CM

## 2021-09-01 DIAGNOSIS — J30.89 NON-SEASONAL ALLERGIC RHINITIS, UNSPECIFIED TRIGGER: ICD-10-CM

## 2021-09-01 DIAGNOSIS — L72.0 EPIDERMOID CYST: ICD-10-CM

## 2021-09-01 PROBLEM — J01.00 ACUTE MAXILLARY SINUSITIS: Status: RESOLVED | Noted: 2020-10-12 | Resolved: 2021-09-01

## 2021-09-01 PROBLEM — H60.332 ACUTE SWIMMER'S EAR OF LEFT SIDE: Status: RESOLVED | Noted: 2020-01-14 | Resolved: 2021-09-01

## 2021-09-01 PROCEDURE — 99213 OFFICE O/P EST LOW 20 MIN: CPT | Performed by: INTERNAL MEDICINE

## 2021-09-01 RX ORDER — KETOCONAZOLE 20 MG/G
CREAM TOPICAL
COMMUNITY
Start: 2021-05-28 | End: 2022-02-28

## 2021-09-01 RX ORDER — PREDNISONE 20 MG/1
40 TABLET ORAL DAILY
Qty: 10 TABLET | Refills: 0 | Status: SHIPPED | OUTPATIENT
Start: 2021-09-01 | End: 2021-09-06

## 2021-09-01 NOTE — PROGRESS NOTES
Assessment/Plan:    Epidermoid cyst  Differential includes epidermoid cyst verses lipoma  Will refer to General surgery for further evaluation and excision  Allergic rhinitis  Will treat with prednisone 40 milligrams daily for 5 days  Continue Singulair, Sudafed, Nasacort  Diagnoses and all orders for this visit:    Chronic maxillary sinusitis  -     predniSONE 20 mg tablet; Take 2 tablets (40 mg total) by mouth daily for 5 days    Dysfunction of left eustachian tube  -     predniSONE 20 mg tablet; Take 2 tablets (40 mg total) by mouth daily for 5 days    Non-seasonal allergic rhinitis, unspecified trigger  -     predniSONE 20 mg tablet; Take 2 tablets (40 mg total) by mouth daily for 5 days    Epidermoid cyst  -     Ambulatory referral to General Surgery; Future    Other orders  -     ketoconazole (NIZORAL) 2 % cream          BMI Counseling: Body mass index is 34 29 kg/m²  The BMI is above normal  Nutrition recommendations include decreasing portion sizes, encouraging healthy choices of fruits and vegetables, decreasing fast food intake, consuming healthier snacks, limiting drinks that contain sugar, moderation in carbohydrate intake, increasing intake of lean protein, reducing intake of saturated and trans fat and reducing intake of cholesterol  Exercise recommendations include moderate physical activity 150 minutes/week and exercising 3-5 times per week  No pharmacotherapy was ordered  Patient referred to PCP due to patient being overweight  Subjective:      Patient ID: Slim Ribera is a 55 y o  male  Chief Complaint   Patient presents with    health maintenance     AWV scheduled on 9/21, hep c    forearm     right forearm feels as if he has something growing there again, you had lanced it last year      Dizziness     has had some vertigo for about 2-3 weeks       55year old male is seen today with concern for left ear ache, sinus congestion, and concern for a right forearm mass   He was seen at urgent care center on 08/27/2021 for left ear ache  He was advised to continue his current allergy regimen as the symptoms were likely due to allergic rhinitis  He continue to experience dizziness and left ear ache, fullness, and pressure  He has a h/o of cyst vs lipoma of the right forearm region  Dizziness  This is a recurrent problem  Pertinent negatives include no abdominal pain, chest pain, chills, congestion, coughing, diaphoresis, fatigue, fever, headaches, nausea, numbness, sore throat, vomiting or weakness  Earache   There is pain in the left ear  This is a new problem  The current episode started in the past 7 days  Pertinent negatives include no abdominal pain, coughing, diarrhea, headaches, rhinorrhea, sore throat or vomiting  The treatment provided no relief  The following portions of the patient's history were reviewed and updated as appropriate: allergies, current medications, past family history, past medical history, past social history, past surgical history and problem list     Review of Systems   Constitutional: Negative for activity change, appetite change, chills, diaphoresis, fatigue and fever  HENT: Positive for ear pain  Negative for congestion, postnasal drip, rhinorrhea, sinus pressure, sinus pain, sneezing and sore throat  Eyes: Negative for visual disturbance  Respiratory: Negative for apnea, cough, choking, chest tightness, shortness of breath and wheezing  Cardiovascular: Negative for chest pain, palpitations and leg swelling  Gastrointestinal: Negative for abdominal distention, abdominal pain, anal bleeding, blood in stool, constipation, diarrhea, nausea and vomiting  Endocrine: Negative for cold intolerance and heat intolerance  Genitourinary: Negative for difficulty urinating, dysuria and hematuria  Musculoskeletal: Negative  Skin: Negative  Neurological: Positive for dizziness   Negative for weakness, light-headedness, numbness and headaches  Hematological: Negative for adenopathy  Psychiatric/Behavioral: Negative for agitation, sleep disturbance and suicidal ideas  All other systems reviewed and are negative          Past Medical History:   Diagnosis Date    Anxiety 1/15/2019    Arthritis     Chronic rhinitis     last assessed 2/21/14    Chronic serous otitis media     last assessed 11/20/13    Chronic sinusitis     last assessed 8/19/14    Functional heart murmur     GERD (gastroesophageal reflux disease)     Gout     Hearing problem     last assessed 12/1/16    Hiatal hernia     Hypercholesterolemia     Hypertension     Palpitations     Testicular hypogonadism     last assessed 10/4/13    [facility])          Current Outpatient Medications:     acetaminophen (TYLENOL) 500 mg tablet, Take 1,000 mg by mouth every 6 (six) hours as needed for mild pain, Disp: , Rfl:     albuterol (PROVENTIL HFA,VENTOLIN HFA) 90 mcg/act inhaler, Inhale 1 puff 4 (four) times a day , Disp: , Rfl:     Chlorpheniramine Maleate (CHLOR-TRIMETON ALLERGY PO), Take by mouth, Disp: , Rfl:     cyclobenzaprine (FLEXERIL) 5 mg tablet, Take 1 tablet (5 mg total) by mouth 3 (three) times a day as needed for muscle spasms May cause drowsiness, Disp: 30 tablet, Rfl: 0    esomeprazole (NexIUM) 40 MG capsule, Take 40 mg by mouth every morning before breakfast, Disp: , Rfl:     fluticasone-salmeterol (ADVAIR DISKUS) 500-50 mcg/dose, Inhale 1 puff 2 (two) times a day, Disp: , Rfl:     ibuprofen (MOTRIN) 600 mg tablet, Take 600 mg by mouth every 8 (eight) hours, Disp: , Rfl:     ipratropium-albuterol (DUO-NEB) 0 5-2 5 mg/3 mL, Inhale 3 mL 4 (four) times a day As needed, Disp: , Rfl:     ketoconazole (NIZORAL) 2 % cream, , Disp: , Rfl:     lisinopril (ZESTRIL) 20 mg tablet, Take 1 tablet (20 mg total) by mouth daily, Disp: 90 tablet, Rfl: 1    meclizine (ANTIVERT) 12 5 MG tablet, Take 1 tablet (12 5 mg total) by mouth every 8 (eight) hours, Disp: 30 tablet, Rfl: 1    montelukast (SINGULAIR) 10 mg tablet, Take 1 tablet (10 mg total) by mouth daily, Disp: 90 tablet, Rfl: 1    pseudoephedrine (SUDAFED) 30 mg tablet, Take 60 mg by mouth every 4 (four) hours as needed for congestion, Disp: , Rfl:     Triamcinolone Acetonide (NASACORT ALLERGY 24HR NA), 1 spray into each nostril daily  , Disp: , Rfl:     Uloric 40 MG tablet, Take 1 tablet (40 mg total) by mouth daily, Disp: 90 tablet, Rfl: 1    verapamil (CALAN) 120 mg tablet, Take 1 tablet (120 mg total) by mouth daily, Disp: 90 tablet, Rfl: 4    ciprofloxacin-dexamethasone (CIPRODEX) otic suspension, Administer 4 drops into the left ear 2 (two) times a day for 7 days, Disp: 7 5 mL, Rfl: 0    diclofenac sodium (VOLTAREN) 1 %, Apply 2 g topically 4 (four) times a day as needed (pain) (Patient not taking: Reported on 6/4/2021), Disp: 100 g, Rfl: 2    predniSONE 20 mg tablet, Take 2 tablets (40 mg total) by mouth daily for 5 days, Disp: 10 tablet, Rfl: 0    Allergies   Allergen Reactions    Penicillins Rash and Anaphylaxis    Acetazolamide      Other reaction(s): Stomach Ache    Cephalosporins Other (See Comments)     headache    Doxycycline      Capsules only  Tablets are ok to take, per pt  Capsules only  Tablets are ok to take, per pt   Other     Sulfa Antibiotics Other (See Comments)     Category: Allergy;  Annotation - [id number]: STOMACH UPSET    Famotidine Palpitations    Omeprazole Palpitations     Painful urination       Social History   Past Surgical History:   Procedure Laterality Date    APPENDECTOMY      BICEPS TENODESIS      anesthesia for tenodesis- of ruptured long tendon of biceps     HERNIA REPAIR      THYROIDECTOMY      TONSILLECTOMY       Family History   Problem Relation Age of Onset    Lung cancer Father     Coronary artery disease Father         blockages    Stroke Maternal Grandmother     Cancer Paternal Grandfather         metastasized    Heart disease Family     Lung cancer Cousin     No Known Problems Mother     No Known Problems Sister     No Known Problems Brother     No Known Problems Maternal Aunt     No Known Problems Maternal Uncle     No Known Problems Paternal Aunt     No Known Problems Paternal Uncle     No Known Problems Paternal Grandmother     ADD / ADHD Neg Hx     Anesthesia problems Neg Hx     Clotting disorder Neg Hx     Collagen disease Neg Hx     Diabetes Neg Hx     Dislocations Neg Hx     Learning disabilities Neg Hx     Neurological problems Neg Hx     Osteoporosis Neg Hx     Rheumatologic disease Neg Hx     Scoliosis Neg Hx     Vascular Disease Neg Hx        Objective:  /76 (BP Location: Right arm, Patient Position: Sitting, Cuff Size: Large)   Pulse 86   Temp 97 8 °F (36 6 °C) (Temporal)   Resp 18   Ht 5' 9" (1 753 m)   Wt 105 kg (232 lb 3 2 oz)   SpO2 96%   BMI 34 29 kg/m²     No results found for this or any previous visit (from the past 1344 hour(s))  Physical Exam  Vitals and nursing note reviewed  Constitutional:       General: He is not in acute distress  Appearance: He is well-developed  He is not diaphoretic  HENT:      Head: Normocephalic and atraumatic  Eyes:      General: No scleral icterus  Right eye: No discharge  Left eye: No discharge  Conjunctiva/sclera: Conjunctivae normal       Pupils: Pupils are equal, round, and reactive to light  Neck:      Thyroid: No thyromegaly  Vascular: No JVD  Cardiovascular:      Rate and Rhythm: Normal rate and regular rhythm  Heart sounds: Normal heart sounds  No murmur heard  No friction rub  No gallop  Pulmonary:      Effort: Pulmonary effort is normal  No respiratory distress  Breath sounds: Normal breath sounds  No wheezing or rales  Chest:      Chest wall: No tenderness  Abdominal:      General: Bowel sounds are normal  There is no distension  Palpations: Abdomen is soft   There is no mass       Tenderness: There is no abdominal tenderness  There is no guarding or rebound  Musculoskeletal:         General: No tenderness or deformity  Normal range of motion  Cervical back: Normal range of motion and neck supple  Lymphadenopathy:      Cervical: No cervical adenopathy  Skin:     General: Skin is warm and dry  Coloration: Skin is not pale  Findings: No erythema or rash  Neurological:      Mental Status: He is alert and oriented to person, place, and time  Cranial Nerves: No cranial nerve deficit  Coordination: Coordination normal       Deep Tendon Reflexes: Reflexes are normal and symmetric  Psychiatric:         Behavior: Behavior normal          Thought Content:  Thought content normal          Judgment: Judgment normal

## 2021-09-01 NOTE — ASSESSMENT & PLAN NOTE
Differential includes epidermoid cyst verses lipoma  Will refer to General surgery for further evaluation and excision

## 2021-09-20 ENCOUNTER — OFFICE VISIT (OUTPATIENT)
Dept: OBGYN CLINIC | Facility: MEDICAL CENTER | Age: 56
End: 2021-09-20
Payer: MEDICARE

## 2021-09-20 VITALS
HEIGHT: 70 IN | WEIGHT: 227.5 LBS | HEART RATE: 79 BPM | DIASTOLIC BLOOD PRESSURE: 84 MMHG | SYSTOLIC BLOOD PRESSURE: 130 MMHG | BODY MASS INDEX: 32.57 KG/M2

## 2021-09-20 DIAGNOSIS — M17.12 PRIMARY OSTEOARTHRITIS OF LEFT KNEE: ICD-10-CM

## 2021-09-20 DIAGNOSIS — M17.11 PRIMARY OSTEOARTHRITIS OF RIGHT KNEE: Primary | ICD-10-CM

## 2021-09-20 PROCEDURE — 20610 DRAIN/INJ JOINT/BURSA W/O US: CPT | Performed by: ORTHOPAEDIC SURGERY

## 2021-09-20 PROCEDURE — 99213 OFFICE O/P EST LOW 20 MIN: CPT | Performed by: ORTHOPAEDIC SURGERY

## 2021-09-20 RX ORDER — LIDOCAINE HYDROCHLORIDE 10 MG/ML
3 INJECTION, SOLUTION INFILTRATION; PERINEURAL
Status: COMPLETED | OUTPATIENT
Start: 2021-09-20 | End: 2021-09-20

## 2021-09-20 RX ORDER — METHYLPREDNISOLONE ACETATE 40 MG/ML
2 INJECTION, SUSPENSION INTRA-ARTICULAR; INTRALESIONAL; INTRAMUSCULAR; SOFT TISSUE
Status: COMPLETED | OUTPATIENT
Start: 2021-09-20 | End: 2021-09-20

## 2021-09-20 RX ADMIN — LIDOCAINE HYDROCHLORIDE 3 ML: 10 INJECTION, SOLUTION INFILTRATION; PERINEURAL at 15:42

## 2021-09-20 RX ADMIN — METHYLPREDNISOLONE ACETATE 2 ML: 40 INJECTION, SUSPENSION INTRA-ARTICULAR; INTRALESIONAL; INTRAMUSCULAR; SOFT TISSUE at 15:42

## 2021-09-20 NOTE — PROGRESS NOTES
Assessment/Plan:  1  Primary osteoarthritis of right knee    2  Primary osteoarthritis of left knee      Orders Placed This Encounter   Procedures    Large joint arthrocentesis: R knee    Large joint arthrocentesis: L knee     · Patient has severe bilateral knee osteoarthritis  · Received bilateral knee steroid injection today  Patient knows to ice and avoid strenuous activity for 1-2 days if needed  · Continue taking  mg as needed for pain  · He is considering TKA in 2022  He is aware he needs to wait 3 months after steroid injection to have surgery  · He has an appointment with his PCP to discuss right LE edema  Return in about 3 months (around 12/20/2021) for Recheck Bilateral knee   I answered all of the patient's questions during the visit and provided education of the patient's condition during the visit  The patient verbalized understanding of the information given and agrees with the plan  This note was dictated using Phanfare software  It may contain errors including improperly dictated words  Please contact physician directly for any questions  Subjective   Chief Complaint:   Chief Complaint   Patient presents with    Left Knee - Follow-up    Right Knee - Follow-up       HPI  Partha Jaffe is a 55 y o  male who presents for follow up for bilateral knee pain due to severe osteoarthritis  Patient had bilateral knee steroid injections on 06/16/2021  And states he had relief for five weeks  He states left knee is worse than right  He states he has pain anteromedial aspect  Pain is worse with increasing knees  He has been doing his home stretching exercises from physical therapy  He is taking ibuprofen 600 milligrams as needed for pain with relief  He did have VS injections but caused increase pain  He states he is having right LE pitting edema  He has no history of DVT  Review of Systems  ROS:    See HPI for musculoskeletal review     All other systems reviewed are negative     History:  Past Medical History:   Diagnosis Date    Anxiety 1/15/2019    Arthritis     Chronic rhinitis     last assessed 2/21/14    Chronic serous otitis media     last assessed 11/20/13    Chronic sinusitis     last assessed 8/19/14    Functional heart murmur     GERD (gastroesophageal reflux disease)     Gout     Hearing problem     last assessed 12/1/16    Hiatal hernia     Hypercholesterolemia     Hypertension     Palpitations     Testicular hypogonadism     last assessed 10/4/13    V-tach [facility])      Past Surgical History:   Procedure Laterality Date    APPENDECTOMY      BICEPS TENODESIS      anesthesia for tenodesis- of ruptured long tendon of biceps     HERNIA REPAIR      THYROIDECTOMY      TONSILLECTOMY       Social History   Social History     Substance and Sexual Activity   Alcohol Use Yes    Comment: occasionally     Social History     Substance and Sexual Activity   Drug Use No     Social History     Tobacco Use   Smoking Status Never Smoker   Smokeless Tobacco Never Used     Family History:   Family History   Problem Relation Age of Onset    Lung cancer Father     Coronary artery disease Father         blockages    Stroke Maternal Grandmother     Cancer Paternal Grandfather         metastasized    Heart disease Family     Lung cancer Cousin     No Known Problems Mother     No Known Problems Sister     No Known Problems Brother     No Known Problems Maternal Aunt     No Known Problems Maternal Uncle     No Known Problems Paternal Aunt     No Known Problems Paternal Uncle     No Known Problems Paternal Grandmother     ADD / ADHD Neg Hx     Anesthesia problems Neg Hx     Clotting disorder Neg Hx     Collagen disease Neg Hx     Diabetes Neg Hx     Dislocations Neg Hx     Learning disabilities Neg Hx     Neurological problems Neg Hx     Osteoporosis Neg Hx     Rheumatologic disease Neg Hx     Scoliosis Neg Hx     Vascular Disease Neg Hx Current Outpatient Medications on File Prior to Visit   Medication Sig Dispense Refill    acetaminophen (TYLENOL) 500 mg tablet Take 1,000 mg by mouth every 6 (six) hours as needed for mild pain      albuterol (PROVENTIL HFA,VENTOLIN HFA) 90 mcg/act inhaler Inhale 1 puff 4 (four) times a day       Chlorpheniramine Maleate (CHLOR-TRIMETON ALLERGY PO) Take by mouth      cyclobenzaprine (FLEXERIL) 5 mg tablet Take 1 tablet (5 mg total) by mouth 3 (three) times a day as needed for muscle spasms May cause drowsiness 30 tablet 0    esomeprazole (NexIUM) 40 MG capsule Take 40 mg by mouth every morning before breakfast      fluticasone-salmeterol (ADVAIR DISKUS) 500-50 mcg/dose Inhale 1 puff 2 (two) times a day      ipratropium-albuterol (DUO-NEB) 0 5-2 5 mg/3 mL Inhale 3 mL 4 (four) times a day As needed      lisinopril (ZESTRIL) 20 mg tablet Take 1 tablet (20 mg total) by mouth daily 90 tablet 1    meclizine (ANTIVERT) 12 5 MG tablet Take 1 tablet (12 5 mg total) by mouth every 8 (eight) hours 30 tablet 1    montelukast (SINGULAIR) 10 mg tablet Take 1 tablet (10 mg total) by mouth daily 90 tablet 1    pseudoephedrine (SUDAFED) 30 mg tablet Take 60 mg by mouth every 4 (four) hours as needed for congestion      Triamcinolone Acetonide (NASACORT ALLERGY 24HR NA) 1 spray into each nostril daily        Uloric 40 MG tablet Take 1 tablet (40 mg total) by mouth daily 90 tablet 1    verapamil (CALAN) 120 mg tablet Take 1 tablet (120 mg total) by mouth daily 90 tablet 4    ciprofloxacin-dexamethasone (CIPRODEX) otic suspension Administer 4 drops into the left ear 2 (two) times a day for 7 days 7 5 mL 0    diclofenac sodium (VOLTAREN) 1 % Apply 2 g topically 4 (four) times a day as needed (pain) (Patient not taking: Reported on 6/4/2021) 100 g 2    ibuprofen (MOTRIN) 600 mg tablet Take 600 mg by mouth every 8 (eight) hours      ketoconazole (NIZORAL) 2 % cream  (Patient not taking: Reported on 9/20/2021) No current facility-administered medications on file prior to visit  Allergies   Allergen Reactions    Penicillins Rash and Anaphylaxis    Acetazolamide      Other reaction(s): Stomach Ache    Cephalosporins Other (See Comments)     headache    Doxycycline      Capsules only  Tablets are ok to take, per pt  Capsules only  Tablets are ok to take, per pt   Other     Sulfa Antibiotics Other (See Comments)     Category: Allergy; Annotation - [id number]: STOMACH UPSET    Famotidine Palpitations    Omeprazole Palpitations     Painful urination        Objective     /84   Pulse 79   Ht 5' 9 5" (1 765 m)   Wt 103 kg (227 lb 8 oz)   BMI 33 11 kg/m²      PE:  AAOx 3  WDWN  Hearing intact, no drainage from eyes  no audible wheezing  no abdominal distension  LE compartments soft, skin intact    Ortho Exam:  bilateral Knee:   No erythema  no swelling  no effusion  no warmth  AROM: 0-120   Stable to varus/valgus stress      Large joint arthrocentesis: R knee  Universal Protocol:  Consent: Verbal consent obtained  Risks and benefits: risks, benefits and alternatives were discussed  Consent given by: patient  Time out: Immediately prior to procedure a "time out" was called to verify the correct patient, procedure, equipment, support staff and site/side marked as required    Timeout called at: 9/20/2021 3:42 PM   Patient understanding: patient states understanding of the procedure being performed  Site marked: the operative site was marked  Supporting Documentation  Indications: pain   Procedure Details  Location: knee - R knee  Preparation: Patient was prepped and draped in the usual sterile fashion  Needle size: 22 G  Ultrasound guidance: no  Approach: anterolateral  Medications administered: 3 mL lidocaine 1 %; 2 mL methylPREDNISolone acetate 40 mg/mL    Patient tolerance: patient tolerated the procedure well with no immediate complications  Dressing:  Sterile dressing applied    Large joint arthrocentesis: L knee  Universal Protocol:  Consent: Verbal consent obtained  Risks and benefits: risks, benefits and alternatives were discussed  Consent given by: patient  Time out: Immediately prior to procedure a "time out" was called to verify the correct patient, procedure, equipment, support staff and site/side marked as required    Timeout called at: 9/20/2021 3:42 PM   Patient understanding: patient states understanding of the procedure being performed  Site marked: the operative site was marked  Supporting Documentation  Indications: pain   Procedure Details  Location: knee - L knee  Preparation: Patient was prepped and draped in the usual sterile fashion  Needle size: 22 G  Ultrasound guidance: no  Approach: anterolateral  Medications administered: 3 mL lidocaine 1 %; 2 mL methylPREDNISolone acetate 40 mg/mL    Patient tolerance: patient tolerated the procedure well with no immediate complications  Dressing:  Sterile dressing applied            Scribe Attestation    I,:  Melisa Capellan am acting as a scribe while in the presence of the attending physician :       I,:  Sterling Sexton DO personally performed the services described in this documentation    as scribed in my presence :

## 2021-09-21 ENCOUNTER — OFFICE VISIT (OUTPATIENT)
Dept: INTERNAL MEDICINE CLINIC | Facility: CLINIC | Age: 56
End: 2021-09-21
Payer: MEDICARE

## 2021-09-21 ENCOUNTER — CONSULT (OUTPATIENT)
Dept: SURGERY | Facility: CLINIC | Age: 56
End: 2021-09-21
Payer: MEDICARE

## 2021-09-21 VITALS
RESPIRATION RATE: 20 BRPM | OXYGEN SATURATION: 97 % | TEMPERATURE: 99.2 F | DIASTOLIC BLOOD PRESSURE: 80 MMHG | HEIGHT: 70 IN | SYSTOLIC BLOOD PRESSURE: 142 MMHG | BODY MASS INDEX: 32.56 KG/M2 | HEART RATE: 80 BPM | WEIGHT: 227.4 LBS

## 2021-09-21 VITALS — BODY MASS INDEX: 32.21 KG/M2 | WEIGHT: 225 LBS | HEIGHT: 70 IN

## 2021-09-21 DIAGNOSIS — J30.89 NON-SEASONAL ALLERGIC RHINITIS, UNSPECIFIED TRIGGER: ICD-10-CM

## 2021-09-21 DIAGNOSIS — I10 ESSENTIAL HYPERTENSION: ICD-10-CM

## 2021-09-21 DIAGNOSIS — L72.0 EPIDERMOID CYST: ICD-10-CM

## 2021-09-21 DIAGNOSIS — E78.2 MIXED HYPERLIPIDEMIA: ICD-10-CM

## 2021-09-21 DIAGNOSIS — M1A.9XX0 CHRONIC GOUT WITHOUT TOPHUS, UNSPECIFIED CAUSE, UNSPECIFIED SITE: ICD-10-CM

## 2021-09-21 DIAGNOSIS — Z11.59 NEED FOR HEPATITIS C SCREENING TEST: ICD-10-CM

## 2021-09-21 DIAGNOSIS — Z12.5 PROSTATE CANCER SCREENING: ICD-10-CM

## 2021-09-21 DIAGNOSIS — J45.901 ASTHMA WITH ACUTE EXACERBATION, UNSPECIFIED ASTHMA SEVERITY, UNSPECIFIED WHETHER PERSISTENT: Primary | ICD-10-CM

## 2021-09-21 DIAGNOSIS — K21.9 GERD WITHOUT ESOPHAGITIS: ICD-10-CM

## 2021-09-21 DIAGNOSIS — Z00.00 MEDICARE ANNUAL WELLNESS VISIT, SUBSEQUENT: ICD-10-CM

## 2021-09-21 PROCEDURE — G0439 PPPS, SUBSEQ VISIT: HCPCS | Performed by: INTERNAL MEDICINE

## 2021-09-21 PROCEDURE — 99202 OFFICE O/P NEW SF 15 MIN: CPT | Performed by: SURGERY

## 2021-09-21 PROCEDURE — 99214 OFFICE O/P EST MOD 30 MIN: CPT | Performed by: INTERNAL MEDICINE

## 2021-09-21 RX ORDER — ALBUTEROL SULFATE 2.5 MG/3ML
2.5 SOLUTION RESPIRATORY (INHALATION) EVERY 6 HOURS PRN
Qty: 180 ML | Refills: 5 | Status: SHIPPED | OUTPATIENT
Start: 2021-09-21 | End: 2022-07-20 | Stop reason: SDUPTHER

## 2021-09-21 NOTE — PROGRESS NOTES
Assessment and Plan:     Problem List Items Addressed This Visit     None          Depression Screening and Follow-up Plan:   Patient was screened for depression during today's encounter  They screened negative with a PHQ-2 score of 0  Preventive health issues were discussed with patient, and age appropriate screening tests were ordered as noted in patient's After Visit Summary  Personalized health advice and appropriate referrals for health education or preventive services given if needed, as noted in patient's After Visit Summary       History of Present Illness:     Patient presents for Medicare Annual Wellness visit    Patient Care Team:  Yohana Naranjo MD as PCP - DO Cas Mahan MD Metta Glisson, MD (Gastroenterology)     Problem List:     Patient Active Problem List   Diagnosis    Allergic rhinitis    Asthma    Essential hypertension    Benign paroxysmal positional vertigo    Chronic bilateral low back pain without sciatica    DJD (degenerative joint disease) of knee    GERD without esophagitis    Gout    Hyperlipidemia    Primary localized osteoarthritis of left knee    Primary localized osteoarthritis of right knee    Tenosynovitis of foot    Thyroid disorder    Ventricular tachycardia ([address] )    Anxiety    Chronic pain of both knees    Asthma with acute exacerbation    Abnormal liver function test    Epidermoid cyst    Chronic maxillary sinusitis    Dysfunction of left eustachian tube    Cough    Shortness of breath    Left ear pain      Past Medical and Surgical History:     Past Medical History:   Diagnosis Date    Anxiety 1/15/2019    Arthritis     Chronic rhinitis     last assessed 2/21/14    Chronic serous otitis media     last assessed 11/20/13    Chronic sinusitis     last assessed 8/19/14    Functional heart murmur     GERD (gastroesophageal reflux disease)     Gout     Hearing problem     last assessed 12/1/16    Hiatal hernia  Hypercholesterolemia     Hypertension     Palpitations     Testicular hypogonadism     last assessed 10/4/13    [facility]ach [facility])      Past Surgical History:   Procedure Laterality Date    APPENDECTOMY      BICEPS TENODESIS      anesthesia for tenodesis- of ruptured long tendon of biceps     HERNIA REPAIR      THYROIDECTOMY      TONSILLECTOMY        Family History:     Family History   Problem Relation Age of Onset    Lung cancer Father     Coronary artery disease Father         blockages    Stroke Maternal Grandmother     Cancer Paternal Grandfather         metastasized    Heart disease Family     Lung cancer Cousin     No Known Problems Mother     No Known Problems Sister     No Known Problems Brother     No Known Problems Maternal Aunt     No Known Problems Maternal Uncle     No Known Problems Paternal Aunt     No Known Problems Paternal Uncle     No Known Problems Paternal Grandmother     ADD / ADHD Neg Hx     Anesthesia problems Neg Hx     Clotting disorder Neg Hx     Collagen disease Neg Hx     Diabetes Neg Hx     Dislocations Neg Hx     Learning disabilities Neg Hx     Neurological problems Neg Hx     Osteoporosis Neg Hx     Rheumatologic disease Neg Hx     Scoliosis Neg Hx     Vascular Disease Neg Hx       Social History:     Social History     Socioeconomic History    Marital status: Single     Spouse name: None    Number of children: None    Years of education: None    Highest education level: None   Occupational History    Occupation: unemployed   Tobacco Use    Smoking status: Never Smoker    Smokeless tobacco: Never Used   Vaping Use    Vaping Use: Never used   Substance and Sexual Activity    Alcohol use: Yes     Comment: occasionally    Drug use: No    Sexual activity: Not Currently   Other Topics Concern    None   Social History Narrative    Travel hx- pt denies being out of the country during 1/2014-10/28/14     Social Determinants of Health Financial Resource Strain:     Difficulty of Paying Living Expenses:    Food Insecurity:     Worried About Running Out of Food in the Last Year:     [address] in the Last Year:    Transportation Needs:     Lack of Transportation (Medical):      Lack of Transportation (Non-Medical):    Physical Activity:     Days of Exercise per Week:     Minutes of Exercise per Session:    Stress:     Feeling of Stress :    Social Connections:     Frequency of Communication with Friends and Family:     Frequency of Social Gatherings with Friends and Family:     Attends [employer] Services:     Active Member of Clubs or Organizations:     Attends Club or Organization Meetings:     Marital Status:    Intimate Partner Violence:     Fear of Current or Ex-Partner:     Emotionally Abused:     Physically Abused:     Sexually Abused:       Medications and Allergies:     Current Outpatient Medications   Medication Sig Dispense Refill    acetaminophen (TYLENOL) 500 mg tablet Take 1,000 mg by mouth every 6 (six) hours as needed for mild pain      albuterol (PROVENTIL HFA,VENTOLIN HFA) 90 mcg/act inhaler Inhale 1 puff 4 (four) times a day       Chlorpheniramine Maleate (CHLOR-TRIMETON ALLERGY PO) Take by mouth      ciprofloxacin-dexamethasone (CIPRODEX) otic suspension Administer 4 drops into the left ear 2 (two) times a day for 7 days 7 5 mL 0    cyclobenzaprine (FLEXERIL) 5 mg tablet Take 1 tablet (5 mg total) by mouth 3 (three) times a day as needed for muscle spasms May cause drowsiness 30 tablet 0    diclofenac sodium (VOLTAREN) 1 % Apply 2 g topically 4 (four) times a day as needed (pain) (Patient not taking: Reported on 6/4/2021) 100 g 2    esomeprazole (NexIUM) 40 MG capsule Take 40 mg by mouth every morning before breakfast      fluticasone-salmeterol (ADVAIR DISKUS) 500-50 mcg/dose Inhale 1 puff 2 (two) times a day      ibuprofen (MOTRIN) 600 mg tablet Take 600 mg by mouth every 8 (eight) hours      ipratropium-albuterol (DUO-NEB) 0 5-2 5 mg/3 mL Inhale 3 mL 4 (four) times a day As needed      ketoconazole (NIZORAL) 2 % cream  (Patient not taking: Reported on 9/20/2021)      lisinopril (ZESTRIL) 20 mg tablet Take 1 tablet (20 mg total) by mouth daily 90 tablet 1    meclizine (ANTIVERT) 12 5 MG tablet Take 1 tablet (12 5 mg total) by mouth every 8 (eight) hours 30 tablet 1    montelukast (SINGULAIR) 10 mg tablet Take 1 tablet (10 mg total) by mouth daily 90 tablet 1    pseudoephedrine (SUDAFED) 30 mg tablet Take 60 mg by mouth every 4 (four) hours as needed for congestion      Triamcinolone Acetonide (NASACORT ALLERGY 24HR NA) 1 spray into each nostril daily        Uloric 40 MG tablet Take 1 tablet (40 mg total) by mouth daily 90 tablet 1    verapamil (CALAN) 120 mg tablet Take 1 tablet (120 mg total) by mouth daily 90 tablet 4     No current facility-administered medications for this visit  Allergies   Allergen Reactions    Penicillins Rash and Anaphylaxis    Acetazolamide      Other reaction(s): Stomach Ache    Cephalosporins Other (See Comments)     headache    Doxycycline      Capsules only  Tablets are ok to take, per pt  Capsules only  Tablets are ok to take, per pt   Other     Sulfa Antibiotics Other (See Comments)     Category: Allergy;  Annotation - [id number]: STOMACH UPSET    Famotidine Palpitations    Omeprazole Palpitations     Painful urination      Immunizations:     Immunization History   Administered Date(s) Administered    INFLUENZA 11/01/2003, 10/28/2005, 10/23/2006, 10/29/2007, 10/29/2008, 10/29/2008, 10/23/2009, 10/23/2009, 10/29/2012    Influenza Quadrivalent Preservative Free 3 years and older IM 10/20/2015    Influenza Quadrivalent, 6-35 Months IM 10/29/2014, 11/04/2016    Influenza, injectable, quadrivalent, preservative free 0 5 mL 10/02/2018    Influenza, recombinant, quadrivalent,injectable, preservative free 10/09/2019, 11/05/2020    Influenza, seasonal, injectable 10/06/2010, 10/06/2010, 10/11/2011, 10/11/2011, 09/27/2012, 09/27/2012, 11/06/2013, 11/15/2017    SARS-CoV-2 / COVID-19 mRNA IM (Pfizer-BioNTech) 07/23/2021, 08/13/2021    Td (adult), adsorbed 02/14/2006, 02/14/2006      Health Maintenance:         Topic Date Due    Hepatitis C Screening  Never done    HIV Screening  Never done    Colorectal Cancer Screening  Never done         Topic Date Due    Pneumococcal Vaccine: Pediatrics (0 to 5 Years) and At-Risk Patients (6 to 59 Years) (1 of 2 - PPSV23) Never done    DTaP,Tdap,and Td Vaccines (1 - Tdap) 03/13/1987    Influenza Vaccine (1) 09/01/2021      Medicare Health Risk Assessment:     Ht 5' 9 5" (1 765 m)   BMI 32 75 kg/m²      Mike Bullock is here for his Subsequent Wellness visit  Last Medicare Wellness visit information reviewed, patient interviewed and updates made to the record today  Health Risk Assessment:   Patient rates overall health as very good  Patient feels that their physical health rating is much better  Patient is very satisfied with their life  Eyesight was rated as same  Hearing was rated as same  Patient feels that their emotional and mental health rating is same  Patients states they are never, rarely angry  Patient states they are sometimes unusually tired/fatigued  Pain experienced in the last 7 days has been some  Patient's pain rating has been 2/10  Patient states that he has experienced no weight loss or gain in last 6 months  Depression Screening:   PHQ-2 Score: 0      Fall Risk Screening: In the past year, patient has experienced: no history of falling in past year      Home Safety:  Patient does not have trouble with stairs inside or outside of their home  Patient has working smoke alarms and has no working carbon monoxide detector  Home safety hazards include: none  Nutrition:   Current diet is Regular  Medications:   Patient is currently taking over-the-counter supplements   OTC medications include: see medication list  Patient is able to manage medications  Activities of Daily Living (ADLs)/Instrumental Activities of Daily Living (IADLs):   Walk and transfer into and out of bed and chair?: Yes  Dress and groom yourself?: Yes    Bathe or shower yourself?: Yes    Feed yourself? Yes  Do your laundry/housekeeping?: Yes  Manage your money, pay your bills and track your expenses?: Yes  Make your own meals?: Yes    Do your own shopping?: Yes    Previous Hospitalizations:   Any hospitalizations or ED visits within the last 12 months?: Yes    How many hospitalizations have you had in the last year?: 1-2    Advance Care Planning:   Living will: No    Advanced directive: No    Five wishes given: Yes      Cognitive Screening:   Provider or family/friend/caregiver concerned regarding cognition?: No    PREVENTIVE SCREENINGS      Cardiovascular Screening:    General: Screening Not Indicated and History Lipid Disorder      Colorectal Cancer Screening:     General: Risks and Benefits Discussed    Due for: Cologuard      Prostate Cancer Screening:    General: Risks and Benefits Discussed    Due for: PSA      Osteoporosis Screening:    General: Screening Not Indicated      Abdominal Aortic Aneurysm (AAA) Screening:        General: Screening Not Indicated      Lung Cancer Screening:     General: Screening Not Indicated      Hepatitis C Screening:    General: Risks and Benefits Discussed    Hep C Screening Accepted: Yes      Screening, Brief Intervention, and Referral to Treatment (SBIRT)    Screening    Typical number of drinks in a week: 12    Single Item Drug Screening:  How often have you used an illegal drug (including marijuana) or a prescription medication for non-medical reasons in the past year? never    Single Item Drug Screen Score: 0  Interpretation: Negative screen for possible drug use disorder    Brief Intervention  Healthy alcohol use/limits discussed       Other Counseling Topics:   Car/seat belt/driving safety, skin self-exam, sunscreen and calcium and vitamin D intake and regular weightbearing exercise         Christine Rosales MD

## 2021-09-21 NOTE — PROGRESS NOTES
Assessment/Plan:    1  Essential hypertension  Blood pressure is acceptable on present regimen  Continue with low-salt diet and exercise  2  Bronchial asthma  Continue with present combination of inhalers    3  GERD  Continue with Nexium 40 mg daily    4  Gout   Continue with Uloric  Will check uric acid level before next visit       Diagnoses and all orders for this visit:    Asthma with acute exacerbation, unspecified asthma severity, unspecified whether persistent  -     albuterol (2 5 mg/3 mL) 0 083 % nebulizer solution; Take 3 mL (2 5 mg total) by nebulization every 6 (six) hours as needed for wheezing or shortness of breath    GERD without esophagitis  -     CBC; Future    Non-seasonal allergic rhinitis, unspecified trigger    Essential hypertension  -     Comprehensive metabolic panel; Future    Mixed hyperlipidemia  -     Lipid Panel with Direct LDL reflex; Future  -     Comprehensive metabolic panel; Future    Need for hepatitis C screening test  -     Hepatitis C antibody; Future    Prostate cancer screening  -     PSA, Total Screen; Future    Chronic gout without tophus, unspecified cause, unspecified site  -     Uric acid; Future    Medicare annual wellness visit, subsequent            Depression Screening and Follow-up Plan:   Patient was screened for depression during today's encounter  They screened negative with a PHQ-2 score of 0  Subjective:          Patient ID: Yareli Weathers is a 55 y o  male  HPI    The following portions of the patient's history were reviewed and updated as appropriate: allergies, current medications, past family history, past medical history, past social history, past surgical history and problem list     Review of Systems   Constitutional: Negative for fatigue and fever  HENT: Negative for congestion, ear discharge, ear pain, postnasal drip, sinus pressure, sore throat, tinnitus and trouble swallowing      Eyes: Negative for discharge, itching and visual disturbance  Respiratory: Negative for cough and shortness of breath  Cardiovascular: Negative for chest pain and palpitations  Gastrointestinal: Negative for abdominal pain, diarrhea, nausea and vomiting  Endocrine: Negative for cold intolerance and polyuria  Genitourinary: Negative for difficulty urinating, dysuria and urgency  Musculoskeletal: Negative for arthralgias and neck pain  Skin: Negative for rash  Allergic/Immunologic: Negative for environmental allergies  Neurological: Negative for dizziness, weakness and headaches  Psychiatric/Behavioral: Negative for agitation, behavioral problems and confusion  The patient is not nervous/anxious            Past Medical History:   Diagnosis Date    Anxiety 1/15/2019    Arthritis     Chronic rhinitis     last assessed 2/21/14    Chronic serous otitis media     last assessed 11/20/13    Chronic sinusitis     last assessed 8/19/14    Functional heart murmur     GERD (gastroesophageal reflux disease)     Gout     Hearing problem     last assessed 12/1/16    Hiatal hernia     Hypercholesterolemia     Hypertension     Palpitations     Testicular hypogonadism     last assessed 10/4/13    [facility])          Current Outpatient Medications:     acetaminophen (TYLENOL) 500 mg tablet, Take 1,000 mg by mouth every 6 (six) hours as needed for mild pain, Disp: , Rfl:     albuterol (PROVENTIL HFA,VENTOLIN HFA) 90 mcg/act inhaler, Inhale 1 puff 4 (four) times a day , Disp: , Rfl:     Chlorpheniramine Maleate (CHLOR-TRIMETON ALLERGY PO), Take by mouth, Disp: , Rfl:     cyclobenzaprine (FLEXERIL) 5 mg tablet, Take 1 tablet (5 mg total) by mouth 3 (three) times a day as needed for muscle spasms May cause drowsiness, Disp: 30 tablet, Rfl: 0    esomeprazole (NexIUM) 40 MG capsule, Take 40 mg by mouth every morning before breakfast, Disp: , Rfl:     fluticasone-salmeterol (ADVAIR DISKUS) 500-50 mcg/dose, Inhale 1 puff 2 (two) times a day, Disp: , Rfl:     ibuprofen (MOTRIN) 600 mg tablet, Take 600 mg by mouth every 8 (eight) hours, Disp: , Rfl:     ipratropium-albuterol (DUO-NEB) 0 5-2 5 mg/3 mL, Inhale 3 mL 2 (two) times a week , Disp: , Rfl:     lisinopril (ZESTRIL) 20 mg tablet, Take 1 tablet (20 mg total) by mouth daily, Disp: 90 tablet, Rfl: 1    meclizine (ANTIVERT) 12 5 MG tablet, Take 1 tablet (12 5 mg total) by mouth every 8 (eight) hours, Disp: 30 tablet, Rfl: 1    montelukast (SINGULAIR) 10 mg tablet, Take 1 tablet (10 mg total) by mouth daily, Disp: 90 tablet, Rfl: 1    pseudoephedrine (SUDAFED) 30 mg tablet, Take 60 mg by mouth daily , Disp: , Rfl:     Triamcinolone Acetonide (NASACORT ALLERGY 24HR NA), 1 spray into each nostril daily  , Disp: , Rfl:     Uloric 40 MG tablet, Take 1 tablet (40 mg total) by mouth daily, Disp: 90 tablet, Rfl: 1    verapamil (CALAN) 120 mg tablet, Take 1 tablet (120 mg total) by mouth daily, Disp: 90 tablet, Rfl: 4    albuterol (2 5 mg/3 mL) 0 083 % nebulizer solution, Take 3 mL (2 5 mg total) by nebulization every 6 (six) hours as needed for wheezing or shortness of breath, Disp: 180 mL, Rfl: 5    ciprofloxacin-dexamethasone (CIPRODEX) otic suspension, Administer 4 drops into the left ear 2 (two) times a day for 7 days, Disp: 7 5 mL, Rfl: 0    diclofenac sodium (VOLTAREN) 1 %, Apply 2 g topically 4 (four) times a day as needed (pain) (Patient not taking: Reported on 6/4/2021), Disp: 100 g, Rfl: 2    ketoconazole (NIZORAL) 2 % cream, , Disp: , Rfl:     Allergies   Allergen Reactions    Penicillins Rash and Anaphylaxis    Acetazolamide      Other reaction(s): Stomach Ache    Cephalosporins Other (See Comments)     headache    Doxycycline      Capsules only  Tablets are ok to take, per pt  Capsules only  Tablets are ok to take, per pt   Other     Sulfa Antibiotics Other (See Comments)     Category: Allergy;  Annotation - [id number]: STOMACH UPSET    Famotidine Palpitations    Omeprazole Palpitations     Painful urination       Social History   Past Surgical History:   Procedure Laterality Date    APPENDECTOMY      BICEPS TENODESIS      anesthesia for tenodesis- of ruptured long tendon of biceps     HERNIA REPAIR      THYROIDECTOMY      TONSILLECTOMY       Family History   Problem Relation Age of Onset    Lung cancer Father     Coronary artery disease Father         blockages    Stroke Maternal Grandmother     Cancer Paternal Grandfather         metastasized    Heart disease Family     Lung cancer Cousin     No Known Problems Mother     No Known Problems Sister     No Known Problems Brother     No Known Problems Maternal Aunt     No Known Problems Maternal Uncle     No Known Problems Paternal Aunt     No Known Problems Paternal Uncle     No Known Problems Paternal Grandmother     ADD / ADHD Neg Hx     Anesthesia problems Neg Hx     Clotting disorder Neg Hx     Collagen disease Neg Hx     Diabetes Neg Hx     Dislocations Neg Hx     Learning disabilities Neg Hx     Neurological problems Neg Hx     Osteoporosis Neg Hx     Rheumatologic disease Neg Hx     Scoliosis Neg Hx     Vascular Disease Neg Hx        Objective:  /80 (BP Location: Right arm, Patient Position: Sitting, Cuff Size: Large)   Pulse 80   Temp 99 2 °F (37 3 °C) (Temporal)   Resp 20   Ht 5' 9 5" (1 765 m)   Wt 103 kg (227 lb 6 4 oz)   SpO2 97%   BMI 33 10 kg/m²   Body mass index is 33 1 kg/m²  Physical Exam  Constitutional:       Appearance: He is well-developed  HENT:      Head: Normocephalic  Right Ear: There is no impacted cerumen  Left Ear: There is no impacted cerumen  Nose: No rhinorrhea  Eyes:      General: No scleral icterus  Pupils: Pupils are equal, round, and reactive to light  Neck:      Thyroid: No thyromegaly  Trachea: No tracheal deviation  Cardiovascular:      Rate and Rhythm: Normal rate and regular rhythm        Heart sounds: Normal heart sounds  Pulmonary:      Effort: Pulmonary effort is normal  No respiratory distress  Breath sounds: Normal breath sounds  Chest:      Chest wall: No tenderness  Abdominal:      General: Bowel sounds are normal       Palpations: Abdomen is soft  There is no mass  Tenderness: There is no abdominal tenderness  Musculoskeletal:         General: Normal range of motion  Cervical back: Normal range of motion and neck supple  Right lower leg: Edema present  Left lower leg: Edema present  Comments: Trace bilateral lower extremity edema present   Lymphadenopathy:      Cervical: No cervical adenopathy  Skin:     General: Skin is warm  Neurological:      Mental Status: He is alert and oriented to person, place, and time  Cranial Nerves: No cranial nerve deficit  Psychiatric:         Mood and Affect: Mood normal          Behavior: Behavior normal          Thought Content:  Thought content normal

## 2021-09-21 NOTE — PATIENT INSTRUCTIONS

## 2021-09-21 NOTE — PROGRESS NOTES
Assessment/Plan:  Patient has a history for in infected cyst over the lateral aspect of the right forearm 1 year ago  This was incised and drained  It resolved  He presents for evaluation of this same region  He states that is not been tender or painful  He does not feel lump or mass at this area  Examination reveals the previous incision and drainage scar over the lateral right forearm  There is no mass effect present  There is no erythema  I recommended observation  I asked him the contact the office should this reaccumulate or become tender  Reassurance provided  He is in agreement  Diagnoses and all orders for this visit:    Epidermoid cyst  -     Ambulatory referral to General Surgery        Subjective:      Patient ID: Beth Boyle is a 55 y o  male  Patient presents for evaluation of a cyst on his right forearm  Denies pain  The following portions of the patient's history were reviewed and updated as appropriate:     He  has a past medical history of Anxiety (1/15/2019), Arthritis, Chronic rhinitis, Chronic serous otitis media, Chronic sinusitis, Functional heart murmur, GERD (gastroesophageal reflux disease), Gout, Hearing problem, Hiatal hernia, Hypercholesterolemia, Hypertension, Palpitations, Testicular hypogonadism, and V-tach ([address] )  He  has a past surgical history that includes Appendectomy; Hernia repair; Thyroidectomy; Tonsillectomy; and Biceps Tenodesis  His family history includes Cancer in his paternal grandfather; Coronary artery disease in his father; Heart disease in his family; Lung cancer in his cousin and father; No Known Problems in his brother, maternal aunt, maternal uncle, mother, paternal aunt, paternal grandmother, paternal uncle, and sister; Stroke in his maternal grandmother  He  reports that he has never smoked  He has never used smokeless tobacco  He reports current alcohol use  He reports that he does not use drugs    Current Outpatient Medications   Medication Sig Dispense Refill    acetaminophen (TYLENOL) 500 mg tablet Take 1,000 mg by mouth every 6 (six) hours as needed for mild pain      albuterol (PROVENTIL HFA,VENTOLIN HFA) 90 mcg/act inhaler Inhale 1 puff 4 (four) times a day       Chlorpheniramine Maleate (CHLOR-TRIMETON ALLERGY PO) Take by mouth      ciprofloxacin-dexamethasone (CIPRODEX) otic suspension Administer 4 drops into the left ear 2 (two) times a day for 7 days 7 5 mL 0    cyclobenzaprine (FLEXERIL) 5 mg tablet Take 1 tablet (5 mg total) by mouth 3 (three) times a day as needed for muscle spasms May cause drowsiness 30 tablet 0    diclofenac sodium (VOLTAREN) 1 % Apply 2 g topically 4 (four) times a day as needed (pain) (Patient not taking: Reported on 6/4/2021) 100 g 2    esomeprazole (NexIUM) 40 MG capsule Take 40 mg by mouth every morning before breakfast      fluticasone-salmeterol (ADVAIR DISKUS) 500-50 mcg/dose Inhale 1 puff 2 (two) times a day      ibuprofen (MOTRIN) 600 mg tablet Take 600 mg by mouth every 8 (eight) hours      ipratropium-albuterol (DUO-NEB) 0 5-2 5 mg/3 mL Inhale 3 mL 4 (four) times a day As needed      ketoconazole (NIZORAL) 2 % cream  (Patient not taking: Reported on 9/20/2021)      lisinopril (ZESTRIL) 20 mg tablet Take 1 tablet (20 mg total) by mouth daily 90 tablet 1    meclizine (ANTIVERT) 12 5 MG tablet Take 1 tablet (12 5 mg total) by mouth every 8 (eight) hours 30 tablet 1    montelukast (SINGULAIR) 10 mg tablet Take 1 tablet (10 mg total) by mouth daily 90 tablet 1    pseudoephedrine (SUDAFED) 30 mg tablet Take 60 mg by mouth every 4 (four) hours as needed for congestion      Triamcinolone Acetonide (NASACORT ALLERGY 24HR NA) 1 spray into each nostril daily        Uloric 40 MG tablet Take 1 tablet (40 mg total) by mouth daily 90 tablet 1    verapamil (CALAN) 120 mg tablet Take 1 tablet (120 mg total) by mouth daily 90 tablet 4     No current facility-administered medications for this visit  He is allergic to penicillins, acetazolamide, cephalosporins, doxycycline, other, sulfa antibiotics, famotidine, and omeprazole       Review of Systems   Constitutional: Negative  Negative for activity change  HENT: Negative  Eyes: Negative  Respiratory: Negative  Cardiovascular: Negative  Gastrointestinal: Negative  Endocrine: Negative  Genitourinary: Negative  Musculoskeletal: Negative  Skin: Negative  Allergic/Immunologic: Negative  Neurological: Positive for dizziness  Psychiatric/Behavioral: Negative for agitation, behavioral problems and confusion  The patient is not nervous/anxious  Objective:      Ht 5' 9 5" (1 765 m)   Wt 102 kg (225 lb)   BMI 32 75 kg/m²          Physical Exam  Constitutional:       Appearance: He is well-developed  He is not diaphoretic  HENT:      Head: Normocephalic and atraumatic  Eyes:      General: No scleral icterus  Right eye: No discharge  Left eye: No discharge  Extraocular Movements: Extraocular movements intact  Conjunctiva/sclera: Conjunctivae normal    Neck:      Thyroid: No thyromegaly  Trachea: No tracheal deviation  Cardiovascular:      Rate and Rhythm: Normal rate  Heart sounds: No murmur heard  No friction rub  Pulmonary:      Effort: Pulmonary effort is normal  No respiratory distress  Breath sounds: No stridor  No wheezing  Chest:      Chest wall: No tenderness  Abdominal:      General: There is no distension  Palpations: Abdomen is soft  There is no mass  Tenderness: There is no abdominal tenderness  There is no guarding or rebound  Musculoskeletal:         General: No tenderness  Normal range of motion  Lymphadenopathy:      Cervical: No cervical adenopathy  Skin:     General: Skin is warm and dry  Findings: No erythema or rash  Comments: Previous incision and drainage site over the lateral right forearm noted    No mass effect present  No erythema  Nontender on exam   Neurological:      Mental Status: He is alert and oriented to person, place, and time  Cranial Nerves: No cranial nerve deficit        Coordination: Coordination normal    Psychiatric:         Behavior: Behavior normal          Judgment: Judgment normal

## 2021-09-22 DIAGNOSIS — J45.901 ASTHMA WITH ACUTE EXACERBATION, UNSPECIFIED ASTHMA SEVERITY, UNSPECIFIED WHETHER PERSISTENT: ICD-10-CM

## 2021-09-22 RX ORDER — ALBUTEROL SULFATE 2.5 MG/3ML
2.5 SOLUTION RESPIRATORY (INHALATION) EVERY 6 HOURS PRN
Qty: 180 ML | Refills: 5 | Status: CANCELLED | OUTPATIENT
Start: 2021-09-22

## 2021-09-22 NOTE — TELEPHONE ENCOUNTER
[employer] calling because insurance will not pay for the nebulizer solution with the dx code that was used  He is asking if it can be resent with the COPD dx code   I do not see that on his problem list

## 2021-10-18 ENCOUNTER — IMMUNIZATIONS (OUTPATIENT)
Dept: INTERNAL MEDICINE CLINIC | Facility: CLINIC | Age: 56
End: 2021-10-18
Payer: MEDICARE

## 2021-10-18 DIAGNOSIS — Z23 NEEDS FLU SHOT: Primary | ICD-10-CM

## 2021-10-18 PROCEDURE — G0008 ADMIN INFLUENZA VIRUS VAC: HCPCS

## 2021-10-18 PROCEDURE — 90682 RIV4 VACC RECOMBINANT DNA IM: CPT

## 2021-11-01 ENCOUNTER — OFFICE VISIT (OUTPATIENT)
Dept: URGENT CARE | Age: 56
End: 2021-11-01
Payer: MEDICARE

## 2021-11-01 VITALS
WEIGHT: 220 LBS | OXYGEN SATURATION: 98 % | HEIGHT: 70 IN | HEART RATE: 82 BPM | BODY MASS INDEX: 31.5 KG/M2 | RESPIRATION RATE: 18 BRPM | TEMPERATURE: 97.3 F

## 2021-11-01 DIAGNOSIS — J30.9 ALLERGIC RHINITIS, UNSPECIFIED SEASONALITY, UNSPECIFIED TRIGGER: Primary | ICD-10-CM

## 2021-11-01 PROCEDURE — G0463 HOSPITAL OUTPT CLINIC VISIT: HCPCS | Performed by: NURSE PRACTITIONER

## 2021-11-01 PROCEDURE — 99213 OFFICE O/P EST LOW 20 MIN: CPT | Performed by: NURSE PRACTITIONER

## 2021-11-01 RX ORDER — PREDNISONE 20 MG/1
20 TABLET ORAL 2 TIMES DAILY WITH MEALS
Qty: 10 TABLET | Refills: 0 | Status: SHIPPED | OUTPATIENT
Start: 2021-11-01 | End: 2021-11-06

## 2021-11-02 DIAGNOSIS — J31.0 CHRONIC RHINITIS: ICD-10-CM

## 2021-11-02 RX ORDER — MONTELUKAST SODIUM 10 MG/1
10 TABLET ORAL DAILY
Qty: 90 TABLET | Refills: 1 | Status: SHIPPED | OUTPATIENT
Start: 2021-11-02 | End: 2022-05-03 | Stop reason: SDUPTHER

## 2021-11-12 ENCOUNTER — OFFICE VISIT (OUTPATIENT)
Dept: INTERNAL MEDICINE CLINIC | Facility: CLINIC | Age: 56
End: 2021-11-12
Payer: MEDICARE

## 2021-11-12 VITALS
HEIGHT: 70 IN | SYSTOLIC BLOOD PRESSURE: 136 MMHG | BODY MASS INDEX: 32.64 KG/M2 | OXYGEN SATURATION: 96 % | DIASTOLIC BLOOD PRESSURE: 84 MMHG | HEART RATE: 81 BPM | WEIGHT: 228 LBS

## 2021-11-12 DIAGNOSIS — J30.89 NON-SEASONAL ALLERGIC RHINITIS, UNSPECIFIED TRIGGER: ICD-10-CM

## 2021-11-12 DIAGNOSIS — I10 ESSENTIAL HYPERTENSION: ICD-10-CM

## 2021-11-12 DIAGNOSIS — K21.9 GERD WITHOUT ESOPHAGITIS: Primary | ICD-10-CM

## 2021-11-12 DIAGNOSIS — J45.909 ASTHMA, UNSPECIFIED ASTHMA SEVERITY, UNSPECIFIED WHETHER COMPLICATED, UNSPECIFIED WHETHER PERSISTENT: ICD-10-CM

## 2021-11-12 PROCEDURE — 99213 OFFICE O/P EST LOW 20 MIN: CPT | Performed by: INTERNAL MEDICINE

## 2021-11-12 RX ORDER — PREDNISONE 20 MG/1
20 TABLET ORAL DAILY
Qty: 5 TABLET | Refills: 0 | Status: SHIPPED | OUTPATIENT
Start: 2021-11-12 | End: 2021-11-17

## 2021-11-23 ENCOUNTER — OFFICE VISIT (OUTPATIENT)
Dept: CARDIOLOGY CLINIC | Facility: CLINIC | Age: 56
End: 2021-11-23
Payer: MEDICARE

## 2021-11-23 VITALS
SYSTOLIC BLOOD PRESSURE: 136 MMHG | WEIGHT: 224.7 LBS | DIASTOLIC BLOOD PRESSURE: 84 MMHG | HEIGHT: 70 IN | HEART RATE: 81 BPM | BODY MASS INDEX: 32.17 KG/M2

## 2021-11-23 DIAGNOSIS — I47.2 VENTRICULAR TACHYCARDIA (HCC): ICD-10-CM

## 2021-11-23 DIAGNOSIS — I10 ESSENTIAL HYPERTENSION: Primary | ICD-10-CM

## 2021-11-23 PROCEDURE — 99213 OFFICE O/P EST LOW 20 MIN: CPT | Performed by: INTERNAL MEDICINE

## 2021-11-24 DIAGNOSIS — M10.9 GOUTY ARTHRITIS: ICD-10-CM

## 2021-11-24 RX ORDER — FEBUXOSTAT 40 MG/1
40 TABLET ORAL DAILY
Qty: 90 TABLET | Refills: 1 | Status: SHIPPED | OUTPATIENT
Start: 2021-11-24 | End: 2022-05-26 | Stop reason: SDUPTHER

## 2021-11-28 PROCEDURE — 93000 ELECTROCARDIOGRAM COMPLETE: CPT | Performed by: INTERNAL MEDICINE

## 2021-12-14 ENCOUNTER — APPOINTMENT (OUTPATIENT)
Dept: LAB | Facility: IMAGING CENTER | Age: 56
End: 2021-12-14
Payer: MEDICARE

## 2021-12-14 DIAGNOSIS — Z11.59 NEED FOR HEPATITIS C SCREENING TEST: ICD-10-CM

## 2021-12-14 DIAGNOSIS — E78.2 MIXED HYPERLIPIDEMIA: ICD-10-CM

## 2021-12-14 DIAGNOSIS — K21.9 GERD WITHOUT ESOPHAGITIS: ICD-10-CM

## 2021-12-14 DIAGNOSIS — M1A.9XX0 CHRONIC GOUT WITHOUT TOPHUS, UNSPECIFIED CAUSE, UNSPECIFIED SITE: ICD-10-CM

## 2021-12-14 DIAGNOSIS — Z12.5 PROSTATE CANCER SCREENING: ICD-10-CM

## 2021-12-14 DIAGNOSIS — I10 ESSENTIAL HYPERTENSION: ICD-10-CM

## 2021-12-14 LAB
ALBUMIN SERPL BCP-MCNC: 4.1 G/DL (ref 3.5–5)
ALP SERPL-CCNC: 69 U/L (ref 46–116)
ALT SERPL W P-5'-P-CCNC: 56 U/L (ref 12–78)
ANION GAP SERPL CALCULATED.3IONS-SCNC: 7 MMOL/L (ref 4–13)
AST SERPL W P-5'-P-CCNC: 39 U/L (ref 5–45)
BILIRUB SERPL-MCNC: 0.92 MG/DL (ref 0.2–1)
BUN SERPL-MCNC: 17 MG/DL (ref 5–25)
CALCIUM SERPL-MCNC: 9.3 MG/DL (ref 8.3–10.1)
CHLORIDE SERPL-SCNC: 100 MMOL/L (ref 100–108)
CHOLEST SERPL-MCNC: 245 MG/DL
CO2 SERPL-SCNC: 28 MMOL/L (ref 21–32)
CREAT SERPL-MCNC: 1.03 MG/DL (ref 0.6–1.3)
ERYTHROCYTE [DISTWIDTH] IN BLOOD BY AUTOMATED COUNT: 13 % (ref 11.6–15.1)
GFR SERPL CREATININE-BSD FRML MDRD: 81 ML/MIN/1.73SQ M
GLUCOSE P FAST SERPL-MCNC: 100 MG/DL (ref 65–99)
HCT VFR BLD AUTO: 39.8 % (ref 36.5–49.3)
HCV AB SER QL: NORMAL
HDLC SERPL-MCNC: 48 MG/DL
HGB BLD-MCNC: 13.5 G/DL (ref 12–17)
LDLC SERPL CALC-MCNC: 170 MG/DL (ref 0–100)
MCH RBC QN AUTO: 30.3 PG (ref 26.8–34.3)
MCHC RBC AUTO-ENTMCNC: 33.9 G/DL (ref 31.4–37.4)
MCV RBC AUTO: 89 FL (ref 82–98)
PLATELET # BLD AUTO: 271 THOUSANDS/UL (ref 149–390)
PMV BLD AUTO: 8.8 FL (ref 8.9–12.7)
POTASSIUM SERPL-SCNC: 4.1 MMOL/L (ref 3.5–5.3)
PROT SERPL-MCNC: 6.9 G/DL (ref 6.4–8.2)
PSA SERPL-MCNC: 0.8 NG/ML (ref 0–4)
RBC # BLD AUTO: 4.46 MILLION/UL (ref 3.88–5.62)
SODIUM SERPL-SCNC: 135 MMOL/L (ref 136–145)
TRIGL SERPL-MCNC: 136 MG/DL
URATE SERPL-MCNC: 5.4 MG/DL (ref 4.2–8)
WBC # BLD AUTO: 6.56 THOUSAND/UL (ref 4.31–10.16)

## 2021-12-14 PROCEDURE — 80053 COMPREHEN METABOLIC PANEL: CPT

## 2021-12-14 PROCEDURE — 84550 ASSAY OF BLOOD/URIC ACID: CPT

## 2021-12-14 PROCEDURE — 80061 LIPID PANEL: CPT

## 2021-12-14 PROCEDURE — 86803 HEPATITIS C AB TEST: CPT

## 2021-12-14 PROCEDURE — G0103 PSA SCREENING: HCPCS

## 2021-12-14 PROCEDURE — 36415 COLL VENOUS BLD VENIPUNCTURE: CPT

## 2021-12-14 PROCEDURE — 85027 COMPLETE CBC AUTOMATED: CPT

## 2021-12-20 ENCOUNTER — OFFICE VISIT (OUTPATIENT)
Dept: OBGYN CLINIC | Facility: MEDICAL CENTER | Age: 56
End: 2021-12-20
Payer: MEDICARE

## 2021-12-20 VITALS
DIASTOLIC BLOOD PRESSURE: 81 MMHG | SYSTOLIC BLOOD PRESSURE: 128 MMHG | TEMPERATURE: 97.5 F | HEIGHT: 70 IN | WEIGHT: 225 LBS | BODY MASS INDEX: 32.21 KG/M2 | HEART RATE: 94 BPM

## 2021-12-20 DIAGNOSIS — M17.0 BILATERAL PRIMARY OSTEOARTHRITIS OF KNEE: Primary | ICD-10-CM

## 2021-12-20 PROCEDURE — 20610 DRAIN/INJ JOINT/BURSA W/O US: CPT | Performed by: ORTHOPAEDIC SURGERY

## 2021-12-20 PROCEDURE — 99213 OFFICE O/P EST LOW 20 MIN: CPT | Performed by: ORTHOPAEDIC SURGERY

## 2021-12-20 RX ORDER — TRIAMCINOLONE ACETONIDE 40 MG/ML
20 INJECTION, SUSPENSION INTRA-ARTICULAR; INTRAMUSCULAR
Status: COMPLETED | OUTPATIENT
Start: 2021-12-20 | End: 2021-12-20

## 2021-12-20 RX ORDER — LIDOCAINE HYDROCHLORIDE 10 MG/ML
4 INJECTION, SOLUTION INFILTRATION; PERINEURAL
Status: COMPLETED | OUTPATIENT
Start: 2021-12-20 | End: 2021-12-20

## 2021-12-20 RX ADMIN — TRIAMCINOLONE ACETONIDE 20 MG: 40 INJECTION, SUSPENSION INTRA-ARTICULAR; INTRAMUSCULAR at 15:39

## 2021-12-20 RX ADMIN — LIDOCAINE HYDROCHLORIDE 4 ML: 10 INJECTION, SOLUTION INFILTRATION; PERINEURAL at 15:39

## 2021-12-21 ENCOUNTER — OFFICE VISIT (OUTPATIENT)
Dept: INTERNAL MEDICINE CLINIC | Facility: OTHER | Age: 56
End: 2021-12-21
Payer: MEDICARE

## 2021-12-21 VITALS
HEART RATE: 101 BPM | DIASTOLIC BLOOD PRESSURE: 80 MMHG | RESPIRATION RATE: 20 BRPM | BODY MASS INDEX: 32.04 KG/M2 | HEIGHT: 70 IN | OXYGEN SATURATION: 97 % | WEIGHT: 223.8 LBS | SYSTOLIC BLOOD PRESSURE: 142 MMHG | TEMPERATURE: 99.1 F

## 2021-12-21 DIAGNOSIS — K21.9 GERD WITHOUT ESOPHAGITIS: Primary | ICD-10-CM

## 2021-12-21 DIAGNOSIS — E78.2 MIXED HYPERLIPIDEMIA: ICD-10-CM

## 2021-12-21 DIAGNOSIS — J45.909 ASTHMA, UNSPECIFIED ASTHMA SEVERITY, UNSPECIFIED WHETHER COMPLICATED, UNSPECIFIED WHETHER PERSISTENT: ICD-10-CM

## 2021-12-21 DIAGNOSIS — I10 ESSENTIAL HYPERTENSION: ICD-10-CM

## 2021-12-21 PROCEDURE — 99214 OFFICE O/P EST MOD 30 MIN: CPT | Performed by: INTERNAL MEDICINE

## 2021-12-26 DIAGNOSIS — I10 ESSENTIAL HYPERTENSION: ICD-10-CM

## 2021-12-26 RX ORDER — VERAPAMIL HYDROCHLORIDE 120 MG/1
TABLET, FILM COATED ORAL
Qty: 90 TABLET | Refills: 3 | Status: SHIPPED | OUTPATIENT
Start: 2021-12-26

## 2022-01-04 ENCOUNTER — OFFICE VISIT (OUTPATIENT)
Dept: URGENT CARE | Age: 57
End: 2022-01-04
Payer: MEDICARE

## 2022-01-04 VITALS — HEART RATE: 87 BPM | RESPIRATION RATE: 20 BRPM | TEMPERATURE: 97.4 F | OXYGEN SATURATION: 97 %

## 2022-01-04 DIAGNOSIS — R53.83 FATIGUE, UNSPECIFIED TYPE: ICD-10-CM

## 2022-01-04 DIAGNOSIS — H92.09 EARACHE: ICD-10-CM

## 2022-01-04 DIAGNOSIS — J02.9 SORE THROAT: Primary | ICD-10-CM

## 2022-01-04 LAB — S PYO AG THROAT QL: NEGATIVE

## 2022-01-04 PROCEDURE — 87070 CULTURE OTHR SPECIMN AEROBIC: CPT | Performed by: PHYSICIAN ASSISTANT

## 2022-01-04 PROCEDURE — U0003 INFECTIOUS AGENT DETECTION BY NUCLEIC ACID (DNA OR RNA); SEVERE ACUTE RESPIRATORY SYNDROME CORONAVIRUS 2 (SARS-COV-2) (CORONAVIRUS DISEASE [COVID-19]), AMPLIFIED PROBE TECHNIQUE, MAKING USE OF HIGH THROUGHPUT TECHNOLOGIES AS DESCRIBED BY CMS-2020-01-R: HCPCS | Performed by: PHYSICIAN ASSISTANT

## 2022-01-04 PROCEDURE — 87880 STREP A ASSAY W/OPTIC: CPT | Performed by: PHYSICIAN ASSISTANT

## 2022-01-04 PROCEDURE — G0463 HOSPITAL OUTPT CLINIC VISIT: HCPCS | Performed by: PHYSICIAN ASSISTANT

## 2022-01-04 PROCEDURE — U0005 INFEC AGEN DETEC AMPLI PROBE: HCPCS | Performed by: PHYSICIAN ASSISTANT

## 2022-01-04 PROCEDURE — 99213 OFFICE O/P EST LOW 20 MIN: CPT | Performed by: PHYSICIAN ASSISTANT

## 2022-01-05 NOTE — PATIENT INSTRUCTIONS
Corona virus testing can take 2-3 days for results    You may take OTC Vit D, Vit C and Zinc supplements as needed    Follow-up with your primary care physician if your symptoms persist and/or if your test results are positive    Contact your primary care physician for a note to return to work if your test results are positive    Go to emergency room if your symptoms worsen    You may access your results through [place] rapid strep test was negative  No antibiotic indicated at this time  Throat swab will be sent for definitive culture  Results take approximately 48-72 hours to return  If you have not heard from the provider by the end of 3 business days, please call phone number at top of clinical summary to request the results  In the meantime you may do warm salt water gargles, over-the-counter medications and throat lozenges as needed        Follow-up with your primary care physician in 3-4 days if symptoms persist    Go to emergency room if symptoms are worsening

## 2022-01-05 NOTE — PROGRESS NOTES
[address] [facility]        NAME: Elif Barton is a 55 y o  male  : 1965    MRN: [id number]  DATE: 2022  TIME: 7:49 PM    Assessment and Plan   Sore throat [J02 9]  1  Sore throat  COVID Only - Collected at [place] [address] or [facility]   2  Earache  COVID Only - Collected at [place] [address] or [facility]   3  Fatigue, unspecified type  COVID Only - Collected at [place] [address] or [facility]         Patient Instructions       Follow up with PCP in 3-5 days  Proceed to  ER if symptoms worsen  Chief Complaint     Chief Complaint   Patient presents with    Sore Throat     pt states started with cold symptoms on , now just c/o throat problems, was in the ER and saw pulmonologist, put on steroids, finished them but still having mouth pain and voice is different  Here to be checked   Earache    Cold Like Symptoms    Fatigue     pt c/o fatigue, pt is vaccinated         History of Present Illness       Patient for evaluation of sore throat and ear pain  Patient was seen in the emergency room on the  and saw the pulmonologist and was placed on oral steroids  Patient states overall it is improving but still has the scratching sore throat off and on  Sore Throat   Associated symptoms include ear pain  Pertinent negatives include no congestion, ear discharge or trouble swallowing  Earache   Associated symptoms include a sore throat  Pertinent negatives include no ear discharge or rhinorrhea  Fatigue  Associated symptoms include fatigue and a sore throat  Pertinent negatives include no chills, congestion, diaphoresis or fever  Review of Systems   Review of Systems   Constitutional: Positive for fatigue  Negative for activity change, appetite change, chills, diaphoresis and fever  HENT: Positive for ear pain and sore throat  Negative for congestion, ear discharge, postnasal drip, rhinorrhea, sinus pressure, sinus pain and trouble swallowing  Eyes: Negative  Respiratory: Negative  Cardiovascular: Negative  Gastrointestinal: Negative  Neurological: Negative            Current Medications       Current Outpatient Medications:     acetaminophen (TYLENOL) 500 mg tablet, Take 1,000 mg by mouth every 6 (six) hours as needed for mild pain, Disp: , Rfl:     albuterol (2 5 mg/3 mL) 0 083 % nebulizer solution, Take 3 mL (2 5 mg total) by nebulization every 6 (six) hours as needed for wheezing or shortness of breath, Disp: 180 mL, Rfl: 5    albuterol (PROVENTIL HFA,VENTOLIN HFA) 90 mcg/act inhaler, Inhale 1 puff 4 (four) times a day , Disp: , Rfl:     Chlorpheniramine Maleate (CHLOR-TRIMETON ALLERGY PO), Take by mouth, Disp: , Rfl:     ciprofloxacin-dexamethasone (CIPRODEX) otic suspension, Administer 4 drops into the left ear 2 (two) times a day for 7 days (Patient not taking: Reported on 11/23/2021 ), Disp: 7 5 mL, Rfl: 0    cyclobenzaprine (FLEXERIL) 5 mg tablet, Take 1 tablet (5 mg total) by mouth 3 (three) times a day as needed for muscle spasms May cause drowsiness, Disp: 30 tablet, Rfl: 0    diclofenac sodium (VOLTAREN) 1 %, Apply 2 g topically 4 (four) times a day as needed (pain) (Patient not taking: Reported on 11/23/2021 ), Disp: 100 g, Rfl: 2    esomeprazole (NexIUM) 40 MG capsule, Take 40 mg by mouth every morning before breakfast, Disp: , Rfl:     fluticasone-salmeterol (ADVAIR DISKUS) 500-50 mcg/dose, Inhale 1 puff 2 (two) times a day, Disp: , Rfl:     ibuprofen (MOTRIN) 600 mg tablet, Take 600 mg by mouth every 8 (eight) hours, Disp: , Rfl:     ipratropium-albuterol (DUO-NEB) 0 5-2 5 mg/3 mL, Inhale 3 mL 2 (two) times a week , Disp: , Rfl:     ketoconazole (NIZORAL) 2 % cream,   (Patient not taking: Reported on 11/23/2021 ), Disp: , Rfl:     lisinopril (ZESTRIL) 20 mg tablet, Take 1 tablet (20 mg total) by mouth daily, Disp: 90 tablet, Rfl: 1    meclizine (ANTIVERT) 12 5 MG tablet, Take 1 tablet (12 5 mg total) by mouth every 8 (eight) hours, Disp: 30 tablet, Rfl: 1    montelukast (SINGULAIR) 10 mg tablet, Take 1 tablet (10 mg total) by mouth daily, Disp: 90 tablet, Rfl: 1    pseudoephedrine (SUDAFED) 30 mg tablet, Take 60 mg by mouth daily , Disp: , Rfl:     Triamcinolone Acetonide (NASACORT ALLERGY 24HR NA), 1 spray into each nostril daily  , Disp: , Rfl:     Uloric 40 MG tablet, Take 1 tablet (40 mg total) by mouth daily, Disp: 90 tablet, Rfl: 1    verapamil (CALAN) 120 mg tablet, TAKE 1 TABLET BY MOUTH EVERY DAY, Disp: 90 tablet, Rfl: 3    Current Allergies     Allergies as of 01/04/2022 - Reviewed 01/04/2022   Allergen Reaction Noted    Penicillins Rash and Anaphylaxis 08/17/2004    Acetazolamide  10/25/2016    Cephalosporins Other (See Comments) 08/17/2004    Doxycycline  10/24/2018    Other  04/28/2014    Sulfa antibiotics Other (See Comments) 08/17/2004    Famotidine Palpitations 09/05/2016    Levofloxacin Palpitations 11/12/2021    Omeprazole Palpitations 09/05/2016            The following portions of the patient's history were reviewed and updated as appropriate: allergies, current medications, past family history, past medical history, past social history, past surgical history and problem list      Past Medical History:   Diagnosis Date    Anxiety 1/15/2019    Arthritis     Chronic rhinitis     last assessed 2/21/14    Chronic serous otitis media     last assessed 11/20/13    Chronic sinusitis     last assessed 8/19/14    Functional heart murmur     GERD (gastroesophageal reflux disease)     Gout     Hearing problem     last assessed 12/1/16    Hiatal hernia     Hypercholesterolemia     Hypertension     Palpitations     Testicular hypogonadism     last assessed 10/4/13    [facility]-t[facility])        Past Surgical History:   Procedure Laterality Date    APPENDECTOMY      BICEPS TENODESIS      anesthesia for tenodesis- of ruptured long tendon of biceps     HERNIA REPAIR      THYROIDECTOMY      TONSILLECTOMY         Family History   Problem Relation Age of Onset    Lung cancer Father     Coronary artery disease Father         blockages    Stroke Maternal Grandmother     Cancer Paternal Grandfather         metastasized    Heart disease Family     Lung cancer Cousin     No Known Problems Mother     No Known Problems Sister     No Known Problems Brother     No Known Problems Maternal Aunt     No Known Problems Maternal Uncle     No Known Problems Paternal Aunt     No Known Problems Paternal Uncle     No Known Problems Paternal Grandmother     ADD / ADHD Neg Hx     Anesthesia problems Neg Hx     Clotting disorder Neg Hx     Collagen disease Neg Hx     Diabetes Neg Hx     Dislocations Neg Hx     Learning disabilities Neg Hx     Neurological problems Neg Hx     Osteoporosis Neg Hx     Rheumatologic disease Neg Hx     Scoliosis Neg Hx     Vascular Disease Neg Hx          Medications have been verified  Objective   Pulse 87   Temp (!) 97 4 °F (36 3 °C)   Resp 20   SpO2 97%   No LMP for male patient  Physical Exam     Physical Exam  Vitals and nursing note reviewed  Constitutional:       General: He is not in acute distress  Appearance: Normal appearance  He is well-developed  He is not ill-appearing, toxic-appearing or diaphoretic  HENT:      Head: Normocephalic and atraumatic  Right Ear: Tympanic membrane and ear canal normal  No tenderness  No middle ear effusion  Tympanic membrane is not erythematous  Left Ear: Tympanic membrane and ear canal normal  No tenderness  No middle ear effusion  Tympanic membrane is not erythematous  Nose: No congestion or rhinorrhea  Mouth/Throat:      Mouth: Mucous membranes are moist       Pharynx: No oropharyngeal exudate or posterior oropharyngeal erythema  Tonsils: No tonsillar exudate or tonsillar abscesses  0 on the right  0 on the left  Eyes:      General:         Right eye: No discharge  Left eye: No discharge        Extraocular Movements: Extraocular movements intact  Conjunctiva/sclera: Conjunctivae normal       Pupils: Pupils are equal, round, and reactive to light  Cardiovascular:      Rate and Rhythm: Normal rate and regular rhythm  Heart sounds: Normal heart sounds  No murmur heard  Pulmonary:      Effort: Pulmonary effort is normal  No respiratory distress  Breath sounds: Normal breath sounds  No stridor  No wheezing, rhonchi or rales  Lymphadenopathy:      Cervical: No cervical adenopathy  Skin:     General: Skin is warm and dry  Neurological:      General: No focal deficit present  Mental Status: He is alert and oriented to person, place, and time  Psychiatric:         Mood and Affect: Mood normal          Behavior: Behavior normal          Thought Content:  Thought content normal          Judgment: Judgment normal

## 2022-01-06 LAB — BACTERIA THROAT CULT: NORMAL

## 2022-01-14 ENCOUNTER — TELEMEDICINE (OUTPATIENT)
Dept: INTERNAL MEDICINE CLINIC | Facility: OTHER | Age: 57
End: 2022-01-14
Payer: MEDICARE

## 2022-01-14 VITALS — SYSTOLIC BLOOD PRESSURE: 123 MMHG | TEMPERATURE: 97.3 F | DIASTOLIC BLOOD PRESSURE: 90 MMHG

## 2022-01-14 DIAGNOSIS — J45.909 ASTHMA, UNSPECIFIED ASTHMA SEVERITY, UNSPECIFIED WHETHER COMPLICATED, UNSPECIFIED WHETHER PERSISTENT: ICD-10-CM

## 2022-01-14 DIAGNOSIS — I10 ESSENTIAL HYPERTENSION: ICD-10-CM

## 2022-01-14 DIAGNOSIS — K21.9 GERD WITHOUT ESOPHAGITIS: Primary | ICD-10-CM

## 2022-01-14 DIAGNOSIS — J01.01 ACUTE RECURRENT MAXILLARY SINUSITIS: ICD-10-CM

## 2022-01-14 PROBLEM — J32.0 CHRONIC MAXILLARY SINUSITIS: Status: RESOLVED | Noted: 2020-09-11 | Resolved: 2022-01-14

## 2022-01-14 PROCEDURE — 99442 PR PHYS/QHP TELEPHONE EVALUATION 11-20 MIN: CPT | Performed by: INTERNAL MEDICINE

## 2022-01-14 RX ORDER — AZITHROMYCIN 250 MG/1
TABLET, FILM COATED ORAL
Qty: 6 TABLET | Refills: 0 | Status: SHIPPED | OUTPATIENT
Start: 2022-01-14 | End: 2022-01-19

## 2022-01-14 NOTE — PROGRESS NOTES
Virtual Brief Visit    Patient is located in the following state in which I hold an active license [place]  Complaining of sinus congestion and tender submandibular lymph node  No fever or chills  He was tested with COVID-19 PCR home on January 4, 2022 which was negative  Is vaccinated and COVID-19 rectum with 2 doses  Assessment/Plan:  Acute maxillary sinusitis  Will start patient on Z-Surjit  Also advised to take Tylenol as needed  Steam inhalation and can take some Robitussin cough syrup if needed  2  Bronchial asthma  Continue with present regimen    3  Essential hypertension  Continue with present regimen    Problem List Items Addressed This Visit        Digestive    GERD without esophagitis - Primary       Respiratory    Asthma    Acute recurrent maxillary sinusitis    Relevant Medications    azithromycin (Zithromax) 250 mg tablet       Cardiovascular and Mediastinum    Essential hypertension          Recent Visits  No visits were found meeting these conditions  Showing recent visits within past 7 days and meeting all other requirements  Today's Visits  Date Type Provider Dept   01/14/22 Telemedicine Ida Aguayo MD [facility]   Showing today's visits and meeting all other requirements  Future Appointments  No visits were found meeting these conditions    Showing future appointments within next 150 days and meeting all other requirements         I spent 15 minutes directly with the patient during this visit

## 2022-01-27 DIAGNOSIS — I10 HYPERTENSION, UNSPECIFIED TYPE: ICD-10-CM

## 2022-01-27 RX ORDER — LISINOPRIL 20 MG/1
20 TABLET ORAL DAILY
Qty: 90 TABLET | Refills: 1 | Status: SHIPPED | OUTPATIENT
Start: 2022-01-27 | End: 2022-07-27 | Stop reason: SDUPTHER

## 2022-02-01 ENCOUNTER — OFFICE VISIT (OUTPATIENT)
Dept: URGENT CARE | Age: 57
End: 2022-02-01
Payer: MEDICARE

## 2022-02-01 VITALS — HEART RATE: 100 BPM | TEMPERATURE: 97.1 F | RESPIRATION RATE: 18 BRPM | OXYGEN SATURATION: 96 %

## 2022-02-01 DIAGNOSIS — R42 DIZZINESS AND GIDDINESS: Primary | ICD-10-CM

## 2022-02-01 DIAGNOSIS — R53.83 FATIGUE, UNSPECIFIED TYPE: ICD-10-CM

## 2022-02-01 PROCEDURE — G0463 HOSPITAL OUTPT CLINIC VISIT: HCPCS

## 2022-02-01 PROCEDURE — 99213 OFFICE O/P EST LOW 20 MIN: CPT

## 2022-02-01 PROCEDURE — U0005 INFEC AGEN DETEC AMPLI PROBE: HCPCS

## 2022-02-01 PROCEDURE — U0003 INFECTIOUS AGENT DETECTION BY NUCLEIC ACID (DNA OR RNA); SEVERE ACUTE RESPIRATORY SYNDROME CORONAVIRUS 2 (SARS-COV-2) (CORONAVIRUS DISEASE [COVID-19]), AMPLIFIED PROBE TECHNIQUE, MAKING USE OF HIGH THROUGHPUT TECHNOLOGIES AS DESCRIBED BY CMS-2020-01-R: HCPCS

## 2022-02-02 LAB — SARS-COV-2 RNA RESP QL NAA+PROBE: NEGATIVE

## 2022-02-02 NOTE — PROGRESS NOTES
[address] [facility]        NAME: Brian Zarate is a 55 y o  male  : 1965    MRN: [id number]  DATE: 2022  TIME: 8:16 PM    Assessment and Plan   Dizziness and giddiness [R42]  1  Dizziness and giddiness     2  Fatigue, unspecified type  COVID Only -Office Collect         Patient Instructions        Follow up with PCP in 3-5 days  Proceed to  ER if symptoms worsen  Chief Complaint     Chief Complaint   Patient presents with    Earache     x thursday, virtual vist with pcp, 2022 with rx  abx  History of Present Illness       Patient is a 60-year-old male who presents for complaints of feeling off balance since Thursday  Past medical history is significant for eustachian tube dysfunction, otitis media, BPPV  Patient also reports fatigue, and intermittent tinnitus over the weekend  He notes that last night he felt slightly short of breath but found relief with Mucinex and Sudafed  Is currently not experiencing symptoms of dizziness but was today while driving  Denies chest pain, headache, diarrhea, vomiting, nasal congestion, sore throat  Earache   Pertinent negatives include no abdominal pain, coughing, diarrhea, headaches, neck pain, rhinorrhea, sore throat or vomiting  Review of Systems   Review of Systems   Constitutional: Positive for fatigue  Negative for activity change, chills, diaphoresis and fever  HENT: Positive for tinnitus  Negative for congestion, ear pain, rhinorrhea, sinus pain, sneezing and sore throat  Eyes: Negative for photophobia, discharge, itching and visual disturbance  Respiratory: Positive for shortness of breath  Negative for cough and chest tightness  Cardiovascular: Negative for chest pain  Gastrointestinal: Negative for abdominal pain, diarrhea, nausea and vomiting  Endocrine: Negative  Genitourinary: Negative for decreased urine volume and difficulty urinating     Musculoskeletal: Negative for arthralgias, back pain, myalgias, neck pain and neck stiffness  Skin: Negative  Allergic/Immunologic: Negative  Neurological: Positive for dizziness  Negative for syncope, weakness, light-headedness and headaches  Hematological: Negative  Psychiatric/Behavioral: Negative            Current Medications       Current Outpatient Medications:     acetaminophen (TYLENOL) 500 mg tablet, Take 1,000 mg by mouth every 6 (six) hours as needed for mild pain, Disp: , Rfl:     albuterol (2 5 mg/3 mL) 0 083 % nebulizer solution, Take 3 mL (2 5 mg total) by nebulization every 6 (six) hours as needed for wheezing or shortness of breath, Disp: 180 mL, Rfl: 5    albuterol (PROVENTIL HFA,VENTOLIN HFA) 90 mcg/act inhaler, Inhale 1 puff 4 (four) times a day , Disp: , Rfl:     Chlorpheniramine Maleate (CHLOR-TRIMETON ALLERGY PO), Take by mouth as needed  , Disp: , Rfl:     ciprofloxacin-dexamethasone (CIPRODEX) otic suspension, Administer 4 drops into the left ear 2 (two) times a day for 7 days (Patient not taking: Reported on 11/23/2021 ), Disp: 7 5 mL, Rfl: 0    cyclobenzaprine (FLEXERIL) 5 mg tablet, Take 1 tablet (5 mg total) by mouth 3 (three) times a day as needed for muscle spasms May cause drowsiness (Patient not taking: Reported on 1/14/2022 ), Disp: 30 tablet, Rfl: 0    diclofenac sodium (VOLTAREN) 1 %, Apply 2 g topically 4 (four) times a day as needed (pain) (Patient not taking: Reported on 11/23/2021 ), Disp: 100 g, Rfl: 2    esomeprazole (NexIUM) 40 MG capsule, Take 40 mg by mouth every morning before breakfast, Disp: , Rfl:     fluticasone-salmeterol (ADVAIR DISKUS) 500-50 mcg/dose, Inhale 1 puff 2 (two) times a day, Disp: , Rfl:     ibuprofen (MOTRIN) 600 mg tablet, Take 600 mg by mouth every 8 (eight) hours, Disp: , Rfl:     ipratropium-albuterol (DUO-NEB) 0 5-2 5 mg/3 mL, Inhale 3 mL 2 (two) times a week , Disp: , Rfl:     ketoconazole (NIZORAL) 2 % cream,   (Patient not taking: Reported on 11/23/2021 ), Disp: , Rfl:   lisinopril (ZESTRIL) 20 mg tablet, Take 1 tablet (20 mg total) by mouth daily, Disp: 90 tablet, Rfl: 1    meclizine (ANTIVERT) 12 5 MG tablet, Take 1 tablet (12 5 mg total) by mouth every 8 (eight) hours, Disp: 30 tablet, Rfl: 1    montelukast (SINGULAIR) 10 mg tablet, Take 1 tablet (10 mg total) by mouth daily, Disp: 90 tablet, Rfl: 1    pseudoephedrine (SUDAFED) 30 mg tablet, Take 60 mg by mouth daily , Disp: , Rfl:     Triamcinolone Acetonide (NASACORT ALLERGY 24HR NA), 1 spray into each nostril daily  , Disp: , Rfl:     Uloric 40 MG tablet, Take 1 tablet (40 mg total) by mouth daily, Disp: 90 tablet, Rfl: 1    verapamil (CALAN) 120 mg tablet, TAKE 1 TABLET BY MOUTH EVERY DAY, Disp: 90 tablet, Rfl: 3    Current Allergies     Allergies as of 02/01/2022 - Reviewed 02/01/2022   Allergen Reaction Noted    Penicillins Rash and Anaphylaxis 08/17/2004    Acetazolamide  10/25/2016    Cephalosporins Other (See Comments) 08/17/2004    Doxycycline  10/24/2018    Other  04/28/2014    Sulfa antibiotics Other (See Comments) 08/17/2004    Famotidine Palpitations 09/05/2016    Levofloxacin Palpitations 11/12/2021    Omeprazole Palpitations 09/05/2016            The following portions of the patient's history were reviewed and updated as appropriate: allergies, current medications, past family history, past medical history, past social history, past surgical history and problem list      Past Medical History:   Diagnosis Date    Anxiety 1/15/2019    Arthritis     Chronic rhinitis     last assessed 2/21/14    Chronic serous otitis media     last assessed 11/20/13    Chronic sinusitis     last assessed 8/19/14    Functional heart murmur     GERD (gastroesophageal reflux disease)     Gout     Hearing problem     last assessed 12/1/16    Hiatal hernia     Hypercholesterolemia     Hypertension     Palpitations     Testicular hypogonadism     last assessed 10/4/13    [facility])        Past Surgical History:   Procedure Laterality Date    APPENDECTOMY      BICEPS TENODESIS      anesthesia for tenodesis- of ruptured long tendon of biceps     HERNIA REPAIR      THYROIDECTOMY      TONSILLECTOMY         Family History   Problem Relation Age of Onset    Lung cancer Father     Coronary artery disease Father         blockages    Stroke Maternal Grandmother     Cancer Paternal Grandfather         metastasized    Heart disease Family     Lung cancer Cousin     No Known Problems Mother     No Known Problems Sister     No Known Problems Brother     No Known Problems Maternal Aunt     No Known Problems Maternal Uncle     No Known Problems Paternal Aunt     No Known Problems Paternal Uncle     No Known Problems Paternal Grandmother     ADD / ADHD Neg Hx     Anesthesia problems Neg Hx     Clotting disorder Neg Hx     Collagen disease Neg Hx     Diabetes Neg Hx     Dislocations Neg Hx     Learning disabilities Neg Hx     Neurological problems Neg Hx     Osteoporosis Neg Hx     Rheumatologic disease Neg Hx     Scoliosis Neg Hx     Vascular Disease Neg Hx          Medications have been verified  Objective   Pulse 100   Temp (!) 97 1 °F (36 2 °C)   Resp 18   SpO2 96%        Physical Exam     Physical Exam  Constitutional:       General: He is not in acute distress  Appearance: Normal appearance  He is not toxic-appearing  HENT:      Head: Normocephalic and atraumatic  Right Ear: Tympanic membrane normal  There is no impacted cerumen  Left Ear: Tympanic membrane normal  There is no impacted cerumen  Nose: Nose normal  No congestion or rhinorrhea  Mouth/Throat:      Mouth: Mucous membranes are moist    Eyes:      Extraocular Movements: Extraocular movements intact  Conjunctiva/sclera: Conjunctivae normal       Pupils: Pupils are equal, round, and reactive to light  Cardiovascular:      Rate and Rhythm: Regular rhythm  Pulses: Normal pulses        Heart sounds: Normal heart sounds  Pulmonary:      Effort: Pulmonary effort is normal  No respiratory distress  Breath sounds: Normal breath sounds  No stridor  No wheezing or rhonchi  Abdominal:      General: Abdomen is flat  Musculoskeletal:         General: No swelling or tenderness  Normal range of motion  Cervical back: Normal range of motion and neck supple  No rigidity or tenderness  Lymphadenopathy:      Cervical: No cervical adenopathy  Skin:     General: Skin is warm and dry  Capillary Refill: Capillary refill takes less than 2 seconds  Neurological:      General: No focal deficit present  Mental Status: He is alert and oriented to person, place, and time  Motor: No weakness  Gait: Gait normal       Comments: Negative Romberg     Psychiatric:         Mood and Affect: Mood normal          Behavior: Behavior normal

## 2022-02-02 NOTE — PATIENT INSTRUCTIONS
Dizziness   Continue current medication regimen as prescribed  WHAT YOU NEED TO KNOW:   Dizziness is a feeling of being off balance or unsteady  Common causes of dizziness are an inner ear fluid imbalance or a lack of oxygen in your blood  Dizziness may be acute (lasts 3 days or less) or chronic (lasts longer than 3 days)  You may have dizzy spells that last from seconds to a few hours  DISCHARGE INSTRUCTIONS:   Return to the emergency department if:   · You have a headache and a stiff neck  · You have shaking chills and a fever  · You vomit over and over with no relief  · Your vomit or bowel movements are red or black  · You have pain in your chest, back, or abdomen  · You have numbness, especially in your face, arms, or legs  · You have trouble moving your arms or legs  · You are confused  Contact your healthcare provider if:   · You have a fever  · Your symptoms do not get better with treatment  · You have questions or concerns about your condition or care  Manage your symptoms:   · Do not drive  or operate heavy machinery when you are dizzy  · Get up slowly  from sitting or lying down  · Drink plenty of liquids  Liquids help prevent dehydration  Ask how much liquid to drink each day and which liquids are best for you  Follow up with your doctor as directed:  Write down your questions so you remember to ask them during your visits  © Copyright [employer] 2021 Information is for End User's use only and may not be sold, redistributed or otherwise used for commercial purposes  All illustrations and images included in CareNotes® are the copyrighted property of [employer]  or [facility] Shanique Santiago   The above information is an  only  It is not intended as medical advice for individual conditions or treatments  Talk to your doctor, nurse or pharmacist before following any medical regimen to see if it is safe and effective for you

## 2022-02-16 ENCOUNTER — OFFICE VISIT (OUTPATIENT)
Dept: INTERNAL MEDICINE CLINIC | Facility: OTHER | Age: 57
End: 2022-02-16
Payer: MEDICARE

## 2022-02-16 VITALS
SYSTOLIC BLOOD PRESSURE: 138 MMHG | DIASTOLIC BLOOD PRESSURE: 82 MMHG | BODY MASS INDEX: 31.73 KG/M2 | OXYGEN SATURATION: 98 % | HEART RATE: 92 BPM | HEIGHT: 70 IN | RESPIRATION RATE: 18 BRPM | TEMPERATURE: 98.5 F | WEIGHT: 221.6 LBS

## 2022-02-16 DIAGNOSIS — J01.01 ACUTE RECURRENT MAXILLARY SINUSITIS: ICD-10-CM

## 2022-02-16 DIAGNOSIS — J30.89 NON-SEASONAL ALLERGIC RHINITIS, UNSPECIFIED TRIGGER: Primary | ICD-10-CM

## 2022-02-16 PROBLEM — H92.02 LEFT EAR PAIN: Status: RESOLVED | Noted: 2020-11-05 | Resolved: 2022-02-16

## 2022-02-16 PROBLEM — R06.02 SHORTNESS OF BREATH: Status: RESOLVED | Noted: 2020-10-26 | Resolved: 2022-02-16

## 2022-02-16 PROCEDURE — 99213 OFFICE O/P EST LOW 20 MIN: CPT | Performed by: INTERNAL MEDICINE

## 2022-02-16 RX ORDER — PREDNISONE 20 MG/1
40 TABLET ORAL DAILY
Qty: 10 TABLET | Refills: 0 | Status: SHIPPED | OUTPATIENT
Start: 2022-02-16 | End: 2022-02-21

## 2022-02-16 NOTE — PROGRESS NOTES
Assessment/Plan:    Acute recurrent maxillary sinusitis  Defer on antibiotics at this time, no active signs of infection  Will prescribe prednisone 40 mg daily for 5 days  Continue Singulair, chlorpheniramine, and Flonase  Diagnoses and all orders for this visit:    Non-seasonal allergic rhinitis, unspecified trigger  -     predniSONE 20 mg tablet; Take 2 tablets (40 mg total) by mouth daily for 5 days    Acute recurrent maxillary sinusitis  -     predniSONE 20 mg tablet; Take 2 tablets (40 mg total) by mouth daily for 5 days                  Subjective:      Patient ID: Elif Barton is a 55 y o  male  Chief Complaint   Patient presents with    motion sickness     all symptoms past 2 weeks but started to get worse since Monday   Dizziness     and nausea  bothers him left to right and up and down feels its a sinus issue    Lymphadenopathy     comes and goes in his neck   health maintenance     cologuard ordered in july    Knee Pain     bad arthrititis in his knees       55year old male is seen today with concern for dizziness and allergic rhinitis  He has been feeling dizziness which he feels is likely due to sinusitis  He has also been experiencing left sided tinnitus  He took pseudoephedrine and chlorpheniramine which did help his symptoms  He is also concerned for bilateral knee pain  He sees orthopedic surgery and is scheduled for repeat steroid injection  Dizziness  This is a recurrent problem  Pertinent negatives include no abdominal pain, chest pain, chills, congestion, coughing, diaphoresis, fatigue, fever, headaches, nausea, numbness, sore throat, vomiting or weakness  Knee Pain   Pertinent negatives include no numbness         The following portions of the patient's history were reviewed and updated as appropriate: allergies, current medications, past family history, past medical history, past social history, past surgical history and problem list     Review of Systems Constitutional: Negative for activity change, appetite change, chills, diaphoresis, fatigue and fever  HENT: Negative for congestion, postnasal drip, rhinorrhea, sinus pressure, sinus pain, sneezing and sore throat  Eyes: Negative for visual disturbance  Respiratory: Negative for apnea, cough, choking, chest tightness, shortness of breath and wheezing  Cardiovascular: Negative for chest pain, palpitations and leg swelling  Gastrointestinal: Negative for abdominal distention, abdominal pain, anal bleeding, blood in stool, constipation, diarrhea, nausea and vomiting  Endocrine: Negative for cold intolerance and heat intolerance  Genitourinary: Negative for difficulty urinating, dysuria and hematuria  Musculoskeletal: Negative  Skin: Negative  Neurological: Positive for dizziness  Negative for weakness, light-headedness, numbness and headaches  Hematological: Negative for adenopathy  Psychiatric/Behavioral: Negative for agitation, sleep disturbance and suicidal ideas  All other systems reviewed and are negative          Past Medical History:   Diagnosis Date    Anxiety 1/15/2019    Arthritis     Chronic rhinitis     last assessed 2/21/14    Chronic serous otitis media     last assessed 11/20/13    Chronic sinusitis     last assessed 8/19/14    Functional heart murmur     GERD (gastroesophageal reflux disease)     Gout     Hearing problem     last assessed 12/1/16    Hiatal hernia     Hypercholesterolemia     Hypertension     Palpitations     Testicular hypogonadism     last assessed 10/4/13    [facility])          Current Outpatient Medications:     acetaminophen (TYLENOL) 500 mg tablet, Take 1,000 mg by mouth every 6 (six) hours as needed for mild pain, Disp: , Rfl:     albuterol (2 5 mg/3 mL) 0 083 % nebulizer solution, Take 3 mL (2 5 mg total) by nebulization every 6 (six) hours as needed for wheezing or shortness of breath, Disp: 180 mL, Rfl: 5    albuterol (PROVENTIL HFA,VENTOLIN HFA) 90 mcg/act inhaler, Inhale 1 puff 4 (four) times a day , Disp: , Rfl:     Chlorpheniramine Maleate (CHLOR-TRIMETON ALLERGY PO), Take by mouth as needed  , Disp: , Rfl:     esomeprazole (NexIUM) 40 MG capsule, Take 40 mg by mouth every morning before breakfast, Disp: , Rfl:     fluticasone-salmeterol (ADVAIR DISKUS) 500-50 mcg/dose, Inhale 1 puff 2 (two) times a day, Disp: , Rfl:     ibuprofen (MOTRIN) 600 mg tablet, Take 600 mg by mouth every 8 (eight) hours, Disp: , Rfl:     ipratropium-albuterol (DUO-NEB) 0 5-2 5 mg/3 mL, Inhale 3 mL 2 (two) times a week , Disp: , Rfl:     ketoconazole (NIZORAL) 2 % cream,   (Patient not taking: Reported on 11/23/2021 ), Disp: , Rfl:     lisinopril (ZESTRIL) 20 mg tablet, Take 1 tablet (20 mg total) by mouth daily, Disp: 90 tablet, Rfl: 1    meclizine (ANTIVERT) 12 5 MG tablet, Take 1 tablet (12 5 mg total) by mouth every 8 (eight) hours, Disp: 30 tablet, Rfl: 1    montelukast (SINGULAIR) 10 mg tablet, Take 1 tablet (10 mg total) by mouth daily, Disp: 90 tablet, Rfl: 1    predniSONE 20 mg tablet, Take 2 tablets (40 mg total) by mouth daily for 5 days, Disp: 10 tablet, Rfl: 0    pseudoephedrine (SUDAFED) 30 mg tablet, Take 60 mg by mouth daily , Disp: , Rfl:     Triamcinolone Acetonide (NASACORT ALLERGY 24HR NA), 1 spray into each nostril daily  , Disp: , Rfl:     Uloric 40 MG tablet, Take 1 tablet (40 mg total) by mouth daily, Disp: 90 tablet, Rfl: 1    verapamil (CALAN) 120 mg tablet, TAKE 1 TABLET BY MOUTH EVERY DAY, Disp: 90 tablet, Rfl: 3    Allergies   Allergen Reactions    Penicillins Rash and Anaphylaxis    Acetazolamide      Other reaction(s): Stomach Ache    Cephalosporins Other (See Comments)     headache    Doxycycline      Capsules only  Tablets are ok to take, per pt  Capsules only  Tablets are ok to take, per pt   Other     Sulfa Antibiotics Other (See Comments)     Category: Allergy;  Annotation - [id number]: STOMACH UPSET    Famotidine Palpitations    Levofloxacin Palpitations    Omeprazole Palpitations     Painful urination       Social History   Past Surgical History:   Procedure Laterality Date    APPENDECTOMY      BICEPS TENODESIS      anesthesia for tenodesis- of ruptured long tendon of biceps     HERNIA REPAIR      THYROIDECTOMY      TONSILLECTOMY       Family History   Problem Relation Age of Onset    Lung cancer Father     Coronary artery disease Father         blockages    Stroke Maternal Grandmother     Cancer Paternal Grandfather         metastasized    Heart disease Family     Lung cancer Cousin     No Known Problems Mother     No Known Problems Sister     No Known Problems Brother     No Known Problems Maternal Aunt     No Known Problems Maternal Uncle     No Known Problems Paternal Aunt     No Known Problems Paternal Uncle     No Known Problems Paternal Grandmother     ADD / ADHD Neg Hx     Anesthesia problems Neg Hx     Clotting disorder Neg Hx     Collagen disease Neg Hx     Diabetes Neg Hx     Dislocations Neg Hx     Learning disabilities Neg Hx     Neurological problems Neg Hx     Osteoporosis Neg Hx     Rheumatologic disease Neg Hx     Scoliosis Neg Hx     Vascular Disease Neg Hx        Objective:  /82 (BP Location: Right arm, Patient Position: Sitting, Cuff Size: Large)   Pulse 92   Temp 98 5 °F (36 9 °C) (Temporal)   Resp 18   Ht 5' 9 5" (1 765 m)   Wt 101 kg (221 lb 9 6 oz)   SpO2 98%   BMI 32 26 kg/m²     Recent Results (from the past 1344 hour(s))   [facility] [id number] [facility]    Collection Time: 01/04/22  7:48 PM    Specimen: Nose; Nares   Result Value Ref Range    SARS-CoV-2 Negative Negative   POCT rapid strepA    Collection Time: 01/04/22  9:00 PM   Result Value Ref Range     RAPID STREP A Negative Negative   Throat culture    Collection Time: 01/04/22  9:01 PM    Specimen: Throat   Result Value Ref Range    Throat Culture Negative for beta-hemolytic Streptococcus    COVID Only -Office Collect    Collection Time: 02/01/22  8:29 PM    Specimen: Nose; Nares   Result Value Ref Range    SARS-CoV-2 Negative Negative            Physical Exam  Vitals and nursing note reviewed  Constitutional:       General: He is not in acute distress  Appearance: He is well-developed  He is not diaphoretic  HENT:      Head: Normocephalic and atraumatic  Eyes:      General: No scleral icterus  Right eye: No discharge  Left eye: No discharge  Conjunctiva/sclera: Conjunctivae normal       Pupils: Pupils are equal, round, and reactive to light  Neck:      Thyroid: No thyromegaly  Vascular: No JVD  Cardiovascular:      Rate and Rhythm: Normal rate and regular rhythm  Heart sounds: Normal heart sounds  No murmur heard  No friction rub  No gallop  Pulmonary:      Effort: Pulmonary effort is normal  No respiratory distress  Breath sounds: Normal breath sounds  No wheezing or rales  Chest:      Chest wall: No tenderness  Abdominal:      General: Bowel sounds are normal  There is no distension  Palpations: Abdomen is soft  There is no mass  Tenderness: There is no abdominal tenderness  There is no guarding or rebound  Musculoskeletal:         General: No tenderness or deformity  Normal range of motion  Cervical back: Normal range of motion and neck supple  Lymphadenopathy:      Cervical: No cervical adenopathy  Skin:     General: Skin is warm and dry  Coloration: Skin is not pale  Findings: No erythema or rash  Neurological:      Mental Status: He is alert and oriented to person, place, and time  Cranial Nerves: No cranial nerve deficit  Coordination: Coordination normal       Deep Tendon Reflexes: Reflexes are normal and symmetric  Psychiatric:         Behavior: Behavior normal          Thought Content:  Thought content normal          Judgment: Judgment normal

## 2022-02-16 NOTE — ASSESSMENT & PLAN NOTE
Defer on antibiotics at this time, no active signs of infection  Will prescribe prednisone 40 mg daily for 5 days  Continue Singulair, chlorpheniramine, and Flonase

## 2022-02-24 ENCOUNTER — APPOINTMENT (OUTPATIENT)
Dept: RADIOLOGY | Age: 57
End: 2022-02-24
Payer: MEDICARE

## 2022-02-24 ENCOUNTER — OFFICE VISIT (OUTPATIENT)
Dept: URGENT CARE | Age: 57
End: 2022-02-24
Payer: MEDICARE

## 2022-02-24 VITALS
DIASTOLIC BLOOD PRESSURE: 83 MMHG | SYSTOLIC BLOOD PRESSURE: 139 MMHG | OXYGEN SATURATION: 99 % | HEART RATE: 80 BPM | TEMPERATURE: 97.8 F | RESPIRATION RATE: 18 BRPM

## 2022-02-24 DIAGNOSIS — R07.89 CHEST TIGHTNESS: Primary | ICD-10-CM

## 2022-02-24 DIAGNOSIS — R07.89 CHEST TIGHTNESS: ICD-10-CM

## 2022-02-24 PROCEDURE — 99213 OFFICE O/P EST LOW 20 MIN: CPT

## 2022-02-24 PROCEDURE — G0463 HOSPITAL OUTPT CLINIC VISIT: HCPCS

## 2022-02-24 PROCEDURE — 71046 X-RAY EXAM CHEST 2 VIEWS: CPT

## 2022-02-24 NOTE — PATIENT INSTRUCTIONS
Allergies   WHAT YOU NEED TO KNOW:   Allergies are an immune system reaction to a substance called an allergen  Your immune system sees the allergen as harmful and attacks it  An allergic reaction can be mild or life-threatening  A life-threatening reaction is called anaphylaxis  Anaphylaxis is a sudden, life-threatening reaction that needs immediate treatment  DISCHARGE INSTRUCTIONS:   Call 911 for signs or symptoms of anaphylaxis,  such as trouble breathing, swelling in your mouth or throat, or wheezing  You may also have itching, a rash, hives, or feel like you are going to faint  Return to the emergency department if:   · You have tingling in your hands or feet  · Your skin is red or flushed  Contact your healthcare provider if:   · You have questions or concerns about your condition or care  Medicines:   · Antihistamines  help decrease itching, sneezing, and swelling  You may take them as a pill or use drops in your nose or eyes  · Decongestants  help your nose feel less stuffy  · Topical treatments  help decrease itching or swelling  You also may be given nasal sprays or eyedrops  · Epinephrine  is used to treat a severe allergic reaction such as anaphylaxis  · Take your medicine as directed  Contact your healthcare provider if you think your medicine is not helping or if you have side effects  Tell him of her if you are allergic to any medicine  Keep a list of the medicines, vitamins, and herbs you take  Include the amounts, and when and why you take them  Bring the list or the pill bottles to follow-up visits  Carry your medicine list with you in case of an emergency  Steps to take for signs or symptoms of anaphylaxis:   · Immediately  give 1 shot of epinephrine only into the outer thigh muscle  · Leave the shot in place  as directed  Your healthcare provider may recommend you leave it in place for up to 10 seconds before you remove it   This helps make sure all of the epinephrine is delivered  · Call 911 and go to the emergency department,  even if the shot improved symptoms  Do not drive yourself  Bring the used epinephrine shot with you  Safety precautions to take if you are at risk for anaphylaxis:   · Keep 2 shots of epinephrine with you at all times  You may need a second shot, because epinephrine only works for about 20 minutes and symptoms may return  Your healthcare provider can show you and family members how to give the shot  Check the expiration date every month and replace it before it expires  · Create an action plan  Your healthcare provider can help you create a written plan that explains the allergy and an emergency plan to treat a reaction  The plan explains when to give a second epinephrine shot if symptoms return or do not improve after the first  Give copies of the action plan and emergency instructions to family members and work staff  Show them how to give a shot of epinephrine  · Be careful when you exercise  If you have had exercise-induced anaphylaxis, do not exercise right after you eat  Stop exercising right away if you start to develop any signs or symptoms of anaphylaxis  You may first feel tired, warm, or have itchy skin  Hives, swelling, and severe breathing problems may develop if you continue to exercise  · Carry medical alert identification  Wear medical alert jewelry or carry a card that explains the allergy  Ask your healthcare provider where to get these items  · Inform all healthcare providers of the allergy  This includes dentists, nurses, doctors, and surgeons  Manage allergies:   · Use nasal rinses as directed  Rinse with a saline solution daily  This will help clear allergens out of your nose  Use distilled water if possible  You can also boil tap water and let it cool before you use it  Do not use tap water that has not been boiled  · Do not smoke    Allergy symptoms may decrease if you are not around smoke  Nicotine and other chemicals in cigarettes and cigars can cause lung damage  Ask your healthcare provider for information if you currently smoke and need help to quit  E-cigarettes or smokeless tobacco still contain nicotine  Talk to your healthcare provider before you use these products  Prevent allergic reactions:   · Do not go outside when pollen counts are high if you have seasonal allergies  Your symptoms may be better if you go outside only in the morning or evening  Use your air conditioner, and change air filters often  · Avoid dust, fur, and mold  Dust and vacuum your home often  You may want to wear a mask when you vacuum  Keep pets in certain rooms, and bathe them often  Use a dehumidifier (machine that decreases moisture) to help prevent mold  · Do not use products that contain latex if you have a latex allergy  Use nonlatex gloves if you work in healthcare or in food preparation  Always tell healthcare providers about a latex allergy  · Avoid areas that attract insects if you have an insect bite or sting allergy  Areas include trash cans, gardens, and picnics  Do not wear bright clothing or strong scents when you will be outside  · Prevent an allergic reaction caused by food  You may have a reaction if your food is not prepared safely  For example, you could be served food that touched your trigger food during preparation  This is called cross-contamination  Kitchen tools can also cause cross-contamination  You may also eat baked foods that contain a trigger food you do not know about  Ask if the food contains your trigger food before you handle or eat it  Follow up with your healthcare provider as directed:  Write down your questions so you remember to ask them during your visits  When you have an allergic reaction, write down everything you were exposed to in the 2 hours before the reaction  Take that information to your next visit    © [address] Information is for End User's use only and may not be sold, redistributed or otherwise used for commercial purposes  All illustrations and images included in CareNotes® are the copyrighted property of [employer]  or [employer]  The above information is an  only  It is not intended as medical advice for individual conditions or treatments  Talk to your doctor, nurse or pharmacist before following any medical regimen to see if it is safe and effective for you

## 2022-02-24 NOTE — PROGRESS NOTES
[address] [facility]        NAME: Mary Cruz is a 55 y o  male  : 1965    MRN: [id number]  DATE: 2022  TIME: 1:35 PM    Assessment and Plan   Chest tightness [R07 89]  1  Chest tightness  XR chest pa & lateral   CXR reviewed, no acute abnormality noted, awaiting official read  Patient requesting another prescription for prednisone, recently finished a five-day course on Friday  At this point, suggesting that patient present to either his PCP or pulmonologist, or if breathing is that impaired to the emergency department  Patient stating that he would like to stop at his pulmonologist's office, and does not feel that he needs an ambulance at this time  Patient Instructions   Continue with prescribed therapies at home, if chest pain, SOB, dizziness, syncope then report to emergency department  Follow up with PCP in 3-5 days  Proceed to  ER if symptoms worsen  Chief Complaint   No chief complaint on file  History of Present Illness       Patient is a 55 y o  male who presents for chief complaint of nasal and oral dryness, some chest tightness  PMH is significant for asthma, vertigo, allergies  He reports he recently finished a course of oral steroids for sinusitis, but over the last couple of days his mouth and nose have been dry  He has been using  ocean saline nasal spray, prescribed inhalers and nebulizers with little relief  Denies chest pain, SOB, dizziness, leg swelling, nausea/vomiting, body aches chills  Recent COVID test in January was negative and he is vaccinated  Review of Systems   Review of Systems   Constitutional: Negative for chills, fatigue and fever  HENT: Positive for sinus pressure  Negative for ear pain, facial swelling, sinus pain, sneezing and sore throat  Eyes: Negative for pain and visual disturbance  Respiratory: Positive for chest tightness  Negative for cough, choking, shortness of breath, wheezing and stridor      Cardiovascular: Negative for chest pain, palpitations and leg swelling  Gastrointestinal: Negative for abdominal pain, diarrhea, nausea, rectal pain and vomiting  Endocrine: Negative  Genitourinary: Negative for dysuria, flank pain and hematuria  Musculoskeletal: Negative for arthralgias, back pain, neck pain and neck stiffness  Skin: Negative for color change and rash  Allergic/Immunologic: Negative  Neurological: Negative for dizziness, seizures, syncope, facial asymmetry, light-headedness, numbness and headaches  Hematological: Positive for adenopathy  Psychiatric/Behavioral: Negative  All other systems reviewed and are negative          Current Medications       Current Outpatient Medications:     acetaminophen (TYLENOL) 500 mg tablet, Take 1,000 mg by mouth every 6 (six) hours as needed for mild pain, Disp: , Rfl:     albuterol (2 5 mg/3 mL) 0 083 % nebulizer solution, Take 3 mL (2 5 mg total) by nebulization every 6 (six) hours as needed for wheezing or shortness of breath, Disp: 180 mL, Rfl: 5    albuterol (PROVENTIL HFA,VENTOLIN HFA) 90 mcg/act inhaler, Inhale 1 puff 4 (four) times a day , Disp: , Rfl:     Chlorpheniramine Maleate (CHLOR-TRIMETON ALLERGY PO), Take by mouth as needed  , Disp: , Rfl:     esomeprazole (NexIUM) 40 MG capsule, Take 40 mg by mouth every morning before breakfast, Disp: , Rfl:     fluticasone-salmeterol (ADVAIR DISKUS) 500-50 mcg/dose, Inhale 1 puff 2 (two) times a day, Disp: , Rfl:     ibuprofen (MOTRIN) 600 mg tablet, Take 600 mg by mouth every 8 (eight) hours, Disp: , Rfl:     ipratropium-albuterol (DUO-NEB) 0 5-2 5 mg/3 mL, Inhale 3 mL 2 (two) times a week , Disp: , Rfl:     ketoconazole (NIZORAL) 2 % cream,   (Patient not taking: Reported on 11/23/2021 ), Disp: , Rfl:     lisinopril (ZESTRIL) 20 mg tablet, Take 1 tablet (20 mg total) by mouth daily, Disp: 90 tablet, Rfl: 1    meclizine (ANTIVERT) 12 5 MG tablet, Take 1 tablet (12 5 mg total) by mouth every 8 (eight) hours, Disp: 30 tablet, Rfl: 1    montelukast (SINGULAIR) 10 mg tablet, Take 1 tablet (10 mg total) by mouth daily, Disp: 90 tablet, Rfl: 1    pseudoephedrine (SUDAFED) 30 mg tablet, Take 60 mg by mouth daily , Disp: , Rfl:     Triamcinolone Acetonide (NASACORT ALLERGY 24HR NA), 1 spray into each nostril daily  , Disp: , Rfl:     Uloric 40 MG tablet, Take 1 tablet (40 mg total) by mouth daily, Disp: 90 tablet, Rfl: 1    verapamil (CALAN) 120 mg tablet, TAKE 1 TABLET BY MOUTH EVERY DAY, Disp: 90 tablet, Rfl: 3    Current Allergies     Allergies as of 02/24/2022 - Reviewed 02/16/2022   Allergen Reaction Noted    Penicillins Rash and Anaphylaxis 08/17/2004    Acetazolamide  10/25/2016    Cephalosporins Other (See Comments) 08/17/2004    Doxycycline  10/24/2018    Other  04/28/2014    Sulfa antibiotics Other (See Comments) 08/17/2004    Famotidine Palpitations 09/05/2016    Levofloxacin Palpitations 11/12/2021    Omeprazole Palpitations 09/05/2016            The following portions of the patient's history were reviewed and updated as appropriate: allergies, current medications, past family history, past medical history, past social history, past surgical history and problem list      Past Medical History:   Diagnosis Date    Anxiety 1/15/2019    Arthritis     Chronic rhinitis     last assessed 2/21/14    Chronic serous otitis media     last assessed 11/20/13    Chronic sinusitis     last assessed 8/19/14    Functional heart murmur     GERD (gastroesophageal reflux disease)     Gout     Hearing problem     last assessed 12/1/16    Hiatal hernia     Hypercholesterolemia     Hypertension     Palpitations     Testicular hypogonadism     last assessed 10/4/13    [facility])        Past Surgical History:   Procedure Laterality Date    APPENDECTOMY      BICEPS TENODESIS      anesthesia for tenodesis- of ruptured long tendon of biceps     HERNIA REPAIR      THYROIDECTOMY      TONSILLECTOMY         Family History   Problem Relation Age of Onset    Lung cancer Father     Coronary artery disease Father         blockages    Stroke Maternal Grandmother     Cancer Paternal Grandfather         metastasized    Heart disease Family     Lung cancer Cousin     No Known Problems Mother     No Known Problems Sister     No Known Problems Brother     No Known Problems Maternal Aunt     No Known Problems Maternal Uncle     No Known Problems Paternal Aunt     No Known Problems Paternal Uncle     No Known Problems Paternal Grandmother     ADD / ADHD Neg Hx     Anesthesia problems Neg Hx     Clotting disorder Neg Hx     Collagen disease Neg Hx     Diabetes Neg Hx     Dislocations Neg Hx     Learning disabilities Neg Hx     Neurological problems Neg Hx     Osteoporosis Neg Hx     Rheumatologic disease Neg Hx     Scoliosis Neg Hx     Vascular Disease Neg Hx          Medications have been verified  Objective   /83 (BP Location: Right arm, Patient Position: Sitting)   Pulse 80   Temp 97 8 °F (36 6 °C) (Temporal)   Resp 18   SpO2 99%        Physical Exam     Physical Exam  Constitutional:       General: He is not in acute distress  Appearance: Normal appearance  He is not ill-appearing  HENT:      Head: Normocephalic and atraumatic  Right Ear: Tympanic membrane normal  There is no impacted cerumen  Left Ear: Tympanic membrane normal  There is no impacted cerumen  Nose: Nose normal       Mouth/Throat:      Mouth: Mucous membranes are dry  Eyes:      Extraocular Movements: Extraocular movements intact  Pupils: Pupils are equal, round, and reactive to light  Cardiovascular:      Rate and Rhythm: Normal rate and regular rhythm  Pulses: Normal pulses  Heart sounds: Normal heart sounds  No murmur heard  No friction rub  No gallop  Pulmonary:      Effort: Pulmonary effort is normal  No respiratory distress  Breath sounds:  No stridor  No wheezing, rhonchi or rales  Chest:      Chest wall: No tenderness  Musculoskeletal:         General: Normal range of motion  Cervical back: Normal range of motion and neck supple  No rigidity or tenderness  Skin:     General: Skin is warm and dry  Capillary Refill: Capillary refill takes less than 2 seconds  Neurological:      General: No focal deficit present  Mental Status: He is alert     Psychiatric:         Mood and Affect: Mood normal       Comments: Anxious, rapid speech

## 2022-02-25 ENCOUNTER — TELEPHONE (OUTPATIENT)
Dept: INTERNAL MEDICINE CLINIC | Age: 57
End: 2022-02-25

## 2022-02-28 ENCOUNTER — OFFICE VISIT (OUTPATIENT)
Dept: INTERNAL MEDICINE CLINIC | Facility: OTHER | Age: 57
End: 2022-02-28
Payer: MEDICARE

## 2022-02-28 VITALS
HEIGHT: 70 IN | RESPIRATION RATE: 18 BRPM | DIASTOLIC BLOOD PRESSURE: 84 MMHG | OXYGEN SATURATION: 98 % | TEMPERATURE: 98.6 F | WEIGHT: 221 LBS | BODY MASS INDEX: 31.64 KG/M2 | HEART RATE: 88 BPM | SYSTOLIC BLOOD PRESSURE: 148 MMHG

## 2022-02-28 DIAGNOSIS — J45.909 ASTHMA, UNSPECIFIED ASTHMA SEVERITY, UNSPECIFIED WHETHER COMPLICATED, UNSPECIFIED WHETHER PERSISTENT: ICD-10-CM

## 2022-02-28 DIAGNOSIS — J06.9 VIRAL URI: ICD-10-CM

## 2022-02-28 DIAGNOSIS — J30.89 NON-SEASONAL ALLERGIC RHINITIS, UNSPECIFIED TRIGGER: Primary | ICD-10-CM

## 2022-02-28 PROBLEM — J01.01 ACUTE RECURRENT MAXILLARY SINUSITIS: Status: RESOLVED | Noted: 2019-02-19 | Resolved: 2022-02-28

## 2022-02-28 PROBLEM — R05.9 COUGH: Status: RESOLVED | Noted: 2020-10-26 | Resolved: 2022-02-28

## 2022-02-28 PROBLEM — J45.901 ASTHMA WITH ACUTE EXACERBATION: Status: RESOLVED | Noted: 2019-07-03 | Resolved: 2022-02-28

## 2022-02-28 PROCEDURE — 99213 OFFICE O/P EST LOW 20 MIN: CPT | Performed by: INTERNAL MEDICINE

## 2022-02-28 NOTE — ASSESSMENT & PLAN NOTE
Blood pressure elevated today however he did not take his antihypertensives  For now, continue verapamil 120 mg daily and lisinopril 20 mg daily

## 2022-02-28 NOTE — PROGRESS NOTES
Assessment/Plan:    Allergic rhinitis  Discussed continuing OTC antihistamines and Nasacort  Asthma  continue albuterol as needed for SOB/CAMP  Viral URI  Defer on antibiotics or steroids at this time  Symptoms improving  Essential hypertension  Blood pressure elevated today however he did not take his antihypertensives  For now, continue verapamil 120 mg daily and lisinopril 20 mg daily  Diagnoses and all orders for this visit:    Non-seasonal allergic rhinitis, unspecified trigger    Asthma, unspecified asthma severity, unspecified whether complicated, unspecified whether persistent    Viral URI                  Subjective:      Patient ID: Deepak Greer is a 55 y o  male  Chief Complaint   Patient presents with    Follow-up     ER 2/24/22 [facility]  SOB, stuffy nose   Health maintenance     cologuard ordered in July    Sinusitis     still bothering him, fatigue, diarrhea on Saturday, cough, swollen lympg nodes in throat    Night Sweats     Wed thru Friday night       81-year-old male is seen today for ER follow-up  He was seen at [facility] Emergency Department on 02/24/2022 for recurrence of allergic rhinitis  He was seen by me on 02/16/2022 to which I prescribed him prednisone and advised to continue Singulair, chlorpheniramine, and Flonase for his exacerbation of rhinitis  He was found to be hypertensive during ER visit with a blood pressure of 167/100  CBC, CMP, BMP, and troponin were all within acceptable range  He was given IV fluids and advised to follow up with his PCP  His symptoms improved  He believed he had a viral URI last week, experiencing night sweats towards the end of the week, diarrhea, and mild allergic rhinitis  He drinks approximately 3 liters if water a day         The following portions of the patient's history were reviewed and updated as appropriate: allergies, current medications, past family history, past medical history, past social history, past surgical history and problem list     Review of Systems   Constitutional: Negative for activity change, appetite change, chills, diaphoresis, fatigue and fever  HENT: Negative for congestion, postnasal drip, rhinorrhea, sinus pressure, sinus pain, sneezing and sore throat  Eyes: Negative for visual disturbance  Respiratory: Negative for apnea, cough, choking, chest tightness, shortness of breath and wheezing  Cardiovascular: Negative for chest pain, palpitations and leg swelling  Gastrointestinal: Negative for abdominal distention, abdominal pain, anal bleeding, blood in stool, constipation, diarrhea, nausea and vomiting  Endocrine: Negative for cold intolerance and heat intolerance  Genitourinary: Negative for difficulty urinating, dysuria and hematuria  Musculoskeletal: Negative  Skin: Negative  Neurological: Negative for dizziness, weakness, light-headedness, numbness and headaches  Hematological: Negative for adenopathy  Psychiatric/Behavioral: Negative for agitation, sleep disturbance and suicidal ideas  All other systems reviewed and are negative          Past Medical History:   Diagnosis Date    Anxiety 1/15/2019    Arthritis     Chronic rhinitis     last assessed 2/21/14    Chronic serous otitis media     last assessed 11/20/13    Chronic sinusitis     last assessed 8/19/14    Functional heart murmur     GERD (gastroesophageal reflux disease)     Gout     Hearing problem     last assessed 12/1/16    Hiatal hernia     Hypercholesterolemia     Hypertension     Palpitations     Testicular hypogonadism     last assessed 10/4/13    [facility])          Current Outpatient Medications:     acetaminophen (TYLENOL) 500 mg tablet, Take 1,000 mg by mouth every 6 (six) hours as needed for mild pain, Disp: , Rfl:     albuterol (2 5 mg/3 mL) 0 083 % nebulizer solution, Take 3 mL (2 5 mg total) by nebulization every 6 (six) hours as needed for wheezing or shortness of breath, Disp: 180 mL, Rfl: 5    albuterol (PROVENTIL HFA,VENTOLIN HFA) 90 mcg/act inhaler, Inhale 1 puff 4 (four) times a day , Disp: , Rfl:     Chlorpheniramine Maleate (CHLOR-TRIMETON ALLERGY PO), Take by mouth as needed  , Disp: , Rfl:     esomeprazole (NexIUM) 40 MG capsule, Take 40 mg by mouth every morning before breakfast, Disp: , Rfl:     fluticasone-salmeterol (ADVAIR DISKUS) 500-50 mcg/dose, Inhale 1 puff 2 (two) times a day, Disp: , Rfl:     ibuprofen (MOTRIN) 600 mg tablet, Take 600 mg by mouth every 8 (eight) hours, Disp: , Rfl:     ipratropium-albuterol (DUO-NEB) 0 5-2 5 mg/3 mL, Inhale 3 mL 2 (two) times a week , Disp: , Rfl:     lisinopril (ZESTRIL) 20 mg tablet, Take 1 tablet (20 mg total) by mouth daily, Disp: 90 tablet, Rfl: 1    meclizine (ANTIVERT) 12 5 MG tablet, Take 1 tablet (12 5 mg total) by mouth every 8 (eight) hours, Disp: 30 tablet, Rfl: 1    montelukast (SINGULAIR) 10 mg tablet, Take 1 tablet (10 mg total) by mouth daily, Disp: 90 tablet, Rfl: 1    Triamcinolone Acetonide (NASACORT ALLERGY 24HR NA), 1 spray into each nostril daily  , Disp: , Rfl:     Uloric 40 MG tablet, Take 1 tablet (40 mg total) by mouth daily, Disp: 90 tablet, Rfl: 1    verapamil (CALAN) 120 mg tablet, TAKE 1 TABLET BY MOUTH EVERY DAY, Disp: 90 tablet, Rfl: 3    Allergies   Allergen Reactions    Penicillins Rash and Anaphylaxis    Acetazolamide      Other reaction(s): Stomach Ache    Cephalosporins Other (See Comments)     headache    Doxycycline      Capsules only  Tablets are ok to take, per pt  Capsules only  Tablets are ok to take, per pt   Other     Sulfa Antibiotics Other (See Comments)     Category: Allergy;  Annotation - [id number]: STOMACH UPSET    Famotidine Palpitations    Levofloxacin Palpitations    Omeprazole Palpitations     Painful urination       Social History   Past Surgical History:   Procedure Laterality Date    APPENDECTOMY      BICEPS TENODESIS      anesthesia for tenodesis- of ruptured long tendon of biceps     HERNIA REPAIR      THYROIDECTOMY      TONSILLECTOMY       Family History   Problem Relation Age of Onset    Lung cancer Father     Coronary artery disease Father         blockages    Stroke Maternal Grandmother     Cancer Paternal Grandfather         metastasized    Heart disease Family     Lung cancer Cousin     No Known Problems Mother     No Known Problems Sister     No Known Problems Brother     No Known Problems Maternal Aunt     No Known Problems Maternal Uncle     No Known Problems Paternal Aunt     No Known Problems Paternal Uncle     No Known Problems Paternal Grandmother     ADD / ADHD Neg Hx     Anesthesia problems Neg Hx     Clotting disorder Neg Hx     Collagen disease Neg Hx     Diabetes Neg Hx     Dislocations Neg Hx     Learning disabilities Neg Hx     Neurological problems Neg Hx     Osteoporosis Neg Hx     Rheumatologic disease Neg Hx     Scoliosis Neg Hx     Vascular Disease Neg Hx        Objective:  /84 (BP Location: Right arm, Patient Position: Sitting, Cuff Size: Large)   Pulse 88   Temp 98 6 °F (37 °C) (Temporal)   Resp 18   Ht 5' 9 5" (1 765 m)   Wt 100 kg (221 lb)   SpO2 98%   BMI 32 17 kg/m²     Recent Results (from the past 1344 hour(s))   COVID Only - Collected at [facility]    Collection Time: 01/04/22  7:48 PM    Specimen: Nose; Nares   Result Value Ref Range    SARS-CoV-2 Negative Negative   POCT rapid strepA    Collection Time: 01/04/22  9:00 PM   Result Value Ref Range     RAPID STREP A Negative Negative   Throat culture    Collection Time: 01/04/22  9:01 PM    Specimen: Throat   Result Value Ref Range    Throat Culture Negative for beta-hemolytic Streptococcus    COVID Only -Office Collect    Collection Time: 02/01/22  8:29 PM    Specimen: Nose; Nares   Result Value Ref Range    SARS-CoV-2 Negative Negative            Physical Exam  Vitals and nursing note reviewed     Constitutional: General: He is not in acute distress  Appearance: He is well-developed  He is not diaphoretic  HENT:      Head: Normocephalic and atraumatic  Eyes:      General: No scleral icterus  Right eye: No discharge  Left eye: No discharge  Conjunctiva/sclera: Conjunctivae normal       Pupils: Pupils are equal, round, and reactive to light  Neck:      Thyroid: No thyromegaly  Vascular: No JVD  Cardiovascular:      Rate and Rhythm: Normal rate and regular rhythm  Heart sounds: Normal heart sounds  No murmur heard  No friction rub  No gallop  Pulmonary:      Effort: Pulmonary effort is normal  No respiratory distress  Breath sounds: Normal breath sounds  No wheezing or rales  Chest:      Chest wall: No tenderness  Abdominal:      General: Bowel sounds are normal  There is no distension  Palpations: Abdomen is soft  There is no mass  Tenderness: There is no abdominal tenderness  There is no guarding or rebound  Musculoskeletal:         General: No tenderness or deformity  Normal range of motion  Cervical back: Normal range of motion and neck supple  Lymphadenopathy:      Cervical: No cervical adenopathy  Skin:     General: Skin is warm and dry  Coloration: Skin is not pale  Findings: No erythema or rash  Neurological:      Mental Status: He is alert and oriented to person, place, and time  Cranial Nerves: No cranial nerve deficit  Coordination: Coordination normal       Deep Tendon Reflexes: Reflexes are normal and symmetric  Psychiatric:         Behavior: Behavior normal          Thought Content:  Thought content normal          Judgment: Judgment normal

## 2022-03-08 ENCOUNTER — OFFICE VISIT (OUTPATIENT)
Dept: URGENT CARE | Age: 57
End: 2022-03-08
Payer: MEDICARE

## 2022-03-08 VITALS — HEART RATE: 72 BPM | TEMPERATURE: 97.2 F | RESPIRATION RATE: 18 BRPM | OXYGEN SATURATION: 97 %

## 2022-03-08 DIAGNOSIS — R59.1 LYMPHADENOPATHY: Primary | ICD-10-CM

## 2022-03-08 PROCEDURE — 99213 OFFICE O/P EST LOW 20 MIN: CPT

## 2022-03-08 PROCEDURE — G0463 HOSPITAL OUTPT CLINIC VISIT: HCPCS

## 2022-03-09 NOTE — PROGRESS NOTES
[address]        NAME: Mague Hearn is a 55 y o  male  : 1965    MRN: [id number]  DATE: 2022  TIME: 8:24 PM    Assessment and Plan   Lymphadenopathy [R59 1]  1  Lymphadenopathy     Ambulatory pulse oximeter 98%, patient in no acute distress  At this point, not recommending course of steroids due to recent course of prednisone  Recommending symptom management with humidifier and nasal saline spray, OTC allergy medications as indicated  Patient Instructions   If SOB, chest pain, increased use of PRN inhaler beyond baseline use, seek further evaluation  Follow up with PCP in 3-5 days  Proceed to  ER if symptoms worsen  Chief Complaint     Chief Complaint   Patient presents with    Shortness of Breath    Fatigue         History of Present Illness       Patient is a 55 y o  male with PMH significant for asthma, vertigo, sinusitis who presents for chief complaint of swollen lymph nodes of the anterior neck, hacking cough productive of greenish/yellow sputum  He has been experiencing similar symptoms intermittently throughout the winter, and noted the most recent flare over the past one to two days  Reports his "lymph nodes always swell" when he is sick  He states that he has been taking Sudafed for allergy symptoms, and believes that the drug is causing dry mouth  He states his goal for the visit is to make sure his oxygenation is adequate and that he has no signs of ear infection or need of steroid prescription  Recent visit with pulmonologist was unremarkable  He was treated with antibiotics and oral steroids within the last month  He denies SOB, chest pain, dizziness, syncope, palpitations, headaches, vision changes, wheezes or increased use of his PRN albuterol inhaler  Review of Systems   Review of Systems   Constitutional: Positive for fatigue  Negative for activity change, chills and fever  HENT: Positive for congestion   Negative for drooling, ear discharge, ear pain, nosebleeds, postnasal drip, rhinorrhea, sinus pressure, sinus pain, sneezing, sore throat, tinnitus and trouble swallowing  Eyes: Negative for photophobia, discharge, redness and visual disturbance  Respiratory: Positive for cough  Negative for chest tightness, shortness of breath, wheezing and stridor  Cardiovascular: Negative for chest pain, palpitations and leg swelling  Gastrointestinal: Negative for abdominal pain, constipation, diarrhea, nausea and vomiting  Endocrine: Negative  Genitourinary: Negative  Negative for difficulty urinating, dysuria, flank pain, penile pain and urgency  Musculoskeletal: Negative for arthralgias, back pain, joint swelling, myalgias, neck pain and neck stiffness  Skin: Negative  Allergic/Immunologic: Negative  Neurological: Negative for dizziness, syncope, weakness, light-headedness, numbness and headaches  Hematological: Positive for adenopathy  Psychiatric/Behavioral: Negative            Current Medications       Current Outpatient Medications:     acetaminophen (TYLENOL) 500 mg tablet, Take 1,000 mg by mouth every 6 (six) hours as needed for mild pain, Disp: , Rfl:     albuterol (2 5 mg/3 mL) 0 083 % nebulizer solution, Take 3 mL (2 5 mg total) by nebulization every 6 (six) hours as needed for wheezing or shortness of breath, Disp: 180 mL, Rfl: 5    albuterol (PROVENTIL HFA,VENTOLIN HFA) 90 mcg/act inhaler, Inhale 1 puff 4 (four) times a day , Disp: , Rfl:     Chlorpheniramine Maleate (CHLOR-TRIMETON ALLERGY PO), Take by mouth as needed  , Disp: , Rfl:     esomeprazole (NexIUM) 40 MG capsule, Take 40 mg by mouth every morning before breakfast, Disp: , Rfl:     fluticasone-salmeterol (ADVAIR DISKUS) 500-50 mcg/dose, Inhale 1 puff 2 (two) times a day, Disp: , Rfl:     ipratropium-albuterol (DUO-NEB) 0 5-2 5 mg/3 mL, Inhale 3 mL 2 (two) times a week , Disp: , Rfl:     lisinopril (ZESTRIL) 20 mg tablet, Take 1 tablet (20 mg total) by mouth daily, Disp: 90 tablet, Rfl: 1    meclizine (ANTIVERT) 12 5 MG tablet, Take 1 tablet (12 5 mg total) by mouth every 8 (eight) hours, Disp: 30 tablet, Rfl: 1    montelukast (SINGULAIR) 10 mg tablet, Take 1 tablet (10 mg total) by mouth daily, Disp: 90 tablet, Rfl: 1    Triamcinolone Acetonide (NASACORT ALLERGY 24HR NA), 1 spray into each nostril daily  , Disp: , Rfl:     Uloric 40 MG tablet, Take 1 tablet (40 mg total) by mouth daily, Disp: 90 tablet, Rfl: 1    verapamil (CALAN) 120 mg tablet, TAKE 1 TABLET BY MOUTH EVERY DAY, Disp: 90 tablet, Rfl: 3    ibuprofen (MOTRIN) 600 mg tablet, Take 600 mg by mouth every 8 (eight) hours, Disp: , Rfl:     Current Allergies     Allergies as of 03/08/2022 - Reviewed 03/08/2022   Allergen Reaction Noted    Penicillins Rash and Anaphylaxis 08/17/2004    Acetazolamide  10/25/2016    Cephalosporins Other (See Comments) 08/17/2004    Doxycycline  10/24/2018    Other  04/28/2014    Sulfa antibiotics Other (See Comments) 08/17/2004    Famotidine Palpitations 09/05/2016    Levofloxacin Palpitations 11/12/2021    Omeprazole Palpitations 09/05/2016            The following portions of the patient's history were reviewed and updated as appropriate: allergies, current medications, past family history, past medical history, past social history, past surgical history and problem list      Past Medical History:   Diagnosis Date    Anxiety 1/15/2019    Arthritis     Chronic rhinitis     last assessed 2/21/14    Chronic serous otitis media     last assessed 11/20/13    Chronic sinusitis     last assessed 8/19/14    Functional heart murmur     GERD (gastroesophageal reflux disease)     Gout     Hearing problem     last assessed 12/1/16    Hiatal hernia     Hypercholesterolemia     Hypertension     Palpitations     Testicular hypogonadism     last assessed 10/4/13    [facility])        Past Surgical History:   Procedure Laterality Date    APPENDECTOMY      BICEPS TENODESIS      anesthesia for tenodesis- of ruptured long tendon of biceps     HERNIA REPAIR      THYROIDECTOMY      TONSILLECTOMY         Family History   Problem Relation Age of Onset    Lung cancer Father     Coronary artery disease Father         blockages    Stroke Maternal Grandmother     Cancer Paternal Grandfather         metastasized    Heart disease Family     Lung cancer Cousin     No Known Problems Mother     No Known Problems Sister     No Known Problems Brother     No Known Problems Maternal Aunt     No Known Problems Maternal Uncle     No Known Problems Paternal Aunt     No Known Problems Paternal Uncle     No Known Problems Paternal Grandmother     ADD / ADHD Neg Hx     Anesthesia problems Neg Hx     Clotting disorder Neg Hx     Collagen disease Neg Hx     Diabetes Neg Hx     Dislocations Neg Hx     Learning disabilities Neg Hx     Neurological problems Neg Hx     Osteoporosis Neg Hx     Rheumatologic disease Neg Hx     Scoliosis Neg Hx     Vascular Disease Neg Hx          Medications have been verified  Objective   Pulse 72   Temp (!) 97 2 °F (36 2 °C) (Temporal)   Resp 18   SpO2 97%        Physical Exam     Physical Exam  Constitutional:       General: He is not in acute distress  Appearance: He is well-developed  He is not ill-appearing, toxic-appearing or diaphoretic  HENT:      Head: Normocephalic and atraumatic  Jaw: No trismus or swelling  Right Ear: Tympanic membrane, ear canal and external ear normal  There is no impacted cerumen  Left Ear: Tympanic membrane, ear canal and external ear normal  There is no impacted cerumen  Nose: No congestion or rhinorrhea  Mouth/Throat:      Mouth: Mucous membranes are dry  No oral lesions  Tongue: No lesions  Tongue does not deviate from midline  Palate: No mass and lesions  Pharynx: Pharyngeal swelling present   No oropharyngeal exudate, posterior oropharyngeal erythema or uvula swelling  Tonsils: No tonsillar exudate or tonsillar abscesses  2+ on the right  2+ on the left  Eyes:      Extraocular Movements:      Right eye: Normal extraocular motion  Left eye: Normal extraocular motion  Conjunctiva/sclera: Conjunctivae normal       Pupils: Pupils are equal, round, and reactive to light  Neck:      Thyroid: No thyromegaly  Cardiovascular:      Rate and Rhythm: Normal rate and regular rhythm  Heart sounds: Normal heart sounds  Pulmonary:      Effort: Pulmonary effort is normal  No respiratory distress  Breath sounds: Normal breath sounds  No stridor  No wheezing, rhonchi or rales  Chest:      Chest wall: No tenderness  Abdominal:      Palpations: Abdomen is soft  Musculoskeletal:         General: No swelling or tenderness  Normal range of motion  Cervical back: Normal range of motion  Lymphadenopathy:      Cervical: No cervical adenopathy  Skin:     General: Skin is warm and dry  Capillary Refill: Capillary refill takes less than 2 seconds  Neurological:      General: No focal deficit present  Mental Status: He is alert  GCS: GCS eye subscore is 4  GCS verbal subscore is 5  GCS motor subscore is 6  Cranial Nerves: Cranial nerves are intact  Sensory: Sensation is intact  Motor: Motor function is intact  Coordination: Coordination is intact  Psychiatric:         Mood and Affect: Mood is anxious  Speech: Speech is rapid and pressured

## 2022-03-09 NOTE — PATIENT INSTRUCTIONS
Lymphadenopathy   WHAT YOU NEED TO KNOW:   Lymphadenopathy is swelling of your lymph nodes  Lymph nodes are small organs that are part of your immune system  The lymph nodes are found throughout your body  They are most easily felt in your neck, under your arms, and near your groin  Lymphadenopathy can occur in one or more areas of your body  It is usually caused by an infection  DISCHARGE INSTRUCTIONS:   Return to the emergency department if:   · The swollen lymph nodes bleed  · You have swollen lymph nodes in your neck that affect your breathing or swallowing  Contact your healthcare provider if:   · You have a fever  · You have a new swollen and painful lymph node  · You have a skin rash  · Your lymph node remains swollen or painful, or it gets bigger  · Your lymph node has red streaks around it, or the skin around the lymph node is red  · You have questions or concerns about your condition or care  Follow up with your doctor as directed:  Write down your questions so you remember to ask them during your visits  Self-care:   · Do not poke or squeeze  the swollen lymph nodes  · Apply heat to the swollen glands  You may use warm compresses, or an electric heating pad set on low  · Rest as needed  If you have a fever, rest until your temperature returns to normal  Return to your normal daily activities slowly after your fever is gone  © Copyright [employer] 2022 Information is for End User's use only and may not be sold, redistributed or otherwise used for commercial purposes  All illustrations and images included in CareNotes® are the copyrighted property of [employer]  or [employer]heed Chew  The above information is an  only  It is not intended as medical advice for individual conditions or treatments  Talk to your doctor, nurse or pharmacist before following any medical regimen to see if it is safe and effective for you

## 2022-03-21 ENCOUNTER — OFFICE VISIT (OUTPATIENT)
Dept: OBGYN CLINIC | Facility: MEDICAL CENTER | Age: 57
End: 2022-03-21
Payer: MEDICARE

## 2022-03-21 VITALS
WEIGHT: 220.6 LBS | DIASTOLIC BLOOD PRESSURE: 81 MMHG | BODY MASS INDEX: 31.58 KG/M2 | HEIGHT: 70 IN | HEART RATE: 76 BPM | SYSTOLIC BLOOD PRESSURE: 125 MMHG

## 2022-03-21 DIAGNOSIS — M17.0 BILATERAL PRIMARY OSTEOARTHRITIS OF KNEE: Primary | ICD-10-CM

## 2022-03-21 PROCEDURE — 99213 OFFICE O/P EST LOW 20 MIN: CPT | Performed by: PHYSICIAN ASSISTANT

## 2022-03-21 PROCEDURE — 20610 DRAIN/INJ JOINT/BURSA W/O US: CPT | Performed by: PHYSICIAN ASSISTANT

## 2022-03-21 RX ORDER — TRIAMCINOLONE ACETONIDE 40 MG/ML
40 INJECTION, SUSPENSION INTRA-ARTICULAR; INTRAMUSCULAR
Status: COMPLETED | OUTPATIENT
Start: 2022-03-21 | End: 2022-03-21

## 2022-03-21 RX ORDER — LIDOCAINE HYDROCHLORIDE 10 MG/ML
3 INJECTION, SOLUTION INFILTRATION; PERINEURAL
Status: COMPLETED | OUTPATIENT
Start: 2022-03-21 | End: 2022-03-21

## 2022-03-21 RX ADMIN — TRIAMCINOLONE ACETONIDE 40 MG: 40 INJECTION, SUSPENSION INTRA-ARTICULAR; INTRAMUSCULAR at 16:42

## 2022-03-21 RX ADMIN — LIDOCAINE HYDROCHLORIDE 3 ML: 10 INJECTION, SOLUTION INFILTRATION; PERINEURAL at 16:42

## 2022-03-21 NOTE — PROGRESS NOTES
Assessment/Plan:  1  Bilateral primary osteoarthritis of knee      Orders Placed This Encounter   Procedures    Large joint arthrocentesis: bilateral knee    Injection Procedure Prior Authorization       · Patient received bilateral knee steroid injections today  Tolerated the procedure well  Advised to apply ice and avoid strenuous activity for 1-2 days as needed  · Order placed for bilateral knee euflexxa to be given in 2 months  · Will cosider TKA depending on injection results  · Continue ibuprofen as needed for pain  · Continue activity as tolerated  Return in about 2 months (around 5/21/2022)  I answered all of the patient's questions during the visit and provided education of the patient's condition during the visit  The patient verbalized understanding of the information given and agrees with the plan  This note was dictated using [employer] software  It may contain errors including improperly dictated words  Please contact physician directly for any questions  Subjective   Chief Complaint:   Chief Complaint   Patient presents with    Left Knee - Follow-up    Right Knee - Follow-up       HPI  Marques Gregory is a 65 y o  male who presents for follow up for bilateral knee pain and OA  Patient is here today for bilat knee CSI  He received steroid injections at his last visit and notes good relief  He states he has the return of bilateral knee pain and muscle spasms  He is taking ibuprofen as needed for pain  He is interested in doing VS injections next  He is considering surgery for the future  Review of Systems  ROS:    See HPI for musculoskeletal review     All other systems reviewed are negative     History:  Past Medical History:   Diagnosis Date    Anxiety 1/15/2019    Arthritis     Chronic rhinitis     last assessed 2/21/14    Chronic serous otitis media     last assessed 11/20/13    Chronic sinusitis     last assessed 8/19/14    Functional heart murmur     GERD (gastroesophageal reflux disease)     Gout     Hearing problem     last assessed 12/1/16    Hiatal hernia     Hypercholesterolemia     Hypertension     Palpitations     Testicular hypogonadism     last assessed 10/4/13    [facility])      Past Surgical History:   Procedure Laterality Date    APPENDECTOMY      BICEPS TENODESIS      anesthesia for tenodesis- of ruptured long tendon of biceps     HERNIA REPAIR      THYROIDECTOMY      TONSILLECTOMY       Social History   Social History     Substance and Sexual Activity   Alcohol Use Yes    Comment: occasionally     Social History     Substance and Sexual Activity   Drug Use No     Social History     Tobacco Use   Smoking Status Never Smoker   Smokeless Tobacco Never Used     Family History:   Family History   Problem Relation Age of Onset    Lung cancer Father     Coronary artery disease Father         blockages    Stroke Maternal Grandmother     Cancer Paternal Grandfather         metastasized    Heart disease Family     Lung cancer Cousin     No Known Problems Mother     No Known Problems Sister     No Known Problems Brother     No Known Problems Maternal Aunt     No Known Problems Maternal Uncle     No Known Problems Paternal Aunt     No Known Problems Paternal Uncle     No Known Problems Paternal Grandmother     ADD / ADHD Neg Hx     Anesthesia problems Neg Hx     Clotting disorder Neg Hx     Collagen disease Neg Hx     Diabetes Neg Hx     Dislocations Neg Hx     Learning disabilities Neg Hx     Neurological problems Neg Hx     Osteoporosis Neg Hx     Rheumatologic disease Neg Hx     Scoliosis Neg Hx     Vascular Disease Neg Hx        Current Outpatient Medications on File Prior to Visit   Medication Sig Dispense Refill    acetaminophen (TYLENOL) 500 mg tablet Take 1,000 mg by mouth every 6 (six) hours as needed for mild pain      albuterol (2 5 mg/3 mL) 0 083 % nebulizer solution Take 3 mL (2 5 mg total) by nebulization every 6 (six) hours as needed for wheezing or shortness of breath 180 mL 5    albuterol (PROVENTIL HFA,VENTOLIN HFA) 90 mcg/act inhaler Inhale 1 puff 4 (four) times a day       Chlorpheniramine Maleate (CHLOR-TRIMETON ALLERGY PO) Take by mouth as needed        esomeprazole (NexIUM) 40 MG capsule Take 40 mg by mouth every morning before breakfast      fluticasone-salmeterol (ADVAIR DISKUS) 500-50 mcg/dose Inhale 1 puff 2 (two) times a day      ibuprofen (MOTRIN) 600 mg tablet Take 600 mg by mouth every 8 (eight) hours      ipratropium-albuterol (DUO-NEB) 0 5-2 5 mg/3 mL Inhale 3 mL 2 (two) times a week       lisinopril (ZESTRIL) 20 mg tablet Take 1 tablet (20 mg total) by mouth daily 90 tablet 1    meclizine (ANTIVERT) 12 5 MG tablet Take 1 tablet (12 5 mg total) by mouth every 8 (eight) hours 30 tablet 1    montelukast (SINGULAIR) 10 mg tablet Take 1 tablet (10 mg total) by mouth daily 90 tablet 1    Triamcinolone Acetonide (NASACORT ALLERGY 24HR NA) 1 spray into each nostril daily        Uloric 40 MG tablet Take 1 tablet (40 mg total) by mouth daily 90 tablet 1    verapamil (CALAN) 120 mg tablet TAKE 1 TABLET BY MOUTH EVERY DAY 90 tablet 3     No current facility-administered medications on file prior to visit  Allergies   Allergen Reactions    Penicillins Rash and Anaphylaxis    Acetazolamide      Other reaction(s): Stomach Ache    Cephalosporins Other (See Comments)     headache    Doxycycline      Capsules only  Tablets are ok to take, per pt  Capsules only  Tablets are ok to take, per pt   Other     Sulfa Antibiotics Other (See Comments)     Category: Allergy;  Annotation - [id number]: STOMACH UPSET    Famotidine Palpitations    Levofloxacin Palpitations    Omeprazole Palpitations     Painful urination        Objective     /81   Pulse 76   Ht 5' 9 5" (1 765 m)   Wt 100 kg (220 lb 9 6 oz)   BMI 32 11 kg/m²      PE:  AAOx 3  WDWN  Hearing intact, no drainage from eyes  no audible wheezing  no abdominal distension  LE compartments soft, skin intact    Ortho Exam:  bilateral Knee:   No erythema  no swelling  no effusion  no warmth  No TTP  AROM: 3- 120  Stable to varus/valgus stress    Large joint arthrocentesis: bilateral knee  Universal Protocol:  Consent: Verbal consent obtained    Risks and benefits: risks, benefits and alternatives were discussed  Consent given by: patient  Site marked: the operative site was marked  Supporting Documentation  Indications: pain   Procedure Details  Location: knee - bilateral knee  Preparation: Patient was prepped and draped in the usual sterile fashion  Needle size: 22 G  Ultrasound guidance: no  Approach: anterolateral    Medications (Right): 3 mL lidocaine 1 %; 40 mg triamcinolone acetonide 40 mg/mLMedications (Left): 3 mL lidocaine 1 %; 40 mg triamcinolone acetonide 40 mg/mL   Patient tolerance: patient tolerated the procedure well with no immediate complications  Dressing:  Sterile dressing applied

## 2022-03-22 ENCOUNTER — OFFICE VISIT (OUTPATIENT)
Dept: INTERNAL MEDICINE CLINIC | Age: 57
End: 2022-03-22
Payer: MEDICARE

## 2022-03-22 VITALS
OXYGEN SATURATION: 98 % | BODY MASS INDEX: 31.64 KG/M2 | TEMPERATURE: 97.5 F | WEIGHT: 221 LBS | HEIGHT: 70 IN | HEART RATE: 75 BPM | DIASTOLIC BLOOD PRESSURE: 68 MMHG | SYSTOLIC BLOOD PRESSURE: 130 MMHG

## 2022-03-22 DIAGNOSIS — J45.909 ASTHMA, UNSPECIFIED ASTHMA SEVERITY, UNSPECIFIED WHETHER COMPLICATED, UNSPECIFIED WHETHER PERSISTENT: ICD-10-CM

## 2022-03-22 DIAGNOSIS — I10 ESSENTIAL HYPERTENSION: ICD-10-CM

## 2022-03-22 DIAGNOSIS — J45.901: ICD-10-CM

## 2022-03-22 DIAGNOSIS — K21.9 GERD WITHOUT ESOPHAGITIS: Primary | ICD-10-CM

## 2022-03-22 DIAGNOSIS — I47.2 VENTRICULAR TACHYCARDIA (HCC): ICD-10-CM

## 2022-03-22 PROCEDURE — 99213 OFFICE O/P EST LOW 20 MIN: CPT | Performed by: INTERNAL MEDICINE

## 2022-03-22 RX ORDER — PREDNISONE 20 MG/1
40 TABLET ORAL DAILY
Qty: 10 TABLET | Refills: 0 | Status: SHIPPED | OUTPATIENT
Start: 2022-03-22 | End: 2022-03-27

## 2022-03-22 NOTE — PROGRESS NOTES
Assessment/Plan:    1  Acute exacerbation of bronchial asthma  Will start patient on prednisone 40 mg daily for 5 days  Advised to continue with present combination of inhalers    2  Essential hypertension  Blood pressure is stable on present regimen    3  GERD  Continue with present regimen  Continue with better diet control         Diagnoses and all orders for this visit:    GERD without esophagitis    Asthma, unspecified asthma severity, unspecified whether complicated, unspecified whether persistent    Acute severe exacerbation of allergic asthma  -     predniSONE 20 mg tablet; Take 2 tablets (40 mg total) by mouth daily for 5 days    Ventricular tachycardia (HCC)    Essential hypertension          BMI Counseling: Body mass index is 32 17 kg/m²  The BMI is above normal  Nutrition recommendations include decreasing portion sizes, encouraging healthy choices of fruits and vegetables and decreasing fast food intake  Exercise recommendations include vigorous physical activity 75 minutes/week and exercising 3-5 times per week  Rationale for BMI follow-up plan is due to patient being overweight or obese  Subjective:          Patient ID: Jun Thompson is a 65 y o  male  Came to office with a complain of shortness breath and wheezing  Mild throat congestion  No fever chills    Sinus Problem  Associated symptoms include congestion and coughing  Pertinent negatives include no ear pain, headaches, neck pain, shortness of breath, sinus pressure or sore throat  The following portions of the patient's history were reviewed and updated as appropriate: allergies, current medications, past family history, past medical history, past social history, past surgical history and problem list     Review of Systems   Constitutional: Negative for fatigue and fever  HENT: Positive for congestion  Negative for ear discharge, ear pain, postnasal drip, sinus pressure, sore throat, tinnitus and trouble swallowing  Eyes: Negative for discharge, itching and visual disturbance  Respiratory: Positive for cough and wheezing  Negative for shortness of breath  Cardiovascular: Negative for chest pain and palpitations  Gastrointestinal: Negative for abdominal pain, diarrhea, nausea and vomiting  Endocrine: Negative for cold intolerance and polyuria  Genitourinary: Negative for difficulty urinating, dysuria and urgency  Musculoskeletal: Negative for arthralgias and neck pain  Skin: Negative for rash  Allergic/Immunologic: Negative for environmental allergies  Neurological: Negative for dizziness, weakness and headaches  Psychiatric/Behavioral: Negative for agitation and behavioral problems  The patient is not nervous/anxious            Past Medical History:   Diagnosis Date    Anxiety 1/15/2019    Arthritis     Chronic rhinitis     last assessed 2/21/14    Chronic serous otitis media     last assessed 11/20/13    Chronic sinusitis     last assessed 8/19/14    Functional heart murmur     GERD (gastroesophageal reflux disease)     Gout     Hearing problem     last assessed 12/1/16    Hiatal hernia     Hypercholesterolemia     Hypertension     Palpitations     Testicular hypogonadism     last assessed 10/4/13    [facility])          Current Outpatient Medications:     acetaminophen (TYLENOL) 500 mg tablet, Take 1,000 mg by mouth every 6 (six) hours as needed for mild pain, Disp: , Rfl:     albuterol (2 5 mg/3 mL) 0 083 % nebulizer solution, Take 3 mL (2 5 mg total) by nebulization every 6 (six) hours as needed for wheezing or shortness of breath, Disp: 180 mL, Rfl: 5    albuterol (PROVENTIL HFA,VENTOLIN HFA) 90 mcg/act inhaler, Inhale 1 puff 4 (four) times a day , Disp: , Rfl:     Chlorpheniramine Maleate (CHLOR-TRIMETON ALLERGY PO), Take by mouth as needed  , Disp: , Rfl:     esomeprazole (NexIUM) 40 MG capsule, Take 40 mg by mouth every morning before breakfast, Disp: , Rfl:    fluticasone-salmeterol (ADVAIR DISKUS) 500-50 mcg/dose, Inhale 1 puff 2 (two) times a day, Disp: , Rfl:     ipratropium-albuterol (DUO-NEB) 0 5-2 5 mg/3 mL, Inhale 3 mL 2 (two) times a week , Disp: , Rfl:     lisinopril (ZESTRIL) 20 mg tablet, Take 1 tablet (20 mg total) by mouth daily, Disp: 90 tablet, Rfl: 1    meclizine (ANTIVERT) 12 5 MG tablet, Take 1 tablet (12 5 mg total) by mouth every 8 (eight) hours, Disp: 30 tablet, Rfl: 1    montelukast (SINGULAIR) 10 mg tablet, Take 1 tablet (10 mg total) by mouth daily, Disp: 90 tablet, Rfl: 1    Triamcinolone Acetonide (NASACORT ALLERGY 24HR NA), 1 spray into each nostril daily  , Disp: , Rfl:     Uloric 40 MG tablet, Take 1 tablet (40 mg total) by mouth daily, Disp: 90 tablet, Rfl: 1    verapamil (CALAN) 120 mg tablet, TAKE 1 TABLET BY MOUTH EVERY DAY, Disp: 90 tablet, Rfl: 3    ibuprofen (MOTRIN) 600 mg tablet, Take 600 mg by mouth every 8 (eight) hours, Disp: , Rfl:     predniSONE 20 mg tablet, Take 2 tablets (40 mg total) by mouth daily for 5 days, Disp: 10 tablet, Rfl: 0  No current facility-administered medications for this visit  Allergies   Allergen Reactions    Penicillins Rash and Anaphylaxis    Acetazolamide      Other reaction(s): Stomach Ache    Cephalosporins Other (See Comments)     headache    Doxycycline      Capsules only  Tablets are ok to take, per pt  Capsules only  Tablets are ok to take, per pt   Other     Sulfa Antibiotics Other (See Comments)     Category: Allergy;  Annotation - [id number]: STOMACH UPSET    Famotidine Palpitations    Levofloxacin Palpitations    Omeprazole Palpitations     Painful urination       Social History   Past Surgical History:   Procedure Laterality Date    APPENDECTOMY      BICEPS TENODESIS      anesthesia for tenodesis- of ruptured long tendon of biceps     HERNIA REPAIR      THYROIDECTOMY      TONSILLECTOMY       Family History   Problem Relation Age of Onset    Lung cancer Father     Coronary artery disease Father         blockages    Stroke Maternal Grandmother     Cancer Paternal Grandfather         metastasized    Heart disease Family     Lung cancer Cousin     No Known Problems Mother     No Known Problems Sister     No Known Problems Brother     No Known Problems Maternal Aunt     No Known Problems Maternal Uncle     No Known Problems Paternal Aunt     No Known Problems Paternal Uncle     No Known Problems Paternal Grandmother     ADD / ADHD Neg Hx     Anesthesia problems Neg Hx     Clotting disorder Neg Hx     Collagen disease Neg Hx     Diabetes Neg Hx     Dislocations Neg Hx     Learning disabilities Neg Hx     Neurological problems Neg Hx     Osteoporosis Neg Hx     Rheumatologic disease Neg Hx     Scoliosis Neg Hx     Vascular Disease Neg Hx        Objective:  /68   Pulse 75   Temp 97 5 °F (36 4 °C) (Temporal)   Ht 5' 9 5" (1 765 m)   Wt 100 kg (221 lb)   SpO2 98%   BMI 32 17 kg/m²   Body mass index is 32 17 kg/m²  Physical Exam  Constitutional:       Appearance: He is well-developed  HENT:      Head: Normocephalic  Nose: Rhinorrhea present  Mouth/Throat:      Pharynx: No oropharyngeal exudate or posterior oropharyngeal erythema  Eyes:      General: No scleral icterus  Pupils: Pupils are equal, round, and reactive to light  Neck:      Thyroid: No thyromegaly  Trachea: No tracheal deviation  Cardiovascular:      Rate and Rhythm: Normal rate and regular rhythm  Heart sounds: Normal heart sounds  Pulmonary:      Effort: Pulmonary effort is normal  No respiratory distress  Breath sounds: Rhonchi present  Chest:      Chest wall: No tenderness  Abdominal:      General: Bowel sounds are normal       Palpations: Abdomen is soft  There is no mass  Tenderness: There is no abdominal tenderness  Musculoskeletal:         General: Normal range of motion        Cervical back: Normal range of motion and neck supple  Right lower leg: No edema  Left lower leg: No edema  Lymphadenopathy:      Cervical: No cervical adenopathy  Skin:     General: Skin is warm  Neurological:      Mental Status: He is alert and oriented to person, place, and time  Cranial Nerves: No cranial nerve deficit     Psychiatric:         Mood and Affect: Mood normal          Behavior: Behavior normal

## 2022-04-01 ENCOUNTER — OFFICE VISIT (OUTPATIENT)
Dept: URGENT CARE | Age: 57
End: 2022-04-01
Payer: MEDICARE

## 2022-04-01 VITALS
OXYGEN SATURATION: 95 % | RESPIRATION RATE: 18 BRPM | TEMPERATURE: 96.2 F | HEIGHT: 70 IN | BODY MASS INDEX: 31.64 KG/M2 | WEIGHT: 221 LBS | HEART RATE: 95 BPM

## 2022-04-01 DIAGNOSIS — R09.81 CONGESTION OF NASAL SINUS: Primary | ICD-10-CM

## 2022-04-01 PROCEDURE — G0463 HOSPITAL OUTPT CLINIC VISIT: HCPCS

## 2022-04-01 PROCEDURE — 99213 OFFICE O/P EST LOW 20 MIN: CPT

## 2022-04-01 NOTE — PATIENT INSTRUCTIONS
Cold Symptoms, Ambulatory Care   GENERAL INFORMATION:   Cold symptoms  include sneezing, dry throat, a stuffy nose, headache, watery eyes, and a cough  Your cough may be dry, or you may cough up mucus  You may also have muscle aches, joint pain, and tiredness  Rarely, you may have a fever  Cold symptoms occur from inflammation in your upper respiratory system caused by a virus  Most colds go away without treatment  Seek immediate care for the following symptoms:   · A heartbeat that is much faster than usual for you     · A swollen neck that is sore to the touch     · Increased tiredness and weakness    · Pinpoint or larger reddish-purple dots on your skin     · Poor or no appetite  Treatment for cold symptoms  may include NSAIDS to decrease muscle aches and fever  Do not give NSAID medicines to children under 10months of age without direction from your child's doctor  Cold medicines may also be given to decrease coughing, nasal stuffiness, sneezing, and a runny nose  Do not give cold medicines to children under 11years of age without direction from your child's doctor  Manage your cold symptoms with the following:   · Drink liquids  to help thin and loosen thick mucus so you can cough it up  Liquids will also keep you hydrated  Ask your healthcare provider which liquids are best for you and how much to drink each day  · Do not smoke  because it may worsen your symptoms and increase the length of time you feel sick  Talk with your healthcare provider if you need help to stop smoking  Prevent the spread of germs  by washing your hands often  You can spread your cold germs to others for at least 3 days after your symptoms start  Do not share items, such as eating utensils  Cover your nose and mouth when you cough or sneeze using the crook of your elbow instead of your hands  Throw used tissues in the garbage    Follow up with your healthcare provider as directed:  Write down your questions so you remember to ask them during your visits  CARE AGREEMENT:   You have the right to help plan your care  Learn about your health condition and how it may be treated  Discuss treatment options with your caregivers to decide what care you want to receive  You always have the right to refuse treatment  The above information is an  only  It is not intended as medical advice for individual conditions or treatments  Talk to your doctor, nurse or pharmacist before following any medical regimen to see if it is safe and effective for you  © 2014 [address] is for End User's use only and may not be sold, redistributed or otherwise used for commercial purposes  All illustrations and images included in CareNotes® are the copyrighted property of [employer]  or [employer]

## 2022-04-01 NOTE — PROGRESS NOTES
[facility]        NAME: Alfonso Solomon is a 65 y o  male  : 1965    MRN: [id number]  DATE: 2022  TIME: 7:29 PM    Assessment and Plan   Congestion of nasal sinus [R09 81]  1  Congestion of nasal sinus           Patient Instructions     Supportive care is discussed   Follow up with PCP in 3-5 days  Proceed to  ER if symptoms worsen  Chief Complaint     Chief Complaint   Patient presents with    Sore Throat     dry mouth since           History of Present Illness       Patient presenting for evaluation of swollen lymph nodes, dry throat and congestion  Patient states that these symptoms have been ongoing for the past 3 months  He states that he has had multiple courses of steroids for the symptoms, but they are not helping  He denies any acute cough, chest pain, shortness of breath, fevers or chills  He denies any recent sick contacts  Patient states that he has been taking Sudafed daily for the past 3 weeks  He states this is not helping with his symptoms  Review of Systems   Review of Systems   Constitutional: Negative for chills and fever  HENT: Positive for congestion and sore throat  Negative for ear pain  Eyes: Negative for pain and visual disturbance  Respiratory: Negative for cough and shortness of breath  Cardiovascular: Negative for chest pain and palpitations  Gastrointestinal: Negative for abdominal pain and vomiting  Genitourinary: Negative for dysuria and hematuria  Musculoskeletal: Negative for arthralgias and back pain  Skin: Negative for color change and rash  Neurological: Negative for seizures and syncope  Hematological: Positive for adenopathy  All other systems reviewed and are negative          Current Medications       Current Outpatient Medications:     acetaminophen (TYLENOL) 500 mg tablet, Take 1,000 mg by mouth every 6 (six) hours as needed for mild pain, Disp: , Rfl:     albuterol (2 5 mg/3 mL) 0 083 % nebulizer Please triage.     I have hours of feeling fine then stomach cramps start again. They are pretty bad at times. I am taking dicyclomine to help with spasms but still have stomachache. Seems like UTI is getting bad again. Was better tues,weds,thurs but stated up again fri. I have been sick for over weeks with different infections. Im tired of all this. What can I do. Im sticking to a bland, low/no sugar soft diet yet not getting better.   Help       solution, Take 3 mL (2 5 mg total) by nebulization every 6 (six) hours as needed for wheezing or shortness of breath, Disp: 180 mL, Rfl: 5    albuterol (PROVENTIL HFA,VENTOLIN HFA) 90 mcg/act inhaler, Inhale 1 puff 4 (four) times a day , Disp: , Rfl:     Chlorpheniramine Maleate (CHLOR-TRIMETON ALLERGY PO), Take by mouth as needed  , Disp: , Rfl:     esomeprazole (NexIUM) 40 MG capsule, Take 40 mg by mouth every morning before breakfast, Disp: , Rfl:     fluticasone-salmeterol (ADVAIR DISKUS) 500-50 mcg/dose, Inhale 1 puff 2 (two) times a day, Disp: , Rfl:     ibuprofen (MOTRIN) 600 mg tablet, Take 600 mg by mouth every 8 (eight) hours, Disp: , Rfl:     ipratropium-albuterol (DUO-NEB) 0 5-2 5 mg/3 mL, Inhale 3 mL 2 (two) times a week , Disp: , Rfl:     lisinopril (ZESTRIL) 20 mg tablet, Take 1 tablet (20 mg total) by mouth daily, Disp: 90 tablet, Rfl: 1    meclizine (ANTIVERT) 12 5 MG tablet, Take 1 tablet (12 5 mg total) by mouth every 8 (eight) hours, Disp: 30 tablet, Rfl: 1    montelukast (SINGULAIR) 10 mg tablet, Take 1 tablet (10 mg total) by mouth daily, Disp: 90 tablet, Rfl: 1    Triamcinolone Acetonide (NASACORT ALLERGY 24HR NA), 1 spray into each nostril daily  , Disp: , Rfl:     Uloric 40 MG tablet, Take 1 tablet (40 mg total) by mouth daily, Disp: 90 tablet, Rfl: 1    verapamil (CALAN) 120 mg tablet, TAKE 1 TABLET BY MOUTH EVERY DAY, Disp: 90 tablet, Rfl: 3    Current Allergies     Allergies as of 04/01/2022 - Reviewed 04/01/2022   Allergen Reaction Noted    Penicillins Rash and Anaphylaxis 08/17/2004    Acetazolamide  10/25/2016    Cephalosporins Other (See Comments) 08/17/2004    Doxycycline  10/24/2018    Other  04/28/2014    Sulfa antibiotics Other (See Comments) 08/17/2004    Famotidine Palpitations 09/05/2016    Levofloxacin Palpitations 11/12/2021    Omeprazole Palpitations 09/05/2016            The following portions of the patient's history were reviewed and updated as appropriate: allergies, current medications, past family history, past medical history, past social history, past surgical history and problem list      Past Medical History:   Diagnosis Date    Anxiety 1/15/2019    Arthritis     Chronic rhinitis     last assessed 2/21/14    Chronic serous otitis media     last assessed 11/20/13    Chronic sinusitis     last assessed 8/19/14    Functional heart murmur     GERD (gastroesophageal reflux disease)     Gout     Hearing problem     last assessed 12/1/16    Hiatal hernia     Hypercholesterolemia     Hypertension     Palpitations     Testicular hypogonadism     last assessed 10/4/13    [facility])        Past Surgical History:   Procedure Laterality Date    APPENDECTOMY      BICEPS TENODESIS      anesthesia for tenodesis- of ruptured long tendon of biceps     HERNIA REPAIR      THYROIDECTOMY      TONSILLECTOMY         Family History   Problem Relation Age of Onset    Lung cancer Father     Coronary artery disease Father         blockages    Stroke Maternal Grandmother     Cancer Paternal Grandfather         metastasized    Heart disease Family     Lung cancer Cousin     No Known Problems Mother     No Known Problems Sister     No Known Problems Brother     No Known Problems Maternal Aunt     No Known Problems Maternal Uncle     No Known Problems Paternal Aunt     No Known Problems Paternal Uncle     No Known Problems Paternal Grandmother     ADD / ADHD Neg Hx     Anesthesia problems Neg Hx     Clotting disorder Neg Hx     Collagen disease Neg Hx     Diabetes Neg Hx     Dislocations Neg Hx     Learning disabilities Neg Hx     Neurological problems Neg Hx     Osteoporosis Neg Hx     Rheumatologic disease Neg Hx     Scoliosis Neg Hx     Vascular Disease Neg Hx          Medications have been verified          Objective   Pulse 95   Temp (!) 96 2 °F (35 7 °C) (Temporal)   Resp 18   Ht 5' 9 5" (1 765 m)   Wt 100 kg (221 lb) SpO2 95%   BMI 32 17 kg/m²        Physical Exam     Physical Exam  Nursing note reviewed  Constitutional:       General: He is not in acute distress  Appearance: Normal appearance  He is not ill-appearing  HENT:      Head: Normocephalic and atraumatic  Right Ear: Tympanic membrane normal       Left Ear: Tympanic membrane normal       Nose: Nose normal  No congestion or rhinorrhea  Mouth/Throat:      Mouth: Mucous membranes are dry  Pharynx: Oropharynx is clear  Posterior oropharyngeal erythema present  No oropharyngeal exudate  Tonsils: No tonsillar exudate or tonsillar abscesses  0 on the right  0 on the left  Eyes:      Extraocular Movements: Extraocular movements intact  Conjunctiva/sclera: Conjunctivae normal       Pupils: Pupils are equal, round, and reactive to light  Cardiovascular:      Rate and Rhythm: Normal rate and regular rhythm  Pulses: Normal pulses  Heart sounds: Normal heart sounds  No murmur heard  No friction rub  No gallop  Pulmonary:      Effort: Pulmonary effort is normal  No respiratory distress  Breath sounds: Normal breath sounds  No wheezing, rhonchi or rales  Abdominal:      General: Bowel sounds are normal       Palpations: Abdomen is soft  Tenderness: There is no abdominal tenderness  Musculoskeletal:         General: No tenderness  Normal range of motion  Cervical back: Normal range of motion and neck supple  Lymphadenopathy:      Cervical: Cervical adenopathy present  Skin:     General: Skin is warm and dry  Capillary Refill: Capillary refill takes less than 2 seconds  Neurological:      General: No focal deficit present  Mental Status: He is alert and oriented to person, place, and time     Psychiatric:         Mood and Affect: Mood normal          Behavior: Behavior normal

## 2022-04-26 ENCOUNTER — OFFICE VISIT (OUTPATIENT)
Dept: INTERNAL MEDICINE CLINIC | Facility: OTHER | Age: 57
End: 2022-04-26
Payer: MEDICARE

## 2022-04-26 VITALS
HEART RATE: 88 BPM | HEIGHT: 69 IN | TEMPERATURE: 98 F | BODY MASS INDEX: 31.7 KG/M2 | WEIGHT: 214 LBS | OXYGEN SATURATION: 99 % | DIASTOLIC BLOOD PRESSURE: 78 MMHG | SYSTOLIC BLOOD PRESSURE: 128 MMHG

## 2022-04-26 DIAGNOSIS — F41.9 ANXIETY: ICD-10-CM

## 2022-04-26 DIAGNOSIS — M10.9 GOUTY ARTHRITIS: ICD-10-CM

## 2022-04-26 DIAGNOSIS — J45.909 ASTHMA, UNSPECIFIED ASTHMA SEVERITY, UNSPECIFIED WHETHER COMPLICATED, UNSPECIFIED WHETHER PERSISTENT: ICD-10-CM

## 2022-04-26 DIAGNOSIS — E78.2 MIXED HYPERLIPIDEMIA: ICD-10-CM

## 2022-04-26 DIAGNOSIS — I10 ESSENTIAL HYPERTENSION: ICD-10-CM

## 2022-04-26 DIAGNOSIS — M1A.9XX0 CHRONIC GOUT WITHOUT TOPHUS, UNSPECIFIED CAUSE, UNSPECIFIED SITE: ICD-10-CM

## 2022-04-26 DIAGNOSIS — K21.9 GERD WITHOUT ESOPHAGITIS: Primary | ICD-10-CM

## 2022-04-26 PROCEDURE — 99214 OFFICE O/P EST MOD 30 MIN: CPT | Performed by: INTERNAL MEDICINE

## 2022-04-26 NOTE — PROGRESS NOTES
Assessment/Plan:    1  Essential hypertension  Blood pressure is stable on present regimen  2  GERD  Continue with Nexium 40 mg daily along with better diet control  3  Bronchial asthma  Doing well on present combination of inhalers  4  Gout  Continue with the Uloric  Will check uric acid level before next visit       Diagnoses and all orders for this visit:    GERD without esophagitis  -     CBC; Future    Asthma, unspecified asthma severity, unspecified whether complicated, unspecified whether persistent    Essential hypertension  -     Comprehensive metabolic panel; Future    Chronic gout without tophus, unspecified cause, unspecified site    Anxiety    Mixed hyperlipidemia  -     Lipid Panel with Direct LDL reflex; Future    Gouty arthritis  -     Uric acid; Future               Subjective:          Patient ID: Ceasar Napier is a 65 y o  male  Patient is here for regular follow-up  He was unable to do blood work prior to this visit  Otherwise no new complaints  The following portions of the patient's history were reviewed and updated as appropriate: allergies, current medications, past family history, past medical history, past social history, past surgical history and problem list     Review of Systems   Constitutional: Negative for fatigue and fever  HENT: Negative for congestion, ear discharge, ear pain, postnasal drip, sinus pressure, sore throat, tinnitus and trouble swallowing  Eyes: Negative for discharge, itching and visual disturbance  Respiratory: Negative for cough and shortness of breath  Cardiovascular: Negative for chest pain and palpitations  Gastrointestinal: Negative for abdominal pain, diarrhea, nausea and vomiting  Endocrine: Negative for cold intolerance and polyuria  Genitourinary: Negative for difficulty urinating, dysuria and urgency  Musculoskeletal: Negative for arthralgias and neck pain  Skin: Negative for rash     Allergic/Immunologic: Negative for environmental allergies  Neurological: Negative for dizziness, weakness and headaches  Psychiatric/Behavioral: Negative for agitation and behavioral problems  The patient is not nervous/anxious            Past Medical History:   Diagnosis Date    Anxiety 1/15/2019    Arthritis     Chronic rhinitis     last assessed 2/21/14    Chronic serous otitis media     last assessed 11/20/13    Chronic sinusitis     last assessed 8/19/14    Functional heart murmur     GERD (gastroesophageal reflux disease)     Gout     Hearing problem     last assessed 12/1/16    Hiatal hernia     Hypercholesterolemia     Hypertension     Palpitations     Testicular hypogonadism     last assessed 10/4/13    V-tach [facility])          Current Outpatient Medications:     acetaminophen (TYLENOL) 500 mg tablet, Take 1,000 mg by mouth every 6 (six) hours as needed for mild pain, Disp: , Rfl:     albuterol (2 5 mg/3 mL) 0 083 % nebulizer solution, Take 3 mL (2 5 mg total) by nebulization every 6 (six) hours as needed for wheezing or shortness of breath, Disp: 180 mL, Rfl: 5    albuterol (PROVENTIL HFA,VENTOLIN HFA) 90 mcg/act inhaler, Inhale 1 puff 4 (four) times a day , Disp: , Rfl:     Chlorpheniramine Maleate (CHLOR-TRIMETON ALLERGY PO), Take by mouth as needed  , Disp: , Rfl:     esomeprazole (NexIUM) 40 MG capsule, Take 40 mg by mouth every morning before breakfast, Disp: , Rfl:     fluticasone-salmeterol (ADVAIR DISKUS) 500-50 mcg/dose, Inhale 1 puff 2 (two) times a day, Disp: , Rfl:     ibuprofen (MOTRIN) 600 mg tablet, Take 600 mg by mouth every 8 (eight) hours, Disp: , Rfl:     ipratropium-albuterol (DUO-NEB) 0 5-2 5 mg/3 mL, Inhale 3 mL 2 (two) times a week , Disp: , Rfl:     lisinopril (ZESTRIL) 20 mg tablet, Take 1 tablet (20 mg total) by mouth daily, Disp: 90 tablet, Rfl: 1    meclizine (ANTIVERT) 12 5 MG tablet, Take 1 tablet (12 5 mg total) by mouth every 8 (eight) hours, Disp: 30 tablet, Rfl: 1    montelukast (SINGULAIR) 10 mg tablet, Take 1 tablet (10 mg total) by mouth daily, Disp: 90 tablet, Rfl: 1    Triamcinolone Acetonide (NASACORT ALLERGY 24HR NA), 1 spray into each nostril daily  , Disp: , Rfl:     Uloric 40 MG tablet, Take 1 tablet (40 mg total) by mouth daily, Disp: 90 tablet, Rfl: 1    verapamil (CALAN) 120 mg tablet, TAKE 1 TABLET BY MOUTH EVERY DAY, Disp: 90 tablet, Rfl: 3    Allergies   Allergen Reactions    Penicillins Rash and Anaphylaxis    Acetazolamide      Other reaction(s): Stomach Ache    Cephalosporins Other (See Comments)     headache    Doxycycline      Capsules only  Tablets are ok to take, per pt  Capsules only  Tablets are ok to take, per pt   Other     Sulfa Antibiotics Other (See Comments)     Category: Allergy;  Annotation - [id number]: STOMACH UPSET    Famotidine Palpitations    Levofloxacin Palpitations    Omeprazole Palpitations     Painful urination       Social History   Past Surgical History:   Procedure Laterality Date    APPENDECTOMY      BICEPS TENODESIS      anesthesia for tenodesis- of ruptured long tendon of biceps     HERNIA REPAIR      THYROIDECTOMY      TONSILLECTOMY       Family History   Problem Relation Age of Onset    Lung cancer Father     Coronary artery disease Father         blockages    Stroke Maternal Grandmother     Cancer Paternal Grandfather         metastasized    Heart disease Family     Lung cancer Cousin     No Known Problems Mother     No Known Problems Sister     No Known Problems Brother     No Known Problems Maternal Aunt     No Known Problems Maternal Uncle     No Known Problems Paternal Aunt     No Known Problems Paternal Uncle     No Known Problems Paternal Grandmother     ADD / ADHD Neg Hx     Anesthesia problems Neg Hx     Clotting disorder Neg Hx     Collagen disease Neg Hx     Diabetes Neg Hx     Dislocations Neg Hx     Learning disabilities Neg Hx     Neurological problems Neg Hx     Osteoporosis Neg Hx  Rheumatologic disease Neg Hx     Scoliosis Neg Hx     Vascular Disease Neg Hx        Objective:  /78 (BP Location: Left arm, Patient Position: Sitting, Cuff Size: Adult)   Pulse 88   Temp 98 °F (36 7 °C) (Temporal)   Ht 5' 9" (1 753 m)   Wt 97 1 kg (214 lb)   SpO2 99%   BMI 31 60 kg/m²   Body mass index is 31 6 kg/m²  Physical Exam  Constitutional:       Appearance: He is well-developed  HENT:      Head: Normocephalic  Right Ear: External ear normal       Left Ear: External ear normal       Nose: No rhinorrhea  Mouth/Throat:      Pharynx: No posterior oropharyngeal erythema  Eyes:      General: No scleral icterus  Pupils: Pupils are equal, round, and reactive to light  Neck:      Thyroid: No thyromegaly  Trachea: No tracheal deviation  Cardiovascular:      Rate and Rhythm: Normal rate and regular rhythm  Heart sounds: Normal heart sounds  No murmur heard  Pulmonary:      Effort: Pulmonary effort is normal  No respiratory distress  Breath sounds: Normal breath sounds  Chest:      Chest wall: No tenderness  Abdominal:      General: Bowel sounds are normal       Palpations: Abdomen is soft  There is no mass  Tenderness: There is no abdominal tenderness  Musculoskeletal:         General: Normal range of motion  Cervical back: Normal range of motion and neck supple  Right lower leg: No edema  Left lower leg: No edema  Lymphadenopathy:      Cervical: No cervical adenopathy  Skin:     General: Skin is warm  Neurological:      Mental Status: He is alert and oriented to person, place, and time  Cranial Nerves: No cranial nerve deficit     Psychiatric:         Mood and Affect: Mood normal          Behavior: Behavior normal

## 2022-05-03 DIAGNOSIS — J31.0 CHRONIC RHINITIS: ICD-10-CM

## 2022-05-03 RX ORDER — MONTELUKAST SODIUM 10 MG/1
10 TABLET ORAL DAILY
Qty: 90 TABLET | Refills: 1 | Status: SHIPPED | OUTPATIENT
Start: 2022-05-03

## 2022-05-23 ENCOUNTER — TELEPHONE (OUTPATIENT)
Dept: INTERNAL MEDICINE CLINIC | Facility: OTHER | Age: 57
End: 2022-05-23

## 2022-05-23 NOTE — TELEPHONE ENCOUNTER
Please do auth for uloric and call patient  He will check back with office by wed if he does not hear anything

## 2022-05-25 NOTE — TELEPHONE ENCOUNTER
Medication has been approved due to medical necessity, approved form now until 5/25/2023  Called and lmom for patient to let him know medication has been approved

## 2022-05-26 DIAGNOSIS — M10.9 GOUTY ARTHRITIS: ICD-10-CM

## 2022-05-26 RX ORDER — FEBUXOSTAT 40 MG/1
40 TABLET ORAL DAILY
Qty: 90 TABLET | Refills: 1 | Status: SHIPPED | OUTPATIENT
Start: 2022-05-26

## 2022-05-27 ENCOUNTER — PROCEDURE VISIT (OUTPATIENT)
Dept: OBGYN CLINIC | Facility: MEDICAL CENTER | Age: 57
End: 2022-05-27
Payer: MEDICARE

## 2022-05-27 VITALS
SYSTOLIC BLOOD PRESSURE: 119 MMHG | HEIGHT: 69 IN | BODY MASS INDEX: 31.81 KG/M2 | WEIGHT: 214.8 LBS | DIASTOLIC BLOOD PRESSURE: 78 MMHG | HEART RATE: 76 BPM

## 2022-05-27 DIAGNOSIS — M17.0 BILATERAL PRIMARY OSTEOARTHRITIS OF KNEE: Primary | ICD-10-CM

## 2022-05-27 PROCEDURE — 20610 DRAIN/INJ JOINT/BURSA W/O US: CPT | Performed by: PHYSICIAN ASSISTANT

## 2022-05-27 RX ORDER — HYALURONATE SODIUM 10 MG/ML
20 SYRINGE (ML) INTRAARTICULAR
Status: COMPLETED | OUTPATIENT
Start: 2022-05-27 | End: 2022-05-27

## 2022-05-27 RX ADMIN — Medication 20 MG: at 16:09

## 2022-05-27 NOTE — PROGRESS NOTES
Patient received bilateral knee euflexxa injections 1/3  Post injection instructions reviewed  Follow up 1 week  Large joint arthrocentesis: bilateral knee  Universal Protocol:  Consent: Verbal consent obtained    Risks and benefits: risks, benefits and alternatives were discussed  Consent given by: patient  Site marked: the operative site was marked  Supporting Documentation  Indications: pain   Procedure Details  Location: knee - bilateral knee  Preparation: Patient was prepped and draped in the usual sterile fashion  Needle size: 22 G  Ultrasound guidance: no  Approach: anterolateral    Medications (Right): 20 mg Sodium Hyaluronate 20 MG/2MLMedications (Left): 20 mg Sodium Hyaluronate 20 MG/2ML   Patient tolerance: patient tolerated the procedure well with no immediate complications  Dressing:  Sterile dressing applied

## 2022-06-03 ENCOUNTER — PROCEDURE VISIT (OUTPATIENT)
Dept: OBGYN CLINIC | Facility: MEDICAL CENTER | Age: 57
End: 2022-06-03
Payer: MEDICARE

## 2022-06-03 VITALS
WEIGHT: 215.8 LBS | HEIGHT: 69 IN | SYSTOLIC BLOOD PRESSURE: 133 MMHG | HEART RATE: 82 BPM | DIASTOLIC BLOOD PRESSURE: 75 MMHG | BODY MASS INDEX: 31.96 KG/M2

## 2022-06-03 DIAGNOSIS — M17.0 PRIMARY OSTEOARTHRITIS OF BOTH KNEES: Primary | ICD-10-CM

## 2022-06-03 PROCEDURE — 20610 DRAIN/INJ JOINT/BURSA W/O US: CPT | Performed by: PHYSICIAN ASSISTANT

## 2022-06-03 RX ORDER — HYALURONATE SODIUM 10 MG/ML
20 SYRINGE (ML) INTRAARTICULAR
Status: COMPLETED | OUTPATIENT
Start: 2022-06-03 | End: 2022-06-03

## 2022-06-03 RX ADMIN — Medication 20 MG: at 12:57

## 2022-06-03 NOTE — PROGRESS NOTES
Patient Name:  Alfonso Solomon  MR#:  [id number]    The patient presents for his second injection of Euflexxa into the bilateral knees  Large joint arthrocentesis: bilateral knee  Procedure Details  Location: knee - bilateral knee  Needle size: 22 G  Ultrasound guidance: no  Approach: anterolateral    Medications (Right): 20 mg Sodium Hyaluronate 20 MG/2MLMedications (Left): 20 mg Sodium Hyaluronate 20 MG/2ML   Patient tolerance: patient tolerated the procedure well with no immediate complications  Dressing:  Sterile dressing applied        Reviewed post-injection care with patient  Follow-up in one week for third injection

## 2022-06-10 ENCOUNTER — PROCEDURE VISIT (OUTPATIENT)
Dept: OBGYN CLINIC | Facility: MEDICAL CENTER | Age: 57
End: 2022-06-10
Payer: MEDICARE

## 2022-06-10 VITALS
SYSTOLIC BLOOD PRESSURE: 145 MMHG | DIASTOLIC BLOOD PRESSURE: 75 MMHG | WEIGHT: 215 LBS | HEIGHT: 69 IN | HEART RATE: 83 BPM | BODY MASS INDEX: 31.84 KG/M2

## 2022-06-10 DIAGNOSIS — M17.0 BILATERAL PRIMARY OSTEOARTHRITIS OF KNEE: Primary | ICD-10-CM

## 2022-06-10 PROCEDURE — 20610 DRAIN/INJ JOINT/BURSA W/O US: CPT | Performed by: PHYSICIAN ASSISTANT

## 2022-06-10 RX ORDER — HYALURONATE SODIUM 10 MG/ML
20 SYRINGE (ML) INTRAARTICULAR
Status: COMPLETED | OUTPATIENT
Start: 2022-06-10 | End: 2022-06-10

## 2022-06-10 RX ADMIN — Medication 20 MG: at 16:00

## 2022-06-10 NOTE — PROGRESS NOTES
Patient is here for bilateral knee Euflexxa injections 3/3  Post injection instructions reviewed  Follow up 2 months for bilat knee CSI  Large joint arthrocentesis: bilateral knee  Universal Protocol:  Consent: Verbal consent obtained    Risks and benefits: risks, benefits and alternatives were discussed  Consent given by: patient  Site marked: the operative site was marked  Supporting Documentation  Indications: pain   Procedure Details  Location: knee - bilateral knee  Preparation: Patient was prepped and draped in the usual sterile fashion  Needle size: 22 G  Ultrasound guidance: no  Approach: anterolateral    Medications (Right): 20 mg Sodium Hyaluronate 20 MG/2MLMedications (Left): 20 mg Sodium Hyaluronate 20 MG/2ML   Patient tolerance: patient tolerated the procedure well with no immediate complications  Dressing:  Sterile dressing applied

## 2022-06-14 ENCOUNTER — TELEPHONE (OUTPATIENT)
Dept: OBGYN CLINIC | Facility: HOSPITAL | Age: 57
End: 2022-06-14

## 2022-06-14 ENCOUNTER — TELEPHONE (OUTPATIENT)
Dept: OTHER | Facility: OTHER | Age: 57
End: 2022-06-14

## 2022-06-14 NOTE — TELEPHONE ENCOUNTER
Patient prefers an inperson visit for visco injection site check  Appt scheduled for tomorrow at 2:30

## 2022-06-14 NOTE — TELEPHONE ENCOUNTER
Bailey Muller, can you please get some more info from Jennifer Salazar regarding swelling, redness, increased pain, fevers, chills? If any concern can we either send him to ED or see if he can be schedule ASAP with Noel Lennon as Dr Moss Peaks is out of the office?

## 2022-06-14 NOTE — TELEPHONE ENCOUNTER
I spoke to the patient and he has no pain, redness, heat to touch or fever  He is 4 days out from b/l visco injections and his R knee has a peak in the knee when he bends the knee  When he straightens the knee it goes away  The area is the size of a dime and looks like it is bruised  Advised that he should be okay to monitor this and call with worsening symptoms, as it probably is just irritated tissue from the injection  Patient would like to be seen for this by Friday  Anyway you can make room for an eye ball of a visco injection site?

## 2022-06-15 NOTE — TELEPHONE ENCOUNTER
Patient calling to cancel his NP appointment with Susy Tate, states that its a long story and will call back to reschedule, mentioned that the office has tight parking and he has a hard time parking in tight spaces  Will be in touch to r/s

## 2022-06-20 ENCOUNTER — OFFICE VISIT (OUTPATIENT)
Dept: INTERNAL MEDICINE CLINIC | Facility: OTHER | Age: 57
End: 2022-06-20
Payer: MEDICARE

## 2022-06-20 VITALS
WEIGHT: 215 LBS | BODY MASS INDEX: 31.84 KG/M2 | TEMPERATURE: 98.5 F | HEIGHT: 69 IN | SYSTOLIC BLOOD PRESSURE: 130 MMHG | HEART RATE: 81 BPM | DIASTOLIC BLOOD PRESSURE: 78 MMHG | OXYGEN SATURATION: 98 %

## 2022-06-20 DIAGNOSIS — I10 ESSENTIAL HYPERTENSION: ICD-10-CM

## 2022-06-20 DIAGNOSIS — J45.909 ASTHMA, UNSPECIFIED ASTHMA SEVERITY, UNSPECIFIED WHETHER COMPLICATED, UNSPECIFIED WHETHER PERSISTENT: ICD-10-CM

## 2022-06-20 DIAGNOSIS — T07.XXXA MULTIPLE BRUISES: Primary | ICD-10-CM

## 2022-06-20 PROCEDURE — 99213 OFFICE O/P EST LOW 20 MIN: CPT | Performed by: INTERNAL MEDICINE

## 2022-06-20 NOTE — PROGRESS NOTES
Assessment/Plan:    1  Superficial bruises of right forearm  Reassured patient  No further x-ray our imaging needed  Advised for moist heating pad couple of times a day for the next 2-3 days  2  Essential hypertension  Blood pressure is stable on present regimen  3  Bronchial asthma  Continue with present combination of inhalers  Follow-up  Keep next scheduled appointment     There are no diagnoses linked to this encounter  Subjective:          Patient ID: Nghia Sinclair is a 65 y o  male  Patient came for evaluation of bruise over right forearm  He was hitting his chair with the arm about 5 days ago  Bruise is getting lighter  No limitation of joint movement  The following portions of the patient's history were reviewed and updated as appropriate: allergies, current medications, past family history, past medical history, past social history, past surgical history and problem list     Review of Systems   Constitutional: Negative for fatigue and fever  HENT: Negative for congestion, ear discharge, ear pain, postnasal drip, sinus pressure, sore throat, tinnitus and trouble swallowing  Eyes: Negative for discharge, itching and visual disturbance  Respiratory: Negative for cough and shortness of breath  Cardiovascular: Negative for chest pain and palpitations  Gastrointestinal: Negative for abdominal pain, diarrhea, nausea and vomiting  Endocrine: Negative for cold intolerance and polyuria  Genitourinary: Negative for difficulty urinating, dysuria and urgency  Musculoskeletal: Negative for arthralgias and neck pain  Skin: Positive for color change  Negative for rash  Allergic/Immunologic: Negative for environmental allergies  Neurological: Negative for dizziness, weakness and headaches  Psychiatric/Behavioral: Negative for agitation and behavioral problems  The patient is not nervous/anxious            Past Medical History:   Diagnosis Date    Anxiety 1/15/2019    Arthritis     Chronic rhinitis     last assessed 2/21/14    Chronic serous otitis media     last assessed 11/20/13    Chronic sinusitis     last assessed 8/19/14    Functional heart murmur     GERD (gastroesophageal reflux disease)     Gout     Hearing problem     last assessed 12/1/16    Hiatal hernia     Hypercholesterolemia     Hypertension     Palpitations     Testicular hypogonadism     last assessed 10/4/13    V-[facility]ach [facility])          Current Outpatient Medications:     acetaminophen (TYLENOL) 500 mg tablet, Take 1,000 mg by mouth every 6 (six) hours as needed for mild pain, Disp: , Rfl:     albuterol (2 5 mg/3 mL) 0 083 % nebulizer solution, Take 3 mL (2 5 mg total) by nebulization every 6 (six) hours as needed for wheezing or shortness of breath, Disp: 180 mL, Rfl: 5    albuterol (PROVENTIL HFA,VENTOLIN HFA) 90 mcg/act inhaler, Inhale 1 puff 4 (four) times a day , Disp: , Rfl:     Chlorpheniramine Maleate (CHLOR-TRIMETON ALLERGY PO), Take by mouth as needed  , Disp: , Rfl:     esomeprazole (NexIUM) 40 MG capsule, Take 40 mg by mouth every morning before breakfast, Disp: , Rfl:     fluticasone-salmeterol (Advair) 500-50 mcg/dose inhaler, Inhale 1 puff 2 (two) times a day, Disp: , Rfl:     ibuprofen (MOTRIN) 600 mg tablet, Take 600 mg by mouth every 8 (eight) hours, Disp: , Rfl:     ipratropium-albuterol (DUO-NEB) 0 5-2 5 mg/3 mL, Inhale 3 mL 2 (two) times a week , Disp: , Rfl:     lisinopril (ZESTRIL) 20 mg tablet, Take 1 tablet (20 mg total) by mouth daily, Disp: 90 tablet, Rfl: 1    meclizine (ANTIVERT) 12 5 MG tablet, Take 1 tablet (12 5 mg total) by mouth every 8 (eight) hours, Disp: 30 tablet, Rfl: 1    montelukast (SINGULAIR) 10 mg tablet, Take 1 tablet (10 mg total) by mouth daily, Disp: 90 tablet, Rfl: 1    Triamcinolone Acetonide (NASACORT ALLERGY 24HR NA), 1 spray into each nostril daily  , Disp: , Rfl:     Uloric 40 MG tablet, Take 1 tablet (40 mg total) by mouth daily, Disp: 90 tablet, Rfl: 1    verapamil (CALAN) 120 mg tablet, TAKE 1 TABLET BY MOUTH EVERY DAY, Disp: 90 tablet, Rfl: 3    Allergies   Allergen Reactions    Penicillins Rash and Anaphylaxis    Acetazolamide      Other reaction(s): Stomach Ache    Cephalosporins Other (See Comments)     headache    Doxycycline      Capsules only  Tablets are ok to take, per pt  Capsules only  Tablets are ok to take, per pt   Other     Sulfa Antibiotics Other (See Comments)     Category: Allergy;  Annotation - [id number]: STOMACH UPSET    Famotidine Palpitations    Levofloxacin Palpitations    Omeprazole Palpitations     Painful urination       Social History   Past Surgical History:   Procedure Laterality Date    APPENDECTOMY      BICEPS TENODESIS      anesthesia for tenodesis- of ruptured long tendon of biceps     HERNIA REPAIR      THYROIDECTOMY      TONSILLECTOMY       Family History   Problem Relation Age of Onset    Lung cancer Father     Coronary artery disease Father         blockages    Stroke Maternal Grandmother     Cancer Paternal Grandfather         metastasized    Heart disease Family     Lung cancer Cousin     No Known Problems Mother     No Known Problems Sister     No Known Problems Brother     No Known Problems Maternal Aunt     No Known Problems Maternal Uncle     No Known Problems Paternal Aunt     No Known Problems Paternal Uncle     No Known Problems Paternal Grandmother     ADD / ADHD Neg Hx     Anesthesia problems Neg Hx     Clotting disorder Neg Hx     Collagen disease Neg Hx     Diabetes Neg Hx     Dislocations Neg Hx     Learning disabilities Neg Hx     Neurological problems Neg Hx     Osteoporosis Neg Hx     Rheumatologic disease Neg Hx     Scoliosis Neg Hx     Vascular Disease Neg Hx        Objective:  /78 (BP Location: Left arm, Patient Position: Sitting, Cuff Size: Adult)   Pulse 81   Temp 98 5 °F (36 9 °C) (Temporal)   Ht 5' 9" (1 753 m)   Wt 97 5 kg (215 lb)   SpO2 98%   BMI 31 75 kg/m²   Body mass index is 31 75 kg/m²  Physical Exam  Constitutional:       Appearance: He is well-developed  HENT:      Head: Normocephalic  Right Ear: External ear normal  There is no impacted cerumen  Left Ear: External ear normal  There is no impacted cerumen  Nose: No rhinorrhea  Mouth/Throat:      Pharynx: No posterior oropharyngeal erythema  Eyes:      General: No scleral icterus  Pupils: Pupils are equal, round, and reactive to light  Neck:      Thyroid: No thyromegaly  Trachea: No tracheal deviation  Cardiovascular:      Rate and Rhythm: Normal rate and regular rhythm  Heart sounds: Normal heart sounds  Pulmonary:      Effort: Pulmonary effort is normal  No respiratory distress  Breath sounds: Normal breath sounds  No rhonchi  Chest:      Chest wall: No tenderness  Abdominal:      General: Bowel sounds are normal       Palpations: Abdomen is soft  There is no mass  Tenderness: There is no abdominal tenderness  Musculoskeletal:         General: Normal range of motion  Cervical back: Normal range of motion and neck supple  Right lower leg: No edema  Left lower leg: No edema  Lymphadenopathy:      Cervical: No cervical adenopathy  Skin:     General: Skin is warm  Findings: Bruising present  Comments: About 3 in to 4 in area of superficial bruises noted over right forearm  No limitation of  elbow or shoulder joint  Nontender  Neurological:      Mental Status: He is alert and oriented to person, place, and time  Cranial Nerves: No cranial nerve deficit

## 2022-06-27 ENCOUNTER — OFFICE VISIT (OUTPATIENT)
Dept: URGENT CARE | Age: 57
End: 2022-06-27
Payer: MEDICARE

## 2022-06-27 VITALS
HEART RATE: 87 BPM | TEMPERATURE: 97.9 F | DIASTOLIC BLOOD PRESSURE: 80 MMHG | OXYGEN SATURATION: 98 % | SYSTOLIC BLOOD PRESSURE: 128 MMHG | RESPIRATION RATE: 18 BRPM

## 2022-06-27 DIAGNOSIS — R09.81 NASAL CONGESTION: Primary | ICD-10-CM

## 2022-06-27 DIAGNOSIS — J34.89 SINUS PRESSURE: ICD-10-CM

## 2022-06-27 PROCEDURE — G0463 HOSPITAL OUTPT CLINIC VISIT: HCPCS

## 2022-06-27 PROCEDURE — 99213 OFFICE O/P EST LOW 20 MIN: CPT

## 2022-06-27 NOTE — PATIENT INSTRUCTIONS
Please take Afrin every 4 hours as needed for the next 3 days  Do not exceed using Afrin from my over 3 days

## 2022-06-27 NOTE — PROGRESS NOTES
[facility]        NAME: Freddie Guerrero is a 65 y o  male  : 1965    MRN: [id number]  DATE: 2022  TIME: 6:12 PM    Assessment and Plan   Nasal congestion [R09 81]  1  Nasal congestion  phenylephrine (DANUTA-SYNEPHRINE) 0 5 % nasal spray   2  Sinus pressure  phenylephrine (DANUTA-SYNEPHRINE) 0 5 % nasal spray         Patient Instructions     Afrin 4 hours as needed for the next 3 days  Follow up with PCP in 3-5 days  Proceed to  ER if symptoms worsen  Chief Complaint     Chief Complaint   Patient presents with    Nasal Congestion    Dizziness         History of Present Illness       Patient presenting for evaluation of sinus pressure and clear nasal drainage over the past 2 days  Patient states that he has tried nasal saline spray, which has provided minimal relief  He denies any purulent drainage from his snare at this time  He denies any cough, congestion, fevers, chills, chest pain or shortness of breath  Review of Systems   Review of Systems   Constitutional: Negative for chills and fever  HENT: Positive for rhinorrhea and sinus pressure  Negative for ear pain and sore throat  Eyes: Negative for pain and visual disturbance  Respiratory: Negative for cough and shortness of breath  Cardiovascular: Negative for chest pain and palpitations  Gastrointestinal: Negative for abdominal pain and vomiting  Genitourinary: Negative for dysuria and hematuria  Musculoskeletal: Negative for arthralgias and back pain  Skin: Negative for color change and rash  Neurological: Negative for seizures and syncope  All other systems reviewed and are negative          Current Medications       Current Outpatient Medications:     phenylephrine (DANUTA-SYNEPHRINE) 0 5 % nasal spray, 1 spray by Each Nare route every 4 (four) hours as needed for congestion for up to 3 days, Disp: 15 mL, Rfl: 0    acetaminophen (TYLENOL) 500 mg tablet, Take 1,000 mg by mouth every 6 (six) hours as needed for mild pain, Disp: , Rfl:     albuterol (2 5 mg/3 mL) 0 083 % nebulizer solution, Take 3 mL (2 5 mg total) by nebulization every 6 (six) hours as needed for wheezing or shortness of breath, Disp: 180 mL, Rfl: 5    albuterol (PROVENTIL HFA,VENTOLIN HFA) 90 mcg/act inhaler, Inhale 1 puff 4 (four) times a day , Disp: , Rfl:     Chlorpheniramine Maleate (CHLOR-TRIMETON ALLERGY PO), Take by mouth as needed  , Disp: , Rfl:     esomeprazole (NexIUM) 40 MG capsule, Take 40 mg by mouth every morning before breakfast, Disp: , Rfl:     fluticasone-salmeterol (Advair) 500-50 mcg/dose inhaler, Inhale 1 puff 2 (two) times a day, Disp: , Rfl:     ibuprofen (MOTRIN) 600 mg tablet, Take 600 mg by mouth every 8 (eight) hours, Disp: , Rfl:     ipratropium-albuterol (DUO-NEB) 0 5-2 5 mg/3 mL, Inhale 3 mL 2 (two) times a week , Disp: , Rfl:     lisinopril (ZESTRIL) 20 mg tablet, Take 1 tablet (20 mg total) by mouth daily, Disp: 90 tablet, Rfl: 1    meclizine (ANTIVERT) 12 5 MG tablet, Take 1 tablet (12 5 mg total) by mouth every 8 (eight) hours, Disp: 30 tablet, Rfl: 1    montelukast (SINGULAIR) 10 mg tablet, Take 1 tablet (10 mg total) by mouth daily, Disp: 90 tablet, Rfl: 1    Triamcinolone Acetonide (NASACORT ALLERGY 24HR NA), 1 spray into each nostril daily  , Disp: , Rfl:     Uloric 40 MG tablet, Take 1 tablet (40 mg total) by mouth daily, Disp: 90 tablet, Rfl: 1    verapamil (CALAN) 120 mg tablet, TAKE 1 TABLET BY MOUTH EVERY DAY, Disp: 90 tablet, Rfl: 3    Current Allergies     Allergies as of 06/27/2022 - Reviewed 06/27/2022   Allergen Reaction Noted    Penicillins Rash and Anaphylaxis 08/17/2004    Acetazolamide  10/25/2016    Cephalosporins Other (See Comments) 08/17/2004    Doxycycline  10/24/2018    Other  04/28/2014    Sulfa antibiotics Other (See Comments) 08/17/2004    Famotidine Palpitations 09/05/2016    Levofloxacin Palpitations 11/12/2021    Omeprazole Palpitations 09/05/2016            The following portions of the patient's history were reviewed and updated as appropriate: allergies, current medications, past family history, past medical history, past social history, past surgical history and problem list      Past Medical History:   Diagnosis Date    Anxiety 1/15/2019    Arthritis     Chronic rhinitis     last assessed 2/21/14    Chronic serous otitis media     last assessed 11/20/13    Chronic sinusitis     last assessed 8/19/14    Functional heart murmur     GERD (gastroesophageal reflux disease)     Gout     Hearing problem     last assessed 12/1/16    Hiatal hernia     Hypercholesterolemia     Hypertension     Palpitations     Testicular hypogonadism     last assessed 10/4/13    [facility])        Past Surgical History:   Procedure Laterality Date    APPENDECTOMY      BICEPS TENODESIS      anesthesia for tenodesis- of ruptured long tendon of biceps     HERNIA REPAIR      THYROIDECTOMY      TONSILLECTOMY         Family History   Problem Relation Age of Onset    Lung cancer Father     Coronary artery disease Father         blockages    Stroke Maternal Grandmother     Cancer Paternal Grandfather         metastasized    Heart disease Family     Lung cancer Cousin     No Known Problems Mother     No Known Problems Sister     No Known Problems Brother     No Known Problems Maternal Aunt     No Known Problems Maternal Uncle     No Known Problems Paternal Aunt     No Known Problems Paternal Uncle     No Known Problems Paternal Grandmother     ADD / ADHD Neg Hx     Anesthesia problems Neg Hx     Clotting disorder Neg Hx     Collagen disease Neg Hx     Diabetes Neg Hx     Dislocations Neg Hx     Learning disabilities Neg Hx     Neurological problems Neg Hx     Osteoporosis Neg Hx     Rheumatologic disease Neg Hx     Scoliosis Neg Hx     Vascular Disease Neg Hx          Medications have been verified          Objective   /80 (BP Location: Right arm, Patient Position: Sitting, Cuff Size: Large)   Pulse 87   Temp 97 9 °F (36 6 °C) (Temporal)   Resp 18   SpO2 98%        Physical Exam     Physical Exam  Vitals and nursing note reviewed  Constitutional:       General: He is not in acute distress  Appearance: Normal appearance  He is not ill-appearing  HENT:      Head: Normocephalic and atraumatic  Right Ear: Tympanic membrane normal  There is no impacted cerumen  Left Ear: Tympanic membrane normal  There is no impacted cerumen  Nose: Nose normal  No congestion or rhinorrhea  Right Sinus: No maxillary sinus tenderness or frontal sinus tenderness  Left Sinus: No maxillary sinus tenderness or frontal sinus tenderness  Mouth/Throat:      Mouth: Mucous membranes are moist       Pharynx: Oropharynx is clear  No oropharyngeal exudate or posterior oropharyngeal erythema  Eyes:      General:         Right eye: No discharge  Left eye: No discharge  Extraocular Movements: Extraocular movements intact  Conjunctiva/sclera: Conjunctivae normal       Pupils: Pupils are equal, round, and reactive to light  Cardiovascular:      Rate and Rhythm: Normal rate and regular rhythm  Pulses: Normal pulses  Heart sounds: Normal heart sounds  No murmur heard  No friction rub  No gallop  Pulmonary:      Effort: Pulmonary effort is normal  No respiratory distress  Breath sounds: Normal breath sounds  No stridor  No wheezing, rhonchi or rales  Chest:      Chest wall: No tenderness  Abdominal:      General: Abdomen is flat  Bowel sounds are normal       Palpations: Abdomen is soft  Tenderness: There is no abdominal tenderness  There is no guarding or rebound  Musculoskeletal:         General: No tenderness  Normal range of motion  Cervical back: Normal range of motion and neck supple  Skin:     General: Skin is warm and dry  Capillary Refill: Capillary refill takes less than 2 seconds  Neurological:      General: No focal deficit present  Mental Status: He is alert and oriented to person, place, and time     Psychiatric:         Mood and Affect: Mood normal          Behavior: Behavior normal

## 2022-06-30 ENCOUNTER — OFFICE VISIT (OUTPATIENT)
Dept: INTERNAL MEDICINE CLINIC | Facility: OTHER | Age: 57
End: 2022-06-30
Payer: MEDICARE

## 2022-06-30 VITALS
DIASTOLIC BLOOD PRESSURE: 90 MMHG | WEIGHT: 215 LBS | OXYGEN SATURATION: 99 % | HEART RATE: 90 BPM | BODY MASS INDEX: 31.84 KG/M2 | HEIGHT: 69 IN | TEMPERATURE: 98.6 F | SYSTOLIC BLOOD PRESSURE: 152 MMHG

## 2022-06-30 DIAGNOSIS — J01.01 ACUTE RECURRENT MAXILLARY SINUSITIS: Primary | ICD-10-CM

## 2022-06-30 PROBLEM — J06.9 VIRAL URI: Status: RESOLVED | Noted: 2018-02-16 | Resolved: 2022-06-30

## 2022-06-30 PROCEDURE — 99213 OFFICE O/P EST LOW 20 MIN: CPT | Performed by: INTERNAL MEDICINE

## 2022-06-30 RX ORDER — PREDNISONE 20 MG/1
40 TABLET ORAL DAILY
Qty: 10 TABLET | Refills: 0 | Status: SHIPPED | OUTPATIENT
Start: 2022-06-30 | End: 2022-07-05

## 2022-06-30 NOTE — ASSESSMENT & PLAN NOTE
Defer on antibiotics at this time  Discussed continuing current allergy regimen  Will prescribe prednisone 40 mg daily for 5 days

## 2022-06-30 NOTE — PROGRESS NOTES
Assessment/Plan:    Acute recurrent maxillary sinusitis  Defer on antibiotics at this time  Discussed continuing current allergy regimen  Will prescribe prednisone 40 mg daily for 5 days  Diagnoses and all orders for this visit:    Acute recurrent maxillary sinusitis  -     predniSONE 20 mg tablet; Take 2 tablets (40 mg total) by mouth daily for 5 days                  Subjective:      Patient ID: Santos Trujillo is a 65 y o  male  Chief Complaint   Patient presents with    Follow-up     C/O sinus issues, and dizziness due to pollen allergies was seen at urgent care on Monday  Has been taking Sudafed       65year old male is seen today with concern for sinusitis  He was seen at an urgent care on 6/27/2022 with onset of sinusitis given phenylephrine nasal spray  He uses a phenylephrine nasal spray and reports experiencing dizziness  He reports that it did work with helping clear up his sinuses  Sinusitis  This is a recurrent problem  The current episode started in the past 7 days  The problem is unchanged  There has been no fever  Associated symptoms include congestion and sinus pressure  Pertinent negatives include no chills, coughing, diaphoresis, headaches, shortness of breath, sneezing or sore throat  The following portions of the patient's history were reviewed and updated as appropriate: allergies, current medications, past family history, past medical history, past social history, past surgical history and problem list     Review of Systems   Constitutional: Negative for activity change, appetite change, chills, diaphoresis, fatigue and fever  HENT: Positive for congestion and sinus pressure  Negative for postnasal drip, rhinorrhea, sinus pain, sneezing and sore throat  Eyes: Negative for visual disturbance  Respiratory: Negative for apnea, cough, choking, chest tightness, shortness of breath and wheezing      Cardiovascular: Negative for chest pain, palpitations and leg swelling  Gastrointestinal: Negative for abdominal distention, abdominal pain, anal bleeding, blood in stool, constipation, diarrhea, nausea and vomiting  Endocrine: Negative for cold intolerance and heat intolerance  Genitourinary: Negative for difficulty urinating, dysuria and hematuria  Musculoskeletal: Negative  Skin: Negative  Neurological: Negative for dizziness, weakness, light-headedness, numbness and headaches  Hematological: Negative for adenopathy  Psychiatric/Behavioral: Negative for agitation, sleep disturbance and suicidal ideas  All other systems reviewed and are negative          Past Medical History:   Diagnosis Date    Anxiety 1/15/2019    Arthritis     Chronic rhinitis     last assessed 2/21/14    Chronic serous otitis media     last assessed 11/20/13    Chronic sinusitis     last assessed 8/19/14    Functional heart murmur     GERD (gastroesophageal reflux disease)     Gout     Hearing problem     last assessed 12/1/16    Hiatal hernia     Hypercholesterolemia     Hypertension     Palpitations     Testicular hypogonadism     last assessed 10/4/13    [facility])          Current Outpatient Medications:     acetaminophen (TYLENOL) 500 mg tablet, Take 1,000 mg by mouth every 6 (six) hours as needed for mild pain, Disp: , Rfl:     albuterol (2 5 mg/3 mL) 0 083 % nebulizer solution, Take 3 mL (2 5 mg total) by nebulization every 6 (six) hours as needed for wheezing or shortness of breath, Disp: 180 mL, Rfl: 5    albuterol (PROVENTIL HFA,VENTOLIN HFA) 90 mcg/act inhaler, Inhale 1 puff 4 (four) times a day , Disp: , Rfl:     Chlorpheniramine Maleate (CHLOR-TRIMETON ALLERGY PO), Take by mouth as needed  , Disp: , Rfl:     esomeprazole (NexIUM) 40 MG capsule, Take 40 mg by mouth every morning before breakfast, Disp: , Rfl:     fluticasone-salmeterol (Advair) 500-50 mcg/dose inhaler, Inhale 1 puff 2 (two) times a day, Disp: , Rfl:     ipratropium-albuterol (DUO-NEB) 0 5-2 5 mg/3 mL, Inhale 3 mL 2 (two) times a week , Disp: , Rfl:     lisinopril (ZESTRIL) 20 mg tablet, Take 1 tablet (20 mg total) by mouth daily, Disp: 90 tablet, Rfl: 1    meclizine (ANTIVERT) 12 5 MG tablet, Take 1 tablet (12 5 mg total) by mouth every 8 (eight) hours, Disp: 30 tablet, Rfl: 1    montelukast (SINGULAIR) 10 mg tablet, Take 1 tablet (10 mg total) by mouth daily, Disp: 90 tablet, Rfl: 1    predniSONE 20 mg tablet, Take 2 tablets (40 mg total) by mouth daily for 5 days, Disp: 10 tablet, Rfl: 0    Triamcinolone Acetonide (NASACORT ALLERGY 24HR NA), 1 spray into each nostril daily  , Disp: , Rfl:     Uloric 40 MG tablet, Take 1 tablet (40 mg total) by mouth daily, Disp: 90 tablet, Rfl: 1    verapamil (CALAN) 120 mg tablet, TAKE 1 TABLET BY MOUTH EVERY DAY, Disp: 90 tablet, Rfl: 3    ibuprofen (MOTRIN) 600 mg tablet, Take 600 mg by mouth every 8 (eight) hours, Disp: , Rfl:     Allergies   Allergen Reactions    Penicillins Rash and Anaphylaxis    Acetazolamide      Other reaction(s): Stomach Ache    Cephalosporins Other (See Comments)     headache    Doxycycline      Capsules only  Tablets are ok to take, per pt  Capsules only  Tablets are ok to take, per pt   Other     Sulfa Antibiotics Other (See Comments)     Category: Allergy;  Annotation - [id number]: STOMACH UPSET    Famotidine Palpitations    Levofloxacin Palpitations    Omeprazole Palpitations     Painful urination       Social History   Past Surgical History:   Procedure Laterality Date    APPENDECTOMY      BICEPS TENODESIS      anesthesia for tenodesis- of ruptured long tendon of biceps     HERNIA REPAIR      THYROIDECTOMY      TONSILLECTOMY       Family History   Problem Relation Age of Onset    Lung cancer Father     Coronary artery disease Father         blockages    Stroke Maternal Grandmother     Cancer Paternal Grandfather         metastasized    Heart disease Family     Lung cancer Cousin     No Known Problems Mother     No Known Problems Sister     No Known Problems Brother     No Known Problems Maternal Aunt     No Known Problems Maternal Uncle     No Known Problems Paternal Aunt     No Known Problems Paternal Uncle     No Known Problems Paternal Grandmother     ADD / ADHD Neg Hx     Anesthesia problems Neg Hx     Clotting disorder Neg Hx     Collagen disease Neg Hx     Diabetes Neg Hx     Dislocations Neg Hx     Learning disabilities Neg Hx     Neurological problems Neg Hx     Osteoporosis Neg Hx     Rheumatologic disease Neg Hx     Scoliosis Neg Hx     Vascular Disease Neg Hx        Objective:  /90 (BP Location: Right arm, Patient Position: Sitting, Cuff Size: Adult)   Pulse 90   Temp 98 6 °F (37 °C) (Temporal)   Ht 5' 9" (1 753 m)   Wt 97 5 kg (215 lb)   SpO2 99%   BMI 31 75 kg/m²     No results found for this or any previous visit (from the past 1344 hour(s))  Physical Exam  Vitals and nursing note reviewed  Constitutional:       General: He is not in acute distress  Appearance: He is well-developed  He is not diaphoretic  HENT:      Head: Normocephalic and atraumatic  Eyes:      General: No scleral icterus  Right eye: No discharge  Left eye: No discharge  Conjunctiva/sclera: Conjunctivae normal       Pupils: Pupils are equal, round, and reactive to light  Neck:      Thyroid: No thyromegaly  Vascular: No JVD  Cardiovascular:      Rate and Rhythm: Normal rate and regular rhythm  Heart sounds: Normal heart sounds  No murmur heard  No friction rub  No gallop  Pulmonary:      Effort: Pulmonary effort is normal  No respiratory distress  Breath sounds: Normal breath sounds  No wheezing or rales  Chest:      Chest wall: No tenderness  Abdominal:      General: Bowel sounds are normal  There is no distension  Palpations: Abdomen is soft  There is no mass  Tenderness: There is no abdominal tenderness  There is no guarding or rebound  Musculoskeletal:         General: No tenderness or deformity  Normal range of motion  Cervical back: Normal range of motion and neck supple  Lymphadenopathy:      Cervical: No cervical adenopathy  Skin:     General: Skin is warm and dry  Coloration: Skin is not pale  Findings: No erythema or rash  Neurological:      Mental Status: He is alert and oriented to person, place, and time  Cranial Nerves: No cranial nerve deficit  Coordination: Coordination normal       Deep Tendon Reflexes: Reflexes are normal and symmetric  Psychiatric:         Behavior: Behavior normal          Thought Content:  Thought content normal          Judgment: Judgment normal

## 2022-07-01 ENCOUNTER — TELEPHONE (OUTPATIENT)
Dept: INTERNAL MEDICINE CLINIC | Facility: OTHER | Age: 57
End: 2022-07-01

## 2022-07-01 NOTE — TELEPHONE ENCOUNTER
Pt is requesting flexeril 5 mg he has had this for back spasms as needed  He had some but they   Can this be refilled?        Leave message  On home #

## 2022-07-01 NOTE — TELEPHONE ENCOUNTER
Would recommend that he is seen for this medication, it was last prescribed by urgent care in May of 2021

## 2022-07-02 ENCOUNTER — OFFICE VISIT (OUTPATIENT)
Dept: URGENT CARE | Age: 57
End: 2022-07-02
Payer: MEDICARE

## 2022-07-02 VITALS — HEART RATE: 83 BPM | RESPIRATION RATE: 16 BRPM | OXYGEN SATURATION: 97 % | TEMPERATURE: 97.6 F

## 2022-07-02 DIAGNOSIS — M62.838 MUSCLE SPASM: Primary | ICD-10-CM

## 2022-07-02 PROCEDURE — G0463 HOSPITAL OUTPT CLINIC VISIT: HCPCS | Performed by: STUDENT IN AN ORGANIZED HEALTH CARE EDUCATION/TRAINING PROGRAM

## 2022-07-02 PROCEDURE — 99213 OFFICE O/P EST LOW 20 MIN: CPT | Performed by: STUDENT IN AN ORGANIZED HEALTH CARE EDUCATION/TRAINING PROGRAM

## 2022-07-02 RX ORDER — CYCLOBENZAPRINE HCL 5 MG
5 TABLET ORAL 3 TIMES DAILY PRN
Qty: 30 TABLET | Refills: 1 | Status: SHIPPED | OUTPATIENT
Start: 2022-07-02

## 2022-07-02 NOTE — PROGRESS NOTES
[facility]        NAME: Jean Gamboa is a 65 y o  male  : 1965    MRN: [id number]  DATE: 2022  TIME: 2:45 PM    Assessment and Plan   Muscle spasm [M62 838]  1  Muscle spasm  cyclobenzaprine (FLEXERIL) 5 mg tablet         Patient Instructions       Follow up with PCP in 3-5 days  Proceed to  ER if symptoms worsen  Chief Complaint     Chief Complaint   Patient presents with    Spasms     Muscle spasms in bilateral thighs,          History of Present Illness       HPI   Patient presents with worsening bilateral thigh muscle spasms ongoing for the past 2 days  He does not note any inciting factors    Denies any weakness numbness tingling loss of sensation    Review of Systems   Review of Systems  Per hpi     Current Medications       Current Outpatient Medications:     acetaminophen (TYLENOL) 500 mg tablet, Take 1,000 mg by mouth every 6 (six) hours as needed for mild pain, Disp: , Rfl:     albuterol (2 5 mg/3 mL) 0 083 % nebulizer solution, Take 3 mL (2 5 mg total) by nebulization every 6 (six) hours as needed for wheezing or shortness of breath, Disp: 180 mL, Rfl: 5    albuterol (PROVENTIL HFA,VENTOLIN HFA) 90 mcg/act inhaler, Inhale 1 puff 4 (four) times a day , Disp: , Rfl:     Chlorpheniramine Maleate (CHLOR-TRIMETON ALLERGY PO), Take by mouth as needed  , Disp: , Rfl:     cyclobenzaprine (FLEXERIL) 5 mg tablet, Take 1 tablet (5 mg total) by mouth 3 (three) times a day as needed for muscle spasms, Disp: 30 tablet, Rfl: 1    esomeprazole (NexIUM) 40 MG capsule, Take 40 mg by mouth every morning before breakfast, Disp: , Rfl:     fluticasone-salmeterol (Advair) 500-50 mcg/dose inhaler, Inhale 1 puff 2 (two) times a day, Disp: , Rfl:     ipratropium-albuterol (DUO-NEB) 0 5-2 5 mg/3 mL, Inhale 3 mL 2 (two) times a week , Disp: , Rfl:     lisinopril (ZESTRIL) 20 mg tablet, Take 1 tablet (20 mg total) by mouth daily, Disp: 90 tablet, Rfl: 1    meclizine (ANTIVERT) 12 5 MG tablet, Take 1 tablet (12 5 mg total) by mouth every 8 (eight) hours, Disp: 30 tablet, Rfl: 1    montelukast (SINGULAIR) 10 mg tablet, Take 1 tablet (10 mg total) by mouth daily, Disp: 90 tablet, Rfl: 1    predniSONE 20 mg tablet, Take 2 tablets (40 mg total) by mouth daily for 5 days, Disp: 10 tablet, Rfl: 0    Triamcinolone Acetonide (NASACORT ALLERGY 24HR NA), 1 spray into each nostril daily  , Disp: , Rfl:     Uloric 40 MG tablet, Take 1 tablet (40 mg total) by mouth daily, Disp: 90 tablet, Rfl: 1    verapamil (CALAN) 120 mg tablet, TAKE 1 TABLET BY MOUTH EVERY DAY, Disp: 90 tablet, Rfl: 3    ibuprofen (MOTRIN) 600 mg tablet, Take 600 mg by mouth every 8 (eight) hours, Disp: , Rfl:     Current Allergies     Allergies as of 07/02/2022 - Reviewed 07/02/2022   Allergen Reaction Noted    Penicillins Rash and Anaphylaxis 08/17/2004    Acetazolamide  10/25/2016    Cephalosporins Other (See Comments) 08/17/2004    Doxycycline  10/24/2018    Other  04/28/2014    Sulfa antibiotics Other (See Comments) 08/17/2004    Famotidine Palpitations 09/05/2016    Levofloxacin Palpitations 11/12/2021    Omeprazole Palpitations 09/05/2016            The following portions of the patient's history were reviewed and updated as appropriate: allergies, current medications, past family history, past medical history, past social history, past surgical history and problem list      Past Medical History:   Diagnosis Date    Anxiety 1/15/2019    Arthritis     Chronic rhinitis     last assessed 2/21/14    Chronic serous otitis media     last assessed 11/20/13    Chronic sinusitis     last assessed 8/19/14    Functional heart murmur     GERD (gastroesophageal reflux disease)     Gout     Hearing problem     last assessed 12/1/16    Hiatal hernia     Hypercholesterolemia     Hypertension     Palpitations     Testicular hypogonadism     last assessed 10/4/13    tach [facility])        Past Surgical History:   Procedure Laterality Date    APPENDECTOMY      BICEPS TENODESIS      anesthesia for tenodesis- of ruptured long tendon of biceps     HERNIA REPAIR      THYROIDECTOMY      TONSILLECTOMY         Family History   Problem Relation Age of Onset    Lung cancer Father     Coronary artery disease Father         blockages    Stroke Maternal Grandmother     Cancer Paternal Grandfather         metastasized    Heart disease Family     Lung cancer Cousin     No Known Problems Mother     No Known Problems Sister     No Known Problems Brother     No Known Problems Maternal Aunt     No Known Problems Maternal Uncle     No Known Problems Paternal Aunt     No Known Problems Paternal Uncle     No Known Problems Paternal Grandmother     ADD / ADHD Neg Hx     Anesthesia problems Neg Hx     Clotting disorder Neg Hx     Collagen disease Neg Hx     Diabetes Neg Hx     Dislocations Neg Hx     Learning disabilities Neg Hx     Neurological problems Neg Hx     Osteoporosis Neg Hx     Rheumatologic disease Neg Hx     Scoliosis Neg Hx     Vascular Disease Neg Hx          Medications have been verified  Objective   Pulse 83   Temp 97 6 °F (36 4 °C)   Resp 16   SpO2 97%   No LMP for male patient  Physical Exam     Physical Exam  Constitutional:       General: He is not in acute distress  Appearance: He is well-developed  He is not diaphoretic  HENT:      Head: Normocephalic and atraumatic  Right Ear: Tympanic membrane and external ear normal       Left Ear: Tympanic membrane and external ear normal       Mouth/Throat:      Mouth: Mucous membranes are moist       Pharynx: Oropharynx is clear  No oropharyngeal exudate  Eyes:      Extraocular Movements: Extraocular movements intact  Conjunctiva/sclera: Conjunctivae normal    Cardiovascular:      Rate and Rhythm: Normal rate and regular rhythm  Heart sounds: Normal heart sounds     Pulmonary:      Effort: Pulmonary effort is normal  No respiratory distress  Breath sounds: Normal breath sounds  No wheezing  Abdominal:      General: Bowel sounds are normal  There is no distension  Palpations: Abdomen is soft  There is no mass  Tenderness: There is no abdominal tenderness  Musculoskeletal:         General: No swelling or tenderness  Cervical back: Normal range of motion  Right lower leg: No edema  Left lower leg: No edema  Lymphadenopathy:      Cervical: No cervical adenopathy  Skin:     General: Skin is warm  Neurological:      Mental Status: He is alert and oriented to person, place, and time

## 2022-07-13 ENCOUNTER — OFFICE VISIT (OUTPATIENT)
Dept: INTERNAL MEDICINE CLINIC | Facility: OTHER | Age: 57
End: 2022-07-13
Payer: MEDICARE

## 2022-07-13 VITALS
HEIGHT: 69 IN | WEIGHT: 215 LBS | TEMPERATURE: 98.4 F | HEART RATE: 75 BPM | OXYGEN SATURATION: 96 % | BODY MASS INDEX: 31.84 KG/M2

## 2022-07-13 DIAGNOSIS — R05.9 COUGH: Primary | ICD-10-CM

## 2022-07-13 DIAGNOSIS — B35.4 TINEA CORPORIS: ICD-10-CM

## 2022-07-13 PROCEDURE — 99214 OFFICE O/P EST MOD 30 MIN: CPT | Performed by: INTERNAL MEDICINE

## 2022-07-13 RX ORDER — KETOCONAZOLE 20 MG/G
CREAM TOPICAL DAILY
Qty: 60 G | Refills: 2 | Status: SHIPPED | OUTPATIENT
Start: 2022-07-13

## 2022-07-13 NOTE — PROGRESS NOTES
Assessment/Plan:     Diagnoses and all orders for this visit:    Cough  - Most likely allergic etiology  - Continue current over the counter antihistamines  History of asthma  -Continue Singulair and albuterol nebulizer as prescribed  Tinea corporis     -Circular kind of rash on the left arm and also is on the right elbow area suggestive of possible fungal infection will start him on ketoconazole cream          Subjective:   Chief Complaint   Patient presents with    Nasal Congestion     Sinus and chest congestion  Sinus congestion is on going  Sore throat  Also C/O fatigue      Cough     Yesterday had a cough not it is off and on        Patient ID: Jun Thompson is a 65 y o  male  Chief Complaint   Patient presents with    Nasal Congestion     Sinus and chest congestion  Sinus congestion is on going  Sore throat  Also C/O fatigue      Cough     Yesterday had a cough not it is off and on       Patient is a 65year old male with a prior medical history of asthma and seasonal allergies presenting with ~9 day history of cough and feeling of congestion with an exacerbation in the last 3 days  Patient denies any areas of pain, but notes an irritated feeling in throat  Patient notes that changes in temperature, and other environmental allergens have triggered exacerbations  The over the counter antihistamine the patient has taken has provided relief, but not enough to completely address the issue  Patient endorses productive cough producing scant discharge, But otherwise denies fevers, chills, nausea, vomiting, diarrhea, eye redness, eye drainage   Patient notes an extensive history of similar symptoms, which has been adequately addressed by medications in the past  Patient ntoes that the symptoms seem to be present for longer than normal       The following portions of the patient's history were reviewed and updated as appropriate: allergies, current medications, past family history, past medical history, past social history, past surgical history and problem list     Review of Systems   Constitutional: Negative for chills and fever  HENT: Negative for ear pain and sore throat  Eyes: Negative for pain and visual disturbance  Respiratory: Negative for cough and shortness of breath  Cardiovascular: Negative for chest pain and palpitations  Gastrointestinal: Negative for abdominal pain and vomiting  Genitourinary: Negative for dysuria and hematuria  Musculoskeletal: Negative for arthralgias and back pain  Skin: Negative for color change and rash  Neurological: Negative for seizures and syncope  All other systems reviewed and are negative          Past Medical History:   Diagnosis Date    Anxiety 1/15/2019    Arthritis     Chronic rhinitis     last assessed 2/21/14    Chronic serous otitis media     last assessed 11/20/13    Chronic sinusitis     last assessed 8/19/14    Functional heart murmur     GERD (gastroesophageal reflux disease)     Gout     Hearing problem     last assessed 12/1/16    Hiatal hernia     Hypercholesterolemia     Hypertension     Palpitations     Testicular hypogonadism     last assessed 10/4/13    [facility])          Current Outpatient Medications:     acetaminophen (TYLENOL) 500 mg tablet, Take 1,000 mg by mouth every 6 (six) hours as needed for mild pain, Disp: , Rfl:     albuterol (2 5 mg/3 mL) 0 083 % nebulizer solution, Take 3 mL (2 5 mg total) by nebulization every 6 (six) hours as needed for wheezing or shortness of breath, Disp: 180 mL, Rfl: 5    albuterol (PROVENTIL HFA,VENTOLIN HFA) 90 mcg/act inhaler, Inhale 1 puff 4 (four) times a day , Disp: , Rfl:     Chlorpheniramine Maleate (CHLOR-TRIMETON ALLERGY PO), Take by mouth as needed  , Disp: , Rfl:     cyclobenzaprine (FLEXERIL) 5 mg tablet, Take 1 tablet (5 mg total) by mouth 3 (three) times a day as needed for muscle spasms, Disp: 30 tablet, Rfl: 1    esomeprazole (NexIUM) 40 MG capsule, Take 40 mg by mouth every morning before breakfast, Disp: , Rfl:     fluticasone-salmeterol (Advair) 500-50 mcg/dose inhaler, Inhale 1 puff 2 (two) times a day, Disp: , Rfl:     ipratropium-albuterol (DUO-NEB) 0 5-2 5 mg/3 mL, Inhale 3 mL 2 (two) times a week , Disp: , Rfl:     lisinopril (ZESTRIL) 20 mg tablet, Take 1 tablet (20 mg total) by mouth daily, Disp: 90 tablet, Rfl: 1    meclizine (ANTIVERT) 12 5 MG tablet, Take 1 tablet (12 5 mg total) by mouth every 8 (eight) hours, Disp: 30 tablet, Rfl: 1    montelukast (SINGULAIR) 10 mg tablet, Take 1 tablet (10 mg total) by mouth daily, Disp: 90 tablet, Rfl: 1    Triamcinolone Acetonide (NASACORT ALLERGY 24HR NA), 1 spray into each nostril daily  , Disp: , Rfl:     Uloric 40 MG tablet, Take 1 tablet (40 mg total) by mouth daily, Disp: 90 tablet, Rfl: 1    verapamil (CALAN) 120 mg tablet, TAKE 1 TABLET BY MOUTH EVERY DAY, Disp: 90 tablet, Rfl: 3    ibuprofen (MOTRIN) 600 mg tablet, Take 600 mg by mouth every 8 (eight) hours, Disp: , Rfl:     Allergies   Allergen Reactions    Penicillins Rash and Anaphylaxis    Acetazolamide      Other reaction(s): Stomach Ache    Cephalosporins Other (See Comments)     headache    Doxycycline      Capsules only  Tablets are ok to take, per pt  Capsules only  Tablets are ok to take, per pt   Other     Sulfa Antibiotics Other (See Comments)     Category: Allergy;  Annotation - [id number]: STOMACH UPSET    Famotidine Palpitations    Levofloxacin Palpitations    Omeprazole Palpitations     Painful urination       Social History   Past Surgical History:   Procedure Laterality Date    APPENDECTOMY      BICEPS TENODESIS      anesthesia for tenodesis- of ruptured long tendon of biceps     HERNIA REPAIR      THYROIDECTOMY      TONSILLECTOMY       Family History   Problem Relation Age of Onset    Lung cancer Father     Coronary artery disease Father         blockages    Stroke Maternal Grandmother     Cancer Paternal Grandfather         metastasized    Heart disease Family     Lung cancer Cousin     No Known Problems Mother     No Known Problems Sister     No Known Problems Brother     No Known Problems Maternal Aunt     No Known Problems Maternal Uncle     No Known Problems Paternal Aunt     No Known Problems Paternal Uncle     No Known Problems Paternal Grandmother     ADD / ADHD Neg Hx     Anesthesia problems Neg Hx     Clotting disorder Neg Hx     Collagen disease Neg Hx     Diabetes Neg Hx     Dislocations Neg Hx     Learning disabilities Neg Hx     Neurological problems Neg Hx     Osteoporosis Neg Hx     Rheumatologic disease Neg Hx     Scoliosis Neg Hx     Vascular Disease Neg Hx        Objective:  Pulse 75   Temp 98 4 °F (36 9 °C) (Temporal)   Ht 5' 9" (1 753 m)   Wt 97 5 kg (215 lb)   SpO2 96%   BMI 31 75 kg/m²     No results found for this or any previous visit (from the past 1344 hour(s))  Physical Exam  Constitutional:       General: He is not in acute distress  Appearance: He is not ill-appearing  HENT:      Head: Normocephalic and atraumatic  Right Ear: Tympanic membrane normal       Left Ear: Tympanic membrane normal       Mouth/Throat:      Mouth: Mucous membranes are moist       Pharynx: Oropharynx is clear  No oropharyngeal exudate  Eyes:      General:         Right eye: No discharge  Left eye: No discharge  Conjunctiva/sclera: Conjunctivae normal    Cardiovascular:      Rate and Rhythm: Normal rate and regular rhythm  Pulses: Normal pulses  Heart sounds: Normal heart sounds  No murmur heard  Pulmonary:      Effort: Pulmonary effort is normal       Breath sounds: No stridor  No wheezing, rhonchi or rales  Abdominal:      General: Abdomen is flat  Bowel sounds are normal  There is no distension  Palpations: Abdomen is soft  Tenderness: There is no abdominal tenderness  There is no right CVA tenderness or left CVA tenderness  Musculoskeletal:         General: No swelling or signs of injury  Cervical back: Normal range of motion and neck supple  Right lower leg: No edema  Left lower leg: No edema  Skin:     General: Skin is warm and dry  Capillary Refill: Capillary refill takes less than 2 seconds  Coloration: Skin is not jaundiced  Neurological:      General: No focal deficit present  Mental Status: He is alert and oriented to person, place, and time  Psychiatric:         Mood and Affect: Mood is anxious  Speech: Speech is rapid and pressured

## 2022-07-20 DIAGNOSIS — J45.901 ASTHMA WITH ACUTE EXACERBATION, UNSPECIFIED ASTHMA SEVERITY, UNSPECIFIED WHETHER PERSISTENT: ICD-10-CM

## 2022-07-20 RX ORDER — ALBUTEROL SULFATE 2.5 MG/3ML
2.5 SOLUTION RESPIRATORY (INHALATION) EVERY 6 HOURS PRN
Qty: 180 ML | Refills: 5 | Status: SHIPPED | OUTPATIENT
Start: 2022-07-20

## 2022-07-27 DIAGNOSIS — I10 HYPERTENSION, UNSPECIFIED TYPE: ICD-10-CM

## 2022-07-27 RX ORDER — LISINOPRIL 20 MG/1
20 TABLET ORAL DAILY
Qty: 90 TABLET | Refills: 1 | Status: SHIPPED | OUTPATIENT
Start: 2022-07-27

## 2022-08-15 ENCOUNTER — OFFICE VISIT (OUTPATIENT)
Dept: OBGYN CLINIC | Facility: MEDICAL CENTER | Age: 57
End: 2022-08-15
Payer: MEDICARE

## 2022-08-15 VITALS
HEIGHT: 69 IN | WEIGHT: 216 LBS | HEART RATE: 73 BPM | BODY MASS INDEX: 31.99 KG/M2 | DIASTOLIC BLOOD PRESSURE: 84 MMHG | SYSTOLIC BLOOD PRESSURE: 134 MMHG

## 2022-08-15 DIAGNOSIS — M17.11 PRIMARY LOCALIZED OSTEOARTHRITIS OF RIGHT KNEE: ICD-10-CM

## 2022-08-15 DIAGNOSIS — M17.12 PRIMARY LOCALIZED OSTEOARTHRITIS OF LEFT KNEE: Primary | ICD-10-CM

## 2022-08-15 DIAGNOSIS — M25.461 EFFUSION OF RIGHT KNEE: ICD-10-CM

## 2022-08-15 PROCEDURE — 20610 DRAIN/INJ JOINT/BURSA W/O US: CPT | Performed by: ORTHOPAEDIC SURGERY

## 2022-08-15 PROCEDURE — 99213 OFFICE O/P EST LOW 20 MIN: CPT | Performed by: ORTHOPAEDIC SURGERY

## 2022-08-15 RX ORDER — BUPIVACAINE HYDROCHLORIDE 2.5 MG/ML
1 INJECTION, SOLUTION INFILTRATION; PERINEURAL
Status: COMPLETED | OUTPATIENT
Start: 2022-08-15 | End: 2022-08-15

## 2022-08-15 RX ORDER — LIDOCAINE HYDROCHLORIDE 10 MG/ML
1 INJECTION, SOLUTION INFILTRATION; PERINEURAL
Status: COMPLETED | OUTPATIENT
Start: 2022-08-15 | End: 2022-08-15

## 2022-08-15 RX ORDER — BUPIVACAINE HYDROCHLORIDE 5 MG/ML
2 INJECTION, SOLUTION EPIDURAL; INTRACAUDAL
Status: COMPLETED | OUTPATIENT
Start: 2022-08-15 | End: 2022-08-15

## 2022-08-15 RX ORDER — BUPIVACAINE HYDROCHLORIDE 5 MG/ML
3.5 INJECTION, SOLUTION PERINEURAL
Status: COMPLETED | OUTPATIENT
Start: 2022-08-15 | End: 2022-08-15

## 2022-08-15 RX ORDER — TRIAMCINOLONE ACETONIDE 40 MG/ML
40 INJECTION, SUSPENSION INTRA-ARTICULAR; INTRAMUSCULAR
Status: COMPLETED | OUTPATIENT
Start: 2022-08-15 | End: 2022-08-15

## 2022-08-15 RX ADMIN — LIDOCAINE HYDROCHLORIDE 1 ML: 10 INJECTION, SOLUTION INFILTRATION; PERINEURAL at 15:58

## 2022-08-15 RX ADMIN — BUPIVACAINE HYDROCHLORIDE 1 ML: 2.5 INJECTION, SOLUTION INFILTRATION; PERINEURAL at 15:58

## 2022-08-15 RX ADMIN — BUPIVACAINE HYDROCHLORIDE 2 ML: 5 INJECTION, SOLUTION EPIDURAL; INTRACAUDAL at 15:58

## 2022-08-15 RX ADMIN — TRIAMCINOLONE ACETONIDE 40 MG: 40 INJECTION, SUSPENSION INTRA-ARTICULAR; INTRAMUSCULAR at 15:58

## 2022-08-15 RX ADMIN — BUPIVACAINE HYDROCHLORIDE 3.5 ML: 5 INJECTION, SOLUTION PERINEURAL at 15:58

## 2022-08-15 NOTE — PROGRESS NOTES
Assessment/Plan:  1  Primary localized osteoarthritis of left knee    2  Primary localized osteoarthritis of right knee    3  Effusion of right knee      Orders Placed This Encounter   Procedures    Large joint arthrocentesis: bilateral knee    Medial  Knee Brace     - right knee aspiration was performed today healed a 76 cc of clear yellow synovial fluid  Cortisone injection was administered at this time into the right knee through the same portal   Left knee corticosteroid injection was also performed today  - left knee medial  brace will be ordered and provided  - continue follow-up with PCP for ongoing treatment of gout, aspirate from right knee today physical examination not consistent with acute gouty arthritis  - patient may follow-up on as-needed basis for repeat injections of Visco or cortisone the future    Return in about 3 months (around 11/15/2022)  I answered all of the patient's questions during the visit and provided education of the patient's condition during the visit  The patient verbalized understanding of the information given and agrees with the plan  This note was dictated using [employer] software  It may contain errors including improperly dictated words  Please contact physician directly for any questions  Subjective   Chief Complaint:   Chief Complaint   Patient presents with    Left Knee - Follow-up    Right Knee - Follow-up       [facility]  Brian Zarate is a 65 y o  male who presents for follow up for bilateral knee pain  Patient reports both knees have been equally painful  They are worse with prolonged ambulation better with rest   He has had relief from cortisone injections and Visco injections in the past   He is interested on  braces today  Denies any recent fevers or chills  No erythema or warmth  Review of Systems  ROS:    See HPI for musculoskeletal review     All other systems reviewed are negative     History:  Past Medical History: Diagnosis Date    Anxiety 1/15/2019    Arthritis     Chronic rhinitis     last assessed 2/21/14    Chronic serous otitis media     last assessed 11/20/13    Chronic sinusitis     last assessed 8/19/14    Functional heart murmur     GERD (gastroesophageal reflux disease)     Gout     Hearing problem     last assessed 12/1/16    Hiatal hernia     Hypercholesterolemia     Hypertension     Palpitations     Testicular hypogonadism     last assessed 10/4/13    [facility])      Past Surgical History:   Procedure Laterality Date    APPENDECTOMY      BICEPS TENODESIS      anesthesia for tenodesis- of ruptured long tendon of biceps     HERNIA REPAIR      THYROIDECTOMY      TONSILLECTOMY       Social History   Social History     Substance and Sexual Activity   Alcohol Use Yes    Comment: occasionally     Social History     Substance and Sexual Activity   Drug Use No     Social History     Tobacco Use   Smoking Status Never Smoker   Smokeless Tobacco Never Used     Family History:   Family History   Problem Relation Age of Onset    Lung cancer Father     Coronary artery disease Father         blockages    Stroke Maternal Grandmother     Cancer Paternal Grandfather         metastasized    Heart disease Family     Lung cancer Cousin     No Known Problems Mother     No Known Problems Sister     No Known Problems Brother     No Known Problems Maternal Aunt     No Known Problems Maternal Uncle     No Known Problems Paternal Aunt     No Known Problems Paternal Uncle     No Known Problems Paternal Grandmother     ADD / ADHD Neg Hx     Anesthesia problems Neg Hx     Clotting disorder Neg Hx     Collagen disease Neg Hx     Diabetes Neg Hx     Dislocations Neg Hx     Learning disabilities Neg Hx     Neurological problems Neg Hx     Osteoporosis Neg Hx     Rheumatologic disease Neg Hx     Scoliosis Neg Hx     Vascular Disease Neg Hx        Current Outpatient Medications on File Prior to Visit   Medication Sig Dispense Refill    acetaminophen (TYLENOL) 500 mg tablet Take 1,000 mg by mouth every 6 (six) hours as needed for mild pain      albuterol (2 5 mg/3 mL) 0 083 % nebulizer solution Take 3 mL (2 5 mg total) by nebulization every 6 (six) hours as needed for wheezing or shortness of breath 180 mL 5    albuterol (PROVENTIL HFA,VENTOLIN HFA) 90 mcg/act inhaler Inhale 1 puff 4 (four) times a day       Chlorpheniramine Maleate (CHLOR-TRIMETON ALLERGY PO) Take by mouth as needed        cyclobenzaprine (FLEXERIL) 5 mg tablet Take 1 tablet (5 mg total) by mouth 3 (three) times a day as needed for muscle spasms 30 tablet 1    esomeprazole (NexIUM) 40 MG capsule Take 40 mg by mouth every morning before breakfast      fluticasone-salmeterol (Advair) 500-50 mcg/dose inhaler Inhale 1 puff 2 (two) times a day      ibuprofen (MOTRIN) 600 mg tablet Take 600 mg by mouth every 8 (eight) hours      ipratropium-albuterol (DUO-NEB) 0 5-2 5 mg/3 mL Inhale 3 mL 2 (two) times a week       ketoconazole (NIZORAL) 2 % cream Apply topically daily 60 g 2    lisinopril (ZESTRIL) 20 mg tablet Take 1 tablet (20 mg total) by mouth daily 90 tablet 1    meclizine (ANTIVERT) 12 5 MG tablet Take 1 tablet (12 5 mg total) by mouth every 8 (eight) hours 30 tablet 1    montelukast (SINGULAIR) 10 mg tablet Take 1 tablet (10 mg total) by mouth daily 90 tablet 1    Triamcinolone Acetonide (NASACORT ALLERGY 24HR NA) 1 spray into each nostril daily        Uloric 40 MG tablet Take 1 tablet (40 mg total) by mouth daily 90 tablet 1    verapamil (CALAN) 120 mg tablet TAKE 1 TABLET BY MOUTH EVERY DAY 90 tablet 3     No current facility-administered medications on file prior to visit  Allergies   Allergen Reactions    Penicillins Rash and Anaphylaxis    Acetazolamide      Other reaction(s): Stomach Ache    Cephalosporins Other (See Comments)     headache    Doxycycline      Capsules only  Tablets are ok to take, per pt  Capsules only  Tablets are ok to take, per pt   Other     Sulfa Antibiotics Other (See Comments)     Category: Allergy; Annotation - [id number]: STOMACH UPSET    Famotidine Palpitations    Levofloxacin Palpitations    Omeprazole Palpitations     Painful urination        Objective     /84   Pulse 73   Ht 5' 9" (1 753 m)   Wt 98 kg (216 lb)   BMI 31 90 kg/m²      PE:  AAOx 3  WDWN  Hearing intact, no drainage from eyes  no audible wheezing  no abdominal distension  LE compartments soft, skin intact    Ortho Exam:  bilateral Knee:   No erythema  no swelling  Moderate effusion the right knee, trace effusion left knee  no warmth  AROM: full  Stable to varus/valgus stress, slight varus laxity on left knee without instability    Imaging Studies: I have personally reviewed pertinent reports  Large joint arthrocentesis: bilateral knee  Universal Protocol:  Consent given by: patient  Time out: Immediately prior to procedure a "time out" was called to verify the correct patient, procedure, equipment, support staff and site/side marked as required    Site marked: the operative site was marked  Supporting Documentation  Indications: pain   Procedure Details  Location: knee - bilateral knee  Preparation: Patient was prepped and draped in the usual sterile fashion  Needle size: 22 G  Approach: anterolateral    Medications (Right): 1 mL lidocaine 1 %; 1 mL bupivacaine 0 25 %; 40 mg triamcinolone acetonide 40 mg/mL; 3 5 mL bupivacaine 0 5 %Aspirate amount (Right): 76 mL  Aspirate (Right): clear, serous and yellow    Medications (Left): 1 mL lidocaine 1 %; 1 mL bupivacaine 0 25 %; 40 mg triamcinolone acetonide 40 mg/mL; 2 mL bupivacaine (PF) 0 5 %   Patient tolerance: patient tolerated the procedure well with no immediate complications  Dressing:  Sterile dressing applied

## 2022-08-30 ENCOUNTER — OFFICE VISIT (OUTPATIENT)
Dept: INTERNAL MEDICINE CLINIC | Facility: OTHER | Age: 57
End: 2022-08-30
Payer: MEDICARE

## 2022-08-30 VITALS
SYSTOLIC BLOOD PRESSURE: 124 MMHG | OXYGEN SATURATION: 96 % | BODY MASS INDEX: 30.96 KG/M2 | TEMPERATURE: 98 F | WEIGHT: 209 LBS | DIASTOLIC BLOOD PRESSURE: 86 MMHG | HEIGHT: 69 IN | HEART RATE: 82 BPM

## 2022-08-30 DIAGNOSIS — J30.89 NON-SEASONAL ALLERGIC RHINITIS, UNSPECIFIED TRIGGER: ICD-10-CM

## 2022-08-30 DIAGNOSIS — I10 ESSENTIAL HYPERTENSION: ICD-10-CM

## 2022-08-30 DIAGNOSIS — I47.20 VENTRICULAR TACHYCARDIA: ICD-10-CM

## 2022-08-30 DIAGNOSIS — B35.4 TINEA CORPORIS: ICD-10-CM

## 2022-08-30 DIAGNOSIS — E78.2 MIXED HYPERLIPIDEMIA: ICD-10-CM

## 2022-08-30 DIAGNOSIS — K21.9 GERD WITHOUT ESOPHAGITIS: Primary | ICD-10-CM

## 2022-08-30 PROBLEM — J01.01 ACUTE RECURRENT MAXILLARY SINUSITIS: Status: RESOLVED | Noted: 2019-02-19 | Resolved: 2022-08-30

## 2022-08-30 PROCEDURE — 99213 OFFICE O/P EST LOW 20 MIN: CPT | Performed by: INTERNAL MEDICINE

## 2022-08-30 NOTE — PROGRESS NOTES
Assessment/Plan:    1  Essential hypertension  Blood pressure is stable on present regimen  Continue with low-salt diet and exercise  2  Bronchial asthma  Stable on present combination of inhalers  3  Candida skin infection  He was prescribed Nizoral cream on previous visit but he never started  Again advised him to start as soon as possible  4  Hyperlipidemia  Is not taking his statin at present  He is due for LFTs and lipid profile as soon as possible  Continue with low-fat diet  5  Seasonal allergic rhinitis  Continue with present combination of nasal steroid and antihistamine  6  Chronic gout  Stable in remission  Continue with present dose of Uloric 40 mg daily  Diagnoses and all orders for this visit:    GERD without esophagitis    Non-seasonal allergic rhinitis, unspecified trigger    Essential hypertension    Ventricular tachycardia (HCC)    Mixed hyperlipidemia    Tinea corporis               Subjective:          Patient ID: Marques Gregory is a 65 y o  male  Patient is here for regular follow-up  He was unable to blood test which was requested on previous visit  The following portions of the patient's history were reviewed and updated as appropriate: allergies, current medications, past family history, past medical history, past social history, past surgical history and problem list     Review of Systems   Constitutional: Negative for fatigue and fever  HENT: Negative for congestion, ear discharge, ear pain, postnasal drip, sinus pressure, sore throat, tinnitus and trouble swallowing  Eyes: Negative for discharge, itching and visual disturbance  Respiratory: Negative for cough and shortness of breath  Cardiovascular: Negative for chest pain and palpitations  Gastrointestinal: Negative for abdominal pain, diarrhea, nausea and vomiting  Endocrine: Negative for cold intolerance and polyuria  Genitourinary: Negative for difficulty urinating, dysuria and urgency  Musculoskeletal: Positive for arthralgias  Negative for neck pain  Skin: Positive for rash  Multiple area of candidal skin infection over right arm left shoulder noted  Allergic/Immunologic: Negative for environmental allergies  Neurological: Negative for dizziness, weakness and headaches  Psychiatric/Behavioral: Negative for agitation and behavioral problems  The patient is not nervous/anxious            Past Medical History:   Diagnosis Date    Anxiety 1/15/2019    Arthritis     Chronic rhinitis     last assessed 2/21/14    Chronic serous otitis media     last assessed 11/20/13    Chronic sinusitis     last assessed 8/19/14    Functional heart murmur     GERD (gastroesophageal reflux disease)     Gout     Hearing problem     last assessed 12/1/16    Hiatal hernia     Hypercholesterolemia     Hypertension     Palpitations     Testicular hypogonadism     last assessed 10/4/13    [facility])          Current Outpatient Medications:     acetaminophen (TYLENOL) 500 mg tablet, Take 1,000 mg by mouth every 6 (six) hours as needed for mild pain, Disp: , Rfl:     albuterol (2 5 mg/3 mL) 0 083 % nebulizer solution, Take 3 mL (2 5 mg total) by nebulization every 6 (six) hours as needed for wheezing or shortness of breath, Disp: 180 mL, Rfl: 5    albuterol (PROVENTIL HFA,VENTOLIN HFA) 90 mcg/act inhaler, Inhale 1 puff 4 (four) times a day , Disp: , Rfl:     Chlorpheniramine Maleate (CHLOR-TRIMETON ALLERGY PO), Take by mouth as needed  , Disp: , Rfl:     cyclobenzaprine (FLEXERIL) 5 mg tablet, Take 1 tablet (5 mg total) by mouth 3 (three) times a day as needed for muscle spasms, Disp: 30 tablet, Rfl: 1    esomeprazole (NexIUM) 40 MG capsule, Take 40 mg by mouth every morning before breakfast, Disp: , Rfl:     fluticasone-salmeterol (Advair) 500-50 mcg/dose inhaler, Inhale 1 puff 2 (two) times a day, Disp: , Rfl:     ibuprofen (MOTRIN) 600 mg tablet, Take 600 mg by mouth every 8 (eight) hours, Disp: , Rfl:     ipratropium-albuterol (DUO-NEB) 0 5-2 5 mg/3 mL, Inhale 3 mL 2 (two) times a week , Disp: , Rfl:     ketoconazole (NIZORAL) 2 % cream, Apply topically daily, Disp: 60 g, Rfl: 2    lisinopril (ZESTRIL) 20 mg tablet, Take 1 tablet (20 mg total) by mouth daily, Disp: 90 tablet, Rfl: 1    meclizine (ANTIVERT) 12 5 MG tablet, Take 1 tablet (12 5 mg total) by mouth every 8 (eight) hours, Disp: 30 tablet, Rfl: 1    montelukast (SINGULAIR) 10 mg tablet, Take 1 tablet (10 mg total) by mouth daily, Disp: 90 tablet, Rfl: 1    Triamcinolone Acetonide (NASACORT ALLERGY 24HR NA), 1 spray into each nostril daily  , Disp: , Rfl:     Uloric 40 MG tablet, Take 1 tablet (40 mg total) by mouth daily, Disp: 90 tablet, Rfl: 1    verapamil (CALAN) 120 mg tablet, TAKE 1 TABLET BY MOUTH EVERY DAY, Disp: 90 tablet, Rfl: 3    Allergies   Allergen Reactions    Penicillins Rash and Anaphylaxis    Acetazolamide      Other reaction(s): Stomach Ache    Cephalosporins Other (See Comments)     headache    Doxycycline      Capsules only  Tablets are ok to take, per pt  Capsules only  Tablets are ok to take, per pt   Other     Sulfa Antibiotics Other (See Comments)     Category: Allergy;  Annotation - [id number]: STOMACH UPSET    Famotidine Palpitations    Levofloxacin Palpitations    Omeprazole Palpitations     Painful urination       Social History   Past Surgical History:   Procedure Laterality Date    APPENDECTOMY      BICEPS TENODESIS      anesthesia for tenodesis- of ruptured long tendon of biceps     HERNIA REPAIR      THYROIDECTOMY      TONSILLECTOMY       Family History   Problem Relation Age of Onset    Lung cancer Father     Coronary artery disease Father         blockages    Stroke Maternal Grandmother     Cancer Paternal Grandfather         metastasized    Heart disease Family     Lung cancer Cousin     No Known Problems Mother     No Known Problems Sister     No Known Problems Brother     No Known Problems Maternal Aunt     No Known Problems Maternal Uncle     No Known Problems Paternal Aunt     No Known Problems Paternal Uncle     No Known Problems Paternal Grandmother     ADD / ADHD Neg Hx     Anesthesia problems Neg Hx     Clotting disorder Neg Hx     Collagen disease Neg Hx     Diabetes Neg Hx     Dislocations Neg Hx     Learning disabilities Neg Hx     Neurological problems Neg Hx     Osteoporosis Neg Hx     Rheumatologic disease Neg Hx     Scoliosis Neg Hx     Vascular Disease Neg Hx        Objective:  /86 (BP Location: Right arm, Patient Position: Sitting, Cuff Size: Adult)   Pulse 82   Temp 98 °F (36 7 °C)   Ht 5' 9" (1 753 m)   Wt 94 8 kg (209 lb)   SpO2 96%   BMI 30 86 kg/m²   Body mass index is 30 86 kg/m²  Physical Exam  Constitutional:       Appearance: He is well-developed  HENT:      Head: Normocephalic  Right Ear: External ear normal       Left Ear: External ear normal       Nose: No rhinorrhea  Mouth/Throat:      Pharynx: No posterior oropharyngeal erythema  Eyes:      General: No scleral icterus  Pupils: Pupils are equal, round, and reactive to light  Neck:      Thyroid: No thyromegaly  Trachea: No tracheal deviation  Cardiovascular:      Rate and Rhythm: Normal rate and regular rhythm  Heart sounds: Normal heart sounds  No murmur heard  Pulmonary:      Effort: Pulmonary effort is normal  No respiratory distress  Breath sounds: Normal breath sounds  Chest:      Chest wall: No tenderness  Abdominal:      General: Bowel sounds are normal       Palpations: Abdomen is soft  There is no mass  Tenderness: There is no abdominal tenderness  Musculoskeletal:         General: Normal range of motion  Cervical back: Normal range of motion and neck supple  Right lower leg: No edema  Left lower leg: No edema  Lymphadenopathy:      Cervical: No cervical adenopathy     Skin:     General: Skin is warm  Findings: Rash present  Comments: Fungal rash over right forearm and left shoulder noted   Neurological:      Mental Status: He is alert and oriented to person, place, and time  Cranial Nerves: No cranial nerve deficit     Psychiatric:         Mood and Affect: Mood normal          Behavior: Behavior normal

## 2022-09-30 ENCOUNTER — OFFICE VISIT (OUTPATIENT)
Dept: OBGYN CLINIC | Facility: MEDICAL CENTER | Age: 57
End: 2022-09-30
Payer: MEDICARE

## 2022-09-30 VITALS
WEIGHT: 216.2 LBS | HEIGHT: 69 IN | DIASTOLIC BLOOD PRESSURE: 76 MMHG | BODY MASS INDEX: 32.02 KG/M2 | SYSTOLIC BLOOD PRESSURE: 127 MMHG | HEART RATE: 81 BPM

## 2022-09-30 DIAGNOSIS — M17.12 PRIMARY LOCALIZED OSTEOARTHRITIS OF LEFT KNEE: Primary | ICD-10-CM

## 2022-09-30 DIAGNOSIS — M17.11 PRIMARY LOCALIZED OSTEOARTHRITIS OF RIGHT KNEE: ICD-10-CM

## 2022-09-30 PROCEDURE — 99213 OFFICE O/P EST LOW 20 MIN: CPT | Performed by: ORTHOPAEDIC SURGERY

## 2022-09-30 RX ORDER — METHYLPREDNISOLONE 4 MG/1
TABLET ORAL
Qty: 21 TABLET | Refills: 0 | Status: SHIPPED | OUTPATIENT
Start: 2022-09-30

## 2022-09-30 NOTE — PROGRESS NOTES
Assessment/Plan:  1  Primary localized osteoarthritis of left knee    2  Primary localized osteoarthritis of right knee      No orders of the defined types were placed in this encounter     - Patient presents with severe bilateral knee osteoarthritis  - Methylprednisolone dose iron ordered  - An aspiration will be considered at the next visit when he is due for injections if needed  - Continue with Advil as needed  - Repeat X-rays at the next visit  Return for CSI in November, repeat X-rays  I answered all of the patient's questions during the visit and provided education of the patient's condition during the visit  The patient verbalized understanding of the information given and agrees with the plan  This note was dictated using S[employer] software  It may contain errors including improperly dictated words  Please contact physician directly for any questions  Subjective   Chief Complaint:   Chief Complaint   Patient presents with    Left Knee - Follow-up    Right Knee - Follow-up       HPI  Nimesh Rand is a 65 y o  male who presents for follow up for bilateral knee pain and osteoarthritis  He reports continued relief from the bilateral CSI received on 08/15/2022, and is aware he is not due for another set of injections yet  Most recent Euflexxa injection was 06/10/2022, also with reported continued relief  He reports experiencing pain and popping sensation in his left knee about 3 weeks ago, but that pain has since reduced  He is still taking Advil as needed for pain and walking 5 miles a week, and using an  brace on his left knee with relief  He is here today because he is unsure of he needs an aspiration to the left knee  Review of Systems  ROS:    See HPI for musculoskeletal review     All other systems reviewed are negative     History:  Past Medical History:   Diagnosis Date    Anxiety 1/15/2019    Arthritis     Chronic rhinitis     last assessed 2/21/14    Chronic serous otitis media     last assessed 11/20/13    Chronic sinusitis     last assessed 8/19/14    Functional heart murmur     GERD (gastroesophageal reflux disease)     Gout     Hearing problem     last assessed 12/1/16    Hiatal hernia     Hypercholesterolemia     Hypertension     Palpitations     Testicular hypogonadism     last assessed 10/4/13    [facility]ach [facility])      Past Surgical History:   Procedure Laterality Date    APPENDECTOMY      BICEPS TENODESIS      anesthesia for tenodesis- of ruptured long tendon of biceps     HERNIA REPAIR      THYROIDECTOMY      TONSILLECTOMY       Social History   Social History     Substance and Sexual Activity   Alcohol Use Yes    Comment: occasionally     Social History     Substance and Sexual Activity   Drug Use No     Social History     Tobacco Use   Smoking Status Never Smoker   Smokeless Tobacco Never Used     Family History:   Family History   Problem Relation Age of Onset    Lung cancer Father     Coronary artery disease Father         blockages    Stroke Maternal Grandmother     Cancer Paternal Grandfather         metastasized    Heart disease Family     Lung cancer Cousin     No Known Problems Mother     No Known Problems Sister     No Known Problems Brother     No Known Problems Maternal Aunt     No Known Problems Maternal Uncle     No Known Problems Paternal Aunt     No Known Problems Paternal Uncle     No Known Problems Paternal Grandmother     ADD / ADHD Neg Hx     Anesthesia problems Neg Hx     Clotting disorder Neg Hx     Collagen disease Neg Hx     Diabetes Neg Hx     Dislocations Neg Hx     Learning disabilities Neg Hx     Neurological problems Neg Hx     Osteoporosis Neg Hx     Rheumatologic disease Neg Hx     Scoliosis Neg Hx     Vascular Disease Neg Hx        Current Outpatient Medications on File Prior to Visit   Medication Sig Dispense Refill    acetaminophen (TYLENOL) 500 mg tablet Take 1,000 mg by mouth every 6 (six) hours as needed for mild pain      albuterol (2 5 mg/3 mL) 0 083 % nebulizer solution Take 3 mL (2 5 mg total) by nebulization every 6 (six) hours as needed for wheezing or shortness of breath 180 mL 5    albuterol (PROVENTIL HFA,VENTOLIN HFA) 90 mcg/act inhaler Inhale 1 puff 4 (four) times a day       Chlorpheniramine Maleate (CHLOR-TRIMETON ALLERGY PO) Take by mouth as needed        cyclobenzaprine (FLEXERIL) 5 mg tablet Take 1 tablet (5 mg total) by mouth 3 (three) times a day as needed for muscle spasms 30 tablet 1    esomeprazole (NexIUM) 40 MG capsule Take 40 mg by mouth every morning before breakfast      fluticasone-salmeterol (Advair) 500-50 mcg/dose inhaler Inhale 1 puff 2 (two) times a day      ibuprofen (MOTRIN) 600 mg tablet Take 600 mg by mouth every 8 (eight) hours      ipratropium-albuterol (DUO-NEB) 0 5-2 5 mg/3 mL Inhale 3 mL 2 (two) times a week       ketoconazole (NIZORAL) 2 % cream Apply topically daily 60 g 2    lisinopril (ZESTRIL) 20 mg tablet Take 1 tablet (20 mg total) by mouth daily 90 tablet 1    meclizine (ANTIVERT) 12 5 MG tablet Take 1 tablet (12 5 mg total) by mouth every 8 (eight) hours 30 tablet 1    montelukast (SINGULAIR) 10 mg tablet Take 1 tablet (10 mg total) by mouth daily 90 tablet 1    Triamcinolone Acetonide (NASACORT ALLERGY 24HR NA) 1 spray into each nostril daily        Uloric 40 MG tablet Take 1 tablet (40 mg total) by mouth daily 90 tablet 1    verapamil (CALAN) 120 mg tablet TAKE 1 TABLET BY MOUTH EVERY DAY 90 tablet 3     No current facility-administered medications on file prior to visit  Allergies   Allergen Reactions    Penicillins Rash and Anaphylaxis    Acetazolamide      Other reaction(s): Stomach Ache    Cephalosporins Other (See Comments)     headache    Doxycycline      Capsules only  Tablets are ok to take, per pt  Capsules only  Tablets are ok to take, per pt       Other     Sulfa Antibiotics Other (See Comments) Category: Allergy;  Annotation - [id number]: STOMACH UPSET    Famotidine Palpitations    Levofloxacin Palpitations    Omeprazole Palpitations     Painful urination        Objective     /76   Pulse 81   Ht 5' 9" (1 753 m)   Wt 98 1 kg (216 lb 3 2 oz)   BMI 31 93 kg/m²      PE:  AAOx 3  WDWN  Hearing intact, no drainage from eyes  no audible wheezing  no abdominal distension  LE compartments soft, skin intact    Ortho Exam:  bilateral Knee:   No erythema  no swelling  Mild effusion   no warmth  AROM: 0-120 bilateral  Stable to varus/valgus stress

## 2022-10-25 ENCOUNTER — IMMUNIZATIONS (OUTPATIENT)
Dept: INTERNAL MEDICINE CLINIC | Facility: OTHER | Age: 57
End: 2022-10-25
Payer: MEDICARE

## 2022-10-25 DIAGNOSIS — Z23 NEEDS FLU SHOT: Primary | ICD-10-CM

## 2022-10-25 PROCEDURE — G0008 ADMIN INFLUENZA VIRUS VAC: HCPCS

## 2022-10-25 PROCEDURE — 90682 RIV4 VACC RECOMBINANT DNA IM: CPT

## 2022-11-03 DIAGNOSIS — J31.0 CHRONIC RHINITIS: ICD-10-CM

## 2022-11-03 RX ORDER — MONTELUKAST SODIUM 10 MG/1
10 TABLET ORAL DAILY
Qty: 90 TABLET | Refills: 1 | Status: SHIPPED | OUTPATIENT
Start: 2022-11-03

## 2022-11-18 ENCOUNTER — OFFICE VISIT (OUTPATIENT)
Dept: OBGYN CLINIC | Facility: MEDICAL CENTER | Age: 57
End: 2022-11-18

## 2022-11-18 VITALS
WEIGHT: 223.2 LBS | HEIGHT: 69 IN | HEART RATE: 89 BPM | DIASTOLIC BLOOD PRESSURE: 88 MMHG | SYSTOLIC BLOOD PRESSURE: 158 MMHG | BODY MASS INDEX: 33.06 KG/M2

## 2022-11-18 DIAGNOSIS — M17.0 BILATERAL PRIMARY OSTEOARTHRITIS OF KNEE: Primary | ICD-10-CM

## 2022-11-18 DIAGNOSIS — M25.461 EFFUSION OF RIGHT KNEE: ICD-10-CM

## 2022-11-18 RX ORDER — BUPIVACAINE HYDROCHLORIDE 2.5 MG/ML
2 INJECTION, SOLUTION INFILTRATION; PERINEURAL
Status: COMPLETED | OUTPATIENT
Start: 2022-11-18 | End: 2022-11-18

## 2022-11-18 RX ORDER — BUPIVACAINE HYDROCHLORIDE 2.5 MG/ML
4 INJECTION, SOLUTION INFILTRATION; PERINEURAL
Status: COMPLETED | OUTPATIENT
Start: 2022-11-18 | End: 2022-11-18

## 2022-11-18 RX ORDER — TRIAMCINOLONE ACETONIDE 40 MG/ML
40 INJECTION, SUSPENSION INTRA-ARTICULAR; INTRAMUSCULAR
Status: COMPLETED | OUTPATIENT
Start: 2022-11-18 | End: 2022-11-18

## 2022-11-18 RX ADMIN — BUPIVACAINE HYDROCHLORIDE 2 ML: 2.5 INJECTION, SOLUTION INFILTRATION; PERINEURAL at 16:30

## 2022-11-18 RX ADMIN — TRIAMCINOLONE ACETONIDE 40 MG: 40 INJECTION, SUSPENSION INTRA-ARTICULAR; INTRAMUSCULAR at 16:30

## 2022-11-18 RX ADMIN — BUPIVACAINE HYDROCHLORIDE 4 ML: 2.5 INJECTION, SOLUTION INFILTRATION; PERINEURAL at 16:30

## 2022-11-18 NOTE — PROGRESS NOTES
Assessment/Plan:  1  Bilateral primary osteoarthritis of knee    2  Effusion of right knee      Orders Placed This Encounter   Procedures   • Large joint arthrocentesis   • Large joint arthrocentesis   • Large joint arthrocentesis   • XR knee 4+ vw left injury   • XR knee 4+ vw right injury       · Patient has severe bilateral knee osteoarthritis  · Patient underwent right knee aspiration for 100 ml and bilateral knee steroid injections  Post injection instructions reviewed  Patient aware he can complete steroid injections every 3 months if needed  · Continue OTC NSAID prn pain  · Continue  brace for comfort  · Continue activity as tolerated  Return in about 3 months (around 2/18/2023) for bilat knee CSI  I answered all of the patient's questions during the visit and provided education of the patient's condition during the visit  The patient verbalized understanding of the information given and agrees with the plan  This note was dictated using [employer] software  It may contain errors including improperly dictated words  Please contact physician directly for any questions  Subjective   Chief Complaint:   Chief Complaint   Patient presents with   • Left Knee - Follow-up   • Right Knee - Follow-up       PEDRO  Kaitlynn Oneal is a 65 y o  male who presents for follow up for bilateral knee pain and severe OA  Patient reports right knee pain and swelling and left knee pain  Patient states medrol dose pack provided at last visit did provide significant pain relief  He notes progressively worsening bilateral knee pain, right knee worse than left knee  He is taking advil over the last few days with relief  Patient has an  brace that he wears for comfort  Patient last underwent bilateral knee steroid injections on 8/15/22  He would like to have his right knee aspirated and both knees injected today  Review of Systems  ROS:    See HPI for musculoskeletal review     All other systems reviewed are negative     History:  Past Medical History:   Diagnosis Date   • Anxiety 1/15/2019   • Arthritis    • Chronic rhinitis     last assessed 2/21/14   • Chronic serous otitis media     last assessed 11/20/13   • Chronic sinusitis     last assessed 8/19/14   • Functional heart murmur    • GERD (gastroesophageal reflux disease)    • Gout    • Hearing problem     last assessed 12/1/16   • Hiatal hernia    • Hypercholesterolemia    • Hypertension    • Palpitations    • Testicular hypogonadism     last assessed 10/4/13   • V[facility]tach [facility])      Past Surgical History:   Procedure Laterality Date   • APPENDECTOMY     • BICEPS TENODESIS      anesthesia for tenodesis- of ruptured long tendon of biceps    • HERNIA REPAIR     • THYROIDECTOMY     • TONSILLECTOMY       Social History   Social History     Substance and Sexual Activity   Alcohol Use Yes    Comment: occasionally     Social History     Substance and Sexual Activity   Drug Use No     Social History     Tobacco Use   Smoking Status Never   Smokeless Tobacco Never     Family History:   Family History   Problem Relation Age of Onset   • Lung cancer Father    • Coronary artery disease Father         blockages   • Stroke Maternal Grandmother    • Cancer Paternal Grandfather         metastasized   • Heart disease Family    • Lung cancer Cousin    • No Known Problems Mother    • No Known Problems Sister    • No Known Problems Brother    • No Known Problems Maternal Aunt    • No Known Problems Maternal Uncle    • No Known Problems Paternal Aunt    • No Known Problems Paternal Uncle    • No Known Problems Paternal Grandmother    • ADD / ADHD Neg Hx    • Anesthesia problems Neg Hx    • Clotting disorder Neg Hx    • Collagen disease Neg Hx    • Diabetes Neg Hx    • Dislocations Neg Hx    • Learning disabilities Neg Hx    • Neurological problems Neg Hx    • Osteoporosis Neg Hx    • Rheumatologic disease Neg Hx    • Scoliosis Neg Hx    • Vascular Disease Neg Hx Current Outpatient Medications on File Prior to Visit   Medication Sig Dispense Refill   • acetaminophen (TYLENOL) 500 mg tablet Take 1,000 mg by mouth every 6 (six) hours as needed for mild pain     • albuterol (2 5 mg/3 mL) 0 083 % nebulizer solution Take 3 mL (2 5 mg total) by nebulization every 6 (six) hours as needed for wheezing or shortness of breath 180 mL 5   • albuterol (PROVENTIL HFA,VENTOLIN HFA) 90 mcg/act inhaler Inhale 1 puff 4 (four) times a day      • Chlorpheniramine Maleate (CHLOR-TRIMETON ALLERGY PO) Take by mouth as needed       • cyclobenzaprine (FLEXERIL) 5 mg tablet Take 1 tablet (5 mg total) by mouth 3 (three) times a day as needed for muscle spasms 30 tablet 1   • esomeprazole (NexIUM) 40 MG capsule Take 40 mg by mouth every morning before breakfast     • fluticasone-salmeterol (Advair) 500-50 mcg/dose inhaler Inhale 1 puff 2 (two) times a day     • ibuprofen (MOTRIN) 600 mg tablet Take 600 mg by mouth every 8 (eight) hours     • ipratropium-albuterol (DUO-NEB) 0 5-2 5 mg/3 mL Inhale 3 mL 2 (two) times a week      • ketoconazole (NIZORAL) 2 % cream Apply topically daily 60 g 2   • lisinopril (ZESTRIL) 20 mg tablet Take 1 tablet (20 mg total) by mouth daily 90 tablet 1   • meclizine (ANTIVERT) 12 5 MG tablet Take 1 tablet (12 5 mg total) by mouth every 8 (eight) hours 30 tablet 1   • methylPREDNISolone 4 MG tablet therapy pack Use as directed on package 21 tablet 0   • montelukast (SINGULAIR) 10 mg tablet Take 1 tablet (10 mg total) by mouth daily 90 tablet 1   • Triamcinolone Acetonide (NASACORT ALLERGY 24HR NA) 1 spray into each nostril daily       • Uloric 40 MG tablet Take 1 tablet (40 mg total) by mouth daily 90 tablet 1   • verapamil (CALAN) 120 mg tablet TAKE 1 TABLET BY MOUTH EVERY DAY 90 tablet 3     No current facility-administered medications on file prior to visit       Allergies   Allergen Reactions   • Penicillins Rash and Anaphylaxis   • Acetazolamide      Other reaction(s): Stomach Ache   • Cephalosporins Other (See Comments)     headache   • Doxycycline      Capsules only  Tablets are ok to take, per pt  Capsules only  Tablets are ok to take, per pt  • Other    • Sulfa Antibiotics Other (See Comments)     Category: Allergy; Annotation - [id number]: STOMACH UPSET   • Famotidine Palpitations   • Levofloxacin Palpitations   • Omeprazole Palpitations     Painful urination        Objective     /88   Pulse 89   Ht 5' 9" (1 753 m)   Wt 101 kg (223 lb 3 2 oz)   BMI 32 96 kg/m²      PE:  AAOx 3  WDWN  Hearing intact, no drainage from eyes  no audible wheezing  no abdominal distension  LE compartments soft, skin intact    Ortho Exam:  bilateral Knee:   No erythema  no swelling  + large effusion right knee, no effusion left knee  no warmth  No TTP  AROM: 0- 120  Stable to varus/valgus stress      Large joint arthrocentesis: R knee  Universal Protocol:  Consent: Verbal consent obtained  Risks and benefits: risks, benefits and alternatives were discussed  Consent given by: patient  Site marked: the operative site was marked  Supporting Documentation  Indications: pain and joint swelling   Procedure Details  Location: knee - R knee  Preparation: Patient was prepped and draped in the usual sterile fashion  Needle size: 22 G  Ultrasound guidance: no  Approach: lateral  Medications administered: 4 mL bupivacaine 0 25 %    Aspirate amount: 100 mL  Aspirate: clear, yellow and blood-tinged      Large joint arthrocentesis: R knee  Universal Protocol:  Consent: Verbal consent obtained    Risks and benefits: risks, benefits and alternatives were discussed  Consent given by: patient  Site marked: the operative site was marked  Supporting Documentation  Indications: pain   Procedure Details  Location: knee - R knee  Preparation: Patient was prepped and draped in the usual sterile fashion  Needle size: 22 G  Ultrasound guidance: no  Approach: lateral  Medications administered: 2 mL bupivacaine 0 25 %; 40 mg triamcinolone acetonide 40 mg/mL    Patient tolerance: patient tolerated the procedure well with no immediate complications  Dressing:  Sterile dressing applied    Large joint arthrocentesis: L knee  Universal Protocol:  Consent: Verbal consent obtained    Risks and benefits: risks, benefits and alternatives were discussed  Consent given by: patient  Site marked: the operative site was marked  Supporting Documentation  Indications: pain   Procedure Details  Location: knee - L knee  Preparation: Patient was prepped and draped in the usual sterile fashion  Needle size: 22 G  Ultrasound guidance: no  Approach: anterolateral  Medications administered: 2 mL bupivacaine 0 25 %; 40 mg triamcinolone acetonide 40 mg/mL    Patient tolerance: patient tolerated the procedure well with no immediate complications  Dressing:  Sterile dressing applied                Scribe Attestation    I,:  Debbie Russo PA-C am acting as a scribe while in the presence of the attending physician :       I,:  Madhavi Zarate DO personally performed the services described in this documentation    as scribed in my presence :

## 2022-11-22 DIAGNOSIS — M10.9 GOUTY ARTHRITIS: ICD-10-CM

## 2022-11-22 RX ORDER — FEBUXOSTAT 40 MG/1
40 TABLET ORAL DAILY
Qty: 90 TABLET | Refills: 1 | Status: SHIPPED | OUTPATIENT
Start: 2022-11-22

## 2022-12-05 ENCOUNTER — OFFICE VISIT (OUTPATIENT)
Dept: CARDIOLOGY CLINIC | Facility: CLINIC | Age: 57
End: 2022-12-05

## 2022-12-05 VITALS
SYSTOLIC BLOOD PRESSURE: 128 MMHG | BODY MASS INDEX: 32.27 KG/M2 | DIASTOLIC BLOOD PRESSURE: 78 MMHG | OXYGEN SATURATION: 97 % | HEART RATE: 80 BPM | WEIGHT: 217.9 LBS | HEIGHT: 69 IN

## 2022-12-05 DIAGNOSIS — I47.20 VENTRICULAR TACHYCARDIA: ICD-10-CM

## 2022-12-05 DIAGNOSIS — I10 ESSENTIAL HYPERTENSION: Primary | ICD-10-CM

## 2022-12-05 NOTE — PROGRESS NOTES
Cardiology Follow Up    Topher Wong  1965  [id number]  [address] [facility]  [address]  [address]  [place] [address]  [phone]    1  Essential hypertension  Echo complete w/ contrast if indicated      2  Ventricular tachycardia  Echo complete w/ contrast if indicated            Discussion/Summary: All of his assessed cardiac problems appear to be stable  With hx of VT, I will check an ECHO  I have reviewed his medications and made no changes  RTO 1 year  Interval History: He is very active and takes walks 2 x a week  He denies CP, SOB, palpitations, dizziness  His weight is down from 224 to 217 lbs    /78        Patient Active Problem List   Diagnosis   • Allergic rhinitis   • Asthma   • Essential hypertension   • Benign paroxysmal positional vertigo   • Chronic bilateral low back pain without sciatica   • DJD (degenerative joint disease) of knee   • GERD without esophagitis   • Gout   • Hyperlipidemia   • Primary localized osteoarthritis of left knee   • Primary localized osteoarthritis of right knee   • Tenosynovitis of foot   • Thyroid disorder   • Ventricular tachycardia   • Anxiety   • Chronic pain of both knees   • Abnormal liver function test   • Epidermoid cyst   • Dysfunction of left eustachian tube   • Tinea corporis   • Effusion of right knee     Past Medical History:   Diagnosis Date   • Anxiety 1/15/2019   • Arthritis    • Chronic rhinitis     last assessed 2/21/14   • Chronic serous otitis media     last assessed 11/20/13   • Chronic sinusitis     last assessed 8/19/14   • Functional heart murmur    • GERD (gastroesophageal reflux disease)    • Gout    • Hearing problem     last assessed 12/1/16   • Hiatal hernia    • Hypercholesterolemia    • Hypertension    • Palpitations    • Testicular hypogonadism     last assessed 10/4/13   • V-tach      Social History     Socioeconomic History   • Marital status: Single     Spouse name: Not on file   • Number of children: Not on file   • Years of education: Not on file   • Highest education level: Not on file   Occupational History   • Occupation: unemployed   Tobacco Use   • Smoking status: Never   • Smokeless tobacco: Never   Vaping Use   • Vaping Use: Never used   Substance and Sexual Activity   • Alcohol use: Yes     Comment: occasionally   • Drug use: No   • Sexual activity: Not Currently   Other Topics Concern   • Not on file   Social History Narrative    Travel hx- pt denies being out of the country during 1/2014-10/28/14     Social Determinants of Health     Financial Resource Strain: Not on file   Food Insecurity: Not on file   Transportation Needs: Not on file   Physical Activity: Not on file   Stress: Not on file   Social Connections: Not on file   Intimate Partner Violence: Not on file   Housing Stability: Not on file      Family History   Problem Relation Age of Onset   • Lung cancer Father    • Coronary artery disease Father         blockages   • Stroke Maternal Grandmother    • Cancer Paternal Grandfather         metastasized   • Heart disease Family    • Lung cancer Cousin    • No Known Problems Mother    • No Known Problems Sister    • No Known Problems Brother    • No Known Problems Maternal Aunt    • No Known Problems Maternal Uncle    • No Known Problems Paternal Aunt    • No Known Problems Paternal Uncle    • No Known Problems Paternal Grandmother    • ADD / ADHD Neg Hx    • Anesthesia problems Neg Hx    • Clotting disorder Neg Hx    • Collagen disease Neg Hx    • Diabetes Neg Hx    • Dislocations Neg Hx    • Learning disabilities Neg Hx    • Neurological problems Neg Hx    • Osteoporosis Neg Hx    • Rheumatologic disease Neg Hx    • Scoliosis Neg Hx    • Vascular Disease Neg Hx      Past Surgical History:   Procedure Laterality Date   • APPENDECTOMY     • BICEPS TENODESIS      anesthesia for tenodesis- of ruptured long tendon of biceps • HERNIA REPAIR     • THYROIDECTOMY     • TONSILLECTOMY         Current Outpatient Medications:   •  acetaminophen (TYLENOL) 500 mg tablet, Take 1,000 mg by mouth every 6 (six) hours as needed for mild pain, Disp: , Rfl:   •  albuterol (2 5 mg/3 mL) 0 083 % nebulizer solution, Take 3 mL (2 5 mg total) by nebulization every 6 (six) hours as needed for wheezing or shortness of breath, Disp: 180 mL, Rfl: 5  •  albuterol (PROVENTIL HFA,VENTOLIN HFA) 90 mcg/act inhaler, Inhale 1 puff 4 (four) times a day , Disp: , Rfl:   •  Chlorpheniramine Maleate (CHLOR-TRIMETON ALLERGY PO), Take by mouth as needed  , Disp: , Rfl:   •  cyclobenzaprine (FLEXERIL) 5 mg tablet, Take 1 tablet (5 mg total) by mouth 3 (three) times a day as needed for muscle spasms, Disp: 30 tablet, Rfl: 1  •  esomeprazole (NexIUM) 40 MG capsule, Take 40 mg by mouth every morning before breakfast, Disp: , Rfl:   •  fluticasone-salmeterol (Advair) 500-50 mcg/dose inhaler, Inhale 1 puff 2 (two) times a day, Disp: , Rfl:   •  ibuprofen (MOTRIN) 600 mg tablet, Take 600 mg by mouth every 8 (eight) hours, Disp: , Rfl:   •  ipratropium-albuterol (DUO-NEB) 0 5-2 5 mg/3 mL, Inhale 3 mL 2 (two) times a week , Disp: , Rfl:   •  ketoconazole (NIZORAL) 2 % cream, Apply topically daily, Disp: 60 g, Rfl: 2  •  lisinopril (ZESTRIL) 20 mg tablet, Take 1 tablet (20 mg total) by mouth daily, Disp: 90 tablet, Rfl: 1  •  meclizine (ANTIVERT) 12 5 MG tablet, Take 1 tablet (12 5 mg total) by mouth every 8 (eight) hours, Disp: 30 tablet, Rfl: 1  •  montelukast (SINGULAIR) 10 mg tablet, Take 1 tablet (10 mg total) by mouth daily, Disp: 90 tablet, Rfl: 1  •  Triamcinolone Acetonide (NASACORT ALLERGY 24HR NA), 1 spray into each nostril daily  , Disp: , Rfl:   •  Uloric 40 MG tablet, Take 1 tablet (40 mg total) by mouth daily, Disp: 90 tablet, Rfl: 1  •  verapamil (CALAN) 120 mg tablet, TAKE 1 TABLET BY MOUTH EVERY DAY, Disp: 90 tablet, Rfl: 3  •  methylPREDNISolone 4 MG tablet therapy pack, Use as directed on package (Patient not taking: Reported on 12/5/2022), Disp: 21 tablet, Rfl: 0  Allergies   Allergen Reactions   • Penicillins Rash and Anaphylaxis   • Acetazolamide      Other reaction(s): Stomach Ache   • Cephalosporins Other (See Comments)     headache   • Doxycycline      Capsules only  Tablets are ok to take, per pt  Capsules only  Tablets are ok to take, per pt  • Other    • Sulfa Antibiotics Other (See Comments)     Category: Allergy; Annotation - [id number]: STOMACH UPSET   • Famotidine Palpitations   • Levofloxacin Palpitations   • Omeprazole Palpitations     Painful urination     Vitals:    12/05/22 1529   BP: 128/78   BP Location: Left arm   Patient Position: Sitting   Cuff Size: Standard   Pulse: 80   SpO2: 97%   Weight: 98 8 kg (217 lb 14 4 oz)   Height: 5' 9" (1 753 m)     Weight (last 2 days)     Date/Time Weight    12/05/22 1529 98 8 (217 9)         Blood pressure 128/78, pulse 80, height 5' 9" (1 753 m), weight 98 8 kg (217 lb 14 4 oz), SpO2 97 %  , Body mass index is 32 18 kg/m²  Labs:  No visits with results within 6 Month(s) from this visit  Latest known visit with results is:   Office Visit on 02/01/2022   Component Date Value   • SARS-CoV-2 02/01/2022 Negative      Imaging: No results found  Review of Systems:  Review of Systems   Constitutional: Negative for diaphoresis, fatigue, fever and unexpected weight change  HENT: Negative  Respiratory: Negative for cough, shortness of breath and wheezing  Cardiovascular: Negative for chest pain, palpitations and leg swelling  Gastrointestinal: Negative for abdominal pain, diarrhea and nausea  Musculoskeletal: Negative for gait problem and myalgias  Skin: Negative for rash  Neurological: Negative for dizziness and numbness  Psychiatric/Behavioral: Negative  Physical Exam:  Physical Exam  Constitutional:       Appearance: He is well-developed and well-nourished     HENT:      Head: Normocephalic and atraumatic  Eyes:      Pupils: Pupils are equal, round, and reactive to light  Neck:      Vascular: No JVD  Cardiovascular:      Rate and Rhythm: Regular rhythm  Pulses: Normal pulses  Carotid pulses are 2+ on the right side and 2+ on the left side  Heart sounds: S1 normal and S2 normal    Pulmonary:      Effort: Pulmonary effort is normal       Breath sounds: Normal breath sounds  No wheezing or rales  Abdominal:      General: Bowel sounds are normal       Palpations: Abdomen is soft  Tenderness: There is no abdominal tenderness  Musculoskeletal:         General: No tenderness or edema  Normal range of motion  Cervical back: Normal range of motion and neck supple  Skin:     General: Skin is warm  Neurological:      Mental Status: He is alert and oriented to person, place, and time  Cranial Nerves: No cranial nerve deficit  Deep Tendon Reflexes: Reflexes are normal and symmetric     Psychiatric:         Mood and Affect: Mood and affect normal

## 2022-12-08 ENCOUNTER — APPOINTMENT (OUTPATIENT)
Dept: LAB | Facility: IMAGING CENTER | Age: 57
End: 2022-12-08

## 2022-12-08 DIAGNOSIS — E78.2 MIXED HYPERLIPIDEMIA: ICD-10-CM

## 2022-12-08 DIAGNOSIS — K21.9 GERD WITHOUT ESOPHAGITIS: ICD-10-CM

## 2022-12-08 DIAGNOSIS — I10 ESSENTIAL HYPERTENSION: ICD-10-CM

## 2022-12-08 DIAGNOSIS — M10.9 GOUTY ARTHRITIS: ICD-10-CM

## 2022-12-08 LAB
ERYTHROCYTE [DISTWIDTH] IN BLOOD BY AUTOMATED COUNT: 13.1 % (ref 11.6–15.1)
HCT VFR BLD AUTO: 42.4 % (ref 36.5–49.3)
HGB BLD-MCNC: 13.8 G/DL (ref 12–17)
MCH RBC QN AUTO: 30.7 PG (ref 26.8–34.3)
MCHC RBC AUTO-ENTMCNC: 32.5 G/DL (ref 31.4–37.4)
MCV RBC AUTO: 94 FL (ref 82–98)
PLATELET # BLD AUTO: 256 THOUSANDS/UL (ref 149–390)
PMV BLD AUTO: 8.9 FL (ref 8.9–12.7)
RBC # BLD AUTO: 4.5 MILLION/UL (ref 3.88–5.62)
WBC # BLD AUTO: 10.89 THOUSAND/UL (ref 4.31–10.16)

## 2022-12-09 LAB
ALBUMIN SERPL BCP-MCNC: 3.9 G/DL (ref 3.5–5)
ALP SERPL-CCNC: 70 U/L (ref 46–116)
ALT SERPL W P-5'-P-CCNC: 46 U/L (ref 12–78)
ANION GAP SERPL CALCULATED.3IONS-SCNC: 6 MMOL/L (ref 4–13)
AST SERPL W P-5'-P-CCNC: 25 U/L (ref 5–45)
BILIRUB SERPL-MCNC: 0.65 MG/DL (ref 0.2–1)
BUN SERPL-MCNC: 25 MG/DL (ref 5–25)
CALCIUM SERPL-MCNC: 9.2 MG/DL (ref 8.3–10.1)
CHLORIDE SERPL-SCNC: 103 MMOL/L (ref 96–108)
CHOLEST SERPL-MCNC: 236 MG/DL
CO2 SERPL-SCNC: 27 MMOL/L (ref 21–32)
CREAT SERPL-MCNC: 0.96 MG/DL (ref 0.6–1.3)
GFR SERPL CREATININE-BSD FRML MDRD: 88 ML/MIN/1.73SQ M
GLUCOSE P FAST SERPL-MCNC: 103 MG/DL (ref 65–99)
HDLC SERPL-MCNC: 59 MG/DL
LDLC SERPL CALC-MCNC: 147 MG/DL (ref 0–100)
POTASSIUM SERPL-SCNC: 3.8 MMOL/L (ref 3.5–5.3)
PROT SERPL-MCNC: 6.8 G/DL (ref 6.4–8.4)
SODIUM SERPL-SCNC: 136 MMOL/L (ref 135–147)
TRIGL SERPL-MCNC: 151 MG/DL
URATE SERPL-MCNC: 5.2 MG/DL (ref 3.5–8.5)

## 2022-12-13 ENCOUNTER — OFFICE VISIT (OUTPATIENT)
Dept: INTERNAL MEDICINE CLINIC | Facility: OTHER | Age: 57
End: 2022-12-13

## 2022-12-13 VITALS
SYSTOLIC BLOOD PRESSURE: 126 MMHG | DIASTOLIC BLOOD PRESSURE: 86 MMHG | OXYGEN SATURATION: 97 % | WEIGHT: 217 LBS | HEIGHT: 69 IN | BODY MASS INDEX: 32.14 KG/M2 | TEMPERATURE: 97.8 F | HEART RATE: 85 BPM

## 2022-12-13 DIAGNOSIS — E78.2 MIXED HYPERLIPIDEMIA: ICD-10-CM

## 2022-12-13 DIAGNOSIS — Z00.00 MEDICARE ANNUAL WELLNESS VISIT, SUBSEQUENT: ICD-10-CM

## 2022-12-13 DIAGNOSIS — K21.9 GERD WITHOUT ESOPHAGITIS: ICD-10-CM

## 2022-12-13 DIAGNOSIS — I10 ESSENTIAL HYPERTENSION: ICD-10-CM

## 2022-12-13 DIAGNOSIS — M1A.9XX0 CHRONIC GOUT WITHOUT TOPHUS, UNSPECIFIED CAUSE, UNSPECIFIED SITE: ICD-10-CM

## 2022-12-13 DIAGNOSIS — Z12.11 SCREEN FOR COLON CANCER: Primary | ICD-10-CM

## 2022-12-13 DIAGNOSIS — J30.89 NON-SEASONAL ALLERGIC RHINITIS, UNSPECIFIED TRIGGER: ICD-10-CM

## 2022-12-13 RX ORDER — ROSUVASTATIN CALCIUM 10 MG/1
10 TABLET, COATED ORAL DAILY
Qty: 90 TABLET | Refills: 3 | Status: SHIPPED | OUTPATIENT
Start: 2022-12-13

## 2022-12-13 NOTE — ASSESSMENT & PLAN NOTE
Patient discontinued his statin at his own  Again discussed the importance of compliance to medicine and he is in agreement to be started again on Crestor 10 mg daily  Advised for low-fat diet and exercise    Will check lipid profile in about 3 months

## 2022-12-13 NOTE — PROGRESS NOTES
Assessment and Plan:     Problem List Items Addressed This Visit        Digestive    GERD without esophagitis     Doing well on present regimen            Respiratory    Allergic rhinitis     Continue with Nasacort and Singulair            Cardiovascular and Mediastinum    Essential hypertension     Blood pressure stable on present regimen            Other    Gout     Uric acid is 5 4  Continue with present regimen         Hyperlipidemia     Patient discontinued his statin at his own  Again discussed the importance of compliance to medicine and he is in agreement to be started again on Crestor 10 mg daily  Advised for low-fat diet and exercise  Will check lipid profile in about 3 months         Relevant Medications    rosuvastatin (CRESTOR) 10 MG tablet    Other Relevant Orders    Lipid Panel with Direct LDL reflex    Comprehensive metabolic panel   Other Visit Diagnoses     Screen for colon cancer    -  Primary    Relevant Orders    Cologuard Medicare annual wellness visit, subsequent                Depression Screening and Follow-up Plan: Patient was screened for depression during today's encounter  They screened negative with a PHQ-2 score of 1  Preventive health issues were discussed with patient, and age appropriate screening tests were ordered as noted in patient's After Visit Summary  Personalized health advice and appropriate referrals for health education or preventive services given if needed, as noted in patient's After Visit Summary  History of Present Illness:     Patient presents for a Medicare Wellness Visit    Patient is here for regular follow-up  Also had blood work done and would like to discuss results  Otherwise no new complaints     Patient Care Team:  Cecil Waddell MD as PCP - General  DO Rena Smith MD Tucker So, MD (Gastroenterology)     Review of Systems:     Review of Systems   Constitutional: Negative for fatigue and fever     HENT: Negative for congestion, ear discharge, ear pain, postnasal drip, sinus pressure, sore throat, tinnitus and trouble swallowing  Eyes: Negative for discharge, itching and visual disturbance  Respiratory: Negative for cough and shortness of breath  Cardiovascular: Negative for chest pain and palpitations  Gastrointestinal: Negative for abdominal pain, diarrhea, nausea and vomiting  Endocrine: Negative for cold intolerance and polyuria  Genitourinary: Negative for difficulty urinating, dysuria and urgency  Musculoskeletal: Negative for arthralgias and neck pain  Skin: Negative for rash  Allergic/Immunologic: Negative for environmental allergies  Neurological: Negative for dizziness, weakness and headaches  Psychiatric/Behavioral: Negative for agitation and behavioral problems  The patient is not nervous/anxious           Problem List:     Patient Active Problem List   Diagnosis   • Allergic rhinitis   • Asthma   • Essential hypertension   • Benign paroxysmal positional vertigo   • Chronic bilateral low back pain without sciatica   • DJD (degenerative joint disease) of knee   • GERD without esophagitis   • Gout   • Hyperlipidemia   • Primary localized osteoarthritis of left knee   • Primary localized osteoarthritis of right knee   • Tenosynovitis of foot   • Thyroid disorder   • Ventricular tachycardia   • Anxiety   • Chronic pain of both knees   • Abnormal liver function test   • Epidermoid cyst   • Dysfunction of left eustachian tube   • Tinea corporis   • Effusion of right knee      Past Medical and Surgical History:     Past Medical History:   Diagnosis Date   • Anxiety 1/15/2019   • Arthritis    • Chronic rhinitis     last assessed 2/21/14   • Chronic serous otitis media     last assessed 11/20/13   • Chronic sinusitis     last assessed 8/19/14   • Functional heart murmur    • GERD (gastroesophageal reflux disease)    • Gout    • Hearing problem     last assessed 12/1/16   • Hiatal hernia • Hypercholesterolemia    • Hypertension    • Palpitations    • Testicular hypogonadism     last assessed 10/4/13   • V-tach      Past Surgical History:   Procedure Laterality Date   • APPENDECTOMY     • BICEPS TENODESIS      anesthesia for tenodesis- of ruptured long tendon of biceps    • HERNIA REPAIR     • THYROIDECTOMY     • TONSILLECTOMY        Family History:     Family History   Problem Relation Age of Onset   • Lung cancer Father    • Coronary artery disease Father         blockages   • Stroke Maternal Grandmother    • Cancer Paternal Grandfather         metastasized   • Heart disease Family    • Lung cancer Cousin    • No Known Problems Mother    • No Known Problems Sister    • No Known Problems Brother    • No Known Problems Maternal Aunt    • No Known Problems Maternal Uncle    • No Known Problems Paternal Aunt    • No Known Problems Paternal Uncle    • No Known Problems Paternal Grandmother    • ADD / ADHD Neg Hx    • Anesthesia problems Neg Hx    • Clotting disorder Neg Hx    • Collagen disease Neg Hx    • Diabetes Neg Hx    • Dislocations Neg Hx    • Learning disabilities Neg Hx    • Neurological problems Neg Hx    • Osteoporosis Neg Hx    • Rheumatologic disease Neg Hx    • Scoliosis Neg Hx    • Vascular Disease Neg Hx       Social History:     Social History     Socioeconomic History   • Marital status: Single     Spouse name: None   • Number of children: None   • Years of education: None   • Highest education level: None   Occupational History   • Occupation: unemployed   Tobacco Use   • Smoking status: Never   • Smokeless tobacco: Never   Vaping Use   • Vaping Use: Never used   Substance and Sexual Activity   • Alcohol use: Yes     Comment: occasionally   • Drug use: No   • Sexual activity: Not Currently   Other Topics Concern   • None   Social History Narrative    Travel hx- pt denies being out of the country during 1/2014-10/28/14     Social Determinants of Health     Financial Resource Strain: Not on file   Food Insecurity: Not on file   Transportation Needs: Not on file   Physical Activity: Not on file   Stress: Not on file   Social Connections: Not on file   Intimate Partner Violence: Not on file   Housing Stability: Not on file      Medications and Allergies:     Current Outpatient Medications   Medication Sig Dispense Refill   • acetaminophen (TYLENOL) 500 mg tablet Take 1,000 mg by mouth every 6 (six) hours as needed for mild pain     • albuterol (2 5 mg/3 mL) 0 083 % nebulizer solution Take 3 mL (2 5 mg total) by nebulization every 6 (six) hours as needed for wheezing or shortness of breath 180 mL 5   • albuterol (PROVENTIL HFA,VENTOLIN HFA) 90 mcg/act inhaler Inhale 1 puff 4 (four) times a day      • Chlorpheniramine Maleate (CHLOR-TRIMETON ALLERGY PO) Take by mouth as needed       • cyclobenzaprine (FLEXERIL) 5 mg tablet Take 1 tablet (5 mg total) by mouth 3 (three) times a day as needed for muscle spasms 30 tablet 1   • esomeprazole (NexIUM) 40 MG capsule Take 40 mg by mouth every morning before breakfast     • fluticasone-salmeterol (Advair) 500-50 mcg/dose inhaler Inhale 1 puff 2 (two) times a day     • ibuprofen (MOTRIN) 600 mg tablet Take 600 mg by mouth every 8 (eight) hours     • ipratropium-albuterol (DUO-NEB) 0 5-2 5 mg/3 mL Inhale 3 mL 2 (two) times a week      • ketoconazole (NIZORAL) 2 % cream Apply topically daily 60 g 2   • lisinopril (ZESTRIL) 20 mg tablet Take 1 tablet (20 mg total) by mouth daily 90 tablet 1   • meclizine (ANTIVERT) 12 5 MG tablet Take 1 tablet (12 5 mg total) by mouth every 8 (eight) hours 30 tablet 1   • methylPREDNISolone 4 MG tablet therapy pack Use as directed on package 21 tablet 0   • montelukast (SINGULAIR) 10 mg tablet Take 1 tablet (10 mg total) by mouth daily 90 tablet 1   • rosuvastatin (CRESTOR) 10 MG tablet Take 1 tablet (10 mg total) by mouth daily 90 tablet 3   • Triamcinolone Acetonide (NASACORT ALLERGY 24HR NA) 1 spray into each nostril daily • Uloric 40 MG tablet Take 1 tablet (40 mg total) by mouth daily 90 tablet 1   • verapamil (CALAN) 120 mg tablet TAKE 1 TABLET BY MOUTH EVERY DAY 90 tablet 3     No current facility-administered medications for this visit  Allergies   Allergen Reactions   • Penicillins Rash and Anaphylaxis   • Acetazolamide      Other reaction(s): Stomach Ache   • Cephalosporins Other (See Comments)     headache   • Doxycycline      Capsules only  Tablets are ok to take, per pt  Capsules only  Tablets are ok to take, per pt  • Other    • Sulfa Antibiotics Other (See Comments)     Category: Allergy;  [facility] - [id number]: STOMACH UPSET   • Famotidine Palpitations   • Levofloxacin Palpitations   • Omeprazole Palpitations     Painful urination      Immunizations:     Immunization History   Administered Date(s) Administered   • COVID-19 PFIZER VACCINE 0 3 ML IM 07/23/2021, 08/13/2021   • COVID-19 Pfizer vac (Basilio-sucrose, gray cap) 12 yr+ IM 07/29/2022   • INFLUENZA 11/01/2003, 10/28/2005, 10/23/2006, 10/29/2007, 10/29/2008, 10/29/2008, 10/23/2009, 10/23/2009, 10/29/2012, 10/25/2022   • Influenza Quadrivalent Preservative Free 3 years and older IM 10/20/2015   • Influenza Quadrivalent, 6-35 Months IM 10/29/2014, 11/04/2016   • Influenza, injectable, quadrivalent, preservative free 0 5 mL 10/02/2018   • Influenza, recombinant, quadrivalent,injectable, preservative free 10/09/2019, 11/05/2020, 10/18/2021, 10/25/2022   • Influenza, seasonal, injectable 10/06/2010, 10/06/2010, 10/11/2011, 10/11/2011, 09/27/2012, 09/27/2012, 11/06/2013, 11/15/2017   • Td (adult), adsorbed 02/14/2006, 02/14/2006      Health Maintenance:         Topic Date Due   • HIV Screening  Never done   • Colorectal Cancer Screening  Never done   • Hepatitis C Screening  Completed         Topic Date Due   • Hepatitis B Vaccine (1 of 3 - 3-dose series) Never done   • Pneumococcal Vaccine: Pediatrics (0 to 5 Years) and At-Risk Patients (6 to 59 Years) (1 - PCV) Never done   • COVID-19 Vaccine (4 - Booster for Pfizer series) 11/29/2022      Medicare Screening Tests and Risk Assessments:     Ailyn Melendez is here for his Subsequent Wellness visit  Last Medicare Wellness visit information reviewed, patient interviewed and updates made to the record today  Health Risk Assessment:   Patient rates overall health as very good  Patient feels that their physical health rating is same  Patient is very satisfied with their life  Eyesight was rated as same  Hearing was rated as same  Patient feels that their emotional and mental health rating is same  Patients states they are sometimes angry  Patient states they are sometimes unusually tired/fatigued  Pain experienced in the last 7 days has been some  Patient's pain rating has been 4/10  Knee on going pain     Depression Screening:   PHQ-2 Score: 1      Fall Risk Screening: In the past year, patient has experienced: no history of falling in past year      Home Safety:  Patient does not have trouble with stairs inside or outside of their home  Patient has working smoke alarms and has working carbon monoxide detector  Home safety hazards include: none  Nutrition:   Current diet is Regular, Low Cholesterol, Low Saturated Fat and Limited junk food  Medications:   Patient is currently taking over-the-counter supplements  OTC medications include: see medication list  Patient is able to manage medications  Activities of Daily Living (ADLs)/Instrumental Activities of Daily Living (IADLs):   Walk and transfer into and out of bed and chair?: Yes  Dress and groom yourself?: Yes    Bathe or shower yourself?: Yes    Feed yourself?  Yes  Do your laundry/housekeeping?: Yes  Manage your money, pay your bills and track your expenses?: Yes  Make your own meals?: Yes    Do your own shopping?: Yes    Previous Hospitalizations:   Any hospitalizations or ED visits within the last 12 months?: No      Advance Care Planning:   Living will: No Durable POA for healthcare: No    Advanced directive: No      Cognitive Screening:   Provider or family/friend/caregiver concerned regarding cognition?: No    PREVENTIVE SCREENINGS      Cardiovascular Screening:    General: Screening Not Indicated and History Lipid Disorder      Diabetes Screening:     General: Screening Current      Colorectal Cancer Screening:     General: Risks and Benefits Discussed    Due for: Cologuard      Prostate Cancer Screening:    General: Screening Current      Osteoporosis Screening:    General: Screening Not Indicated      Abdominal Aortic Aneurysm (AAA) Screening:        General: Screening Not Indicated      Lung Cancer Screening:     General: Screening Not Indicated      Hepatitis C Screening:    General: Screening Current    Screening, Brief Intervention, and Referral to Treatment (SBIRT)    Screening  Typical number of drinks in a day: 6  Typical number of drinks in a week: 12  Interpretation: Low risk drinking behavior  Single Item Drug Screening:  How often have you used an illegal drug (including marijuana) or a prescription medication for non-medical reasons in the past year? never    Single Item Drug Screen Score: 0  Interpretation: Negative screen for possible drug use disorder    Brief Intervention  Healthy alcohol use/limits discussed  Other Counseling Topics:   Car/seat belt/driving safety  No results found  Physical Exam:     /86 (BP Location: Left arm, Patient Position: Sitting, Cuff Size: Adult)   Pulse 85   Temp 97 8 °F (36 6 °C)   Ht 5' 9" (1 753 m)   Wt 98 4 kg (217 lb)   SpO2 97%   BMI 32 05 kg/m²     Physical Exam  Vitals and nursing note reviewed  Constitutional:       General: He is not in acute distress  Appearance: He is well-developed  HENT:      Head: Normocephalic and atraumatic        Right Ear: Ear canal and external ear normal       Left Ear: Ear canal and external ear normal       Ears:      Comments: Mild bilateral middle ear effusion noted     Nose: No rhinorrhea  Mouth/Throat:      Pharynx: No posterior oropharyngeal erythema  Eyes:      Conjunctiva/sclera: Conjunctivae normal    Cardiovascular:      Rate and Rhythm: Normal rate and regular rhythm  Heart sounds: No murmur heard  Pulmonary:      Effort: Pulmonary effort is normal  No respiratory distress  Breath sounds: Normal breath sounds  No rhonchi  Abdominal:      Palpations: Abdomen is soft  Tenderness: There is no abdominal tenderness  Musculoskeletal:         General: No swelling  Cervical back: Neck supple  Right lower leg: No edema  Left lower leg: No edema  Skin:     General: Skin is warm and dry  Capillary Refill: Capillary refill takes less than 2 seconds  Neurological:      Mental Status: He is alert and oriented to person, place, and time     Psychiatric:         Mood and Affect: Mood normal          Behavior: Behavior normal           Sherrell Lott MD

## 2022-12-26 DIAGNOSIS — I10 ESSENTIAL HYPERTENSION: ICD-10-CM

## 2022-12-27 RX ORDER — VERAPAMIL HYDROCHLORIDE 120 MG/1
TABLET, FILM COATED ORAL
Qty: 90 TABLET | Refills: 3 | Status: SHIPPED | OUTPATIENT
Start: 2022-12-27

## 2023-01-02 ENCOUNTER — TELEPHONE (OUTPATIENT)
Dept: INTERNAL MEDICINE CLINIC | Facility: OTHER | Age: 58
End: 2023-01-02

## 2023-01-04 ENCOUNTER — OFFICE VISIT (OUTPATIENT)
Dept: INTERNAL MEDICINE CLINIC | Age: 58
End: 2023-01-04

## 2023-01-04 VITALS
TEMPERATURE: 98.3 F | WEIGHT: 214 LBS | HEART RATE: 83 BPM | SYSTOLIC BLOOD PRESSURE: 132 MMHG | HEIGHT: 69 IN | DIASTOLIC BLOOD PRESSURE: 70 MMHG | BODY MASS INDEX: 31.7 KG/M2 | OXYGEN SATURATION: 97 %

## 2023-01-04 DIAGNOSIS — I10 ESSENTIAL HYPERTENSION: ICD-10-CM

## 2023-01-04 DIAGNOSIS — J45.909 ASTHMA, UNSPECIFIED ASTHMA SEVERITY, UNSPECIFIED WHETHER COMPLICATED, UNSPECIFIED WHETHER PERSISTENT: ICD-10-CM

## 2023-01-04 DIAGNOSIS — J01.90 SUBACUTE SINUSITIS, UNSPECIFIED LOCATION: Primary | ICD-10-CM

## 2023-01-04 RX ORDER — AZITHROMYCIN 250 MG/1
TABLET, FILM COATED ORAL
Qty: 6 TABLET | Refills: 0 | Status: SHIPPED | OUTPATIENT
Start: 2023-01-04 | End: 2023-01-08

## 2023-01-04 RX ORDER — PREDNISONE 20 MG/1
40 TABLET ORAL DAILY
Qty: 10 TABLET | Refills: 0 | Status: SHIPPED | OUTPATIENT
Start: 2023-01-04 | End: 2023-01-09

## 2023-01-04 NOTE — PROGRESS NOTES
[facility] PRIMARY CARE - [place]:  ASSESSMENT/PLAN:  Diagnoses and all orders for this visit:    Subacute sinusitis, unspecified location  · S/P ED visit due to nasal congestion and body aches - COVID/flu negative  · Patient with continued congestion and productive cough  · On Exam, patient with inflamed and edematous turbinates and bilateral middle ear effusions  · Will begin on steroids along with antibiotics at this time  · Patient instructed to call office if symptoms worsen or persist   · Orders placed this visit:   · Azithromycin (ZITHROMAX) 250 mg tablet; Take 2 tablets today then 1 tablet daily x 4 days  · PredniSONE 20 mg tablet; Take 2 tablets (40 mg total) by mouth daily for 5 days    Asthma:  · History of asthma; well controlled at this time  · Lungs clear to auscultation with no wheezing  · Continue on current home regimen:  · Singulair 10 mg daily  · Advair 1 puff twice daily  · Adderall inhaler as needed    Essential Hypertension:  · History of hypertension; well-controlled at this time  · Blood pressure in office today 132/70  · Continue on current home regimen:  · Lisinopril 20 mg daily    CHIEF COMPLAINT: ED Follow Up Visit     HISTORY OF PRESENT ILLNESS:  Mr Walter Williamson is a 65year old male with a PMHx of GERD, asthma, HTN, BPPV, anxiety, chronic LBP, and HLD who presents to the office today, 1/4/2023, as an ED follow up  Patient was recently seen and evaluated at [facility] ED on 1/1/2022 with complaints of nasal congestion and body aches  At that time, patient was flu and COVID negative  Patient's symptoms thought to be secondary to a viral like illness for which symptomatic care was recommended prior to ED discharge  Patient now presenting for ED follow-up  Since his discharge, patient continues to complain of nasal congestion along with an occasional productive cough  Symptoms are worse at night and in the early morning    Patient has been taking multiple over-the-counter medications which have failed to provide him Intermatic relief  Does have a past history of recurrent sinus infections which have been treated with a combination of steroids versus antibiotics  Patient states that in the past he has had most relief when treated with steroids  The following portions of the patient's history were reviewed and updated as appropriate: allergies, current medications, past family history, past medical history, past social history, past surgical history and problem list     Review of Systems   Constitutional: Positive for fatigue  Negative for activity change, chills, diaphoresis and fever  HENT: Positive for congestion and postnasal drip  Negative for ear discharge, ear pain, rhinorrhea, sinus pressure, sinus pain, sneezing, tinnitus and trouble swallowing  Eyes: Negative for visual disturbance  Respiratory: Positive for cough  Negative for chest tightness, shortness of breath and wheezing  Cardiovascular: Negative for chest pain, palpitations and leg swelling  Gastrointestinal: Negative for abdominal distention, abdominal pain, diarrhea, nausea and vomiting  Musculoskeletal: Negative for back pain and gait problem  Skin: Negative for color change and pallor  Neurological: Positive for weakness  Negative for dizziness, light-headedness and headaches  Psychiatric/Behavioral: Negative for agitation and confusion  OBJECTIVE:  Vitals:    01/04/23 1607   BP: 132/70   BP Location: Left arm   Patient Position: Sitting   Cuff Size: Standard   Pulse: 83   Temp: 98 3 °F (36 8 °C)   TempSrc: Temporal   SpO2: 97%   Weight: 97 1 kg (214 lb)   Height: 5' 9" (1 753 m)     Physical Exam  Constitutional:       General: He is not in acute distress  Appearance: He is obese  He is not ill-appearing or toxic-appearing  HENT:      Head: Normocephalic and atraumatic  Right Ear: No tenderness  A middle ear effusion is present   Tympanic membrane is not perforated, erythematous, retracted or bulging  Left Ear: No tenderness  A middle ear effusion is present  Tympanic membrane is not perforated, erythematous, retracted or bulging  Nose: Congestion present  Right Turbinates: Enlarged and swollen  Left Turbinates: Enlarged and swollen  Mouth/Throat:      Pharynx: Posterior oropharyngeal erythema present  No pharyngeal swelling or oropharyngeal exudate  Tonsils: No tonsillar exudate  Cardiovascular:      Rate and Rhythm: Normal rate and regular rhythm  Pulses: Normal pulses  Heart sounds: Normal heart sounds  No murmur heard  Pulmonary:      Effort: Pulmonary effort is normal  No respiratory distress  Breath sounds: Normal breath sounds  No wheezing, rhonchi or rales  Musculoskeletal:      Cervical back: Normal range of motion  Lymphadenopathy:      Cervical: No cervical adenopathy  Skin:     General: Skin is warm  Capillary Refill: Capillary refill takes less than 2 seconds  Coloration: Skin is not jaundiced or pale  Neurological:      Mental Status: He is alert and oriented to person, place, and time     Psychiatric:         Mood and Affect: Mood normal          Behavior: Behavior normal            Current Outpatient Medications:   •  acetaminophen (TYLENOL) 500 mg tablet, Take 1,000 mg by mouth every 6 (six) hours as needed for mild pain, Disp: , Rfl:   •  albuterol (2 5 mg/3 mL) 0 083 % nebulizer solution, Take 3 mL (2 5 mg total) by nebulization every 6 (six) hours as needed for wheezing or shortness of breath, Disp: 180 mL, Rfl: 5  •  albuterol (PROVENTIL HFA,VENTOLIN HFA) 90 mcg/act inhaler, Inhale 1 puff 4 (four) times a day , Disp: , Rfl:   •  azithromycin (ZITHROMAX) 250 mg tablet, Take 2 tablets today then 1 tablet daily x 4 days, Disp: 6 tablet, Rfl: 0  •  Chlorpheniramine Maleate (CHLOR-TRIMETON ALLERGY PO), Take by mouth as needed  , Disp: , Rfl:   •  cyclobenzaprine (FLEXERIL) 5 mg tablet, Take 1 tablet (5 mg total) by mouth 3 (three) times a day as needed for muscle spasms, Disp: 30 tablet, Rfl: 1  •  esomeprazole (NexIUM) 40 MG capsule, Take 40 mg by mouth every morning before breakfast, Disp: , Rfl:   •  fluticasone-salmeterol (Advair) 500-50 mcg/dose inhaler, Inhale 1 puff 2 (two) times a day, Disp: , Rfl:   •  ipratropium-albuterol (DUO-NEB) 0 5-2 5 mg/3 mL, Inhale 3 mL 2 (two) times a week , Disp: , Rfl:   •  ketoconazole (NIZORAL) 2 % cream, Apply topically daily, Disp: 60 g, Rfl: 2  •  lisinopril (ZESTRIL) 20 mg tablet, Take 1 tablet (20 mg total) by mouth daily, Disp: 90 tablet, Rfl: 1  •  meclizine (ANTIVERT) 12 5 MG tablet, Take 1 tablet (12 5 mg total) by mouth every 8 (eight) hours, Disp: 30 tablet, Rfl: 1  •  montelukast (SINGULAIR) 10 mg tablet, Take 1 tablet (10 mg total) by mouth daily, Disp: 90 tablet, Rfl: 1  •  predniSONE 20 mg tablet, Take 2 tablets (40 mg total) by mouth daily for 5 days, Disp: 10 tablet, Rfl: 0  •  Triamcinolone Acetonide (NASACORT ALLERGY 24HR NA), 1 spray into each nostril daily  , Disp: , Rfl:   •  Uloric 40 MG tablet, Take 1 tablet (40 mg total) by mouth daily, Disp: 90 tablet, Rfl: 1  •  verapamil (CALAN) 120 mg tablet, TAKE 1 TABLET BY MOUTH EVERY DAY, Disp: 90 tablet, Rfl: 3  •  ibuprofen (MOTRIN) 600 mg tablet, Take 600 mg by mouth every 8 (eight) hours, Disp: , Rfl:   •  methylPREDNISolone 4 MG tablet therapy pack, Use as directed on package (Patient not taking: Reported on 1/4/2023), Disp: 21 tablet, Rfl: 0  •  rosuvastatin (CRESTOR) 10 MG tablet, Take 1 tablet (10 mg total) by mouth daily (Patient not taking: Reported on 1/4/2023), Disp: 90 tablet, Rfl: 3    Past Medical History:   Diagnosis Date   • Anxiety 1/15/2019   • Arthritis    • Chronic rhinitis     last assessed 2/21/14   • Chronic serous otitis media     last assessed 11/20/13   • Chronic sinusitis     last assessed 8/19/14   • Functional heart murmur    • GERD (gastroesophageal reflux disease)    • Gout    • Hearing problem last assessed 12/1/16   • Hiatal hernia    • Hypercholesterolemia    • Hypertension    • Palpitations    • Testicular hypogonadism     last assessed 10/4/13   • V-tach      Past Surgical History:   Procedure Laterality Date   • APPENDECTOMY     • BICEPS TENODESIS      anesthesia for tenodesis- of ruptured long tendon of biceps    • HERNIA REPAIR     • THYROIDECTOMY     • TONSILLECTOMY       Social History     Socioeconomic History   • Marital status: Single     Spouse name: Not on file   • Number of children: Not on file   • Years of education: Not on file   • Highest education level: Not on file   Occupational History   • Occupation: unemployed   Tobacco Use   • Smoking status: Never   • Smokeless tobacco: Never   Vaping Use   • Vaping Use: Never used   Substance and Sexual Activity   • Alcohol use: Yes     Comment: occasionally   • Drug use: No   • Sexual activity: Not Currently   Other Topics Concern   • Not on file   Social History Narrative    Travel hx- pt denies being out of the country during 1/2014-10/28/14     Social Determinants of Health     Financial Resource Strain: Not on file   Food Insecurity: Not on file   Transportation Needs: Not on file   Physical Activity: Not on file   Stress: Not on file   Social Connections: Not on file   Intimate Partner Violence: Not on file   Housing Stability: Not on file     Family History   Problem Relation Age of Onset   • Lung cancer Father    • Coronary artery disease Father         blockages   • Stroke Maternal Grandmother    • Cancer Paternal Grandfather         metastasized   • Heart disease Family    • Lung cancer Cousin    • No Known Problems Mother    • No Known Problems Sister    • No Known Problems Brother    • No Known Problems Maternal Aunt    • No Known Problems Maternal Uncle    • No Known Problems Paternal Aunt    • No Known Problems Paternal Uncle    • No Known Problems Paternal Grandmother    • ADD / ADHD Neg Hx    • Anesthesia problems Neg Hx    • Clotting disorder Neg Hx    • Collagen disease Neg Hx    • Diabetes Neg Hx    • Dislocations Neg Hx    • Learning disabilities Neg Hx    • Neurological problems Neg Hx    • Osteoporosis Neg Hx    • Rheumatologic disease Neg Hx    • Scoliosis Neg Hx    • Vascular Disease Neg Hx        ==  Ave Downs DO  [facility] Internal Medicine PGY-1    [address]  [facility] , [address] [address] [facility] [address] [facility]  Office: [phone]  Fax: [phone] [place]

## 2023-01-12 ENCOUNTER — TELEPHONE (OUTPATIENT)
Dept: INTERNAL MEDICINE CLINIC | Facility: OTHER | Age: 58
End: 2023-01-12

## 2023-01-31 DIAGNOSIS — I10 HYPERTENSION, UNSPECIFIED TYPE: ICD-10-CM

## 2023-01-31 RX ORDER — LISINOPRIL 20 MG/1
20 TABLET ORAL DAILY
Qty: 90 TABLET | Refills: 1 | Status: SHIPPED | OUTPATIENT
Start: 2023-01-31

## 2023-02-24 ENCOUNTER — OFFICE VISIT (OUTPATIENT)
Dept: OBGYN CLINIC | Facility: MEDICAL CENTER | Age: 58
End: 2023-02-24

## 2023-02-24 VITALS
BODY MASS INDEX: 30.69 KG/M2 | HEIGHT: 69 IN | HEART RATE: 84 BPM | DIASTOLIC BLOOD PRESSURE: 84 MMHG | SYSTOLIC BLOOD PRESSURE: 138 MMHG | WEIGHT: 207.2 LBS

## 2023-02-24 DIAGNOSIS — M17.11 PRIMARY OSTEOARTHRITIS OF RIGHT KNEE: Primary | ICD-10-CM

## 2023-02-24 DIAGNOSIS — M17.0 BILATERAL PRIMARY OSTEOARTHRITIS OF KNEE: ICD-10-CM

## 2023-02-24 DIAGNOSIS — M25.461 EFFUSION OF RIGHT KNEE: ICD-10-CM

## 2023-02-24 DIAGNOSIS — M17.12 PRIMARY OSTEOARTHRITIS OF LEFT KNEE: ICD-10-CM

## 2023-02-24 RX ORDER — BUPIVACAINE HYDROCHLORIDE 5 MG/ML
2 INJECTION, SOLUTION EPIDURAL; INTRACAUDAL
Status: COMPLETED | OUTPATIENT
Start: 2023-02-24 | End: 2023-02-24

## 2023-02-24 RX ORDER — BUPIVACAINE HYDROCHLORIDE 5 MG/ML
6 INJECTION, SOLUTION EPIDURAL; INTRACAUDAL
Status: COMPLETED | OUTPATIENT
Start: 2023-02-24 | End: 2023-02-24

## 2023-02-24 RX ORDER — METHYLPREDNISOLONE ACETATE 40 MG/ML
2 INJECTION, SUSPENSION INTRA-ARTICULAR; INTRALESIONAL; INTRAMUSCULAR; SOFT TISSUE
Status: COMPLETED | OUTPATIENT
Start: 2023-02-24 | End: 2023-02-24

## 2023-02-24 RX ADMIN — METHYLPREDNISOLONE ACETATE 2 ML: 40 INJECTION, SUSPENSION INTRA-ARTICULAR; INTRALESIONAL; INTRAMUSCULAR; SOFT TISSUE at 16:00

## 2023-02-24 RX ADMIN — METHYLPREDNISOLONE ACETATE 2 ML: 40 INJECTION, SUSPENSION INTRA-ARTICULAR; INTRALESIONAL; INTRAMUSCULAR; SOFT TISSUE at 15:59

## 2023-02-24 RX ADMIN — BUPIVACAINE HYDROCHLORIDE 2 ML: 5 INJECTION, SOLUTION EPIDURAL; INTRACAUDAL at 16:00

## 2023-02-24 RX ADMIN — BUPIVACAINE HYDROCHLORIDE 6 ML: 5 INJECTION, SOLUTION EPIDURAL; INTRACAUDAL at 15:59

## 2023-02-24 RX ADMIN — BUPIVACAINE HYDROCHLORIDE 2 ML: 5 INJECTION, SOLUTION EPIDURAL; INTRACAUDAL at 15:59

## 2023-03-02 ENCOUNTER — OFFICE VISIT (OUTPATIENT)
Dept: URGENT CARE | Age: 58
End: 2023-03-02

## 2023-03-02 VITALS
HEART RATE: 86 BPM | RESPIRATION RATE: 12 BRPM | SYSTOLIC BLOOD PRESSURE: 127 MMHG | TEMPERATURE: 98.1 F | OXYGEN SATURATION: 98 % | DIASTOLIC BLOOD PRESSURE: 90 MMHG

## 2023-03-02 DIAGNOSIS — J01.90 ACUTE SINUSITIS, RECURRENCE NOT SPECIFIED, UNSPECIFIED LOCATION: Primary | ICD-10-CM

## 2023-03-02 RX ORDER — PREDNISONE 10 MG/1
TABLET ORAL
Qty: 20 TABLET | Refills: 0 | Status: SHIPPED | OUTPATIENT
Start: 2023-03-02

## 2023-03-02 NOTE — PATIENT INSTRUCTIONS
1  Drink plenty fluids  2   Use a humidifier at bedtime     3  Over-the-counter medications as needed for symptomatic care  4    Advance activities as tolerated  5    Follow-up with your primary care physician in 3-4 days  6   Go to emergency room if symptoms are worsening

## 2023-03-02 NOTE — PROGRESS NOTES
[facility] [employer] [employer] [facility]        NAME: Gunjan Stack is a 65 y o  male  : 1965    MRN: [id number]  DATE: 2023  TIME: 6:51 PM    Assessment and Plan   Acute sinusitis, recurrence not specified, unspecified location [J01 90]  1  Acute sinusitis, recurrence not specified, unspecified location  predniSONE 10 mg tablet            Patient Instructions       Follow up with PCP in 3-5 days  Proceed to  ER if symptoms worsen  Chief Complaint     Chief Complaint   Patient presents with   • Asthma     Asthma flare up; lung tightness   • Nasal Congestion     Started with a cold last Thursday; taking sudafed and nasal spray; now dry nose and bloody         History of Present Illness       Patient here for evaluation of sinus congestion, pressure, occasional nosebleeds  Patient symptoms started few weeks ago  He has been using over-the-counter medications like Sudafed and nasal sprays with minimal relief  He does get short-term relief from them but symptoms return  Asthma  Associated symptoms include postnasal drip and rhinorrhea  Pertinent negatives include no ear pain, sore throat or trouble swallowing  His past medical history is significant for asthma  Review of Systems   Review of Systems   Constitutional: Negative  HENT: Positive for congestion, postnasal drip and rhinorrhea  Negative for ear discharge, ear pain, sinus pressure, sinus pain, sore throat and trouble swallowing  Eyes: Negative  Respiratory: Negative  Cardiovascular: Negative  Gastrointestinal: Negative  Neurological: Negative            Current Medications       Current Outpatient Medications:   •  predniSONE 10 mg tablet, 4 tablets daily for 5 days, Disp: 20 tablet, Rfl: 0  •  acetaminophen (TYLENOL) 500 mg tablet, Take 1,000 mg by mouth every 6 (six) hours as needed for mild pain, Disp: , Rfl:   •  albuterol (2 5 mg/3 mL) 0 083 % nebulizer solution, Take 3 mL (2 5 mg total) by nebulization every 6 (six) hours as needed for wheezing or shortness of breath, Disp: 180 mL, Rfl: 5  •  albuterol (PROVENTIL HFA,VENTOLIN HFA) 90 mcg/act inhaler, Inhale 1 puff 4 (four) times a day , Disp: , Rfl:   •  Chlorpheniramine Maleate (CHLOR-TRIMETON ALLERGY PO), Take by mouth as needed  , Disp: , Rfl:   •  cyclobenzaprine (FLEXERIL) 5 mg tablet, Take 1 tablet (5 mg total) by mouth 3 (three) times a day as needed for muscle spasms, Disp: 30 tablet, Rfl: 1  •  esomeprazole (NexIUM) 40 MG capsule, Take 40 mg by mouth every morning before breakfast, Disp: , Rfl:   •  fluticasone-salmeterol (Advair) 500-50 mcg/dose inhaler, Inhale 1 puff 2 (two) times a day, Disp: , Rfl:   •  ibuprofen (MOTRIN) 600 mg tablet, Take 600 mg by mouth every 8 (eight) hours, Disp: , Rfl:   •  ipratropium-albuterol (DUO-NEB) 0 5-2 5 mg/3 mL, Inhale 3 mL 2 (two) times a week , Disp: , Rfl:   •  ketoconazole (NIZORAL) 2 % cream, Apply topically daily, Disp: 60 g, Rfl: 2  •  lisinopril (ZESTRIL) 20 mg tablet, Take 1 tablet (20 mg total) by mouth daily, Disp: 90 tablet, Rfl: 1  •  meclizine (ANTIVERT) 12 5 MG tablet, Take 1 tablet (12 5 mg total) by mouth every 8 (eight) hours, Disp: 30 tablet, Rfl: 1  •  montelukast (SINGULAIR) 10 mg tablet, Take 1 tablet (10 mg total) by mouth daily, Disp: 90 tablet, Rfl: 1  •  rosuvastatin (CRESTOR) 10 MG tablet, Take 1 tablet (10 mg total) by mouth daily (Patient not taking: Reported on 1/4/2023), Disp: 90 tablet, Rfl: 3  •  Triamcinolone Acetonide (NASACORT ALLERGY 24HR NA), 1 spray into each nostril daily  , Disp: , Rfl:   •  Uloric 40 MG tablet, Take 1 tablet (40 mg total) by mouth daily, Disp: 90 tablet, Rfl: 1  •  verapamil (CALAN) 120 mg tablet, TAKE 1 TABLET BY MOUTH EVERY DAY, Disp: 90 tablet, Rfl: 3    Current Allergies     Allergies as of 03/02/2023 - Reviewed 03/02/2023   Allergen Reaction Noted   • Penicillins Rash and Anaphylaxis 08/17/2004   • Acetazolamide  10/25/2016   • Cephalosporins Other (See Comments) 08/17/2004 • Doxycycline  10/24/2018   • Other  04/28/2014   • Sulfa antibiotics Other (See Comments) 08/17/2004   • Famotidine Palpitations 09/05/2016   • Levofloxacin Palpitations 11/12/2021   • Omeprazole Palpitations 09/05/2016            The following portions of the patient's history were reviewed and updated as appropriate: allergies, current medications, past family history, past medical history, past social history, past surgical history and problem list      Past Medical History:   Diagnosis Date   • Anxiety 1/15/2019   • Arthritis    • Chronic rhinitis     last assessed 2/21/14   • Chronic serous otitis media     last assessed 11/20/13   • Chronic sinusitis     last assessed 8/19/14   • Functional heart murmur    • GERD (gastroesophageal reflux disease)    • Gout    • Hearing problem     last assessed 12/1/16   • Hiatal hernia    • Hypercholesterolemia    • Hypertension    • Palpitations    • Testicular hypogonadism     last assessed 10/4/13   • V-tach        Past Surgical History:   Procedure Laterality Date   • APPENDECTOMY     • BICEPS TENODESIS      anesthesia for tenodesis- of ruptured long tendon of biceps    • HERNIA REPAIR     • THYROIDECTOMY     • TONSILLECTOMY         Family History   Problem Relation Age of Onset   • Lung cancer Father    • Coronary artery disease Father         blockages   • Stroke Maternal Grandmother    • Cancer Paternal Grandfather         metastasized   • Heart disease Family    • Lung cancer Cousin    • No Known Problems Mother    • No Known Problems Sister    • No Known Problems Brother    • No Known Problems Maternal Aunt    • No Known Problems Maternal Uncle    • No Known Problems Paternal Aunt    • No Known Problems Paternal Uncle    • No Known Problems Paternal Grandmother    • ADD / ADHD Neg Hx    • Anesthesia problems Neg Hx    • Clotting disorder Neg Hx    • Collagen disease Neg Hx    • Diabetes Neg Hx    • Dislocations Neg Hx    • Learning disabilities Neg Hx    • Neurological problems Neg Hx    • Osteoporosis Neg Hx    • Rheumatologic disease Neg Hx    • Scoliosis Neg Hx    • Vascular Disease Neg Hx          Medications have been verified  Objective   /90 (BP Location: Right arm, Patient Position: Sitting, Cuff Size: Large)   Pulse 86   Temp 98 1 °F (36 7 °C) (Temporal)   Resp 12   SpO2 98%   No LMP for male patient  Physical Exam     Physical Exam  Vitals and nursing note reviewed  Constitutional:       General: He is not in acute distress  Appearance: Normal appearance  He is well-developed  He is not ill-appearing, toxic-appearing or diaphoretic  HENT:      Head: Normocephalic and atraumatic  Right Ear: Tympanic membrane and ear canal normal       Left Ear: Tympanic membrane and ear canal normal       Nose: Congestion and rhinorrhea present  Mouth/Throat:      Mouth: Mucous membranes are moist       Pharynx: No oropharyngeal exudate or posterior oropharyngeal erythema  Eyes:      General:         Right eye: No discharge  Left eye: No discharge  Extraocular Movements: Extraocular movements intact  Conjunctiva/sclera: Conjunctivae normal       Pupils: Pupils are equal, round, and reactive to light  Cardiovascular:      Rate and Rhythm: Normal rate and regular rhythm  Heart sounds: Normal heart sounds  No murmur heard  Pulmonary:      Effort: Pulmonary effort is normal  No respiratory distress  Breath sounds: Normal breath sounds  No stridor  No wheezing, rhonchi or rales  Lymphadenopathy:      Cervical: Cervical adenopathy present  Skin:     General: Skin is warm and dry  Neurological:      General: No focal deficit present  Mental Status: He is alert and oriented to person, place, and time  Psychiatric:         Mood and Affect: Mood normal          Behavior: Behavior normal          Thought Content:  Thought content normal          Judgment: Judgment normal

## 2023-03-06 ENCOUNTER — OFFICE VISIT (OUTPATIENT)
Dept: INTERNAL MEDICINE CLINIC | Facility: OTHER | Age: 58
End: 2023-03-06

## 2023-03-06 VITALS
HEIGHT: 69 IN | HEART RATE: 101 BPM | SYSTOLIC BLOOD PRESSURE: 138 MMHG | TEMPERATURE: 97.8 F | DIASTOLIC BLOOD PRESSURE: 68 MMHG | WEIGHT: 203 LBS | BODY MASS INDEX: 30.07 KG/M2 | OXYGEN SATURATION: 98 %

## 2023-03-06 DIAGNOSIS — J45.909 ASTHMA, UNSPECIFIED ASTHMA SEVERITY, UNSPECIFIED WHETHER COMPLICATED, UNSPECIFIED WHETHER PERSISTENT: ICD-10-CM

## 2023-03-06 DIAGNOSIS — E07.9 THYROID DISORDER: ICD-10-CM

## 2023-03-06 DIAGNOSIS — I10 HYPERTENSION, UNSPECIFIED TYPE: ICD-10-CM

## 2023-03-06 DIAGNOSIS — I45.9 SKIPPED BEATS: ICD-10-CM

## 2023-03-06 DIAGNOSIS — I47.20 VENTRICULAR TACHYCARDIA: ICD-10-CM

## 2023-03-06 DIAGNOSIS — J32.9 SINUSITIS, UNSPECIFIED CHRONICITY, UNSPECIFIED LOCATION: Primary | ICD-10-CM

## 2023-03-06 NOTE — PROGRESS NOTES
INTERNAL MEDICINE OFFICE VISIT  NENITA Benitez 54    NAME: Isidra Pagan  AGE: 65 y o  SEX: male    DATE OF ENCOUNTER: 3/6/2023    Assessment and Plan     1  Sinusitis, unspecified chronicity, unspecified location  Had recent ER visit 03/03 for acute sinusitis symptoms , was prescribed prednisone 10 mg x 5 days course   Today exam is WNL , no URI findings , CTAB , okay to DC 5th dose     2  Thyroid disorder  3  Hypertension, unspecified type  5  Ventricular tachycardia  6  Skipped beats    Hx of thyroid nodule s/p partial thyroidectomy   No recent TSH   Patient with fast speech, hx of Vtach and had skipped beats on exam today   HR today 84, has a skipped beat every 6-8 beats , asymptomatic, denies any chest pain, SOB or palpitations    - TSH, 3rd generation with Free T4 reflex; Future  - counseled on decreasing alcohol and caffeine use, drinks whiskey 2-4 drinks of whiskey two days a week/weekened and 1-2 cup coffee every morning   - continue on Verapamil 120 mg QD   - Discontinue Flexeril   - to continue follow up with Cardiology, had recent appointment and to have ECHO done       4  Asthma, unspecified asthma severity, unspecified whether complicated, unspecified whether persistent  In no acute exacerbations; On Singulair and PRN albuterol, had recent steroid course for acute sinusitis as above       Orders Placed This Encounter   Procedures   • TSH, 3rd generation with Free T4 reflex       - Counseling Documentation: patient was counseled regarding: diagnostic results, instructions for management and risks and benefits of treatment options    Chief Complaint     Chief Complaint   Patient presents with   • Follow-up   • Cold Like Symptoms     Sinus congestion  Seen urgent care 3/2/23 dx  Acute sinusitis put on prednisone 10 mg  Patient states he did not take his prednisone today- Distance walking he feels his heart rate is up  Patient sates he is very dry and has cold sweats   He wants you to speak slowly  Patient states he still has colo guard since last yr  Has not gotten to it yet       History of Present Illness     Gunjan Stack is a 65years old male with past medical history remarkable for thyroid disease, asthma, GERD, HTN, history of V  tach, hyperlipidemia, gout, presenting today for follow-up after having urgent care visit 3/3 for sinusitis and was prescribed prednisone, today patient noted that his symptoms almost resolved with and that was mainly nasal congestion and he was using over-the-counter antihistaminic and decongestant on which he was advised today to use with caution given possible side effects and not overuse over-the-counter medications unless needed, when asked about his epistaxis reported per urgent care note patient noted that he did not have any active bleeding from the nose but when he blew off his nose 1 time was a black material concern with him that he could have some type of bleeding, otherwise no nosebleeding reported, he denies any chest pain palpitation shortness of breath however on exam today skipped beats noted, per medication review he is on Flexeril as needed, instructed to stop using, also instructed on moderating alcohol and caffeine, otherwise to continue follow-up with cardiology and to have his echo done  Denies any fever chills sore throat or systemic/new symptoms or complaint today  HPI    The following portions of the patient's history were reviewed and updated as appropriate: allergies, current medications, past family history, past medical history, past social history, past surgical history and problem list     Review of Systems     Review of Systems  - GENERAL: Negative for any nausea, vomiting, fevers, chills, or weight loss    - HEENT: recent nasal congestion, resolved; no epistaxis but noted having nasal thick secretion dark /black when blew off his nose last week though he could have bleed but did not have active nose bleeding ; Negative for any head/Neck trauma, pain, double/blurry vision, rhinitis  - CARDIAC: Negative for any chest pain, palpitation, Dyspnea on exertion, peripheral edema  - PULMONARY: Negative for any SOB, cough, wheezing    - GASTROINTESTINAL: Negative for any abdominal pain, N/V/D/C, blood in stool    - GENITOURINARY: Negative for any dysuria, hematuria, incontinence   - NEUROLOGIC: Negative for any muscle weakness, numbness/tingling, memory changes  - MUSCULOSKELETAL: Negative for any joint pains/swelling, limited ROM  - INTEGUMENTARY: Negative for any rashes, cuts/ lesions   - HEMATOLOGIC: Negative for any abnormal bruising, frequent infections or bleeding  Active Problem List     Patient Active Problem List   Diagnosis   • Allergic rhinitis   • Asthma   • Essential hypertension   • Benign paroxysmal positional vertigo   • Chronic bilateral low back pain without sciatica   • DJD (degenerative joint disease) of knee   • GERD without esophagitis   • Gout   • Hyperlipidemia   • Primary localized osteoarthritis of left knee   • Primary localized osteoarthritis of right knee   • Tenosynovitis of foot   • Thyroid disorder   • Ventricular tachycardia   • Anxiety   • Chronic pain of both knees   • Abnormal liver function test   • Epidermoid cyst   • Dysfunction of left eustachian tube   • Tinea corporis   • Effusion of right knee       Objective     /68 (BP Location: Right arm, Patient Position: Sitting, Cuff Size: Large)   Pulse 101   Temp 97 8 °F (36 6 °C) (Temporal)   Ht 5' 9" (1 753 m)   Wt 92 1 kg (203 lb)   SpO2 98%   BMI 29 98 kg/m²     Physical Exam  - GEN: fast tangenital speech ;  Appears well, alert and oriented x 3, pleasant and cooperative, in no acute distress  - HEENT: Anicteric, mucous membranes moist, PERRL and EOMI   - NECK: No lymphadenopathy, JVD or carotid bruits   - HEART: skipped beat every ~ 8 beats ; otherwise RRR,  normal S1 and S2, no murmurs, clicks, gallops or rubs   - LUNGS: Clear to auscultation bilaterally; no wheezes, rales, or rhonchi  - ABDOMEN: Normal bowel sounds, soft, no tenderness, no distention, no organomegaly or masses felt on exam    - EXTREMITIES: Peripheral pulses normal; no clubbing, cyanosis, or edema  - NEURO: No focal findings, CN II-XII are grossly intact  - Musculoskeletal: 5/5 strength, normal ROM, no swollen or erythematous joints     - SKIN: Normal without suspicious lesions on exposed skin    Pertinent Laboratory/Diagnostic Studies:  CBC:   Lab Results   Component Value Date/Time    WBC 10 89 (H) 12/08/2022 12:27 PM    WBC 6 96 10/22/2014 07:17 PM    RBC 4 50 12/08/2022 12:27 PM    RBC 4 88 10/22/2014 07:17 PM    HGB 13 8 12/08/2022 12:27 PM    HGB 14 9 10/22/2014 07:17 PM    HCT 42 4 12/08/2022 12:27 PM    HCT 43 4 10/22/2014 07:17 PM    MCV 94 12/08/2022 12:27 PM    MCV 89 10/22/2014 07:17 PM    MCH 30 7 12/08/2022 12:27 PM    MCH 30 5 10/22/2014 07:17 PM    MCHC 32 5 12/08/2022 12:27 PM    MCHC 34 3 10/22/2014 07:17 PM    RDW 13 1 12/08/2022 12:27 PM    RDW 13 5 10/22/2014 07:17 PM    MPV 8 9 12/08/2022 12:27 PM    MPV 9 8 10/22/2014 07:17 PM     12/08/2022 12:27 PM     10/22/2014 07:17 PM    NRBC 0 06/26/2018 01:59 PM    NEUTOPHILPCT 66 06/26/2018 01:59 PM    NEUTOPHILPCT 62 10/22/2014 07:17 PM    LYMPHOPCT 22 06/26/2018 01:59 PM    LYMPHOPCT 23 10/22/2014 07:17 PM    MONOPCT 9 06/26/2018 01:59 PM    MONOPCT 12 10/22/2014 07:17 PM    EOSPCT 2 06/26/2018 01:59 PM    EOSPCT 3 10/22/2014 07:17 PM    BASOPCT 0 06/26/2018 01:59 PM    NEUTROABS 4 40 06/26/2018 01:59 PM    NEUTROABS 4 32 10/22/2014 07:17 PM    LYMPHSABS 1 50 06/26/2018 01:59 PM    LYMPHSABS 1 60 10/22/2014 07:17 PM    MONOSABS 0 62 06/26/2018 01:59 PM    MONOSABS 0 84 10/22/2014 07:17 PM    EOSABS 0 10 06/26/2018 01:59 PM    EOSABS 0 21 10/22/2014 07:17 PM     Chemistry Profile:   Lab Results   Component Value Date/Time     11/13/2015 04:18 PM    K 3 8 12/08/2022 12:27 PM    K 3 9 11/13/2015 04:18 PM     12/08/2022 12:27 PM     11/13/2015 04:18 PM    CO2 27 12/08/2022 12:27 PM    CO2 28 11/13/2015 04:18 PM    ANIONGAP 7 11/13/2015 04:18 PM    BUN 25 12/08/2022 12:27 PM    BUN 23 11/13/2015 04:18 PM    CREATININE 0 96 12/08/2022 12:27 PM    CREATININE 1 11 11/13/2015 04:18 PM    GLUC 95 02/22/2017 04:36 PM    GLUF 103 (H) 12/08/2022 12:27 PM    GLUCOSE 104 11/13/2015 04:18 PM    CALCIUM 9 2 12/08/2022 12:27 PM    CALCIUM 9 6 11/13/2015 04:18 PM    AST 25 12/08/2022 12:27 PM    AST 27 03/18/2015 04:50 PM    ALT 46 12/08/2022 12:27 PM    ALT 53 03/18/2015 04:50 PM    ALKPHOS 70 12/08/2022 12:27 PM    ALKPHOS 82 10/22/2014 07:17 PM    PROT 7 5 10/22/2014 07:17 PM    BILITOT 1 10 (H) 10/22/2014 07:17 PM    EGFR 88 12/08/2022 12:27 PM     Health Maintenance:   Lab Results   Component Value Date/Time    PSA 0 8 12/14/2021 12:54 PM    HEPCAB Non-reactive 12/14/2021 12:54 PM       Current Medications     Current Outpatient Medications:   •  acetaminophen (TYLENOL) 500 mg tablet, Take 1,000 mg by mouth every 6 (six) hours as needed for mild pain, Disp: , Rfl:   •  albuterol (2 5 mg/3 mL) 0 083 % nebulizer solution, Take 3 mL (2 5 mg total) by nebulization every 6 (six) hours as needed for wheezing or shortness of breath, Disp: 180 mL, Rfl: 5  •  albuterol (PROVENTIL HFA,VENTOLIN HFA) 90 mcg/act inhaler, Inhale 1 puff 4 (four) times a day , Disp: , Rfl:   •  Chlorpheniramine Maleate (CHLOR-TRIMETON ALLERGY PO), Take by mouth as needed  , Disp: , Rfl:   •  esomeprazole (NexIUM) 40 MG capsule, Take 40 mg by mouth every morning before breakfast, Disp: , Rfl:   •  fluticasone-salmeterol (Advair) 500-50 mcg/dose inhaler, Inhale 1 puff 2 (two) times a day, Disp: , Rfl:   •  ibuprofen (MOTRIN) 600 mg tablet, Take 600 mg by mouth every 8 (eight) hours, Disp: , Rfl:   •  ipratropium-albuterol (DUO-NEB) 0 5-2 5 mg/3 mL, Inhale 3 mL 2 (two) times a week , Disp: , Rfl:   •  lisinopril (ZESTRIL) 20 mg tablet, Take 1 tablet (20 mg total) by mouth daily, Disp: 90 tablet, Rfl: 1  •  meclizine (ANTIVERT) 12 5 MG tablet, Take 1 tablet (12 5 mg total) by mouth every 8 (eight) hours, Disp: 30 tablet, Rfl: 1  •  montelukast (SINGULAIR) 10 mg tablet, Take 1 tablet (10 mg total) by mouth daily, Disp: 90 tablet, Rfl: 1  •  predniSONE 10 mg tablet, 4 tablets daily for 5 days, Disp: 20 tablet, Rfl: 0  •  Triamcinolone Acetonide (NASACORT ALLERGY 24HR NA), 1 spray into each nostril daily  , Disp: , Rfl:   •  Uloric 40 MG tablet, Take 1 tablet (40 mg total) by mouth daily, Disp: 90 tablet, Rfl: 1  •  verapamil (CALAN) 120 mg tablet, TAKE 1 TABLET BY MOUTH EVERY DAY, Disp: 90 tablet, Rfl: 3  •  ketoconazole (NIZORAL) 2 % cream, Apply topically daily (Patient not taking: Reported on 3/6/2023), Disp: 60 g, Rfl: 2  •  rosuvastatin (CRESTOR) 10 MG tablet, Take 1 tablet (10 mg total) by mouth daily (Patient not taking: Reported on 3/6/2023), Disp: 90 tablet, Rfl: 3    Health Maintenance     Health Maintenance   Topic Date Due   • Pneumococcal Vaccine: Pediatrics (0 to 5 Years) and At-Risk Patients (6 to 59 Years) (1 - PCV) Never done   • HIV Screening  Never done   • Colorectal Cancer Screening  Never done   • COVID-19 Vaccine (4 - Booster for Velasquez Peter series) 09/23/2022   • BMI: Followup Plan  03/22/2023   • Medicare Annual Wellness Visit (AWV)  12/13/2023   • Depression Screening  03/06/2024   • BMI: Adult  03/06/2024   • Hepatitis C Screening  Completed   • Influenza Vaccine  Completed   • HIB Vaccine  Aged Out   • IPV Vaccine  Aged Out   • Hepatitis A Vaccine  Aged Out   • Meningococcal ACWY Vaccine  Aged Out   • HPV Vaccine  Aged Out     Immunization History   Administered Date(s) Administered   • COVID-19 PFIZER VACCINE 0 3 ML IM 07/23/2021, 08/13/2021   • COVID-19 Pfizer vac (Basilio-sucrose, gray cap) 12 yr+ IM 07/29/2022   • INFLUENZA 11/01/2003, 10/28/2005, 10/23/2006, 10/29/2007, 10/29/2008, 10/29/2008, 10/23/2009, 10/23/2009, 10/29/2012, 10/25/2022   • Influenza Quadrivalent Preservative Free 3 years and older IM 10/20/2015   • Influenza Quadrivalent, 6-35 Months IM 10/29/2014, 11/04/2016   • Influenza, injectable, quadrivalent, preservative free 0 5 mL 10/02/2018   • Influenza, recombinant, quadrivalent,injectable, preservative free 10/09/2019, 11/05/2020, 10/18/2021, 10/25/2022   • Influenza, seasonal, injectable 10/06/2010, 10/06/2010, 10/11/2011, 10/11/2011, 09/27/2012, 09/27/2012, 11/06/2013, 11/15/2017   • Td (adult), adsorbed 02/14/2006, 02/14/2006       Maria E Hope [address]    3/6/2023 2:54 PM

## 2023-03-29 ENCOUNTER — TELEPHONE (OUTPATIENT)
Dept: INTERNAL MEDICINE CLINIC | Facility: OTHER | Age: 58
End: 2023-03-29

## 2023-05-01 DIAGNOSIS — J31.0 CHRONIC RHINITIS: ICD-10-CM

## 2023-05-01 RX ORDER — MONTELUKAST SODIUM 10 MG/1
10 TABLET ORAL DAILY
Qty: 90 TABLET | Refills: 1 | Status: SHIPPED | OUTPATIENT
Start: 2023-05-01

## 2023-05-02 DIAGNOSIS — J45.901 ASTHMA WITH ACUTE EXACERBATION, UNSPECIFIED ASTHMA SEVERITY, UNSPECIFIED WHETHER PERSISTENT: ICD-10-CM

## 2023-05-02 RX ORDER — ALBUTEROL SULFATE 2.5 MG/3ML
2.5 SOLUTION RESPIRATORY (INHALATION) EVERY 6 HOURS PRN
Qty: 240 ML | Refills: 2 | Status: SHIPPED | OUTPATIENT
Start: 2023-05-02

## 2023-05-23 DIAGNOSIS — M10.9 GOUTY ARTHRITIS: ICD-10-CM

## 2023-05-23 RX ORDER — FEBUXOSTAT 40 MG/1
40 TABLET ORAL DAILY
Qty: 90 TABLET | Refills: 1 | Status: SHIPPED | OUTPATIENT
Start: 2023-05-23

## 2023-05-25 ENCOUNTER — OFFICE VISIT (OUTPATIENT)
Dept: OBGYN CLINIC | Facility: MEDICAL CENTER | Age: 58
End: 2023-05-25

## 2023-05-25 VITALS
HEIGHT: 69 IN | WEIGHT: 207.4 LBS | SYSTOLIC BLOOD PRESSURE: 144 MMHG | DIASTOLIC BLOOD PRESSURE: 76 MMHG | BODY MASS INDEX: 30.72 KG/M2 | HEART RATE: 81 BPM

## 2023-05-25 DIAGNOSIS — M25.561 CHRONIC PAIN OF BOTH KNEES: ICD-10-CM

## 2023-05-25 DIAGNOSIS — M17.0 BILATERAL PRIMARY OSTEOARTHRITIS OF KNEE: Primary | ICD-10-CM

## 2023-05-25 DIAGNOSIS — G89.29 CHRONIC PAIN OF BOTH KNEES: ICD-10-CM

## 2023-05-25 DIAGNOSIS — M25.461 EFFUSION OF RIGHT KNEE: ICD-10-CM

## 2023-05-25 DIAGNOSIS — M25.562 CHRONIC PAIN OF BOTH KNEES: ICD-10-CM

## 2023-05-25 RX ORDER — TRIAMCINOLONE ACETONIDE 40 MG/ML
40 INJECTION, SUSPENSION INTRA-ARTICULAR; INTRAMUSCULAR
Status: COMPLETED | OUTPATIENT
Start: 2023-05-25 | End: 2023-05-25

## 2023-05-25 RX ORDER — BUPIVACAINE HYDROCHLORIDE 2.5 MG/ML
2 INJECTION, SOLUTION INFILTRATION; PERINEURAL
Status: COMPLETED | OUTPATIENT
Start: 2023-05-25 | End: 2023-05-25

## 2023-05-25 RX ORDER — BUPIVACAINE HYDROCHLORIDE 2.5 MG/ML
4 INJECTION, SOLUTION INFILTRATION; PERINEURAL
Status: COMPLETED | OUTPATIENT
Start: 2023-05-25 | End: 2023-05-25

## 2023-05-25 RX ADMIN — BUPIVACAINE HYDROCHLORIDE 2 ML: 2.5 INJECTION, SOLUTION INFILTRATION; PERINEURAL at 14:30

## 2023-05-25 RX ADMIN — BUPIVACAINE HYDROCHLORIDE 4 ML: 2.5 INJECTION, SOLUTION INFILTRATION; PERINEURAL at 14:30

## 2023-05-25 RX ADMIN — TRIAMCINOLONE ACETONIDE 40 MG: 40 INJECTION, SUSPENSION INTRA-ARTICULAR; INTRAMUSCULAR at 14:30

## 2023-05-25 NOTE — PROGRESS NOTES
Assessment/Plan:  1  Bilateral primary osteoarthritis of knee    2  Chronic pain of both knees    3  Effusion of right knee      Orders Placed This Encounter   Procedures   • Large joint arthrocentesis: R knee   • Large joint arthrocentesis: R knee   • Large joint arthrocentesis: L knee   • Medial  Knee Brace       · Patient has severe bilateral knee osteoarthritis and right knee effusion  · Patient is considering TKA in October or later  He is a candidate for surgery at this time  · Patient's right knee was aspirated and patient received bilateral knee steroid injections today  Tolerated the procedures well  Post injection instructions reviewed  · Order placed for right knee medial  brace  Note provided to brace fitter to contact patient to fitting  · Continue OTC NSAID prn pain  Return in about 3 months (around 8/25/2023) for TKA planning  I answered all of the patient's questions during the visit and provided education of the patient's condition during the visit  The patient verbalized understanding of the information given and agrees with the plan  This note was dictated using [employer] software  It may contain errors including improperly dictated words  Please contact physician directly for any questions  Subjective   Chief Complaint:   Chief Complaint   Patient presents with   • Left Knee - Follow-up, Pain   • Right Knee - Follow-up, Pain       HPI  Zoey Tompkins is a 63 y o  male who presents for follow up for bilateral knee pain  Patient received bilateral knee steroid injections and right knee aspiration at his last visit 3 months ago  Patient reports good pain relief from the injections  He states that both knees have been significantly painful for him recently  He notes that overall his right knee tends to be more swollen than the left knee  He notes that he does well walking uphill but struggles to walk downhill or down steps    He is taking over-the-counter NSAIDs as needed for pain  He has been wearing the medial  on the left knee and finds that this is helpful as long as he is able to position it correctly  He is interested in a medial  for the right knee as well  Patient is interested in proceeding with surgery in the fall  He would like bilateral knee steroid injections today  Review of Systems  ROS:    See HPI for musculoskeletal review     All other systems reviewed are negative     History:  Past Medical History:   Diagnosis Date   • Anxiety 1/15/2019   • Arthritis    • Chronic rhinitis     last assessed 2/21/14   • Chronic serous otitis media     last assessed 11/20/13   • Chronic sinusitis     last assessed 8/19/14   • Functional heart murmur    • GERD (gastroesophageal reflux disease)    • Gout    • Hearing problem     last assessed 12/1/16   • Hiatal hernia    • Hypercholesterolemia    • Hypertension    • Palpitations    • Testicular hypogonadism     last assessed 10/4/13   • V-tach [facility])      Past Surgical History:   Procedure Laterality Date   • APPENDECTOMY     • BICEPS TENODESIS      anesthesia for tenodesis- of ruptured long tendon of biceps    • HERNIA REPAIR     • THYROIDECTOMY     • TONSILLECTOMY       Social History   Social History     Substance and Sexual Activity   Alcohol Use Yes    Comment: occasionally     Social History     Substance and Sexual Activity   Drug Use No     Social History     Tobacco Use   Smoking Status Never   Smokeless Tobacco Never     Family History:   Family History   Problem Relation Age of Onset   • Lung cancer Father    • Coronary artery disease Father         blockages   • Stroke Maternal Grandmother    • Cancer Paternal Grandfather         metastasized   • Heart disease Family    • Lung cancer Cousin    • No Known Problems Mother    • No Known Problems Sister    • No Known Problems Brother    • No Known Problems Maternal Aunt    • No Known Problems Maternal Uncle    • No Known Problems Paternal Aunt    • No Known Problems Paternal Uncle    • No Known Problems Paternal Grandmother    • ADD / ADHD Neg Hx    • Anesthesia problems Neg Hx    • Clotting disorder Neg Hx    • Collagen disease Neg Hx    • Diabetes Neg Hx    • Dislocations Neg Hx    • Learning disabilities Neg Hx    • Neurological problems Neg Hx    • Osteoporosis Neg Hx    • Rheumatologic disease Neg Hx    • Scoliosis Neg Hx    • Vascular Disease Neg Hx        Current Outpatient Medications on File Prior to Visit   Medication Sig Dispense Refill   • acetaminophen (TYLENOL) 500 mg tablet Take 1,000 mg by mouth every 6 (six) hours as needed for mild pain     • albuterol (2 5 mg/3 mL) 0 083 % nebulizer solution Take 3 mL (2 5 mg total) by nebulization every 6 (six) hours as needed for wheezing or shortness of breath 240 mL 2   • albuterol (PROVENTIL HFA,VENTOLIN HFA) 90 mcg/act inhaler Inhale 1 puff 4 (four) times a day      • Chlorpheniramine Maleate (CHLOR-TRIMETON ALLERGY PO) Take by mouth as needed       • esomeprazole (NexIUM) 40 MG capsule Take 40 mg by mouth every morning before breakfast     • fluticasone-salmeterol (Advair) 500-50 mcg/dose inhaler Inhale 1 puff 2 (two) times a day     • ibuprofen (MOTRIN) 600 mg tablet Take 600 mg by mouth every 8 (eight) hours     • ipratropium-albuterol (DUO-NEB) 0 5-2 5 mg/3 mL Inhale 3 mL 2 (two) times a week      • ketoconazole (NIZORAL) 2 % cream Apply topically daily (Patient not taking: Reported on 3/6/2023) 60 g 2   • lisinopril (ZESTRIL) 20 mg tablet Take 1 tablet (20 mg total) by mouth daily 90 tablet 1   • meclizine (ANTIVERT) 12 5 MG tablet Take 1 tablet (12 5 mg total) by mouth every 8 (eight) hours 30 tablet 1   • montelukast (SINGULAIR) 10 mg tablet Take 1 tablet (10 mg total) by mouth daily 90 tablet 1   • predniSONE 10 mg tablet 4 tablets daily for 5 days 20 tablet 0   • rosuvastatin (CRESTOR) 10 MG tablet Take 1 tablet (10 mg total) by mouth daily (Patient not taking: Reported on 3/6/2023) "90 tablet 3   • Triamcinolone Acetonide (NASACORT ALLERGY 24HR NA) 1 spray into each nostril daily       • Uloric 40 MG tablet Take 1 tablet (40 mg total) by mouth daily 90 tablet 1   • verapamil (CALAN) 120 mg tablet TAKE 1 TABLET BY MOUTH EVERY DAY 90 tablet 3     No current facility-administered medications on file prior to visit  Allergies   Allergen Reactions   • Penicillins Rash and Anaphylaxis   • Acetazolamide      Other reaction(s): Stomach Ache   • Cephalosporins Other (See Comments)     headache   • Doxycycline      Capsules only  Tablets are ok to take, per pt  Capsules only  Tablets are ok to take, per pt  • Other    • Sulfa Antibiotics Other (See Comments)     Category: Allergy; Annotation - [id number]: STOMACH UPSET   • Famotidine Palpitations   • Levofloxacin Palpitations   • Omeprazole Palpitations     Painful urination        Objective     /76   Pulse 81   Ht 5' 9\" (1 753 m)   Wt 94 1 kg (207 lb 6 4 oz)   BMI 30 63 kg/m²      PE:  AAOx 3  WDWN  Hearing intact, no drainage from eyes  no audible wheezing  no abdominal distension  LE compartments soft, skin intact    Ortho Exam:  bilateral Knee:   No erythema  no swelling  + right knee effusion  no warmth  No TTP  AROM: 0- 120  Stable to varus/valgus stress        Large joint arthrocentesis: R knee  Universal Protocol:  Consent: Verbal consent obtained    Risks and benefits: risks, benefits and alternatives were discussed  Consent given by: patient  Site marked: the operative site was marked  Supporting Documentation  Indications: pain   Procedure Details  Location: knee - R knee  Preparation: Patient was prepped and draped in the usual sterile fashion  Needle size: 22 G  Ultrasound guidance: no  Approach: lateral  Medications administered: 4 mL bupivacaine 0 25 %    Patient tolerance: patient tolerated the procedure well with no immediate complications    Large joint arthrocentesis: R knee  Universal Protocol:  Consent: Verbal " consent obtained  Risks and benefits: risks, benefits and alternatives were discussed  Consent given by: patient  Site marked: the operative site was marked  Supporting Documentation  Indications: pain   Procedure Details  Location: knee - R knee  Preparation: Patient was prepped and draped in the usual sterile fashion  Needle size: 18 G  Ultrasound guidance: no  Approach: lateral  Medications administered: 2 mL bupivacaine 0 25 %; 40 mg triamcinolone acetonide 40 mg/mL    Aspirate amount: 85 mL  Aspirate: serous and yellow    Patient tolerance: patient tolerated the procedure well with no immediate complications  Dressing:  Sterile dressing applied    Large joint arthrocentesis: L knee  Universal Protocol:  Consent: Verbal consent obtained    Risks and benefits: risks, benefits and alternatives were discussed  Consent given by: patient  Site marked: the operative site was marked  Supporting Documentation  Indications: pain   Procedure Details  Location: knee - L knee  Needle size: 22 G  Ultrasound guidance: no  Approach: anterolateral  Medications administered: 2 mL bupivacaine 0 25 %; 40 mg triamcinolone acetonide 40 mg/mL    Patient tolerance: patient tolerated the procedure well with no immediate complications  Dressing:  Sterile dressing applied

## 2023-05-31 ENCOUNTER — RA CDI HCC (OUTPATIENT)
Dept: OTHER | Facility: HOSPITAL | Age: 58
End: 2023-05-31

## 2023-05-31 NOTE — PROGRESS NOTES
[address]  coding opportunities       Chart reviewed, no opportunity found: CHART REVIEWED, NO OPPORTUNITY FOUND        Patients Insurance     Medicare Insurance: Medicare

## 2023-06-06 ENCOUNTER — OFFICE VISIT (OUTPATIENT)
Dept: INTERNAL MEDICINE CLINIC | Facility: OTHER | Age: 58
End: 2023-06-06
Payer: MEDICARE

## 2023-06-06 VITALS
TEMPERATURE: 98 F | HEART RATE: 99 BPM | SYSTOLIC BLOOD PRESSURE: 140 MMHG | HEIGHT: 69 IN | WEIGHT: 201 LBS | OXYGEN SATURATION: 96 % | BODY MASS INDEX: 29.77 KG/M2 | DIASTOLIC BLOOD PRESSURE: 80 MMHG

## 2023-06-06 DIAGNOSIS — J45.909 ASTHMA, UNSPECIFIED ASTHMA SEVERITY, UNSPECIFIED WHETHER COMPLICATED, UNSPECIFIED WHETHER PERSISTENT: ICD-10-CM

## 2023-06-06 DIAGNOSIS — K21.9 GERD WITHOUT ESOPHAGITIS: ICD-10-CM

## 2023-06-06 DIAGNOSIS — E87.1 HYPONATREMIA: Primary | ICD-10-CM

## 2023-06-06 DIAGNOSIS — J30.89 NON-SEASONAL ALLERGIC RHINITIS, UNSPECIFIED TRIGGER: ICD-10-CM

## 2023-06-06 DIAGNOSIS — I10 ESSENTIAL HYPERTENSION: ICD-10-CM

## 2023-06-06 DIAGNOSIS — I47.20 VENTRICULAR TACHYCARDIA (HCC): ICD-10-CM

## 2023-06-06 DIAGNOSIS — M1A.9XX0 CHRONIC GOUT WITHOUT TOPHUS, UNSPECIFIED CAUSE, UNSPECIFIED SITE: ICD-10-CM

## 2023-06-06 DIAGNOSIS — H81.12 BENIGN PAROXYSMAL POSITIONAL VERTIGO OF LEFT EAR: ICD-10-CM

## 2023-06-06 PROCEDURE — 99214 OFFICE O/P EST MOD 30 MIN: CPT | Performed by: INTERNAL MEDICINE

## 2023-06-06 RX ORDER — MECLIZINE HCL 12.5 MG/1
12.5 TABLET ORAL EVERY 8 HOURS SCHEDULED
Qty: 30 TABLET | Refills: 1 | Status: SHIPPED | OUTPATIENT
Start: 2023-06-06

## 2023-06-06 NOTE — PROGRESS NOTES
Assessment/Plan:    GERD without esophagitis  Continue with Nexium 40 mg daily along with better diet control    Ventricular tachycardia (HCC)  No more symptoms  Also being followed by cardiologist    Essential hypertension  Blood pressure stable on present regimen  Advised for better diet control and exercise    Benign paroxysmal positional vertigo  Better  Still required as needed Antivert    Hyponatremia  We will repeat electrolyte to be done as soon as possible    Gout  Continue with present dose of Uloric    Asthma  Continue with present inhaler also managed by pulmonologist       Diagnoses and all orders for this visit:    Hyponatremia  -     Comprehensive metabolic panel; Future    Benign paroxysmal positional vertigo of left ear  -     meclizine (ANTIVERT) 12 5 MG tablet; Take 1 tablet (12 5 mg total) by mouth every 8 (eight) hours  -     CBC; Future    GERD without esophagitis    Non-seasonal allergic rhinitis, unspecified trigger    Ventricular tachycardia (HCC)    Essential hypertension    Chronic gout without tophus, unspecified cause, unspecified site    Asthma, unspecified asthma severity, unspecified whether complicated, unspecified whether persistent          BMI Counseling: Body mass index is 29 68 kg/m²  The BMI is above normal  Nutrition recommendations include decreasing portion sizes, encouraging healthy choices of fruits and vegetables, decreasing fast food intake, consuming healthier snacks and limiting drinks that contain sugar  Exercise recommendations include moderate physical activity 150 minutes/week and exercising 3-5 times per week  Rationale for BMI follow-up plan is due to patient being overweight or obese  Subjective:          Patient ID: Fly Mchugh is a 63 y o  male  Patient is here for regular follow-up  He was unable to do blood work requested on previous visit  Still complaining of sinus congestion and postnasal drip        The following portions of the patient's history were reviewed and updated as appropriate: allergies, current medications, past family history, past medical history, past social history, past surgical history and problem list     Review of Systems   Constitutional: Negative for fatigue and fever  HENT: Positive for congestion and postnasal drip  Negative for ear discharge, ear pain, sinus pressure, sore throat, tinnitus and trouble swallowing  Eyes: Negative for discharge, itching and visual disturbance  Respiratory: Negative for cough and shortness of breath  Cardiovascular: Negative for chest pain and palpitations  Gastrointestinal: Negative for abdominal pain, diarrhea, nausea and vomiting  Endocrine: Negative for cold intolerance and polyuria  Genitourinary: Negative for difficulty urinating, dysuria and urgency  Musculoskeletal: Negative for arthralgias and neck pain  Skin: Negative for rash  Allergic/Immunologic: Negative for environmental allergies  Neurological: Negative for dizziness, weakness and headaches  Psychiatric/Behavioral: Negative for agitation and behavioral problems  The patient is not nervous/anxious            Past Medical History:   Diagnosis Date   • Anxiety 1/15/2019   • Arthritis    • Chronic rhinitis     last assessed 2/21/14   • Chronic serous otitis media     last assessed 11/20/13   • Chronic sinusitis     last assessed 8/19/14   • Functional heart murmur    • GERD (gastroesophageal reflux disease)    • Gout    • Hearing problem     last assessed 12/1/16   • Hiatal hernia    • Hypercholesterolemia    • Hypertension    • Palpitations    • Testicular hypogonadism     last assessed 10/4/13   • V-tach [facility])          Current Outpatient Medications:   •  acetaminophen (TYLENOL) 500 mg tablet, Take 1,000 mg by mouth every 6 (six) hours as needed for mild pain, Disp: , Rfl:   •  albuterol (2 5 mg/3 mL) 0 083 % nebulizer solution, Take 3 mL (2 5 mg total) by nebulization every 6 (six) hours as needed for wheezing or shortness of breath, Disp: 240 mL, Rfl: 2  •  albuterol (PROVENTIL HFA,VENTOLIN HFA) 90 mcg/act inhaler, Inhale 1 puff 4 (four) times a day , Disp: , Rfl:   •  Chlorpheniramine Maleate (CHLOR-TRIMETON ALLERGY PO), Take by mouth as needed  , Disp: , Rfl:   •  esomeprazole (NexIUM) 40 MG capsule, Take 40 mg by mouth every morning before breakfast, Disp: , Rfl:   •  fluticasone-salmeterol (Advair) 500-50 mcg/dose inhaler, Inhale 1 puff 2 (two) times a day, Disp: , Rfl:   •  ipratropium-albuterol (DUO-NEB) 0 5-2 5 mg/3 mL, Inhale 3 mL 2 (two) times a week , Disp: , Rfl:   •  ketoconazole (NIZORAL) 2 % cream, Apply topically daily, Disp: 60 g, Rfl: 2  •  lisinopril (ZESTRIL) 20 mg tablet, Take 1 tablet (20 mg total) by mouth daily, Disp: 90 tablet, Rfl: 1  •  meclizine (ANTIVERT) 12 5 MG tablet, Take 1 tablet (12 5 mg total) by mouth every 8 (eight) hours, Disp: 30 tablet, Rfl: 1  •  montelukast (SINGULAIR) 10 mg tablet, Take 1 tablet (10 mg total) by mouth daily, Disp: 90 tablet, Rfl: 1  •  predniSONE 10 mg tablet, 4 tablets daily for 5 days, Disp: 20 tablet, Rfl: 0  •  rosuvastatin (CRESTOR) 10 MG tablet, Take 1 tablet (10 mg total) by mouth daily, Disp: 90 tablet, Rfl: 3  •  Triamcinolone Acetonide (NASACORT ALLERGY 24HR NA), 1 spray into each nostril daily  , Disp: , Rfl:   •  Uloric 40 MG tablet, Take 1 tablet (40 mg total) by mouth daily, Disp: 90 tablet, Rfl: 1  •  verapamil (CALAN) 120 mg tablet, TAKE 1 TABLET BY MOUTH EVERY DAY, Disp: 90 tablet, Rfl: 3  •  ibuprofen (MOTRIN) 600 mg tablet, Take 600 mg by mouth every 8 (eight) hours, Disp: , Rfl:     Allergies   Allergen Reactions   • Penicillins Rash and Anaphylaxis   • Acetazolamide      Other reaction(s): Stomach Ache   • Cephalosporins Other (See Comments)     headache   • Doxycycline      Capsules only  Tablets are ok to take, per pt  Capsules only  Tablets are ok to take, per pt      • Other    • Sulfa Antibiotics Other (See Comments) "Category: Allergy; Annotation - [id number]: STOMACH UPSET   • Famotidine Palpitations   • Levofloxacin Palpitations   • Omeprazole Palpitations     Painful urination       Social History   Past Surgical History:   Procedure Laterality Date   • APPENDECTOMY     • BICEPS TENODESIS      anesthesia for tenodesis- of ruptured long tendon of biceps    • HERNIA REPAIR     • THYROIDECTOMY     • TONSILLECTOMY       Family History   Problem Relation Age of Onset   • Lung cancer Father    • Coronary artery disease Father         blockages   • Stroke Maternal Grandmother    • Cancer Paternal Grandfather         metastasized   • Heart disease Family    • Lung cancer Cousin    • No Known Problems Mother    • No Known Problems Sister    • No Known Problems Brother    • No Known Problems Maternal Aunt    • No Known Problems Maternal Uncle    • No Known Problems Paternal Aunt    • No Known Problems Paternal Uncle    • No Known Problems Paternal Grandmother    • ADD / ADHD Neg Hx    • Anesthesia problems Neg Hx    • Clotting disorder Neg Hx    • Collagen disease Neg Hx    • Diabetes Neg Hx    • Dislocations Neg Hx    • Learning disabilities Neg Hx    • Neurological problems Neg Hx    • Osteoporosis Neg Hx    • Rheumatologic disease Neg Hx    • Scoliosis Neg Hx    • Vascular Disease Neg Hx        Objective:  /80 (BP Location: Left arm, Patient Position: Sitting, Cuff Size: Adult)   Pulse 99   Temp 98 °F (36 7 °C)   Ht 5' 9\" (1 753 m)   Wt 91 2 kg (201 lb)   SpO2 96%   BMI 29 68 kg/m²   Body mass index is 29 68 kg/m²  Physical Exam  Constitutional:       Appearance: He is well-developed  He is not ill-appearing or diaphoretic  HENT:      Head: Normocephalic  Comments: Mild bilateral middle ear effusion noted     Right Ear: External ear normal       Left Ear: External ear normal       Nose: Congestion and rhinorrhea present  Mouth/Throat:      Pharynx: No posterior oropharyngeal erythema     Eyes:      General: " No scleral icterus  Pupils: Pupils are equal, round, and reactive to light  Neck:      Thyroid: No thyromegaly  Trachea: No tracheal deviation  Cardiovascular:      Rate and Rhythm: Normal rate and regular rhythm  Heart sounds: Normal heart sounds  Pulmonary:      Effort: Pulmonary effort is normal  No respiratory distress  Breath sounds: Normal breath sounds  Chest:      Chest wall: No tenderness  Abdominal:      General: Bowel sounds are normal       Palpations: Abdomen is soft  There is no mass  Tenderness: There is no abdominal tenderness  Musculoskeletal:         General: Normal range of motion  Cervical back: Normal range of motion and neck supple  Right lower leg: No edema  Left lower leg: No edema  Lymphadenopathy:      Cervical: No cervical adenopathy  Skin:     General: Skin is warm  Findings: No lesion or rash  Neurological:      Mental Status: He is alert and oriented to person, place, and time  Cranial Nerves: No cranial nerve deficit     Psychiatric:         Mood and Affect: Mood normal          Behavior: Behavior normal

## 2023-06-10 ENCOUNTER — OFFICE VISIT (OUTPATIENT)
Dept: URGENT CARE | Age: 58
End: 2023-06-10
Payer: MEDICARE

## 2023-06-10 VITALS
OXYGEN SATURATION: 99 % | DIASTOLIC BLOOD PRESSURE: 78 MMHG | RESPIRATION RATE: 12 BRPM | SYSTOLIC BLOOD PRESSURE: 132 MMHG | HEART RATE: 77 BPM | TEMPERATURE: 99 F

## 2023-06-10 DIAGNOSIS — J98.01 BRONCHOSPASM: Primary | ICD-10-CM

## 2023-06-10 PROCEDURE — G0463 HOSPITAL OUTPT CLINIC VISIT: HCPCS | Performed by: PHYSICIAN ASSISTANT

## 2023-06-10 PROCEDURE — 99213 OFFICE O/P EST LOW 20 MIN: CPT | Performed by: PHYSICIAN ASSISTANT

## 2023-06-10 NOTE — PATIENT INSTRUCTIONS
Continue to monitor symptoms  If new or worsening symptoms develop, go immediately to Er  Drink plenty of fluids  Follow up with Family Doctor this week  Bronchospasm   WHAT YOU NEED TO KNOW:   Bronchospasm is a narrowing of the airway that usually comes and goes  You may be at risk for bronchospasm if you have a chest cold or allergies  You may also be at risk if you are bothered by air pollution, certain medicines, cold, dry air, smoke, or strong odors  Exercise may worsen your symptoms  Bronchospasms may make it hard for you to breathe  Severe bronchospasm may become life-threatening  DISCHARGE INSTRUCTIONS:   Call your local emergency number (911 in the [address] [facility]) if:   You have chest pain  You have severe shortness of breath or trouble breathing  Return to the emergency department if:   You cough or spit up blood  You are short of breath  You have blue fingernails or toenails  Your heartbeat is fast or not even  Call your doctor or pulmonologist if:   You have a fever  You have a cough that will not go away  Your wheezing worsens  You have questions or concerns about your condition or care  Medicines: You may need any of the following:  Bronchodilators  help expand your airway for easier breathing  Some of these medicines may help prevent future spasms  Inhaled steroids  help reduce swelling in your airway and soothe your breathing  These are used for long-term control  Anticholinergics  help relax and open your airway  Take your medicine as directed  Contact your healthcare provider if you think your medicine is not helping or if you have side effects  Tell your provider if you are allergic to any medicine  Keep a list of the medicines, vitamins, and herbs you take  Include the amounts, and when and why you take them  Bring the list or the pill bottles to follow-up visits  Carry your medicine list with you in case of an emergency      Prevent bronchospasms:   Avoid triggers  Your healthcare provider can help you identify your triggers  You may need to keep a diary of your symptoms  Include where you were and what you were doing when symptoms started  Also include how long symptoms lasted  Make a note of anything that helped or made your symptoms worse  Bring your diary to visits with your healthcare provider  He or she may also recommend skin prick tests or other tests to help find triggers  Warm up before you exercise  Ask your healthcare provider about the best exercise plan for you  Keep your immune system healthy  Try to avoid people who are sick  Ask your healthcare provider about vaccines you may need  Vaccines help prevent certain infections that can cause breathing problems  Get a flu vaccine as soon as recommended each year, usually in September or October  Vaccines are also available to prevent COVID-19 and pneumonia  Your provider can tell you if you also need other vaccines, and when to get them  Breathe through your nose when you are in cold, dry air or weather  This may help reduce lung irritation by warming the air before it reaches your lungs  Follow up with your doctor or pulmonologist as directed: You may need more testing to find the cause of your condition  Write down your questions so you remember to ask them during your visits  © Copyright [employer] 2022 Information is for End User's use only and may not be sold, redistributed or otherwise used for commercial purposes  The above information is an  only  It is not intended as medical advice for individual conditions or treatments  Talk to your doctor, nurse or pharmacist before following any medical regimen to see if it is safe and effective for you

## 2023-06-26 ENCOUNTER — APPOINTMENT (OUTPATIENT)
Dept: LAB | Facility: IMAGING CENTER | Age: 58
End: 2023-06-26
Payer: MEDICARE

## 2023-06-26 DIAGNOSIS — E07.9 THYROID DISORDER: ICD-10-CM

## 2023-06-26 DIAGNOSIS — E78.2 MIXED HYPERLIPIDEMIA: ICD-10-CM

## 2023-06-26 DIAGNOSIS — H81.12 BENIGN PAROXYSMAL POSITIONAL VERTIGO OF LEFT EAR: ICD-10-CM

## 2023-06-26 DIAGNOSIS — E87.1 HYPONATREMIA: ICD-10-CM

## 2023-06-26 LAB
ALBUMIN SERPL BCP-MCNC: 4.1 G/DL (ref 3.5–5)
ALP SERPL-CCNC: 61 U/L (ref 46–116)
ALT SERPL W P-5'-P-CCNC: 40 U/L (ref 12–78)
ANION GAP SERPL CALCULATED.3IONS-SCNC: 3 MMOL/L
AST SERPL W P-5'-P-CCNC: 21 U/L (ref 5–45)
BILIRUB SERPL-MCNC: 0.76 MG/DL (ref 0.2–1)
BUN SERPL-MCNC: 18 MG/DL (ref 5–25)
CALCIUM SERPL-MCNC: 9.4 MG/DL (ref 8.3–10.1)
CHLORIDE SERPL-SCNC: 107 MMOL/L (ref 96–108)
CO2 SERPL-SCNC: 26 MMOL/L (ref 21–32)
CREAT SERPL-MCNC: 1.06 MG/DL (ref 0.6–1.3)
ERYTHROCYTE [DISTWIDTH] IN BLOOD BY AUTOMATED COUNT: 13.4 % (ref 11.6–15.1)
GFR SERPL CREATININE-BSD FRML MDRD: 77 ML/MIN/1.73SQ M
GLUCOSE P FAST SERPL-MCNC: 109 MG/DL (ref 65–99)
HCT VFR BLD AUTO: 41.1 % (ref 36.5–49.3)
HGB BLD-MCNC: 13.6 G/DL (ref 12–17)
MCH RBC QN AUTO: 31 PG (ref 26.8–34.3)
MCHC RBC AUTO-ENTMCNC: 33.1 G/DL (ref 31.4–37.4)
MCV RBC AUTO: 94 FL (ref 82–98)
PLATELET # BLD AUTO: 262 THOUSANDS/UL (ref 149–390)
PMV BLD AUTO: 8.8 FL (ref 8.9–12.7)
POTASSIUM SERPL-SCNC: 4.2 MMOL/L (ref 3.5–5.3)
PROT SERPL-MCNC: 7 G/DL (ref 6.4–8.4)
RBC # BLD AUTO: 4.39 MILLION/UL (ref 3.88–5.62)
SODIUM SERPL-SCNC: 136 MMOL/L (ref 135–147)
TSH SERPL DL<=0.05 MIU/L-ACNC: 1.02 UIU/ML (ref 0.45–4.5)
WBC # BLD AUTO: 7.32 THOUSAND/UL (ref 4.31–10.16)

## 2023-06-26 PROCEDURE — 84443 ASSAY THYROID STIM HORMONE: CPT

## 2023-06-26 PROCEDURE — 36415 COLL VENOUS BLD VENIPUNCTURE: CPT

## 2023-06-26 PROCEDURE — 80053 COMPREHEN METABOLIC PANEL: CPT

## 2023-06-26 PROCEDURE — 85027 COMPLETE CBC AUTOMATED: CPT

## 2023-07-23 DIAGNOSIS — I10 HYPERTENSION, UNSPECIFIED TYPE: ICD-10-CM

## 2023-07-24 RX ORDER — LISINOPRIL 20 MG/1
20 TABLET ORAL DAILY
Qty: 90 TABLET | Refills: 1 | OUTPATIENT
Start: 2023-07-24

## 2023-07-26 DIAGNOSIS — I10 HYPERTENSION, UNSPECIFIED TYPE: ICD-10-CM

## 2023-07-27 RX ORDER — LISINOPRIL 20 MG/1
20 TABLET ORAL DAILY
Qty: 90 TABLET | Refills: 1 | Status: SHIPPED | OUTPATIENT
Start: 2023-07-27

## 2023-08-09 ENCOUNTER — RA CDI HCC (OUTPATIENT)
Dept: OTHER | Facility: HOSPITAL | Age: 58
End: 2023-08-09

## 2023-08-11 ENCOUNTER — OFFICE VISIT (OUTPATIENT)
Dept: URGENT CARE | Age: 58
End: 2023-08-11
Payer: MEDICARE

## 2023-08-11 VITALS
TEMPERATURE: 98.5 F | HEART RATE: 71 BPM | RESPIRATION RATE: 16 BRPM | SYSTOLIC BLOOD PRESSURE: 145 MMHG | DIASTOLIC BLOOD PRESSURE: 86 MMHG

## 2023-08-11 DIAGNOSIS — M25.561 ACUTE PAIN OF BOTH KNEES: Primary | ICD-10-CM

## 2023-08-11 DIAGNOSIS — M25.562 ACUTE PAIN OF BOTH KNEES: Primary | ICD-10-CM

## 2023-08-11 PROCEDURE — G0463 HOSPITAL OUTPT CLINIC VISIT: HCPCS

## 2023-08-11 PROCEDURE — 99213 OFFICE O/P EST LOW 20 MIN: CPT

## 2023-08-11 RX ORDER — METHYLPREDNISOLONE 4 MG/1
TABLET ORAL
Qty: 21 TABLET | Refills: 0 | Status: SHIPPED | OUTPATIENT
Start: 2023-08-11 | End: 2023-08-17

## 2023-08-11 NOTE — PATIENT INSTRUCTIONS
Please begin medrol dosepak as directed. May continue to alternate Tylenol and Ibuprofen as needed. Follow up with orthopedics as planned.

## 2023-08-11 NOTE — PROGRESS NOTES
[facility]        NAME: Leta Malagon is a 63 y.o. male  : 1965    MRN: [id number]  DATE: 2023  TIME: 6:53 PM    Assessment and Plan   Acute pain of both knees [M25.561, M25.562]  1. Acute pain of both knees  methylPREDNISolone 4 MG tablet therapy pack      Please begin medrol dosepak as directed. May continue to alternate Tylenol and Ibuprofen as needed. Follow up with orthopedics as planned. Patient Instructions   Knee Pain   WHAT YOU NEED TO KNOW:   Knee pain may start suddenly, or it may be a long-term problem. You may have pain on the side, front, or back of your knee. You may have knee stiffness and swelling. You may hear popping sounds or feel like your knee is giving way or locking up as you walk. You may feel pain when you sit, stand, walk, or climb up and down stairs. Knee pain can be caused by conditions such as obesity, inflammation, or strains or tears in ligaments or tendons. DISCHARGE INSTRUCTIONS:   Return to the emergency department if:   • Your pain is worse, even after treatment.      • You cannot bend or straighten your leg completely.      • The swelling around your knee does not go down even with treatment.     • Your knee is painful and hot to the touch.     Contact your healthcare provider if:   • You have questions or concerns about your condition or care.         Medicines: You may need any of the following:  • NSAIDs  help decrease swelling and pain or fever. This medicine is available with or without a doctor's order. NSAIDs can cause stomach bleeding or kidney problems in certain people. If you take blood thinner medicine, always ask your healthcare provider if NSAIDs are safe for you. Always read the medicine label and follow directions.     • Acetaminophen  decreases pain and fever. It is available without a doctor's order. Ask how much to take and how often to take it. Follow directions.  Read the labels of all other medicines you are using to see if they also contain acetaminophen, or ask your doctor or pharmacist. Acetaminophen can cause liver damage if not taken correctly.     • Prescription pain medicine  may be given. Ask your healthcare provider how to take this medicine safely. Some prescription pain medicines contain acetaminophen. Do not take other medicines that contain acetaminophen without talking to your healthcare provider. Too much acetaminophen may cause liver damage. Prescription pain medicine may cause constipation. Ask your healthcare provider how to prevent or treat constipation.      • Take your medicine as directed. Contact your healthcare provider if you think your medicine is not helping or if you have side effects. Tell your provider if you are allergic to any medicine. Keep a list of the medicines, vitamins, and herbs you take. Include the amounts, and when and why you take them. Bring the list or the pill bottles to follow-up visits. Carry your medicine list with you in case of an emergency.     What you can do to manage your symptoms:   • Rest your knee so it can heal.  Limit activities that increase your pain. Do low-impact exercises, such as walking or swimming.      • Apply ice to help reduce swelling and pain. Use an ice pack, or put crushed ice in a plastic bag. Cover it with a towel before you apply it to your knee. Apply ice for 15 to 20 minutes every hour, or as directed.     • Apply compression to help reduce swelling. Use a brace or bandage only as directed.     • Elevate your knee to help decrease pain and swelling. Elevate your knee while you are sitting or lying down. Prop your leg on pillows to keep your knee above the level of your heart.     • Prevent your knee from moving as directed. Your healthcare provider may put on a cast or splint. You may need to wear a leg brace to stabilize your knee.  A leg brace can be adjusted to increase your range of motion as your knee heals.        What you can do to prevent knee pain:   • Maintain a healthy weight. Extra weight increases your risk for knee pain. Ask your healthcare provider how much you should weigh. He or she can help you create a safe weight loss plan if you need to lose weight.     • Exercise or train properly. Use the correct equipment for sports. Wear shoes that provide good support. Check your posture often as you exercise, play sports, or train for an event. This can help prevent stress and strain on your knees. Rest between sessions so you do not overwork your knees.     Follow up with your healthcare provider within 24 hours or as directed: You may need follow-up treatments, such as steroid injections to decrease pain. Write down your questions so you remember to ask them during your visits. © Copyright [employer] 2022 Information is for End User's use only and may not be sold, redistributed or otherwise used for commercial purposes. The above information is an  only. It is not intended as medical advice for individual conditions or treatments. Talk to your doctor, nurse or pharmacist before following any medical regimen to see if it is safe and effective for you. Follow up with PCP in 3-5 days. Proceed to  ER if symptoms worsen. Chief Complaint   No chief complaint on file. History of Present Illness       63year old male with PMH significant for bilateral knee OA presents for evaluation of worsening right and left knee pain over the past 2 weeks. He is scheduled for steroid injections at his orthopedist's office on 8/25/2023. He reports that he has had medrol dose iron in the past with success prior to steroid injections. He denies trauma or injury, numbness, tingling, swelling. Review of Systems   Review of Systems   Constitutional: Negative for fatigue and fever. HENT: Negative for congestion, ear discharge, ear pain, postnasal drip, rhinorrhea, sinus pressure, sinus pain, sneezing and sore throat.     Eyes: Negative. Negative for pain, discharge, redness and itching. Respiratory: Negative. Negative for apnea, cough, choking, chest tightness, shortness of breath and stridor. Cardiovascular: Negative. Negative for chest pain and palpitations. Gastrointestinal: Negative. Negative for diarrhea, nausea and vomiting. Endocrine: Negative. Negative for polydipsia, polyphagia and polyuria. Genitourinary: Negative. Negative for decreased urine volume and flank pain. Musculoskeletal: Positive for arthralgias. Negative for back pain, gait problem, joint swelling, myalgias, neck pain and neck stiffness. Skin: Negative. Negative for color change and rash. Allergic/Immunologic: Negative. Negative for environmental allergies. Neurological: Negative. Negative for dizziness, facial asymmetry, light-headedness, numbness and headaches. Hematological: Negative. Negative for adenopathy. Psychiatric/Behavioral: Negative. Current Medications       Current Outpatient Medications:   •  methylPREDNISolone 4 MG tablet therapy pack, Take 6 tablets (24 mg total) by mouth daily for 1 day, THEN 5 tablets (20 mg total) daily for 1 day, THEN 4 tablets (16 mg total) daily for 1 day, THEN 3 tablets (12 mg total) daily for 1 day, THEN 2 tablets (8 mg total) daily for 1 day, THEN 1 tablet (4 mg total) daily for 1 day. Use as directed on package. , Disp: 21 tablet, Rfl: 0  •  acetaminophen (TYLENOL) 500 mg tablet, Take 1,000 mg by mouth every 6 (six) hours as needed for mild pain, Disp: , Rfl:   •  albuterol (2.5 mg/3 mL) 0.083 % nebulizer solution, Take 3 mL (2.5 mg total) by nebulization every 6 (six) hours as needed for wheezing or shortness of breath, Disp: 240 mL, Rfl: 2  •  albuterol (PROVENTIL HFA,VENTOLIN HFA) 90 mcg/act inhaler, Inhale 1 puff 4 (four) times a day , Disp: , Rfl:   •  Chlorpheniramine Maleate (CHLOR-TRIMETON ALLERGY PO), Take by mouth as needed  , Disp: , Rfl:   •  esomeprazole (NexIUM) 40 MG capsule, Take 40 mg by mouth every morning before breakfast, Disp: , Rfl:   •  fluticasone-salmeterol (Advair) 500-50 mcg/dose inhaler, Inhale 1 puff 2 (two) times a day, Disp: , Rfl:   •  ibuprofen (MOTRIN) 600 mg tablet, Take 600 mg by mouth every 8 (eight) hours, Disp: , Rfl:   •  ipratropium-albuterol (DUO-NEB) 0.5-2.5 mg/3 mL, Inhale 3 mL 2 (two) times a week , Disp: , Rfl:   •  ketoconazole (NIZORAL) 2 % cream, Apply topically daily, Disp: 60 g, Rfl: 2  •  lisinopril (ZESTRIL) 20 mg tablet, Take 1 tablet (20 mg total) by mouth daily, Disp: 90 tablet, Rfl: 1  •  meclizine (ANTIVERT) 12.5 MG tablet, Take 1 tablet (12.5 mg total) by mouth every 8 (eight) hours, Disp: 30 tablet, Rfl: 1  •  montelukast (SINGULAIR) 10 mg tablet, Take 1 tablet (10 mg total) by mouth daily, Disp: 90 tablet, Rfl: 1  •  rosuvastatin (CRESTOR) 10 MG tablet, Take 1 tablet (10 mg total) by mouth daily, Disp: 90 tablet, Rfl: 3  •  Triamcinolone Acetonide (NASACORT ALLERGY 24HR NA), 1 spray into each nostril daily  , Disp: , Rfl:   •  Uloric 40 MG tablet, Take 1 tablet (40 mg total) by mouth daily, Disp: 90 tablet, Rfl: 1  •  verapamil (CALAN) 120 mg tablet, TAKE 1 TABLET BY MOUTH EVERY DAY, Disp: 90 tablet, Rfl: 3    Current Allergies     Allergies as of 08/11/2023 - Reviewed 06/10/2023   Allergen Reaction Noted   • Penicillins Rash and Anaphylaxis 08/17/2004   • Acetazolamide  10/25/2016   • Cephalosporins Other (See Comments) 08/17/2004   • Doxycycline  10/24/2018   • Other  04/28/2014   • Sulfa antibiotics Other (See Comments) 08/17/2004   • Famotidine Palpitations 09/05/2016   • Levofloxacin Palpitations 11/12/2021   • Omeprazole Palpitations 09/05/2016            The following portions of the patient's history were reviewed and updated as appropriate: allergies, current medications, past family history, past medical history, past social history, past surgical history and problem list.     Past Medical History:   Diagnosis Date   • Anxiety 1/15/2019   • Arthritis    • Chronic rhinitis     last assessed 2/21/14   • Chronic serous otitis media     last assessed 11/20/13   • Chronic sinusitis     last assessed 8/19/14   • Functional heart murmur    • GERD (gastroesophageal reflux disease)    • Gout    • Hearing problem     last assessed 12/1/16   • Hiatal hernia    • Hypercholesterolemia    • Hypertension    • Palpitations    • Testicular hypogonadism     last assessed 10/4/13   • [facility]ach [facility])        Past Surgical History:   Procedure Laterality Date   • APPENDECTOMY     • BICEPS TENODESIS      anesthesia for tenodesis- of ruptured long tendon of biceps    • HERNIA REPAIR     • THYROIDECTOMY     • TONSILLECTOMY         Family History   Problem Relation Age of Onset   • Lung cancer Father    • Coronary artery disease Father         blockages   • Stroke Maternal Grandmother    • Cancer Paternal Grandfather         metastasized   • Heart disease Family    • Lung cancer Cousin    • No Known Problems Mother    • No Known Problems Sister    • No Known Problems Brother    • No Known Problems Maternal Aunt    • No Known Problems Maternal Uncle    • No Known Problems Paternal Aunt    • No Known Problems Paternal Uncle    • No Known Problems Paternal Grandmother    • ADD / ADHD Neg Hx    • Anesthesia problems Neg Hx    • Clotting disorder Neg Hx    • Collagen disease Neg Hx    • Diabetes Neg Hx    • Dislocations Neg Hx    • Learning disabilities Neg Hx    • Neurological problems Neg Hx    • Osteoporosis Neg Hx    • Rheumatologic disease Neg Hx    • Scoliosis Neg Hx    • Vascular Disease Neg Hx          Medications have been verified. Objective   /86   Pulse 71   Temp 98.5 °F (36.9 °C)   Resp 16        Physical Exam     Physical Exam  Vitals reviewed. Constitutional:       General: He is not in acute distress. Appearance: Normal appearance. He is not ill-appearing, toxic-appearing or diaphoretic.       Interventions: He is not intubated. HENT:      Head: Normocephalic and atraumatic. Right Ear: External ear normal. There is no impacted cerumen. Left Ear: External ear normal. There is no impacted cerumen. Nose: Nose normal. No congestion or rhinorrhea. Mouth/Throat:      Mouth: Mucous membranes are moist.      Pharynx: Oropharynx is clear. Uvula midline. No pharyngeal swelling, oropharyngeal exudate, posterior oropharyngeal erythema or uvula swelling. Tonsils: No tonsillar exudate or tonsillar abscesses. 1+ on the right. 1+ on the left. Eyes:      Extraocular Movements: Extraocular movements intact. Conjunctiva/sclera: Conjunctivae normal.      Pupils: Pupils are equal, round, and reactive to light. Cardiovascular:      Rate and Rhythm: Normal rate and regular rhythm. Pulses: Normal pulses. Heart sounds: Normal heart sounds, S1 normal and S2 normal. Heart sounds not distant. No murmur heard. No friction rub. No gallop. Pulmonary:      Effort: Pulmonary effort is normal. No tachypnea, bradypnea, accessory muscle usage, prolonged expiration, respiratory distress or retractions. He is not intubated. Breath sounds: Normal breath sounds. No stridor, decreased air movement or transmitted upper airway sounds. No decreased breath sounds, wheezing, rhonchi or rales. Chest:      Chest wall: No tenderness. Musculoskeletal:         General: Normal range of motion. Cervical back: Normal range of motion and neck supple. No rigidity or tenderness. Right knee: Normal.      Left knee: Normal.        Legs:    Lymphadenopathy:      Cervical: No cervical adenopathy. Skin:     General: Skin is warm and dry. Capillary Refill: Capillary refill takes less than 2 seconds. Findings: No erythema. Neurological:      General: No focal deficit present. Mental Status: He is alert.    Psychiatric:         Mood and Affect: Mood normal.

## 2023-08-15 ENCOUNTER — OFFICE VISIT (OUTPATIENT)
Dept: INTERNAL MEDICINE CLINIC | Facility: OTHER | Age: 58
End: 2023-08-15
Payer: MEDICARE

## 2023-08-15 ENCOUNTER — TELEPHONE (OUTPATIENT)
Dept: INTERNAL MEDICINE CLINIC | Facility: OTHER | Age: 58
End: 2023-08-15

## 2023-08-15 VITALS
HEART RATE: 80 BPM | TEMPERATURE: 98.2 F | HEIGHT: 69 IN | SYSTOLIC BLOOD PRESSURE: 126 MMHG | OXYGEN SATURATION: 97 % | BODY MASS INDEX: 29.33 KG/M2 | WEIGHT: 198 LBS | DIASTOLIC BLOOD PRESSURE: 80 MMHG

## 2023-08-15 DIAGNOSIS — J30.89 NON-SEASONAL ALLERGIC RHINITIS, UNSPECIFIED TRIGGER: ICD-10-CM

## 2023-08-15 DIAGNOSIS — K21.9 GERD WITHOUT ESOPHAGITIS: Primary | ICD-10-CM

## 2023-08-15 DIAGNOSIS — E78.2 MIXED HYPERLIPIDEMIA: ICD-10-CM

## 2023-08-15 DIAGNOSIS — I10 ESSENTIAL HYPERTENSION: ICD-10-CM

## 2023-08-15 DIAGNOSIS — I47.20 VENTRICULAR TACHYCARDIA (HCC): ICD-10-CM

## 2023-08-15 DIAGNOSIS — J45.909 ASTHMA, UNSPECIFIED ASTHMA SEVERITY, UNSPECIFIED WHETHER COMPLICATED, UNSPECIFIED WHETHER PERSISTENT: ICD-10-CM

## 2023-08-15 PROBLEM — E87.1 HYPONATREMIA: Status: RESOLVED | Noted: 2023-06-06 | Resolved: 2023-08-15

## 2023-08-15 PROCEDURE — 99214 OFFICE O/P EST MOD 30 MIN: CPT | Performed by: INTERNAL MEDICINE

## 2023-08-15 NOTE — PROGRESS NOTES
Assessment/Plan:    GERD without esophagitis  Continue with Nexium 40 mg daily. Also advised for healthy diet    Allergic rhinitis  Continue with present regimen. Asthma  Continue with present combination of inhalers. Essential hypertension  Blood pressure is stable on present regimen. Continue with low-salt diet    Ventricular tachycardia (HCC)  No more episode. Well-controlled. Continue with present regimen. Also managed by cardiology    Hyperlipidemia  Still patient is not taking statin because of fear of side effects. On previous visit requested lipid profile but was unable to do it. Advised him to continue with low-fat diet and exercise as much as possible. Check lipid profile before next visit and will act accordingly. Diagnoses and all orders for this visit:    GERD without esophagitis    Asthma, unspecified asthma severity, unspecified whether complicated, unspecified whether persistent    Essential hypertension    Ventricular tachycardia (HCC)    Mixed hyperlipidemia    Non-seasonal allergic rhinitis, unspecified trigger               Subjective:          Patient ID: Karmen Lazar is a 63 y.o. male. Patient is here for follow-up. Also have blood work done would like to discuss results. Otherwise no new complaints. He was unable to do lipid profile prior to this visit. Rash  Pertinent negatives include no congestion, cough, diarrhea, fatigue, fever, shortness of breath, sore throat or vomiting. The following portions of the patient's history were reviewed and updated as appropriate: allergies, current medications, past family history, past medical history, past social history, past surgical history and problem list.    Review of Systems   Constitutional: Negative for fatigue and fever. HENT: Negative for congestion, ear discharge, ear pain, postnasal drip, sinus pressure, sore throat, tinnitus and trouble swallowing.     Eyes: Negative for discharge, itching and visual disturbance. Respiratory: Negative for cough and shortness of breath. Cardiovascular: Negative for chest pain and palpitations. Gastrointestinal: Negative for abdominal pain, diarrhea, nausea and vomiting. Endocrine: Negative for cold intolerance and polyuria. Genitourinary: Negative for difficulty urinating, dysuria and urgency. Musculoskeletal: Positive for arthralgias. Negative for neck pain. Skin: Positive for rash. Allergic/Immunologic: Negative for environmental allergies. Neurological: Negative for dizziness, weakness and headaches. Psychiatric/Behavioral: Negative for agitation and behavioral problems. The patient is not nervous/anxious.           Past Medical History:   Diagnosis Date   • Anxiety 1/15/2019   • Arthritis    • Chronic rhinitis     last assessed 2/21/14   • Chronic serous otitis media     last assessed 11/20/13   • Chronic sinusitis     last assessed 8/19/14   • Functional heart murmur    • GERD (gastroesophageal reflux disease)    • Gout    • Hearing problem     last assessed 12/1/16   • Hiatal hernia    • Hypercholesterolemia    • Hypertension    • Palpitations    • Testicular hypogonadism     last assessed 10/4/13   • [facility]-tach [facility])          Current Outpatient Medications:   •  acetaminophen (TYLENOL) 500 mg tablet, Take 1,000 mg by mouth every 6 (six) hours as needed for mild pain, Disp: , Rfl:   •  albuterol (2.5 mg/3 mL) 0.083 % nebulizer solution, Take 3 mL (2.5 mg total) by nebulization every 6 (six) hours as needed for wheezing or shortness of breath, Disp: 240 mL, Rfl: 2  •  albuterol (PROVENTIL HFA,VENTOLIN HFA) 90 mcg/act inhaler, Inhale 1 puff 4 (four) times a day , Disp: , Rfl:   •  Chlorpheniramine Maleate (CHLOR-TRIMETON ALLERGY PO), Take by mouth as needed  , Disp: , Rfl:   •  esomeprazole (NexIUM) 40 MG capsule, Take 40 mg by mouth every morning before breakfast, Disp: , Rfl:   •  fluticasone-salmeterol (Advair) 500-50 mcg/dose inhaler, Inhale 1 puff 2 (two) times a day, Disp: , Rfl:   •  ibuprofen (MOTRIN) 600 mg tablet, Take 600 mg by mouth every 8 (eight) hours as needed for moderate pain, Disp: , Rfl:   •  ipratropium-albuterol (DUO-NEB) 0.5-2.5 mg/3 mL, Inhale 3 mL 2 (two) times a week , Disp: , Rfl:   •  lisinopril (ZESTRIL) 20 mg tablet, Take 1 tablet (20 mg total) by mouth daily, Disp: 90 tablet, Rfl: 1  •  meclizine (ANTIVERT) 12.5 MG tablet, Take 1 tablet (12.5 mg total) by mouth every 8 (eight) hours (Patient taking differently: Take 12.5 mg by mouth every 8 (eight) hours as needed for dizziness), Disp: 30 tablet, Rfl: 1  •  methylPREDNISolone 4 MG tablet therapy pack, Take 6 tablets (24 mg total) by mouth daily for 1 day, THEN 5 tablets (20 mg total) daily for 1 day, THEN 4 tablets (16 mg total) daily for 1 day, THEN 3 tablets (12 mg total) daily for 1 day, THEN 2 tablets (8 mg total) daily for 1 day, THEN 1 tablet (4 mg total) daily for 1 day. Use as directed on package. , Disp: 21 tablet, Rfl: 0  •  montelukast (SINGULAIR) 10 mg tablet, Take 1 tablet (10 mg total) by mouth daily, Disp: 90 tablet, Rfl: 1  •  Triamcinolone Acetonide (NASACORT ALLERGY 24HR NA), 1 spray into each nostril daily  , Disp: , Rfl:   •  Uloric 40 MG tablet, Take 1 tablet (40 mg total) by mouth daily, Disp: 90 tablet, Rfl: 1  •  verapamil (CALAN) 120 mg tablet, TAKE 1 TABLET BY MOUTH EVERY DAY, Disp: 90 tablet, Rfl: 3  •  ketoconazole (NIZORAL) 2 % cream, Apply topically daily (Patient not taking: Reported on 8/15/2023), Disp: 60 g, Rfl: 2  •  rosuvastatin (CRESTOR) 10 MG tablet, Take 1 tablet (10 mg total) by mouth daily (Patient not taking: Reported on 8/15/2023), Disp: 90 tablet, Rfl: 3    Allergies   Allergen Reactions   • Penicillins Rash and Anaphylaxis   • Acetazolamide      Other reaction(s): Stomach Ache   • Cephalosporins Other (See Comments)     headache   • Doxycycline      Capsules only. Tablets are ok to take, per pt. Capsules only. Tablets are ok to take, per pt.     • Other    • Sulfa Antibiotics Other (See Comments)     Category: Allergy; Annotation - [id number]: STOMACH UPSET   • Famotidine Palpitations   • Levofloxacin Palpitations   • Omeprazole Palpitations     Painful urination       Social History   Past Surgical History:   Procedure Laterality Date   • APPENDECTOMY     • BICEPS TENODESIS      anesthesia for tenodesis- of ruptured long tendon of biceps    • HERNIA REPAIR     • THYROIDECTOMY     • TONSILLECTOMY       Family History   Problem Relation Age of Onset   • Lung cancer Father    • Coronary artery disease Father         blockages   • Stroke Maternal Grandmother    • Cancer Paternal Grandfather         metastasized   • Heart disease Family    • Lung cancer Cousin    • No Known Problems Mother    • No Known Problems Sister    • No Known Problems Brother    • No Known Problems Maternal Aunt    • No Known Problems Maternal Uncle    • No Known Problems Paternal Aunt    • No Known Problems Paternal Uncle    • No Known Problems Paternal Grandmother    • ADD / ADHD Neg Hx    • Anesthesia problems Neg Hx    • Clotting disorder Neg Hx    • Collagen disease Neg Hx    • Diabetes Neg Hx    • Dislocations Neg Hx    • Learning disabilities Neg Hx    • Neurological problems Neg Hx    • Osteoporosis Neg Hx    • Rheumatologic disease Neg Hx    • Scoliosis Neg Hx    • Vascular Disease Neg Hx        Objective:  /80 (BP Location: Right arm, Patient Position: Sitting, Cuff Size: Adult)   Pulse 80   Temp 98.2 °F (36.8 °C) (Temporal)   Ht 5' 9" (1.753 m)   Wt 89.8 kg (198 lb)   SpO2 97% Comment: ra  BMI 29.24 kg/m²   Body mass index is 29.24 kg/m². Physical Exam  Constitutional:       Appearance: He is well-developed. He is not ill-appearing or diaphoretic. HENT:      Head: Normocephalic. Right Ear: Ear canal and external ear normal.      Left Ear: Ear canal and external ear normal.      Nose: No rhinorrhea.       Mouth/Throat:      Pharynx: No posterior oropharyngeal erythema. Eyes:      General: No scleral icterus. Pupils: Pupils are equal, round, and reactive to light. Neck:      Thyroid: No thyromegaly. Trachea: No tracheal deviation. Cardiovascular:      Rate and Rhythm: Normal rate and regular rhythm. Heart sounds: Normal heart sounds. No murmur heard. Comments: Trace bilateral lower extremity edema present  Pulmonary:      Effort: Pulmonary effort is normal. No respiratory distress. Breath sounds: Normal breath sounds. Chest:      Chest wall: No tenderness. Abdominal:      General: Bowel sounds are normal.      Palpations: Abdomen is soft. There is no mass. Tenderness: There is no abdominal tenderness. Musculoskeletal:         General: Normal range of motion. Cervical back: Normal range of motion and neck supple. Right lower leg: No edema. Left lower leg: No edema. Lymphadenopathy:      Cervical: No cervical adenopathy. Skin:     General: Skin is warm. Neurological:      Mental Status: He is alert and oriented to person, place, and time. Cranial Nerves: No cranial nerve deficit.    Psychiatric:         Mood and Affect: Mood normal.         Behavior: Behavior normal.

## 2023-08-15 NOTE — ASSESSMENT & PLAN NOTE
Still patient is not taking statin because of fear of side effects. On previous visit requested lipid profile but was unable to do it. Advised him to continue with low-fat diet and exercise as much as possible. Check lipid profile before next visit and will act accordingly.

## 2023-08-15 NOTE — TELEPHONE ENCOUNTER
Shameka Pastor,   Patient states he spoke to you at his last appointment in June regarding [facility] needing a letter of approval to go to [facility]. Can you please check on status of this? Thank you!

## 2023-08-20 DIAGNOSIS — M10.9 GOUTY ARTHRITIS: ICD-10-CM

## 2023-08-21 ENCOUNTER — TELEPHONE (OUTPATIENT)
Dept: INTERNAL MEDICINE CLINIC | Facility: OTHER | Age: 58
End: 2023-08-21

## 2023-08-21 RX ORDER — ALLOPURINOL 100 MG/1
TABLET ORAL
Refills: 0 | OUTPATIENT
Start: 2023-08-21

## 2023-08-21 NOTE — TELEPHONE ENCOUNTER
Patient prefers [facility]. Per Ling Stallings, she will do PA today for the patient. Patient notified.

## 2023-08-21 NOTE — TELEPHONE ENCOUNTER
Dr. Fer Graves brand name not being covered. Pharmacy is suggesting Allopurinol 100mg daily. Is it ok to change to this medication instead? Please advise. Thank you!

## 2023-08-21 NOTE — TELEPHONE ENCOUNTER
Called patient [facility] [employer] prior auth department. Provided clinical question over the phone with rep Patton. Patient Uloric medication has been approved from now until 8/24/2024. PA approval ID number is [id number]    LMOM for patient to let him know medication has been approved.      Thank you

## 2023-08-25 ENCOUNTER — OFFICE VISIT (OUTPATIENT)
Dept: OBGYN CLINIC | Facility: MEDICAL CENTER | Age: 58
End: 2023-08-25
Payer: MEDICARE

## 2023-08-25 VITALS
SYSTOLIC BLOOD PRESSURE: 146 MMHG | HEIGHT: 69 IN | WEIGHT: 201.8 LBS | DIASTOLIC BLOOD PRESSURE: 88 MMHG | HEART RATE: 78 BPM | BODY MASS INDEX: 29.89 KG/M2

## 2023-08-25 DIAGNOSIS — M17.0 BILATERAL PRIMARY OSTEOARTHRITIS OF KNEE: Primary | ICD-10-CM

## 2023-08-25 DIAGNOSIS — M25.562 CHRONIC PAIN OF BOTH KNEES: ICD-10-CM

## 2023-08-25 DIAGNOSIS — M25.561 CHRONIC PAIN OF BOTH KNEES: ICD-10-CM

## 2023-08-25 DIAGNOSIS — G89.29 CHRONIC PAIN OF BOTH KNEES: ICD-10-CM

## 2023-08-25 DIAGNOSIS — M25.461 EFFUSION OF RIGHT KNEE: ICD-10-CM

## 2023-08-25 PROCEDURE — 99213 OFFICE O/P EST LOW 20 MIN: CPT | Performed by: ORTHOPAEDIC SURGERY

## 2023-08-25 PROCEDURE — 20610 DRAIN/INJ JOINT/BURSA W/O US: CPT | Performed by: ORTHOPAEDIC SURGERY

## 2023-08-25 RX ORDER — LIDOCAINE HYDROCHLORIDE 10 MG/ML
5 INJECTION, SOLUTION INFILTRATION; PERINEURAL
Status: COMPLETED | OUTPATIENT
Start: 2023-08-25 | End: 2023-08-25

## 2023-08-25 RX ORDER — TRIAMCINOLONE ACETONIDE 40 MG/ML
40 INJECTION, SUSPENSION INTRA-ARTICULAR; INTRAMUSCULAR
Status: COMPLETED | OUTPATIENT
Start: 2023-08-25 | End: 2023-08-25

## 2023-08-25 RX ORDER — BUPIVACAINE HYDROCHLORIDE 2.5 MG/ML
2 INJECTION, SOLUTION INFILTRATION; PERINEURAL
Status: COMPLETED | OUTPATIENT
Start: 2023-08-25 | End: 2023-08-25

## 2023-08-25 RX ADMIN — TRIAMCINOLONE ACETONIDE 40 MG: 40 INJECTION, SUSPENSION INTRA-ARTICULAR; INTRAMUSCULAR at 15:45

## 2023-08-25 RX ADMIN — BUPIVACAINE HYDROCHLORIDE 2 ML: 2.5 INJECTION, SOLUTION INFILTRATION; PERINEURAL at 15:45

## 2023-08-25 RX ADMIN — LIDOCAINE HYDROCHLORIDE 5 ML: 10 INJECTION, SOLUTION INFILTRATION; PERINEURAL at 15:45

## 2023-08-25 NOTE — PROGRESS NOTES
Assessment/Plan:  1. Bilateral primary osteoarthritis of knee    2. Effusion of right knee    3. Chronic pain of both knees      Orders Placed This Encounter   Procedures   • Large joint arthrocentesis   • Large joint arthrocentesis   • Large joint arthrocentesis   • Large joint arthrocentesis     • Patient has severe bilateral knee osteoarthritis and right knee effusion. • Pt would like to pursue surgery for TKA, but would like to wait until after winter since he is the only person available to shovel snow at his house  • Discussed he would need Cardiovascular , Dental and Internal medicine clearance  • Patient's right knee was aspirated and patient received bilateral knee steroid injections today. Tolerated the procedures well. Post injection instructions reviewed      Return in about 3 months (around 11/25/2023), or if symptoms worsen or fail to improve, for TKA planning. I answered all of the patient's questions during the visit and provided education of the patient's condition during the visit. The patient verbalized understanding of the information given and agrees with the plan. This note was dictated using [employer] software. It may contain errors including improperly dictated words. Please contact physician directly for any questions. Subjective   Chief Complaint:   Chief Complaint   Patient presents with   • Left Knee - Follow-up   • Right Knee - Follow-up       HPI  Katarzyna Oropeza is a 63 y.o. male who presents for follow up for bilateral knee pain  Pt states he was last here 5/25/2023 and received bilateral CSI. He states they give him about 6 weeks relief. He states he is leaning towards having surgery. Pain is a 7-8/10 and he continues to use Tylenol for pain control.  Pain is worse with activites    MSRA No  Smoker- No  Cardio- Does see a Cardiologist  No family history of DVT- Dad had Heart blockage  No DVT  No HIV  NO Hepatitis C  Covid Vaccinated  Has not seen a dentist 15 yrs        Review of Systems  ROS:    See HPI for musculoskeletal review.    All other systems reviewed are negative     History:  Past Medical History:   Diagnosis Date   • Anxiety 1/15/2019   • Arthritis    • Chronic rhinitis     last assessed 2/21/14   • Chronic serous otitis media     last assessed 11/20/13   • Chronic sinusitis     last assessed 8/19/14   • Functional heart murmur    • GERD (gastroesophageal reflux disease)    • Gout    • Hearing problem     last assessed 12/1/16   • Hiatal hernia    • Hypercholesterolemia    • Hypertension    • Palpitations    • Testicular hypogonadism     last assessed 10/4/13   • V-tach [facility])      Past Surgical History:   Procedure Laterality Date   • APPENDECTOMY     • BICEPS TENODESIS      anesthesia for tenodesis- of ruptured long tendon of biceps    • HERNIA REPAIR     • THYROIDECTOMY     • TONSILLECTOMY       Social History   Social History     Substance and Sexual Activity   Alcohol Use Yes    Comment: occasionally     Social History     Substance and Sexual Activity   Drug Use No     Social History     Tobacco Use   Smoking Status Never   Smokeless Tobacco Never     Family History:   Family History   Problem Relation Age of Onset   • Lung cancer Father    • Coronary artery disease Father         blockages   • Stroke Maternal Grandmother    • Cancer Paternal Grandfather         metastasized   • Heart disease Family    • Lung cancer Cousin    • No Known Problems Mother    • No Known Problems Sister    • No Known Problems Brother    • No Known Problems Maternal Aunt    • No Known Problems Maternal Uncle    • No Known Problems Paternal Aunt    • No Known Problems Paternal Uncle    • No Known Problems Paternal Grandmother    • ADD / ADHD Neg Hx    • Anesthesia problems Neg Hx    • Clotting disorder Neg Hx    • Collagen disease Neg Hx    • Diabetes Neg Hx    • Dislocations Neg Hx    • Learning disabilities Neg Hx    • Neurological problems Neg Hx    • Osteoporosis Neg Hx • Rheumatologic disease Neg Hx    • Scoliosis Neg Hx    • Vascular Disease Neg Hx        Current Outpatient Medications on File Prior to Visit   Medication Sig Dispense Refill   • acetaminophen (TYLENOL) 500 mg tablet Take 1,000 mg by mouth every 6 (six) hours as needed for mild pain     • albuterol (2.5 mg/3 mL) 0.083 % nebulizer solution Take 3 mL (2.5 mg total) by nebulization every 6 (six) hours as needed for wheezing or shortness of breath 240 mL 2   • albuterol (PROVENTIL HFA,VENTOLIN HFA) 90 mcg/act inhaler Inhale 1 puff 4 (four) times a day      • Chlorpheniramine Maleate (CHLOR-TRIMETON ALLERGY PO) Take by mouth as needed       • esomeprazole (NexIUM) 40 MG capsule Take 40 mg by mouth every morning before breakfast     • fluticasone-salmeterol (Advair) 500-50 mcg/dose inhaler Inhale 1 puff 2 (two) times a day     • ibuprofen (MOTRIN) 600 mg tablet Take 600 mg by mouth every 8 (eight) hours as needed for moderate pain     • ipratropium-albuterol (DUO-NEB) 0.5-2.5 mg/3 mL Inhale 3 mL 2 (two) times a week      • lisinopril (ZESTRIL) 20 mg tablet Take 1 tablet (20 mg total) by mouth daily 90 tablet 1   • meclizine (ANTIVERT) 12.5 MG tablet Take 1 tablet (12.5 mg total) by mouth every 8 (eight) hours (Patient taking differently: Take 12.5 mg by mouth every 8 (eight) hours as needed for dizziness) 30 tablet 1   • montelukast (SINGULAIR) 10 mg tablet Take 1 tablet (10 mg total) by mouth daily 90 tablet 1   • Triamcinolone Acetonide (NASACORT ALLERGY 24HR NA) 1 spray into each nostril daily       • Uloric 40 MG tablet Take 1 tablet (40 mg total) by mouth daily 90 tablet 1   • verapamil (CALAN) 120 mg tablet TAKE 1 TABLET BY MOUTH EVERY DAY 90 tablet 3   • ketoconazole (NIZORAL) 2 % cream Apply topically daily (Patient not taking: Reported on 8/15/2023) 60 g 2   • rosuvastatin (CRESTOR) 10 MG tablet Take 1 tablet (10 mg total) by mouth daily (Patient not taking: Reported on 8/15/2023) 90 tablet 3     No current facility-administered medications on file prior to visit. Allergies   Allergen Reactions   • Penicillins Rash and Anaphylaxis   • Acetazolamide      Other reaction(s): Stomach Ache   • Cephalosporins Other (See Comments)     headache   • Doxycycline      Capsules only. Tablets are ok to take, per pt. Capsules only. Tablets are ok to take, per pt. • Other    • Sulfa Antibiotics Other (See Comments)     Category: Allergy; Annotation - [id number]: STOMACH UPSET   • Famotidine Palpitations   • Levofloxacin Palpitations   • Omeprazole Palpitations     Painful urination        Objective     /88   Pulse 78   Ht 5' 9" (1.753 m)   Wt 91.5 kg (201 lb 12.8 oz)   BMI 29.80 kg/m²      PE:  AAOx 3  WDWN  Hearing intact, no drainage from eyes  no audible wheezing  no abdominal distension  LE compartments soft, skin intact    Ortho Exam:  bilateral Knee:   No erythema  no swelling  no effusion  no warmth  AROM: 0-110  Stable to varus/valgus stress    Imaging Studies: I have personally reviewed pertinent films in PACS  X-rays bilateral knee:   Severe osteoarthritis    Large joint arthrocentesis: R knee  Universal Protocol:  Consent: Verbal consent obtained. Risks and benefits: risks, benefits and alternatives were discussed  Consent given by: patient  Timeout called at: 8/25/2023 4:43 PM.  Patient understanding: patient states understanding of the procedure being performed  Site marked: the operative site was marked  Patient identity confirmed: verbally with patient    Supporting Documentation  Indications: pain   Procedure Details  Location: knee - R knee  Needle size: 22 G  Ultrasound guidance: no  Approach: anterolateral  Medications administered: 5 mL lidocaine 1 %    Patient tolerance: patient tolerated the procedure well with no immediate complications  Dressing:  Sterile dressing applied    Large joint arthrocentesis: L knee  Universal Protocol:  Consent: Verbal consent obtained.   Risks and benefits: risks, benefits and alternatives were discussed  Consent given by: patient  Timeout called at: 8/25/2023 4:46 PM.  Patient understanding: patient states understanding of the procedure being performed  Site marked: the operative site was marked  Patient identity confirmed: verbally with patient    Supporting Documentation  Indications: pain   Procedure Details  Location: knee - L knee  Needle size: 22 G  Ultrasound guidance: no  Approach: anterolateral  Medications administered: 2 mL bupivacaine 0.25 %; 40 mg triamcinolone acetonide 40 mg/mL    Patient tolerance: patient tolerated the procedure well with no immediate complications  Dressing:  Sterile dressing applied    Large joint arthrocentesis: R knee  Universal Protocol:  Consent: Verbal consent obtained.   Risks and benefits: risks, benefits and alternatives were discussed  Consent given by: patient  Timeout called at: 8/25/2023 4:47 PM.  Patient understanding: patient states understanding of the procedure being performed  Site marked: the operative site was marked  Patient identity confirmed: verbally with patient    Supporting Documentation  Indications: pain   Procedure Details  Location: knee - R knee  Needle size: 22 G  Ultrasound guidance: no  Approach: anterolateral  Medications administered: 2 mL bupivacaine 0.25 %; 40 mg triamcinolone acetonide 40 mg/mL    Aspirate amount: 70 mL    Patient tolerance: patient tolerated the procedure well with no immediate complications  Dressing:  Sterile dressing applied

## 2023-08-25 NOTE — PROGRESS NOTES
Assessment/Plan:  1. Bilateral primary osteoarthritis of knee      No orders of the defined types were placed in this encounter. • Patient presents with severe bilateral knee osteoarthritis. • ***  • Continue NSAIDs as tolerated. • Add in Tylenol as needed for pain. 1,000 mg up to 3 times a day. Do not exceed 3,000 mg per day. No follow-ups on file. I answered all of the patient's questions during the visit and provided education of the patient's condition during the visit. The patient verbalized understanding of the information given and agrees with the plan. This note was dictated using [employer] software. It may contain errors including improperly dictated words. Please contact physician directly for any questions. Subjective   Chief Complaint:   Chief Complaint   Patient presents with   • Left Knee - Follow-up   • Right Knee - Follow-up       HPI  Denise Echeverria is a 63 y.o. male who presents for follow up for     Review of Systems  ROS:    See HPI for musculoskeletal review.    All other systems reviewed are negative     History:  Past Medical History:   Diagnosis Date   • Anxiety 1/15/2019   • Arthritis    • Chronic rhinitis     last assessed 2/21/14   • Chronic serous otitis media     last assessed 11/20/13   • Chronic sinusitis     last assessed 8/19/14   • Functional heart murmur    • GERD (gastroesophageal reflux disease)    • Gout    • Hearing problem     last assessed 12/1/16   • Hiatal hernia    • Hypercholesterolemia    • Hypertension    • Palpitations    • Testicular hypogonadism     last assessed 10/4/13   • V-tach [facility])      Past Surgical History:   Procedure Laterality Date   • APPENDECTOMY     • BICEPS TENODESIS      anesthesia for tenodesis- of ruptured long tendon of biceps    • HERNIA REPAIR     • THYROIDECTOMY     • TONSILLECTOMY       Social History   Social History     Substance and Sexual Activity   Alcohol Use Yes    Comment: occasionally     Social History     Substance and Sexual Activity   Drug Use No     Social History     Tobacco Use   Smoking Status Never   Smokeless Tobacco Never     Family History:   Family History   Problem Relation Age of Onset   • Lung cancer Father    • Coronary artery disease Father         blockages   • Stroke Maternal Grandmother    • Cancer Paternal Grandfather         metastasized   • Heart disease Family    • Lung cancer Cousin    • No Known Problems Mother    • No Known Problems Sister    • No Known Problems Brother    • No Known Problems Maternal Aunt    • No Known Problems Maternal Uncle    • No Known Problems Paternal Aunt    • No Known Problems Paternal Uncle    • No Known Problems Paternal Grandmother    • ADD / ADHD Neg Hx    • Anesthesia problems Neg Hx    • Clotting disorder Neg Hx    • Collagen disease Neg Hx    • Diabetes Neg Hx    • Dislocations Neg Hx    • Learning disabilities Neg Hx    • Neurological problems Neg Hx    • Osteoporosis Neg Hx    • Rheumatologic disease Neg Hx    • Scoliosis Neg Hx    • Vascular Disease Neg Hx        Current Outpatient Medications on File Prior to Visit   Medication Sig Dispense Refill   • acetaminophen (TYLENOL) 500 mg tablet Take 1,000 mg by mouth every 6 (six) hours as needed for mild pain     • albuterol (2.5 mg/3 mL) 0.083 % nebulizer solution Take 3 mL (2.5 mg total) by nebulization every 6 (six) hours as needed for wheezing or shortness of breath 240 mL 2   • albuterol (PROVENTIL HFA,VENTOLIN HFA) 90 mcg/act inhaler Inhale 1 puff 4 (four) times a day      • Chlorpheniramine Maleate (CHLOR-TRIMETON ALLERGY PO) Take by mouth as needed       • esomeprazole (NexIUM) 40 MG capsule Take 40 mg by mouth every morning before breakfast     • fluticasone-salmeterol (Advair) 500-50 mcg/dose inhaler Inhale 1 puff 2 (two) times a day     • ibuprofen (MOTRIN) 600 mg tablet Take 600 mg by mouth every 8 (eight) hours as needed for moderate pain     • ipratropium-albuterol (DUO-NEB) 0.5-2.5 mg/3 mL Inhale 3 mL 2 (two) times a week      • lisinopril (ZESTRIL) 20 mg tablet Take 1 tablet (20 mg total) by mouth daily 90 tablet 1   • meclizine (ANTIVERT) 12.5 MG tablet Take 1 tablet (12.5 mg total) by mouth every 8 (eight) hours (Patient taking differently: Take 12.5 mg by mouth every 8 (eight) hours as needed for dizziness) 30 tablet 1   • montelukast (SINGULAIR) 10 mg tablet Take 1 tablet (10 mg total) by mouth daily 90 tablet 1   • Triamcinolone Acetonide (NASACORT ALLERGY 24HR NA) 1 spray into each nostril daily       • Uloric 40 MG tablet Take 1 tablet (40 mg total) by mouth daily 90 tablet 1   • verapamil (CALAN) 120 mg tablet TAKE 1 TABLET BY MOUTH EVERY DAY 90 tablet 3   • ketoconazole (NIZORAL) 2 % cream Apply topically daily (Patient not taking: Reported on 8/15/2023) 60 g 2   • rosuvastatin (CRESTOR) 10 MG tablet Take 1 tablet (10 mg total) by mouth daily (Patient not taking: Reported on 8/15/2023) 90 tablet 3     No current facility-administered medications on file prior to visit. Allergies   Allergen Reactions   • Penicillins Rash and Anaphylaxis   • Acetazolamide      Other reaction(s): Stomach Ache   • Cephalosporins Other (See Comments)     headache   • Doxycycline      Capsules only. Tablets are ok to take, per pt. Capsules only. Tablets are ok to take, per pt. • Other    • Sulfa Antibiotics Other (See Comments)     Category: Allergy;  Annotation - [id number]: STOMACH UPSET   • Famotidine Palpitations   • Levofloxacin Palpitations   • Omeprazole Palpitations     Painful urination        Objective     /88   Pulse 78   Ht 5' 9" (1.753 m)   Wt 91.5 kg (201 lb 12.8 oz)   BMI 29.80 kg/m²      PE:  AAOx 3  WDWN  Hearing intact, no drainage from eyes  no audible wheezing  no abdominal distension  LE compartments soft, skin intact    Ortho Exam:  {right/left/bilateral:68020} Knee:   No erythema  no swelling  no effusion  no warmth  AROM: full  Stable to varus/valgus stress    Imaging Studies: {Results Review Statement:87981}  *** {right/left/bilateral:53592} knee:         Scribe Attestation    I,:   am acting as a scribe while in the presence of the attending physician.:       I,:   personally performed the services described in this documentation    as scribed in my presence.:

## 2023-09-27 ENCOUNTER — OFFICE VISIT (OUTPATIENT)
Dept: URGENT CARE | Age: 58
End: 2023-09-27
Payer: MEDICARE

## 2023-09-27 VITALS
OXYGEN SATURATION: 97 % | HEIGHT: 69 IN | BODY MASS INDEX: 30.36 KG/M2 | DIASTOLIC BLOOD PRESSURE: 82 MMHG | RESPIRATION RATE: 18 BRPM | SYSTOLIC BLOOD PRESSURE: 142 MMHG | HEART RATE: 106 BPM | WEIGHT: 205 LBS | TEMPERATURE: 97.3 F

## 2023-09-27 DIAGNOSIS — J01.41 ACUTE RECURRENT PANSINUSITIS: Primary | ICD-10-CM

## 2023-09-27 PROCEDURE — 99213 OFFICE O/P EST LOW 20 MIN: CPT | Performed by: NURSE PRACTITIONER

## 2023-09-27 PROCEDURE — G0463 HOSPITAL OUTPT CLINIC VISIT: HCPCS | Performed by: NURSE PRACTITIONER

## 2023-09-27 RX ORDER — PREDNISONE 20 MG/1
20 TABLET ORAL 2 TIMES DAILY WITH MEALS
Qty: 10 TABLET | Refills: 0 | Status: SHIPPED | OUTPATIENT
Start: 2023-09-27 | End: 2023-10-02

## 2023-09-28 NOTE — PROGRESS NOTES
[facility]        NAME: Deepak Hidalgo is a 63 y.o. male  : 1965    MRN: [id number]  DATE: 2023  TIME: 8:39 PM    Assessment and Plan   Acute recurrent pansinusitis [J01.41]  1. Acute recurrent pansinusitis  predniSONE 20 mg tablet            Patient Instructions     Prednisone for sinusitis and SOB  F/u with PCP for eczema to the feet  Follow up with PCP in 3-5 days. Proceed to  ER if symptoms worsen. Chief Complaint     Chief Complaint   Patient presents with   • Rash     Reports rash 2 months ago. on calf of left lower leg. No itching. Skin is peeling. Started off smaller has increased in size. Rash also on Left calf. Right arm    • Cough     Productive in am. Zyrtec last night . Took albuterol this afternoon which did help open patient up some what. • Dizziness     Today has gotten worse. History of Present Illness       HPI   Presents to clinic with complaint of intermittent shortness of breath, sinus congestion and postnasal drip. Has used over-the-counter histamines without much improvement. Ongoing for more than 1 week. Also complains of dryness and redness to the bilateral feet. Ongoing for a few weeks. Possibly 6 weeks. Some itching. No drainage or crusting. Chronic Hx of asthma. Used nebulizer without relief    Review of Systems   Review of Systems   Constitutional: Negative for fever. HENT: Positive for congestion, sinus pressure and sneezing. Negative for ear pain. Respiratory: Positive for shortness of breath (with exertion, intermittent). Negative for chest tightness and wheezing. Skin: Positive for color change (red) and rash (bilateral feet). Neurological: Negative for weakness and numbness.          Current Medications       Current Outpatient Medications:   •  acetaminophen (TYLENOL) 500 mg tablet, Take 1,000 mg by mouth every 6 (six) hours as needed for mild pain, Disp: , Rfl:   •  albuterol (2.5 mg/3 mL) 0.083 % nebulizer solution, Take 3 mL (2.5 mg total) by nebulization every 6 (six) hours as needed for wheezing or shortness of breath, Disp: 240 mL, Rfl: 2  •  albuterol (PROVENTIL HFA,VENTOLIN HFA) 90 mcg/act inhaler, Inhale 1 puff 4 (four) times a day , Disp: , Rfl:   •  Chlorpheniramine Maleate (CHLOR-TRIMETON ALLERGY PO), Take by mouth as needed  , Disp: , Rfl:   •  esomeprazole (NexIUM) 40 MG capsule, Take 40 mg by mouth every morning before breakfast, Disp: , Rfl:   •  fluticasone-salmeterol (Advair) 500-50 mcg/dose inhaler, Inhale 1 puff 2 (two) times a day, Disp: , Rfl:   •  ibuprofen (MOTRIN) 600 mg tablet, Take 600 mg by mouth every 8 (eight) hours as needed for moderate pain, Disp: , Rfl:   •  ipratropium-albuterol (DUO-NEB) 0.5-2.5 mg/3 mL, Inhale 3 mL 2 (two) times a week , Disp: , Rfl:   •  lisinopril (ZESTRIL) 20 mg tablet, Take 1 tablet (20 mg total) by mouth daily, Disp: 90 tablet, Rfl: 1  •  meclizine (ANTIVERT) 12.5 MG tablet, Take 1 tablet (12.5 mg total) by mouth every 8 (eight) hours (Patient taking differently: Take 12.5 mg by mouth every 8 (eight) hours as needed for dizziness), Disp: 30 tablet, Rfl: 1  •  montelukast (SINGULAIR) 10 mg tablet, Take 1 tablet (10 mg total) by mouth daily, Disp: 90 tablet, Rfl: 1  •  predniSONE 20 mg tablet, Take 1 tablet (20 mg total) by mouth 2 (two) times a day with meals for 5 days, Disp: 10 tablet, Rfl: 0  •  Triamcinolone Acetonide (NASACORT ALLERGY 24HR NA), 1 spray into each nostril daily  , Disp: , Rfl:   •  Uloric 40 MG tablet, Take 1 tablet (40 mg total) by mouth daily, Disp: 90 tablet, Rfl: 1  •  ketoconazole (NIZORAL) 2 % cream, Apply topically daily (Patient not taking: Reported on 8/15/2023), Disp: 60 g, Rfl: 2  •  rosuvastatin (CRESTOR) 10 MG tablet, Take 1 tablet (10 mg total) by mouth daily (Patient not taking: Reported on 8/15/2023), Disp: 90 tablet, Rfl: 3  •  verapamil (CALAN) 120 mg tablet, TAKE 1 TABLET BY MOUTH EVERY DAY, Disp: 90 tablet, Rfl: 3    Current Allergies     Allergies as of 09/27/2023 - Reviewed 09/27/2023   Allergen Reaction Noted   • Penicillins Rash and Anaphylaxis 08/17/2004   • Acetazolamide  10/25/2016   • Cephalosporins Other (See Comments) 08/17/2004   • Doxycycline  10/24/2018   • Other  04/28/2014   • Sulfa antibiotics Other (See Comments) 08/17/2004   • Famotidine Palpitations 09/05/2016   • Levofloxacin Palpitations 11/12/2021   • Omeprazole Palpitations 09/05/2016            The following portions of the patient's history were reviewed and updated as appropriate: allergies, current medications, past family history, past medical history, past social history, past surgical history and problem list.     Past Medical History:   Diagnosis Date   • Anxiety 1/15/2019   • Arthritis    • Chronic rhinitis     last assessed 2/21/14   • Chronic serous otitis media     last assessed 11/20/13   • Chronic sinusitis     last assessed 8/19/14   • Functional heart murmur    • GERD (gastroesophageal reflux disease)    • Gout    • Hearing problem     last assessed 12/1/16   • Hiatal hernia    • Hypercholesterolemia    • Hypertension    • Palpitations    • Testicular hypogonadism     last assessed 10/4/13   • V-tach [facility])        Past Surgical History:   Procedure Laterality Date   • APPENDECTOMY     • BICEPS TENODESIS      anesthesia for tenodesis- of ruptured long tendon of biceps    • HERNIA REPAIR     • THYROIDECTOMY     • TONSILLECTOMY         Family History   Problem Relation Age of Onset   • Lung cancer Father    • Coronary artery disease Father         blockages   • Stroke Maternal Grandmother    • Cancer Paternal Grandfather         metastasized   • Heart disease Family    • Lung cancer Cousin    • No Known Problems Mother    • No Known Problems Sister    • No Known Problems Brother    • No Known Problems Maternal Aunt    • No Known Problems Maternal Uncle    • No Known Problems Paternal Aunt    • No Known Problems Paternal Uncle    • No Known Problems Paternal Grandmother    • ADD / ADHD Neg Hx    • Anesthesia problems Neg Hx    • Clotting disorder Neg Hx    • Collagen disease Neg Hx    • Diabetes Neg Hx    • Dislocations Neg Hx    • Learning disabilities Neg Hx    • Neurological problems Neg Hx    • Osteoporosis Neg Hx    • Rheumatologic disease Neg Hx    • Scoliosis Neg Hx    • Vascular Disease Neg Hx          Medications have been verified. Objective   /82   Pulse (!) 106   Temp (!) 97.3 °F (36.3 °C)   Resp 18   Ht 5' 9" (1.753 m)   Wt 93 kg (205 lb)   SpO2 97%   BMI 30.27 kg/m²   No LMP for male patient. Physical Exam     Physical Exam  HENT:      Head:      Comments: NTTP of the sinuses     Right Ear: Tympanic membrane normal.      Left Ear: Tympanic membrane normal.      Nose: Rhinorrhea present. Cardiovascular:      Rate and Rhythm: Regular rhythm. Heart sounds: Normal heart sounds. Pulmonary:      Effort: Pulmonary effort is normal.      Breath sounds: Normal breath sounds. No wheezing. Skin:     General: Skin is dry. Findings: Erythema (mild) present. No bruising. Comments: Extreme dryness to the bilateral feet, affecting the sole and the dorsal aspect of the feet. Also extending up into the ankle.  Fungal infection of the toenails too

## 2023-10-09 NOTE — PROGRESS NOTES
Assessment/Plan:  No diagnosis found  Orders Placed This Encounter   Procedures    Large joint arthrocentesis   · Patient given R knee CSI today  Encouraged to rest and ice knee tonight  · Continue ibuprofen prn pain   · Continue walking for exercise as tolerated  Return in about 3 months (around 1/30/2019)  Subjective   Chief Complaint:   Chief Complaint   Patient presents with    Right Knee - Pain       Orlin Atkins is a 47 y o  male who presents for R knee injection  Patient had a left knee injection 2 months ago and states he's still experiencing pain relief  Patient reports taking ibuprofen prn pain which well for him  He has tried Aleve but states it didn't provide him the same level of analgesia as ibuprofen  He has also found wearing his walking sneakers decreases his knee pain more compared to when he wears flat-soled shoes  He is currently taking Doxycycline 100mg BID for sinusitis, prescribed by his PCP  Patient denies fevers, chills, SOB, CP  Review of Systems  ROS:    See HPI for musculoskeletal review     All other systems reviewed are negative     History:  Past Medical History:   Diagnosis Date    Arthritis     Chronic rhinitis     last assessed 2/21/14    Chronic serous otitis media     last assessed 11/20/13    Chronic sinusitis     last assessed 8/19/14    Functional heart murmur     GERD (gastroesophageal reflux disease)     Gout     Hearing problem     last assessed 12/1/16    Hiatal hernia     Hypercholesterolemia     Hypertension     Palpitations     Testicular hypogonadism     last assessed 10/4/13     Past Surgical History:   Procedure Laterality Date    APPENDECTOMY      BICEPS TENODESIS      anesthesia for tenodesis- of ruptured long tendon of biceps     HERNIA REPAIR      THYROIDECTOMY      TONSILLECTOMY       Social History   History   Alcohol Use    Yes     Comment: occassional     History   Drug Use No     Comment: experimented with Marijuana in 1982 as per [employer]     History   Smoking Status    Never Smoker   Smokeless Tobacco    Never Used     Family History:   Family History   Problem Relation Age of Onset    Cancer Father     Lung cancer Father     Coronary artery disease Father     Stroke Maternal Grandmother     Stroke Maternal Grandfather     Cancer Paternal Grandfather     Heart disease Family     Hypertension Family     Lung cancer Cousin     No Known Problems Mother     No Known Problems Sister     No Known Problems Brother     No Known Problems Maternal Aunt     No Known Problems Maternal Uncle     No Known Problems Paternal Aunt     No Known Problems Paternal Uncle     No Known Problems Paternal Grandmother     ADD / ADHD Neg Hx     Anesthesia problems Neg Hx     Clotting disorder Neg Hx     Collagen disease Neg Hx     Diabetes Neg Hx     Dislocations Neg Hx     Learning disabilities Neg Hx     Neurological problems Neg Hx     Osteoporosis Neg Hx     Rheumatologic disease Neg Hx     Scoliosis Neg Hx     Vascular Disease Neg Hx        Meds/Allergies     (Not in a hospital admission)  Allergies   Allergen Reactions    Penicillins Rash and Anaphylaxis    Acetazolamide      Other reaction(s): Stomach Ache    Cephalosporins Other (See Comments)     headache    Doxycycline      Capsules only  Tablets are ok to take, per pt   Sulfa Antibiotics Other (See Comments)     Category: Allergy;  Annotation - [id number]: STOMACH UPSET    Famotidine Palpitations    Omeprazole Palpitations     Painful urination          Objective     /70   Pulse 80   Ht 5' 9 5" (1 765 m)   Wt 98 4 kg (217 lb)   BMI 31 59 kg/m²      PE:  AAOx 3  WDWN  Hearing intact, no drainage from eyes  no audible wheezing  no abdominal distension  LE compartments soft, skin intact    Ortho Exam:  right Knee:   No erythema  no swelling  no effusion  no warmth  AROM: full  Stable to varus/valgus stress    Large joint arthrocentesis  Date/Time: 10/30/2018 3:53 PM  Consent given by: patient  Supporting Documentation  Indications: pain   Procedure Details  Location: knee - R knee  Needle size: 22 G  Ultrasound guidance: no  Approach: anterolateral  Medications administered: 3 mL lidocaine 0 5 %; 2 mL methylPREDNISolone acetate 40 mg/mL    Patient tolerance: patient tolerated the procedure well with no immediate complications  Dressing:  Sterile dressing applied Awake

## 2023-10-18 ENCOUNTER — OFFICE VISIT (OUTPATIENT)
Dept: INTERNAL MEDICINE CLINIC | Facility: OTHER | Age: 58
End: 2023-10-18
Payer: MEDICARE

## 2023-10-18 VITALS
TEMPERATURE: 98.2 F | BODY MASS INDEX: 30.27 KG/M2 | HEART RATE: 77 BPM | DIASTOLIC BLOOD PRESSURE: 78 MMHG | HEIGHT: 69 IN | SYSTOLIC BLOOD PRESSURE: 134 MMHG | OXYGEN SATURATION: 99 %

## 2023-10-18 DIAGNOSIS — E78.2 MIXED HYPERLIPIDEMIA: ICD-10-CM

## 2023-10-18 DIAGNOSIS — B35.9 TINEA: Primary | ICD-10-CM

## 2023-10-18 PROCEDURE — 99213 OFFICE O/P EST LOW 20 MIN: CPT | Performed by: INTERNAL MEDICINE

## 2023-10-18 RX ORDER — ROSUVASTATIN CALCIUM 10 MG/1
10 TABLET, COATED ORAL DAILY
Qty: 90 TABLET | Refills: 3 | Status: SHIPPED
Start: 2023-10-18

## 2023-10-18 RX ORDER — LEVALBUTEROL INHALATION SOLUTION 1.25 MG/3ML
1.25 SOLUTION RESPIRATORY (INHALATION) EVERY 8 HOURS PRN
COMMUNITY
Start: 2023-09-28

## 2023-10-18 RX ORDER — CLOTRIMAZOLE AND BETAMETHASONE DIPROPIONATE 10; .64 MG/G; MG/G
CREAM TOPICAL 2 TIMES DAILY
Qty: 45 G | Refills: 3 | Status: SHIPPED | OUTPATIENT
Start: 2023-10-18 | End: 2023-11-01

## 2023-10-18 RX ORDER — TERBINAFINE HYDROCHLORIDE 250 MG/1
250 TABLET ORAL DAILY
Qty: 21 TABLET | Refills: 0 | Status: SHIPPED | OUTPATIENT
Start: 2023-10-18 | End: 2023-11-08

## 2023-10-18 NOTE — ASSESSMENT & PLAN NOTE
Will prescribe terbinafine daily for 21 days and clotrimazole-betamethasone BID for 14 days.  He is to contact our office for worsening symptoms

## 2023-10-18 NOTE — PROGRESS NOTES
Assessment/Plan:    Tinea  Will prescribe terbinafine daily for 21 days and clotrimazole-betamethasone BID for 14 days. He is to contact our office for worsening symptoms        Diagnoses and all orders for this visit:    Tinea  -     clotrimazole-betamethasone (LOTRISONE) 1-0.05 % cream; Apply topically 2 (two) times a day for 14 days  -     terbinafine (LamISIL) 250 mg tablet; Take 1 tablet (250 mg total) by mouth daily for 21 days    Mixed hyperlipidemia  -     rosuvastatin (CRESTOR) 10 MG tablet; Take 1 tablet (10 mg total) by mouth daily    Other orders  -     levalbuterol (XOPENEX) 1.25 mg/3 mL nebulizer solution; Inhale 1.25 mg every 8 (eight) hours as needed                  Subjective:      Patient ID: Lea Holloway is a 63 y.o. male. Chief Complaint   Patient presents with   • Follow-up     Rash -back lower left calf, right arm and neck. Not itchy   •      Cologuard ordered 12/13/22, MEDICARE WELLNESS VISIT due after 12/13/23   • Sinus Problem     Neck a little tender, a little dizzy, some confusion       63year old male is seen today with concern for a persistent rash and sinusitis. He was treated with prednisone for sinusitis. He has chronic allergic rhinitis/sinusitis. He developed a diffuse rash since 2-months. He has not noticed any treatment when he was on prednisone. Sinus Problem  Pertinent negatives include no chills, congestion, coughing, diaphoresis, headaches, shortness of breath, sinus pressure, sneezing or sore throat. The following portions of the patient's history were reviewed and updated as appropriate: allergies, current medications, past family history, past medical history, past social history, past surgical history, and problem list.    Review of Systems   Constitutional:  Negative for activity change, appetite change, chills, diaphoresis, fatigue and fever.    HENT:  Negative for congestion, postnasal drip, rhinorrhea, sinus pressure, sinus pain, sneezing and sore throat. Eyes:  Negative for visual disturbance. Respiratory:  Negative for apnea, cough, choking, chest tightness, shortness of breath and wheezing. Cardiovascular:  Negative for chest pain, palpitations and leg swelling. Gastrointestinal:  Negative for abdominal distention, abdominal pain, anal bleeding, blood in stool, constipation, diarrhea, nausea and vomiting. Endocrine: Negative for cold intolerance and heat intolerance. Genitourinary:  Negative for difficulty urinating, dysuria and hematuria. Musculoskeletal: Negative. Skin:  Positive for rash. Neurological:  Negative for dizziness, weakness, light-headedness, numbness and headaches. Hematological:  Negative for adenopathy. Psychiatric/Behavioral:  Negative for agitation, sleep disturbance and suicidal ideas. All other systems reviewed and are negative.         Past Medical History:   Diagnosis Date   • Anxiety 1/15/2019   • Arthritis    • Chronic rhinitis     last assessed 2/21/14   • Chronic serous otitis media     last assessed 11/20/13   • Chronic sinusitis     last assessed 8/19/14   • Functional heart murmur    • GERD (gastroesophageal reflux disease)    • Gout    • Hearing problem     last assessed 12/1/16   • Hiatal hernia    • Hypercholesterolemia    • Hypertension    • Palpitations    • Testicular hypogonadism     last assessed 10/4/13   • V-tach [facility])          Current Outpatient Medications:   •  acetaminophen (TYLENOL) 500 mg tablet, Take 1,000 mg by mouth every 6 (six) hours as needed for mild pain, Disp: , Rfl:   •  albuterol (2.5 mg/3 mL) 0.083 % nebulizer solution, Take 3 mL (2.5 mg total) by nebulization every 6 (six) hours as needed for wheezing or shortness of breath (Patient taking differently: Take 2.5 mg by nebulization in the morning), Disp: 240 mL, Rfl: 2  •  albuterol (PROVENTIL HFA,VENTOLIN HFA) 90 mcg/act inhaler, Inhale 1 puff if needed, Disp: , Rfl:   •  Chlorpheniramine Maleate (CHLOR-TRIMETON ALLERGY PO), Take by mouth as needed  , Disp: , Rfl:   •  clotrimazole-betamethasone (LOTRISONE) 1-0.05 % cream, Apply topically 2 (two) times a day for 14 days, Disp: 45 g, Rfl: 3  •  esomeprazole (NexIUM) 40 MG capsule, Take 40 mg by mouth every morning before breakfast, Disp: , Rfl:   •  fluticasone-salmeterol (Advair) 500-50 mcg/dose inhaler, Inhale 1 puff 2 (two) times a day, Disp: , Rfl:   •  ibuprofen (MOTRIN) 600 mg tablet, Take 600 mg by mouth every 8 (eight) hours as needed for moderate pain, Disp: , Rfl:   •  ipratropium-albuterol (DUO-NEB) 0.5-2.5 mg/3 mL, Inhale 3 mL 2 (two) times a week , Disp: , Rfl:   •  levalbuterol (XOPENEX) 1.25 mg/3 mL nebulizer solution, Inhale 1.25 mg every 8 (eight) hours as needed, Disp: , Rfl:   •  lisinopril (ZESTRIL) 20 mg tablet, Take 1 tablet (20 mg total) by mouth daily, Disp: 90 tablet, Rfl: 1  •  meclizine (ANTIVERT) 12.5 MG tablet, Take 1 tablet (12.5 mg total) by mouth every 8 (eight) hours (Patient taking differently: Take 12.5 mg by mouth every 8 (eight) hours as needed for dizziness), Disp: 30 tablet, Rfl: 1  •  montelukast (SINGULAIR) 10 mg tablet, Take 1 tablet (10 mg total) by mouth daily, Disp: 90 tablet, Rfl: 1  •  rosuvastatin (CRESTOR) 10 MG tablet, Take 1 tablet (10 mg total) by mouth daily, Disp: 90 tablet, Rfl: 3  •  terbinafine (LamISIL) 250 mg tablet, Take 1 tablet (250 mg total) by mouth daily for 21 days, Disp: 21 tablet, Rfl: 0  •  Triamcinolone Acetonide (NASACORT ALLERGY 24HR NA), 1 spray into each nostril daily  , Disp: , Rfl:   •  Uloric 40 MG tablet, Take 1 tablet (40 mg total) by mouth daily, Disp: 90 tablet, Rfl: 1  •  verapamil (CALAN) 120 mg tablet, TAKE 1 TABLET BY MOUTH EVERY DAY, Disp: 90 tablet, Rfl: 3  •  ketoconazole (NIZORAL) 2 % cream, Apply topically daily (Patient not taking: Reported on 8/15/2023), Disp: 60 g, Rfl: 2    Allergies   Allergen Reactions   • Penicillins Rash and Anaphylaxis   • Acetazolamide      Other reaction(s): Stomach Ache   • Cephalosporins Other (See Comments)     headache   • Doxycycline      Capsules only. Tablets are ok to take, per pt. Capsules only. Tablets are ok to take, per pt. • Other    • Sulfa Antibiotics Other (See Comments)     Category: Allergy; Annotation - [id number]: STOMACH UPSET   • Famotidine Palpitations   • Levofloxacin Palpitations   • Omeprazole Palpitations     Painful urination       Social History   Past Surgical History:   Procedure Laterality Date   • APPENDECTOMY     • BICEPS TENODESIS      anesthesia for tenodesis- of ruptured long tendon of biceps    • HERNIA REPAIR     • THYROIDECTOMY     • TONSILLECTOMY       Family History   Problem Relation Age of Onset   • Lung cancer Father    • Coronary artery disease Father         blockages   • Stroke Maternal Grandmother    • Cancer Paternal Grandfather         metastasized   • Heart disease Family    • Lung cancer Cousin    • No Known Problems Mother    • No Known Problems Sister    • No Known Problems Brother    • No Known Problems Maternal Aunt    • No Known Problems Maternal Uncle    • No Known Problems Paternal Aunt    • No Known Problems Paternal Uncle    • No Known Problems Paternal Grandmother    • ADD / ADHD Neg Hx    • Anesthesia problems Neg Hx    • Clotting disorder Neg Hx    • Collagen disease Neg Hx    • Diabetes Neg Hx    • Dislocations Neg Hx    • Learning disabilities Neg Hx    • Neurological problems Neg Hx    • Osteoporosis Neg Hx    • Rheumatologic disease Neg Hx    • Scoliosis Neg Hx    • Vascular Disease Neg Hx        Objective:  /78 (BP Location: Left arm, Patient Position: Sitting, Cuff Size: Standard)   Pulse 77   Temp 98.2 °F (36.8 °C) (Temporal)   Ht 5' 9" (1.753 m)   SpO2 99%   BMI 30.27 kg/m²     No results found for this or any previous visit (from the past 1344 hour(s)). Physical Exam  Vitals and nursing note reviewed. Constitutional:       General: He is not in acute distress. Appearance: He is well-developed. He is not diaphoretic. HENT:      Head: Normocephalic and atraumatic. Eyes:      General: No scleral icterus. Right eye: No discharge. Left eye: No discharge. Conjunctiva/sclera: Conjunctivae normal.      Pupils: Pupils are equal, round, and reactive to light. Neck:      Thyroid: No thyromegaly. Vascular: No JVD. Cardiovascular:      Rate and Rhythm: Normal rate and regular rhythm. Heart sounds: Normal heart sounds. No murmur heard. No friction rub. No gallop. Pulmonary:      Effort: Pulmonary effort is normal. No respiratory distress. Breath sounds: Normal breath sounds. No wheezing or rales. Chest:      Chest wall: No tenderness. Abdominal:      General: Bowel sounds are normal. There is no distension. Palpations: Abdomen is soft. There is no mass. Tenderness: There is no abdominal tenderness. There is no guarding or rebound. Musculoskeletal:         General: No tenderness or deformity. Normal range of motion. Cervical back: Normal range of motion and neck supple. Lymphadenopathy:      Cervical: No cervical adenopathy. Skin:     General: Skin is warm and dry. Coloration: Skin is not pale. Findings: Rash (diffuse) present. No erythema. Rash is urticarial.   Neurological:      Mental Status: He is alert and oriented to person, place, and time. Cranial Nerves: No cranial nerve deficit. Coordination: Coordination normal.      Deep Tendon Reflexes: Reflexes are normal and symmetric. Psychiatric:         Behavior: Behavior normal.         Thought Content:  Thought content normal.         Judgment: Judgment normal.

## 2023-11-08 ENCOUNTER — CLINICAL SUPPORT (OUTPATIENT)
Dept: INTERNAL MEDICINE CLINIC | Facility: OTHER | Age: 58
End: 2023-11-08
Payer: MEDICARE

## 2023-11-08 DIAGNOSIS — Z23 NEEDS FLU SHOT: ICD-10-CM

## 2023-11-08 DIAGNOSIS — Z23 ENCOUNTER FOR IMMUNIZATION: Primary | ICD-10-CM

## 2023-11-08 PROCEDURE — 90686 IIV4 VACC NO PRSV 0.5 ML IM: CPT

## 2023-11-08 PROCEDURE — G0008 ADMIN INFLUENZA VIRUS VAC: HCPCS

## 2023-11-14 ENCOUNTER — OFFICE VISIT (OUTPATIENT)
Dept: URGENT CARE | Age: 58
End: 2023-11-14
Payer: MEDICARE

## 2023-11-14 VITALS
WEIGHT: 205 LBS | RESPIRATION RATE: 18 BRPM | OXYGEN SATURATION: 99 % | TEMPERATURE: 98.6 F | HEIGHT: 69 IN | HEART RATE: 74 BPM | BODY MASS INDEX: 30.36 KG/M2

## 2023-11-14 DIAGNOSIS — J01.00 ACUTE MAXILLARY SINUSITIS, RECURRENCE NOT SPECIFIED: Primary | ICD-10-CM

## 2023-11-14 PROCEDURE — G0463 HOSPITAL OUTPT CLINIC VISIT: HCPCS

## 2023-11-14 PROCEDURE — 99213 OFFICE O/P EST LOW 20 MIN: CPT

## 2023-11-14 RX ORDER — PREDNISONE 10 MG/1
40 TABLET ORAL DAILY
Qty: 20 TABLET | Refills: 0 | Status: SHIPPED | OUTPATIENT
Start: 2023-11-14 | End: 2023-11-19

## 2023-11-14 NOTE — PATIENT INSTRUCTIONS
Start prednisone as prescribed  Vitamin D3 2000 IU daily  Vitamin C 1000mg twice per day  Multivitamin daily  Fluids and rest  Over the counter cold medication as needed (EX: Coricidin HBP, tylenol/motrin)  Follow up with PCP in 3-5 days. Proceed to ER if symptoms worsen.

## 2023-11-14 NOTE — PROGRESS NOTES
[facility]        NAME: Edda Gutiérrez is a 63 y.o. male  : 1965    MRN: [id number]  DATE: 2023  TIME: 8:33 PM    Assessment and Plan   Acute maxillary sinusitis, recurrence not specified [J01.00]  1. Acute maxillary sinusitis, recurrence not specified  predniSONE 10 mg tablet            Patient Instructions     Start prednisone as prescribed  Vitamin D3 2000 IU daily  Vitamin C 1000mg twice per day  Multivitamin daily  Fluids and rest  Over the counter cold medication as needed (EX: Coricidin HBP, tylenol/motrin)  Follow up with PCP in 3-5 days. Proceed to  ER if symptoms worsen. Chief Complaint     Chief Complaint   Patient presents with    Knee Pain     Finished lamisil on  and some rash still exsists. Sinus infection and wants prednisone. Also wants Flexeril. Explained to patient this is not the location he belongs for health maintenance, patient insisted on being seen to get meds. Was at [facility] for same treatment for both knees. History of Present Illness       Patient is a 64 yo male with no significant PMH presenting in the clinic today for congestion x 1 week. Admits congestion and sinus pain/pressure. Admits the use of OTC anti-histamine, Singular, and Nasacort for sx management. Denies fever, chills, body aches, sore throat, ear pain, headache, chest pain, SOB, abdominal pain, n/v/d. Denies recent sick contacts. Review of Systems   Review of Systems   Constitutional:  Negative for chills, fatigue and fever. HENT:  Positive for congestion, sinus pressure and sinus pain. Negative for ear pain, postnasal drip, rhinorrhea and sore throat. Respiratory:  Negative for cough and shortness of breath. Cardiovascular:  Negative for chest pain. Gastrointestinal:  Negative for abdominal pain, diarrhea, nausea and vomiting. Musculoskeletal:  Negative for myalgias. Skin:  Negative for rash. Neurological:  Negative for headaches.          Current Medications       Current Outpatient Medications:     acetaminophen (TYLENOL) 500 mg tablet, Take 1,000 mg by mouth every 6 (six) hours as needed for mild pain, Disp: , Rfl:     albuterol (2.5 mg/3 mL) 0.083 % nebulizer solution, Take 3 mL (2.5 mg total) by nebulization every 6 (six) hours as needed for wheezing or shortness of breath (Patient taking differently: Take 2.5 mg by nebulization in the morning), Disp: 240 mL, Rfl: 2    albuterol (PROVENTIL HFA,VENTOLIN HFA) 90 mcg/act inhaler, Inhale 1 puff if needed, Disp: , Rfl:     Chlorpheniramine Maleate (CHLOR-TRIMETON ALLERGY PO), Take by mouth as needed  , Disp: , Rfl:     esomeprazole (NexIUM) 40 MG capsule, Take 40 mg by mouth every morning before breakfast, Disp: , Rfl:     fluticasone-salmeterol (Advair) 500-50 mcg/dose inhaler, Inhale 1 puff 2 (two) times a day, Disp: , Rfl:     ibuprofen (MOTRIN) 600 mg tablet, Take 600 mg by mouth every 8 (eight) hours as needed for moderate pain, Disp: , Rfl:     ipratropium-albuterol (DUO-NEB) 0.5-2.5 mg/3 mL, Inhale 3 mL 2 (two) times a week , Disp: , Rfl:     levalbuterol (XOPENEX) 1.25 mg/3 mL nebulizer solution, Inhale 1.25 mg every 8 (eight) hours as needed, Disp: , Rfl:     lisinopril (ZESTRIL) 20 mg tablet, Take 1 tablet (20 mg total) by mouth daily, Disp: 90 tablet, Rfl: 1    meclizine (ANTIVERT) 12.5 MG tablet, Take 1 tablet (12.5 mg total) by mouth every 8 (eight) hours (Patient taking differently: Take 12.5 mg by mouth every 8 (eight) hours as needed for dizziness), Disp: 30 tablet, Rfl: 1    montelukast (SINGULAIR) 10 mg tablet, Take 1 tablet (10 mg total) by mouth daily, Disp: 90 tablet, Rfl: 1    predniSONE 10 mg tablet, Take 4 tablets (40 mg total) by mouth daily for 5 days, Disp: 20 tablet, Rfl: 0    rosuvastatin (CRESTOR) 10 MG tablet, Take 1 tablet (10 mg total) by mouth daily, Disp: 90 tablet, Rfl: 3    Triamcinolone Acetonide (NASACORT ALLERGY 24HR NA), 1 spray into each nostril daily  , Disp: , Rfl: Uloric 40 MG tablet, Take 1 tablet (40 mg total) by mouth daily, Disp: 90 tablet, Rfl: 1    verapamil (CALAN) 120 mg tablet, TAKE 1 TABLET BY MOUTH EVERY DAY, Disp: 90 tablet, Rfl: 3    clotrimazole-betamethasone (LOTRISONE) 1-0.05 % cream, Apply topically 2 (two) times a day for 14 days, Disp: 45 g, Rfl: 3    ketoconazole (NIZORAL) 2 % cream, Apply topically daily (Patient not taking: Reported on 8/15/2023), Disp: 60 g, Rfl: 2    Current Allergies     Allergies as of 11/14/2023 - Reviewed 11/14/2023   Allergen Reaction Noted    Penicillins Rash and Anaphylaxis 08/17/2004    Acetazolamide  10/25/2016    Cephalosporins Other (See Comments) 08/17/2004    Doxycycline  10/24/2018    Other  04/28/2014    Sulfa antibiotics Other (See Comments) 08/17/2004    Famotidine Palpitations 09/05/2016    Levofloxacin Palpitations 11/12/2021    Omeprazole Palpitations 09/05/2016            The following portions of the patient's history were reviewed and updated as appropriate: allergies, current medications, past family history, past medical history, past social history, past surgical history and problem list.     Past Medical History:   Diagnosis Date    Anxiety 1/15/2019    Arthritis     Chronic rhinitis     last assessed 2/21/14    Chronic serous otitis media     last assessed 11/20/13    Chronic sinusitis     last assessed 8/19/14    Functional heart murmur     GERD (gastroesophageal reflux disease)     Gout     Hearing problem     last assessed 12/1/16    Hiatal hernia     Hypercholesterolemia     Hypertension     Palpitations     Testicular hypogonadism     last assessed 10/4/13    [facility]ach [facility])        Past Surgical History:   Procedure Laterality Date    APPENDECTOMY      BICEPS TENODESIS      anesthesia for tenodesis- of ruptured long tendon of biceps     HERNIA REPAIR      THYROIDECTOMY      TONSILLECTOMY         Family History   Problem Relation Age of Onset    Lung cancer Father     Coronary artery disease Father blockages    Stroke Maternal Grandmother     Cancer Paternal Grandfather         metastasized    Heart disease Family     Lung cancer Cousin     No Known Problems Mother     No Known Problems Sister     No Known Problems Brother     No Known Problems Maternal Aunt     No Known Problems Maternal Uncle     No Known Problems Paternal Aunt     No Known Problems Paternal Uncle     No Known Problems Paternal Grandmother     ADD / ADHD Neg Hx     Anesthesia problems Neg Hx     Clotting disorder Neg Hx     Collagen disease Neg Hx     Diabetes Neg Hx     Dislocations Neg Hx     Learning disabilities Neg Hx     Neurological problems Neg Hx     Osteoporosis Neg Hx     Rheumatologic disease Neg Hx     Scoliosis Neg Hx     Vascular Disease Neg Hx          Medications have been verified. Objective   Pulse 74   Temp 98.6 °F (37 °C) (Tympanic)   Resp 18   Ht 5' 9" (1.753 m)   Wt 93 kg (205 lb)   SpO2 99%   BMI 30.27 kg/m²        Physical Exam     Physical Exam  Vitals reviewed. Constitutional:       General: He is not in acute distress. Appearance: Normal appearance. He is normal weight. He is not ill-appearing. HENT:      Head: Normocephalic. Right Ear: Hearing, tympanic membrane, ear canal and external ear normal. No middle ear effusion. There is no impacted cerumen. Tympanic membrane is not erythematous or bulging. Left Ear: Hearing, tympanic membrane, ear canal and external ear normal.  No middle ear effusion. There is no impacted cerumen. Tympanic membrane is not erythematous or bulging. Nose: Congestion present. No rhinorrhea. Right Sinus: Maxillary sinus tenderness present. No frontal sinus tenderness. Left Sinus: Maxillary sinus tenderness present. No frontal sinus tenderness. Mouth/Throat:      Lips: Pink. Mouth: Mucous membranes are moist.      Pharynx: Oropharynx is clear. Uvula midline.  No pharyngeal swelling, oropharyngeal exudate, posterior oropharyngeal erythema or uvula swelling. Eyes:      General:         Right eye: No discharge. Left eye: No discharge. Conjunctiva/sclera: Conjunctivae normal.   Cardiovascular:      Rate and Rhythm: Normal rate and regular rhythm. Pulses: Normal pulses. Heart sounds: Normal heart sounds. No murmur heard. No friction rub. No gallop. Pulmonary:      Effort: Pulmonary effort is normal.      Breath sounds: Normal breath sounds. No wheezing, rhonchi or rales. Musculoskeletal:      Cervical back: Normal range of motion and neck supple. No tenderness. Lymphadenopathy:      Cervical: No cervical adenopathy. Skin:     General: Skin is warm. Findings: No rash. Neurological:      Mental Status: He is alert.    Psychiatric:         Mood and Affect: Mood normal.         Behavior: Behavior normal.

## 2023-11-20 DIAGNOSIS — M10.9 GOUTY ARTHRITIS: ICD-10-CM

## 2023-11-20 RX ORDER — FEBUXOSTAT 40 MG/1
40 TABLET ORAL DAILY
Qty: 90 TABLET | Refills: 1 | Status: SHIPPED | OUTPATIENT
Start: 2023-11-20

## 2023-11-28 ENCOUNTER — OFFICE VISIT (OUTPATIENT)
Dept: OBGYN CLINIC | Facility: MEDICAL CENTER | Age: 58
End: 2023-11-28
Payer: MEDICARE

## 2023-11-28 VITALS
HEART RATE: 79 BPM | BODY MASS INDEX: 30.36 KG/M2 | WEIGHT: 205 LBS | SYSTOLIC BLOOD PRESSURE: 142 MMHG | HEIGHT: 69 IN | DIASTOLIC BLOOD PRESSURE: 93 MMHG

## 2023-11-28 DIAGNOSIS — M17.0 BILATERAL PRIMARY OSTEOARTHRITIS OF KNEE: Primary | ICD-10-CM

## 2023-11-28 DIAGNOSIS — M25.462 EFFUSION OF LEFT KNEE: ICD-10-CM

## 2023-11-28 DIAGNOSIS — M25.461 EFFUSION OF RIGHT KNEE: ICD-10-CM

## 2023-11-28 PROCEDURE — 20610 DRAIN/INJ JOINT/BURSA W/O US: CPT | Performed by: ORTHOPAEDIC SURGERY

## 2023-11-28 PROCEDURE — 99213 OFFICE O/P EST LOW 20 MIN: CPT | Performed by: ORTHOPAEDIC SURGERY

## 2023-11-28 RX ADMIN — LIDOCAINE HYDROCHLORIDE 5 ML: 10 INJECTION, SOLUTION INFILTRATION; PERINEURAL at 15:30

## 2023-11-28 RX ADMIN — TRIAMCINOLONE ACETONIDE 40 MG: 40 INJECTION, SUSPENSION INTRA-ARTICULAR; INTRAMUSCULAR at 15:30

## 2023-11-28 RX ADMIN — BUPIVACAINE HYDROCHLORIDE 2 ML: 2.5 INJECTION, SOLUTION INFILTRATION; PERINEURAL at 15:30

## 2023-11-28 NOTE — PROGRESS NOTES
Assessment/Plan:  1. Bilateral primary osteoarthritis of knee    2. Effusion of right knee    3. Effusion of left knee      Orders Placed This Encounter   Procedures    Large joint arthrocentesis    Large joint arthrocentesis     Patient has severe bilateral knee osteoarthritis with right knee effusion. Patient's right knee was aspirated and patient received bilateral knee steroid injections today. Tolerated the procedures well. Post injection instructions reviewed. Pt would like to pursue surgery for TKA, but would like to wait until after winter    Return in about 3 months (around 2/28/2024) for Recheck and potential TKA discussion. I answered all of the patient's questions during the visit and provided education of the patient's condition during the visit. The patient verbalized understanding of the information given and agrees with the plan. This note was dictated using [employer] software. It may contain errors including improperly dictated words. Please contact physician directly for any questions. Subjective   Chief Complaint:   Chief Complaint   Patient presents with    Left Knee - Follow-up    Right Knee - Follow-up       PEDRO  Kiersten Grove is a 63 y.o. male who presents for follow up for severe bilateral knee osteoarthritis. Patient was last seen 8/25/2023 where patient had right knee aspirated and  received bilateral knee steroid injections. Patient states today that steroid injections provided 1.5 months relief for him and his pain started to return along the medial aspects of bilateral knees and noticed clicking of both knees along the medial aspect. Patient states he takes ibuprofen and Tylenol as needed for pain control. Patient states his pain is aggravated with prolonged walking and prolonged standing. Patient does notice swelling within the knees over the past couple of weeks.   Patient states today that he is interested in bilateral aspiration and possible steroid injections at today's visit. Review of Systems  ROS:    See HPI for musculoskeletal review.    All other systems reviewed are negative     History:  Past Medical History:   Diagnosis Date    Anxiety 1/15/2019    Arthritis     Chronic rhinitis     last assessed 2/21/14    Chronic serous otitis media     last assessed 11/20/13    Chronic sinusitis     last assessed 8/19/14    Functional heart murmur     GERD (gastroesophageal reflux disease)     Gout     Hearing problem     last assessed 12/1/16    Hiatal hernia     Hypercholesterolemia     Hypertension     Palpitations     Testicular hypogonadism     last assessed 10/4/13    V-tach [facility])      Past Surgical History:   Procedure Laterality Date    APPENDECTOMY      BICEPS TENODESIS      anesthesia for tenodesis- of ruptured long tendon of biceps     HERNIA REPAIR      THYROIDECTOMY      TONSILLECTOMY       Social History   Social History     Substance and Sexual Activity   Alcohol Use Yes    Comment: occasionally     Social History     Substance and Sexual Activity   Drug Use No     Social History     Tobacco Use   Smoking Status Never   Smokeless Tobacco Never     Family History:   Family History   Problem Relation Age of Onset    Lung cancer Father     Coronary artery disease Father         blockages    Stroke Maternal Grandmother     Cancer Paternal Grandfather         metastasized    Heart disease Family     Lung cancer Cousin     No Known Problems Mother     No Known Problems Sister     No Known Problems Brother     No Known Problems Maternal Aunt     No Known Problems Maternal Uncle     No Known Problems Paternal Aunt     No Known Problems Paternal Uncle     No Known Problems Paternal Grandmother     ADD / ADHD Neg Hx     Anesthesia problems Neg Hx     Clotting disorder Neg Hx     Collagen disease Neg Hx     Diabetes Neg Hx     Dislocations Neg Hx     Learning disabilities Neg Hx     Neurological problems Neg Hx     Osteoporosis Neg Hx     Rheumatologic disease Neg Hx Scoliosis Neg Hx     Vascular Disease Neg Hx        Current Outpatient Medications on File Prior to Visit   Medication Sig Dispense Refill    acetaminophen (TYLENOL) 500 mg tablet Take 1,000 mg by mouth every 6 (six) hours as needed for mild pain      albuterol (PROVENTIL HFA,VENTOLIN HFA) 90 mcg/act inhaler Inhale 1 puff if needed      Chlorpheniramine Maleate (CHLOR-TRIMETON ALLERGY PO) Take by mouth as needed        esomeprazole (NexIUM) 40 MG capsule Take 40 mg by mouth every morning before breakfast      fluticasone-salmeterol (Advair) 500-50 mcg/dose inhaler Inhale 1 puff 2 (two) times a day      ibuprofen (MOTRIN) 600 mg tablet Take 600 mg by mouth every 8 (eight) hours as needed for moderate pain      ipratropium-albuterol (DUO-NEB) 0.5-2.5 mg/3 mL Inhale 3 mL 2 (two) times a week       levalbuterol (XOPENEX) 1.25 mg/3 mL nebulizer solution Inhale 1.25 mg every 8 (eight) hours as needed      lisinopril (ZESTRIL) 20 mg tablet Take 1 tablet (20 mg total) by mouth daily 90 tablet 1    meclizine (ANTIVERT) 12.5 MG tablet Take 1 tablet (12.5 mg total) by mouth every 8 (eight) hours (Patient taking differently: Take 12.5 mg by mouth every 8 (eight) hours as needed for dizziness) 30 tablet 1    montelukast (SINGULAIR) 10 mg tablet Take 1 tablet (10 mg total) by mouth daily 90 tablet 1    rosuvastatin (CRESTOR) 10 MG tablet Take 1 tablet (10 mg total) by mouth daily 90 tablet 3    Triamcinolone Acetonide (NASACORT ALLERGY 24HR NA) 1 spray into each nostril daily        Uloric 40 MG tablet Take 1 tablet (40 mg total) by mouth daily 90 tablet 1    verapamil (CALAN) 120 mg tablet TAKE 1 TABLET BY MOUTH EVERY DAY 90 tablet 3    albuterol (2.5 mg/3 mL) 0.083 % nebulizer solution Take 3 mL (2.5 mg total) by nebulization every 6 (six) hours as needed for wheezing or shortness of breath (Patient not taking: Reported on 11/28/2023) 240 mL 2    clotrimazole-betamethasone (LOTRISONE) 1-0.05 % cream Apply topically 2 (two) times a day for 14 days 45 g 3    ketoconazole (NIZORAL) 2 % cream Apply topically daily (Patient not taking: Reported on 8/15/2023) 60 g 2     No current facility-administered medications on file prior to visit. Allergies   Allergen Reactions    Penicillins Rash and Anaphylaxis    Acetazolamide      Other reaction(s): Stomach Ache    Cephalosporins Other (See Comments)     headache    Doxycycline      Capsules only. Tablets are ok to take, per pt. Capsules only. Tablets are ok to take, per pt. Other     Sulfa Antibiotics Other (See Comments)     Category: Allergy; Annotation - [id number]: STOMACH UPSET    Famotidine Palpitations    Levofloxacin Palpitations    Omeprazole Palpitations     Painful urination        Objective     /93   Pulse 79   Ht 5' 9" (1.753 m)   Wt 93 kg (205 lb)   BMI 30.27 kg/m²      PE:  AAOx 3  WDWN  Hearing intact, no drainage from eyes  no audible wheezing  no abdominal distension  LE compartments soft, skin intact    Ortho Exam:  bilateral Knee:   No erythema  no swelling  Mild effusion: right knee  no warmth  AROM: 0-110  Stable to varus/valgus stress          Large joint arthrocentesis: bilateral knee  Universal Protocol:  Consent: Verbal consent obtained.   Risks and benefits: risks, benefits and alternatives were discussed  Consent given by: patient  Patient understanding: patient states understanding of the procedure being performed  Site marked: the operative site was marked  Patient identity confirmed: verbally with patient  Supporting Documentation  Indications: pain   Procedure Details  Location: knee - bilateral knee  Needle size: 22 G  Ultrasound guidance: no  Approach: anterolateral    Medications (Right): 2 mL bupivacaine 0.25 %; 40 mg triamcinolone acetonide 40 mg/mLAspirate amount (Right): 55 mL  Medications (Left): 2 mL bupivacaine 0.25 %; 40 mg triamcinolone acetonide 40 mg/mL   Aspirate amount (Left): 0 mL  Patient tolerance: patient tolerated the procedure well with no immediate complications  Dressing:  Sterile dressing applied      Large joint arthrocentesis: R knee  Universal Protocol:  Consent: Verbal consent obtained. Risks and benefits: risks, benefits and alternatives were discussed  Consent given by: patient  Patient understanding: patient states understanding of the procedure being performed  Site marked: the operative site was marked  Patient identity confirmed: verbally with patient  Supporting Documentation  Indications: pain   Procedure Details  Location: knee - R knee  Needle size: 22 G  Ultrasound guidance: no  Approach: anterolateral  Medications administered: 5 mL lidocaine 1 %    Patient tolerance: patient tolerated the procedure well with no immediate complications  Dressing:  Sterile dressing applied      Large joint arthrocentesis: L knee  Universal Protocol:  Consent: Verbal consent obtained. Risks and benefits: risks, benefits and alternatives were discussed  Consent given by: patient  Patient understanding: patient states understanding of the procedure being performed  Site marked: the operative site was marked  Patient identity confirmed: verbally with patient  Supporting Documentation  Indications: pain   Procedure Details  Location: knee - L knee  Needle size: 22 G  Ultrasound guidance: no  Approach: anterolateral  Medications administered: 5 mL lidocaine 1 %    Patient tolerance: patient tolerated the procedure well with no immediate complications  Dressing:  Sterile dressing applied       Fluid from right knee appeared normal, clear yellow fluid. Did not send for analysis as no concerns at this time.       Scribe Attestation      I,:  [facility] am acting as a scribe while in the presence of the attending physician.:       I,:  Sheryle Shove, DO personally performed the services described in this documentation    as scribed in my presence.:

## 2023-11-29 RX ORDER — TRIAMCINOLONE ACETONIDE 40 MG/ML
40 INJECTION, SUSPENSION INTRA-ARTICULAR; INTRAMUSCULAR
Status: COMPLETED | OUTPATIENT
Start: 2023-11-28 | End: 2023-11-28

## 2023-11-29 RX ORDER — LIDOCAINE HYDROCHLORIDE 10 MG/ML
5 INJECTION, SOLUTION INFILTRATION; PERINEURAL
Status: COMPLETED | OUTPATIENT
Start: 2023-11-28 | End: 2023-11-28

## 2023-11-29 RX ORDER — BUPIVACAINE HYDROCHLORIDE 2.5 MG/ML
2 INJECTION, SOLUTION INFILTRATION; PERINEURAL
Status: COMPLETED | OUTPATIENT
Start: 2023-11-28 | End: 2023-11-28

## 2023-12-04 ENCOUNTER — OFFICE VISIT (OUTPATIENT)
Dept: URGENT CARE | Age: 58
End: 2023-12-04
Payer: MEDICARE

## 2023-12-04 VITALS — RESPIRATION RATE: 18 BRPM | TEMPERATURE: 97.6 F | OXYGEN SATURATION: 99 % | HEART RATE: 79 BPM

## 2023-12-04 DIAGNOSIS — B00.1 COLD SORE: Primary | ICD-10-CM

## 2023-12-04 PROCEDURE — 99213 OFFICE O/P EST LOW 20 MIN: CPT

## 2023-12-04 PROCEDURE — G0463 HOSPITAL OUTPT CLINIC VISIT: HCPCS

## 2023-12-04 RX ORDER — VALACYCLOVIR HYDROCHLORIDE 1 G/1
1000 TABLET, FILM COATED ORAL DAILY
Qty: 5 TABLET | Refills: 0 | Status: SHIPPED | OUTPATIENT
Start: 2023-12-04 | End: 2023-12-09

## 2023-12-04 NOTE — PROGRESS NOTES
[facility]        NAME: Iqra Blankenship is a 63 y.o. male  : 1965    MRN: [id number]  DATE: 2023  TIME: 7:29 PM      Assessment and Plan     Cold sore [B00.1]  1. Cold sore  valACYclovir (VALTREX) 1,000 mg tablet            Patient Instructions       Use valtrex as directed. PCP follow-up in 3-5 days   Proceed to the ER if symptoms worsen   Chief Complaint     Patient relates lesion on top lip for past month         History of Present Illness     Patient is a 27-year-old male who presents with cold sore on his upper lip that started yesterday. States he has a history of herpes and has been on Valtrex in the past.  Denies fever. States he is also had some congestion in his chest.  Reports he does have asthma history and does have his medications at home        Review of Systems     Review of Systems   Constitutional:  Negative for fever. HENT:  Positive for mouth sores. Respiratory:  Positive for cough. Gastrointestinal:  Negative for diarrhea and vomiting. All other systems reviewed and are negative.         Current Medications       Current Outpatient Medications:     albuterol (PROVENTIL HFA,VENTOLIN HFA) 90 mcg/act inhaler, Inhale 1 puff if needed, Disp: , Rfl:     Chlorpheniramine Maleate (CHLOR-TRIMETON ALLERGY PO), Take by mouth as needed  , Disp: , Rfl:     esomeprazole (NexIUM) 40 MG capsule, Take 40 mg by mouth every morning before breakfast, Disp: , Rfl:     fluticasone-salmeterol (Advair) 500-50 mcg/dose inhaler, Inhale 1 puff 2 (two) times a day, Disp: , Rfl:     ibuprofen (MOTRIN) 600 mg tablet, Take 600 mg by mouth every 8 (eight) hours as needed for moderate pain, Disp: , Rfl:     ipratropium-albuterol (DUO-NEB) 0.5-2.5 mg/3 mL, Inhale 3 mL 2 (two) times a week , Disp: , Rfl:     levalbuterol (XOPENEX) 1.25 mg/3 mL nebulizer solution, Inhale 1.25 mg every 8 (eight) hours as needed, Disp: , Rfl:     lisinopril (ZESTRIL) 20 mg tablet, Take 1 tablet (20 mg total) by mouth daily, Disp: 90 tablet, Rfl: 1    montelukast (SINGULAIR) 10 mg tablet, Take 1 tablet (10 mg total) by mouth daily, Disp: 90 tablet, Rfl: 1    rosuvastatin (CRESTOR) 10 MG tablet, Take 1 tablet (10 mg total) by mouth daily, Disp: 90 tablet, Rfl: 3    Triamcinolone Acetonide (NASACORT ALLERGY 24HR NA), 1 spray into each nostril daily  , Disp: , Rfl:     Uloric 40 MG tablet, Take 1 tablet (40 mg total) by mouth daily, Disp: 90 tablet, Rfl: 1    valACYclovir (VALTREX) 1,000 mg tablet, Take 1 tablet (1,000 mg total) by mouth daily for 5 days, Disp: 5 tablet, Rfl: 0    verapamil (CALAN) 120 mg tablet, TAKE 1 TABLET BY MOUTH EVERY DAY, Disp: 90 tablet, Rfl: 3    acetaminophen (TYLENOL) 500 mg tablet, Take 1,000 mg by mouth every 6 (six) hours as needed for mild pain, Disp: , Rfl:     albuterol (2.5 mg/3 mL) 0.083 % nebulizer solution, Take 3 mL (2.5 mg total) by nebulization every 6 (six) hours as needed for wheezing or shortness of breath (Patient not taking: Reported on 11/28/2023), Disp: 240 mL, Rfl: 2    clotrimazole-betamethasone (LOTRISONE) 1-0.05 % cream, Apply topically 2 (two) times a day for 14 days, Disp: 45 g, Rfl: 3    ketoconazole (NIZORAL) 2 % cream, Apply topically daily (Patient not taking: Reported on 8/15/2023), Disp: 60 g, Rfl: 2    meclizine (ANTIVERT) 12.5 MG tablet, Take 1 tablet (12.5 mg total) by mouth every 8 (eight) hours (Patient taking differently: Take 12.5 mg by mouth every 8 (eight) hours as needed for dizziness), Disp: 30 tablet, Rfl: 1    Current Allergies     Allergies as of 12/04/2023 - Reviewed 12/04/2023   Allergen Reaction Noted    Penicillins Rash and Anaphylaxis 08/17/2004    Acetazolamide  10/25/2016    Cephalosporins Other (See Comments) 08/17/2004    Doxycycline  10/24/2018    Other  04/28/2014    Sulfa antibiotics Other (See Comments) 08/17/2004    Famotidine Palpitations 09/05/2016    Levofloxacin Palpitations 11/12/2021    Omeprazole Palpitations 09/05/2016 The following portions of the patient's history were reviewed and updated as appropriate: allergies, current medications, past family history, past medical history, past social history, past surgical history and problem list.     Past Medical History:   Diagnosis Date    Anxiety 1/15/2019    Arthritis     Chronic rhinitis     last assessed 2/21/14    Chronic serous otitis media     last assessed 11/20/13    Chronic sinusitis     last assessed 8/19/14    Functional heart murmur     GERD (gastroesophageal reflux disease)     Gout     Hearing problem     last assessed 12/1/16    Hiatal hernia     Hypercholesterolemia     Hypertension     Palpitations     Testicular hypogonadism     last assessed 10/4/13    V-[facility]ach [facility])        Past Surgical History:   Procedure Laterality Date    APPENDECTOMY      BICEPS TENODESIS      anesthesia for tenodesis- of ruptured long tendon of biceps     HERNIA REPAIR      THYROIDECTOMY      TONSILLECTOMY         Family History   Problem Relation Age of Onset    Lung cancer Father     Coronary artery disease Father         blockages    Stroke Maternal Grandmother     Cancer Paternal Grandfather         metastasized    Heart disease Family     Lung cancer Cousin     No Known Problems Mother     No Known Problems Sister     No Known Problems Brother     No Known Problems Maternal Aunt     No Known Problems Maternal Uncle     No Known Problems Paternal Aunt     No Known Problems Paternal Uncle     No Known Problems Paternal Grandmother     ADD / ADHD Neg Hx     Anesthesia problems Neg Hx     Clotting disorder Neg Hx     Collagen disease Neg Hx     Diabetes Neg Hx     Dislocations Neg Hx     Learning disabilities Neg Hx     Neurological problems Neg Hx     Osteoporosis Neg Hx     Rheumatologic disease Neg Hx     Scoliosis Neg Hx     Vascular Disease Neg Hx          Medications have been verified.         Objective     Pulse 79   Temp 97.6 °F (36.4 °C)   Resp 18   SpO2 99%   No LMP for male patient. Physical Exam     Physical Exam  Vitals and nursing note reviewed. Constitutional:       General: He is not in acute distress. Appearance: Normal appearance. He is not ill-appearing, toxic-appearing or diaphoretic. HENT:      Head:        Nose: Nose normal.      Mouth/Throat:      Lips: Pink. Mouth: Mucous membranes are moist.   Cardiovascular:      Rate and Rhythm: Normal rate. Pulses: Normal pulses. Heart sounds: Normal heart sounds, S1 normal and S2 normal.   Pulmonary:      Effort: Pulmonary effort is normal. No tachypnea. Breath sounds: Normal breath sounds and air entry. No stridor, decreased air movement or transmitted upper airway sounds. No decreased breath sounds, wheezing, rhonchi or rales. Skin:     General: Skin is warm. Capillary Refill: Capillary refill takes less than 2 seconds. Neurological:      General: No focal deficit present. Mental Status: He is alert. Psychiatric:         Mood and Affect: Mood normal.         Behavior: Behavior normal.         Thought Content:  Thought content normal.         Judgment: Judgment normal.

## 2023-12-04 NOTE — PATIENT INSTRUCTIONS
Use valtrex as directed.  PCP follow-up in 3-5 days   Proceed to the ER if symptoms worsen
negative -  no rash

## 2023-12-14 ENCOUNTER — RA CDI HCC (OUTPATIENT)
Dept: OTHER | Facility: HOSPITAL | Age: 58
End: 2023-12-14

## 2023-12-17 DIAGNOSIS — I10 ESSENTIAL HYPERTENSION: ICD-10-CM

## 2023-12-18 ENCOUNTER — HOSPITAL ENCOUNTER (OUTPATIENT)
Dept: NON INVASIVE DIAGNOSTICS | Facility: CLINIC | Age: 58
Discharge: HOME | End: 2023-12-18
Payer: MEDICARE

## 2023-12-18 VITALS
DIASTOLIC BLOOD PRESSURE: 93 MMHG | HEART RATE: 78 BPM | SYSTOLIC BLOOD PRESSURE: 142 MMHG | BODY MASS INDEX: 30.36 KG/M2 | WEIGHT: 205 LBS | HEIGHT: 69 IN

## 2023-12-18 DIAGNOSIS — I47.20 VENTRICULAR TACHYCARDIA (HCC): ICD-10-CM

## 2023-12-18 DIAGNOSIS — I10 ESSENTIAL HYPERTENSION: ICD-10-CM

## 2023-12-18 LAB
AORTIC ROOT: 3.5 CM
AORTIC VALVE MEAN VELOCITY: 10 M/S
APICAL FOUR CHAMBER EJECTION FRACTION: 56 %
ASCENDING AORTA: 3.8 CM
AV AREA BY CONTINUOUS VTI: 2.8 CM2
AV AREA PEAK VELOCITY: 2.9 CM2
AV LVOT MEAN GRADIENT: 3 MMHG
AV LVOT PEAK GRADIENT: 6 MMHG
AV MEAN GRADIENT: 5 MMHG
AV PEAK GRADIENT: 9 MMHG
AV VALVE AREA: 2.85 CM2
AV VELOCITY RATIO: 0.83
DOP CALC AO PEAK VEL: 1.49 M/S
DOP CALC AO VTI: 30.7 CM
DOP CALC LVOT AREA: 3.46 CM2
DOP CALC LVOT CARDIAC INDEX: 2.73 L/MIN/M2
DOP CALC LVOT CARDIAC OUTPUT: 5.7 L/MIN
DOP CALC LVOT DIAMETER: 2.1 CM
DOP CALC LVOT PEAK VEL VTI: 25.23 CM
DOP CALC LVOT PEAK VEL: 1.23 M/S
DOP CALC LVOT STROKE INDEX: 40.2 ML/M2
DOP CALC LVOT STROKE VOLUME: 87.34
E WAVE DECELERATION TIME: 214 MS
E/A RATIO: 1
FRACTIONAL SHORTENING: 35 (ref 28–44)
INTERVENTRICULAR SEPTUM IN DIASTOLE (PARASTERNAL SHORT AXIS VIEW): 1.2 CM
INTERVENTRICULAR SEPTUM: 1.2 CM (ref 0.6–1.1)
LAAS-AP2: 25.1 CM2
LAAS-AP4: 25.1 CM2
LEFT ATRIUM SIZE: 4.3 CM
LEFT ATRIUM VOLUME (MOD BIPLANE): 85 ML
LEFT ATRIUM VOLUME INDEX (MOD BIPLANE): 40.7 ML/M2
LEFT INTERNAL DIMENSION IN SYSTOLE: 3.2 CM (ref 2.1–4)
LEFT VENTRICULAR INTERNAL DIMENSION IN DIASTOLE: 4.9 CM (ref 3.5–6)
LEFT VENTRICULAR POSTERIOR WALL IN END DIASTOLE: 1.2 CM
LEFT VENTRICULAR STROKE VOLUME: 72 ML
LVSV (TEICH): 72 ML
MV E'TISSUE VEL-LAT: 18 CM/S
MV E'TISSUE VEL-SEP: 12 CM/S
MV PEAK A VEL: 0.73 M/S
MV PEAK E VEL: 73 CM/S
RIGHT ATRIUM AREA SYSTOLE A4C: 19.1 CM2
RIGHT VENTRICLE ID DIMENSION: 4.1 CM
SL CV LEFT ATRIUM LENGTH A2C: 6 CM
SL CV LV EF: 65
SL CV PED ECHO LEFT VENTRICLE DIASTOLIC VOLUME (MOD BIPLANE) 2D: 112 ML
SL CV PED ECHO LEFT VENTRICLE SYSTOLIC VOLUME (MOD BIPLANE) 2D: 40 ML
TR MAX PG: 25 MMHG
TR PEAK VELOCITY: 2.5 M/S
TRICUSPID ANNULAR PLANE SYSTOLIC EXCURSION: 2.2 CM
TRICUSPID VALVE PEAK REGURGITATION VELOCITY: 2.51 M/S

## 2023-12-18 PROCEDURE — 93306 TTE W/DOPPLER COMPLETE: CPT

## 2023-12-18 PROCEDURE — 93306 TTE W/DOPPLER COMPLETE: CPT | Performed by: INTERNAL MEDICINE

## 2023-12-18 RX ORDER — VERAPAMIL HYDROCHLORIDE 120 MG/1
TABLET, FILM COATED ORAL
Qty: 90 TABLET | Refills: 3 | Status: SHIPPED | OUTPATIENT
Start: 2023-12-18

## 2023-12-20 ENCOUNTER — OFFICE VISIT (OUTPATIENT)
Dept: INTERNAL MEDICINE CLINIC | Age: 58
End: 2023-12-20
Payer: MEDICARE

## 2023-12-20 VITALS
WEIGHT: 194 LBS | DIASTOLIC BLOOD PRESSURE: 84 MMHG | HEART RATE: 67 BPM | SYSTOLIC BLOOD PRESSURE: 132 MMHG | OXYGEN SATURATION: 97 % | HEIGHT: 69 IN | TEMPERATURE: 98 F | BODY MASS INDEX: 28.73 KG/M2

## 2023-12-20 DIAGNOSIS — M1A.9XX0 CHRONIC GOUT WITHOUT TOPHUS, UNSPECIFIED CAUSE, UNSPECIFIED SITE: ICD-10-CM

## 2023-12-20 DIAGNOSIS — I10 ESSENTIAL HYPERTENSION: ICD-10-CM

## 2023-12-20 DIAGNOSIS — J30.89 NON-SEASONAL ALLERGIC RHINITIS, UNSPECIFIED TRIGGER: ICD-10-CM

## 2023-12-20 DIAGNOSIS — E07.9 THYROID DISORDER: ICD-10-CM

## 2023-12-20 DIAGNOSIS — E78.2 MIXED HYPERLIPIDEMIA: ICD-10-CM

## 2023-12-20 DIAGNOSIS — I47.20 VENTRICULAR TACHYCARDIA (HCC): ICD-10-CM

## 2023-12-20 DIAGNOSIS — K21.9 GERD WITHOUT ESOPHAGITIS: Primary | ICD-10-CM

## 2023-12-20 DIAGNOSIS — Z00.00 MEDICARE ANNUAL WELLNESS VISIT, SUBSEQUENT: ICD-10-CM

## 2023-12-20 DIAGNOSIS — R73.9 HYPERGLYCEMIA: ICD-10-CM

## 2023-12-20 PROBLEM — B35.9 TINEA: Status: RESOLVED | Noted: 2022-07-13 | Resolved: 2023-12-20

## 2023-12-20 PROCEDURE — G0439 PPPS, SUBSEQ VISIT: HCPCS | Performed by: INTERNAL MEDICINE

## 2023-12-20 PROCEDURE — 99214 OFFICE O/P EST MOD 30 MIN: CPT | Performed by: INTERNAL MEDICINE

## 2023-12-20 NOTE — PROGRESS NOTES
Assessment and Plan:     Problem List Items Addressed This Visit          Digestive    GERD without esophagitis - Primary     Doing well on present regimen.  Continue with the Nexium.  Continue with healthy diet         Relevant Orders    CBC       Endocrine    Thyroid disorder     Stable on present regimen.         Relevant Orders    TSH, 3rd generation with Free T4 reflex       Respiratory    Allergic rhinitis       Cardiovascular and Mediastinum    Essential hypertension     Blood pressure is stable on present regimen.  Continue with low-salt diet         Relevant Orders    Comprehensive metabolic panel    Ventricular tachycardia (HCC)     Stable.  Managed by cardiologist            Other    Gout     Continue with Uloric.         Hyperlipidemia     Patient stopped taking statin again.  But he is overdue for lipid profile advised him to do as soon as possible         Relevant Orders    Lipid Panel with Direct LDL reflex    Hyperglycemia     Will check hemoglobin A1c.  Advised for low sugar/low-carb diet         Relevant Orders    Hemoglobin A1C     Other Visit Diagnoses       Medicare annual wellness visit, subsequent                Depression Screening and Follow-up Plan: Patient was screened for depression during today's encounter. They screened negative with a PHQ-2 score of 0.      Preventive health issues were discussed with patient, and age appropriate screening tests were ordered as noted in patient's After Visit Summary.  Personalized health advice and appropriate referrals for health education or preventive services given if needed, as noted in patient's After Visit Summary.     History of Present Illness:     Patient presents for a Medicare Wellness Visit    Patient is here for follow-up.  Also for AWV.  Was unable to do blood test again.       Patient Care Team:  Javier Delacruz MD as PCP - General  DO Fred Hills MD Glenn Short, MD (Gastroenterology)     Review of  Systems:     Review of Systems   Constitutional:  Negative for fatigue and fever.   HENT:  Negative for congestion, ear discharge, ear pain, postnasal drip, sinus pressure, sore throat, tinnitus and trouble swallowing.    Eyes:  Negative for discharge, itching and visual disturbance.   Respiratory:  Negative for cough and shortness of breath.    Cardiovascular:  Negative for chest pain and palpitations.   Gastrointestinal:  Negative for abdominal pain, diarrhea, nausea and vomiting.   Endocrine: Negative for cold intolerance and polyuria.   Genitourinary:  Negative for difficulty urinating, dysuria and urgency.   Musculoskeletal:  Negative for arthralgias and neck pain.   Skin:  Negative for rash.   Allergic/Immunologic: Negative for environmental allergies.   Neurological:  Negative for dizziness, weakness and headaches.   Psychiatric/Behavioral:  Negative for agitation and behavioral problems. The patient is not nervous/anxious.         Problem List:     Patient Active Problem List   Diagnosis    Allergic rhinitis    Asthma    Essential hypertension    Benign paroxysmal positional vertigo    Chronic bilateral low back pain without sciatica    DJD (degenerative joint disease) of knee    GERD without esophagitis    Gout    Hyperlipidemia    Primary localized osteoarthritis of left knee    Primary localized osteoarthritis of right knee    Tenosynovitis of foot    Thyroid disorder    Ventricular tachycardia (HCC)    Anxiety    Chronic pain of both knees    Abnormal liver function test    Epidermoid cyst    Dysfunction of left eustachian tube    Effusion of right knee    Bilateral primary osteoarthritis of knee    Hyperglycemia      Past Medical and Surgical History:     Past Medical History:   Diagnosis Date    Anxiety 1/15/2019    Arthritis     Chronic rhinitis     last assessed 2/21/14    Chronic serous otitis media     last assessed 11/20/13    Chronic sinusitis     last assessed 8/19/14    Functional heart murmur      GERD (gastroesophageal reflux disease)     Gout     Hearing problem     last assessed 12/1/16    Hiatal hernia     Hypercholesterolemia     Hypertension     Palpitations     Testicular hypogonadism     last assessed 10/4/13    V-tach (HCC)      Past Surgical History:   Procedure Laterality Date    APPENDECTOMY      BICEPS TENODESIS      anesthesia for tenodesis- of ruptured long tendon of biceps     HERNIA REPAIR      THYROIDECTOMY      TONSILLECTOMY        Family History:     Family History   Problem Relation Age of Onset    Lung cancer Father     Coronary artery disease Father         blockages    Stroke Maternal Grandmother     Cancer Paternal Grandfather         metastasized    Heart disease Family     Lung cancer Cousin     No Known Problems Mother     No Known Problems Sister     No Known Problems Brother     No Known Problems Maternal Aunt     No Known Problems Maternal Uncle     No Known Problems Paternal Aunt     No Known Problems Paternal Uncle     No Known Problems Paternal Grandmother     ADD / ADHD Neg Hx     Anesthesia problems Neg Hx     Clotting disorder Neg Hx     Collagen disease Neg Hx     Diabetes Neg Hx     Dislocations Neg Hx     Learning disabilities Neg Hx     Neurological problems Neg Hx     Osteoporosis Neg Hx     Rheumatologic disease Neg Hx     Scoliosis Neg Hx     Vascular Disease Neg Hx       Social History:     Social History     Socioeconomic History    Marital status: Single     Spouse name: None    Number of children: None    Years of education: None    Highest education level: None   Occupational History    Occupation: unemployed   Tobacco Use    Smoking status: Never    Smokeless tobacco: Never   Vaping Use    Vaping status: Never Used   Substance and Sexual Activity    Alcohol use: Yes     Comment: occasionally    Drug use: No    Sexual activity: Not Currently   Other Topics Concern    None   Social History Narrative    Travel hx- pt denies being out of the country during  1/2014-10/28/14     Social Determinants of Health     Financial Resource Strain: Low Risk  (12/20/2023)    Overall Financial Resource Strain (CARDIA)     Difficulty of Paying Living Expenses: Not hard at all   Food Insecurity: Not on file   Transportation Needs: No Transportation Needs (12/20/2023)    PRAPARE - Transportation     Lack of Transportation (Medical): No     Lack of Transportation (Non-Medical): No   Physical Activity: Not on file   Stress: Not on file   Social Connections: Not on file   Intimate Partner Violence: Not on file   Housing Stability: Not on file      Medications and Allergies:     Current Outpatient Medications   Medication Sig Dispense Refill    acetaminophen (TYLENOL) 500 mg tablet Take 1,000 mg by mouth every 6 (six) hours as needed for mild pain      albuterol (PROVENTIL HFA,VENTOLIN HFA) 90 mcg/act inhaler Inhale 1 puff if needed      Chlorpheniramine Maleate (CHLOR-TRIMETON ALLERGY PO) Take by mouth as needed        esomeprazole (NexIUM) 40 MG capsule Take 40 mg by mouth every morning before breakfast      fluticasone-salmeterol (Advair) 500-50 mcg/dose inhaler Inhale 1 puff 2 (two) times a day      ibuprofen (MOTRIN) 600 mg tablet Take 600 mg by mouth every 8 (eight) hours as needed for moderate pain      ipratropium-albuterol (DUO-NEB) 0.5-2.5 mg/3 mL Inhale 3 mL 2 (two) times a week       levalbuterol (XOPENEX) 1.25 mg/3 mL nebulizer solution Inhale 1.25 mg every 8 (eight) hours as needed      lisinopril (ZESTRIL) 20 mg tablet Take 1 tablet (20 mg total) by mouth daily 90 tablet 1    meclizine (ANTIVERT) 12.5 MG tablet Take 1 tablet (12.5 mg total) by mouth every 8 (eight) hours (Patient taking differently: Take 12.5 mg by mouth every 8 (eight) hours as needed for dizziness) 30 tablet 1    montelukast (SINGULAIR) 10 mg tablet Take 1 tablet (10 mg total) by mouth daily 90 tablet 1    Triamcinolone Acetonide (NASACORT ALLERGY 24HR NA) 1 spray into each nostril daily        Uloric 40  MG tablet Take 1 tablet (40 mg total) by mouth daily 90 tablet 1    verapamil (CALAN) 120 mg tablet TAKE 1 TABLET BY MOUTH EVERY DAY 90 tablet 3    albuterol (2.5 mg/3 mL) 0.083 % nebulizer solution Take 3 mL (2.5 mg total) by nebulization every 6 (six) hours as needed for wheezing or shortness of breath (Patient not taking: Reported on 11/28/2023) 240 mL 2    clotrimazole-betamethasone (LOTRISONE) 1-0.05 % cream Apply topically 2 (two) times a day for 14 days 45 g 3    rosuvastatin (CRESTOR) 10 MG tablet Take 1 tablet (10 mg total) by mouth daily (Patient not taking: Reported on 12/20/2023) 90 tablet 3     No current facility-administered medications for this visit.     Allergies   Allergen Reactions    Penicillins Rash and Anaphylaxis    Acetazolamide      Other reaction(s): Stomach Ache    Cephalosporins Other (See Comments)     headache    Doxycycline      Capsules only. Tablets are ok to take, per pt.   Capsules only. Tablets are ok to take, per pt.     Other     Sulfa Antibiotics Other (See Comments)     Category: Allergy; Annotation - [id number]: STOMACH UPSET    Famotidine Palpitations    Levofloxacin Palpitations    Omeprazole Palpitations     Painful urination      Immunizations:     Immunization History   Administered Date(s) Administered    COVID-19 PFIZER VACCINE 0.3 ML IM 07/23/2021, 08/13/2021    COVID-19 Pfizer vac (Basilio-sucrose, gray cap) 12 yr+ IM 07/29/2022    INFLUENZA 11/01/2003, 10/28/2005, 10/23/2006, 10/29/2007, 10/29/2008, 10/29/2008, 10/23/2009, 10/23/2009, 10/29/2012, 10/25/2022, 11/08/2023    Influenza Quadrivalent Preservative Free 3 years and older IM 10/20/2015    Influenza Quadrivalent, 6-35 Months IM 10/29/2014, 11/04/2016    Influenza, injectable, quadrivalent, preservative free 0.5 mL 10/02/2018, 11/08/2023    Influenza, recombinant, quadrivalent,injectable, preservative free 10/09/2019, 11/05/2020, 10/18/2021, 10/25/2022    Influenza, seasonal, injectable 10/06/2010, 10/06/2010,  10/11/2011, 10/11/2011, 09/27/2012, 09/27/2012, 11/06/2013, 11/15/2017    Td (adult), adsorbed 02/14/2006, 02/14/2006    Tdap 03/28/2023      Health Maintenance:         Topic Date Due    HIV Screening  Never done    Colorectal Cancer Screening  Never done    Hepatitis C Screening  Completed         Topic Date Due    Pneumococcal Vaccine: Pediatrics (0 to 5 Years) and At-Risk Patients (6 to 64 Years) (1 - PCV) Never done    COVID-19 Vaccine (4 - 2023-24 season) 09/01/2023      Medicare Screening Tests and Risk Assessments:     Alexx is here for his Subsequent Wellness visit. Last Medicare Wellness visit information reviewed, patient interviewed and updates made to the record today.      Health Risk Assessment:   Patient rates overall health as good. Patient feels that their physical health rating is slightly worse. Patient is very satisfied with their life. Eyesight was rated as same. Hearing was rated as same. Patient feels that their emotional and mental health rating is same. Patients states they are never, rarely angry. Patient states they are often unusually tired/fatigued. Pain experienced in the last 7 days has been a lot. Patient's pain rating has been 5/10. Patient states that he has experienced no weight loss or gain in last 6 months.     Depression Screening:   PHQ-2 Score: 0      Fall Risk Screening:   In the past year, patient has experienced: no history of falling in past year      Home Safety:  Patient has trouble with stairs inside or outside of their home. Patient has working smoke alarms and has no working carbon monoxide detector. Home safety hazards include: none.     Nutrition:   Current diet is Regular.     Medications:   Patient is currently taking over-the-counter supplements. OTC medications include: see medication list. Patient is able to manage medications.     Activities of Daily Living (ADLs)/Instrumental Activities of Daily Living (IADLs):   Walk and transfer into and out of bed and  chair?: Yes  Dress and groom yourself?: Yes    Bathe or shower yourself?: Yes    Feed yourself? Yes  Do your laundry/housekeeping?: Yes  Manage your money, pay your bills and track your expenses?: Yes  Make your own meals?: Yes    Do your own shopping?: Yes    Previous Hospitalizations:   Any hospitalizations or ED visits within the last 12 months?: No      Advance Care Planning:   Living will: No    Durable POA for healthcare: No    Advanced directive: No    Advanced directive counseling given: Yes    ACP document given: Yes      Cognitive Screening:   Provider or family/friend/caregiver concerned regarding cognition?: No    PREVENTIVE SCREENINGS      Cardiovascular Screening:    General: Screening Not Indicated and History Lipid Disorder      Diabetes Screening:     General: Screening Current      Colorectal Cancer Screening:     General: Risks and Benefits Discussed    Due for: Cologuard      Prostate Cancer Screening:    General: Risks and Benefits Discussed    Due for: PSA      Osteoporosis Screening:    General: Screening Not Indicated      Abdominal Aortic Aneurysm (AAA) Screening:        General: Screening Not Indicated      Lung Cancer Screening:     General: Screening Not Indicated      Hepatitis C Screening:    General: Screening Current    Screening, Brief Intervention, and Referral to Treatment (SBIRT)    Screening  Typical number of drinks in a day: 2  Typical number of drinks in a week: 10  Interpretation: Low risk drinking behavior.    Single Item Drug Screening:  How often have you used an illegal drug (including marijuana) or a prescription medication for non-medical reasons in the past year? never    Single Item Drug Screen Score: 0  Interpretation: Negative screen for possible drug use disorder    Brief Intervention  Healthy alcohol use/limits discussed.     Other Counseling Topics:   Car/seat belt/driving safety, skin self-exam, sunscreen and calcium and vitamin D intake and regular  "weightbearing exercise.     No results found.     Physical Exam:     /84 (BP Location: Left arm, Patient Position: Sitting, Cuff Size: Standard)   Pulse 67   Temp 98 °F (36.7 °C) (Temporal)   Ht 5' 9\" (1.753 m)   Wt 88 kg (194 lb)   SpO2 97%   BMI 28.65 kg/m²     Physical Exam  Vitals and nursing note reviewed.   Constitutional:       General: He is not in acute distress.     Appearance: He is well-developed.   HENT:      Head: Normocephalic and atraumatic.      Right Ear: Ear canal and external ear normal. There is no impacted cerumen.      Left Ear: Ear canal and external ear normal. There is no impacted cerumen.      Nose: No rhinorrhea.      Mouth/Throat:      Pharynx: No posterior oropharyngeal erythema.   Eyes:      Conjunctiva/sclera: Conjunctivae normal.   Cardiovascular:      Rate and Rhythm: Normal rate and regular rhythm.      Heart sounds: No murmur heard.  Pulmonary:      Effort: Pulmonary effort is normal. No respiratory distress.      Breath sounds: Normal breath sounds.   Abdominal:      Palpations: Abdomen is soft.      Tenderness: There is no abdominal tenderness.   Musculoskeletal:         General: No swelling.      Cervical back: Neck supple.      Right lower leg: No edema.      Left lower leg: No edema.   Skin:     General: Skin is warm and dry.      Capillary Refill: Capillary refill takes less than 2 seconds.   Neurological:      Mental Status: He is alert and oriented to person, place, and time.   Psychiatric:         Mood and Affect: Mood normal.          Javier Delacruz MD  "

## 2023-12-20 NOTE — ASSESSMENT & PLAN NOTE
Patient stopped taking statin again.  But he is overdue for lipid profile advised him to do as soon as possible

## 2024-01-12 ENCOUNTER — OFFICE VISIT (OUTPATIENT)
Dept: CARDIOLOGY CLINIC | Facility: CLINIC | Age: 59
End: 2024-01-12
Payer: MEDICARE

## 2024-01-12 VITALS
SYSTOLIC BLOOD PRESSURE: 132 MMHG | DIASTOLIC BLOOD PRESSURE: 80 MMHG | HEART RATE: 75 BPM | WEIGHT: 200 LBS | OXYGEN SATURATION: 98 % | BODY MASS INDEX: 29.53 KG/M2

## 2024-01-12 DIAGNOSIS — I47.20 VENTRICULAR TACHYCARDIA (HCC): ICD-10-CM

## 2024-01-12 DIAGNOSIS — E78.2 MIXED HYPERLIPIDEMIA: Primary | ICD-10-CM

## 2024-01-12 DIAGNOSIS — I10 ESSENTIAL HYPERTENSION: ICD-10-CM

## 2024-01-12 PROCEDURE — 99213 OFFICE O/P EST LOW 20 MIN: CPT | Performed by: INTERNAL MEDICINE

## 2024-01-15 ENCOUNTER — OFFICE VISIT (OUTPATIENT)
Dept: INTERNAL MEDICINE CLINIC | Facility: OTHER | Age: 59
End: 2024-01-15
Payer: MEDICARE

## 2024-01-15 VITALS
BODY MASS INDEX: 30.27 KG/M2 | SYSTOLIC BLOOD PRESSURE: 138 MMHG | HEIGHT: 69 IN | WEIGHT: 204.4 LBS | OXYGEN SATURATION: 97 % | TEMPERATURE: 99.3 F | HEART RATE: 87 BPM | DIASTOLIC BLOOD PRESSURE: 82 MMHG | RESPIRATION RATE: 20 BRPM

## 2024-01-15 DIAGNOSIS — J06.9 VIRAL URI WITH COUGH: Primary | ICD-10-CM

## 2024-01-15 PROCEDURE — 99213 OFFICE O/P EST LOW 20 MIN: CPT | Performed by: INTERNAL MEDICINE

## 2024-01-15 PROCEDURE — 87636 SARSCOV2 & INF A&B AMP PRB: CPT | Performed by: INTERNAL MEDICINE

## 2024-01-15 RX ORDER — PSEUDOEPHEDRINE HCL 30 MG
25 TABLET ORAL DAILY
COMMUNITY

## 2024-01-15 NOTE — PROGRESS NOTES
Assessment/Plan:    Viral URI with cough  Discussed droplet precautions.   Discussed OTC medications for symptoms relief.  He is to contact our office for worsening symptoms.   Will test for COVID/Influenza via PCR.        Diagnoses and all orders for this visit:    Viral URI with cough    Other orders  -     pseudoephedrine (SUDAFED) 30 mg tablet; Take 25 mg by mouth in the morning                  Subjective:      Patient ID: Alexx Haney is a 58 y.o. male.    Chief Complaint   Patient presents with   • balance issues     Last week    • Sinus Problem     Off and on all season    • head congestion     Yellow mucus started Friday night    • Fever     101.4 this morning    • jury duty     Wanted to know if he can have a letter to excuse him        58 year old male is seen today with concern for lightheadedness and steadiness.   Symptoms started 4 days ago.   He is also experiencing generalized body aches and headaches. He is also experiencing sinusitis.     Sinus Problem  The current episode started in the past 7 days. The problem is unchanged. Pertinent negatives include no chills, congestion, coughing, diaphoresis, headaches, shortness of breath, sinus pressure, sneezing or sore throat.   Fever  Pertinent negatives include no abdominal pain, chest pain, chills, congestion, coughing, diaphoresis, fatigue, fever, headaches, nausea, numbness, sore throat, vomiting or weakness.       The following portions of the patient's history were reviewed and updated as appropriate: allergies, current medications, past family history, past medical history, past social history, past surgical history, and problem list.    Review of Systems   Constitutional:  Negative for activity change, appetite change, chills, diaphoresis, fatigue and fever.   HENT:  Negative for congestion, postnasal drip, rhinorrhea, sinus pressure, sinus pain, sneezing and sore throat.    Eyes:  Negative for visual disturbance.   Respiratory:  Negative for  apnea, cough, choking, chest tightness, shortness of breath and wheezing.    Cardiovascular:  Negative for chest pain, palpitations and leg swelling.   Gastrointestinal:  Negative for abdominal distention, abdominal pain, anal bleeding, blood in stool, constipation, diarrhea, nausea and vomiting.   Endocrine: Negative for cold intolerance and heat intolerance.   Genitourinary:  Negative for difficulty urinating, dysuria and hematuria.   Musculoskeletal: Negative.    Skin: Negative.    Neurological:  Negative for dizziness, weakness, light-headedness, numbness and headaches.   Hematological:  Negative for adenopathy.   Psychiatric/Behavioral:  Negative for agitation, sleep disturbance and suicidal ideas.    All other systems reviewed and are negative.        Past Medical History:   Diagnosis Date   • Anxiety 1/15/2019   • Arthritis    • Chronic rhinitis     last assessed 2/21/14   • Chronic serous otitis media     last assessed 11/20/13   • Chronic sinusitis     last assessed 8/19/14   • Functional heart murmur    • GERD (gastroesophageal reflux disease)    • Gout    • Hearing problem     last assessed 12/1/16   • Hiatal hernia    • Hypercholesterolemia    • Hypertension    • Palpitations    • Testicular hypogonadism     last assessed 10/4/13   • V[facility]tach ([facility])          Current Outpatient Medications:   •  acetaminophen (TYLENOL) 500 mg tablet, Take 1,000 mg by mouth every 6 (six) hours as needed for mild pain, Disp: , Rfl:   •  albuterol (2.5 mg/3 mL) 0.083 % nebulizer solution, Take 3 mL (2.5 mg total) by nebulization every 6 (six) hours as needed for wheezing or shortness of breath, Disp: 240 mL, Rfl: 2  •  albuterol (PROVENTIL HFA,VENTOLIN HFA) 90 mcg/act inhaler, Inhale 1 puff if needed, Disp: , Rfl:   •  Chlorpheniramine Maleate (CHLOR-TRIMETON ALLERGY PO), Take by mouth as needed, Disp: , Rfl:   •  esomeprazole (NexIUM) 40 MG capsule, Take 40 mg by mouth every morning before breakfast, Disp: , Rfl:   •   fluticasone-salmeterol (Advair) 500-50 mcg/dose inhaler, Inhale 1 puff 2 (two) times a day, Disp: , Rfl:   •  ibuprofen (MOTRIN) 600 mg tablet, Take 600 mg by mouth every 8 (eight) hours as needed for moderate pain, Disp: , Rfl:   •  ipratropium-albuterol (DUO-NEB) 0.5-2.5 mg/3 mL, Inhale 3 mL 2 (two) times a week , Disp: , Rfl:   •  lisinopril (ZESTRIL) 20 mg tablet, Take 1 tablet (20 mg total) by mouth daily, Disp: 90 tablet, Rfl: 1  •  meclizine (ANTIVERT) 12.5 MG tablet, Take 1 tablet (12.5 mg total) by mouth every 8 (eight) hours (Patient taking differently: Take 12.5 mg by mouth every 8 (eight) hours as needed), Disp: 30 tablet, Rfl: 1  •  montelukast (SINGULAIR) 10 mg tablet, Take 1 tablet (10 mg total) by mouth daily, Disp: 90 tablet, Rfl: 1  •  pseudoephedrine (SUDAFED) 30 mg tablet, Take 25 mg by mouth in the morning, Disp: , Rfl:   •  Triamcinolone Acetonide (NASACORT ALLERGY 24HR NA), 1 spray into each nostril daily  , Disp: , Rfl:   •  Uloric 40 MG tablet, Take 1 tablet (40 mg total) by mouth daily, Disp: 90 tablet, Rfl: 1  •  verapamil (CALAN) 120 mg tablet, TAKE 1 TABLET BY MOUTH EVERY DAY, Disp: 90 tablet, Rfl: 3  •  clotrimazole-betamethasone (LOTRISONE) 1-0.05 % cream, Apply topically 2 (two) times a day for 14 days, Disp: 45 g, Rfl: 3  •  levalbuterol (XOPENEX) 1.25 mg/3 mL nebulizer solution, Inhale 1.25 mg every 8 (eight) hours as needed (Patient not taking: Reported on 1/12/2024), Disp: , Rfl:   •  rosuvastatin (CRESTOR) 10 MG tablet, Take 1 tablet (10 mg total) by mouth daily (Patient not taking: Reported on 12/20/2023), Disp: 90 tablet, Rfl: 3    Allergies   Allergen Reactions   • Penicillins Rash and Anaphylaxis   • Acetazolamide      Other reaction(s): Stomach Ache   • Cephalosporins Other (See Comments)     headache   • Doxycycline      Capsules only. Tablets are ok to take, per pt.   Capsules only. Tablets are ok to take, per pt.    • Other    • Sulfa Antibiotics Other (See Comments)      "Category: Allergy; Annotation - [id number]: STOMACH UPSET   • Famotidine Palpitations   • Levofloxacin Palpitations   • Omeprazole Palpitations     Painful urination       Social History   Past Surgical History:   Procedure Laterality Date   • APPENDECTOMY     • BICEPS TENODESIS      anesthesia for tenodesis- of ruptured long tendon of biceps    • HERNIA REPAIR     • THYROIDECTOMY     • TONSILLECTOMY       Family History   Problem Relation Age of Onset   • Lung cancer Father    • Coronary artery disease Father         blockages   • Stroke Maternal Grandmother    • Cancer Paternal Grandfather         metastasized   • Heart disease Family    • Lung cancer Cousin    • No Known Problems Mother    • No Known Problems Sister    • No Known Problems Brother    • No Known Problems Maternal Aunt    • No Known Problems Maternal Uncle    • No Known Problems Paternal Aunt    • No Known Problems Paternal Uncle    • No Known Problems Paternal Grandmother    • ADD / ADHD Neg Hx    • Anesthesia problems Neg Hx    • Clotting disorder Neg Hx    • Collagen disease Neg Hx    • Diabetes Neg Hx    • Dislocations Neg Hx    • Learning disabilities Neg Hx    • Neurological problems Neg Hx    • Osteoporosis Neg Hx    • Rheumatologic disease Neg Hx    • Scoliosis Neg Hx    • Vascular Disease Neg Hx        Objective:  /82 (BP Location: Right arm, Patient Position: Sitting, Cuff Size: Standard)   Pulse 87   Temp 99.3 °F (37.4 °C) (Temporal)   Resp 20   Ht 5' 9\" (1.753 m)   Wt 92.7 kg (204 lb 6.4 oz)   SpO2 97%   BMI 30.18 kg/m²     Recent Results (from the past 1344 hour(s))   Echo complete w/ contrast if indicated    Collection Time: 12/18/23  3:40 PM   Result Value Ref Range    Triscuspid Valve Regurgitation Peak Gradient 25.0 mmHg    RAA A4C 19.1 cm2    LA Volume Index (BP) 40.7 mL/m2    MV Peak A Wing 0.73 m/s    MV Peak E Wing 73 cm/s    AV peak gradient 9 mmHg    LVOT stroke volume 87.34     Ao VTI 30.7 cm    Aortic valve peak " velocity 1.49 m/s    LVOT peak VTI 25.23 cm    LVOT peak wing 1.23 m/s    LVOT diameter 2.1 cm    E wave deceleration time 214 ms    E/A ratio 1.00     AV LVOT peak gradient 6 mmHg    AV mean gradient 5 mmHg    TR Peak Wing 2.5 m/s    AV area peak wing 2.9 cm2    AV area by cont VTI 2.8 cm2    LVOT mn grad 3.0 mmHg    RVID d 4.1 cm    A4C EF 56 %    Aortic valve mean velocity 10.00 m/s    Tricuspid valve peak regurgitation velocity 2.51 m/s    Left ventricular stroke volume (2D) 72.00 mL    IVSd 1.20 cm    Tricuspid annular plane systolic excursion 2.20 cm    Ao root 3.50 cm    LVPWd 1.20 cm    LA size 4.3 cm    Asc Ao 3.8 cm    LA volume (BP) 85 mL    FS 35 28 - 44    LVIDS 3.20 cm    IVS 1.2 cm    LVIDd 4.90 cm    LA length (A2C) 6.00 cm    LEFT VENTRICLE SYSTOLIC VOLUME (MOD BIPLANE) 2D 40 mL    LV DIASTOLIC VOLUME (MOD BIPLANE) 2D 112 mL    LVOT Cardiac Index 2.73 l/min/m2    LVOT stroke volume index 40.20 ml/m2    LVOT Cardiac Output 5.70 l/min    Left Atrium Area-systolic Four Chamber 25.1 cm2    Left Atrium Area-systolic Apical Two Chamber 25.1 cm2    MV E' Tissue Velocity Lateral 18 cm/s    MV E' Tissue Velocity Septal 12 cm/s    LVSV, 2D 72 mL    LVOT area 3.46 cm2    DVI 0.83     AV valve area 2.85 cm2    LV EF 65             Physical Exam  Vitals and nursing note reviewed.   Constitutional:       General: He is not in acute distress.     Appearance: He is well-developed. He is not diaphoretic.   HENT:      Head: Normocephalic and atraumatic.   Eyes:      General: No scleral icterus.        Right eye: No discharge.         Left eye: No discharge.      Conjunctiva/sclera: Conjunctivae normal.      Pupils: Pupils are equal, round, and reactive to light.   Neck:      Thyroid: No thyromegaly.      Vascular: No JVD.   Cardiovascular:      Rate and Rhythm: Normal rate and regular rhythm.      Heart sounds: Normal heart sounds. No murmur heard.     No friction rub. No gallop.   Pulmonary:      Effort: Pulmonary  effort is normal. No respiratory distress.      Breath sounds: Normal breath sounds. No wheezing or rales.   Chest:      Chest wall: No tenderness.   Abdominal:      General: Bowel sounds are normal. There is no distension.      Palpations: Abdomen is soft. There is no mass.      Tenderness: There is no abdominal tenderness. There is no guarding or rebound.   Musculoskeletal:         General: No tenderness or deformity. Normal range of motion.      Cervical back: Normal range of motion and neck supple.   Lymphadenopathy:      Cervical: No cervical adenopathy.   Skin:     General: Skin is warm and dry.      Coloration: Skin is not pale.      Findings: No erythema or rash.   Neurological:      Mental Status: He is alert and oriented to person, place, and time.      Cranial Nerves: No cranial nerve deficit.      Coordination: Coordination normal.      Deep Tendon Reflexes: Reflexes are normal and symmetric.   Psychiatric:         Behavior: Behavior normal.         Thought Content: Thought content normal.         Judgment: Judgment normal.

## 2024-01-15 NOTE — ASSESSMENT & PLAN NOTE
Discussed droplet precautions.   Discussed OTC medications for symptoms relief.  He is to contact our office for worsening symptoms.   Will test for COVID/Influenza via PCR.

## 2024-01-15 NOTE — LETTER
Date: 1/15/2024    To whom it may concern:     This is to certify that Alexx Haney has been under my care for the following diagnosis: Anxiety        I feel that Alexx Haney is unable to serve on Jury Duty at this time for the above mentioned medical reasons.                  Sincerely,  Kaiden Beltrán MD

## 2024-01-15 NOTE — LETTER
Date: 1/15/2024    To whom it may concern:     This is to certify that Alexx Haney has been under my care for the following diagnosis: Anxiety which is a chronic condition and likely lifelong.         I feel that Alexx Haney is unable to serve on Jury Duty at this time for the above mentioned medical reasons.                  Sincerely,  Kaiden Beltrán MD

## 2024-01-16 DIAGNOSIS — U07.1 COVID-19: Primary | ICD-10-CM

## 2024-01-16 LAB
FLUAV RNA RESP QL NAA+PROBE: NEGATIVE
FLUBV RNA RESP QL NAA+PROBE: NEGATIVE
SARS-COV-2 RNA RESP QL NAA+PROBE: POSITIVE

## 2024-01-16 RX ORDER — NIRMATRELVIR AND RITONAVIR 300-100 MG
3 KIT ORAL 2 TIMES DAILY
Qty: 30 TABLET | Refills: 0 | Status: SHIPPED | OUTPATIENT
Start: 2024-01-16 | End: 2024-01-21

## 2024-01-17 DIAGNOSIS — I10 HYPERTENSION, UNSPECIFIED TYPE: ICD-10-CM

## 2024-01-17 RX ORDER — LISINOPRIL 20 MG/1
20 TABLET ORAL DAILY
Qty: 90 TABLET | Refills: 1 | Status: SHIPPED | OUTPATIENT
Start: 2024-01-17

## 2024-01-27 NOTE — PROGRESS NOTES
Cardiology Follow Up    Alexx Haney  1965  [id number]  [facility]  [address]  [place] [id number] [phone]    1. Mixed hyperlipidemia        2. Ventricular tachycardia (HCC)        3. Essential hypertension              Discussion/Summary: All of his assessed cardiac problems are stable. I have reviewed his medications and made no changes. No cardiac testing is ordered.  RTO 1 year.      Interval History: He has not had any cardiac problems since his last OV. He remains active and denies CP, SOB, palpitations, dizziness. His weight is down from 217 to 200 lbs.  /80      ECHO 12/18/2023 - EF 65%    Previous nuclear stress was normal.    Patient Active Problem List   Diagnosis    Allergic rhinitis    Asthma    Essential hypertension    Benign paroxysmal positional vertigo    Chronic bilateral low back pain without sciatica    DJD (degenerative joint disease) of knee    GERD without esophagitis    Gout    Hyperlipidemia    Primary localized osteoarthritis of left knee    Primary localized osteoarthritis of right knee    Tenosynovitis of foot    Thyroid disorder    Ventricular tachycardia (HCC)    Viral URI with cough    Anxiety    Chronic pain of both knees    Abnormal liver function test    Epidermoid cyst    Dysfunction of left eustachian tube    Effusion of right knee    Bilateral primary osteoarthritis of knee    Hyperglycemia     Past Medical History:   Diagnosis Date    Anxiety 1/15/2019    Arthritis     Chronic rhinitis     last assessed 2/21/14    Chronic serous otitis media     last assessed 11/20/13    Chronic sinusitis     last assessed 8/19/14    Functional heart murmur     GERD (gastroesophageal reflux disease)     Gout     Hearing problem     last assessed 12/1/16    Hiatal hernia     Hypercholesterolemia     Hypertension     Palpitations     Testicular hypogonadism     last assessed 10/4/13     V-tach ([facility])      Social History     Socioeconomic History    Marital status: Single     Spouse name: Not on file    Number of children: Not on file    Years of education: Not on file    Highest education level: Not on file   Occupational History    Occupation: unemployed   Tobacco Use    Smoking status: Never    Smokeless tobacco: Never   Vaping Use    Vaping status: Never Used   Substance and Sexual Activity    Alcohol use: Yes     Comment: occasionally    Drug use: No    Sexual activity: Not Currently   Other Topics Concern    Not on file   Social History Narrative    Travel hx- pt denies being out of the country during 1/2014-10/28/14     Social Determinants of Health     Financial Resource Strain: Low Risk  (12/20/2023)    Overall Financial Resource Strain (CARDIA)     Difficulty of Paying Living Expenses: Not hard at all   Food Insecurity: Not on file   Transportation Needs: No Transportation Needs (12/20/2023)    PRAPARE - Transportation     Lack of Transportation (Medical): No     Lack of Transportation (Non-Medical): No   Physical Activity: Not on file   Stress: Not on file   Social Connections: Not on file   Intimate Partner Violence: Not on file   Housing Stability: Not on file      Family History   Problem Relation Age of Onset    Lung cancer Father     Coronary artery disease Father         blockages    Stroke Maternal Grandmother     Cancer Paternal Grandfather         metastasized    Heart disease Family     Lung cancer Cousin     No Known Problems Mother     No Known Problems Sister     No Known Problems Brother     No Known Problems Maternal Aunt     No Known Problems Maternal Uncle     No Known Problems Paternal Aunt     No Known Problems Paternal Uncle     No Known Problems Paternal Grandmother     ADD / ADHD Neg Hx     Anesthesia problems Neg Hx     Clotting disorder Neg Hx     Collagen disease Neg Hx     Diabetes Neg Hx     Dislocations Neg Hx     Learning disabilities Neg Hx     Neurological  problems Neg Hx     Osteoporosis Neg Hx     Rheumatologic disease Neg Hx     Scoliosis Neg Hx     Vascular Disease Neg Hx      Past Surgical History:   Procedure Laterality Date    APPENDECTOMY      BICEPS TENODESIS      anesthesia for tenodesis- of ruptured long tendon of biceps     HERNIA REPAIR      THYROIDECTOMY      TONSILLECTOMY         Current Outpatient Medications:     albuterol (2.5 mg/3 mL) 0.083 % nebulizer solution, Take 3 mL (2.5 mg total) by nebulization every 6 (six) hours as needed for wheezing or shortness of breath, Disp: 240 mL, Rfl: 2    albuterol (PROVENTIL HFA,VENTOLIN HFA) 90 mcg/act inhaler, Inhale 1 puff if needed, Disp: , Rfl:     esomeprazole (NexIUM) 40 MG capsule, Take 40 mg by mouth every morning before breakfast, Disp: , Rfl:     fluticasone-salmeterol (Advair) 500-50 mcg/dose inhaler, Inhale 1 puff 2 (two) times a day, Disp: , Rfl:     ipratropium-albuterol (DUO-NEB) 0.5-2.5 mg/3 mL, Inhale 3 mL 2 (two) times a week , Disp: , Rfl:     montelukast (SINGULAIR) 10 mg tablet, Take 1 tablet (10 mg total) by mouth daily, Disp: 90 tablet, Rfl: 1    Triamcinolone Acetonide (NASACORT ALLERGY 24HR NA), 1 spray into each nostril daily  , Disp: , Rfl:     Uloric 40 MG tablet, Take 1 tablet (40 mg total) by mouth daily, Disp: 90 tablet, Rfl: 1    verapamil (CALAN) 120 mg tablet, TAKE 1 TABLET BY MOUTH EVERY DAY, Disp: 90 tablet, Rfl: 3    acetaminophen (TYLENOL) 500 mg tablet, Take 1,000 mg by mouth every 6 (six) hours as needed for mild pain, Disp: , Rfl:     Chlorpheniramine Maleate (CHLOR-TRIMETON ALLERGY PO), Take by mouth as needed, Disp: , Rfl:     clotrimazole-betamethasone (LOTRISONE) 1-0.05 % cream, Apply topically 2 (two) times a day for 14 days, Disp: 45 g, Rfl: 3    ibuprofen (MOTRIN) 600 mg tablet, Take 600 mg by mouth every 8 (eight) hours as needed for moderate pain, Disp: , Rfl:     levalbuterol (XOPENEX) 1.25 mg/3 mL nebulizer solution, Inhale 1.25 mg every 8 (eight) hours as  needed (Patient not taking: Reported on 1/12/2024), Disp: , Rfl:     lisinopril (ZESTRIL) 20 mg tablet, Take 1 tablet (20 mg total) by mouth daily, Disp: 90 tablet, Rfl: 1    meclizine (ANTIVERT) 12.5 MG tablet, Take 1 tablet (12.5 mg total) by mouth every 8 (eight) hours (Patient taking differently: Take 12.5 mg by mouth every 8 (eight) hours as needed), Disp: 30 tablet, Rfl: 1    pseudoephedrine (SUDAFED) 30 mg tablet, Take 25 mg by mouth in the morning, Disp: , Rfl:   Allergies   Allergen Reactions    Penicillins Rash and Anaphylaxis    Acetazolamide      Other reaction(s): Stomach Ache    Cephalosporins Other (See Comments)     headache    Doxycycline      Capsules only. Tablets are ok to take, per pt.   Capsules only. Tablets are ok to take, per pt.     Other     Sulfa Antibiotics Other (See Comments)     Category: Allergy; Annotation - [id number]: STOMACH UPSET    Famotidine Palpitations    Levofloxacin Palpitations    Omeprazole Palpitations     Painful urination     Vitals:    01/12/24 1539   BP: 132/80   BP Location: Right arm   Patient Position: Sitting   Cuff Size: Standard   Pulse: 75   SpO2: 98%   Weight: 90.7 kg (200 lb)     Weight (last 2 days)       None           Blood pressure 132/80, pulse 75, weight 90.7 kg (200 lb), SpO2 98%., Body mass index is 29.53 kg/m².    Labs:  Hospital Outpatient Visit on 12/18/2023   Component Date Value    Triscuspid Valve Regurgi* 12/18/2023 25.0     RAA A4C 12/18/2023 19.1     LA Volume Index (BP) 12/18/2023 40.7     MV Peak A Wing 12/18/2023 0.73     MV Peak E Wing 12/18/2023 73     AV peak gradient 12/18/2023 9     LVOT stroke volume 12/18/2023 87.34     Ao VTI 12/18/2023 30.7     Aortic valve peak veloci* 12/18/2023 1.49     LVOT peak VTI 12/18/2023 25.23     LVOT peak wing 12/18/2023 1.23     LVOT diameter 12/18/2023 2.1     E wave deceleration time 12/18/2023 214     E/A ratio 12/18/2023 1.00     AV LVOT peak gradient 12/18/2023 6     AV mean gradient 12/18/2023 5      TR Peak Wing 12/18/2023 2.5     AV area peak wing 12/18/2023 2.9     AV area by cont VTI 12/18/2023 2.8     LVOT mn grad 12/18/2023 3.0     RVID d 12/18/2023 4.1     A4C EF 12/18/2023 56     Aortic valve mean veloci* 12/18/2023 10.00     Tricuspid valve peak reg* 12/18/2023 2.51     Left ventricular stroke * 12/18/2023 72.00     IVSd 12/18/2023 1.20     Tricuspid annular plane * 12/18/2023 2.20     Ao root 12/18/2023 3.50     LVPWd 12/18/2023 1.20     LA size 12/18/2023 4.3     Asc Ao 12/18/2023 3.8     LA volume (BP) 12/18/2023 85     FS 12/18/2023 35     LVIDS 12/18/2023 3.20     IVS 12/18/2023 1.2     LVIDd 12/18/2023 4.90     LA length (A2C) 12/18/2023 6.00     LEFT VENTRICLE SYSTOLIC * 12/18/2023 40     LV DIASTOLIC VOLUME (MOD* 12/18/2023 112     LVOT Cardiac Index 12/18/2023 2.73     LVOT stroke volume index 12/18/2023 40.20     LVOT Cardiac Output 12/18/2023 5.70     Left Atrium Area-systoli* 12/18/2023 25.1     Left Atrium Area-systoli* 12/18/2023 25.1     MV E' Tissue Velocity La* 12/18/2023 18     MV E' Tissue Velocity Se* 12/18/2023 12     LVSV, 2D 12/18/2023 72     LVOT area 12/18/2023 3.46     DVI 12/18/2023 0.83     AV valve area 12/18/2023 2.85     LV EF 12/18/2023 65      Imaging: No results found.    Review of Systems:  Review of Systems   Constitutional:  Negative for diaphoresis, fatigue, fever and unexpected weight change.   HENT: Negative.     Respiratory:  Negative for cough, shortness of breath and wheezing.    Cardiovascular:  Negative for chest pain, palpitations and leg swelling.   Gastrointestinal:  Negative for abdominal pain, diarrhea and nausea.   Musculoskeletal:  Negative for gait problem and myalgias.   Skin:  Negative for rash.   Neurological:  Negative for dizziness and numbness.   Psychiatric/Behavioral: Negative.         Physical Exam:  Physical Exam  Constitutional:       Appearance: He is well-developed.   HENT:      Head: Normocephalic and atraumatic.   Eyes:      Pupils:  Pupils are equal, round, and reactive to light.   Neck:      Vascular: No JVD.   Cardiovascular:      Rate and Rhythm: Regular rhythm.      Pulses: Normal pulses.           Carotid pulses are 2+ on the right side and 2+ on the left side.     Heart sounds: S1 normal and S2 normal.   Pulmonary:      Effort: Pulmonary effort is normal.      Breath sounds: Normal breath sounds. No wheezing or rales.   Abdominal:      General: Bowel sounds are normal.      Palpations: Abdomen is soft.   Musculoskeletal:         General: No tenderness. Normal range of motion.      Cervical back: Normal range of motion and neck supple.   Skin:     General: Skin is warm.   Neurological:      Mental Status: He is alert and oriented to person, place, and time.      Cranial Nerves: No cranial nerve deficit.      Deep Tendon Reflexes: Reflexes are normal and symmetric.

## 2024-01-29 ENCOUNTER — OFFICE VISIT (OUTPATIENT)
Dept: INTERNAL MEDICINE CLINIC | Facility: OTHER | Age: 59
End: 2024-01-29
Payer: MEDICARE

## 2024-01-29 VITALS
HEART RATE: 100 BPM | HEIGHT: 69 IN | WEIGHT: 199 LBS | SYSTOLIC BLOOD PRESSURE: 136 MMHG | TEMPERATURE: 98 F | OXYGEN SATURATION: 99 % | BODY MASS INDEX: 29.47 KG/M2 | DIASTOLIC BLOOD PRESSURE: 84 MMHG

## 2024-01-29 DIAGNOSIS — I10 ESSENTIAL HYPERTENSION: ICD-10-CM

## 2024-01-29 DIAGNOSIS — J45.909 ASTHMA, UNSPECIFIED ASTHMA SEVERITY, UNSPECIFIED WHETHER COMPLICATED, UNSPECIFIED WHETHER PERSISTENT: ICD-10-CM

## 2024-01-29 DIAGNOSIS — J30.89 NON-SEASONAL ALLERGIC RHINITIS, UNSPECIFIED TRIGGER: ICD-10-CM

## 2024-01-29 DIAGNOSIS — E78.2 MIXED HYPERLIPIDEMIA: ICD-10-CM

## 2024-01-29 DIAGNOSIS — Z12.11 SCREEN FOR COLON CANCER: Primary | ICD-10-CM

## 2024-01-29 DIAGNOSIS — K21.9 GERD WITHOUT ESOPHAGITIS: ICD-10-CM

## 2024-01-29 DIAGNOSIS — J20.9 ACUTE BRONCHITIS, UNSPECIFIED ORGANISM: ICD-10-CM

## 2024-01-29 DIAGNOSIS — I47.20 VENTRICULAR TACHYCARDIA (HCC): ICD-10-CM

## 2024-01-29 PROCEDURE — 99214 OFFICE O/P EST MOD 30 MIN: CPT | Performed by: INTERNAL MEDICINE

## 2024-01-29 RX ORDER — AZITHROMYCIN 250 MG/1
TABLET, FILM COATED ORAL DAILY
Qty: 6 TABLET | Refills: 0 | Status: SHIPPED | OUTPATIENT
Start: 2024-01-29 | End: 2024-02-03

## 2024-01-29 NOTE — ASSESSMENT & PLAN NOTE
Will treat with Z-Surjit.  Continue with present combination of inhalers and can take Robitussin cough syrup

## 2024-01-29 NOTE — PROGRESS NOTES
Assessment/Plan:    GERD without esophagitis  Continue with present regimen.  Continue with healthy diet    Allergic rhinitis  Continue with nasal steroid and antihistamine    Asthma  Well-controlled on present combination of inhalers    Ventricular tachycardia (HCC)  Doing well on present regimen.  No palpitation.  Also managed by cardiology    Acute bronchitis  Will treat with Z-Surjit.  Continue with present combination of inhalers and can take Robitussin cough syrup    Essential hypertension  Blood pressure is stable on present regimen.  Continue with low-salt diet       Diagnoses and all orders for this visit:    Screen for colon cancer  -     Cologuard    GERD without esophagitis    Ventricular tachycardia (HCC)    Mixed hyperlipidemia    Asthma, unspecified asthma severity, unspecified whether complicated, unspecified whether persistent    Non-seasonal allergic rhinitis, unspecified trigger    Acute bronchitis, unspecified organism  -     azithromycin (Zithromax) 250 mg tablet; Take 2 tablets (500 mg total) by mouth daily for 1 day, THEN 1 tablet (250 mg total) daily for 4 days.    Essential hypertension            Depression Screening and Follow-up Plan: Patient was screened for depression during today's encounter. They screened negative with a PHQ-2 score of 0.        Subjective:          Patient ID: Alexx Haney is a 58 y.o. male.    Patient came to office with a complaint of cough with yellowish colored phlegm.  2 weeks ago he was diagnosed with COVID-19 viral infection.  Which is better now.  But cough is lingering on.  No fever or chills    Sinus Problem  Associated symptoms include congestion and coughing. Pertinent negatives include no ear pain, headaches, neck pain, shortness of breath, sinus pressure or sore throat.       The following portions of the patient's history were reviewed and updated as appropriate: allergies, current medications, past family history, past medical history, past social  history, past surgical history, and problem list.    Review of Systems   Constitutional:  Negative for fatigue and fever.   HENT:  Positive for congestion. Negative for ear discharge, ear pain, postnasal drip, sinus pressure, sore throat, tinnitus and trouble swallowing.    Eyes:  Negative for discharge, itching and visual disturbance.   Respiratory:  Positive for cough. Negative for shortness of breath.    Cardiovascular:  Negative for chest pain and palpitations.   Gastrointestinal:  Negative for abdominal pain, diarrhea, nausea and vomiting.   Endocrine: Negative for cold intolerance and polyuria.   Genitourinary:  Negative for difficulty urinating, dysuria and urgency.   Musculoskeletal:  Negative for arthralgias and neck pain.   Skin:  Negative for rash.   Allergic/Immunologic: Negative for environmental allergies.   Neurological:  Negative for dizziness, weakness and headaches.   Psychiatric/Behavioral:  The patient is not nervous/anxious.          Past Medical History:   Diagnosis Date    Anxiety 1/15/2019    Arthritis     Chronic rhinitis     last assessed 2/21/14    Chronic serous otitis media     last assessed 11/20/13    Chronic sinusitis     last assessed 8/19/14    Functional heart murmur     GERD (gastroesophageal reflux disease)     Gout     Hearing problem     last assessed 12/1/16    Hiatal hernia     Hypercholesterolemia     Hypertension     Palpitations     Testicular hypogonadism     last assessed 10/4/13    V-tach ([facility])          Current Outpatient Medications:     acetaminophen (TYLENOL) 500 mg tablet, Take 1,000 mg by mouth every 6 (six) hours as needed for mild pain, Disp: , Rfl:     albuterol (2.5 mg/3 mL) 0.083 % nebulizer solution, Take 3 mL (2.5 mg total) by nebulization every 6 (six) hours as needed for wheezing or shortness of breath, Disp: 240 mL, Rfl: 2    albuterol (PROVENTIL HFA,VENTOLIN HFA) 90 mcg/act inhaler, Inhale 1 puff if needed, Disp: , Rfl:     azithromycin (Zithromax) 250  mg tablet, Take 2 tablets (500 mg total) by mouth daily for 1 day, THEN 1 tablet (250 mg total) daily for 4 days., Disp: 6 tablet, Rfl: 0    Chlorpheniramine Maleate (CHLOR-TRIMETON ALLERGY PO), Take by mouth as needed, Disp: , Rfl:     esomeprazole (NexIUM) 40 MG capsule, Take 40 mg by mouth every morning before breakfast, Disp: , Rfl:     fluticasone-salmeterol (Advair) 500-50 mcg/dose inhaler, Inhale 1 puff 2 (two) times a day, Disp: , Rfl:     ibuprofen (MOTRIN) 600 mg tablet, Take 600 mg by mouth every 8 (eight) hours as needed for moderate pain, Disp: , Rfl:     ipratropium-albuterol (DUO-NEB) 0.5-2.5 mg/3 mL, Inhale 3 mL 2 (two) times a week , Disp: , Rfl:     lisinopril (ZESTRIL) 20 mg tablet, Take 1 tablet (20 mg total) by mouth daily, Disp: 90 tablet, Rfl: 1    meclizine (ANTIVERT) 12.5 MG tablet, Take 1 tablet (12.5 mg total) by mouth every 8 (eight) hours (Patient taking differently: Take 12.5 mg by mouth every 8 (eight) hours as needed), Disp: 30 tablet, Rfl: 1    montelukast (SINGULAIR) 10 mg tablet, Take 1 tablet (10 mg total) by mouth daily, Disp: 90 tablet, Rfl: 1    pseudoephedrine (SUDAFED) 30 mg tablet, Take 25 mg by mouth in the morning, Disp: , Rfl:     Triamcinolone Acetonide (NASACORT ALLERGY 24HR NA), 1 spray into each nostril daily  , Disp: , Rfl:     Uloric 40 MG tablet, Take 1 tablet (40 mg total) by mouth daily, Disp: 90 tablet, Rfl: 1    verapamil (CALAN) 120 mg tablet, TAKE 1 TABLET BY MOUTH EVERY DAY, Disp: 90 tablet, Rfl: 3    clotrimazole-betamethasone (LOTRISONE) 1-0.05 % cream, Apply topically 2 (two) times a day for 14 days, Disp: 45 g, Rfl: 3    levalbuterol (XOPENEX) 1.25 mg/3 mL nebulizer solution, Inhale 1.25 mg every 8 (eight) hours as needed (Patient not taking: Reported on 1/29/2024), Disp: , Rfl:     Allergies   Allergen Reactions    Penicillins Rash and Anaphylaxis    Acetazolamide      Other reaction(s): Stomach Ache    Cephalosporins Other (See Comments)     headache  "   Doxycycline      Capsules only. Tablets are ok to take, per pt.   Capsules only. Tablets are ok to take, per pt.     Other     Sulfa Antibiotics Other (See Comments)     Category: Allergy; Annotation - [id number]: STOMACH UPSET    Famotidine Palpitations    Levofloxacin Palpitations    Omeprazole Palpitations     Painful urination       Social History   Past Surgical History:   Procedure Laterality Date    APPENDECTOMY      BICEPS TENODESIS      anesthesia for tenodesis- of ruptured long tendon of biceps     HERNIA REPAIR      THYROIDECTOMY      TONSILLECTOMY       Family History   Problem Relation Age of Onset    Lung cancer Father     Coronary artery disease Father         blockages    Stroke Maternal Grandmother     Cancer Paternal Grandfather         metastasized    Heart disease Family     Lung cancer Cousin     No Known Problems Mother     No Known Problems Sister     No Known Problems Brother     No Known Problems Maternal Aunt     No Known Problems Maternal Uncle     No Known Problems Paternal Aunt     No Known Problems Paternal Uncle     No Known Problems Paternal Grandmother     ADD / ADHD Neg Hx     Anesthesia problems Neg Hx     Clotting disorder Neg Hx     Collagen disease Neg Hx     Diabetes Neg Hx     Dislocations Neg Hx     Learning disabilities Neg Hx     Neurological problems Neg Hx     Osteoporosis Neg Hx     Rheumatologic disease Neg Hx     Scoliosis Neg Hx     Vascular Disease Neg Hx        Objective:  /84 (BP Location: Left arm, Patient Position: Sitting, Cuff Size: Adult)   Pulse 100   Temp 98 °F (36.7 °C)   Ht 5' 9\" (1.753 m)   Wt 90.3 kg (199 lb)   SpO2 99%   BMI 29.39 kg/m²   Body mass index is 29.39 kg/m².     Physical Exam  Constitutional:       General: He is not in acute distress.     Appearance: He is well-developed. He is not ill-appearing.   HENT:      Head: Normocephalic.      Right Ear: External ear normal.      Left Ear: External ear normal.      Nose: Congestion and " rhinorrhea present.      Mouth/Throat:      Pharynx: Posterior oropharyngeal erythema present. No oropharyngeal exudate.   Eyes:      General: No scleral icterus.     Pupils: Pupils are equal, round, and reactive to light.   Neck:      Thyroid: No thyromegaly.      Trachea: No tracheal deviation.   Cardiovascular:      Rate and Rhythm: Normal rate and regular rhythm.      Heart sounds: Normal heart sounds.   Pulmonary:      Effort: Pulmonary effort is normal. No respiratory distress.      Breath sounds: Normal breath sounds.   Chest:      Chest wall: No tenderness.   Abdominal:      General: Bowel sounds are normal.      Palpations: Abdomen is soft. There is no mass.      Tenderness: There is no abdominal tenderness.   Musculoskeletal:         General: Normal range of motion.      Cervical back: Normal range of motion and neck supple. No rigidity.      Right lower leg: No edema.      Left lower leg: No edema.   Lymphadenopathy:      Cervical: No cervical adenopathy.   Skin:     General: Skin is warm.      Findings: No erythema or rash.   Neurological:      Mental Status: He is alert and oriented to person, place, and time.      Cranial Nerves: No cranial nerve deficit.   Psychiatric:         Mood and Affect: Mood normal.         Behavior: Behavior normal.

## 2024-02-09 ENCOUNTER — OFFICE VISIT (OUTPATIENT)
Dept: URGENT CARE | Age: 59
End: 2024-02-09
Payer: MEDICARE

## 2024-02-09 VITALS — OXYGEN SATURATION: 98 % | HEART RATE: 89 BPM | DIASTOLIC BLOOD PRESSURE: 94 MMHG | SYSTOLIC BLOOD PRESSURE: 138 MMHG

## 2024-02-09 DIAGNOSIS — J01.40 ACUTE PANSINUSITIS, RECURRENCE NOT SPECIFIED: Primary | ICD-10-CM

## 2024-02-09 PROCEDURE — G0463 HOSPITAL OUTPT CLINIC VISIT: HCPCS | Performed by: EMERGENCY MEDICINE

## 2024-02-09 PROCEDURE — 99213 OFFICE O/P EST LOW 20 MIN: CPT | Performed by: EMERGENCY MEDICINE

## 2024-02-09 RX ORDER — DOXYCYCLINE 100 MG/1
100 TABLET ORAL 2 TIMES DAILY
Qty: 14 TABLET | Refills: 0 | Status: SHIPPED | OUTPATIENT
Start: 2024-02-09 | End: 2024-02-16

## 2024-02-09 RX ORDER — PREDNISONE 20 MG/1
40 TABLET ORAL DAILY
Qty: 10 TABLET | Refills: 0 | Status: SHIPPED | OUTPATIENT
Start: 2024-02-09 | End: 2024-02-14

## 2024-02-13 NOTE — PROGRESS NOTES
[facility]        NAME: Alexx Haney is a 58 y.o. male  : 1965    MRN: [id number]  DATE: 2024  TIME: 4:45 PM    Assessment and Plan   Acute pansinusitis, recurrence not specified [J01.40]  1. Acute pansinusitis, recurrence not specified  doxycycline (ADOXA) 100 MG tablet    predniSONE 20 mg tablet            Patient Instructions       Follow up with PCP in 3-5 days.  Proceed to  ER if symptoms worsen.    Chief Complaint     Chief Complaint   Patient presents with    Cold Like Symptoms     Pt presents with congestion, SOB, and knee pain. Pt has a hx of asthma.          History of Present Illness       Sinusitis  This is a new problem. The current episode started 1 to 4 weeks ago. The problem has been gradually worsening since onset. There has been no fever. His pain is at a severity of 5/10. The pain is moderate. Associated symptoms include chills, congestion, coughing, sinus pressure, sneezing and a sore throat. Pertinent negatives include no diaphoresis, ear pain, headaches, hoarse voice, neck pain, shortness of breath or swollen glands. Past treatments include acetaminophen and saline nose sprays. The treatment provided mild relief.       Review of Systems   Review of Systems   Constitutional:  Positive for chills. Negative for activity change, appetite change, diaphoresis, fatigue, fever and unexpected weight change.   HENT:  Positive for congestion, postnasal drip, rhinorrhea, sinus pressure, sinus pain, sneezing and sore throat. Negative for dental problem, drooling, ear discharge, ear pain, facial swelling, hearing loss, hoarse voice, mouth sores, nosebleeds, tinnitus, trouble swallowing and voice change.    Respiratory:  Positive for cough. Negative for shortness of breath, wheezing and stridor.    Musculoskeletal:  Negative for arthralgias, back pain, gait problem, joint swelling, myalgias, neck pain and neck stiffness.   Neurological:  Negative for dizziness, tremors,  seizures, syncope, facial asymmetry, speech difficulty, light-headedness, numbness and headaches.         Current Medications       Current Outpatient Medications:     acetaminophen (TYLENOL) 500 mg tablet, Take 1,000 mg by mouth every 6 (six) hours as needed for mild pain, Disp: , Rfl:     albuterol (2.5 mg/3 mL) 0.083 % nebulizer solution, Take 3 mL (2.5 mg total) by nebulization every 6 (six) hours as needed for wheezing or shortness of breath, Disp: 240 mL, Rfl: 2    albuterol (PROVENTIL HFA,VENTOLIN HFA) 90 mcg/act inhaler, Inhale 1 puff if needed, Disp: , Rfl:     Chlorpheniramine Maleate (CHLOR-TRIMETON ALLERGY PO), Take by mouth as needed, Disp: , Rfl:     doxycycline (ADOXA) 100 MG tablet, Take 1 tablet (100 mg total) by mouth 2 (two) times a day for 7 days, Disp: 14 tablet, Rfl: 0    esomeprazole (NexIUM) 40 MG capsule, Take 40 mg by mouth every morning before breakfast, Disp: , Rfl:     ibuprofen (MOTRIN) 600 mg tablet, Take 600 mg by mouth every 8 (eight) hours as needed for moderate pain, Disp: , Rfl:     lisinopril (ZESTRIL) 20 mg tablet, Take 1 tablet (20 mg total) by mouth daily, Disp: 90 tablet, Rfl: 1    montelukast (SINGULAIR) 10 mg tablet, Take 1 tablet (10 mg total) by mouth daily, Disp: 90 tablet, Rfl: 1    predniSONE 20 mg tablet, Take 2 tablets (40 mg total) by mouth daily for 5 days, Disp: 10 tablet, Rfl: 0    pseudoephedrine (SUDAFED) 30 mg tablet, Take 25 mg by mouth in the morning, Disp: , Rfl:     Triamcinolone Acetonide (NASACORT ALLERGY 24HR NA), 1 spray into each nostril daily  , Disp: , Rfl:     Uloric 40 MG tablet, Take 1 tablet (40 mg total) by mouth daily, Disp: 90 tablet, Rfl: 1    verapamil (CALAN) 120 mg tablet, TAKE 1 TABLET BY MOUTH EVERY DAY, Disp: 90 tablet, Rfl: 3    clotrimazole-betamethasone (LOTRISONE) 1-0.05 % cream, Apply topically 2 (two) times a day for 14 days, Disp: 45 g, Rfl: 3    fluticasone-salmeterol (Advair) 500-50 mcg/dose inhaler, Inhale 1 puff 2 (two)  times a day (Patient not taking: Reported on 2/9/2024), Disp: , Rfl:     ipratropium-albuterol (DUO-NEB) 0.5-2.5 mg/3 mL, Inhale 3 mL 2 (two) times a week , Disp: , Rfl:     levalbuterol (XOPENEX) 1.25 mg/3 mL nebulizer solution, Inhale 1.25 mg every 8 (eight) hours as needed (Patient not taking: Reported on 1/29/2024), Disp: , Rfl:     meclizine (ANTIVERT) 12.5 MG tablet, Take 1 tablet (12.5 mg total) by mouth every 8 (eight) hours (Patient taking differently: Take 12.5 mg by mouth every 8 (eight) hours as needed), Disp: 30 tablet, Rfl: 1    Current Allergies     Allergies as of 02/09/2024 - Reviewed 02/09/2024   Allergen Reaction Noted    Penicillins Rash and Anaphylaxis 08/17/2004    Acetazolamide  10/25/2016    Cephalosporins Other (See Comments) 08/17/2004    Other  04/28/2014    Sulfa antibiotics Other (See Comments) 08/17/2004    Famotidine Palpitations 09/05/2016    Levofloxacin Palpitations 11/12/2021    Omeprazole Palpitations 09/05/2016            The following portions of the patient's history were reviewed and updated as appropriate: allergies, current medications, past family history, past medical history, past social history, past surgical history and problem list.     Past Medical History:   Diagnosis Date    Anxiety 1/15/2019    Arthritis     Chronic rhinitis     last assessed 2/21/14    Chronic serous otitis media     last assessed 11/20/13    Chronic sinusitis     last assessed 8/19/14    Functional heart murmur     GERD (gastroesophageal reflux disease)     Gout     Hearing problem     last assessed 12/1/16    Hiatal hernia     Hypercholesterolemia     Hypertension     Palpitations     Testicular hypogonadism     last assessed 10/4/13    [facility]ach ([facility])        Past Surgical History:   Procedure Laterality Date    APPENDECTOMY      BICEPS TENODESIS      anesthesia for tenodesis- of ruptured long tendon of biceps     HERNIA REPAIR      THYROIDECTOMY      TONSILLECTOMY         Family History   Problem  Relation Age of Onset    Lung cancer Father     Coronary artery disease Father         blockages    Stroke Maternal Grandmother     Cancer Paternal Grandfather         metastasized    Heart disease Family     Lung cancer Cousin     No Known Problems Mother     No Known Problems Sister     No Known Problems Brother     No Known Problems Maternal Aunt     No Known Problems Maternal Uncle     No Known Problems Paternal Aunt     No Known Problems Paternal Uncle     No Known Problems Paternal Grandmother     ADD / ADHD Neg Hx     Anesthesia problems Neg Hx     Clotting disorder Neg Hx     Collagen disease Neg Hx     Diabetes Neg Hx     Dislocations Neg Hx     Learning disabilities Neg Hx     Neurological problems Neg Hx     Osteoporosis Neg Hx     Rheumatologic disease Neg Hx     Scoliosis Neg Hx     Vascular Disease Neg Hx          Medications have been verified.        Objective   /94 (BP Location: Right arm)   Pulse 89   SpO2 98%   No LMP for male patient.       Physical Exam     Physical Exam  Vitals and nursing note reviewed.   Constitutional:       General: He is not in acute distress.     Appearance: Normal appearance. He is not ill-appearing, toxic-appearing or diaphoretic.   HENT:      Head: Normocephalic and atraumatic.      Right Ear: Tympanic membrane, ear canal and external ear normal. There is no impacted cerumen.      Left Ear: Tympanic membrane, ear canal and external ear normal. There is no impacted cerumen.      Nose: Mucosal edema, congestion and rhinorrhea present. No nasal deformity, septal deviation, signs of injury, laceration or nasal tenderness. Rhinorrhea is purulent.      Right Nostril: No foreign body, epistaxis, septal hematoma or occlusion.      Left Nostril: No foreign body or epistaxis.      Right Turbinates: Enlarged and swollen.      Left Turbinates: Enlarged and swollen.      Right Sinus: Maxillary sinus tenderness and frontal sinus tenderness present.      Left Sinus: Maxillary  sinus tenderness and frontal sinus tenderness present.      Mouth/Throat:      Mouth: Mucous membranes are moist.      Pharynx: Posterior oropharyngeal erythema present. No oropharyngeal exudate.   Eyes:      General:         Right eye: No discharge.         Left eye: No discharge.      Pupils: Pupils are equal, round, and reactive to light.   Neck:      Vascular: No carotid bruit.   Cardiovascular:      Rate and Rhythm: Normal rate and regular rhythm.      Pulses: Normal pulses.      Heart sounds: Normal heart sounds.   Pulmonary:      Effort: Pulmonary effort is normal. No tachypnea, bradypnea, accessory muscle usage, prolonged expiration, respiratory distress or retractions.      Breath sounds: Normal breath sounds. No stridor. No decreased breath sounds, wheezing, rhonchi or rales.   Chest:      Chest wall: No tenderness.   Abdominal:      General: Abdomen is flat. There is no distension.      Palpations: There is no mass.      Tenderness: There is no abdominal tenderness. There is no right CVA tenderness, left CVA tenderness, guarding or rebound.      Hernia: No hernia is present.   Musculoskeletal:         General: No swelling, tenderness, deformity or signs of injury.      Cervical back: No rigidity.      Right lower leg: No edema.      Left lower leg: No edema.   Lymphadenopathy:      Cervical: No cervical adenopathy.   Skin:     General: Skin is warm and dry.   Neurological:      General: No focal deficit present.      Mental Status: He is alert.      Cranial Nerves: No cranial nerve deficit.      Sensory: No sensory deficit.      Motor: No weakness.      Coordination: Coordination normal.      Gait: Gait normal.

## 2024-02-21 PROBLEM — J06.9 VIRAL URI WITH COUGH: Status: RESOLVED | Noted: 2018-02-16 | Resolved: 2024-02-21

## 2024-02-22 DIAGNOSIS — M10.9 GOUTY ARTHRITIS: ICD-10-CM

## 2024-02-22 RX ORDER — FEBUXOSTAT 40 MG/1
40 TABLET ORAL DAILY
Qty: 90 TABLET | Refills: 1 | Status: SHIPPED | OUTPATIENT
Start: 2024-02-22

## 2024-02-27 ENCOUNTER — OFFICE VISIT (OUTPATIENT)
Dept: OBGYN CLINIC | Facility: MEDICAL CENTER | Age: 59
End: 2024-02-27
Payer: MEDICARE

## 2024-02-27 VITALS
WEIGHT: 198.4 LBS | BODY MASS INDEX: 29.38 KG/M2 | HEART RATE: 88 BPM | SYSTOLIC BLOOD PRESSURE: 137 MMHG | DIASTOLIC BLOOD PRESSURE: 92 MMHG | HEIGHT: 69 IN

## 2024-02-27 DIAGNOSIS — G89.29 CHRONIC PAIN OF BOTH KNEES: ICD-10-CM

## 2024-02-27 DIAGNOSIS — M25.561 CHRONIC PAIN OF BOTH KNEES: ICD-10-CM

## 2024-02-27 DIAGNOSIS — M17.0 BILATERAL PRIMARY OSTEOARTHRITIS OF KNEE: Primary | ICD-10-CM

## 2024-02-27 DIAGNOSIS — M25.562 CHRONIC PAIN OF BOTH KNEES: ICD-10-CM

## 2024-02-27 PROCEDURE — 20610 DRAIN/INJ JOINT/BURSA W/O US: CPT | Performed by: PHYSICIAN ASSISTANT

## 2024-02-27 PROCEDURE — 99213 OFFICE O/P EST LOW 20 MIN: CPT | Performed by: ORTHOPAEDIC SURGERY

## 2024-02-27 RX ORDER — LIDOCAINE HYDROCHLORIDE 10 MG/ML
4 INJECTION, SOLUTION INFILTRATION; PERINEURAL
Status: COMPLETED | OUTPATIENT
Start: 2024-02-27 | End: 2024-02-27

## 2024-02-27 RX ORDER — LIDOCAINE HYDROCHLORIDE 10 MG/ML
3 INJECTION, SOLUTION INFILTRATION; PERINEURAL
Status: COMPLETED | OUTPATIENT
Start: 2024-02-27 | End: 2024-02-27

## 2024-02-27 RX ORDER — TRIAMCINOLONE ACETONIDE 40 MG/ML
40 INJECTION, SUSPENSION INTRA-ARTICULAR; INTRAMUSCULAR
Status: COMPLETED | OUTPATIENT
Start: 2024-02-27 | End: 2024-02-27

## 2024-02-27 RX ADMIN — LIDOCAINE HYDROCHLORIDE 4 ML: 10 INJECTION, SOLUTION INFILTRATION; PERINEURAL at 15:45

## 2024-02-27 RX ADMIN — LIDOCAINE HYDROCHLORIDE 3 ML: 10 INJECTION, SOLUTION INFILTRATION; PERINEURAL at 15:45

## 2024-02-27 RX ADMIN — TRIAMCINOLONE ACETONIDE 40 MG: 40 INJECTION, SUSPENSION INTRA-ARTICULAR; INTRAMUSCULAR at 15:45

## 2024-02-27 NOTE — PROGRESS NOTES
Assessment/Plan     1. Bilateral primary osteoarthritis of knee    2. Chronic pain of both knees      Orders Placed This Encounter   Procedures    Large joint arthrocentesis    Large joint arthrocentesis    Large joint arthrocentesis         Patient has severe bilateral knee osteoarthritis.   Patient is a surgical candidate at this time. He is considering TKA in the fall. Patient will let us know if he would like to select a date.   Injections: Patient received bilateral knee steroid injection today. We also aspirated 105 cc of synovial fluid from the right knee. Tolerated the procedure well. Post injection instructions reviewed including information on glucose monitoring for diabetic patients. Patient aware that they may repeat steroid injection every 3 months if needed.   Medications: Tylenol up to 3000 mg per day  PT: Continue home exercises.   Bracing:  none  Activity: Continue activity as tolerated.   Plan for next appt:  repeat CSI vs surgical planning      Return in about 3 months (around 5/27/2024).    I answered all of the patient's questions during the visit and provided education of the patient's condition during the visit.  The patient verbalized understanding of the information given and agrees with the plan.  This note was dictated using Appiterate software.  It may contain errors including improperly dictated words.  Please contact physician directly for any questions.    History of Present Illness   Chief complaint:   Chief Complaint   Patient presents with    Left Knee - Follow-up    Right Knee - Follow-up       HPI: Alexx Haney is a 58 y.o. male that c/o bilateral knee pain.      Mechanism of Injury: None  Pain Description: bilateral knees, neither knee worse than the other at this time. Notes increased amount of cracking and instability in the right knee compared to previously. Pain is constant and described as achy.   Palliating Factors: rest, injections   Provoking Factors: weight  bearing  Associated Symptoms: instability and swelling  Medications: tylenol and advil if needed  Able to take NSAIDs? If not, why: yes  Physical Therapy or Home Exercises: home exercises   Injections: last received right knee aspiration and bilateral knee CSI at his last visit on 11/28/23 and reports good pain relief for a few weeks after  Bracing: not wearing any currently   Previous Surgery: NA  Miscellaneous: considering TKA in the fall     ROS:    See HPI for musculoskeletal review.   All other systems reviewed are negative     Historical Information   Past Medical History:   Diagnosis Date    Anxiety 1/15/2019    Arthritis     Chronic rhinitis     last assessed 2/21/14    Chronic serous otitis media     last assessed 11/20/13    Chronic sinusitis     last assessed 8/19/14    Functional heart murmur     GERD (gastroesophageal reflux disease)     Gout     Hearing problem     last assessed 12/1/16    Hiatal hernia     Hypercholesterolemia     Hypertension     Palpitations     Testicular hypogonadism     last assessed 10/4/13    V-tach (HCC)      Past Surgical History:   Procedure Laterality Date    APPENDECTOMY      BICEPS TENODESIS      anesthesia for tenodesis- of ruptured long tendon of biceps     HERNIA REPAIR      THYROIDECTOMY      TONSILLECTOMY       Social History   Social History     Substance and Sexual Activity   Alcohol Use Yes    Comment: occasionally     Social History     Substance and Sexual Activity   Drug Use No     Social History     Tobacco Use   Smoking Status Never   Smokeless Tobacco Never     Family History:   Family History   Problem Relation Age of Onset    Lung cancer Father     Coronary artery disease Father         blockages    Stroke Maternal Grandmother     Cancer Paternal Grandfather         metastasized    Heart disease Family     Lung cancer Cousin     No Known Problems Mother     No Known Problems Sister     No Known Problems Brother     No Known Problems Maternal Aunt     No  Known Problems Maternal Uncle     No Known Problems Paternal Aunt     No Known Problems Paternal Uncle     No Known Problems Paternal Grandmother     ADD / ADHD Neg Hx     Anesthesia problems Neg Hx     Clotting disorder Neg Hx     Collagen disease Neg Hx     Diabetes Neg Hx     Dislocations Neg Hx     Learning disabilities Neg Hx     Neurological problems Neg Hx     Osteoporosis Neg Hx     Rheumatologic disease Neg Hx     Scoliosis Neg Hx     Vascular Disease Neg Hx        Current Outpatient Medications on File Prior to Visit   Medication Sig Dispense Refill    acetaminophen (TYLENOL) 500 mg tablet Take 1,000 mg by mouth every 6 (six) hours as needed for mild pain      albuterol (2.5 mg/3 mL) 0.083 % nebulizer solution Take 3 mL (2.5 mg total) by nebulization every 6 (six) hours as needed for wheezing or shortness of breath 240 mL 2    albuterol (PROVENTIL HFA,VENTOLIN HFA) 90 mcg/act inhaler Inhale 1 puff if needed      Chlorpheniramine Maleate (CHLOR-TRIMETON ALLERGY PO) Take by mouth as needed      clotrimazole-betamethasone (LOTRISONE) 1-0.05 % cream Apply topically 2 (two) times a day for 14 days 45 g 3    esomeprazole (NexIUM) 40 MG capsule Take 40 mg by mouth every morning before breakfast      fluticasone-salmeterol (Advair) 500-50 mcg/dose inhaler Inhale 1 puff 2 (two) times a day (Patient not taking: Reported on 2/9/2024)      ibuprofen (MOTRIN) 600 mg tablet Take 600 mg by mouth every 8 (eight) hours as needed for moderate pain      ipratropium-albuterol (DUO-NEB) 0.5-2.5 mg/3 mL Inhale 3 mL 2 (two) times a week       levalbuterol (XOPENEX) 1.25 mg/3 mL nebulizer solution Inhale 1.25 mg every 8 (eight) hours as needed (Patient not taking: Reported on 1/29/2024)      lisinopril (ZESTRIL) 20 mg tablet Take 1 tablet (20 mg total) by mouth daily 90 tablet 1    meclizine (ANTIVERT) 12.5 MG tablet Take 1 tablet (12.5 mg total) by mouth every 8 (eight) hours (Patient taking differently: Take 12.5 mg by mouth  every 8 (eight) hours as needed) 30 tablet 1    montelukast (SINGULAIR) 10 mg tablet Take 1 tablet (10 mg total) by mouth daily 90 tablet 1    pseudoephedrine (SUDAFED) 30 mg tablet Take 25 mg by mouth in the morning      Triamcinolone Acetonide (NASACORT ALLERGY 24HR NA) 1 spray into each nostril daily        Uloric 40 MG tablet Take 1 tablet (40 mg total) by mouth daily 90 tablet 1    verapamil (CALAN) 120 mg tablet TAKE 1 TABLET BY MOUTH EVERY DAY 90 tablet 3     No current facility-administered medications on file prior to visit.     Allergies   Allergen Reactions    Penicillins Rash and Anaphylaxis    Acetazolamide      Other reaction(s): Stomach Ache    Cephalosporins Other (See Comments)     headache    Other     Sulfa Antibiotics Other (See Comments)     Category: Allergy; Annotation - [id number]: STOMACH UPSET    Famotidine Palpitations    Levofloxacin Palpitations    Omeprazole Palpitations     Painful urination       Current Outpatient Medications on File Prior to Visit   Medication Sig Dispense Refill    acetaminophen (TYLENOL) 500 mg tablet Take 1,000 mg by mouth every 6 (six) hours as needed for mild pain      albuterol (2.5 mg/3 mL) 0.083 % nebulizer solution Take 3 mL (2.5 mg total) by nebulization every 6 (six) hours as needed for wheezing or shortness of breath 240 mL 2    albuterol (PROVENTIL HFA,VENTOLIN HFA) 90 mcg/act inhaler Inhale 1 puff if needed      Chlorpheniramine Maleate (CHLOR-TRIMETON ALLERGY PO) Take by mouth as needed      clotrimazole-betamethasone (LOTRISONE) 1-0.05 % cream Apply topically 2 (two) times a day for 14 days 45 g 3    esomeprazole (NexIUM) 40 MG capsule Take 40 mg by mouth every morning before breakfast      fluticasone-salmeterol (Advair) 500-50 mcg/dose inhaler Inhale 1 puff 2 (two) times a day (Patient not taking: Reported on 2/9/2024)      ibuprofen (MOTRIN) 600 mg tablet Take 600 mg by mouth every 8 (eight) hours as needed for moderate pain       "ipratropium-albuterol (DUO-NEB) 0.5-2.5 mg/3 mL Inhale 3 mL 2 (two) times a week       levalbuterol (XOPENEX) 1.25 mg/3 mL nebulizer solution Inhale 1.25 mg every 8 (eight) hours as needed (Patient not taking: Reported on 1/29/2024)      lisinopril (ZESTRIL) 20 mg tablet Take 1 tablet (20 mg total) by mouth daily 90 tablet 1    meclizine (ANTIVERT) 12.5 MG tablet Take 1 tablet (12.5 mg total) by mouth every 8 (eight) hours (Patient taking differently: Take 12.5 mg by mouth every 8 (eight) hours as needed) 30 tablet 1    montelukast (SINGULAIR) 10 mg tablet Take 1 tablet (10 mg total) by mouth daily 90 tablet 1    pseudoephedrine (SUDAFED) 30 mg tablet Take 25 mg by mouth in the morning      Triamcinolone Acetonide (NASACORT ALLERGY 24HR NA) 1 spray into each nostril daily        Uloric 40 MG tablet Take 1 tablet (40 mg total) by mouth daily 90 tablet 1    verapamil (CALAN) 120 mg tablet TAKE 1 TABLET BY MOUTH EVERY DAY 90 tablet 3     No current facility-administered medications on file prior to visit.       Objective   Vitals: Blood pressure 137/92, pulse 88, height 5' 9\" (1.753 m), weight 90 kg (198 lb 6.4 oz).,Body mass index is 29.3 kg/m².    PE:  AAOx 3  WDWN  Hearing intact, no drainage from eyes  Regular rate  no audible wheezing  no abdominal distension  LE compartments soft, skin intact    bilateralknee:    Appearance:  No  swelling   No  ecchymosis  Yes  obvious joint deformity   Yes  effusion   Palpation/Tenderness:  No  TTP over medial joint line  No  TTP over lateral joint line   No  TTP over patella  No  TTP over patellar tendon  No  TTP over pes anserine bursa  Active Range of Motion:  AROM: 3- 110  Special Tests:  Valgus Stress Test: negative  Varus Stress Test: negative    Large joint arthrocentesis: R knee  Universal Protocol:  Consent: Verbal consent obtained.  Risks and benefits: risks, benefits and alternatives were discussed  Consent given by: patient  Site marked: the operative site was " marked  Supporting Documentation  Indications: pain   Procedure Details  Location: knee - R knee  Preparation: Patient was prepped and draped in the usual sterile fashion  Needle size: 18 G  Ultrasound guidance: no  Approach: lateral  Medications administered: 4 mL lidocaine 1 %; 40 mg triamcinolone acetonide 40 mg/mL    Aspirate amount: 105 mL  Aspirate: serous, yellow and blood-tinged  Patient tolerance: patient tolerated the procedure well with no immediate complications  Dressing:  Sterile dressing applied      Large joint arthrocentesis      Large joint arthrocentesis: L knee  Universal Protocol:  Consent: Verbal consent obtained.  Risks and benefits: risks, benefits and alternatives were discussed  Consent given by: patient  Site marked: the operative site was marked  Supporting Documentation  Indications: pain   Procedure Details  Location: knee - L knee  Preparation: Patient was prepped and draped in the usual sterile fashion  Needle size: 22 G  Ultrasound guidance: no  Approach: anterolateral  Medications administered: 3 mL lidocaine 1 %; 40 mg triamcinolone acetonide 40 mg/mL    Patient tolerance: patient tolerated the procedure well with no immediate complications  Dressing:  Sterile dressing applied            Scribe Attestation      I,:  Christa Reyes PA-C am acting as a scribe while in the presence of the attending physician.:       I,:  Parvin Whipple DO personally performed the services described in this documentation    as scribed in my presence.:

## 2024-03-20 ENCOUNTER — TELEPHONE (OUTPATIENT)
Dept: INTERNAL MEDICINE CLINIC | Facility: OTHER | Age: 59
End: 2024-03-20

## 2024-03-20 NOTE — TELEPHONE ENCOUNTER
Patient called stating [employer] needs pre authorization for the generic for Uloric 40 MG     FEBUXOSTAT 40 MG TABLET     [facility] - [place], [place] - [address]

## 2024-03-21 DIAGNOSIS — M10.9 GOUTY ARTHRITIS: ICD-10-CM

## 2024-03-21 RX ORDER — FEBUXOSTAT 40 MG/1
40 TABLET, FILM COATED ORAL DAILY
Qty: 90 TABLET | Refills: 1 | Status: SHIPPED | OUTPATIENT
Start: 2024-03-21

## 2024-03-21 NOTE — TELEPHONE ENCOUNTER
Called and spoke to Nahid with the insurance company and discussed further information regarding prior authorization, it was approved until 03/21/2025 by pharmacist. The key is [id number]

## 2024-03-22 ENCOUNTER — RA CDI HCC (OUTPATIENT)
Dept: OTHER | Facility: HOSPITAL | Age: 59
End: 2024-03-22
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
## 2024-04-13 ENCOUNTER — OFFICE VISIT (OUTPATIENT)
Dept: URGENT CARE | Age: 59
End: 2024-04-13
Payer: MEDICARE

## 2024-04-13 VITALS
OXYGEN SATURATION: 98 % | BODY MASS INDEX: 29.62 KG/M2 | TEMPERATURE: 98.1 F | DIASTOLIC BLOOD PRESSURE: 86 MMHG | WEIGHT: 200 LBS | SYSTOLIC BLOOD PRESSURE: 147 MMHG | HEART RATE: 79 BPM | HEIGHT: 69 IN | RESPIRATION RATE: 18 BRPM

## 2024-04-13 DIAGNOSIS — H66.005 RECURRENT ACUTE SUPPURATIVE OTITIS MEDIA WITHOUT SPONTANEOUS RUPTURE OF LEFT TYMPANIC MEMBRANE: Primary | ICD-10-CM

## 2024-04-13 DIAGNOSIS — H60.312 ACUTE DIFFUSE OTITIS EXTERNA OF LEFT EAR: ICD-10-CM

## 2024-04-13 PROCEDURE — 99213 OFFICE O/P EST LOW 20 MIN: CPT

## 2024-04-13 PROCEDURE — G0463 HOSPITAL OUTPT CLINIC VISIT: HCPCS

## 2024-04-13 RX ORDER — DOXYCYCLINE 100 MG/1
100 TABLET ORAL 2 TIMES DAILY
Qty: 14 TABLET | Refills: 0 | Status: SHIPPED | OUTPATIENT
Start: 2024-04-13 | End: 2024-04-20

## 2024-04-13 NOTE — PROGRESS NOTES
[facility]        NAME: Alexx Haney is a 59 y.o. male  : 1965    MRN: [id number]  DATE: 2024  TIME: 10:44 AM    Assessment and Plan   Recurrent acute suppurative otitis media without spontaneous rupture of left tympanic membrane [H66.005]  1. Recurrent acute suppurative otitis media without spontaneous rupture of left tympanic membrane  doxycycline (ADOXA) 100 MG tablet    neomycin-polymyxin-hydrocortisone (CORTISPORIN) otic solution      2. Acute diffuse otitis externa of left ear          Has been having congestion, sinus pressure for 3 weeks. Concerned bc he has developed left ear pain and has hx of recurrent ear infections. Very anxious. Drainage/swelling noted to canal. Erythematous and bulging OM. Adenopathy.     Patient Instructions       Follow up with PCP in 3-5 days.  Proceed to  ER if symptoms worsen.    If tests have been performed at [facility], our office will contact you with results if changes need to be made to the care plan discussed with you at the visit.  [facility]    Chief Complaint     Chief Complaint   Patient presents with    Nasal Congestion     Nasal congestion, sinus pressure, ear congestion x 3 weeks         History of Present Illness       Has been having congestion, sinus pressure for 3 weeks. Concerned bc he has developed left ear pain and has hx of recurrent ear infections. Very anxious. Drainage/swelling noted to canal. Erythematous and bulging OM. Adenopathy.         Review of Systems   Review of Systems   Constitutional:  Negative for fever.   HENT:  Positive for congestion, ear discharge, ear pain, rhinorrhea and sinus pressure.    Respiratory:  Positive for cough. Negative for shortness of breath and wheezing.    Allergic/Immunologic: Positive for environmental allergies.   All other systems reviewed and are negative.        Current Medications       Current Outpatient Medications:     albuterol (2.5  mg/3 mL) 0.083 % nebulizer solution, Take 3 mL (2.5 mg total) by nebulization every 6 (six) hours as needed for wheezing or shortness of breath, Disp: 240 mL, Rfl: 2    albuterol (PROVENTIL HFA,VENTOLIN HFA) 90 mcg/act inhaler, Inhale 1 puff if needed, Disp: , Rfl:     Chlorpheniramine Maleate (CHLOR-TRIMETON ALLERGY PO), Take by mouth as needed, Disp: , Rfl:     doxycycline (ADOXA) 100 MG tablet, Take 1 tablet (100 mg total) by mouth 2 (two) times a day for 7 days, Disp: 14 tablet, Rfl: 0    esomeprazole (NexIUM) 40 MG capsule, Take 40 mg by mouth every morning before breakfast, Disp: , Rfl:     febuxostat (ULORIC) 40 mg tablet, Take 1 tablet (40 mg total) by mouth daily, Disp: 90 tablet, Rfl: 1    ibuprofen (MOTRIN) 600 mg tablet, Take 600 mg by mouth every 8 (eight) hours as needed for moderate pain, Disp: , Rfl:     ipratropium-albuterol (DUO-NEB) 0.5-2.5 mg/3 mL, Inhale 3 mL 2 (two) times a week , Disp: , Rfl:     lisinopril (ZESTRIL) 20 mg tablet, Take 1 tablet (20 mg total) by mouth daily, Disp: 90 tablet, Rfl: 1    montelukast (SINGULAIR) 10 mg tablet, Take 1 tablet (10 mg total) by mouth daily, Disp: 90 tablet, Rfl: 1    neomycin-polymyxin-hydrocortisone (CORTISPORIN) otic solution, Administer 4 drops into the left ear every 6 (six) hours for 7 days, Disp: 10 mL, Rfl: 0    pseudoephedrine (SUDAFED) 30 mg tablet, Take 25 mg by mouth in the morning, Disp: , Rfl:     Triamcinolone Acetonide (NASACORT ALLERGY 24HR NA), 1 spray into each nostril daily  , Disp: , Rfl:     verapamil (CALAN) 120 mg tablet, TAKE 1 TABLET BY MOUTH EVERY DAY, Disp: 90 tablet, Rfl: 3    acetaminophen (TYLENOL) 500 mg tablet, Take 1,000 mg by mouth every 6 (six) hours as needed for mild pain, Disp: , Rfl:     clotrimazole-betamethasone (LOTRISONE) 1-0.05 % cream, Apply topically 2 (two) times a day for 14 days, Disp: 45 g, Rfl: 3    fluticasone-salmeterol (Advair) 500-50 mcg/dose inhaler, Inhale 1 puff 2 (two) times a day (Patient not  taking: Reported on 2/9/2024), Disp: , Rfl:     levalbuterol (XOPENEX) 1.25 mg/3 mL nebulizer solution, Inhale 1.25 mg every 8 (eight) hours as needed (Patient not taking: Reported on 1/29/2024), Disp: , Rfl:     meclizine (ANTIVERT) 12.5 MG tablet, Take 1 tablet (12.5 mg total) by mouth every 8 (eight) hours (Patient taking differently: Take 12.5 mg by mouth every 8 (eight) hours as needed), Disp: 30 tablet, Rfl: 1    Current Allergies     Allergies as of 04/13/2024 - Reviewed 04/13/2024   Allergen Reaction Noted    Penicillins Rash and Anaphylaxis 08/17/2004    Acetazolamide  10/25/2016    Cephalosporins Other (See Comments) 08/17/2004    Other  04/28/2014    Sulfa antibiotics Other (See Comments) 08/17/2004    Famotidine Palpitations 09/05/2016    Levofloxacin Palpitations 11/12/2021    Omeprazole Palpitations 09/05/2016            The following portions of the patient's history were reviewed and updated as appropriate: allergies, current medications, past family history, past medical history, past social history, past surgical history and problem list.     Past Medical History:   Diagnosis Date    Anxiety 1/15/2019    Arthritis     Chronic rhinitis     last assessed 2/21/14    Chronic serous otitis media     last assessed 11/20/13    Chronic sinusitis     last assessed 8/19/14    Functional heart murmur     GERD (gastroesophageal reflux disease)     Gout     Hearing problem     last assessed 12/1/16    Hiatal hernia     Hypercholesterolemia     Hypertension     Palpitations     Testicular hypogonadism     last assessed 10/4/13    V-tach (HCC)        Past Surgical History:   Procedure Laterality Date    APPENDECTOMY      BICEPS TENODESIS      anesthesia for tenodesis- of ruptured long tendon of biceps     HERNIA REPAIR      THYROIDECTOMY      TONSILLECTOMY         Family History   Problem Relation Age of Onset    Lung cancer Father     Coronary artery disease Father         blockages    Stroke Maternal  "Grandmother     Cancer Paternal Grandfather         metastasized    Heart disease Family     Lung cancer Cousin     No Known Problems Mother     No Known Problems Sister     No Known Problems Brother     No Known Problems Maternal Aunt     No Known Problems Maternal Uncle     No Known Problems Paternal Aunt     No Known Problems Paternal Uncle     No Known Problems Paternal Grandmother     ADD / ADHD Neg Hx     Anesthesia problems Neg Hx     Clotting disorder Neg Hx     Collagen disease Neg Hx     Diabetes Neg Hx     Dislocations Neg Hx     Learning disabilities Neg Hx     Neurological problems Neg Hx     Osteoporosis Neg Hx     Rheumatologic disease Neg Hx     Scoliosis Neg Hx     Vascular Disease Neg Hx          Medications have been verified.        Objective   /86   Pulse 79   Temp 98.1 °F (36.7 °C)   Resp 18   Ht 5' 9\" (1.753 m)   Wt 90.7 kg (200 lb)   SpO2 98%   BMI 29.53 kg/m²   No LMP for male patient.       Physical Exam     Physical Exam  Vitals reviewed.   Constitutional:       Appearance: Normal appearance.   HENT:      Right Ear: Tympanic membrane normal.      Left Ear: Drainage, swelling and tenderness present. A middle ear effusion is present. Tympanic membrane is erythematous and bulging.      Nose: Congestion present.   Cardiovascular:      Rate and Rhythm: Normal rate and regular rhythm.      Pulses: Normal pulses.      Heart sounds: Normal heart sounds.   Pulmonary:      Effort: Pulmonary effort is normal.      Breath sounds: Normal breath sounds.   Lymphadenopathy:      Cervical: Cervical adenopathy present.   Neurological:      Mental Status: He is alert.                   "

## 2024-04-24 ENCOUNTER — OFFICE VISIT (OUTPATIENT)
Dept: INTERNAL MEDICINE CLINIC | Facility: OTHER | Age: 59
End: 2024-04-24
Payer: MEDICARE

## 2024-04-24 VITALS
HEIGHT: 69 IN | DIASTOLIC BLOOD PRESSURE: 80 MMHG | TEMPERATURE: 98 F | BODY MASS INDEX: 29.44 KG/M2 | HEART RATE: 81 BPM | OXYGEN SATURATION: 96 % | WEIGHT: 198.8 LBS | SYSTOLIC BLOOD PRESSURE: 132 MMHG

## 2024-04-24 DIAGNOSIS — E78.2 MIXED HYPERLIPIDEMIA: ICD-10-CM

## 2024-04-24 DIAGNOSIS — J30.89 NON-SEASONAL ALLERGIC RHINITIS, UNSPECIFIED TRIGGER: ICD-10-CM

## 2024-04-24 DIAGNOSIS — I47.20 VENTRICULAR TACHYCARDIA (HCC): ICD-10-CM

## 2024-04-24 DIAGNOSIS — F41.9 ANXIETY: ICD-10-CM

## 2024-04-24 DIAGNOSIS — E87.1 HYPONATREMIA: ICD-10-CM

## 2024-04-24 DIAGNOSIS — J45.909 ASTHMA, UNSPECIFIED ASTHMA SEVERITY, UNSPECIFIED WHETHER COMPLICATED, UNSPECIFIED WHETHER PERSISTENT: Primary | ICD-10-CM

## 2024-04-24 DIAGNOSIS — M1A.9XX0 CHRONIC GOUT WITHOUT TOPHUS, UNSPECIFIED CAUSE, UNSPECIFIED SITE: ICD-10-CM

## 2024-04-24 DIAGNOSIS — I10 ESSENTIAL HYPERTENSION: ICD-10-CM

## 2024-04-24 DIAGNOSIS — K21.9 GERD WITHOUT ESOPHAGITIS: ICD-10-CM

## 2024-04-24 PROCEDURE — 99214 OFFICE O/P EST MOD 30 MIN: CPT | Performed by: INTERNAL MEDICINE

## 2024-04-24 PROCEDURE — G2211 COMPLEX E/M VISIT ADD ON: HCPCS | Performed by: INTERNAL MEDICINE

## 2024-04-24 RX ORDER — ALBUTEROL SULFATE 90 UG/1
1 AEROSOL, METERED RESPIRATORY (INHALATION) AS NEEDED
Qty: 18 G | Refills: 1 | Status: SHIPPED | OUTPATIENT
Start: 2024-04-24

## 2024-04-24 RX ORDER — PREDNISONE 20 MG/1
TABLET ORAL
COMMUNITY
Start: 2024-04-21 | End: 2024-05-06

## 2024-04-24 NOTE — ASSESSMENT & PLAN NOTE
Continue with Nexium 40 mg daily along with better diet control  
Continue with Uloric 40 mg daily.  He is due for uric acid level soon  
Continue with present combination of inhalers.  Also followed by pulmonologist  
Continue with present regimen doing well.  Also followed by cardiology  
Continue with present regimen.  
Noted to have sodium of 128 in the emergency room.  Advised him to do blood work requested in December that will include CMP as soon as possible  
Stable on present regimen.  
Detail Level: Detailed
Size Of Lesion In Cm (Optional): 0

## 2024-04-24 NOTE — PROGRESS NOTES
Assessment/Plan:    Ventricular tachycardia (HCC)  Continue with present regimen doing well.  Also followed by cardiology    Allergic rhinitis  Continue with present regimen.    GERD without esophagitis  Continue with Nexium 40 mg daily along with better diet control    Anxiety  Stable on present regimen.    Gout  Continue with Uloric 40 mg daily.  He is due for uric acid level soon    Hyponatremia  Noted to have sodium of 128 in the emergency room.  Advised him to do blood work requested in December that will include CMP as soon as possible    Asthma  Continue with present combination of inhalers.  Also followed by pulmonologist       Diagnoses and all orders for this visit:    Asthma, unspecified asthma severity, unspecified whether complicated, unspecified whether persistent  -     albuterol (PROVENTIL HFA,VENTOLIN HFA) 90 mcg/act inhaler; Inhale 1 puff if needed for wheezing or shortness of breath    Ventricular tachycardia (HCC)    Non-seasonal allergic rhinitis, unspecified trigger    Essential hypertension    Mixed hyperlipidemia    Chronic gout without tophus, unspecified cause, unspecified site    Anxiety    Hyponatremia    GERD without esophagitis    Other orders  -     predniSONE 20 mg tablet; TAKE 3 TABLETS BY MOUTH EVERY DAY FOR 5 DAYS               Subjective:          Patient ID: Alexx Haney is a 59 y.o. male.    Patient is here for follow-up.  About a week ago he was in the emergency room with acute asthmatic bronchitis and was treated with antibiotic and prednisone.  Feeling better now.        The following portions of the patient's history were reviewed and updated as appropriate: allergies, current medications, past family history, past medical history, past social history, past surgical history, and problem list.    Review of Systems   Constitutional:  Negative for fatigue and fever.   HENT:  Positive for congestion. Negative for ear discharge, ear pain, postnasal drip, sinus pressure, sore  throat, tinnitus and trouble swallowing.    Eyes:  Negative for discharge, itching and visual disturbance.   Respiratory:  Negative for cough and shortness of breath.    Cardiovascular:  Negative for chest pain and palpitations.   Gastrointestinal:  Negative for abdominal pain, diarrhea, nausea and vomiting.   Endocrine: Negative for cold intolerance and polyuria.   Genitourinary:  Negative for difficulty urinating, dysuria and urgency.   Musculoskeletal:  Negative for arthralgias and neck pain.   Skin:  Negative for rash.   Allergic/Immunologic: Negative for environmental allergies.   Neurological:  Negative for dizziness, weakness and headaches.   Psychiatric/Behavioral:  Negative for agitation. The patient is not nervous/anxious.          Past Medical History:   Diagnosis Date    Anxiety 1/15/2019    Arthritis     Chronic rhinitis     last assessed 2/21/14    Chronic serous otitis media     last assessed 11/20/13    Chronic sinusitis     last assessed 8/19/14    Functional heart murmur     GERD (gastroesophageal reflux disease)     Gout     Hearing problem     last assessed 12/1/16    Hiatal hernia     Hypercholesterolemia     Hypertension     Palpitations     Testicular hypogonadism     last assessed 10/4/13    V[facility]tach ([facility])          Current Outpatient Medications:     acetaminophen (TYLENOL) 500 mg tablet, Take 1,000 mg by mouth every 6 (six) hours as needed for mild pain, Disp: , Rfl:     albuterol (2.5 mg/3 mL) 0.083 % nebulizer solution, Take 3 mL (2.5 mg total) by nebulization every 6 (six) hours as needed for wheezing or shortness of breath, Disp: 240 mL, Rfl: 2    albuterol (PROVENTIL HFA,VENTOLIN HFA) 90 mcg/act inhaler, Inhale 1 puff if needed for wheezing or shortness of breath, Disp: 18 g, Rfl: 1    Chlorpheniramine Maleate (CHLOR-TRIMETON ALLERGY PO), Take by mouth as needed, Disp: , Rfl:     esomeprazole (NexIUM) 40 MG capsule, Take 40 mg by mouth every morning before breakfast, Disp: , Rfl:      febuxostat (ULORIC) 40 mg tablet, Take 1 tablet (40 mg total) by mouth daily, Disp: 90 tablet, Rfl: 1    fluticasone-salmeterol (Advair) 500-50 mcg/dose inhaler, Inhale 1 puff 2 (two) times a day, Disp: , Rfl:     ibuprofen (MOTRIN) 600 mg tablet, Take 600 mg by mouth every 8 (eight) hours as needed for moderate pain, Disp: , Rfl:     ipratropium-albuterol (DUO-NEB) 0.5-2.5 mg/3 mL, Inhale 3 mL 2 (two) times a week , Disp: , Rfl:     lisinopril (ZESTRIL) 20 mg tablet, Take 1 tablet (20 mg total) by mouth daily, Disp: 90 tablet, Rfl: 1    meclizine (ANTIVERT) 12.5 MG tablet, Take 1 tablet (12.5 mg total) by mouth every 8 (eight) hours (Patient taking differently: Take 12.5 mg by mouth every 8 (eight) hours as needed), Disp: 30 tablet, Rfl: 1    montelukast (SINGULAIR) 10 mg tablet, Take 1 tablet (10 mg total) by mouth daily, Disp: 90 tablet, Rfl: 1    predniSONE 20 mg tablet, TAKE 3 TABLETS BY MOUTH EVERY DAY FOR 5 DAYS, Disp: , Rfl:     pseudoephedrine (SUDAFED) 30 mg tablet, Take 25 mg by mouth in the morning, Disp: , Rfl:     Triamcinolone Acetonide (NASACORT ALLERGY 24HR NA), 1 spray into each nostril daily  , Disp: , Rfl:     verapamil (CALAN) 120 mg tablet, TAKE 1 TABLET BY MOUTH EVERY DAY, Disp: 90 tablet, Rfl: 3    clotrimazole-betamethasone (LOTRISONE) 1-0.05 % cream, Apply topically 2 (two) times a day for 14 days (Patient not taking: Reported on 4/24/2024), Disp: 45 g, Rfl: 3    levalbuterol (XOPENEX) 1.25 mg/3 mL nebulizer solution, Inhale 1.25 mg every 8 (eight) hours as needed (Patient not taking: Reported on 1/29/2024), Disp: , Rfl:     neomycin-polymyxin-hydrocortisone (CORTISPORIN) otic solution, Administer 4 drops into the left ear every 6 (six) hours for 7 days (Patient not taking: Reported on 4/24/2024), Disp: 10 mL, Rfl: 0    Allergies   Allergen Reactions    Penicillins Rash and Anaphylaxis    Acetazolamide      Other reaction(s): Stomach Ache    Cephalosporins Other (See Comments)     headache  "   Other     Sulfa Antibiotics Other (See Comments)     Category: Allergy; Annotation - [id number]: STOMACH UPSET    Famotidine Palpitations    Levofloxacin Palpitations    Omeprazole Palpitations     Painful urination       Social History   Past Surgical History:   Procedure Laterality Date    APPENDECTOMY      BICEPS TENODESIS      anesthesia for tenodesis- of ruptured long tendon of biceps     HERNIA REPAIR      THYROIDECTOMY      TONSILLECTOMY       Family History   Problem Relation Age of Onset    Lung cancer Father     Coronary artery disease Father         blockages    Stroke Maternal Grandmother     Cancer Paternal Grandfather         metastasized    Heart disease Family     Lung cancer Cousin     No Known Problems Mother     No Known Problems Sister     No Known Problems Brother     No Known Problems Maternal Aunt     No Known Problems Maternal Uncle     No Known Problems Paternal Aunt     No Known Problems Paternal Uncle     No Known Problems Paternal Grandmother     ADD / ADHD Neg Hx     Anesthesia problems Neg Hx     Clotting disorder Neg Hx     Collagen disease Neg Hx     Diabetes Neg Hx     Dislocations Neg Hx     Learning disabilities Neg Hx     Neurological problems Neg Hx     Osteoporosis Neg Hx     Rheumatologic disease Neg Hx     Scoliosis Neg Hx     Vascular Disease Neg Hx        Objective:  /80 (BP Location: Left arm, Patient Position: Sitting, Cuff Size: Standard)   Pulse 81   Temp 98 °F (36.7 °C) (Temporal)   Ht 5' 9\" (1.753 m)   Wt 90.2 kg (198 lb 12.8 oz)   SpO2 96%   BMI 29.36 kg/m²   Body mass index is 29.36 kg/m².     Physical Exam  Constitutional:       General: He is not in acute distress.     Appearance: He is well-developed.   HENT:      Head: Normocephalic.      Right Ear: External ear normal.      Left Ear: External ear normal.      Nose: No congestion or rhinorrhea.      Mouth/Throat:      Pharynx: No oropharyngeal exudate or posterior oropharyngeal erythema.   Eyes:     "  General: No scleral icterus.     Pupils: Pupils are equal, round, and reactive to light.   Neck:      Thyroid: No thyromegaly.      Trachea: No tracheal deviation.   Cardiovascular:      Rate and Rhythm: Normal rate and regular rhythm.      Heart sounds: Normal heart sounds. No murmur heard.  Pulmonary:      Effort: Pulmonary effort is normal. No respiratory distress.      Breath sounds: Rhonchi present.   Chest:      Chest wall: No tenderness.   Abdominal:      General: Bowel sounds are normal.      Palpations: Abdomen is soft. There is no mass.      Tenderness: There is no abdominal tenderness.   Musculoskeletal:         General: Normal range of motion.      Cervical back: Normal range of motion and neck supple. No rigidity.      Right lower leg: No edema.      Left lower leg: No edema.   Lymphadenopathy:      Cervical: No cervical adenopathy.   Skin:     General: Skin is warm.      Findings: No erythema or rash.   Neurological:      Mental Status: He is alert and oriented to person, place, and time.      Cranial Nerves: No cranial nerve deficit.   Psychiatric:         Mood and Affect: Mood normal.         Behavior: Behavior normal.

## 2024-04-26 ENCOUNTER — OFFICE VISIT (OUTPATIENT)
Dept: URGENT CARE | Age: 59
End: 2024-04-26
Payer: MEDICARE

## 2024-04-26 VITALS
SYSTOLIC BLOOD PRESSURE: 140 MMHG | HEART RATE: 102 BPM | RESPIRATION RATE: 18 BRPM | TEMPERATURE: 98.2 F | OXYGEN SATURATION: 99 % | DIASTOLIC BLOOD PRESSURE: 82 MMHG

## 2024-04-26 DIAGNOSIS — S39.012A STRAIN OF MUSCLE, FASCIA AND TENDON OF LOWER BACK, INITIAL ENCOUNTER: Primary | ICD-10-CM

## 2024-04-26 PROCEDURE — G0463 HOSPITAL OUTPT CLINIC VISIT: HCPCS | Performed by: EMERGENCY MEDICINE

## 2024-04-26 PROCEDURE — 99214 OFFICE O/P EST MOD 30 MIN: CPT | Performed by: EMERGENCY MEDICINE

## 2024-04-26 RX ORDER — METHOCARBAMOL 500 MG/1
500 TABLET, FILM COATED ORAL 4 TIMES DAILY PRN
Qty: 12 TABLET | Refills: 0 | Status: SHIPPED | OUTPATIENT
Start: 2024-04-26 | End: 2024-05-06

## 2024-04-26 RX ORDER — METHOCARBAMOL 500 MG/1
500 TABLET, FILM COATED ORAL 4 TIMES DAILY PRN
Qty: 12 TABLET | Refills: 0 | Status: SHIPPED | OUTPATIENT
Start: 2024-04-26 | End: 2024-04-26

## 2024-04-29 NOTE — PROGRESS NOTES
"  [facility]        NAME: Alexx Haney is a 59 y.o. male  : 1965    MRN: [id number]  DATE: 2024  TIME: 8:13 AM    Assessment and Plan   Strain of muscle, fascia and tendon of lower back, initial encounter [S39.012A]  1. Strain of muscle, fascia and tendon of lower back, initial encounter  methocarbamol (ROBAXIN) 500 mg tablet    DISCONTINUED: methocarbamol (ROBAXIN) 500 mg tablet            Patient Instructions       Follow up with PCP in 3-5 days.  Proceed to  ER if symptoms worsen.    If tests have been performed at [facility], our office will contact you with results if changes need to be made to the care plan discussed with you at the visit.  [facility]    Chief Complaint     Chief Complaint   Patient presents with    Back Pain     Last Pm had muscle spasm that went up to neck.          History of Present Illness       59-year-old male with history of hypertension, ventricular tachycardia, allergic otitis, asthma, GERD, chronic bilateral low back pain presents with chief complaint of acute on chronic low back pain and back strain which began yesterday evening.  Patient states that he was lifting a box at home and felt a \"pull\" in his lower back which she feels radiates up his spine into his thoracic region.  He denies any radicular symptoms down his legs, or associated leg weakness, sensory deficits, saddle anesthesia or bowel bladder symptoms.  Additionally, denies fever, night sweats or unintended weight loss.  Has not tried take anything for his pain.    Back Pain  This is a new problem. The current episode started yesterday. The problem occurs constantly. The problem has been waxing and waning since onset. The pain is present in the lumbar spine. The pain is at a severity of 5/10. The pain is moderate. The symptoms are aggravated by bending, standing and twisting. Pertinent negatives include no abdominal pain, bladder incontinence, " bowel incontinence, chest pain, dysuria, fever, headaches, leg pain, numbness, paresis, paresthesias, pelvic pain, perianal numbness, tingling, weakness or weight loss. He has tried nothing for the symptoms.       Review of Systems   Review of Systems   Constitutional:  Negative for activity change, appetite change, chills, diaphoresis, fatigue, fever and weight loss.   HENT:  Negative for ear pain and sore throat.    Eyes:  Negative for pain and visual disturbance.   Respiratory:  Negative for cough and shortness of breath.    Cardiovascular:  Negative for chest pain and palpitations.   Gastrointestinal:  Negative for abdominal pain, bowel incontinence and vomiting.   Genitourinary:  Negative for bladder incontinence, dysuria, hematuria and pelvic pain.   Musculoskeletal:  Positive for back pain. Negative for arthralgias, gait problem, joint swelling, myalgias, neck pain and neck stiffness.   Skin:  Negative for color change and rash.   Neurological:  Negative for dizziness, tingling, tremors, seizures, syncope, facial asymmetry, speech difficulty, weakness, light-headedness, numbness, headaches and paresthesias.   All other systems reviewed and are negative.        Current Medications       Current Outpatient Medications:     acetaminophen (TYLENOL) 500 mg tablet, Take 1,000 mg by mouth every 6 (six) hours as needed for mild pain, Disp: , Rfl:     albuterol (2.5 mg/3 mL) 0.083 % nebulizer solution, Take 3 mL (2.5 mg total) by nebulization every 6 (six) hours as needed for wheezing or shortness of breath, Disp: 240 mL, Rfl: 2    albuterol (PROVENTIL HFA,VENTOLIN HFA) 90 mcg/act inhaler, Inhale 1 puff if needed for wheezing or shortness of breath, Disp: 18 g, Rfl: 1    Chlorpheniramine Maleate (CHLOR-TRIMETON ALLERGY PO), Take by mouth as needed, Disp: , Rfl:     esomeprazole (NexIUM) 40 MG capsule, Take 40 mg by mouth every morning before breakfast, Disp: , Rfl:     febuxostat (ULORIC) 40 mg tablet, Take 1 tablet  (40 mg total) by mouth daily, Disp: 90 tablet, Rfl: 1    fluticasone-salmeterol (Advair) 500-50 mcg/dose inhaler, Inhale 1 puff 2 (two) times a day, Disp: , Rfl:     ibuprofen (MOTRIN) 600 mg tablet, Take 600 mg by mouth every 8 (eight) hours as needed for moderate pain, Disp: , Rfl:     ipratropium-albuterol (DUO-NEB) 0.5-2.5 mg/3 mL, Inhale 3 mL 2 (two) times a week , Disp: , Rfl:     lisinopril (ZESTRIL) 20 mg tablet, Take 1 tablet (20 mg total) by mouth daily, Disp: 90 tablet, Rfl: 1    methocarbamol (ROBAXIN) 500 mg tablet, Take 1 tablet (500 mg total) by mouth 4 (four) times a day as needed for muscle spasms for up to 3 days, Disp: 12 tablet, Rfl: 0    montelukast (SINGULAIR) 10 mg tablet, Take 1 tablet (10 mg total) by mouth daily, Disp: 90 tablet, Rfl: 1    Triamcinolone Acetonide (NASACORT ALLERGY 24HR NA), 1 spray into each nostril daily  , Disp: , Rfl:     verapamil (CALAN) 120 mg tablet, TAKE 1 TABLET BY MOUTH EVERY DAY, Disp: 90 tablet, Rfl: 3    clotrimazole-betamethasone (LOTRISONE) 1-0.05 % cream, Apply topically 2 (two) times a day for 14 days (Patient not taking: Reported on 4/24/2024), Disp: 45 g, Rfl: 3    levalbuterol (XOPENEX) 1.25 mg/3 mL nebulizer solution, Inhale 1.25 mg every 8 (eight) hours as needed (Patient not taking: Reported on 1/29/2024), Disp: , Rfl:     meclizine (ANTIVERT) 12.5 MG tablet, Take 1 tablet (12.5 mg total) by mouth every 8 (eight) hours (Patient taking differently: Take 12.5 mg by mouth every 8 (eight) hours as needed), Disp: 30 tablet, Rfl: 1    neomycin-polymyxin-hydrocortisone (CORTISPORIN) otic solution, Administer 4 drops into the left ear every 6 (six) hours for 7 days (Patient not taking: Reported on 4/24/2024), Disp: 10 mL, Rfl: 0    predniSONE 20 mg tablet, TAKE 3 TABLETS BY MOUTH EVERY DAY FOR 5 DAYS, Disp: , Rfl:     pseudoephedrine (SUDAFED) 30 mg tablet, Take 25 mg by mouth in the morning (Patient not taking: Reported on 4/26/2024), Disp: , Rfl:      Current Allergies     Allergies as of 04/26/2024 - Reviewed 04/26/2024   Allergen Reaction Noted    Penicillins Rash and Anaphylaxis 08/17/2004    Acetazolamide  10/25/2016    Cephalosporins Other (See Comments) 08/17/2004    Other  04/28/2014    Sulfa antibiotics Other (See Comments) 08/17/2004    Famotidine Palpitations 09/05/2016    Levofloxacin Palpitations 11/12/2021    Omeprazole Palpitations 09/05/2016            The following portions of the patient's history were reviewed and updated as appropriate: allergies, current medications, past family history, past medical history, past social history, past surgical history and problem list.     Past Medical History:   Diagnosis Date    Anxiety 1/15/2019    Arthritis     Chronic rhinitis     last assessed 2/21/14    Chronic serous otitis media     last assessed 11/20/13    Chronic sinusitis     last assessed 8/19/14    Functional heart murmur     GERD (gastroesophageal reflux disease)     Gout     Hearing problem     last assessed 12/1/16    Hiatal hernia     Hypercholesterolemia     Hypertension     Palpitations     Testicular hypogonadism     last assessed 10/4/13    V-tach (HCC)        Past Surgical History:   Procedure Laterality Date    APPENDECTOMY      BICEPS TENODESIS      anesthesia for tenodesis- of ruptured long tendon of biceps     HERNIA REPAIR      THYROIDECTOMY      TONSILLECTOMY         Family History   Problem Relation Age of Onset    Lung cancer Father     Coronary artery disease Father         blockages    Stroke Maternal Grandmother     Cancer Paternal Grandfather         metastasized    Heart disease Family     Lung cancer Cousin     No Known Problems Mother     No Known Problems Sister     No Known Problems Brother     No Known Problems Maternal Aunt     No Known Problems Maternal Uncle     No Known Problems Paternal Aunt     No Known Problems Paternal Uncle     No Known Problems Paternal Grandmother     ADD / ADHD Neg Hx     Anesthesia  problems Neg Hx     Clotting disorder Neg Hx     Collagen disease Neg Hx     Diabetes Neg Hx     Dislocations Neg Hx     Learning disabilities Neg Hx     Neurological problems Neg Hx     Osteoporosis Neg Hx     Rheumatologic disease Neg Hx     Scoliosis Neg Hx     Vascular Disease Neg Hx          Medications have been verified.        Objective   /82   Pulse 102   Temp 98.2 °F (36.8 °C)   Resp 18   SpO2 99%   No LMP for male patient.       Physical Exam     Physical Exam  Vitals and nursing note reviewed.   Constitutional:       General: He is not in acute distress.     Appearance: Normal appearance. He is normal weight. He is not ill-appearing or toxic-appearing.   HENT:      Head: Normocephalic and atraumatic.      Right Ear: External ear normal.      Left Ear: External ear normal.      Nose: Nose normal.      Mouth/Throat:      Mouth: Mucous membranes are moist.      Pharynx: Oropharynx is clear. No oropharyngeal exudate or posterior oropharyngeal erythema.   Eyes:      Extraocular Movements: Extraocular movements intact.      Pupils: Pupils are equal, round, and reactive to light.   Cardiovascular:      Rate and Rhythm: Normal rate and regular rhythm.      Pulses: Normal pulses.      Heart sounds: Normal heart sounds.   Pulmonary:      Effort: Pulmonary effort is normal. No respiratory distress.      Breath sounds: Normal breath sounds. No stridor. No wheezing, rhonchi or rales.   Chest:      Chest wall: No tenderness.   Abdominal:      General: Abdomen is flat. There is no distension.      Palpations: There is no mass.      Tenderness: There is no abdominal tenderness. There is no right CVA tenderness, left CVA tenderness, guarding or rebound.      Hernia: No hernia is present.   Musculoskeletal:         General: Tenderness present. No swelling, deformity or signs of injury.      Cervical back: Normal and neck supple.      Thoracic back: Normal.      Lumbar back: Spasms and tenderness present. No  swelling, edema, deformity, signs of trauma, lacerations or bony tenderness. Normal range of motion. No scoliosis.        Back:       Right lower leg: No edema.      Left lower leg: No edema.   Skin:     General: Skin is warm and dry.      Capillary Refill: Capillary refill takes less than 2 seconds.   Neurological:      General: No focal deficit present.      Mental Status: He is alert and oriented to person, place, and time.      GCS: GCS eye subscore is 4. GCS verbal subscore is 5. GCS motor subscore is 6.      Sensory: Sensation is intact. No sensory deficit.      Motor: Motor function is intact. No weakness.      Deep Tendon Reflexes: Reflexes normal.      Reflex Scores:       Patellar reflexes are 2+ on the right side and 2+ on the left side.       Achilles reflexes are 2+ on the right side and 2+ on the left side.     Comments: Muscle strength 5/5 in bilateral lower extremity hip flexion, extension, knee flexion, extension as well as dorsiflexion plantarflexion.  No saddle anesthesia or sensory deficits appreciated in a dermatomal distribution.   Psychiatric:         Behavior: Behavior normal.

## 2024-05-06 ENCOUNTER — OFFICE VISIT (OUTPATIENT)
Dept: INTERNAL MEDICINE CLINIC | Facility: OTHER | Age: 59
End: 2024-05-06
Payer: MEDICARE

## 2024-05-06 VITALS
WEIGHT: 201 LBS | OXYGEN SATURATION: 98 % | TEMPERATURE: 98.8 F | BODY MASS INDEX: 29.77 KG/M2 | HEART RATE: 78 BPM | DIASTOLIC BLOOD PRESSURE: 78 MMHG | HEIGHT: 69 IN | SYSTOLIC BLOOD PRESSURE: 116 MMHG

## 2024-05-06 DIAGNOSIS — B35.4 TINEA CORPORIS: Primary | ICD-10-CM

## 2024-05-06 PROCEDURE — G2211 COMPLEX E/M VISIT ADD ON: HCPCS | Performed by: INTERNAL MEDICINE

## 2024-05-06 PROCEDURE — 99213 OFFICE O/P EST LOW 20 MIN: CPT | Performed by: INTERNAL MEDICINE

## 2024-05-06 RX ORDER — CLOTRIMAZOLE 1 %
CREAM (GRAM) TOPICAL 2 TIMES DAILY
Qty: 60 G | Refills: 3 | Status: SHIPPED | OUTPATIENT
Start: 2024-05-06 | End: 2024-05-27

## 2024-05-06 RX ORDER — FLUCONAZOLE 150 MG/1
150 TABLET ORAL WEEKLY
Qty: 4 TABLET | Refills: 0 | Status: SHIPPED | OUTPATIENT
Start: 2024-05-06 | End: 2024-05-28

## 2024-05-06 NOTE — ASSESSMENT & PLAN NOTE
Discussed appropriate hygiene, particularly washing without reusing soiled close and taking daily showers.  Will prescribe clotrimazole cream twice daily for 3 weeks as well as Diflucan weekly for 4 weeks.

## 2024-06-14 ENCOUNTER — OFFICE VISIT (OUTPATIENT)
Dept: OBGYN CLINIC | Facility: MEDICAL CENTER | Age: 59
End: 2024-06-14
Payer: MEDICARE

## 2024-06-14 VITALS
SYSTOLIC BLOOD PRESSURE: 154 MMHG | HEIGHT: 69 IN | WEIGHT: 200.2 LBS | DIASTOLIC BLOOD PRESSURE: 85 MMHG | BODY MASS INDEX: 29.65 KG/M2 | HEART RATE: 91 BPM

## 2024-06-14 DIAGNOSIS — M17.0 BILATERAL PRIMARY OSTEOARTHRITIS OF KNEE: Primary | ICD-10-CM

## 2024-06-14 DIAGNOSIS — M25.562 CHRONIC PAIN OF BOTH KNEES: ICD-10-CM

## 2024-06-14 DIAGNOSIS — M25.461 EFFUSION OF RIGHT KNEE: ICD-10-CM

## 2024-06-14 DIAGNOSIS — G89.29 CHRONIC PAIN OF BOTH KNEES: ICD-10-CM

## 2024-06-14 DIAGNOSIS — M25.561 CHRONIC PAIN OF BOTH KNEES: ICD-10-CM

## 2024-06-14 PROCEDURE — 20610 DRAIN/INJ JOINT/BURSA W/O US: CPT | Performed by: PHYSICIAN ASSISTANT

## 2024-06-14 PROCEDURE — 99213 OFFICE O/P EST LOW 20 MIN: CPT | Performed by: ORTHOPAEDIC SURGERY

## 2024-06-14 RX ORDER — BUPIVACAINE HYDROCHLORIDE 2.5 MG/ML
2 INJECTION, SOLUTION INFILTRATION; PERINEURAL
Status: COMPLETED | OUTPATIENT
Start: 2024-06-14 | End: 2024-06-14

## 2024-06-14 RX ORDER — TRIAMCINOLONE ACETONIDE 40 MG/ML
40 INJECTION, SUSPENSION INTRA-ARTICULAR; INTRAMUSCULAR
Status: COMPLETED | OUTPATIENT
Start: 2024-06-14 | End: 2024-06-14

## 2024-06-14 RX ORDER — BUPIVACAINE HYDROCHLORIDE 2.5 MG/ML
4 INJECTION, SOLUTION INFILTRATION; PERINEURAL
Status: COMPLETED | OUTPATIENT
Start: 2024-06-14 | End: 2024-06-14

## 2024-06-14 RX ADMIN — BUPIVACAINE HYDROCHLORIDE 4 ML: 2.5 INJECTION, SOLUTION INFILTRATION; PERINEURAL at 15:00

## 2024-06-14 RX ADMIN — TRIAMCINOLONE ACETONIDE 40 MG: 40 INJECTION, SUSPENSION INTRA-ARTICULAR; INTRAMUSCULAR at 15:00

## 2024-06-14 RX ADMIN — BUPIVACAINE HYDROCHLORIDE 2 ML: 2.5 INJECTION, SOLUTION INFILTRATION; PERINEURAL at 15:00

## 2024-06-14 NOTE — PROGRESS NOTES
Assessment & Plan     1. Bilateral primary osteoarthritis of knee    2. Chronic pain of both knees    3. Effusion of right knee      No orders of the defined types were placed in this encounter.        Patient has severe bilateral knee osteoarthritis.   We will plan for R TKA after 9/14/24. Patient is a surgical candidate and will select date for surgery today. He would like to return in 1 month for full surgical discussion. He will likely require SOC, cardio, and dental clearance. Patient was told today to try to make dental appt asap.   Patient underwent right knee aspiration and bilat knee steroid injections. Tolerated the procedures well. Post injection instructions reviewed. Aware he must wait 3 months between CSI and TKA.   Continue diclofenac tabs prn pain.  Continue knee braces as needed.  Continue activity as tolerated.       Return in about 1 month (around 7/14/2024) for R TKA planning visit .    I answered all of the patient's questions during the visit and provided education of the patient's condition during the visit.  The patient verbalized understanding of the information given and agrees with the plan.  This note was dictated using [employer] software.  It may contain errors including improperly dictated words.  Please contact physician directly for any questions.    History of Present Illness   Chief complaint:   Chief Complaint   Patient presents with    Left Knee - Follow-up, Pain    Right Knee - Follow-up, Pain       HPI: Alexx Haney is a 59 y.o. male that c/o bilateral knee pain.      Mechanism of Injury: none  Pain Description: bilateral knee pain, worsening lately, neither knee worse than the other currently but right knee continues to get recurrent effusions   Palliating Factors: injections  Provoking Factors: walking  Associated Symptoms: swelling  Medications: will alternate between OTC NSAID and diclofenac tabs. Will also take tylenol.   Able to take NSAIDs? If not, why: yes  Physical  Therapy or Home Exercises: doing some stretches at home  Injections: received bilat knee CSI at last visit and reports good relief for several weeks after surgery   Bracing: does have braces for both knees that we will wear occasionally   Previous Surgery: NA  Miscellaneous: wants surgery in the fall      History of MRSA: no, but has had fungal infection on left lower leg requiring oral meds  History of blood clots: no  Family history of blood clots: no  History of Hepatitis C:no  History of HIV: no  Are you a smoker:no  Are you diabetic:no  Do you see a cardiologist: yes  Have you seen a dentist in the past year: no        ROS:    See HPI for musculoskeletal review.   All other systems reviewed are negative     Historical Information   Past Medical History:   Diagnosis Date    Anxiety 1/15/2019    Arthritis     Chronic rhinitis     last assessed 2/21/14    Chronic serous otitis media     last assessed 11/20/13    Chronic sinusitis     last assessed 8/19/14    Functional heart murmur     GERD (gastroesophageal reflux disease)     Gout     Hearing problem     last assessed 12/1/16    Hiatal hernia     Hypercholesterolemia     Hypertension     Palpitations     Testicular hypogonadism     last assessed 10/4/13    V-tach (HCC)      Past Surgical History:   Procedure Laterality Date    APPENDECTOMY      BICEPS TENODESIS      anesthesia for tenodesis- of ruptured long tendon of biceps     HERNIA REPAIR      THYROIDECTOMY      TONSILLECTOMY       Social History   Social History     Substance and Sexual Activity   Alcohol Use Yes    Comment: occasionally     Social History     Substance and Sexual Activity   Drug Use No     Social History     Tobacco Use   Smoking Status Never   Smokeless Tobacco Never     Family History:   Family History   Problem Relation Age of Onset    Lung cancer Father     Coronary artery disease Father         blockages    Stroke Maternal Grandmother     Cancer Paternal Grandfather          metastasized    Heart disease Family     Lung cancer Cousin     No Known Problems Mother     No Known Problems Sister     No Known Problems Brother     No Known Problems Maternal Aunt     No Known Problems Maternal Uncle     No Known Problems Paternal Aunt     No Known Problems Paternal Uncle     No Known Problems Paternal Grandmother     ADD / ADHD Neg Hx     Anesthesia problems Neg Hx     Clotting disorder Neg Hx     Collagen disease Neg Hx     Diabetes Neg Hx     Dislocations Neg Hx     Learning disabilities Neg Hx     Neurological problems Neg Hx     Osteoporosis Neg Hx     Rheumatologic disease Neg Hx     Scoliosis Neg Hx     Vascular Disease Neg Hx        Current Outpatient Medications on File Prior to Visit   Medication Sig Dispense Refill    acetaminophen (TYLENOL) 500 mg tablet Take 1,000 mg by mouth every 6 (six) hours as needed for mild pain      albuterol (2.5 mg/3 mL) 0.083 % nebulizer solution Take 3 mL (2.5 mg total) by nebulization every 6 (six) hours as needed for wheezing or shortness of breath 240 mL 2    albuterol (PROVENTIL HFA,VENTOLIN HFA) 90 mcg/act inhaler Inhale 1 puff if needed for wheezing or shortness of breath 18 g 1    Chlorpheniramine Maleate (CHLOR-TRIMETON ALLERGY PO) Take by mouth as needed      clotrimazole (LOTRIMIN) 1 % cream Apply topically 2 (two) times a day for 21 days 60 g 3    esomeprazole (NexIUM) 40 MG capsule Take 40 mg by mouth every morning before breakfast      febuxostat (ULORIC) 40 mg tablet Take 1 tablet (40 mg total) by mouth daily 90 tablet 1    fluticasone-salmeterol (Advair) 500-50 mcg/dose inhaler Inhale 1 puff 2 (two) times a day      ibuprofen (MOTRIN) 600 mg tablet Take 600 mg by mouth every 8 (eight) hours as needed for moderate pain      ipratropium-albuterol (DUO-NEB) 0.5-2.5 mg/3 mL Inhale 3 mL 2 (two) times a week       lisinopril (ZESTRIL) 20 mg tablet Take 1 tablet (20 mg total) by mouth daily 90 tablet 1    montelukast (SINGULAIR) 10 mg tablet Take  1 tablet (10 mg total) by mouth daily 90 tablet 1    Triamcinolone Acetonide (NASACORT ALLERGY 24HR NA) 1 spray into each nostril daily        verapamil (CALAN) 120 mg tablet TAKE 1 TABLET BY MOUTH EVERY DAY 90 tablet 3     No current facility-administered medications on file prior to visit.     Allergies   Allergen Reactions    Penicillins Rash and Anaphylaxis    Acetazolamide      Other reaction(s): Stomach Ache    Cephalosporins Other (See Comments)     headache    Other     Sulfa Antibiotics Other (See Comments)     Category: Allergy; Annotation - [id number]: STOMACH UPSET    Famotidine Palpitations    Levofloxacin Palpitations    Omeprazole Palpitations     Painful urination       Current Outpatient Medications on File Prior to Visit   Medication Sig Dispense Refill    acetaminophen (TYLENOL) 500 mg tablet Take 1,000 mg by mouth every 6 (six) hours as needed for mild pain      albuterol (2.5 mg/3 mL) 0.083 % nebulizer solution Take 3 mL (2.5 mg total) by nebulization every 6 (six) hours as needed for wheezing or shortness of breath 240 mL 2    albuterol (PROVENTIL HFA,VENTOLIN HFA) 90 mcg/act inhaler Inhale 1 puff if needed for wheezing or shortness of breath 18 g 1    Chlorpheniramine Maleate (CHLOR-TRIMETON ALLERGY PO) Take by mouth as needed      clotrimazole (LOTRIMIN) 1 % cream Apply topically 2 (two) times a day for 21 days 60 g 3    esomeprazole (NexIUM) 40 MG capsule Take 40 mg by mouth every morning before breakfast      febuxostat (ULORIC) 40 mg tablet Take 1 tablet (40 mg total) by mouth daily 90 tablet 1    fluticasone-salmeterol (Advair) 500-50 mcg/dose inhaler Inhale 1 puff 2 (two) times a day      ibuprofen (MOTRIN) 600 mg tablet Take 600 mg by mouth every 8 (eight) hours as needed for moderate pain      ipratropium-albuterol (DUO-NEB) 0.5-2.5 mg/3 mL Inhale 3 mL 2 (two) times a week       lisinopril (ZESTRIL) 20 mg tablet Take 1 tablet (20 mg total) by mouth daily 90 tablet 1    montelukast  "(SINGULAIR) 10 mg tablet Take 1 tablet (10 mg total) by mouth daily 90 tablet 1    Triamcinolone Acetonide (NASACORT ALLERGY 24HR NA) 1 spray into each nostril daily        verapamil (CALAN) 120 mg tablet TAKE 1 TABLET BY MOUTH EVERY DAY 90 tablet 3     No current facility-administered medications on file prior to visit.       Objective   Vitals: Blood pressure 154/85, pulse 91, height 5' 9\" (1.753 m), weight 90.8 kg (200 lb 3.2 oz).,Body mass index is 29.56 kg/m².    PE:  AAOx 3  WDWN  Hearing intact, no drainage from eyes  Regular rate  no audible wheezing  no abdominal distension  LE compartments soft, skin intact    bilateralknee:    Appearance:  Yes  swelling   No  ecchymosis  Yes  obvious joint deformity   Yes  effusion   Palpation/Tenderness:  No  TTP over medial joint line  No  TTP over lateral joint line   No  TTP over patella  No  TTP over patellar tendon  No  TTP over pes anserine bursa  Active Range of Motion:  AROM: 3- 110  Special Tests:  Valgus Stress Test: negative  Varus Stress Test: negative        Large joint arthrocentesis: R knee  Universal Protocol:  Consent: Verbal consent obtained.  Risks and benefits: risks, benefits and alternatives were discussed  Consent given by: patient  Site marked: the operative site was marked  Supporting Documentation  Indications: pain and joint swelling   Procedure Details  Location: knee - R knee  Preparation: Patient was prepped and draped in the usual sterile fashion  Needle size: 22 G  Ultrasound guidance: no  Approach: lateral  Medications administered: 4 mL bupivacaine 0.25 %    Patient tolerance: patient tolerated the procedure well with no immediate complications      Large joint arthrocentesis: R knee  Universal Protocol:  Consent: Verbal consent obtained.  Risks and benefits: risks, benefits and alternatives were discussed  Consent given by: patient  Site marked: the operative site was marked  Supporting Documentation  Indications: pain and joint " swelling   Procedure Details  Location: knee - R knee  Preparation: Patient was prepped and draped in the usual sterile fashion  Needle size: 18 G  Ultrasound guidance: no  Approach: lateral  Medications administered: 2 mL bupivacaine 0.25 %; 40 mg triamcinolone acetonide 40 mg/mL    Aspirate amount: 105 mL  Aspirate: serous and yellow  Patient tolerance: patient tolerated the procedure well with no immediate complications  Dressing:  Sterile dressing applied      Large joint arthrocentesis: L knee  Universal Protocol:  Consent: Verbal consent obtained.  Risks and benefits: risks, benefits and alternatives were discussed  Consent given by: patient  Site marked: the operative site was marked  Supporting Documentation  Indications: pain   Procedure Details  Location: knee - L knee  Preparation: Patient was prepped and draped in the usual sterile fashion  Needle size: 22 G  Ultrasound guidance: no  Approach: anterolateral  Medications administered: 2 mL bupivacaine 0.25 %; 40 mg triamcinolone acetonide 40 mg/mL    Patient tolerance: patient tolerated the procedure well with no immediate complications  Dressing:  Sterile dressing applied            Scribe Attestation      I,:  Christa Reyes PA-C am acting as a scribe while in the presence of the attending physician.:       I,:  Parvin Whipple DO personally performed the services described in this documentation    as scribed in my presence.:

## 2024-06-19 ENCOUNTER — TELEPHONE (OUTPATIENT)
Age: 59
End: 2024-06-19

## 2024-06-19 DIAGNOSIS — H81.12 BENIGN PAROXYSMAL POSITIONAL VERTIGO OF LEFT EAR: ICD-10-CM

## 2024-06-19 RX ORDER — MECLIZINE HCL 12.5 MG/1
12.5 TABLET ORAL EVERY 8 HOURS SCHEDULED
Qty: 30 TABLET | Refills: 0 | Status: SHIPPED | OUTPATIENT
Start: 2024-06-19

## 2024-07-16 DIAGNOSIS — I10 HYPERTENSION, UNSPECIFIED TYPE: ICD-10-CM

## 2024-07-16 RX ORDER — LISINOPRIL 20 MG/1
20 TABLET ORAL DAILY
Qty: 90 TABLET | Refills: 1 | Status: SHIPPED | OUTPATIENT
Start: 2024-07-16

## 2024-07-19 DIAGNOSIS — J45.909 ASTHMA, UNSPECIFIED ASTHMA SEVERITY, UNSPECIFIED WHETHER COMPLICATED, UNSPECIFIED WHETHER PERSISTENT: ICD-10-CM

## 2024-07-19 RX ORDER — ALBUTEROL SULFATE 90 UG/1
1 AEROSOL, METERED RESPIRATORY (INHALATION) AS NEEDED
Qty: 48 G | Refills: 1 | Status: SHIPPED | OUTPATIENT
Start: 2024-07-19

## 2024-07-19 NOTE — TELEPHONE ENCOUNTER
Medication: PROVENTIL HFA,VENTOLIN HFA ---MUST BE BRAND NAME, INSURANCE WILL NOT PAY FOR GENERIC PER PATIENT    Dose/Frequency:     Inhale 1 puff if needed for wheezing or shortness of breath       Quantity: 18 g    Pharmacy: [facility], [place]    Office:   [x] PCP/Provider - Dr Delacruz  [] Speciality/Provider -     Does the patient have enough for 3 days?   [] Yes   [x] No - Send as HP to POD--patient was in emergency department last night and forgot to ask them to refill it

## 2024-07-23 ENCOUNTER — TELEPHONE (OUTPATIENT)
Age: 59
End: 2024-07-23

## 2024-07-24 ENCOUNTER — RA CDI HCC (OUTPATIENT)
Dept: OTHER | Facility: HOSPITAL | Age: 59
End: 2024-07-24

## 2024-07-30 ENCOUNTER — OFFICE VISIT (OUTPATIENT)
Dept: INTERNAL MEDICINE CLINIC | Facility: OTHER | Age: 59
End: 2024-07-30
Payer: MEDICARE

## 2024-07-30 VITALS
RESPIRATION RATE: 20 BRPM | HEIGHT: 69 IN | TEMPERATURE: 98.8 F | BODY MASS INDEX: 29.98 KG/M2 | WEIGHT: 202.4 LBS | SYSTOLIC BLOOD PRESSURE: 128 MMHG | HEART RATE: 84 BPM | DIASTOLIC BLOOD PRESSURE: 78 MMHG | OXYGEN SATURATION: 95 %

## 2024-07-30 DIAGNOSIS — E78.2 MIXED HYPERLIPIDEMIA: ICD-10-CM

## 2024-07-30 DIAGNOSIS — E87.1 HYPONATREMIA: ICD-10-CM

## 2024-07-30 DIAGNOSIS — R73.9 HYPERGLYCEMIA: ICD-10-CM

## 2024-07-30 DIAGNOSIS — I10 ESSENTIAL HYPERTENSION: Primary | ICD-10-CM

## 2024-07-30 DIAGNOSIS — I47.20 VENTRICULAR TACHYCARDIA (HCC): ICD-10-CM

## 2024-07-30 DIAGNOSIS — K21.9 GERD WITHOUT ESOPHAGITIS: ICD-10-CM

## 2024-07-30 DIAGNOSIS — J45.909 ASTHMA, UNSPECIFIED ASTHMA SEVERITY, UNSPECIFIED WHETHER COMPLICATED, UNSPECIFIED WHETHER PERSISTENT: ICD-10-CM

## 2024-07-30 DIAGNOSIS — M1A.9XX0 CHRONIC GOUT WITHOUT TOPHUS, UNSPECIFIED CAUSE, UNSPECIFIED SITE: ICD-10-CM

## 2024-07-30 PROCEDURE — 99495 TRANSJ CARE MGMT MOD F2F 14D: CPT | Performed by: INTERNAL MEDICINE

## 2024-07-30 NOTE — PROGRESS NOTES
Assessment/Plan:    Essential hypertension  Blood pressure is stable on present regimen.  Continue with low-salt diet.    Ventricular tachycardia (HCC)  Well-controlled on present dose of beta-blocker.  Also followed by cardiologist    Asthma  Stable on present regimen.  Also managed by pulmonologist    GERD without esophagitis  Continue with present dose of PPI.  Continue with healthy diet    Gout  Stable on present dose of Uloric.    Hyponatremia  Mild hyponatremia with sodium of 134 2 weeks ago.  Will repeat BMP before next visit       Diagnoses and all orders for this visit:    Essential hypertension    GERD without esophagitis    Mixed hyperlipidemia  -     Lipid Panel with Direct LDL reflex; Future    Hyponatremia  -     Basic metabolic panel; Future    Chronic gout without tophus, unspecified cause, unspecified site    Hyperglycemia  -     Hemoglobin A1C; Future    Ventricular tachycardia (HCC)    Asthma, unspecified asthma severity, unspecified whether complicated, unspecified whether persistent          Subjective:          Patient ID: Alexx Haney is a 59 y.o. male.    TCM Call       Date and time call was made  7/22/2024 11:56 AM    Hospital care reviewed  Records reviewed    Patient was hospitialized at  [facility]    Date of Admission  07/18/24    Date of discharge  07/19/24    Diagnosis  Asthma and chronic Fatigue    Disposition  Home    Were the patients medications reviewed and updated  Yes    Current Symptoms  None          TCM Call       Post hospital issues  None    Should patient be enrolled in anticoag monitoring?  No    Scheduled for follow up?  No    Did you obtain your prescribed medications  Yes    Do you need help managing your prescriptions or medications  No    Is transportation to your appointment needed  No    I have advised the patient to call PCP with any new or worsening symptoms  Michell Ahuja    Living Arrangements  Family members    Support System  Family    The  type of support provided  Other (comment)    Do you have social support  Yes, as much as I need    Are you recieving any outpatient services  No    Are you recieving home care services  No    Are you using any community resources  No    Current waiver services  No    Have you fallen in the last 12 months  No    Interperter language line needed  No    Counseling  Patient    Counseling topics  Activities of daily living; Importance of RX compliance; instructions for management            Patient is here for follow-up of her emergency room visit.  He was seen in the ER with acute exacerbation of bronchial asthma treated with prednisone and now feeling better.    Knee Pain         The following portions of the patient's history were reviewed and updated as appropriate: allergies, current medications, past family history, past medical history, past social history, past surgical history, and problem list.    Review of Systems   Constitutional:  Negative for fatigue and fever.   HENT:  Negative for congestion, ear discharge, ear pain, postnasal drip, sinus pressure, sore throat, tinnitus and trouble swallowing.    Eyes:  Negative for discharge, itching and visual disturbance.   Respiratory:  Negative for cough and shortness of breath.    Cardiovascular:  Negative for chest pain and palpitations.   Gastrointestinal:  Negative for abdominal pain, diarrhea, nausea and vomiting.   Endocrine: Negative for cold intolerance and polyuria.   Genitourinary:  Negative for difficulty urinating, dysuria and urgency.   Musculoskeletal:  Positive for arthralgias. Negative for neck pain.   Skin:  Negative for rash.   Allergic/Immunologic: Negative for environmental allergies.   Neurological:  Negative for dizziness, weakness and headaches.   Psychiatric/Behavioral:  The patient is not nervous/anxious.          Past Medical History:   Diagnosis Date    Anxiety 1/15/2019    Arthritis     Chronic rhinitis     last assessed 2/21/14    Chronic  serous otitis media     last assessed 11/20/13    Chronic sinusitis     last assessed 8/19/14    Functional heart murmur     GERD (gastroesophageal reflux disease)     Gout     Hearing problem     last assessed 12/1/16    Hiatal hernia     Hypercholesterolemia     Hypertension     Palpitations     Testicular hypogonadism     last assessed 10/4/13    V[facility]tach ([facility])          Current Outpatient Medications:     acetaminophen (TYLENOL) 500 mg tablet, Take 750 mg by mouth in the morning 1500 at times, Disp: , Rfl:     albuterol (2.5 mg/3 mL) 0.083 % nebulizer solution, Take 3 mL (2.5 mg total) by nebulization every 6 (six) hours as needed for wheezing or shortness of breath, Disp: 240 mL, Rfl: 2    albuterol (PROVENTIL HFA,VENTOLIN HFA) 90 mcg/act inhaler, Inhale 1 puff if needed for wheezing or shortness of breath, Disp: 48 g, Rfl: 1    Chlorpheniramine Maleate (CHLOR-TRIMETON ALLERGY PO), Take by mouth as needed, Disp: , Rfl:     esomeprazole (NexIUM) 40 MG capsule, Take 40 mg by mouth every morning before breakfast, Disp: , Rfl:     febuxostat (ULORIC) 40 mg tablet, Take 1 tablet (40 mg total) by mouth daily, Disp: 90 tablet, Rfl: 1    fluticasone-salmeterol (Advair) 500-50 mcg/dose inhaler, Inhale 1 puff 2 (two) times a day, Disp: , Rfl:     ibuprofen (MOTRIN) 600 mg tablet, Take 600 mg by mouth every 8 (eight) hours as needed for moderate pain, Disp: , Rfl:     ipratropium-albuterol (DUO-NEB) 0.5-2.5 mg/3 mL, Inhale 3 mL if needed, Disp: , Rfl:     lisinopril (ZESTRIL) 20 mg tablet, TAKE 1 TABLET BY MOUTH EVERY DAY, Disp: 90 tablet, Rfl: 1    meclizine (ANTIVERT) 12.5 MG tablet, Take 1 tablet (12.5 mg total) by mouth every 8 (eight) hours, Disp: 30 tablet, Rfl: 0    montelukast (SINGULAIR) 10 mg tablet, Take 1 tablet (10 mg total) by mouth daily, Disp: 90 tablet, Rfl: 1    Triamcinolone Acetonide (NASACORT ALLERGY 24HR NA), 1 spray into each nostril daily  , Disp: , Rfl:     verapamil (CALAN) 120 mg tablet, TAKE 1  "TABLET BY MOUTH EVERY DAY, Disp: 90 tablet, Rfl: 3    clotrimazole (LOTRIMIN) 1 % cream, Apply topically 2 (two) times a day for 21 days, Disp: 60 g, Rfl: 3    Allergies   Allergen Reactions    Penicillins Rash and Anaphylaxis    Acetazolamide      Other reaction(s): Stomach Ache    Cephalosporins Other (See Comments)     headache    Other     Sulfa Antibiotics Other (See Comments)     Category: Allergy; Annotation - [id number]: STOMACH UPSET    Famotidine Palpitations    Levofloxacin Palpitations    Omeprazole Palpitations     Painful urination       Social History   Past Surgical History:   Procedure Laterality Date    APPENDECTOMY      BICEPS TENODESIS      anesthesia for tenodesis- of ruptured long tendon of biceps     HERNIA REPAIR      THYROIDECTOMY      TONSILLECTOMY       Family History   Problem Relation Age of Onset    Lung cancer Father     Coronary artery disease Father         blockages    Stroke Maternal Grandmother     Cancer Paternal Grandfather         metastasized    Heart disease Family     Lung cancer Cousin     No Known Problems Mother     No Known Problems Sister     No Known Problems Brother     No Known Problems Maternal Aunt     No Known Problems Maternal Uncle     No Known Problems Paternal Aunt     No Known Problems Paternal Uncle     No Known Problems Paternal Grandmother     ADD / ADHD Neg Hx     Anesthesia problems Neg Hx     Clotting disorder Neg Hx     Collagen disease Neg Hx     Diabetes Neg Hx     Dislocations Neg Hx     Learning disabilities Neg Hx     Neurological problems Neg Hx     Osteoporosis Neg Hx     Rheumatologic disease Neg Hx     Scoliosis Neg Hx     Vascular Disease Neg Hx        Objective:  /78 (BP Location: Left arm, Patient Position: Sitting, Cuff Size: Standard)   Pulse 84   Temp 98.8 °F (37.1 °C) (Temporal)   Resp 20   Ht 5' 9\" (1.753 m)   Wt 91.8 kg (202 lb 6.4 oz)   SpO2 95%   BMI 29.89 kg/m²   Body mass index is 29.89 kg/m².     Physical " Exam  Constitutional:       General: He is not in acute distress.     Appearance: He is well-developed.   HENT:      Head: Normocephalic.      Right Ear: External ear normal.      Left Ear: External ear normal.      Nose: No rhinorrhea.      Mouth/Throat:      Pharynx: No posterior oropharyngeal erythema.   Eyes:      General: No scleral icterus.     Pupils: Pupils are equal, round, and reactive to light.   Neck:      Thyroid: No thyromegaly.      Trachea: No tracheal deviation.   Cardiovascular:      Rate and Rhythm: Normal rate and regular rhythm.      Heart sounds: Normal heart sounds. No murmur heard.  Pulmonary:      Effort: Pulmonary effort is normal. No respiratory distress.      Breath sounds: Normal breath sounds. No rhonchi.   Chest:      Chest wall: No tenderness.   Abdominal:      General: Bowel sounds are normal.      Palpations: Abdomen is soft. There is no mass.      Tenderness: There is no abdominal tenderness.   Musculoskeletal:         General: Normal range of motion.      Cervical back: Normal range of motion and neck supple. No rigidity.      Right lower leg: No edema.      Left lower leg: No edema.   Lymphadenopathy:      Cervical: No cervical adenopathy.   Skin:     General: Skin is warm.      Findings: No erythema or rash.   Neurological:      Mental Status: He is alert and oriented to person, place, and time.      Cranial Nerves: No cranial nerve deficit.   Psychiatric:         Mood and Affect: Mood normal.         Behavior: Behavior normal.         Thought Content: Thought content normal.

## 2024-08-14 ENCOUNTER — APPOINTMENT (OUTPATIENT)
Dept: RADIOLOGY | Facility: MEDICAL CENTER | Age: 59
End: 2024-08-14
Payer: MEDICARE

## 2024-08-14 ENCOUNTER — OFFICE VISIT (OUTPATIENT)
Dept: OBGYN CLINIC | Facility: MEDICAL CENTER | Age: 59
End: 2024-08-14
Payer: MEDICARE

## 2024-08-14 VITALS
BODY MASS INDEX: 30.1 KG/M2 | HEART RATE: 84 BPM | HEIGHT: 69 IN | WEIGHT: 203.2 LBS | SYSTOLIC BLOOD PRESSURE: 118 MMHG | DIASTOLIC BLOOD PRESSURE: 74 MMHG

## 2024-08-14 DIAGNOSIS — Z01.818 PREOPERATIVE TESTING: ICD-10-CM

## 2024-08-14 DIAGNOSIS — M17.0 BILATERAL PRIMARY OSTEOARTHRITIS OF KNEE: ICD-10-CM

## 2024-08-14 DIAGNOSIS — M25.59 PAIN IN OTHER SPECIFIED JOINT: ICD-10-CM

## 2024-08-14 DIAGNOSIS — M17.11 ARTHRITIS OF RIGHT KNEE: Primary | ICD-10-CM

## 2024-08-14 PROCEDURE — 73564 X-RAY EXAM KNEE 4 OR MORE: CPT

## 2024-08-14 PROCEDURE — 99213 OFFICE O/P EST LOW 20 MIN: CPT | Performed by: ORTHOPAEDIC SURGERY

## 2024-08-14 PROCEDURE — 77073 BONE LENGTH STUDIES: CPT

## 2024-08-14 NOTE — PROGRESS NOTES
Assessment/Plan:  1. Arthritis of right knee    2. Bilateral primary osteoarthritis of knee    3. Preoperative testing    4. Pain in other specified joint      Orders Placed This Encounter   Procedures    XR bone length (scanogram)    XR knee 4+ vw left injury    XR knee 4+ vw right injury    Comprehensive metabolic panel    Hemoglobin A1C W/EAG Estimation    CBC and differential    C-reactive protein    Anemia Panel w/Reflex    Protime-INR    APTT    Ambulatory referral to Physical Therapy    Ambulatory referral to Cardiology    Ambulatory referral to Internal Medicine    Ambulatory referral to Dentistry    EKG 12 lead    Type and screen       Patient has severe bilateral knee arthritis.  he has tried conservative treatment without adequate relief.  We discussed treatment options as well as risks and benefits of treatment options.  The patient would like to proceed with a right total knee arthroplasty.  The risks of surgery include, but are not limited to infection, blood clot, wound healing problems, blood loss, damage to blood vessels and nerves, persistent pain and stiffness, fracture, need for additional surgery, need for revision surgery, failure of hardware, heart attack, stroke, death.  The patient understood and agreed to by oral and written consent.  I answered all questions regarding surgery.  Reviewed with patient to expect some numbness over the lateral aspect of the knee after surgery.  We let the patient know to avoid kneeling while the incision is healing, typically for the first 4-6 weeks.  Once incision is healed kneeling if permitted but may still be uncomfortable for some patients long term.    Reviewed no driving for 4-6 weeks for right sided surgery once off narcotics.  No driving until off narcotics for left sided surgery.    Preoperative vitamins were prescribed.  Patient instructed to  what they are not already taking and start 30 days before surgery.  We let the patient know that  some people experience constipation while on iron.  If that occurs can take iron every other day.  If still an issue can stop the iron.  Can also add in OTC medication and/or prune juice for constipation.    We also let the patient know that some people experience constipation after surgery while on narcotics.  We suggest to have OTC medications and/or prune juice available if needed.    DVT Prophylaxis: ASA  The patient will obtain clearance from: SOC, cardio, dental      No follow-ups on file.    I answered all of the patient's questions during the visit and provided education of the patient's condition during the visit.  The patient verbalized understanding of the information given and agrees with the plan.  This note was dictated using [email or web address] software.  It may contain errors including improperly dictated words.  Please contact physician directly for any questions.    Subjective   Chief Complaint:   Chief Complaint   Patient presents with    Left Knee - Follow-up    Right Knee - Follow-up       HPI  Alexx Haney is a 59 y.o. male who presents for follow up for severe bilateral knee osteoarthritis.  Patient was last seen 6/14/2024 where patient selected a right TKA date after 9/14/2024 and patient underwent right knee aspiration and bilateral knee steroid injections. He is here to discuss R TKA.       History of MRSA: no, but has had fungal infection on left lower leg requiring oral meds  History of blood clots: no  Family history of blood clots: no  History of Hepatitis C:no  History of HIV: no  Are you a smoker:no  Are you diabetic:no  Do you see a cardiologist: yes  Have you seen a dentist in the past year: no     Review of Systems  ROS:    See HPI for musculoskeletal review.   All other systems reviewed are negative     History:  Past Medical History:   Diagnosis Date    Anxiety 1/15/2019    Arthritis     Chronic rhinitis     last assessed 2/21/14    Chronic serous otitis media     last assessed 11/20/13     Chronic sinusitis     last assessed 8/19/14    Functional heart murmur     GERD (gastroesophageal reflux disease)     Gout     Hearing problem     last assessed 12/1/16    Hiatal hernia     Hypercholesterolemia     Hypertension     Palpitations     Testicular hypogonadism     last assessed 10/4/13    V-tach (HCC)      Past Surgical History:   Procedure Laterality Date    APPENDECTOMY      BICEPS TENODESIS      anesthesia for tenodesis- of ruptured long tendon of biceps     HERNIA REPAIR      THYROIDECTOMY      TONSILLECTOMY       Social History   Social History     Substance and Sexual Activity   Alcohol Use Yes    Comment: occasionally     Social History     Substance and Sexual Activity   Drug Use No     Social History     Tobacco Use   Smoking Status Never   Smokeless Tobacco Never     Family History:   Family History   Problem Relation Age of Onset    Lung cancer Father     Coronary artery disease Father         blockages    Stroke Maternal Grandmother     Cancer Paternal Grandfather         metastasized    Heart disease Family     Lung cancer Cousin     No Known Problems Mother     No Known Problems Sister     No Known Problems Brother     No Known Problems Maternal Aunt     No Known Problems Maternal Uncle     No Known Problems Paternal Aunt     No Known Problems Paternal Uncle     No Known Problems Paternal Grandmother     ADD / ADHD Neg Hx     Anesthesia problems Neg Hx     Clotting disorder Neg Hx     Collagen disease Neg Hx     Diabetes Neg Hx     Dislocations Neg Hx     Learning disabilities Neg Hx     Neurological problems Neg Hx     Osteoporosis Neg Hx     Rheumatologic disease Neg Hx     Scoliosis Neg Hx     Vascular Disease Neg Hx        Current Outpatient Medications on File Prior to Visit   Medication Sig Dispense Refill    acetaminophen (TYLENOL) 500 mg tablet Take 750 mg by mouth in the morning 1500 at times      albuterol (2.5 mg/3 mL) 0.083 % nebulizer solution Take 3 mL (2.5 mg total) by  "nebulization every 6 (six) hours as needed for wheezing or shortness of breath 240 mL 2    albuterol (PROVENTIL HFA,VENTOLIN HFA) 90 mcg/act inhaler Inhale 1 puff if needed for wheezing or shortness of breath 48 g 1    Chlorpheniramine Maleate (CHLOR-TRIMETON ALLERGY PO) Take by mouth as needed      clotrimazole (LOTRIMIN) 1 % cream Apply topically 2 (two) times a day for 21 days 60 g 3    esomeprazole (NexIUM) 40 MG capsule Take 40 mg by mouth every morning before breakfast      febuxostat (ULORIC) 40 mg tablet Take 1 tablet (40 mg total) by mouth daily 90 tablet 1    fluticasone-salmeterol (Advair) 500-50 mcg/dose inhaler Inhale 1 puff 2 (two) times a day      ibuprofen (MOTRIN) 600 mg tablet Take 600 mg by mouth every 8 (eight) hours as needed for moderate pain      ipratropium-albuterol (DUO-NEB) 0.5-2.5 mg/3 mL Inhale 3 mL if needed      lisinopril (ZESTRIL) 20 mg tablet TAKE 1 TABLET BY MOUTH EVERY DAY 90 tablet 1    meclizine (ANTIVERT) 12.5 MG tablet Take 1 tablet (12.5 mg total) by mouth every 8 (eight) hours 30 tablet 0    montelukast (SINGULAIR) 10 mg tablet Take 1 tablet (10 mg total) by mouth daily 90 tablet 1    Triamcinolone Acetonide (NASACORT ALLERGY 24HR NA) 1 spray into each nostril daily        verapamil (CALAN) 120 mg tablet TAKE 1 TABLET BY MOUTH EVERY DAY 90 tablet 3     No current facility-administered medications on file prior to visit.     Allergies   Allergen Reactions    Penicillins Rash and Anaphylaxis    Acetazolamide      Other reaction(s): Stomach Ache    Cephalosporins Other (See Comments)     headache    Other     Sulfa Antibiotics Other (See Comments)     Category: Allergy; Annotation - [id number]: STOMACH UPSET    Famotidine Palpitations    Levofloxacin Palpitations    Omeprazole Palpitations     Painful urination        Objective     /74   Pulse 84   Ht 5' 9\" (1.753 m)   Wt 92.2 kg (203 lb 3.2 oz)   BMI 30.01 kg/m²      PE:  AAOx 3  WDWN  Hearing intact, no drainage from " eyes  no audible wheezing  no abdominal distension  LE compartments soft, skin intact    Ortho Exam:  bilateral Knee:   No erythema  no swelling  no effusion  no warmth  AROM: 3-110  Stable to varus/valgus stress    Imaging Studies: I have personally reviewed pertinent films in PACS  XR right knee: Severe osteoarthritis of right knee with bone-on-bone contact.      Scribe Attestation      I,:  Yoel Greer am acting as a scribe while in the presence of the attending physician.:       I,:  Parvin Whipple DO personally performed the services described in this documentation    as scribed in my presence.:

## 2024-08-15 RX ORDER — CEFAZOLIN SODIUM 2 G/50ML
2000 SOLUTION INTRAVENOUS ONCE
OUTPATIENT
Start: 2024-08-15 | End: 2024-08-15

## 2024-08-15 RX ORDER — CHLORHEXIDINE GLUCONATE 40 MG/ML
SOLUTION TOPICAL DAILY PRN
OUTPATIENT
Start: 2024-08-15

## 2024-08-15 RX ORDER — TRANEXAMIC ACID 10 MG/ML
1000 INJECTION, SOLUTION INTRAVENOUS ONCE
OUTPATIENT
Start: 2024-08-15 | End: 2024-08-15

## 2024-08-15 RX ORDER — CHLORHEXIDINE GLUCONATE ORAL RINSE 1.2 MG/ML
15 SOLUTION DENTAL ONCE
OUTPATIENT
Start: 2024-08-15 | End: 2024-08-15

## 2024-08-15 RX ORDER — SODIUM CHLORIDE 9 MG/ML
75 INJECTION, SOLUTION INTRAVENOUS CONTINUOUS
OUTPATIENT
Start: 2024-08-15

## 2024-08-15 RX ORDER — ACETAMINOPHEN 325 MG/1
975 TABLET ORAL ONCE
OUTPATIENT
Start: 2024-08-15 | End: 2024-08-15

## 2024-08-16 DIAGNOSIS — M17.11 PRIMARY OSTEOARTHRITIS OF RIGHT KNEE: Primary | ICD-10-CM

## 2024-08-16 RX ORDER — FOLIC ACID 1 MG/1
1 TABLET ORAL DAILY
Qty: 30 TABLET | Refills: 1 | Status: SHIPPED | OUTPATIENT
Start: 2024-08-16

## 2024-08-16 RX ORDER — FERROUS SULFATE 324(65)MG
324 TABLET, DELAYED RELEASE (ENTERIC COATED) ORAL
Qty: 30 TABLET | Refills: 1 | Status: SHIPPED | OUTPATIENT
Start: 2024-08-16

## 2024-08-16 RX ORDER — MULTIVITAMIN
1 TABLET ORAL DAILY
Qty: 30 TABLET | Refills: 1 | Status: SHIPPED | OUTPATIENT
Start: 2024-08-16

## 2024-08-16 RX ORDER — ASCORBIC ACID 500 MG
500 TABLET ORAL DAILY
Qty: 30 TABLET | Refills: 1 | Status: SHIPPED | OUTPATIENT
Start: 2024-08-16

## 2024-08-19 PROBLEM — E66.09 CLASS 1 OBESITY DUE TO EXCESS CALORIES WITHOUT SERIOUS COMORBIDITY WITH BODY MASS INDEX (BMI) OF 30.0 TO 30.9 IN ADULT: Status: ACTIVE | Noted: 2024-08-19

## 2024-08-19 PROBLEM — J20.9 ACUTE BRONCHITIS: Status: RESOLVED | Noted: 2024-01-29 | Resolved: 2024-08-19

## 2024-08-19 PROBLEM — E66.811 CLASS 1 OBESITY DUE TO EXCESS CALORIES WITHOUT SERIOUS COMORBIDITY WITH BODY MASS INDEX (BMI) OF 30.0 TO 30.9 IN ADULT: Status: ACTIVE | Noted: 2024-08-19

## 2024-08-19 NOTE — PATIENT INSTRUCTIONS
BEFORE SURGERY    Contact surgical nurse  navigator with any questions regarding preoperative plan or schedule.  Stop all over the counter supplements, herbal, naturopathic  medications for 1 week prior to surgery UNLESS prescribed by your surgeon  Hold NSAIDS (i.e. advil, alleve, motrin, ibuprofen, celebrex) minimum 5 days prior to surgery  Follow presurgical medication instructions provided by preadmission nursing team reviewed during your presurgery phone call  Strategies for optimizing your surgery through breathing exercises, nutrition and physical activity can be found at [email or web address]/best  Call [phone] with any presurgical concerns or medications questions    AFTER SURGERY    Recommend using Tylenol ( acetaminophen ) 1000 mg every eight hours during the first week post discharge along with icing the area for 20 mins every 3-4 hours while awake can be helpful in reducing your need for post operative opioid use. This opioid sparing plan can be used along side your surgeons pain plan.  Use stool softener over the counter (colace) daily after surgery during the first 1-2 weeks to avoid post operative constipation issues  If no bowel movement within 3 days after surgery then use over the counter Miralax in addition to your stool softener   If cleared by your surgical team for activity then early and often walking is encouraged and can be important in prevention of post surgical blood clots. Additionally spend as much time out of bed as possible and allowed by your surgical team  Use your incentive spirometer twice per hour in the first seven days after surgery to help prevent post surgery lung complications and infections  Call [phone] with any post discharge concerns or medical issues

## 2024-08-19 NOTE — H&P (VIEW-ONLY)
Internal Medicine Pre-Operative Evaluation:     Reason for Visit: Pre-operative Evaluation for Risk Stratification and Optimization    Patient ID: Alexx Haney is a 59 y.o. male.     Surgery: Arthroplasty of right knee  Referring Provider: Dr. Whipple      Recommendations to Proceed withSurgery    Patient is considered to be Low risk for Medium risk procedure.     After evaluation and discussion with patient with emphasis that all surgery has some degree of inherent risk it is acknowledged by patient this risk is Acceptable.    Patient is optimized and may proceed with planned procedure.     Assessment    Pre-operative Medical Evaluation for planned surgery  Recommendations as listed in PLAN section below  Contact surgical nurse  navigator with any questions regarding preoperative plan or schedule.      Problem List Items Addressed This Visit          Cardiovascular and Mediastinum    Essential hypertension     Stable  Monitor post operative BP   Avoid hypotension if at all possible  Refer to PAT instructions regarding medication administration the morning of surgery           V-tach (HCC)       Respiratory    Asthma     Stable  Continue inhalers the morning of surgery as directed            Musculoskeletal and Integument    Primary localized osteoarthritis of right knee     Failed outpatient conservative measures  Electing to undergo arthroplasty              Other    Class 1 obesity due to excess calories without serious comorbidity with body mass index (BMI) of 30.0 to 30.9 in adult     Recommend ongoing attempts at weight loss  Current BMI meets criteria of <40 per MLJ preoperative qualifications            Other Visit Diagnoses       Preoperative clearance    -  Primary        Hyponatremia  Chronic  stable         Plan:     1. Further preoperative workup as follows:   - none no further testing required may proceed with surgery    2. Preoperative Medication Management Review performed by OWEN  "nursing  YES    3. Patient requires further consultation with:   No Consults Required    4. Discharge Planning / Barriers to Discharge  none identified - patients has post discharge therapy plan in place, transportation arranged for discharge day, adequate family support at home to assist with discharge to home.        Subjective:           History of Present Illness:     Alexx Haney is a 59 y.o. male who presents to the office today for a preoperative consultation at the request of surgeon. The patient understands this is an elective procedure and not emergent. They are electing to undergo planned procedure with an understanding that all surgery has inherent risk. They have worked with their surgeon and failed conservative treatment measures. Today they present for preoperative risk assessment and recommendations for optimization in preparation for surgery.    Pt seen in surgical optimization center for upcoming proposed surgery. They have failed previous conservative measures and have elected surgical intervention.     Pt meets presurgical lab and BMI optimization goals.    Upon interview questioning patient is able to perform greater than 4 METs workload in daily life without any reported cardio-pulmonary symptoms.Patient has a history of SVT, he is followed by cardiology.  He has not had any documented recent episodes.  He was cleared by cardiology with no additional testing on  8/22/2024.          ROS: No TIA's or unusual headaches, no dysphagia.  No prolonged cough. No dyspnea or chest pain on exertion.  No abdominal pain, change in bowel habits, black or bloody stools.  No urinary tract or BPH symptoms.  Positive reported pain in arthritic joint. Positive difficulty with gait. No skin rashes or issues.      Objective:    /78   Pulse 85   Ht 5' 9\" (1.753 m)   Wt 90.7 kg (200 lb)   BMI 29.53 kg/m²       General Appearance: no distress, conversive  HEENT: PERRLA, conjuctiva normal; oropharynx " clear; mucous membranes moist;   Neck:  Supple, no lymphadenopathy or thyromegaly  Lungs: breath sounds normal, normal respiratory effort, no retractions, expiratory effort normal  CV: normal heart sounds S1/S2, PMI normal   ABD: soft non tender, no masses , no hepatic or splenomegaly  EXT: DP pulses intact, no lymphadenopathy, no edema  Skin: normal turgor, normal texture, no rash  Psych: affect normal, mood normal  Neuro: AAOx3        The following portions of the patient's history were reviewed and updated as appropriate: allergies, current medications, past family history, past medical history, past social history, past surgical history and problem list.     Past History:       Past Medical History:   Diagnosis Date    Acute bronchitis 01/29/2024    Anxiety 01/15/2019    Arthritis     Chronic rhinitis     last assessed 2/21/14    Chronic serous otitis media     last assessed 11/20/13    Chronic sinusitis     last assessed 8/19/14    Functional heart murmur     GERD (gastroesophageal reflux disease)     Gout     Hearing problem     last assessed 12/1/16    Hiatal hernia     Hypercholesterolemia     Hypertension     Palpitations     Testicular hypogonadism     last assessed 10/4/13    V-tach (HCC)     Past Surgical History:   Procedure Laterality Date    APPENDECTOMY      BICEPS TENODESIS      anesthesia for tenodesis- of ruptured long tendon of biceps     HERNIA REPAIR      THYROIDECTOMY      TONSILLECTOMY            Social History     Tobacco Use    Smoking status: Never    Smokeless tobacco: Never   Vaping Use    Vaping status: Never Used   Substance Use Topics    Alcohol use: Yes     Comment: occasionally    Drug use: No     Family History   Problem Relation Age of Onset    Lung cancer Father     Coronary artery disease Father         blockages    Stroke Maternal Grandmother     Cancer Paternal Grandfather         metastasized    Heart disease Family     Lung cancer Cousin     No Known Problems Mother     No  Known Problems Sister     No Known Problems Brother     No Known Problems Maternal Aunt     No Known Problems Maternal Uncle     No Known Problems Paternal Aunt     No Known Problems Paternal Uncle     No Known Problems Paternal Grandmother     ADD / ADHD Neg Hx     Anesthesia problems Neg Hx     Clotting disorder Neg Hx     Collagen disease Neg Hx     Diabetes Neg Hx     Dislocations Neg Hx     Learning disabilities Neg Hx     Neurological problems Neg Hx     Osteoporosis Neg Hx     Rheumatologic disease Neg Hx     Scoliosis Neg Hx     Vascular Disease Neg Hx           Allergies:     Allergies   Allergen Reactions    Penicillins Rash and Anaphylaxis    Acetazolamide      Other reaction(s): Stomach Ache    Cephalosporins Other (See Comments)     headache    Other     Sulfa Antibiotics Other (See Comments)     Category: Allergy; Annotation - [id number]: STOMACH UPSET    Famotidine Palpitations    Levofloxacin Palpitations    Omeprazole Palpitations     Painful urination        Current Medications:     Current Outpatient Medications   Medication Instructions    acetaminophen (TYLENOL) 750 mg, Oral, Daily, 1500 at times    albuterol (PROVENTIL HFA,VENTOLIN HFA) 90 mcg/act inhaler 1 puff, Inhalation, As needed    albuterol 2.5 mg, Nebulization, Every 6 hours PRN    ascorbic acid (VITAMIN C) 500 mg, Oral, Daily, Begin 30 days prior to surgery.    Chlorpheniramine Maleate (CHLOR-TRIMETON ALLERGY PO) Oral, As needed    clotrimazole (LOTRIMIN) 1 % cream Topical, 2 times daily    esomeprazole (NEXIUM) 40 mg, Oral, Every morning before breakfast    febuxostat (ULORIC) 40 mg, Oral, Daily    ferrous sulfate 324 mg, Oral, Daily before breakfast, Begin 30 days prior to surgery.    fluticasone-salmeterol (Advair) 500-50 mcg/dose inhaler 1 puff, Inhalation, 2 times daily    folic acid (FOLVITE) 1 mg, Oral, Daily, Begin 30 days prior to surgery.    ibuprofen (MOTRIN) 600 mg, Oral, Every 8 hours PRN    ipratropium-albuterol (DUO-NEB)  "0.5-2.5 mg/3 mL 3 mL, Inhalation, As needed    lisinopril (ZESTRIL) 20 mg, Oral, Daily    meclizine (ANTIVERT) 12.5 mg, Oral, Every 8 hours scheduled    montelukast (SINGULAIR) 10 mg, Oral, Daily    Multiple Vitamin (multivitamin) tablet 1 tablet, Oral, Daily, Begin 30 days prior to surgery.    Triamcinolone Acetonide (NASACORT ALLERGY 24HR NA) 1 spray, Nasal, Daily    verapamil (CALAN) 120 mg tablet TAKE 1 TABLET BY MOUTH EVERY DAY           PRE-OP WORKSHEET DATA    Assessment of Pre-Operative Risks     MLJ Quality Hard Stops:    BMI (<40) : Estimated body mass index is 29.53 kg/m² as calculated from the following:    Height as of this encounter: 5' 9\" (1.753 m).    Weight as of this encounter: 90.7 kg (200 lb).    Hgb ( >11):   Lab Results   Component Value Date    HGB 13.5 08/21/2024    HGB 13.6 06/26/2023    HGB 13.8 12/08/2022       HbA1c (<7.5) :   Lab Results   Component Value Date    HGBA1C 5.4 08/21/2024       GFR (>60) (Less then 45 = Nephrology consult):    Lab Results   Component Value Date    EGFR 96 08/21/2024    EGFR 95 07/18/2024    EGFR 102 04/20/2024         Active Decompensated Chronic Conditions which would delay surgery  No acutely decompensated medical issues such as recent CVA, MI, new onset arrhythmia, severe aortic stenosis, CHF, uncontrolled COPD       Functional capacity: PUSH MOWING LAWN, BRISK WALKING                                   5 METS  Pick the highest level patient can comfortably perform   (if less the 4 mets consider functional assessment via cardiac stress testing or consultation)    Assessment of intra and post operative respiratory, hemodynamic and thrombotic risks     Prior Anesthesia Reactions: No     Personal history of venous thromboembolic disease? No    History of steroid use > 5 mg for >2 weeks within last year? No      The patient's risk factors for cardiac complications include :  none    Alexx Haney has an IN HOSPITAL cardiac risk of RCI RISK CLASS I (0 risk " factors, risk of major cardiac compl. appr. 0.5%) based on RCRI calculator    Cardiac Risk Estimation: per the Revised Cardiac Risk Index (Circ. 100:1043, 1999),        Pre-Op Data Reviewed:       Laboratory Results: I have personally reviewed the pertinent laboratory results/reports     EKG:I have personally reviewed pertinent reports.  . I personally reviewed and interpreted available tracings in the electronic medical record    Normal sinus rhythm, normal EKG      Specimen Collected: 08/22/24          OLD RECORDS: reviewed old records in the chart review section if EHR on day of visit.    Previous cardiopulmonary studies within the past year:  Echocardiogram: yes   Lab Results   Component Value Date    LVEF 65 12/18/2023     Cardiac Catheterization: no  Stress Test: no      Time of visit including pre-visit chart review, visit and post-visit coordination of plan and care , review of pre-surgical lab work, preparation and time spent documenting note in electronic medical record, time spent face-to-face in physical examination answering patient questions by care team 35 minutes             [facility]

## 2024-08-21 ENCOUNTER — APPOINTMENT (OUTPATIENT)
Dept: LAB | Facility: IMAGING CENTER | Age: 59
DRG: 470 | End: 2024-08-21
Payer: MEDICARE

## 2024-08-21 ENCOUNTER — LAB REQUISITION (OUTPATIENT)
Dept: LAB | Facility: HOSPITAL | Age: 59
End: 2024-08-21
Payer: MEDICARE

## 2024-08-21 DIAGNOSIS — Z01.812 PRE-PROCEDURE LAB EXAM: ICD-10-CM

## 2024-08-21 DIAGNOSIS — Z01.812 ENCOUNTER FOR PREPROCEDURAL LABORATORY EXAMINATION: ICD-10-CM

## 2024-08-21 DIAGNOSIS — M17.11 ARTHRITIS OF RIGHT KNEE: ICD-10-CM

## 2024-08-21 DIAGNOSIS — Z01.818 PREOPERATIVE TESTING: ICD-10-CM

## 2024-08-21 DIAGNOSIS — M25.59 PAIN IN OTHER SPECIFIED JOINT: ICD-10-CM

## 2024-08-21 LAB
ABO GROUP BLD: NORMAL
ALBUMIN SERPL BCG-MCNC: 4.5 G/DL (ref 3.5–5)
ALP SERPL-CCNC: 68 U/L (ref 34–104)
ALT SERPL W P-5'-P-CCNC: 30 U/L (ref 7–52)
ANION GAP SERPL CALCULATED.3IONS-SCNC: 12 MMOL/L (ref 4–13)
APTT PPP: 28 SECONDS (ref 23–34)
AST SERPL W P-5'-P-CCNC: 32 U/L (ref 13–39)
BASOPHILS # BLD AUTO: 0.04 THOUSANDS/ΜL (ref 0–0.1)
BASOPHILS NFR BLD AUTO: 1 % (ref 0–1)
BILIRUB SERPL-MCNC: 0.81 MG/DL (ref 0.2–1)
BLD GP AB SCN SERPL QL: NEGATIVE
BUN SERPL-MCNC: 11 MG/DL (ref 5–25)
CALCIUM SERPL-MCNC: 9.6 MG/DL (ref 8.4–10.2)
CHLORIDE SERPL-SCNC: 96 MMOL/L (ref 96–108)
CO2 SERPL-SCNC: 24 MMOL/L (ref 21–32)
CREAT SERPL-MCNC: 0.85 MG/DL (ref 0.6–1.3)
CRP SERPL QL: 6.9 MG/L
EOSINOPHIL # BLD AUTO: 0.15 THOUSAND/ΜL (ref 0–0.61)
EOSINOPHIL NFR BLD AUTO: 3 % (ref 0–6)
ERYTHROCYTE [DISTWIDTH] IN BLOOD BY AUTOMATED COUNT: 12.7 % (ref 11.6–15.1)
FERRITIN SERPL-MCNC: 695 NG/ML (ref 24–336)
GFR SERPL CREATININE-BSD FRML MDRD: 96 ML/MIN/1.73SQ M
GLUCOSE P FAST SERPL-MCNC: 96 MG/DL (ref 65–99)
HCT VFR BLD AUTO: 38.5 % (ref 36.5–49.3)
HGB BLD-MCNC: 13.5 G/DL (ref 12–17)
IMM GRANULOCYTES # BLD AUTO: 0.03 THOUSAND/UL (ref 0–0.2)
IMM GRANULOCYTES NFR BLD AUTO: 1 % (ref 0–2)
INR PPP: 0.94 (ref 0.85–1.19)
IRON SATN MFR SERPL: 24 % (ref 15–50)
IRON SERPL-MCNC: 74 UG/DL (ref 50–212)
LYMPHOCYTES # BLD AUTO: 1.46 THOUSANDS/ΜL (ref 0.6–4.47)
LYMPHOCYTES NFR BLD AUTO: 26 % (ref 14–44)
MCH RBC QN AUTO: 31.1 PG (ref 26.8–34.3)
MCHC RBC AUTO-ENTMCNC: 35.1 G/DL (ref 31.4–37.4)
MCV RBC AUTO: 89 FL (ref 82–98)
MONOCYTES # BLD AUTO: 0.54 THOUSAND/ΜL (ref 0.17–1.22)
MONOCYTES NFR BLD AUTO: 10 % (ref 4–12)
NEUTROPHILS # BLD AUTO: 3.47 THOUSANDS/ΜL (ref 1.85–7.62)
NEUTS SEG NFR BLD AUTO: 59 % (ref 43–75)
NRBC BLD AUTO-RTO: 0 /100 WBCS
PLATELET # BLD AUTO: 309 THOUSANDS/UL (ref 149–390)
PMV BLD AUTO: 8.6 FL (ref 8.9–12.7)
POTASSIUM SERPL-SCNC: 4 MMOL/L (ref 3.5–5.3)
PROT SERPL-MCNC: 6.9 G/DL (ref 6.4–8.4)
PROTHROMBIN TIME: 12.9 SECONDS (ref 12.3–15)
RBC # BLD AUTO: 4.34 MILLION/UL (ref 3.88–5.62)
RH BLD: POSITIVE
SODIUM SERPL-SCNC: 132 MMOL/L (ref 135–147)
SPECIMEN EXPIRATION DATE: NORMAL
TIBC SERPL-MCNC: 309 UG/DL (ref 250–450)
UIBC SERPL-MCNC: 235 UG/DL (ref 155–355)
WBC # BLD AUTO: 5.69 THOUSAND/UL (ref 4.31–10.16)

## 2024-08-21 PROCEDURE — 85610 PROTHROMBIN TIME: CPT

## 2024-08-21 PROCEDURE — 85025 COMPLETE CBC W/AUTO DIFF WBC: CPT

## 2024-08-21 PROCEDURE — 82728 ASSAY OF FERRITIN: CPT

## 2024-08-21 PROCEDURE — 83540 ASSAY OF IRON: CPT

## 2024-08-21 PROCEDURE — 86850 RBC ANTIBODY SCREEN: CPT | Performed by: ORTHOPAEDIC SURGERY

## 2024-08-21 PROCEDURE — 86900 BLOOD TYPING SEROLOGIC ABO: CPT | Performed by: ORTHOPAEDIC SURGERY

## 2024-08-21 PROCEDURE — 85730 THROMBOPLASTIN TIME PARTIAL: CPT

## 2024-08-21 PROCEDURE — 36415 COLL VENOUS BLD VENIPUNCTURE: CPT

## 2024-08-21 PROCEDURE — 86923 COMPATIBILITY TEST ELECTRIC: CPT

## 2024-08-21 PROCEDURE — 86901 BLOOD TYPING SEROLOGIC RH(D): CPT | Performed by: ORTHOPAEDIC SURGERY

## 2024-08-21 PROCEDURE — 83036 HEMOGLOBIN GLYCOSYLATED A1C: CPT

## 2024-08-21 PROCEDURE — 86140 C-REACTIVE PROTEIN: CPT

## 2024-08-21 PROCEDURE — 83550 IRON BINDING TEST: CPT

## 2024-08-21 PROCEDURE — 80053 COMPREHEN METABOLIC PANEL: CPT

## 2024-08-22 ENCOUNTER — OFFICE VISIT (OUTPATIENT)
Dept: CARDIOLOGY CLINIC | Facility: CLINIC | Age: 59
End: 2024-08-22
Payer: MEDICARE

## 2024-08-22 ENCOUNTER — RA CDI HCC (OUTPATIENT)
Dept: OTHER | Facility: HOSPITAL | Age: 59
End: 2024-08-22

## 2024-08-22 ENCOUNTER — TELEPHONE (OUTPATIENT)
Dept: OBGYN CLINIC | Facility: HOSPITAL | Age: 59
End: 2024-08-22

## 2024-08-22 VITALS
DIASTOLIC BLOOD PRESSURE: 84 MMHG | HEIGHT: 69 IN | SYSTOLIC BLOOD PRESSURE: 144 MMHG | WEIGHT: 201 LBS | HEART RATE: 77 BPM | BODY MASS INDEX: 29.77 KG/M2

## 2024-08-22 DIAGNOSIS — I47.29 NSVT (NONSUSTAINED VENTRICULAR TACHYCARDIA) (HCC): Primary | ICD-10-CM

## 2024-08-22 DIAGNOSIS — I10 PRIMARY HYPERTENSION: ICD-10-CM

## 2024-08-22 DIAGNOSIS — M17.11 ARTHRITIS OF RIGHT KNEE: ICD-10-CM

## 2024-08-22 DIAGNOSIS — Z01.818 PREOPERATIVE TESTING: ICD-10-CM

## 2024-08-22 LAB
EST. AVERAGE GLUCOSE BLD GHB EST-MCNC: 108 MG/DL
HBA1C MFR BLD: 5.4 %

## 2024-08-22 PROCEDURE — 99214 OFFICE O/P EST MOD 30 MIN: CPT | Performed by: STUDENT IN AN ORGANIZED HEALTH CARE EDUCATION/TRAINING PROGRAM

## 2024-08-22 PROCEDURE — 93000 ELECTROCARDIOGRAM COMPLETE: CPT | Performed by: STUDENT IN AN ORGANIZED HEALTH CARE EDUCATION/TRAINING PROGRAM

## 2024-08-22 NOTE — PROGRESS NOTES
[facility]  [address]  [facility] [address]  Phone#  [phone]  Fax#  [phone]  [facility] Cardiology Office Consultation             NAME: Alexx Haney  AGE: 59 y.o. SEX: male   : 1965   MRN: [id number]    DATE: 2024  TIME: 2:34 PM    Assessment and recommendations    1. NSVT (nonsustained ventricular tachycardia) (HCC)        2. Preoperative testing  Ambulatory referral to Cardiology      3. Arthritis of right knee  Ambulatory referral to Cardiology      4. Primary hypertension            Pre-Op Evaluation Assessment-  Cardiac Risk Estimation:    RCRI risk factors:  none  RCI RISK CLASS I (0 risk factors, risk of major cardiac compl. appr. 0.5%)    No cardiac contraindications to planned surgery    Low Risk for major adverse cardiac event (MACE).   Patient may proceed with surgery as planned without further workup.    Pre-Op Evaluation Plan  1. Further preoperative workup as follows:   - None; no further preoperative work-up is required    Hypertension:  We did discuss that his blood pressure is elevated today at 144/84.  He reports that his blood pressure ranges between the 110s to 140s systolic.  He is not interested in changing or increasing medications at this time.    Chief Complaint   Patient presents with    Follow-up     Needs CC for knee surgery on   No cardiac concerns.        HPI:    Alexx Haney is a 59 y.o.-year-old male who presents to the cardiology clinic for preoperative evaluation.  He is currently a patient of Dr. Meyers, but was unable to get an appointment with him prior to the planned surgery.  He reports that his cardiac status symptoms have been stable.  Denies any chest pain, chest discomfort, shortness of breath since his last office visit.  Denies any palpitations, lightheadedness, dizziness.  He had initially established care with cardiology after being diagnosed with nonsustained ventricular tachycardia.  He reports  "that this has been well-controlled since his initiation of verapamil.    Pre-operative evaluation:  Current anti-platelet/anti-coagulation medications that the patient is prescribed includes:  none .      Exercise Capacity:  Able to walk 4 blocks without symptoms?: no - limited by knee pain, gait instability  Able to walk 2 flights without symptoms?: no - limited by knee pain, gait instability    Personal history of venous thromboembolic disease? No    Assessment of Cardiac Contraindications:  No history of severe angina or MI in the last 6 weeks. No recent PCI.  No recent decompensated heart failure   No recent serious arrhythmias   No known severe heart valve disease including severe aortic stenosis or symptomatic mitral stenosis    Reviewed echocardiogram from 12/18/2023.  Normal left ventricular systolic and diastolic function. Had nuclear stress test at [facility] on 6/29/2017 with mildly reduced functional capacity, but no evidence of ischemia per report..    Per chart review, \"9/13 Holter Monitor: Predominantly normal sinus rhythm.Rare ventricular and supraventricular ectopy.Rare ventricular supraventricular ectopy. Pt's symptoms of heart fluttering correlated with 1 episode of nonsustained ventricular tachycardia (10 beat NSVT). \"      Past history, family history, social history, current medications, vital signs, recent lab and imaging studies and  prior cardiology studies reviewed independently on this visit.    Allergies   Allergen Reactions    Penicillins Rash and Anaphylaxis    Acetazolamide      Other reaction(s): Stomach Ache    Cephalosporins Other (See Comments)     headache    Other     Sulfa Antibiotics Other (See Comments)     Category: Allergy; Annotation - [id number]: STOMACH UPSET    Famotidine Palpitations    Levofloxacin Palpitations    Omeprazole Palpitations     Painful urination       Current Outpatient Medications:     acetaminophen (TYLENOL) 500 mg tablet, Take 750 mg by mouth in the " morning 1500 at times, Disp: , Rfl:     albuterol (2.5 mg/3 mL) 0.083 % nebulizer solution, Take 3 mL (2.5 mg total) by nebulization every 6 (six) hours as needed for wheezing or shortness of breath, Disp: 240 mL, Rfl: 2    albuterol (PROVENTIL HFA,VENTOLIN HFA) 90 mcg/act inhaler, Inhale 1 puff if needed for wheezing or shortness of breath, Disp: 48 g, Rfl: 1    ascorbic acid (VITAMIN C) 500 MG tablet, Take 1 tablet (500 mg total) by mouth daily Begin 30 days prior to surgery., Disp: 30 tablet, Rfl: 1    Chlorpheniramine Maleate (CHLOR-TRIMETON ALLERGY PO), Take by mouth as needed, Disp: , Rfl:     esomeprazole (NexIUM) 40 MG capsule, Take 40 mg by mouth every morning before breakfast, Disp: , Rfl:     febuxostat (ULORIC) 40 mg tablet, Take 1 tablet (40 mg total) by mouth daily, Disp: 90 tablet, Rfl: 1    ferrous sulfate 324 (65 Fe) mg, Take 1 tablet (324 mg total) by mouth daily before breakfast Begin 30 days prior to surgery., Disp: 30 tablet, Rfl: 1    fluticasone-salmeterol (Advair) 500-50 mcg/dose inhaler, Inhale 1 puff 2 (two) times a day, Disp: , Rfl:     folic acid (FOLVITE) 1 mg tablet, Take 1 tablet (1 mg total) by mouth daily Begin 30 days prior to surgery., Disp: 30 tablet, Rfl: 1    ibuprofen (MOTRIN) 600 mg tablet, Take 600 mg by mouth every 8 (eight) hours as needed for moderate pain, Disp: , Rfl:     ipratropium-albuterol (DUO-NEB) 0.5-2.5 mg/3 mL, Inhale 3 mL if needed, Disp: , Rfl:     lisinopril (ZESTRIL) 20 mg tablet, TAKE 1 TABLET BY MOUTH EVERY DAY, Disp: 90 tablet, Rfl: 1    meclizine (ANTIVERT) 12.5 MG tablet, Take 1 tablet (12.5 mg total) by mouth every 8 (eight) hours, Disp: 30 tablet, Rfl: 0    montelukast (SINGULAIR) 10 mg tablet, Take 1 tablet (10 mg total) by mouth daily, Disp: 90 tablet, Rfl: 1    Multiple Vitamin (multivitamin) tablet, Take 1 tablet by mouth daily Begin 30 days prior to surgery., Disp: 30 tablet, Rfl: 1    Triamcinolone Acetonide (NASACORT ALLERGY 24HR NA), 1 spray  into each nostril daily  , Disp: , Rfl:     verapamil (CALAN) 120 mg tablet, TAKE 1 TABLET BY MOUTH EVERY DAY, Disp: 90 tablet, Rfl: 3    clotrimazole (LOTRIMIN) 1 % cream, Apply topically 2 (two) times a day for 21 days, Disp: 60 g, Rfl: 3    Past Medical History:   Diagnosis Date    Acute bronchitis 01/29/2024    Anxiety 01/15/2019    Arthritis     Chronic rhinitis     last assessed 2/21/14    Chronic serous otitis media     last assessed 11/20/13    Chronic sinusitis     last assessed 8/19/14    Functional heart murmur     GERD (gastroesophageal reflux disease)     Gout     Hearing problem     last assessed 12/1/16    Hiatal hernia     Hypercholesterolemia     Hypertension     Palpitations     Testicular hypogonadism     last assessed 10/4/13    V-tach (HCC)      Past Surgical History:   Procedure Laterality Date    APPENDECTOMY      BICEPS TENODESIS      anesthesia for tenodesis- of ruptured long tendon of biceps     HERNIA REPAIR      THYROIDECTOMY      TONSILLECTOMY       Family History   Problem Relation Age of Onset    Lung cancer Father     Coronary artery disease Father         blockages    Stroke Maternal Grandmother     Cancer Paternal Grandfather         metastasized    Heart disease Family     Lung cancer Cousin     No Known Problems Mother     No Known Problems Sister     No Known Problems Brother     No Known Problems Maternal Aunt     No Known Problems Maternal Uncle     No Known Problems Paternal Aunt     No Known Problems Paternal Uncle     No Known Problems Paternal Grandmother     ADD / ADHD Neg Hx     Anesthesia problems Neg Hx     Clotting disorder Neg Hx     Collagen disease Neg Hx     Diabetes Neg Hx     Dislocations Neg Hx     Learning disabilities Neg Hx     Neurological problems Neg Hx     Osteoporosis Neg Hx     Rheumatologic disease Neg Hx     Scoliosis Neg Hx     Vascular Disease Neg Hx        Social History   reports that he has never smoked. He has never used smokeless tobacco. He  reports current alcohol use. He reports that he does not use drugs.     Review of Systems   Constitutional:  Negative for fatigue.   HENT:  Positive for congestion.    Respiratory:  Negative for chest tightness and shortness of breath.    Cardiovascular:  Negative for chest pain and palpitations.   Gastrointestinal:  Negative for abdominal distention.   Musculoskeletal:  Positive for arthralgias.   Neurological:  Negative for dizziness and light-headedness.   Psychiatric/Behavioral: Negative.         Objective:     Vitals:    08/22/24 1355   BP: 144/84   Pulse: 77     Physical Exam  Vitals reviewed.   Constitutional:       General: He is not in acute distress.     Appearance: Normal appearance. He is well-developed.   HENT:      Head: Normocephalic and atraumatic.   Cardiovascular:      Rate and Rhythm: Normal rate and regular rhythm.      Heart sounds: No murmur heard.  Pulmonary:      Effort: Pulmonary effort is normal. No respiratory distress.      Breath sounds: Normal breath sounds.   Abdominal:      General: There is no distension.   Musculoskeletal:         General: No swelling.      Cervical back: Neck supple.   Skin:     General: Skin is warm and dry.   Neurological:      Mental Status: He is alert.   Psychiatric:         Mood and Affect: Mood normal.         Pertinent Laboratory/Diagnostic Studies:    Laboratory studies reviewed personally by An Perez MD    BMP:   Lab Results   Component Value Date    SODIUM 132 (L) 08/21/2024    K 4.0 08/21/2024    CL 96 08/21/2024    CO2 24 08/21/2024    BUN 11 08/21/2024    CREATININE 0.85 08/21/2024    GLUC 73 07/18/2024    CALCIUM 9.6 08/21/2024     CBC:  Lab Results   Component Value Date    WBC 5.69 08/21/2024    HGB 13.5 08/21/2024    HCT 38.5 08/21/2024    MCV 89 08/21/2024     08/21/2024     Lipid Profile:   Lab Results   Component Value Date    HDL 59 12/08/2022     Lab Results   Component Value Date    LDLCALC 147 (H) 12/08/2022     Lab Results  "  Component Value Date    TRIG 151 (H) 12/08/2022      Other labs:  Lab Results   Component Value Date    OYW0JUBAQIXM 1.017 06/26/2023    TSH 0.62 07/18/2024     Lab Results   Component Value Date    ALT 30 08/21/2024    AST 32 08/21/2024       Imaging Studies:     Pertinent cardiac studies and imaging studies were personally reviewed on this visit and results summarized.    An Perez MD, PhD    Portions of the record may have been created with voice recognition software.  Occasional wrong word or \"sound alike\" substitutions may have occurred due to the inherent limitations of voice recognition software.  Read the chart carefully and recognize, using context, where substitutions have occurred. Please reach out to me directly for any clarifications.   "

## 2024-08-23 ENCOUNTER — TELEPHONE (OUTPATIENT)
Dept: CARDIOLOGY CLINIC | Facility: CLINIC | Age: 59
End: 2024-08-23

## 2024-08-26 ENCOUNTER — EVALUATION (OUTPATIENT)
Dept: PHYSICAL THERAPY | Facility: REHABILITATION | Age: 59
End: 2024-08-26
Payer: MEDICARE

## 2024-08-26 DIAGNOSIS — Z01.818 PREOPERATIVE TESTING: ICD-10-CM

## 2024-08-26 DIAGNOSIS — M17.11 ARTHRITIS OF RIGHT KNEE: Primary | ICD-10-CM

## 2024-08-26 PROCEDURE — 97161 PT EVAL LOW COMPLEX 20 MIN: CPT | Performed by: PHYSICAL THERAPIST

## 2024-08-26 NOTE — PROGRESS NOTES
PT Evaluation     Today's date: 2024  Patient name: Alexx Haney  : 1965  MRN: [id number]  Referring provider: Parvin Whipple,*  Dx:   Encounter Diagnosis     ICD-10-CM    1. Arthritis of right knee  M17.11 Ambulatory referral to Physical Therapy      2. Preoperative testing  Z01.818 Ambulatory referral to Physical Therapy                     Assessment  Impairments: abnormal coordination, abnormal gait, abnormal or restricted ROM, activity intolerance, impaired balance, impaired physical strength and pain with function    Assessment details: Pt is a pleasant 59 y.o. male presenting to outpatient physical therapy with Arthritis of right knee  (primary encounter diagnosis)  Preoperative testing for pre-operative consultation appointment. Virtual Home Assessment reviewed, educated patient in gait and stair mechanics, provided transfer training, and answered questions regarding negotiating around the home. Patient issued HEP for immediate post-op. Patient scheduled for first post-operative physical therapy session 3 days following surgery. Pt would benefit from skilled physical therapy to address limitations and to achieve goals. Thank you for this referral.   Understanding of Dx/Px/POC: good     Prognosis: good    Goals  ST. Patient will report 25% decrease in pain in 4 weeks.  2. Patient will demonstrate 25% improvement in ROM in 4 weeks.  3. Patient will demonstrate 1/2 grade improvement in strength in 4 weeks.  4. Patient will be able to independently ambulate 250+ feet with SPC in 4 weeks.    LT. Patient will be able to perform IADLS without restriction or pain by discharge.  2. Patient will be independent in HEP by discharge.  3. Patient will be able to return to recreational/work duties without restriction or pain by discharge.  4. Patient will be able to ambulate community distances without compensation by discharge.     Plan  Patient would benefit from: PT eval and skilled  PT  Planned modality interventions: cryotherapy    Planned therapy interventions: IADL retraining, body mechanics training, flexibility, functional ROM exercises, home exercise program, neuromuscular re-education, manual therapy, postural training, strengthening, stretching, therapeutic activities, therapeutic exercise, gait training, transfer training, self care, patient education, balance/weight bearing training and ADL retraining    Frequency: 2x week  Duration in weeks: 12  Treatment plan discussed with: patient      Subjective Evaluation    History of Present Illness  Mechanism of injury: 08/26/24  Pt comes to therapy for pre-operative TKA appointment consultation. Notes he has had long standing knee pain, which has led to the decision to have surgery. Reports he lives with his mother in a two-story home, with bedroom on second floor, but states he may try to arrange for first floor living after surgery. Reports he owns a SPC at present time. Notes he will be prescribed medications for pain and Aspirin for DVT prophylaxis. States his home has two steps to enter from the outside. Reports his brother will be able to assist with transportation.   Patient Goals  Patient goals for therapy: decreased pain, increased motion, increased strength and independence with ADLs/IADLs        Objective           Precautions:   Patient Active Problem List   Diagnosis    Allergic rhinitis    Asthma    Essential hypertension    Benign paroxysmal positional vertigo    Chronic bilateral low back pain without sciatica    DJD (degenerative joint disease) of knee    GERD without esophagitis    Gout    Hyperlipidemia    Primary localized osteoarthritis of left knee    Primary localized osteoarthritis of right knee    Tenosynovitis of foot    Thyroid disorder    V-tach (HCC)    Anxiety    Chronic pain of both knees    Abnormal liver function test    Epidermoid cyst    Dysfunction of left eustachian tube    Tinea corporis    Effusion of  right knee    Bilateral primary osteoarthritis of knee    Hyponatremia    Hyperglycemia    Class 1 obesity due to excess calories without serious comorbidity with body mass index (BMI) of 30.0 to 30.9 in adult      Allergies   Allergen Reactions    Penicillins Rash and Anaphylaxis    Acetazolamide      Other reaction(s): Stomach Ache    Cephalosporins Other (See Comments)     headache    Other     Sulfa Antibiotics Other (See Comments)     Category: Allergy; Annotation - [id number]: STOMACH UPSET    Famotidine Palpitations    Levofloxacin Palpitations    Omeprazole Palpitations     Painful urination        Daily Treatment Diary    Date             FOTO             Re-Eval                Manuals    PROM flex/ext             Patellar mobs             PROM hip                          Neuro Re-Ed                                                                                                Ther Ex                 Mini squats             Stand hip abd, ext             HR/TR             Standing gastroc stretch             Knee flex/ext stretch on step                                       Quad sets              Heel slides             Bridges             SAQ                          Ther Activity    Bike  ROM                         Gait Training                              Modalities    CP PRN

## 2024-08-28 ENCOUNTER — OFFICE VISIT (OUTPATIENT)
Age: 59
End: 2024-08-28
Payer: MEDICARE

## 2024-08-28 VITALS
HEART RATE: 85 BPM | DIASTOLIC BLOOD PRESSURE: 78 MMHG | SYSTOLIC BLOOD PRESSURE: 120 MMHG | BODY MASS INDEX: 29.62 KG/M2 | WEIGHT: 200 LBS | HEIGHT: 69 IN

## 2024-08-28 DIAGNOSIS — J45.909 ASTHMA, UNSPECIFIED ASTHMA SEVERITY, UNSPECIFIED WHETHER COMPLICATED, UNSPECIFIED WHETHER PERSISTENT: ICD-10-CM

## 2024-08-28 DIAGNOSIS — I47.20 V-TACH (HCC): ICD-10-CM

## 2024-08-28 DIAGNOSIS — M17.11 ARTHRITIS OF RIGHT KNEE: ICD-10-CM

## 2024-08-28 DIAGNOSIS — I10 ESSENTIAL HYPERTENSION: ICD-10-CM

## 2024-08-28 DIAGNOSIS — Z01.818 PREOPERATIVE CLEARANCE: Primary | ICD-10-CM

## 2024-08-28 DIAGNOSIS — Z01.818 PREOPERATIVE TESTING: ICD-10-CM

## 2024-08-28 DIAGNOSIS — M17.11 PRIMARY LOCALIZED OSTEOARTHRITIS OF RIGHT KNEE: ICD-10-CM

## 2024-08-28 DIAGNOSIS — E66.09 CLASS 1 OBESITY DUE TO EXCESS CALORIES WITHOUT SERIOUS COMORBIDITY WITH BODY MASS INDEX (BMI) OF 30.0 TO 30.9 IN ADULT: ICD-10-CM

## 2024-08-28 PROCEDURE — 99215 OFFICE O/P EST HI 40 MIN: CPT | Performed by: INTERNAL MEDICINE

## 2024-08-29 ENCOUNTER — ANESTHESIA EVENT (OUTPATIENT)
Dept: PERIOP | Facility: HOSPITAL | Age: 59
DRG: 470 | End: 2024-08-29
Payer: MEDICARE

## 2024-08-30 NOTE — PRE-PROCEDURE INSTRUCTIONS
Pre-Surgery Instructions:   Medication Instructions    acetaminophen (TYLENOL) 500 mg tablet Take day of surgery.    albuterol (2.5 mg/3 mL) 0.083 % nebulizer solution Take day of surgery.    albuterol (PROVENTIL HFA,VENTOLIN HFA) 90 mcg/act inhaler Take day of surgery.    ascorbic acid (VITAMIN C) 500 MG tablet Stop taking 7 days prior to surgery.    Chlorpheniramine Maleate (CHLOR-TRIMETON ALLERGY PO) Hold day of surgery.    esomeprazole (NexIUM) 40 MG capsule Take day of surgery.    febuxostat (ULORIC) 40 mg tablet Take day of surgery.    ferrous sulfate 324 (65 Fe) mg Hold day of surgery.    fluticasone-salmeterol (Advair) 500-50 mcg/dose inhaler Take day of surgery.    folic acid (FOLVITE) 1 mg tablet Take day of surgery.    ibuprofen (MOTRIN) 600 mg tablet Stop taking 7 days prior to surgery.    lisinopril (ZESTRIL) 20 mg tablet Hold day of surgery.    meclizine (ANTIVERT) 12.5 MG tablet Take day of surgery.    montelukast (SINGULAIR) 10 mg tablet Take night before surgery    Multiple Vitamin (multivitamin) tablet Hold day of surgery.    Triamcinolone Acetonide (NASACORT ALLERGY 24HR NA) Hold day of surgery.    verapamil (CALAN) 120 mg tablet Take day of surgery.    Medication instructions for day surgery reviewed. Please use only a sip of water to take your instructed medications. Avoid all over the counter vitamins, supplements and NSAIDS for one week prior to surgery per anesthesia guidelines. Tylenol is ok to take as needed.     You will receive a call one business day prior to surgery with an arrival time and hospital directions. If your surgery is scheduled on a Monday, the hospital will be calling you on the Friday prior to your surgery. If you have not heard from anyone by 8pm, please call the hospital supervisor through the hospital  at [phone]. ([place] [phone] or [place] [phone]).    Do not eat or drink anything after midnight the night before your surgery, including candy,  mints, lifesavers, or chewing gum. Do not drink alcohol 24hrs before your surgery. Try not to smoke at least 24hrs before your surgery.       Follow the pre surgery showering instructions as listed in the “My Surgical Experience Booklet” or otherwise provided by your surgeon's office. Do not use a blade to shave the surgical area 1 week before surgery. It is okay to use a clean electric clippers up to 24 hours before surgery. Do not apply any lotions, creams, including makeup, cologne, deodorant, or perfumes after showering on the day of your surgery. Do not use dry shampoo, hair spray, hair gel, or any type of hair products.     No contact lenses, eye make-up, or artificial eyelashes. Remove nail polish, including gel polish, and any artificial, gel, or acrylic nails if possible. Remove all jewelry including rings and body piercing jewelry.     Wear causal clothing that is easy to take on and off. Consider your type of surgery.    Keep any valuables, jewelry, piercings at home. Please bring any specially ordered equipment (sling, braces) if indicated.    Arrange for a responsible person to drive you to and from the hospital on the day of your surgery. Please confirm the visitor policy for the day of your procedure when you receive your phone call with an arrival time.     Call the surgeon's office with any new illnesses, exposures, or additional questions prior to surgery.    Please reference your “My Surgical Experience Booklet” for additional information to prepare for your upcoming surgery.

## 2024-09-06 LAB
DME PARACHUTE DELIVERY DATE REQUESTED: NORMAL
DME PARACHUTE DELIVERY NOTE: NORMAL
DME PARACHUTE ITEM DESCRIPTION: NORMAL
DME PARACHUTE ORDER STATUS: NORMAL
DME PARACHUTE SUPPLIER NAME: NORMAL
DME PARACHUTE SUPPLIER PHONE: NORMAL

## 2024-09-07 DIAGNOSIS — M17.11 PRIMARY OSTEOARTHRITIS OF RIGHT KNEE: ICD-10-CM

## 2024-09-08 RX ORDER — FERROUS SULFATE 324(65)MG
324 TABLET, DELAYED RELEASE (ENTERIC COATED) ORAL
Qty: 90 TABLET | Refills: 1 | Status: ON HOLD | OUTPATIENT
Start: 2024-09-08

## 2024-09-08 RX ORDER — FOLIC ACID 1 MG/1
1 TABLET ORAL DAILY
Qty: 90 TABLET | Refills: 1 | Status: ON HOLD | OUTPATIENT
Start: 2024-09-08

## 2024-09-11 LAB
DME PARACHUTE DELIVERY DATE ACTUAL: NORMAL
DME PARACHUTE DELIVERY DATE REQUESTED: NORMAL
DME PARACHUTE DELIVERY NOTE: NORMAL
DME PARACHUTE ITEM DESCRIPTION: NORMAL
DME PARACHUTE ORDER STATUS: NORMAL
DME PARACHUTE SUPPLIER NAME: NORMAL
DME PARACHUTE SUPPLIER PHONE: NORMAL

## 2024-09-17 ENCOUNTER — HOSPITAL ENCOUNTER (INPATIENT)
Facility: HOSPITAL | Age: 59
LOS: 3 days | Discharge: NURSING, REHAB OR LONG-TERM CARE | DRG: 470 | End: 2024-09-20
Attending: ORTHOPAEDIC SURGERY | Admitting: ORTHOPAEDIC SURGERY
Payer: MEDICARE

## 2024-09-17 ENCOUNTER — ANESTHESIA (OUTPATIENT)
Dept: PERIOP | Facility: HOSPITAL | Age: 59
DRG: 470 | End: 2024-09-17
Payer: MEDICARE

## 2024-09-17 DIAGNOSIS — Z96.651 STATUS POST TOTAL KNEE REPLACEMENT, RIGHT: Primary | ICD-10-CM

## 2024-09-17 DIAGNOSIS — M17.11 ARTHRITIS OF RIGHT KNEE: ICD-10-CM

## 2024-09-17 DIAGNOSIS — J45.20 MILD INTERMITTENT ASTHMA WITHOUT COMPLICATION: ICD-10-CM

## 2024-09-17 LAB
ABO GROUP BLD: NORMAL
RH BLD: POSITIVE

## 2024-09-17 PROCEDURE — 27447 TOTAL KNEE ARTHROPLASTY: CPT | Performed by: PHYSICIAN ASSISTANT

## 2024-09-17 PROCEDURE — C1776 JOINT DEVICE (IMPLANTABLE): HCPCS | Performed by: ORTHOPAEDIC SURGERY

## 2024-09-17 PROCEDURE — 0SRC0J9 REPLACEMENT OF RIGHT KNEE JOINT WITH SYNTHETIC SUBSTITUTE, CEMENTED, OPEN APPROACH: ICD-10-PCS | Performed by: ORTHOPAEDIC SURGERY

## 2024-09-17 PROCEDURE — 97116 GAIT TRAINING THERAPY: CPT

## 2024-09-17 PROCEDURE — 97163 PT EVAL HIGH COMPLEX 45 MIN: CPT

## 2024-09-17 PROCEDURE — 88305 TISSUE EXAM BY PATHOLOGIST: CPT | Performed by: PATHOLOGY

## 2024-09-17 PROCEDURE — C1713 ANCHOR/SCREW BN/BN,TIS/BN: HCPCS | Performed by: ORTHOPAEDIC SURGERY

## 2024-09-17 PROCEDURE — 27447 TOTAL KNEE ARTHROPLASTY: CPT | Performed by: ORTHOPAEDIC SURGERY

## 2024-09-17 DEVICE — ATTUNE KNEE SYSTEM TIBIAL BASE FIXED BEARING SIZE 7 CEMENTED
Type: IMPLANTABLE DEVICE | Site: KNEE | Status: FUNCTIONAL
Brand: ATTUNE

## 2024-09-17 DEVICE — ATTUNE KNEE SYSTEM TIBIAL INSERT FIXED BEARING POSTERIOR STABILIZED 6 12MM AOX
Type: IMPLANTABLE DEVICE | Site: KNEE | Status: FUNCTIONAL
Brand: ATTUNE

## 2024-09-17 DEVICE — ATTUNE KNEE SYSTEM FEMORAL POSTERIOR STABILIZED SIZE 6 RIGHT CEMENTED
Type: IMPLANTABLE DEVICE | Site: KNEE | Status: FUNCTIONAL
Brand: ATTUNE

## 2024-09-17 DEVICE — SMARTSET HIGH PERFORMANCE MV MEDIUM VISCOSITY BONE CEMENT 40G
Type: IMPLANTABLE DEVICE | Site: KNEE | Status: FUNCTIONAL
Brand: SMARTSET

## 2024-09-17 DEVICE — ATTUNE PATELLA MEDIALIZED DOME 41MM CEMENTED AOX
Type: IMPLANTABLE DEVICE | Site: KNEE | Status: FUNCTIONAL
Brand: ATTUNE

## 2024-09-17 RX ORDER — ENOXAPARIN SODIUM 100 MG/ML
40 INJECTION SUBCUTANEOUS DAILY
Status: DISCONTINUED | OUTPATIENT
Start: 2024-09-18 | End: 2024-09-20 | Stop reason: HOSPADM

## 2024-09-17 RX ORDER — FOLIC ACID 1 MG/1
1 TABLET ORAL DAILY
Status: DISCONTINUED | OUTPATIENT
Start: 2024-09-17 | End: 2024-09-20 | Stop reason: HOSPADM

## 2024-09-17 RX ORDER — PANTOPRAZOLE SODIUM 40 MG/1
40 TABLET, DELAYED RELEASE ORAL
Status: DISCONTINUED | OUTPATIENT
Start: 2024-09-17 | End: 2024-09-20 | Stop reason: HOSPADM

## 2024-09-17 RX ORDER — ONDANSETRON 2 MG/ML
4 INJECTION INTRAMUSCULAR; INTRAVENOUS ONCE AS NEEDED
Status: DISCONTINUED | OUTPATIENT
Start: 2024-09-17 | End: 2024-09-17 | Stop reason: HOSPADM

## 2024-09-17 RX ORDER — SODIUM CHLORIDE, SODIUM LACTATE, POTASSIUM CHLORIDE, CALCIUM CHLORIDE 600; 310; 30; 20 MG/100ML; MG/100ML; MG/100ML; MG/100ML
125 INJECTION, SOLUTION INTRAVENOUS CONTINUOUS
Status: DISCONTINUED | OUTPATIENT
Start: 2024-09-17 | End: 2024-09-20 | Stop reason: HOSPADM

## 2024-09-17 RX ORDER — CEPHALEXIN 500 MG/1
500 CAPSULE ORAL EVERY 12 HOURS SCHEDULED
Qty: 2 CAPSULE | Refills: 0 | Status: SHIPPED | OUTPATIENT
Start: 2024-09-17 | End: 2024-09-18

## 2024-09-17 RX ORDER — TRANEXAMIC ACID 10 MG/ML
1000 INJECTION, SOLUTION INTRAVENOUS ONCE
Status: DISCONTINUED | OUTPATIENT
Start: 2024-09-17 | End: 2024-09-17 | Stop reason: HOSPADM

## 2024-09-17 RX ORDER — ONDANSETRON 2 MG/ML
INJECTION INTRAMUSCULAR; INTRAVENOUS AS NEEDED
Status: DISCONTINUED | OUTPATIENT
Start: 2024-09-17 | End: 2024-09-17

## 2024-09-17 RX ORDER — MAGNESIUM HYDROXIDE 1200 MG/15ML
LIQUID ORAL AS NEEDED
Status: DISCONTINUED | OUTPATIENT
Start: 2024-09-17 | End: 2024-09-17 | Stop reason: HOSPADM

## 2024-09-17 RX ORDER — METOCLOPRAMIDE HYDROCHLORIDE 5 MG/ML
10 INJECTION INTRAMUSCULAR; INTRAVENOUS ONCE AS NEEDED
Status: DISCONTINUED | OUTPATIENT
Start: 2024-09-17 | End: 2024-09-17 | Stop reason: HOSPADM

## 2024-09-17 RX ORDER — MONTELUKAST SODIUM 10 MG/1
10 TABLET ORAL DAILY
Status: DISCONTINUED | OUTPATIENT
Start: 2024-09-17 | End: 2024-09-18

## 2024-09-17 RX ORDER — BISACODYL 10 MG
10 SUPPOSITORY, RECTAL RECTAL DAILY PRN
Status: DISCONTINUED | OUTPATIENT
Start: 2024-09-17 | End: 2024-09-20 | Stop reason: HOSPADM

## 2024-09-17 RX ORDER — BUPIVACAINE HYDROCHLORIDE 7.5 MG/ML
INJECTION, SOLUTION INTRASPINAL AS NEEDED
Status: DISCONTINUED | OUTPATIENT
Start: 2024-09-17 | End: 2024-09-17

## 2024-09-17 RX ORDER — SENNOSIDES 8.6 MG
1 TABLET ORAL DAILY
Status: DISCONTINUED | OUTPATIENT
Start: 2024-09-17 | End: 2024-09-20 | Stop reason: HOSPADM

## 2024-09-17 RX ORDER — MIDAZOLAM HYDROCHLORIDE 2 MG/2ML
INJECTION, SOLUTION INTRAMUSCULAR; INTRAVENOUS AS NEEDED
Status: DISCONTINUED | OUTPATIENT
Start: 2024-09-17 | End: 2024-09-17

## 2024-09-17 RX ORDER — ACETAMINOPHEN 325 MG/1
975 TABLET ORAL ONCE
Status: COMPLETED | OUTPATIENT
Start: 2024-09-17 | End: 2024-09-17

## 2024-09-17 RX ORDER — DOCUSATE SODIUM 100 MG/1
100 CAPSULE, LIQUID FILLED ORAL 2 TIMES DAILY
Status: DISCONTINUED | OUTPATIENT
Start: 2024-09-17 | End: 2024-09-20 | Stop reason: HOSPADM

## 2024-09-17 RX ORDER — FENTANYL CITRATE/PF 50 MCG/ML
50 SYRINGE (ML) INJECTION
Status: DISCONTINUED | OUTPATIENT
Start: 2024-09-17 | End: 2024-09-17 | Stop reason: HOSPADM

## 2024-09-17 RX ORDER — CHLORHEXIDINE GLUCONATE 40 MG/ML
SOLUTION TOPICAL DAILY PRN
Status: DISCONTINUED | OUTPATIENT
Start: 2024-09-17 | End: 2024-09-17 | Stop reason: HOSPADM

## 2024-09-17 RX ORDER — ALBUTEROL SULFATE 0.83 MG/ML
2.5 SOLUTION RESPIRATORY (INHALATION) ONCE AS NEEDED
Status: COMPLETED | OUTPATIENT
Start: 2024-09-17 | End: 2024-09-17

## 2024-09-17 RX ORDER — ONDANSETRON 2 MG/ML
4 INJECTION INTRAMUSCULAR; INTRAVENOUS EVERY 6 HOURS PRN
Status: DISCONTINUED | OUTPATIENT
Start: 2024-09-17 | End: 2024-09-20 | Stop reason: HOSPADM

## 2024-09-17 RX ORDER — DEXAMETHASONE SODIUM PHOSPHATE 10 MG/ML
INJECTION, SOLUTION INTRAMUSCULAR; INTRAVENOUS AS NEEDED
Status: DISCONTINUED | OUTPATIENT
Start: 2024-09-17 | End: 2024-09-17

## 2024-09-17 RX ORDER — HYDROMORPHONE HCL/PF 1 MG/ML
0.4 SYRINGE (ML) INJECTION
Status: DISCONTINUED | OUTPATIENT
Start: 2024-09-17 | End: 2024-09-17 | Stop reason: HOSPADM

## 2024-09-17 RX ORDER — FLUTICASONE PROPIONATE 50 MCG
1 SPRAY, SUSPENSION (ML) NASAL DAILY
Status: DISCONTINUED | OUTPATIENT
Start: 2024-09-17 | End: 2024-09-20 | Stop reason: HOSPADM

## 2024-09-17 RX ORDER — HYDROMORPHONE HCL/PF 1 MG/ML
0.5 SYRINGE (ML) INJECTION EVERY 4 HOURS PRN
Status: ACTIVE | OUTPATIENT
Start: 2024-09-17 | End: 2024-09-19

## 2024-09-17 RX ORDER — DOCUSATE SODIUM 100 MG/1
100 CAPSULE, LIQUID FILLED ORAL 2 TIMES DAILY
Qty: 20 CAPSULE | Refills: 0 | Status: ON HOLD | OUTPATIENT
Start: 2024-09-17

## 2024-09-17 RX ORDER — CALCIUM CARBONATE 500 MG/1
1000 TABLET, CHEWABLE ORAL DAILY PRN
Status: DISCONTINUED | OUTPATIENT
Start: 2024-09-17 | End: 2024-09-20 | Stop reason: HOSPADM

## 2024-09-17 RX ORDER — CEFAZOLIN SODIUM 2 G/50ML
2000 SOLUTION INTRAVENOUS EVERY 8 HOURS
Status: COMPLETED | OUTPATIENT
Start: 2024-09-17 | End: 2024-09-18

## 2024-09-17 RX ORDER — FENTANYL CITRATE 50 UG/ML
INJECTION, SOLUTION INTRAMUSCULAR; INTRAVENOUS AS NEEDED
Status: DISCONTINUED | OUTPATIENT
Start: 2024-09-17 | End: 2024-09-17

## 2024-09-17 RX ORDER — ACETAMINOPHEN 325 MG/1
975 TABLET ORAL EVERY 8 HOURS SCHEDULED
Status: DISCONTINUED | OUTPATIENT
Start: 2024-09-17 | End: 2024-09-20 | Stop reason: HOSPADM

## 2024-09-17 RX ORDER — GABAPENTIN 100 MG/1
100 CAPSULE ORAL EVERY 8 HOURS
Status: DISCONTINUED | OUTPATIENT
Start: 2024-09-17 | End: 2024-09-20 | Stop reason: HOSPADM

## 2024-09-17 RX ORDER — TRANEXAMIC ACID 10 MG/ML
INJECTION, SOLUTION INTRAVENOUS AS NEEDED
Status: DISCONTINUED | OUTPATIENT
Start: 2024-09-17 | End: 2024-09-17

## 2024-09-17 RX ORDER — CHLORHEXIDINE GLUCONATE ORAL RINSE 1.2 MG/ML
15 SOLUTION DENTAL ONCE
Status: COMPLETED | OUTPATIENT
Start: 2024-09-17 | End: 2024-09-17

## 2024-09-17 RX ORDER — CEFAZOLIN SODIUM 2 G/50ML
SOLUTION INTRAVENOUS AS NEEDED
Status: DISCONTINUED | OUTPATIENT
Start: 2024-09-17 | End: 2024-09-17

## 2024-09-17 RX ORDER — SODIUM CHLORIDE 9 MG/ML
75 INJECTION, SOLUTION INTRAVENOUS CONTINUOUS
Status: DISCONTINUED | OUTPATIENT
Start: 2024-09-17 | End: 2024-09-20 | Stop reason: HOSPADM

## 2024-09-17 RX ORDER — GABAPENTIN 100 MG/1
100 CAPSULE ORAL 3 TIMES DAILY
Qty: 90 CAPSULE | Refills: 0 | Status: ON HOLD | OUTPATIENT
Start: 2024-09-17

## 2024-09-17 RX ORDER — PROMETHAZINE HYDROCHLORIDE 12.5 MG/1
12.5 TABLET ORAL EVERY 6 HOURS PRN
Qty: 8 TABLET | Refills: 0 | Status: ON HOLD | OUTPATIENT
Start: 2024-09-17 | End: 2024-09-25

## 2024-09-17 RX ORDER — SODIUM CHLORIDE, SODIUM LACTATE, POTASSIUM CHLORIDE, CALCIUM CHLORIDE 600; 310; 30; 20 MG/100ML; MG/100ML; MG/100ML; MG/100ML
INJECTION, SOLUTION INTRAVENOUS CONTINUOUS PRN
Status: DISCONTINUED | OUTPATIENT
Start: 2024-09-17 | End: 2024-09-17

## 2024-09-17 RX ORDER — BUPIVACAINE HYDROCHLORIDE 5 MG/ML
INJECTION, SOLUTION EPIDURAL; INTRACAUDAL AS NEEDED
Status: DISCONTINUED | OUTPATIENT
Start: 2024-09-17 | End: 2024-09-17

## 2024-09-17 RX ORDER — PROPOFOL 10 MG/ML
INJECTION, EMULSION INTRAVENOUS CONTINUOUS PRN
Status: DISCONTINUED | OUTPATIENT
Start: 2024-09-17 | End: 2024-09-17

## 2024-09-17 RX ORDER — ASPIRIN 325 MG
325 TABLET ORAL 2 TIMES DAILY
Qty: 84 TABLET | Refills: 0 | Status: ON HOLD | OUTPATIENT
Start: 2024-09-17 | End: 2024-09-25

## 2024-09-17 RX ORDER — OXYCODONE HYDROCHLORIDE 10 MG/1
10 TABLET ORAL EVERY 4 HOURS PRN
Qty: 42 TABLET | Refills: 0 | Status: SHIPPED | OUTPATIENT
Start: 2024-09-17 | End: 2024-09-20

## 2024-09-17 RX ORDER — ALLOPURINOL 100 MG/1
200 TABLET ORAL DAILY
Status: DISCONTINUED | OUTPATIENT
Start: 2024-09-17 | End: 2024-09-20 | Stop reason: HOSPADM

## 2024-09-17 RX ORDER — ASCORBIC ACID 500 MG
500 TABLET ORAL DAILY
Status: DISCONTINUED | OUTPATIENT
Start: 2024-09-17 | End: 2024-09-20 | Stop reason: HOSPADM

## 2024-09-17 RX ORDER — OXYCODONE HYDROCHLORIDE 5 MG/1
5 TABLET ORAL EVERY 4 HOURS PRN
Status: DISCONTINUED | OUTPATIENT
Start: 2024-09-17 | End: 2024-09-20 | Stop reason: HOSPADM

## 2024-09-17 RX ORDER — ACETAMINOPHEN 500 MG
1000 TABLET ORAL EVERY 8 HOURS
Status: ON HOLD
Start: 2024-09-17

## 2024-09-17 RX ORDER — FLUTICASONE FUROATE AND VILANTEROL 200; 25 UG/1; UG/1
1 POWDER RESPIRATORY (INHALATION)
Status: DISCONTINUED | OUTPATIENT
Start: 2024-09-17 | End: 2024-09-18

## 2024-09-17 RX ORDER — OXYCODONE HYDROCHLORIDE 10 MG/1
10 TABLET ORAL EVERY 4 HOURS PRN
Status: DISCONTINUED | OUTPATIENT
Start: 2024-09-17 | End: 2024-09-20 | Stop reason: HOSPADM

## 2024-09-17 RX ORDER — EPHEDRINE SULFATE 50 MG/ML
INJECTION INTRAVENOUS AS NEEDED
Status: DISCONTINUED | OUTPATIENT
Start: 2024-09-17 | End: 2024-09-17

## 2024-09-17 RX ADMIN — FENTANYL CITRATE 25 MCG: 50 INJECTION, SOLUTION INTRAMUSCULAR; INTRAVENOUS at 08:02

## 2024-09-17 RX ADMIN — SODIUM CHLORIDE, SODIUM LACTATE, POTASSIUM CHLORIDE, AND CALCIUM CHLORIDE: .6; .31; .03; .02 INJECTION, SOLUTION INTRAVENOUS at 07:27

## 2024-09-17 RX ADMIN — EPHEDRINE SULFATE 10 MG: 50 INJECTION INTRAVENOUS at 10:57

## 2024-09-17 RX ADMIN — FENTANYL CITRATE 50 MCG: 50 INJECTION, SOLUTION INTRAMUSCULAR; INTRAVENOUS at 11:44

## 2024-09-17 RX ADMIN — PROPOFOL 120 MCG/KG/MIN: 10 INJECTION, EMULSION INTRAVENOUS at 08:04

## 2024-09-17 RX ADMIN — ONDANSETRON 4 MG: 2 INJECTION INTRAMUSCULAR; INTRAVENOUS at 07:40

## 2024-09-17 RX ADMIN — OXYCODONE HYDROCHLORIDE AND ACETAMINOPHEN 500 MG: 500 TABLET ORAL at 16:30

## 2024-09-17 RX ADMIN — CHLORHEXIDINE GLUCONATE 0.12% ORAL RINSE 15 ML: 1.2 LIQUID ORAL at 05:46

## 2024-09-17 RX ADMIN — ACETAMINOPHEN 975 MG: 325 TABLET ORAL at 13:26

## 2024-09-17 RX ADMIN — CEFAZOLIN SODIUM 2000 MG: 2 SOLUTION INTRAVENOUS at 17:17

## 2024-09-17 RX ADMIN — PANTOPRAZOLE SODIUM 40 MG: 40 TABLET, DELAYED RELEASE ORAL at 12:48

## 2024-09-17 RX ADMIN — BUPIVACAINE HYDROCHLORIDE 30 ML: 5 INJECTION, SOLUTION EPIDURAL; INTRACAUDAL; PERINEURAL at 07:42

## 2024-09-17 RX ADMIN — ACETAMINOPHEN 975 MG: 325 TABLET ORAL at 05:46

## 2024-09-17 RX ADMIN — IRON SUCROSE 300 MG: 20 INJECTION, SOLUTION INTRAVENOUS at 12:06

## 2024-09-17 RX ADMIN — EPHEDRINE SULFATE 10 MG: 50 INJECTION INTRAVENOUS at 11:24

## 2024-09-17 RX ADMIN — MONTELUKAST 10 MG: 10 TABLET, FILM COATED ORAL at 16:30

## 2024-09-17 RX ADMIN — CEFAZOLIN SODIUM 2000 MG: 2 SOLUTION INTRAVENOUS at 08:00

## 2024-09-17 RX ADMIN — ALBUTEROL SULFATE 2.5 MG: 2.5 SOLUTION RESPIRATORY (INHALATION) at 12:16

## 2024-09-17 RX ADMIN — TRANEXAMIC ACID 1000 MG: 10 INJECTION, SOLUTION INTRAVENOUS at 08:06

## 2024-09-17 RX ADMIN — DEXAMETHASONE SODIUM PHOSPHATE 10 MG: 10 INJECTION, SOLUTION INTRAMUSCULAR; INTRAVENOUS at 08:03

## 2024-09-17 RX ADMIN — SODIUM CHLORIDE, SODIUM LACTATE, POTASSIUM CHLORIDE, AND CALCIUM CHLORIDE: .6; .31; .03; .02 INJECTION, SOLUTION INTRAVENOUS at 08:54

## 2024-09-17 RX ADMIN — EPHEDRINE SULFATE 10 MG: 50 INJECTION INTRAVENOUS at 10:53

## 2024-09-17 RX ADMIN — ENOXAPARIN SODIUM 40 MG: 40 INJECTION SUBCUTANEOUS at 23:48

## 2024-09-17 RX ADMIN — FENTANYL CITRATE 25 MCG: 50 INJECTION, SOLUTION INTRAMUSCULAR; INTRAVENOUS at 08:07

## 2024-09-17 RX ADMIN — MIDAZOLAM 2 MG: 1 INJECTION INTRAMUSCULAR; INTRAVENOUS at 07:40

## 2024-09-17 RX ADMIN — ACETAMINOPHEN 975 MG: 325 TABLET ORAL at 21:27

## 2024-09-17 RX ADMIN — FOLIC ACID 1 MG: 1 TABLET ORAL at 16:30

## 2024-09-17 RX ADMIN — SODIUM CHLORIDE, SODIUM LACTATE, POTASSIUM CHLORIDE, AND CALCIUM CHLORIDE 125 ML/HR: .6; .31; .03; .02 INJECTION, SOLUTION INTRAVENOUS at 17:17

## 2024-09-17 RX ADMIN — SODIUM CHLORIDE, SODIUM LACTATE, POTASSIUM CHLORIDE, AND CALCIUM CHLORIDE: .6; .31; .03; .02 INJECTION, SOLUTION INTRAVENOUS at 11:14

## 2024-09-17 RX ADMIN — BUPIVACAINE HYDROCHLORIDE IN DEXTROSE 1.8 ML: 7.5 INJECTION, SOLUTION SUBARACHNOID at 08:00

## 2024-09-17 RX ADMIN — GABAPENTIN 100 MG: 400 CAPSULE ORAL at 21:27

## 2024-09-17 NOTE — ANESTHESIA PROCEDURE NOTES
Peripheral Block    Patient location during procedure: holding area  Start time: 9/17/2024 7:42 AM  Reason for block: at surgeon's request and post-op pain management  Staffing  Performed by: Tabatha Rivera MD  Authorized by: Tabatha Rivera MD    Preanesthetic Checklist  Completed: patient identified, IV checked, site marked, risks and benefits discussed, surgical consent, monitors and equipment checked, pre-op evaluation and timeout performed  Peripheral Block  Patient position: supine  Prep: ChloraPrep  Patient monitoring: frequent blood pressure checks, continuous pulse oximetry and heart rate  Block type: Adductor Canal  Laterality: right  Injection technique: single-shot  Procedures: ultrasound guided, Ultrasound guidance required for the procedure to increase accuracy and safety of medication placement and decrease risk of complications.  Ultrasound permanent image saved    Needle  Needle type: Stimuplex   Needle gauge: 20 G  Needle length: 4 in  Needle localization: anatomical landmarks and ultrasound guidance  Needle insertion depth: 3 cm  Assessment  Injection assessment: incremental injection, frequent aspiration, injected with ease, negative aspiration, negative for heart rate change, no paresthesia on injection, no symptoms of intraneural/intravenous injection and needle tip visualized at all times  Paresthesia pain: none  Post-procedure:  site cleaned  patient tolerated the procedure well with no immediate complications

## 2024-09-17 NOTE — ANESTHESIA POSTPROCEDURE EVALUATION
Post-Op Assessment Note    CV Status:  Stable  Pain Score: 0    Pain management: adequate    Multimodal analgesia used between 6 hours prior to anesthesia start to PACU discharge    Mental Status:  Sleepy   Hydration Status:  Stable   PONV Controlled:  None   Airway Patency:  Patent  There is a medical reason for not screening for obstructive sleep apnea and/or for not using two or more mitigation strategies   Post Op Vitals Reviewed: Yes    No anethesia notable event occurred.    Staff: CRNA               BP   126/72   Temp      Pulse  81   Resp   16   SpO2   95

## 2024-09-17 NOTE — PROGRESS NOTES
Right Adductor Canal Block:    Time out preformed. Patient and consent verified. Laterality verified. Patient sedated on standard ASA monitors. Patient prepped in sterile fashion. Needle advanced under ultrasound guidance. 30 mL of 0.5% Bupivacaine injected after negative aspiration in 5 cc increments for adductor canal block. Image stored. No complications apparent. VSS.

## 2024-09-17 NOTE — INTERVAL H&P NOTE
H&P reviewed. After examining the patient I find no changes in the patients condition since the H&P had been written.  Heart:  regular rate  Lungs:  no audible wheezing  Abd:  nondistended  RLE:  EHL/AT/GS intact, sensation grossly intact L4, L5, S1, palpable pedal pulse    Vitals:    09/17/24 0745   BP:    Pulse: 76   Resp:    Temp:    SpO2:

## 2024-09-17 NOTE — NURSING NOTE
Pt. States he's having trouble catching my breath - bilateral lungs auscultated clear, encouraged  to use incentive spirometer with good follow through. O2 sat levels 99 - 100. Syed to bedside.

## 2024-09-17 NOTE — ANESTHESIA PREPROCEDURE EVALUATION
Procedure:  ARTHROPLASTY KNEE TOTAL, potential same day discharge, possible cement (Right: Knee)    Relevant Problems   CARDIO   (+) Essential hypertension   (+) Hyperlipidemia      GI/HEPATIC   (+) GERD without esophagitis      MUSCULOSKELETAL   (+) Chronic bilateral low back pain without sciatica   (+) DJD (degenerative joint disease) of knee   (+) Gout   (+) Primary localized osteoarthritis of left knee   (+) Primary localized osteoarthritis of right knee      NEURO/PSYCH   (+) Anxiety   (+) Chronic bilateral low back pain without sciatica      PULMONARY   (+) Asthma      Ear/Nose/Throat   (+) Allergic rhinitis      Cardiovascular/Peripheral Vascular   (+) V-tach (HCC)      Other   (+) Class 1 obesity due to excess calories without serious comorbidity with body mass index (BMI) of 30.0 to 30.9 in adult      Plan for right adductor canal block with bupivacaine. Followed by spinal anesthesia with sedation. Risks and benefits explained to patient. Patient and surgeon in agreement with the above plan.     12/18/23:      Left Ventricle: Left ventricular cavity size is normal. Wall thickness is mildly increased. The left ventricular ejection fraction is 65%. Systolic function is normal. Wall motion is normal. Diastolic function is normal.    Left Atrium: The atrium is mildly dilated.    Mitral Valve: There is mild regurgitation.    Tricuspid Valve: There is mild regurgitation.    Aorta: The aortic root is normal in size. The ascending aorta is upper normal in size. The ascending aorta is 3.8 cm.    Pulmonary Artery: The pulmonary artery systolic pressure is normal.    Physical Exam    Airway    Mallampati score: II  TM Distance: >3 FB  Neck ROM: full     Dental       Cardiovascular      Pulmonary      Other Findings        Anesthesia Plan  ASA Score- 2     Anesthesia Type- spinal with ASA Monitors.         Additional Monitors:     Airway Plan:            Plan Factors-Exercise tolerance (METS): <4 METS.    Chart  reviewed. EKG reviewed.  Existing labs reviewed. Patient summary reviewed.    Patient is not a current smoker.  Patient did not smoke on day of surgery.    Obstructive sleep apnea risk education given perioperatively.        Induction- intravenous.    Postoperative Plan- Plan for postoperative opioid use.     Perioperative Resuscitation Plan - Level 1 - Full Code.       Informed Consent- Anesthetic plan and risks discussed with patient.  I personally reviewed this patient with the CRNA. Discussed and agreed on the Anesthesia Plan with the CRNA..

## 2024-09-17 NOTE — ANESTHESIA PROCEDURE NOTES
Spinal Block    Patient location during procedure: OR  Start time: 9/17/2024 8:00 AM  Reason for block: at surgeon's request and primary anesthetic  Staffing  Performed by: Millie Awad CRNA  Authorized by: Tabatha Rivera MD    Preanesthetic Checklist  Completed: patient identified, IV checked, site marked, risks and benefits discussed, surgical consent, monitors and equipment checked, pre-op evaluation and timeout performed  Spinal Block  Patient position: sitting  Prep: Betadine  Patient monitoring: heart rate, cardiac monitor and continuous pulse ox  Approach: midline  Location: L4-5  Needle  Needle type: Pencan   Needle gauge: 22 G  Needle length: 3.5 in  Assessment  Sensory level: T10  Injection Assessment:  negative aspiration for heme, no paresthesia on injection and positive aspiration for clear CSF.  Post-procedure:  site cleaned

## 2024-09-17 NOTE — PHYSICAL THERAPY NOTE
Physical Therapy Evaluation    Patient's Name: Alexx Haney    Admitting Diagnosis  Arthritis of right knee [M17.11]    Problem List  Patient Active Problem List   Diagnosis    Allergic rhinitis    Asthma    Essential hypertension    Benign paroxysmal positional vertigo    Chronic bilateral low back pain without sciatica    DJD (degenerative joint disease) of knee    GERD without esophagitis    Gout    Hyperlipidemia    Primary localized osteoarthritis of left knee    Primary localized osteoarthritis of right knee    Tenosynovitis of foot    Thyroid disorder    V-tach (HCC)    Anxiety    Chronic pain of both knees    Abnormal liver function test    Epidermoid cyst    Dysfunction of left eustachian tube    Tinea corporis    Effusion of right knee    Bilateral primary osteoarthritis of knee    Hyponatremia    Hyperglycemia    Class 1 obesity due to excess calories without serious comorbidity with body mass index (BMI) of 30.0 to 30.9 in adult       Past Medical History  Past Medical History:   Diagnosis Date    Acute bronchitis 01/29/2024    Anxiety 01/15/2019    Arthritis     Asthma     Chronic rhinitis     last assessed 2/21/14    Chronic serous otitis media     last assessed 11/20/13    Chronic sinusitis     last assessed 8/19/14    Functional heart murmur     GERD (gastroesophageal reflux disease)     Gout     Hearing problem     last assessed 12/1/16    Hiatal hernia     Hypercholesterolemia     Hypertension     Palpitations     Testicular hypogonadism     last assessed 10/4/13    V-tach (HCC)        Past Surgical History  Past Surgical History:   Procedure Laterality Date    APPENDECTOMY      BICEPS TENDON REPAIR Left 2009    BICEPS TENODESIS      anesthesia for tenodesis- of ruptured long tendon of biceps     HERNIA REPAIR      THYROIDECTOMY      TONSILLECTOMY         Recent Imaging  No orders to display       Recent Vital Signs  Vitals:    09/17/24 1338 09/17/24 1437 09/17/24 1542 09/17/24 1605   BP: 135/84  "144/79 121/74 123/87   BP Location: Right arm  Right leg Right arm   Pulse: 75 83 80 80   Resp: 18 18 18 20   Temp: (!) 97.3 °F (36.3 °C)   97.5 °F (36.4 °C)   TempSrc: Temporal   Temporal   SpO2: 97% 97%  97%   Weight:    87.5 kg (193 lb)   Height:    5' 9.49\" (1.765 m)        09/17/24 1500   PT Last Visit   PT Visit Date 09/17/24   Note Type   Note type Evaluation   Pain Assessment   Pain Assessment Tool 0-10   Pain Score No Pain   Restrictions/Precautions   Weight Bearing Precautions Per Order No   Other Precautions Fall Risk;Pain   Home Living   Type of Home House   Additional Comments pt lives in 2 story home with bathroom in the basement, no handrails on stairs to basement. 2 EMEKA with no handrails   Prior Function   Level of [place] Independent with ADLs;Independent with functional mobility   Lives With   (mother)   Falls in the last 6 months 0   General   Family/Caregiver Present Yes   Cognition   Arousal/Participation Alert   Orientation Level Oriented X4   Memory Within functional limits   Following Commands Follows all commands and directions without difficulty   RLE Assessment   RLE Assessment   (3+/5)   LLE Assessment   LLE Assessment WFL   Coordination   Movements are Fluid and Coordinated 0   Coordination and Movement Description decreased gross motor coordination and reduced standing balance   Sensation X   Light Touch   RLE Light Touch Impaired   LLE Light Touch Impaired   Bed Mobility   Supine to Sit 6  Modified independent   Additional items Increased time required   Sit to Supine 6  Modified independent   Additional items Increased time required   Transfers   Sit to Stand 5  Supervision   Additional items Increased time required   Stand to Sit 5  Supervision   Additional items Increased time required   Ambulation/Elevation   Gait pattern Step through pattern;Decreased toe off;Decreased heel strike;Excessively slow;Short stride;Decreased foot clearance;Improper Weight shift   Gait Assistance 5 "  Supervision   Additional items Verbal cues   Assistive Device Rolling walker   Distance 30ft, 100ft   Stair Management Assistance 5  Supervision   Additional items Assist x 1;Verbal cues;Tactile cues;Increased time required   Stair Management Technique Step to pattern;Foreward;Two rails   Number of Stairs 4  (completed additional trial of 4 steps)   Balance   Static Sitting Fair +   Dynamic Sitting Fair +   Static Standing Fair   Dynamic Standing Fair -   Ambulatory Fair -   Endurance Deficit   Endurance Deficit Yes   Endurance Deficit Description fatigue, dizziness, RLE weakness   Activity Tolerance   Activity Tolerance Patient limited by fatigue;Patient limited by pain   Medical Staff Made Aware spoke to CM   Nurse Made Aware spoke to RN   Assessment   Prognosis Good   Problem List Decreased strength;Decreased endurance;Decreased range of motion;Impaired balance;Decreased mobility;Decreased coordination;Impaired sensation;Pain   Barriers to Discharge Inaccessible home environment;Decreased caregiver support   Goals   Patient Goals to be able to do the stairs   STG Expiration Date 09/27/24   Short Term Goal #1 see eval note   PT Treatment Day 1   Plan   Treatment/Interventions ADL retraining;Functional transfer training;LE strengthening/ROM;Elevations;Therapeutic exercise;Endurance training;Patient/family training;Equipment eval/education;Bed mobility;Gait training;Spoke to nursing;Spoke to case management;OT   PT Frequency 3-5x/wk   Discharge Recommendation   Rehab Resource Intensity Level, PT III (Minimum Resource Intensity)  (final recs TBD pending pt progress)   Equipment Recommended Walker   Walker Package Recommended Wheeled walker   AM-PAC Basic Mobility Inpatient   Turning in Flat Bed Without Bedrails 4   Lying on Back to Sitting on Edge of Flat Bed Without Bedrails 4   Moving Bed to Chair 3   Standing Up From Chair Using Arms 3   Walk in Room 3   Climb 3-5 Stairs With Railing 3   Basic Mobility Inpatient  Raw Score 20   Basic Mobility Standardized Score 43.99   [facility] Highest Level Of Mobility   -[facility] Goal 6: Walk 10 steps or more   -[facility] Achieved 7: Walk 25 feet or more   End of Consult   Patient Position at End of Consult Bedside chair;All needs within reach         ASSESSMENT                                                                                                                     Alexx LUCILLE Haney is a 59 y.o. male admitted to [facility] on 9/17/2024 for <principal problem not specified>. Pt  has a past medical history of Acute bronchitis (01/29/2024), Anxiety (01/15/2019), Arthritis, Asthma, Chronic rhinitis, Chronic serous otitis media, Chronic sinusitis, Functional heart murmur, GERD (gastroesophageal reflux disease), Gout, Hearing problem, Hiatal hernia, Hypercholesterolemia, Hypertension, Palpitations, Testicular hypogonadism, and V-tach (HCC).. PT was consulted and pt was seen on 9/17/2024 for mobility assessment and d/c planning.  Impairments limiting pt at this time include decreased ROM, impaired balance, decreased endurance, decreased coordination, increased fall risk, new onset of impairment of functional mobility, decreased ADLS, decreased IADLS, pain, decreased activity tolerance, decreased sensation, and decreased strength. Pt is currently functioning at a modified independent assistance level for bed mobility, supervision assistance x1 level for transfers, supervision assistance x1 level for ambulation with Rolling Walker, and supervision assistance x1 for elevations. The patient's AM-PAC Basic Mobility Inpatient Short Form Raw Score is 20. A Raw score of greater than 16 suggests the patient may benefit from discharge to home. Please also refer to the recommendation of the Physical Therapist for safe discharge planning.    Goals                                                                                                                                    1) Bed mobility skills with  modified independent assistance to facilitate safe return to previous living environment 2) Functional transfers with modified independent assistance to facilitate safe return to previous living environment  3) Ambulation with least restrictive AD modified independent assistance without LOB and stable vitals for safe ambulation home/ community distances. 4) Stair training up/down flight 12 step/s with appropriate rail/s  and modified independent assistance for safe access to previous living environment. 5) Improve balance grades to fair + to reduce risk of falls. 6)Improve LE strength grades by 1 to increase independence w/ transfers and gait.  7) PT for ongoing pt and family education; DME needs and D/C planning to promote highest level of function in least restrictive environment.     Recommendations                                                                                                              Pt will benefit from continued skilled IP PT to address the above mentioned impairments in order to maximize recovery and increase functional independence when completing mobility and ADLs. See flow sheet for goals and POC.     DME: Rolling Walker    Discharge Disposition:   TBD pending pt progress      Syed Mcclure PT, DPT

## 2024-09-17 NOTE — NURSING NOTE
Syed from PT here to evaluate patient, per Syed patient is unsafe to go home, he will speak with Christa OLVERA- admit order obtained

## 2024-09-17 NOTE — PLAN OF CARE
Problem: PAIN - ADULT  Goal: Verbalizes/displays adequate comfort level or baseline comfort level  Description: Interventions:  - Encourage patient to monitor pain and request assistance  - Assess pain using appropriate pain scale  - Administer analgesics based on type and severity of pain and evaluate response  - Implement non-pharmacological measures as appropriate and evaluate response  - Consider cultural and social influences on pain and pain management  - Notify physician/advanced practitioner if interventions unsuccessful or patient reports new pain  Outcome: Progressing     Problem: INFECTION - ADULT  Goal: Absence or prevention of progression during hospitalization  Description: INTERVENTIONS:  - Assess and monitor for signs and symptoms of infection  - Monitor lab/diagnostic results  - Monitor all insertion sites, i.e. indwelling lines, tubes, and drains  - Monitor endotracheal if appropriate and nasal secretions for changes in amount and color  - Echo appropriate cooling/warming therapies per order  - Administer medications as ordered  - Instruct and encourage patient and family to use good hand hygiene technique  - Identify and instruct in appropriate isolation precautions for identified infection/condition  Outcome: Progressing  Goal: Absence of fever/infection during neutropenic period  Description: INTERVENTIONS:  - Monitor WBC    Outcome: Progressing     Problem: SAFETY ADULT  Goal: Patient will remain free of falls  Description: INTERVENTIONS:  - Educate patient/family on patient safety including physical limitations  - Instruct patient to call for assistance with activity   - Consult OT/PT to assist with strengthening/mobility   - Keep Call bell within reach  - Keep bed low and locked with side rails adjusted as appropriate  - Keep care items and personal belongings within reach  - Initiate and maintain comfort rounds  - Make Fall Risk Sign visible to staff  - Apply yellow socks and bracelet  for high fall risk patients  - Consider moving patient to room near nurses station  Outcome: Progressing  Goal: Maintain or return to baseline ADL function  Description: INTERVENTIONS:  -  Assess patient's ability to carry out ADLs; assess patient's baseline for ADL function and identify physical deficits which impact ability to perform ADLs (bathing, care of mouth/teeth, toileting, grooming, dressing, etc.)  - Assess/evaluate cause of self-care deficits   - Assess range of motion  - Assess patient's mobility; develop plan if impaired  - Assess patient's need for assistive devices and provide as appropriate  - Encourage maximum independence but intervene and supervise when necessary  - Involve family in performance of ADLs  - Assess for home care needs following discharge   - Consider OT consult to assist with ADL evaluation and planning for discharge  - Provide patient education as appropriate  Outcome: Progressing  Goal: Maintains/Returns to pre admission functional level  Description: INTERVENTIONS:  - Perform AM-PAC 6 Click Basic Mobility/ Daily Activity assessment daily.  - Set and communicate daily mobility goal to care team and patient/family/caregiver.   - Collaborate with rehabilitation services on mobility goals if consulted  - Out of bed for toileting  - Record patient progress and toleration of activity level   Outcome: Progressing     Problem: DISCHARGE PLANNING  Goal: Discharge to home or other facility with appropriate resources  Description: INTERVENTIONS:  - Identify barriers to discharge w/patient and caregiver  - Arrange for needed discharge resources and transportation as appropriate  - Identify discharge learning needs (meds, wound care, etc.)  - Arrange for interpretive services to assist at discharge as needed  - Refer to Case Management Department for coordinating discharge planning if the patient needs post-hospital services based on physician/advanced practitioner order or complex needs  related to functional status, cognitive ability, or social support system  Outcome: Progressing     Problem: Knowledge Deficit  Goal: Patient/family/caregiver demonstrates understanding of disease process, treatment plan, medications, and discharge instructions  Description: Complete learning assessment and assess knowledge base.  Interventions:  - Provide teaching at level of understanding  - Provide teaching via preferred learning methods  Outcome: Progressing

## 2024-09-17 NOTE — PLAN OF CARE
Problem: PHYSICAL THERAPY ADULT  Goal: Performs mobility at highest level of function for planned discharge setting.  See evaluation for individualized goals.  Description: Treatment/Interventions: ADL retraining, Functional transfer training, LE strengthening/ROM, Elevations, Therapeutic exercise, Endurance training, Patient/family training, Equipment eval/education, Bed mobility, Gait training, Spoke to nursing, Spoke to case management, OT  Equipment Recommended: Walker       See flowsheet documentation for full assessment, interventions and recommendations.  Outcome: Progressing  Note: Prognosis: Good  Problem List: Decreased strength, Decreased endurance, Decreased range of motion, Impaired balance, Decreased mobility, Decreased coordination, Impaired sensation, Pain     Barriers to Discharge: Inaccessible home environment, Decreased caregiver support     Rehab Resource Intensity Level, PT: III (Minimum Resource Intensity) (final recs TBD pending pt progress)    See flowsheet documentation for full assessment.

## 2024-09-17 NOTE — OP NOTE
OPERATIVE REPORT  PATIENT NAME: Alexx Haney  : 1965  MRN: [id number]  Pt Location:   OR ROOM 12    Surgery Date: 2024    Surgeons and Role:     * Parvin Whipple DO - Primary     * Christa Reyes PA-C - Assisting      * Dejan Espinosa, KASSY - Assisting    Preop Diagnosis:  Arthritis of right knee [M17.11]    Post-Op Diagnosis Codes:     * Arthritis of right knee [M17.11]    Procedure(s):  Right - ARTHROPLASTY KNEE TOTAL. potential same day discharge. cemented    Specimens:  ID Type Source Tests Collected by Time Destination   1 : Right Knee Synovial Tissue Tissue Joint, Right Knee TISSUE EXAM Parvin Whipple DO 2024 0901        Estimated Blood Loss:   Minimal    Drains:  * No LDAs found *    Anesthesia Type:   Spinal     Operative Indications:  Arthritis of right knee [M17.11]    Operative Findings:  See below    Complications:   None      Knee Approach: Medial Parapatellar    Chronic Narcotic Use:  No      Procedure and Technique:  Implants:  Depuy Attune  PS femoral component size 6  Tibial base fixed bearing size 7  Tibial insert fixed bearing MS size 6, 12mm  Dome patella size 41mm    INDICATIONS FOR PROCEDURE:  The patients is a 59 y.o. male who presented to the office with  knee OA.  Conservative treatment was attempted for quite some time and ultimately failed.  He has severe progressive pain, stiffness, and disability and now elects to proceed with right total knee replacement surgery.  Extensive counseling in regards to the reasons for surgical intervention as well as the risks and benefits of surgery were reviewed.  The risks include, but are not limited to infection, extensive blood loss, blood clots, wound healing problems, fracture, need for additional surgery, need for revision surgery, failure of hardware, persistent pain and stiffness, heart attack, stroke, death.  The patient understood and agreed to by oral and written consent.    OPERATIVE PROCEDURE:  The  patient was identified as Alexx Haney by His ID bracelet by the surgical staff in the preoperative area at [facility].  The patient was wheeled back to the surgical room and placed on the operative table.  Anesthesia was administered.  Preoperative antibiotics were given.    The patient remained in the supine position and all bony prominences were carefully protected.  A tourniquet was applied to the patient’s right upper thigh.  The right leg was then prepped and draped in the usual sterile fashion.  A timeout was performed where the patient’s name and surgical site were once again identified.  The incision was marked out.  The leg was elevated and the tourniquet was inflated to 250 mmHg.      An incision was made over anterior aspect of the knee.  Dissection was made through the skin and subcutaneous tissue.   A medial parapatellar arthrotomy was made and the medial tissues were elevated while protecting the MCL.  Remnants of the ACL, medial and lateral menisci were removed and retractors were placed.  Severe osteoarthritis was noted.  The femoral canal was opened with a drill and the contents were suctioned and the canal was irrigated.  We used an intramedullary guide on the femoral side and resected 9mm of distal femur in 5 degrees of valgus.  Osteophytes were removed.  We then subluxated the tibia forward and opened the tibial canal with a drill.  The contents of the canal were then suctioned and the canal was irrigated.      We placed an intramedullary guide and measured our resection of the tibial plateau keeping it perpendicular to the mechanical axis of the tibia.  Next, the flexion and extension gaps were analyzed with a spacer block and visualization.  Negar’s line and the epicondylar axis were then identified.  The 4 in 1 cutting block was placed in 3 degrees of external rotation and resections were made.  Posterior osteophytes and any remnants of the medial and lateral  menisci were removed.  Trials were then placed.  The knee was tight medially and in flexion.  Further medial release was performed.  Trials were placed and knee still tight medially.  We then removed a small portion of the tibia medially.  Trials were place and the knee was still tight medially.  We then released a portion of the PCL.  This helped some.  We then converted to a PS knee.  The appropriate cuts were made.  Finally we removed about 1mm more from the medial tibial plateau.  Trials were then placed and the knee was found to be stable and well balanced.         The patella was measured and a portion of the articular aspect of the patella was removed.  The trial for the patella was placed and patellar tracking was checked and found to be adequate with the other components in place.      Attention was directed back to the tibia.  External rotation of the tibial guide was set and the final preparations of the tibia were performed.    The tourniquet was dropped briefly.      The components were removed and the knee was copiously irrigated with pulse lavage and then dried.  The tourniquet was put back up.  The components were then cemented in place and excess cement was removed.  Once the cement was dried the trial insert was trialed.  A size  6,12mm  tibial insert was confirmed to be the correct size.  The final polyethylene component was placed and the tourniquet was let down.  Any bleeders were cauterized and then the knee was irrigated.  An irrisept wash was performed and then the knee was once again copiously irrigated.  Marcaine, morphine, and toradol was injected periarticularly throughout the case.  The arthrotomy was closed with vicryl suture.  The skin and subcutaneous tissues were closed with vicryl and then a running monocryl stitch.  A sterile dressing was placed. The patient was awakened and transferred to a hospital bed and then to the recovery room in stable condition.      I attest that I was  present and performed this procedure. Christa Reyes PA-C and Dejan Espinosa ATC were present for the entire procedure and provided essential assistance with limb position, patient prepping, and retraction.     A qualified resident physician was not available.    Patient Disposition:  hemodynamically stable            SIGNATURE: Parvin Whipple DO  DATE: September 17, 2024  TIME: 12:12 PM

## 2024-09-17 NOTE — NURSING NOTE
"Tolerating po well, strong plantar dorsiflexion bilaterally. States he has \"arthritis in left ankle and that it clicks.\" Able to bend knees bilaterally. Denies pain in Knee. Verbalizing concerns about going home, lives with his 85 yr old Mom. Brother to drive home, when the time comes  "

## 2024-09-17 NOTE — DISCHARGE INSTR - AVS FIRST PAGE
TOTAL KNEE REPLACEMENT DISCHARGE INSTRUCTIONS    Surgical Dressing:  You may leave your dressing in place for 1 week.  If it starts lifting off you can change it prior to that starting 2 days after your surgery.  Once you start changing your dressing you should change your dressing daily until drainage stops.  Do not remove your steri-strips.  Do not put any lotions or creams on your incision.  If there are any signs of infection such as drainage persisting beyond 7 days, unusual looking drainage (yellow, green), increased redness around the incision, or fever/chills let your doctor know.      Do not get your incision wet.      Continue to wear your JAYDON stockings 2 weeks after surgery.  You can remove at night them to wash, hang to dry, and reapply in the morning after sponge bath or shower (do not get your incision wet).  You can wear them as needed after that.  If the stockings are too tight at the top you can cut the elastic.     Medications:  Upon discharge you will be given a prescription for an anticoagulant (i.e. Ecotrin (Aspirin), Coumadin (Warfarin), Lovenox (Enoxaparin)).  Take as prescribed.  Do not take any over the counter NSAIDs (i.e. Ibuprofen, Motrin, Advil, Naprosyn, Aleve) while on your anticoagulation medication unless otherwise specified by your surgeon.  You will also be given Gabapentin if appropriate and a narcotic pain reliever for pain.  Please be mindful of the number of pills you have left.  You can only get a refill Monday-Friday during business hours from your surgeon or the physician assistant.  You will not be given any refills on nights and weekends.  Call ahead if needed.  Please include tylenol in your pain regimen as well.  You will also be given Colace to prevent constipation that can be caused by narcotics.  Please include other over- the- counter medications for constipation if needed.     If you are on Coumadin (Warfarin) you will need your blood drawn every Monday and Thursday  once you are home.  Initially your visiting nurse will draw your blood.  Later you will go to an outpatient lab.  You will receive a phone call the next day and your Coumadin (warfarin) will be dosed based on these results.  Take this dosage daily until you hear from us next.      Walking:  Use two crutches or a walker for EVERY step.  Gradually increase your walking daily.  You can progress to a cane as tolerated once advised by your physical therapist.  Be sure to work on advancing your range of motion daily.      Bathing:  Do not get your incision wet until follow up in the office.  No tub baths.  Do not submerge your incision.  You may shower but you must cover your incision so it does NOT get wet.  Gauze and Tegaderm dressing can be used to cover your incision.  Once your original dressing comes off.  These can be found at the drug store.  Use your crutches/walker to get in and out of the shower.  Use a chair if needed.      Sexual Relations:  Resume according to your comfort.    Swimming:  No swimming or hot tubs until approved by your physician.  Swimming will be allowed once your incision is well healed.      Driving:  You may ride as a passenger now.  No driving until your follow-up appointment.  To get into the car use the front passenger seat with the seat pushed back as far as possible.  Scoot yourself back in the seat.  Use your hands to assist your legs into the car.      Physical therapy:  Continue with exercises at home.  Plan on going to outpatient physical therapy within a couple days of your surgery once you are home.  If you are doing home physical therapy please call the office for a prescription once you are ready to transition to outpatient physical therapy.      Special considerations:  To minimize swelling, stiffness, and decrease pain use cold as needed, but not heat.  Ice 20 minutes at a time with a cloth between your skin and the ice.  Ice after walking, when you have pain, or after you  have completed your exercises.  Limit your sitting to 60 minutes at one time.  Continue to wear your JAYDON stockings 2 weeks after surgery.  You can remove at night them to wash, hang to dry, and reapply in the morning after sponge bath or shower (do not get your incision wet).  You can wear them as needed after that.  If the stockings are too tight at the top you can cut the elastic.     Follow up:  Call [phone] to make an appointment to see your surgeon within 2 weeks of your surgery if you haven’t done so already.  If you have any questions please call [phone] for any concerns as well.      For the future:  Antibiotics for dental appointments is controversial.  If you have multiple medical problems we would recommend for you to call the office for a prescription for antibiotics to be taken one hour prior to your dental appointment.  If you do not have multiple medical problems it is your choice if you would like to take antibiotics prior to dental appointments.  We would also recommend you talking to your dentist prior to your appointment as well.  For any invasive procedures (i.e. endoscopy, colonoscopy, etc.) inform the doctor performing the procedure that you have a total knee replacement and they will give you antibiotics just prior to the procedure.

## 2024-09-17 NOTE — NURSING NOTE
Pt returned to [facility] awake,alert,taking po, C/O right leg pressure discomfort 3/10. Moving the right leg, still states he has some numbness. (+) pedal pulse, right lower extremity is warm, and dry, color appropriate for ethnicity.

## 2024-09-18 LAB
ABO GROUP BLD BPU: NORMAL
ABO GROUP BLD BPU: NORMAL
ANION GAP SERPL CALCULATED.3IONS-SCNC: 9 MMOL/L (ref 4–13)
BPU ID: NORMAL
BPU ID: NORMAL
BUN SERPL-MCNC: 11 MG/DL (ref 5–25)
CALCIUM SERPL-MCNC: 8.5 MG/DL (ref 8.4–10.2)
CHLORIDE SERPL-SCNC: 100 MMOL/L (ref 96–108)
CO2 SERPL-SCNC: 24 MMOL/L (ref 21–32)
CREAT SERPL-MCNC: 0.79 MG/DL (ref 0.6–1.3)
CROSSMATCH: NORMAL
CROSSMATCH: NORMAL
ERYTHROCYTE [DISTWIDTH] IN BLOOD BY AUTOMATED COUNT: 12.5 % (ref 11.6–15.1)
GFR SERPL CREATININE-BSD FRML MDRD: 98 ML/MIN/1.73SQ M
GLUCOSE SERPL-MCNC: 125 MG/DL (ref 65–140)
HCT VFR BLD AUTO: 25.2 % (ref 36.5–49.3)
HGB BLD-MCNC: 9.2 G/DL (ref 12–17)
MCH RBC QN AUTO: 31.2 PG (ref 26.8–34.3)
MCHC RBC AUTO-ENTMCNC: 36.5 G/DL (ref 31.4–37.4)
MCV RBC AUTO: 85 FL (ref 82–98)
PLATELET # BLD AUTO: 215 THOUSANDS/UL (ref 149–390)
PMV BLD AUTO: 8.9 FL (ref 8.9–12.7)
POTASSIUM SERPL-SCNC: 4.1 MMOL/L (ref 3.5–5.3)
RBC # BLD AUTO: 2.95 MILLION/UL (ref 3.88–5.62)
SODIUM SERPL-SCNC: 133 MMOL/L (ref 135–147)
UNIT DISPENSE STATUS: NORMAL
UNIT DISPENSE STATUS: NORMAL
UNIT PRODUCT CODE: NORMAL
UNIT PRODUCT CODE: NORMAL
UNIT PRODUCT VOLUME: 350 ML
UNIT PRODUCT VOLUME: 350 ML
UNIT RH: NORMAL
UNIT RH: NORMAL
WBC # BLD AUTO: 10.22 THOUSAND/UL (ref 4.31–10.16)

## 2024-09-18 PROCEDURE — 85027 COMPLETE CBC AUTOMATED: CPT | Performed by: PHYSICIAN ASSISTANT

## 2024-09-18 PROCEDURE — 80048 BASIC METABOLIC PNL TOTAL CA: CPT | Performed by: PHYSICIAN ASSISTANT

## 2024-09-18 PROCEDURE — 99024 POSTOP FOLLOW-UP VISIT: CPT | Performed by: PHYSICIAN ASSISTANT

## 2024-09-18 PROCEDURE — 97530 THERAPEUTIC ACTIVITIES: CPT

## 2024-09-18 PROCEDURE — 97116 GAIT TRAINING THERAPY: CPT

## 2024-09-18 RX ORDER — FEBUXOSTAT 40 MG/1
40 TABLET, FILM COATED ORAL DAILY
Status: DISCONTINUED | OUTPATIENT
Start: 2024-09-18 | End: 2024-09-20 | Stop reason: HOSPADM

## 2024-09-18 RX ORDER — MONTELUKAST SODIUM 10 MG/1
10 TABLET ORAL
Status: DISCONTINUED | OUTPATIENT
Start: 2024-09-18 | End: 2024-09-20 | Stop reason: HOSPADM

## 2024-09-18 RX ORDER — VERAPAMIL HYDROCHLORIDE 120 MG/1
120 TABLET ORAL DAILY
Status: DISCONTINUED | OUTPATIENT
Start: 2024-09-19 | End: 2024-09-20 | Stop reason: HOSPADM

## 2024-09-18 RX ORDER — LISINOPRIL 20 MG/1
20 TABLET ORAL DAILY
Status: DISCONTINUED | OUTPATIENT
Start: 2024-09-19 | End: 2024-09-20 | Stop reason: HOSPADM

## 2024-09-18 RX ORDER — FLUTICASONE PROPIONATE AND SALMETEROL 500; 50 UG/1; UG/1
1 POWDER RESPIRATORY (INHALATION) EVERY 12 HOURS SCHEDULED
Status: DISCONTINUED | OUTPATIENT
Start: 2024-09-18 | End: 2024-09-20 | Stop reason: HOSPADM

## 2024-09-18 RX ADMIN — FLUTICASONE PROPIONATE AND SALMETEROL 1 PUFF: 500; 50 POWDER RESPIRATORY (INHALATION) at 20:59

## 2024-09-18 RX ADMIN — MONTELUKAST 10 MG: 10 TABLET, FILM COATED ORAL at 21:05

## 2024-09-18 RX ADMIN — ACETAMINOPHEN 975 MG: 325 TABLET ORAL at 21:04

## 2024-09-18 RX ADMIN — ACETAMINOPHEN 975 MG: 325 TABLET ORAL at 14:34

## 2024-09-18 RX ADMIN — FEBUXOSTAT 40 MG: 40 TABLET ORAL at 20:59

## 2024-09-18 RX ADMIN — DOCUSATE SODIUM 100 MG: 100 CAPSULE, LIQUID FILLED ORAL at 08:48

## 2024-09-18 RX ADMIN — CEFAZOLIN SODIUM 2000 MG: 2 SOLUTION INTRAVENOUS at 01:52

## 2024-09-18 RX ADMIN — DOCUSATE SODIUM 100 MG: 100 CAPSULE, LIQUID FILLED ORAL at 17:41

## 2024-09-18 RX ADMIN — FOLIC ACID 1 MG: 1 TABLET ORAL at 08:47

## 2024-09-18 RX ADMIN — IRON SUCROSE 300 MG: 20 INJECTION, SOLUTION INTRAVENOUS at 08:42

## 2024-09-18 RX ADMIN — ENOXAPARIN SODIUM 40 MG: 40 INJECTION SUBCUTANEOUS at 08:54

## 2024-09-18 RX ADMIN — OXYCODONE HYDROCHLORIDE 10 MG: 10 TABLET ORAL at 03:34

## 2024-09-18 RX ADMIN — SENNOSIDES 8.6 MG: 8.6 TABLET, FILM COATED ORAL at 08:48

## 2024-09-18 RX ADMIN — OXYCODONE HYDROCHLORIDE 10 MG: 10 TABLET ORAL at 12:01

## 2024-09-18 RX ADMIN — SODIUM CHLORIDE, SODIUM LACTATE, POTASSIUM CHLORIDE, AND CALCIUM CHLORIDE 125 ML/HR: .6; .31; .03; .02 INJECTION, SOLUTION INTRAVENOUS at 01:48

## 2024-09-18 RX ADMIN — ACETAMINOPHEN 975 MG: 325 TABLET ORAL at 06:43

## 2024-09-18 RX ADMIN — GABAPENTIN 100 MG: 400 CAPSULE ORAL at 11:45

## 2024-09-18 RX ADMIN — OXYCODONE HYDROCHLORIDE 10 MG: 10 TABLET ORAL at 22:30

## 2024-09-18 RX ADMIN — OXYCODONE HYDROCHLORIDE AND ACETAMINOPHEN 500 MG: 500 TABLET ORAL at 08:47

## 2024-09-18 RX ADMIN — GABAPENTIN 100 MG: 400 CAPSULE ORAL at 04:49

## 2024-09-18 RX ADMIN — GABAPENTIN 100 MG: 400 CAPSULE ORAL at 21:04

## 2024-09-18 RX ADMIN — PANTOPRAZOLE SODIUM 40 MG: 40 TABLET, DELAYED RELEASE ORAL at 06:10

## 2024-09-18 NOTE — PLAN OF CARE
Problem: PAIN - ADULT  Goal: Verbalizes/displays adequate comfort level or baseline comfort level  Description: Interventions:  - Encourage patient to monitor pain and request assistance  - Assess pain using appropriate pain scale  - Administer analgesics based on type and severity of pain and evaluate response  - Implement non-pharmacological measures as appropriate and evaluate response  - Consider cultural and social influences on pain and pain management  - Notify physician/advanced practitioner if interventions unsuccessful or patient reports new pain  Outcome: Progressing     Problem: INFECTION - ADULT  Goal: Absence or prevention of progression during hospitalization  Description: INTERVENTIONS:  - Assess and monitor for signs and symptoms of infection  - Monitor lab/diagnostic results  - Monitor all insertion sites, i.e. indwelling lines, tubes, and drains  - Monitor endotracheal if appropriate and nasal secretions for changes in amount and color  - Grants appropriate cooling/warming therapies per order  - Administer medications as ordered  - Instruct and encourage patient and family to use good hand hygiene technique  - Identify and instruct in appropriate isolation precautions for identified infection/condition  Outcome: Progressing  Goal: Absence of fever/infection during neutropenic period  Description: INTERVENTIONS:  - Monitor WBC    Outcome: Progressing     Problem: SAFETY ADULT  Goal: Patient will remain free of falls  Description: INTERVENTIONS:  - Educate patient/family on patient safety including physical limitations  - Instruct patient to call for assistance with activity   - Consult OT/PT to assist with strengthening/mobility   - Keep Call bell within reach  - Keep bed low and locked with side rails adjusted as appropriate  - Keep care items and personal belongings within reach  - Initiate and maintain comfort rounds  - Make Fall Risk Sign visible to staff  - Apply yellow socks and bracelet  for high fall risk patients  - Consider moving patient to room near nurses station  Outcome: Progressing  Goal: Maintain or return to baseline ADL function  Description: INTERVENTIONS:  -  Assess patient's ability to carry out ADLs; assess patient's baseline for ADL function and identify physical deficits which impact ability to perform ADLs (bathing, care of mouth/teeth, toileting, grooming, dressing, etc.)  - Assess/evaluate cause of self-care deficits   - Assess range of motion  - Assess patient's mobility; develop plan if impaired  - Assess patient's need for assistive devices and provide as appropriate  - Encourage maximum independence but intervene and supervise when necessary  - Involve family in performance of ADLs  - Assess for home care needs following discharge   - Consider OT consult to assist with ADL evaluation and planning for discharge  - Provide patient education as appropriate  Outcome: Progressing  Goal: Maintains/Returns to pre admission functional level  Description: INTERVENTIONS:  - Perform AM-PAC 6 Click Basic Mobility/ Daily Activity assessment daily.  - Set and communicate daily mobility goal to care team and patient/family/caregiver.   - Collaborate with rehabilitation services on mobility goals if consulted  - Out of bed for toileting  - Record patient progress and toleration of activity level   Outcome: Progressing     Problem: DISCHARGE PLANNING  Goal: Discharge to home or other facility with appropriate resources  Description: INTERVENTIONS:  - Identify barriers to discharge w/patient and caregiver  - Arrange for needed discharge resources and transportation as appropriate  - Identify discharge learning needs (meds, wound care, etc.)  - Arrange for interpretive services to assist at discharge as needed  - Refer to Case Management Department for coordinating discharge planning if the patient needs post-hospital services based on physician/advanced practitioner order or complex needs  related to functional status, cognitive ability, or social support system  Outcome: Progressing     Problem: Knowledge Deficit  Goal: Patient/family/caregiver demonstrates understanding of disease process, treatment plan, medications, and discharge instructions  Description: Complete learning assessment and assess knowledge base.  Interventions:  - Provide teaching at level of understanding  - Provide teaching via preferred learning methods  Outcome: Progressing     Problem: SKIN/TISSUE INTEGRITY - ADULT  Goal: Incision(s), wounds(s) or drain site(s) healing without S/S of infection  Description: INTERVENTIONS  - Assess and document dressing, incision, wound bed, drain sites and surrounding tissue  - Provide patient and family education  Outcome: Progressing     Problem: MUSCULOSKELETAL - ADULT  Goal: Maintain or return mobility to safest level of function  Description: INTERVENTIONS:  - Assess patient's ability to carry out ADLs; assess patient's baseline for ADL function and identify physical deficits which impact ability to perform ADLs (bathing, care of mouth/teeth, toileting, grooming, dressing, etc.)  - Assess/evaluate cause of self-care deficits   - Assess range of motion  - Assess patient's mobility  - Assess patient's need for assistive devices and provide as appropriate  - Encourage maximum independence but intervene and supervise when necessary  - Involve family in performance of ADLs  - Assess for home care needs following discharge   - Consider OT consult to assist with ADL evaluation and planning for discharge  - Provide patient education as appropriate  Outcome: Progressing  Goal: Maintain proper alignment of affected body part  Description: INTERVENTIONS:  - Support, maintain and protect limb and body alignment  - Provide patient/ family with appropriate education  Outcome: Progressing

## 2024-09-18 NOTE — PROGRESS NOTES
Progress Note - Orthopedics   Name: Alexx Haney 59 y.o. male I MRN: [id number]  Unit/Bed#: 7T [facility] 714-01 I Date of Admission: 9/17/2024   Date of Service: 9/18/2024 I Hospital Day: 1     Assessment & Plan  Status post total knee replacement, right  Assessment:  59 year old Male POD 1 Right knee s/p TKA with      Plan:  WBAT RLE  PT/OT  Pain control PRN  Encourage incentive spirometry   Continue bowel regiment  24hr of post-op Abx completed   Slight leukocytosis, 10.22, likely reactive to recent surgery. Patient is afebrile, we will continue to monitor   Hgb 9.2, we will continue to monitor   Continue Teds, SCDs, and Lovenox for DVT ppx while in house. He will discharged on ASA 81mg BID  Discharge planning. Plan to dc to a rehab. Case Management will meet with the patient to discuss his options      History of Present Illness   59 y.o.male POD 1 Right knee s/p TKA with . No acute events, no new complaints.  Patient was seen and examined at bedside.  Patient reports that his pain is well-controlled about the right knee.  Patient denies having any right knee pain at this time.  Patient does report having pain in the anterior aspect of his thigh as well as the lateral aspect of the right ankle.  Patient reports that he does have history of right ankle fracture and has had on and off pain in this ankle since then.  Patient denies any numbness or tingling in the right lower extremity.  Patient states that he feels well overall denying any fevers chills or sweats.  Patient denies bowel movement but states that he is passing gas.       Objective      Temp:  [97.3 °F (36.3 °C)-98.3 °F (36.8 °C)] 97.4 °F (36.3 °C)  HR:  [75-86] 82  Resp:  [18-20] 18  BP: (112-147)/(68-91) 112/68  O2 Device: None (Room air)          I/O         09/16 0701  09/17 0700 09/17 0701  09/18 0700 09/18 0701  09/19 0700    P.O.  480 240    I.V. (mL/kg)  4214.6 (48.2)     IV Piggyback  150     Total Intake(mL/kg)  4844.6 (55.4)  240 (2.7)    Urine (mL/kg/hr)  320 (0.2)     Total Output  320     Net  +4524.6 +240           Unmeasured Urine Occurrence  4 x 1 x          Lines/Drains/Airways       Active Status       None                  Physical Exam  Vitals reviewed.   Constitutional:       Appearance: Normal appearance.   HENT:      Head: Normocephalic and atraumatic.      Right Ear: External ear normal.      Left Ear: External ear normal.      Nose: Nose normal.      Mouth/Throat:      Mouth: Mucous membranes are moist.   Eyes:      Extraocular Movements: Extraocular movements intact.      Conjunctiva/sclera: Conjunctivae normal.      Pupils: Pupils are equal, round, and reactive to light.   Cardiovascular:      Comments: No apparent distress  Pulmonary:      Effort: Pulmonary effort is normal.   Abdominal:      General: Abdomen is flat.   Musculoskeletal:      Cervical back: Normal range of motion.   Skin:     General: Skin is warm and dry.      Capillary Refill: Capillary refill takes less than 2 seconds.   Neurological:      General: No focal deficit present.      Mental Status: He is alert.   Psychiatric:         Mood and Affect: Mood normal.          Musculoskeletal:   Right Lower Extremity Exam  Alignment:  Leg lengths appear equal.  Inspection:   Mepilex dressings present over the anterior aspect of the right knee and is clean dry and intact.  There is ecchymosis visualized about the medial aspect of the dressing.  There is no erythema or ecchymosis visualized about the rest of the skin.  Palpation: Slight global tenderness to palpation about the right knee.  Effusion.  Compartments are soft and compressible of the upper and lower leg.  Strength:  Anterior tibialis 5/5. Gastroc/Soleus 5/5. EHL 5/5. FHL 5/5.  Neurovascular:  Sensation intact in DP/SP/Young/Sa/T nerve distributions. Palpable DP pulse.          Lab Results: I have reviewed the following results:   Recent Labs     09/18/24  0449   WBC 10.22*   HGB 9.2*   HCT 25.2*   PLT  "215   BUN 11   CREATININE 0.79     Blood Culture:  No results found for: \"BLOODCX\"  Wound Culture: No results found for: \"WOUNDCULT\"    Yasmin Scott PA-C    "

## 2024-09-18 NOTE — CASE MANAGEMENT
Case Management Progress Note    Patient name Alexx Haney  Location 7T /7T -01 MRN [id number]  : 1965 Date 2024       LOS (days): 1  Geometric Mean LOS (GMLOS) (days):   Days to GMLOS:        OBJECTIVE:        Current admission status: Inpatient  Preferred Pharmacy:   [facility]/pharmacy #[id number] [address] [address]  Phone: [phone] Fax: [phone]    Primary Care Provider: Javier Delacruz MD    Primary Insurance: MEDICARE  Secondary Insurance:     PROGRESS NOTE:    Per Ortho pre-op assessment and discussion with pt at bedside, pt resides in a two story home with his mother and was independent with ADLs prior to admission. Pt has a RW that was ordered prior to surgery by the nurse navigator. Therapy recommending OP therapy, pt has an appointment scheduled for Friday. No CM needs identified at this time. CM department will continue to follow.

## 2024-09-18 NOTE — PHYSICAL THERAPY NOTE
Physical TherapyTreatment Note    Patient's Name: Alexx Haney    Admitting Diagnosis  Arthritis of right knee [M17.11]    Problem List  Patient Active Problem List   Diagnosis    Allergic rhinitis    Asthma    Essential hypertension    Benign paroxysmal positional vertigo    Chronic bilateral low back pain without sciatica    DJD (degenerative joint disease) of knee    GERD without esophagitis    Gout    Hyperlipidemia    Primary localized osteoarthritis of left knee    Primary localized osteoarthritis of right knee    Tenosynovitis of foot    Thyroid disorder    V-tach (HCC)    Anxiety    Chronic pain of both knees    Abnormal liver function test    Epidermoid cyst    Dysfunction of left eustachian tube    Tinea corporis    Effusion of right knee    Bilateral primary osteoarthritis of knee    Hyponatremia    Hyperglycemia    Class 1 obesity due to excess calories without serious comorbidity with body mass index (BMI) of 30.0 to 30.9 in adult    Status post total knee replacement, right       Past Medical History  Past Medical History:   Diagnosis Date    Acute bronchitis 01/29/2024    Anxiety 01/15/2019    Arthritis     Asthma     Chronic rhinitis     last assessed 2/21/14    Chronic serous otitis media     last assessed 11/20/13    Chronic sinusitis     last assessed 8/19/14    Functional heart murmur     GERD (gastroesophageal reflux disease)     Gout     Hearing problem     last assessed 12/1/16    Hiatal hernia     Hypercholesterolemia     Hypertension     Palpitations     Testicular hypogonadism     last assessed 10/4/13    V-tach (HCC)        Past Surgical History  Past Surgical History:   Procedure Laterality Date    APPENDECTOMY      BICEPS TENDON REPAIR Left 2009    BICEPS TENODESIS      anesthesia for tenodesis- of ruptured long tendon of biceps     HERNIA REPAIR      KS ARTHRP KNE CONDYLE&PLATU MEDIAL&LAT COMPARTMENTS Right 9/17/2024    Procedure: ARTHROPLASTY KNEE TOTAL, potential same day  discharge, cemented;  Surgeon: Parvin Whipple, DO;  Location:  MAIN OR;  Service: Orthopedics    THYROIDECTOMY      TONSILLECTOMY         Recent Imaging  RADIOLOGY RESULTS   Final Result by  (09/18 1510)          Recent Vital Signs  Vitals:    09/18/24 0400 09/18/24 0739 09/18/24 1100 09/18/24 1500   BP: 121/72 115/69 112/68 110/67   BP Location: Right arm Right arm  Right arm   Pulse: 84 80 82 88   Resp: 18 18 18 18   Temp: 97.7 °F (36.5 °C) 98.3 °F (36.8 °C) (!) 97.4 °F (36.3 °C) 98 °F (36.7 °C)   TempSrc: Temporal Temporal  Temporal   SpO2: 94% 97% 100% 98%   Weight:       Height:            09/18/24 1400   PT Last Visit   PT Visit Date 09/18/24   Note Type   Note Type Treatment   Pain Assessment   Pain Assessment Tool 0-10   Pain Score 8   Pain Location/Orientation Orientation: Right;Location: Knee   Restrictions/Precautions   Weight Bearing Precautions Per Order Yes   RLE Weight Bearing Per Order WBAT   Other Precautions Fall Risk;Pain   General   Chart Reviewed Yes   Response to Previous Treatment Patient with no complaints from previous session.   Family/Caregiver Present No   Cognition   Overall Cognitive Status WFL   Arousal/Participation Alert   Attention Within functional limits   Orientation Level Oriented X4   Memory Within functional limits   Following Commands Follows all commands and directions without difficulty   Bed Mobility   Supine to Sit 6  Modified independent   Additional items Increased time required   Sit to Supine 6  Modified independent   Additional items Increased time required   Transfers   Sit to Stand 5  Supervision   Additional items Increased time required   Stand to Sit 5  Supervision   Additional items Increased time required   Ambulation/Elevation   Gait pattern Step through pattern;Decreased toe off;Decreased heel strike;Decreased foot clearance;Improper Weight shift;Antalgic   Gait Assistance 5  Supervision   Additional items Verbal cues   Assistive Device Rolling  walker   Distance 100ft x3 with seated and standing rest breaks   Ambulation/Elevation Additional Comments pt not able to tolerate stairs today due to pain   Balance   Static Sitting Fair +   Dynamic Sitting Fair +   Static Standing Fair -   Dynamic Standing Fair -   Ambulatory Fair -   Endurance Deficit   Endurance Deficit Yes   Endurance Deficit Description pain and fatigue   Activity Tolerance   Activity Tolerance Patient limited by fatigue;Patient limited by pain   Medical Staff Made Aware spoke to CM   Nurse Made Aware spoke to RN   Assessment   Prognosis Good   Problem List Decreased strength;Decreased endurance;Decreased range of motion;Impaired balance;Decreased mobility;Decreased coordination;Impaired sensation;Pain   Assessment Pt is more limited due to pain today. Able to tolerate some ambulation with RW without LOB, taking many standing rest breaks and seated breaks at times. Not able to attempt stairs today due to increased pain and fatigue, will now recommend inpt rehab at D/C.   Barriers to Discharge Inaccessible home environment;Decreased caregiver support   Goals   Patient Goals to have less knee pain   STG Expiration Date 09/27/24   Short Term Goal #1 see eval note   PT Treatment Day 2   Plan   Treatment/Interventions ADL retraining;LE strengthening/ROM;Functional transfer training;Therapeutic exercise;Elevations;Endurance training;Patient/family training;Equipment eval/education;Bed mobility;Gait training;Spoke to nursing;Spoke to case management;OT   Progress Progressing toward goals   PT Frequency 3-5x/wk   Discharge Recommendation   Rehab Resource Intensity Level, PT II (Moderated Resource Intensity)   Equipment Recommended Walker   Walker Package Recommended Wheeled walker   AM-PAC Basic Mobility Inpatient   Turning in Flat Bed Without Bedrails 4   Lying on Back to Sitting on Edge of Flat Bed Without Bedrails 4   Moving Bed to Chair 3   Standing Up From Chair Using Arms 3   Walk in Room 3    Climb 3-5 Stairs With Railing 2   Basic Mobility Inpatient Raw Score 19   Basic Mobility Standardized Score 42.48   [facility] Highest Level Of Mobility   -HL Goal 6: Walk 10 steps or more   -HLM Achieved 8: Walk 250 feet ot more   Education   Education Provided Mobility training;Assistive device   Patient Explanation/teachback used;Demonstrates verbal understanding   End of Consult   Patient Position at End of Consult Supine;All needs within reach       Syed Mcclure PT, DPT

## 2024-09-18 NOTE — PLAN OF CARE
Problem: PHYSICAL THERAPY ADULT  Goal: Performs mobility at highest level of function for planned discharge setting.  See evaluation for individualized goals.  Description: Treatment/Interventions: ADL retraining, LE strengthening/ROM, Functional transfer training, Therapeutic exercise, Elevations, Endurance training, Patient/family training, Equipment eval/education, Bed mobility, Gait training, Spoke to nursing, Spoke to case management, OT  Equipment Recommended: Walker       See flowsheet documentation for full assessment, interventions and recommendations.  Outcome: Progressing  Note: Prognosis: Good  Problem List: Decreased strength, Decreased endurance, Decreased range of motion, Impaired balance, Decreased mobility, Decreased coordination, Impaired sensation, Pain  Assessment: Pt is more limited due to pain today. Able to tolerate some ambulation with RW without LOB, taking many standing rest breaks and seated breaks at times. Not able to attempt stairs today due to increased pain and fatigue, will now recommend inpt rehab at D/C.  Barriers to Discharge: Inaccessible home environment, Decreased caregiver support     Rehab Resource Intensity Level, PT: III (Minimum Resource Intensity)    See flowsheet documentation for full assessment.

## 2024-09-18 NOTE — PLAN OF CARE
Problem: PAIN - ADULT  Goal: Verbalizes/displays adequate comfort level or baseline comfort level  Description: Interventions:  - Encourage patient to monitor pain and request assistance  - Assess pain using appropriate pain scale  - Administer analgesics based on type and severity of pain and evaluate response  - Implement non-pharmacological measures as appropriate and evaluate response  - Consider cultural and social influences on pain and pain management  - Notify physician/advanced practitioner if interventions unsuccessful or patient reports new pain  Outcome: Progressing     Problem: MUSCULOSKELETAL - ADULT  Goal: Maintain or return mobility to safest level of function  Description: INTERVENTIONS:  - Assess patient's ability to carry out ADLs; assess patient's baseline for ADL function and identify physical deficits which impact ability to perform ADLs (bathing, care of mouth/teeth, toileting, grooming, dressing, etc.)  - Assess/evaluate cause of self-care deficits   - Assess range of motion  - Assess patient's mobility  - Assess patient's need for assistive devices and provide as appropriate  - Encourage maximum independence but intervene and supervise when necessary  - Involve family in performance of ADLs  - Assess for home care needs following discharge   - Consider OT consult to assist with ADL evaluation and planning for discharge  - Provide patient education as appropriate  Outcome: Progressing     Problem: MUSCULOSKELETAL - ADULT  Goal: Maintain proper alignment of affected body part  Description: INTERVENTIONS:  - Support, maintain and protect limb and body alignment  - Provide patient/ family with appropriate education  Outcome: Progressing     Problem: SKIN/TISSUE INTEGRITY - ADULT  Goal: Incision(s), wounds(s) or drain site(s) healing without S/S of infection  Description: INTERVENTIONS  - Assess and document dressing, incision, wound bed, drain sites and surrounding tissue  - Provide patient  and family education  - Perform skin care/dressing changes every   Outcome: Progressing

## 2024-09-18 NOTE — ASSESSMENT & PLAN NOTE
Assessment:  59 year old Male POD 1 Right knee s/p TKA with      Plan:  WBAT RLE  PT/OT  Pain control PRN  Encourage incentive spirometry   Continue bowel regiment  24hr of post-op Abx completed   Slight leukocytosis, 10.22, likely reactive to recent surgery. Patient is afebrile, we will continue to monitor   Hgb 9.2, we will continue to monitor   Continue Teds, SCDs, and Lovenox for DVT ppx while in house. He will discharged on ASA 81mg BID  Discharge planning. Plan to dc to a rehab. Case Management will meet with the patient to discuss his options

## 2024-09-19 LAB
ANION GAP SERPL CALCULATED.3IONS-SCNC: 7 MMOL/L (ref 4–13)
BUN SERPL-MCNC: 9 MG/DL (ref 5–25)
CALCIUM SERPL-MCNC: 8.3 MG/DL (ref 8.4–10.2)
CHLORIDE SERPL-SCNC: 97 MMOL/L (ref 96–108)
CO2 SERPL-SCNC: 26 MMOL/L (ref 21–32)
CREAT SERPL-MCNC: 0.82 MG/DL (ref 0.6–1.3)
ERYTHROCYTE [DISTWIDTH] IN BLOOD BY AUTOMATED COUNT: 12.8 % (ref 11.6–15.1)
GFR SERPL CREATININE-BSD FRML MDRD: 97 ML/MIN/1.73SQ M
GLUCOSE SERPL-MCNC: 108 MG/DL (ref 65–140)
HCT VFR BLD AUTO: 23.7 % (ref 36.5–49.3)
HGB BLD-MCNC: 8.2 G/DL (ref 12–17)
MCH RBC QN AUTO: 31.5 PG (ref 26.8–34.3)
MCHC RBC AUTO-ENTMCNC: 34.6 G/DL (ref 31.4–37.4)
MCV RBC AUTO: 91 FL (ref 82–98)
PLATELET # BLD AUTO: 166 THOUSANDS/UL (ref 149–390)
PMV BLD AUTO: 9.1 FL (ref 8.9–12.7)
POTASSIUM SERPL-SCNC: 4 MMOL/L (ref 3.5–5.3)
RBC # BLD AUTO: 2.6 MILLION/UL (ref 3.88–5.62)
SODIUM SERPL-SCNC: 130 MMOL/L (ref 135–147)
WBC # BLD AUTO: 7.78 THOUSAND/UL (ref 4.31–10.16)

## 2024-09-19 PROCEDURE — 85027 COMPLETE CBC AUTOMATED: CPT | Performed by: PHYSICIAN ASSISTANT

## 2024-09-19 PROCEDURE — 97116 GAIT TRAINING THERAPY: CPT

## 2024-09-19 PROCEDURE — 97530 THERAPEUTIC ACTIVITIES: CPT

## 2024-09-19 PROCEDURE — 80048 BASIC METABOLIC PNL TOTAL CA: CPT | Performed by: PHYSICIAN ASSISTANT

## 2024-09-19 PROCEDURE — 99024 POSTOP FOLLOW-UP VISIT: CPT | Performed by: PHYSICIAN ASSISTANT

## 2024-09-19 RX ORDER — ALBUTEROL SULFATE 90 UG/1
2 INHALANT RESPIRATORY (INHALATION) EVERY 4 HOURS PRN
Status: DISCONTINUED | OUTPATIENT
Start: 2024-09-19 | End: 2024-09-20 | Stop reason: HOSPADM

## 2024-09-19 RX ORDER — ALBUTEROL SULFATE 0.83 MG/ML
2.5 SOLUTION RESPIRATORY (INHALATION) EVERY 6 HOURS PRN
Status: DISCONTINUED | OUTPATIENT
Start: 2024-09-19 | End: 2024-09-20 | Stop reason: HOSPADM

## 2024-09-19 RX ADMIN — FOLIC ACID 1 MG: 1 TABLET ORAL at 08:28

## 2024-09-19 RX ADMIN — MONTELUKAST 10 MG: 10 TABLET, FILM COATED ORAL at 23:14

## 2024-09-19 RX ADMIN — ACETAMINOPHEN 975 MG: 325 TABLET ORAL at 21:48

## 2024-09-19 RX ADMIN — FLUTICASONE PROPIONATE AND SALMETEROL 1 PUFF: 500; 50 POWDER RESPIRATORY (INHALATION) at 08:31

## 2024-09-19 RX ADMIN — SENNOSIDES 8.6 MG: 8.6 TABLET, FILM COATED ORAL at 08:28

## 2024-09-19 RX ADMIN — ENOXAPARIN SODIUM 40 MG: 40 INJECTION SUBCUTANEOUS at 08:31

## 2024-09-19 RX ADMIN — DOCUSATE SODIUM 100 MG: 100 CAPSULE, LIQUID FILLED ORAL at 08:28

## 2024-09-19 RX ADMIN — PANTOPRAZOLE SODIUM 40 MG: 40 TABLET, DELAYED RELEASE ORAL at 06:34

## 2024-09-19 RX ADMIN — ACETAMINOPHEN 975 MG: 325 TABLET ORAL at 05:00

## 2024-09-19 RX ADMIN — OXYCODONE HYDROCHLORIDE AND ACETAMINOPHEN 500 MG: 500 TABLET ORAL at 08:30

## 2024-09-19 RX ADMIN — GABAPENTIN 100 MG: 400 CAPSULE ORAL at 12:10

## 2024-09-19 RX ADMIN — ACETAMINOPHEN 975 MG: 325 TABLET ORAL at 13:38

## 2024-09-19 RX ADMIN — GABAPENTIN 100 MG: 400 CAPSULE ORAL at 04:33

## 2024-09-19 RX ADMIN — OXYCODONE HYDROCHLORIDE 10 MG: 10 TABLET ORAL at 12:10

## 2024-09-19 RX ADMIN — FLUTICASONE PROPIONATE AND SALMETEROL 1 PUFF: 500; 50 POWDER RESPIRATORY (INHALATION) at 20:34

## 2024-09-19 RX ADMIN — GABAPENTIN 100 MG: 400 CAPSULE ORAL at 20:34

## 2024-09-19 RX ADMIN — OXYCODONE HYDROCHLORIDE 10 MG: 10 TABLET ORAL at 05:34

## 2024-09-19 RX ADMIN — DOCUSATE SODIUM 100 MG: 100 CAPSULE, LIQUID FILLED ORAL at 17:07

## 2024-09-19 NOTE — ARC ADMISSION
Received referral on patient for possible ARC placement. Upon review of patient's case, patient deemed not ARC appropriate at this time. Patient does not meet Medicare guidelines. Patient is ambulating 100 ft at a supervision level. Patient does not require medical oversight with his rehabilitation. CM made aware.     Thank you,   Roxanne Taylor  ARC Admissions

## 2024-09-19 NOTE — PLAN OF CARE
Problem: PHYSICAL THERAPY ADULT  Goal: Performs mobility at highest level of function for planned discharge setting.  See evaluation for individualized goals.  Description: Treatment/Interventions: ADL retraining, Functional transfer training, Elevations, LE strengthening/ROM, Therapeutic exercise, Endurance training, Patient/family training, Equipment eval/education, Bed mobility, Gait training, Spoke to nursing, Spoke to case management, OT  Equipment Recommended: Walker       See flowsheet documentation for full assessment, interventions and recommendations.  Outcome: Progressing  Note: Prognosis: Good  Problem List: Decreased strength, Decreased endurance, Decreased range of motion, Impaired balance, Decreased mobility, Decreased coordination, Impaired sensation, Pain  Assessment: Pt is making slow progress with activity tolerance. He is reporting less pain and stiffness today with ambulation however still not able to tolerate stair training at this time. ROM continues to be limited due to edema and stiffness. Some limitations also present due to arthritis of the univolved LE. Continue to recommend inpt rehab at this time.  Barriers to Discharge: Inaccessible home environment, Decreased caregiver support     Rehab Resource Intensity Level, PT: II (Moderate Resource Intensity)    See flowsheet documentation for full assessment.

## 2024-09-19 NOTE — PHYSICAL THERAPY NOTE
Physical TherapyTreatment Note    Patient's Name: Alexx Haney    Admitting Diagnosis  Arthritis of right knee [M17.11]    Problem List  Patient Active Problem List   Diagnosis    Allergic rhinitis    Asthma    Essential hypertension    Benign paroxysmal positional vertigo    Chronic bilateral low back pain without sciatica    DJD (degenerative joint disease) of knee    GERD without esophagitis    Gout    Hyperlipidemia    Primary localized osteoarthritis of left knee    Primary localized osteoarthritis of right knee    Tenosynovitis of foot    Thyroid disorder    V-tach (HCC)    Anxiety    Chronic pain of both knees    Abnormal liver function test    Epidermoid cyst    Dysfunction of left eustachian tube    Tinea corporis    Effusion of right knee    Bilateral primary osteoarthritis of knee    Hyponatremia    Hyperglycemia    Class 1 obesity due to excess calories without serious comorbidity with body mass index (BMI) of 30.0 to 30.9 in adult    Status post total knee replacement, right       Past Medical History  Past Medical History:   Diagnosis Date    Acute bronchitis 01/29/2024    Anxiety 01/15/2019    Arthritis     Asthma     Chronic rhinitis     last assessed 2/21/14    Chronic serous otitis media     last assessed 11/20/13    Chronic sinusitis     last assessed 8/19/14    Functional heart murmur     GERD (gastroesophageal reflux disease)     Gout     Hearing problem     last assessed 12/1/16    Hiatal hernia     Hypercholesterolemia     Hypertension     Palpitations     Testicular hypogonadism     last assessed 10/4/13    V-tach (HCC)        Past Surgical History  Past Surgical History:   Procedure Laterality Date    APPENDECTOMY      BICEPS TENDON REPAIR Left 2009    BICEPS TENODESIS      anesthesia for tenodesis- of ruptured long tendon of biceps     HERNIA REPAIR      OK ARTHRP KNE CONDYLE&PLATU MEDIAL&LAT COMPARTMENTS Right 9/17/2024    Procedure: ARTHROPLASTY KNEE TOTAL, potential same day  discharge, cemented;  Surgeon: Parvin Whipple, DO;  Location:  MAIN OR;  Service: Orthopedics    THYROIDECTOMY      TONSILLECTOMY         Recent Imaging  RADIOLOGY RESULTS   Final Result by  (09/18 1510)          Recent Vital Signs  Vitals:    09/18/24 2300 09/19/24 0400 09/19/24 0733 09/19/24 1100   BP: 124/68 118/68 105/66 143/64   BP Location: Right arm Right arm Right arm    Pulse: 95 92 85 94   Resp: 18 18 18 18   Temp: 98.8 °F (37.1 °C) 99.5 °F (37.5 °C) 97.7 °F (36.5 °C) 98.7 °F (37.1 °C)   TempSrc: Temporal Temporal Temporal Temporal   SpO2: 95% 95% 93% 94%   Weight:       Height:            09/19/24 1000   PT Last Visit   PT Visit Date 09/19/24   Note Type   Note Type Treatment   Pain Assessment   Pain Assessment Tool 0-10   Pain Score 8   Pain Location/Orientation Orientation: Right;Location: Knee   Restrictions/Precautions   Weight Bearing Precautions Per Order Yes   RLE Weight Bearing Per Order WBAT   Other Precautions Fall Risk;Pain   General   Chart Reviewed Yes   Response to Previous Treatment Patient with no complaints from previous session.   Family/Caregiver Present No   Cognition   Overall Cognitive Status WFL   Arousal/Participation Alert   Attention Within functional limits   Orientation Level Oriented X4   Memory Within functional limits   Following Commands Follows all commands and directions without difficulty   Bed Mobility   Supine to Sit 6  Modified independent   Additional items Increased time required   Sit to Supine 6  Modified independent   Additional items Increased time required   Transfers   Sit to Stand 5  Supervision   Additional items Increased time required   Stand to Sit 5  Supervision   Additional items Increased time required   Ambulation/Elevation   Gait pattern Step through pattern;Decreased heel strike;Decreased toe off;Decreased foot clearance;Excessively slow   Gait Assistance 5  Supervision   Additional items Verbal cues   Assistive Device Rolling walker    Distance 100ft x 3 with standing rest breaks   Balance   Static Sitting Fair +   Dynamic Sitting Fair +   Static Standing Fair -   Dynamic Standing Fair -   Ambulatory Fair -   Endurance Deficit   Endurance Deficit Yes   Endurance Deficit Description pain and fatigue   Activity Tolerance   Activity Tolerance Patient limited by fatigue;Patient limited by pain   Medical Staff Made Aware spoke to CM   Nurse Made Aware spoke to RN   Assessment   Prognosis Good   Problem List Decreased strength;Decreased endurance;Decreased range of motion;Impaired balance;Decreased mobility;Decreased coordination;Impaired sensation;Pain   Assessment Pt is making slow progress with activity tolerance. He is reporting less pain and stiffness today with ambulation however still not able to tolerate stair training at this time. ROM continues to be limited due to edema and stiffness. Some limitations also present due to arthritis of the univolved LE. Continue to recommend inpt rehab at this time.   Barriers to Discharge Inaccessible home environment;Decreased caregiver support   Goals   Patient Goals to be able to straighten knee all the way   STG Expiration Date 09/27/24   Short Term Goal #1 see eval note   PT Treatment Day 3   Plan   Treatment/Interventions ADL retraining;Functional transfer training;Elevations;LE strengthening/ROM;Therapeutic exercise;Endurance training;Patient/family training;Equipment eval/education;Bed mobility;Gait training;Spoke to nursing;Spoke to case management;OT   Progress Progressing toward goals   PT Frequency 3-5x/wk   Discharge Recommendation   Rehab Resource Intensity Level, PT II (Moderate Resource Intensity)   Equipment Recommended Walker   Walker Package Recommended Wheeled walker   AM-PAC Basic Mobility Inpatient   Turning in Flat Bed Without Bedrails 3   Lying on Back to Sitting on Edge of Flat Bed Without Bedrails 3   Moving Bed to Chair 3   Standing Up From Chair Using Arms 3   Walk in Room 3    Climb 3-5 Stairs With Railing 2   Basic Mobility Inpatient Raw Score 17   Basic Mobility Standardized Score 39.67   [facility] Highest Level Of Mobility   -HL Goal 5: Stand one or more mins   -HL Achieved 8: Walk 250 feet ot more   Education   Education Provided Mobility training;Assistive device   Patient Explanation/teachback used;Demonstrates verbal understanding   End of Consult   Patient Position at End of Consult Supine;All needs within reach       Syed Mcclure PT, DPT

## 2024-09-19 NOTE — PLAN OF CARE
Problem: PAIN - ADULT  Goal: Verbalizes/displays adequate comfort level or baseline comfort level  Description: Interventions:  - Encourage patient to monitor pain and request assistance  - Assess pain using appropriate pain scale  - Administer analgesics based on type and severity of pain and evaluate response  - Implement non-pharmacological measures as appropriate and evaluate response  - Consider cultural and social influences on pain and pain management  - Notify physician/advanced practitioner if interventions unsuccessful or patient reports new pain  Outcome: Progressing     Problem: INFECTION - ADULT  Goal: Absence or prevention of progression during hospitalization  Description: INTERVENTIONS:  - Assess and monitor for signs and symptoms of infection  - Monitor lab/diagnostic results  - Monitor all insertion sites, i.e. indwelling lines, tubes, and drains  - Monitor endotracheal if appropriate and nasal secretions for changes in amount and color  - [place] appropriate cooling/warming therapies per order  - Administer medications as ordered  - Instruct and encourage patient and family to use good hand hygiene technique  - Identify and instruct in appropriate isolation precautions for identified infection/condition  Outcome: Progressing  Goal: Absence of fever/infection during neutropenic period  Description: INTERVENTIONS:  - Monitor WBC    Outcome: Progressing     Problem: SAFETY ADULT  Goal: Patient will remain free of falls  Description: INTERVENTIONS:  - Educate patient/family on patient safety including physical limitations  - Instruct patient to call for assistance with activity   - Consult OT/PT to assist with strengthening/mobility   - Keep Call bell within reach  - Keep bed low and locked with side rails adjusted as appropriate  - Keep care items and personal belongings within reach  - Initiate and maintain comfort rounds  - Make Fall Risk Sign visible to staff  - Apply yellow socks and bracelet  for high fall risk patients  - Consider moving patient to room near nurses station  Outcome: Progressing  Goal: Maintain or return to baseline ADL function  Description: INTERVENTIONS:  -  Assess patient's ability to carry out ADLs; assess patient's baseline for ADL function and identify physical deficits which impact ability to perform ADLs (bathing, care of mouth/teeth, toileting, grooming, dressing, etc.)  - Assess/evaluate cause of self-care deficits   - Assess range of motion  - Assess patient's mobility; develop plan if impaired  - Assess patient's need for assistive devices and provide as appropriate  - Encourage maximum independence but intervene and supervise when necessary  - Involve family in performance of ADLs  - Assess for home care needs following discharge   - Consider OT consult to assist with ADL evaluation and planning for discharge  - Provide patient education as appropriate  Outcome: Progressing  Goal: Maintains/Returns to pre admission functional level  Description: INTERVENTIONS:  - Perform AM-PAC 6 Click Basic Mobility/ Daily Activity assessment daily.  - Set and communicate daily mobility goal to care team and patient/family/caregiver.   - Collaborate with rehabilitation services on mobility goals if consulted  - Out of bed for toileting  - Record patient progress and toleration of activity level   Outcome: Progressing     Problem: DISCHARGE PLANNING  Goal: Discharge to home or other facility with appropriate resources  Description: INTERVENTIONS:  - Identify barriers to discharge w/patient and caregiver  - Arrange for needed discharge resources and transportation as appropriate  - Identify discharge learning needs (meds, wound care, etc.)  - Arrange for interpretive services to assist at discharge as needed  - Refer to Case Management Department for coordinating discharge planning if the patient needs post-hospital services based on physician/advanced practitioner order or complex needs  related to functional status, cognitive ability, or social support system  Outcome: Progressing     Problem: Knowledge Deficit  Goal: Patient/family/caregiver demonstrates understanding of disease process, treatment plan, medications, and discharge instructions  Description: Complete learning assessment and assess knowledge base.  Interventions:  - Provide teaching at level of understanding  - Provide teaching via preferred learning methods  Outcome: Progressing     Problem: SKIN/TISSUE INTEGRITY - ADULT  Goal: Incision(s), wounds(s) or drain site(s) healing without S/S of infection  Description: INTERVENTIONS  - Assess and document dressing, incision, wound bed, drain sites and surrounding tissue  - Provide patient and family education  Outcome: Progressing     Problem: MUSCULOSKELETAL - ADULT  Goal: Maintain or return mobility to safest level of function  Description: INTERVENTIONS:  - Assess patient's ability to carry out ADLs; assess patient's baseline for ADL function and identify physical deficits which impact ability to perform ADLs (bathing, care of mouth/teeth, toileting, grooming, dressing, etc.)  - Assess/evaluate cause of self-care deficits   - Assess range of motion  - Assess patient's mobility  - Assess patient's need for assistive devices and provide as appropriate  - Encourage maximum independence but intervene and supervise when necessary  - Involve family in performance of ADLs  - Assess for home care needs following discharge   - Consider OT consult to assist with ADL evaluation and planning for discharge  - Provide patient education as appropriate  Outcome: Progressing  Goal: Maintain proper alignment of affected body part  Description: INTERVENTIONS:  - Support, maintain and protect limb and body alignment  - Provide patient/ family with appropriate education  Outcome: Progressing

## 2024-09-19 NOTE — CASE MANAGEMENT
Case Management Discharge Planning Note    Patient name Alexx Haney  Location 7T [facility] 714/7T [facility] 714-01 MRN [id number]  : 1965 Date 2024       Current Admission Date: 2024  Current Admission Diagnosis:Status post total knee replacement, right   Patient Active Problem List    Diagnosis Date Noted Date Diagnosed    Status post total knee replacement, right 2024     Class 1 obesity due to excess calories without serious comorbidity with body mass index (BMI) of 30.0 to 30.9 in adult 2024     Hyperglycemia 2023     Hyponatremia 2023     Bilateral primary osteoarthritis of knee 2023     Effusion of right knee 08/15/2022     Tinea corporis 2022     Dysfunction of left eustachian tube 10/05/2020     Epidermoid cyst 2020     Abnormal liver function test 2020     Chronic pain of both knees 2019     Anxiety 01/15/2019     Benign paroxysmal positional vertigo 11/15/2017     Gout 2017     Chronic bilateral low back pain without sciatica 2017     Thyroid disorder 2016     Tenosynovitis of foot 10/28/2016     Primary localized osteoarthritis of left knee 2016     Primary localized osteoarthritis of right knee 2016     Allergic rhinitis 2016     GERD without esophagitis 2016     V-tach (HCC) 2015     Essential hypertension 2015     DJD (degenerative joint disease) of knee 2015     Asthma 10/04/2013     Hyperlipidemia 10/04/2013       LOS (days): 2  Geometric Mean LOS (GMLOS) (days):   Days to GMLOS:     OBJECTIVE:  Risk of Unplanned Readmission Score: 10.9         Current admission status: Inpatient   Preferred Pharmacy:   [facility]/pharmacy #[id number] - [address] [address]  Phone: [phone] Fax: [phone]    Primary Care Provider: Javier Delacruz MD    Primary Insurance: MEDICARE  Secondary Insurance:     DISCHARGE DETAILS:    Other  Referral/Resources/Interventions Provided:  Interventions: Short Term Rehab  Referral Comments: Acute and sub-acute referrals sent via [facility].     Additional Comments: Pt now being recommended for STR. Pt in agreement with acute and sub-acute referrals but would prefer to stay at  either at  [facility] or  TCF if able. Per [facility] communication, pt not appropriate for [facility] at this time however [facility] is able to accept. Sub-acute choice list reviewed with pt who wishes to move forward with [facility]. Per  TCF they will are planning to accept pt to their facility tomorrow. CM department to follow.

## 2024-09-19 NOTE — PROGRESS NOTES
Progress Note - Orthopedics   Name: Alexx Haney 59 y.o. male I MRN: [id number]  Unit/Bed#: 7T [facility] 714-01 I Date of Admission: 9/17/2024   Date of Service: 9/19/2024 I Hospital Day: 2    Assessment & Plan  Status post total knee replacement, right  Assessment:  59 year old Male POD 1 Right knee s/p TKA with      Plan:  WBAT RLE  PT/OT  Pain control PRN  Encourage incentive spirometry   Continue bowel regiment  Hgb 8.2, we will continue to monitor   Continue Teds, SCDs, and Lovenox for DVT ppx while in house. He will discharged on ASA 81mg BID  Discharge planning. Plan to dc to a rehab. Case Management will meet with the patient to discuss his options      History of Present Illness   59 y.o.male doing well today. He denies any significant swelling in the leg at this time. He feels that the pain is improved from yesterday and has been getting around easier than before. He denies any significant respiratory distress.     Objective      Temp:  [97.4 °F (36.3 °C)-99.5 °F (37.5 °C)] 97.7 °F (36.5 °C)  HR:  [82-95] 85  Resp:  [18] 18  BP: (105-124)/(66-70) 105/66  O2 Device: None (Room air)          I/O         09/17 0701  09/18 0700 09/18 0701  09/19 0700 09/19 0701  09/20 0700    P.O. 480 480     I.V. (mL/kg) 4214.6 (48.2)      IV Piggyback 150      Total Intake(mL/kg) 4844.6 (55.4) 480 (5.5)     Urine (mL/kg/hr) 320 (0.2)      Total Output 320      Net +4524.6 +480            Unmeasured Urine Occurrence 4 x 6 x     Unmeasured Stool Occurrence  0 x           Lines/Drains/Airways       Active Status       None                  Physical Exam  Constitutional:       General: He is not in acute distress.     Appearance: Normal appearance.   HENT:      Head: Normocephalic and atraumatic.   Cardiovascular:      Pulses: Normal pulses.   Pulmonary:      Effort: Pulmonary effort is normal.   Abdominal:      General: Bowel sounds are normal.   Neurological:      General: No focal deficit present.      Mental Status:  "He is alert.   Psychiatric:         Mood and Affect: Mood normal.         Behavior: Behavior normal.      Musculoskeletal: rightlower  No erythema or ecchymosis.  Dressing c/d/i  Minimal swelling at this time  Sensation intact  Motor intact to +FHL/EHL, +ankle dorsi/plantar flexion  2+ DP pulse      Lab Results: I have reviewed the following results: none  Recent Labs     09/18/24  0449 09/19/24  0438   WBC 10.22* 7.78   HGB 9.2* 8.2*   HCT 25.2* 23.7*    166   BUN 11 9   CREATININE 0.79 0.82     Blood Culture:  No results found for: \"BLOODCX\"  Wound Culture: No results found for: \"WOUNDCULT\"  "

## 2024-09-19 NOTE — ASSESSMENT & PLAN NOTE
Assessment:  59 year old Male POD 1 Right knee s/p TKA with      Plan:  WBAT RLE  PT/OT  Pain control PRN  Encourage incentive spirometry   Continue bowel regiment  Hgb 8.2, we will continue to monitor   Continue Teds, SCDs, and Lovenox for DVT ppx while in house. He will discharged on ASA 81mg BID  Discharge planning. Plan to dc to a rehab. Case Management will meet with the patient to discuss his options

## 2024-09-19 NOTE — PROGRESS NOTES
Patient:    MRN:  [id number]    Zafar Request ID:  [id number]    Level of care reserved:  Skilled Nursing Facility    Partner Reserved:  [facility], [place] [address] [phone]    Clinical needs requested:    Geography searched:  10 miles around [address]    Start of Service:    Request sent:  2:56pm EDT on 9/19/2024 by Reyna Sears    Partner reserved:  4:02pm EDT on 9/19/2024 by Reyna Sears    Choice list shared:  4:01pm EDT on 9/19/2024 by Reyna Sears

## 2024-09-20 ENCOUNTER — NURSING HOME VISIT (OUTPATIENT)
Dept: GERIATRICS | Facility: OTHER | Age: 59
End: 2024-09-20
Payer: MEDICARE

## 2024-09-20 VITALS
TEMPERATURE: 97.5 F | OXYGEN SATURATION: 94 % | SYSTOLIC BLOOD PRESSURE: 118 MMHG | HEIGHT: 69 IN | RESPIRATION RATE: 18 BRPM | BODY MASS INDEX: 28.58 KG/M2 | DIASTOLIC BLOOD PRESSURE: 73 MMHG | WEIGHT: 193 LBS | HEART RATE: 85 BPM

## 2024-09-20 DIAGNOSIS — Z96.651 STATUS POST TOTAL KNEE REPLACEMENT, RIGHT: Primary | ICD-10-CM

## 2024-09-20 DIAGNOSIS — D62 ACUTE BLOOD LOSS AS CAUSE OF POSTOPERATIVE ANEMIA: ICD-10-CM

## 2024-09-20 DIAGNOSIS — D72.823 LEUKEMOID REACTION: ICD-10-CM

## 2024-09-20 DIAGNOSIS — E78.2 MIXED HYPERLIPIDEMIA: ICD-10-CM

## 2024-09-20 DIAGNOSIS — J45.909 PERSISTENT ASTHMA WITHOUT COMPLICATION, UNSPECIFIED ASTHMA SEVERITY: ICD-10-CM

## 2024-09-20 DIAGNOSIS — Z91.89 AT RISK FOR DELIRIUM: ICD-10-CM

## 2024-09-20 DIAGNOSIS — I10 ESSENTIAL HYPERTENSION: ICD-10-CM

## 2024-09-20 DIAGNOSIS — H81.12 BENIGN PAROXYSMAL POSITIONAL VERTIGO OF LEFT EAR: ICD-10-CM

## 2024-09-20 DIAGNOSIS — I47.20 V-TACH (HCC): ICD-10-CM

## 2024-09-20 DIAGNOSIS — K59.09 OTHER CONSTIPATION: ICD-10-CM

## 2024-09-20 DIAGNOSIS — K21.9 GERD WITHOUT ESOPHAGITIS: ICD-10-CM

## 2024-09-20 DIAGNOSIS — M15.9 PRIMARY OSTEOARTHRITIS INVOLVING MULTIPLE JOINTS: ICD-10-CM

## 2024-09-20 DIAGNOSIS — E87.1 HYPONATREMIA: ICD-10-CM

## 2024-09-20 DIAGNOSIS — M1A.9XX0 CHRONIC GOUT WITHOUT TOPHUS, UNSPECIFIED CAUSE, UNSPECIFIED SITE: ICD-10-CM

## 2024-09-20 DIAGNOSIS — Z71.89 ADVANCE CARE PLANNING: ICD-10-CM

## 2024-09-20 DIAGNOSIS — G89.18 ACUTE POST-OPERATIVE PAIN: ICD-10-CM

## 2024-09-20 DIAGNOSIS — H81.10 BENIGN PAROXYSMAL POSITIONAL VERTIGO, UNSPECIFIED LATERALITY: ICD-10-CM

## 2024-09-20 PROBLEM — M15.0 PRIMARY OSTEOARTHRITIS INVOLVING MULTIPLE JOINTS: Status: ACTIVE | Noted: 2024-09-20

## 2024-09-20 LAB
ANION GAP SERPL CALCULATED.3IONS-SCNC: 9 MMOL/L (ref 4–13)
BUN SERPL-MCNC: 9 MG/DL (ref 5–25)
CALCIUM SERPL-MCNC: 8.8 MG/DL (ref 8.4–10.2)
CHLORIDE SERPL-SCNC: 96 MMOL/L (ref 96–108)
CO2 SERPL-SCNC: 26 MMOL/L (ref 21–32)
CREAT SERPL-MCNC: 0.81 MG/DL (ref 0.6–1.3)
ERYTHROCYTE [DISTWIDTH] IN BLOOD BY AUTOMATED COUNT: 12.8 % (ref 11.6–15.1)
FLUAV RNA RESP QL NAA+PROBE: NEGATIVE
FLUBV RNA RESP QL NAA+PROBE: NEGATIVE
GFR SERPL CREATININE-BSD FRML MDRD: 97 ML/MIN/1.73SQ M
GLUCOSE SERPL-MCNC: 104 MG/DL (ref 65–140)
HCT VFR BLD AUTO: 24.2 % (ref 36.5–49.3)
HGB BLD-MCNC: 8.3 G/DL (ref 12–17)
MCH RBC QN AUTO: 31.3 PG (ref 26.8–34.3)
MCHC RBC AUTO-ENTMCNC: 34.3 G/DL (ref 31.4–37.4)
MCV RBC AUTO: 91 FL (ref 82–98)
PLATELET # BLD AUTO: 198 THOUSANDS/UL (ref 149–390)
PMV BLD AUTO: 9 FL (ref 8.9–12.7)
POTASSIUM SERPL-SCNC: 3.9 MMOL/L (ref 3.5–5.3)
RBC # BLD AUTO: 2.65 MILLION/UL (ref 3.88–5.62)
RSV RNA RESP QL NAA+PROBE: NEGATIVE
SARS-COV-2 RNA RESP QL NAA+PROBE: NEGATIVE
SODIUM SERPL-SCNC: 131 MMOL/L (ref 135–147)
WBC # BLD AUTO: 7.58 THOUSAND/UL (ref 4.31–10.16)

## 2024-09-20 PROCEDURE — 0241U HB NFCT DS VIR RESP RNA 4 TRGT: CPT | Performed by: ORTHOPAEDIC SURGERY

## 2024-09-20 PROCEDURE — 99306 1ST NF CARE HIGH MDM 50: CPT | Performed by: STUDENT IN AN ORGANIZED HEALTH CARE EDUCATION/TRAINING PROGRAM

## 2024-09-20 PROCEDURE — 80048 BASIC METABOLIC PNL TOTAL CA: CPT | Performed by: PHYSICIAN ASSISTANT

## 2024-09-20 PROCEDURE — 85027 COMPLETE CBC AUTOMATED: CPT | Performed by: PHYSICIAN ASSISTANT

## 2024-09-20 RX ORDER — OXYCODONE HYDROCHLORIDE 10 MG/1
10 TABLET ORAL EVERY 4 HOURS PRN
Qty: 42 TABLET | Refills: 0 | Status: ON HOLD | OUTPATIENT
Start: 2024-09-20 | End: 2024-09-30

## 2024-09-20 RX ORDER — MECLIZINE HCL 12.5 MG 12.5 MG/1
12.5 TABLET ORAL EVERY 8 HOURS PRN
Status: ON HOLD
Start: 2024-09-20

## 2024-09-20 RX ADMIN — FEBUXOSTAT 40 MG: 40 TABLET ORAL at 08:55

## 2024-09-20 RX ADMIN — FLUTICASONE PROPIONATE AND SALMETEROL 1 PUFF: 500; 50 POWDER RESPIRATORY (INHALATION) at 09:01

## 2024-09-20 RX ADMIN — SENNOSIDES 8.6 MG: 8.6 TABLET, FILM COATED ORAL at 08:54

## 2024-09-20 RX ADMIN — FOLIC ACID 1 MG: 1 TABLET ORAL at 08:55

## 2024-09-20 RX ADMIN — GABAPENTIN 100 MG: 400 CAPSULE ORAL at 04:03

## 2024-09-20 RX ADMIN — GABAPENTIN 100 MG: 400 CAPSULE ORAL at 12:51

## 2024-09-20 RX ADMIN — ACETAMINOPHEN 975 MG: 325 TABLET ORAL at 05:31

## 2024-09-20 RX ADMIN — OXYCODONE HYDROCHLORIDE AND ACETAMINOPHEN 500 MG: 500 TABLET ORAL at 09:17

## 2024-09-20 RX ADMIN — ENOXAPARIN SODIUM 40 MG: 40 INJECTION SUBCUTANEOUS at 08:55

## 2024-09-20 RX ADMIN — LISINOPRIL 20 MG: 20 TABLET ORAL at 08:54

## 2024-09-20 RX ADMIN — DOCUSATE SODIUM 100 MG: 100 CAPSULE, LIQUID FILLED ORAL at 08:54

## 2024-09-20 RX ADMIN — OXYCODONE HYDROCHLORIDE 10 MG: 10 TABLET ORAL at 12:51

## 2024-09-20 RX ADMIN — PANTOPRAZOLE SODIUM 40 MG: 40 TABLET, DELAYED RELEASE ORAL at 05:31

## 2024-09-20 NOTE — ASSESSMENT & PLAN NOTE
No acute exacerbation  Monitor respiratory status - stable on room air  Continue breathing regimen  Consider respiratory consult if needed  Follow up with PCP, Pulmonology as appropriate

## 2024-09-20 NOTE — ASSESSMENT & PLAN NOTE
Monitor BP  Avoid hypotension  No acute cardiac complaints  Continue regimen including lisinopril, verapamil with hold parameters  Follow up with PCP, Cardiology as appropriate

## 2024-09-20 NOTE — PROGRESS NOTES
[facility]  Facility: [facility]    HISTORY AND PHYSICAL  Nursing Home Place of Service: nursing home place of service: POS 31 Skilled Care-Part A Coverage    NAME: Alexx Haney  : 1965 AGE: 59 y.o. SEX: male MRN: [id number]  DATE OF ENCOUNTER: 2024    Records Reviewed include: Hospital records    Chief Complaint/ Reason for Admission:   Status post total knee replacement, right     History of Present Illness:     Alexx Haney is a 59 y.o. male with PMH including allergies, asthma, HTN, BPPV, GERD, gout, HLD, OA, anxiety, hyponatremia  For details of hospitalization, see hospital records including discharge documentation (final summary not yet in chart as of rehab admission)  Patient was hospitalized at [facility] from  to 24  Briefly, patient hospitalized under Orthopedic service for elective R knee surgery due to symptomatic osteoarthritis; underwent right total knee arthroplasty on 24; WBAT RLE    Patient seen and examined in room  Others present: none  Patient sitting up in bed, able to reposition independently  Appears comfortable, awake, alert, oriented to situation, able to converse appropriately  Patient polite, appears in good spirits, Aox3, appears to be a reasonable historian. Notes he feels OK overall, overall better than earlier in hospital course. Notes he has been able to ambulate with walker a bit since his surgery. Motivated to participate in rehab with goal to return home to prior level of function.  R knee post-op pain variable, presently around 4-5/10, worsens with certain movements and weight bearing and when pain meds wear off. Also has chronic baseline multijoint arthritic pain (mainly ankles and knees) and chronic back pain. No acute pain anywhere else.  Breathing OK, on room air, no acute SOB/CAMP, no orthopnea, feels breathing around his baseline as long as he gets his breathing regimen.  No recent CP/palpitations. Did  "feel some intermittent orthostatic lightheadedness at times, he thinks not drinking enough water recently.  Appetite stable, no acute swallowing concerns  Urinating well without acute symptoms  Last BM several days ago he thinks on Monday. No abd pain/N/V. He is passing gas.  Does not feel acutely sick or confused  No acute cardiopulmonary, abdominal, or urinary symptoms; see ROS for more details.    No further questions or acute concerns identified.    Lab Review:  9/20: BMP with Na 131 (stable, chronic hyponatremia on review), GFR 97; CBC with WBC WNL (recent peak 10.22 on 9/18), Hb 8.3 (reduced/stable, normocytic, baseline 13-14)      Lab Results   Component Value Date    HGBA1C 5.4 08/21/2024     Lab Results   Component Value Date    LHT7TZNHBMKE 1.017 06/26/2023    TSH 0.62 07/18/2024     No results found for: \"MQDHBLEW22\"  Lab Results   Component Value Date    IRON 74 08/21/2024    TIBC 309 08/21/2024    FERRITIN 695 (H) 08/21/2024     Lab Results   Component Value Date    CHOLESTEROL 236 (H) 12/08/2022     Lab Results   Component Value Date    HDL 59 12/08/2022     Lab Results   Component Value Date    TRIG 151 (H) 12/08/2022     No results found for: \"NONHDLC\"  Lab Results   Component Value Date    LDLCALC 147 (H) 12/08/2022     XR L knee 8/14/24  Progressive marked osteoarthritis.   No acute osseous abnormality.    XR R knee 8/14/24  Progressive marked osteoarthritis.   No acute osseous abnormality.        EKG 8/22/24: NSR, 77bpm, QTc 411  Echo Dec 2023: EF 65, normal systolic/diastolic function, mild MR, mild TR        [place] PDMP reviewed 9/20/2024  No patient matching the search criteria submitted was identified       Social: Patient lives in a 2 story home with his mother (age 85). Reports being quite independent at baseline including ADLs, medications, finances, driving. Not presently working, reports being unemployed/on disability. At baseline has been using cane recently and denies recent " falls.    Lives: Home,  Social Support: family  Fall in the past 12 months: denies  Use of assistance Device: Cane    Allergies    Allergies   Allergen Reactions    Penicillins Rash and Anaphylaxis    Acetazolamide      Other reaction(s): Stomach Ache    Cephalosporins Other (See Comments)     headache    Other     Sulfa Antibiotics Other (See Comments)     Category: Allergy; Annotation - [id number]: STOMACH UPSET    Famotidine Palpitations    Levofloxacin Palpitations    Omeprazole Palpitations     Painful urination       Past Medical History  Past Medical History:   Diagnosis Date    Acute bronchitis 01/29/2024    Anxiety 01/15/2019    Arthritis     Asthma     Chronic rhinitis     last assessed 2/21/14    Chronic serous otitis media     last assessed 11/20/13    Chronic sinusitis     last assessed 8/19/14    Functional heart murmur     GERD (gastroesophageal reflux disease)     Gout     Hearing problem     last assessed 12/1/16    Hiatal hernia     Hypercholesterolemia     Hypertension     Palpitations     Testicular hypogonadism     last assessed 10/4/13    V-tach (HCC)         Past Surgical History:   Procedure Laterality Date    APPENDECTOMY      BICEPS TENDON REPAIR Left 2009    BICEPS TENODESIS      anesthesia for tenodesis- of ruptured long tendon of biceps     HERNIA REPAIR      [facility] ARTHRP KNE CONDYLE&PLATU MEDIAL&LAT COMPARTMENTS Right 9/17/2024    Procedure: ARTHROPLASTY KNEE TOTAL, potential same day discharge, cemented;  Surgeon: Parvin Whipple DO;  Location:  MAIN OR;  Service: Orthopedics    THYROIDECTOMY      TONSILLECTOMY         Family History  Family History   Problem Relation Age of Onset    Lung cancer Father     Coronary artery disease Father         blockages    Stroke Maternal Grandmother     Cancer Paternal Grandfather         metastasized    Heart disease Family     Lung cancer Cousin     No Known Problems Mother     No Known Problems Sister     No Known Problems Brother     No  Known Problems Maternal Aunt     No Known Problems Maternal Uncle     No Known Problems Paternal Aunt     No Known Problems Paternal Uncle     No Known Problems Paternal Grandmother     ADD / ADHD Neg Hx     Anesthesia problems Neg Hx     Clotting disorder Neg Hx     Collagen disease Neg Hx     Diabetes Neg Hx     Dislocations Neg Hx     Learning disabilities Neg Hx     Neurological problems Neg Hx     Osteoporosis Neg Hx     Rheumatologic disease Neg Hx     Scoliosis Neg Hx     Vascular Disease Neg Hx        Social History  Social History     Tobacco Use   Smoking Status Never   Smokeless Tobacco Never      Social History     Substance and Sexual Activity   Alcohol Use Yes    Alcohol/week: 12.0 standard drinks of alcohol    Types: 12 Shots of liquor per week    Comment: occasionally; socially      Social History     Substance and Sexual Activity   Drug Use No            Physical Exam    Vital Signs  There were no vitals filed for this visit.  Temp 98.9F  HR 88  RR 22  /63  Weight 205.3 lb  O2 100 room air      Physical Exam  Vitals reviewed.   Constitutional:       General: He is not in acute distress.     Appearance: He is not toxic-appearing or diaphoretic.   HENT:      Head: Normocephalic and atraumatic.      Right Ear: External ear normal.      Left Ear: External ear normal.      Nose: Nose normal. No rhinorrhea.      Mouth/Throat:      Mouth: Mucous membranes are dry.      Pharynx: Oropharynx is clear. No oropharyngeal exudate or posterior oropharyngeal erythema.   Eyes:      General: No scleral icterus.        Right eye: No discharge.         Left eye: No discharge.      Extraocular Movements: Extraocular movements intact.      Conjunctiva/sclera: Conjunctivae normal.      Pupils: Pupils are equal, round, and reactive to light.   Cardiovascular:      Rate and Rhythm: Normal rate and regular rhythm.   Pulmonary:      Effort: Pulmonary effort is normal. No respiratory distress.      Breath sounds: No  wheezing or rales.   Abdominal:      General: Bowel sounds are normal.      Palpations: Abdomen is soft.      Tenderness: There is no abdominal tenderness. There is no guarding.   Musculoskeletal:      Cervical back: No rigidity.      Comments: No notable LLE edema  Trace-1+ RLE edema  R knee post-op site with silver bandage   Skin:     General: Skin is warm and dry.      Coloration: Skin is not jaundiced.   Neurological:      General: No focal deficit present.      Mental Status: He is alert and oriented to person, place, and time. Mental status is at baseline.   Psychiatric:         Mood and Affect: Mood normal.         Behavior: Behavior normal.         Thought Content: Thought content normal.         Judgment: Judgment normal.      Comments: Speech somewhat pressured/rapid         Review of Systems:  Review of Systems   Constitutional:  Negative for appetite change, chills, diaphoresis and fever.   HENT:  Negative for drooling, ear pain, hearing loss, rhinorrhea, sore throat and trouble swallowing.    Eyes:  Negative for pain, discharge, redness, itching and visual disturbance.   Respiratory:  Negative for cough, chest tightness, shortness of breath and wheezing.    Cardiovascular:  Positive for leg swelling. Negative for chest pain and palpitations.   Gastrointestinal:  Positive for constipation. Negative for abdominal pain, blood in stool, diarrhea, nausea and vomiting.   Genitourinary:  Negative for difficulty urinating, dysuria, flank pain and hematuria.   Musculoskeletal:  Positive for arthralgias, back pain (chronic) and gait problem. Negative for neck pain.   Skin:  Negative for color change.   Neurological:  Positive for weakness. Negative for dizziness (none acute but has chronic intermittent), tremors, seizures, facial asymmetry and headaches.   Psychiatric/Behavioral:  Negative for agitation, behavioral problems and confusion. The patient is not nervous/anxious and is not hyperactive.        List of  Current Medications:  Current Outpatient Medications   Medication Instructions    acetaminophen (TYLENOL) 1,000 mg, Oral, Every 8 hours, 1500 at times    albuterol (PROVENTIL HFA,VENTOLIN HFA) 90 mcg/act inhaler 1 puff, Inhalation, As needed    albuterol 2.5 mg, Nebulization, Every 6 hours PRN    ascorbic acid (VITAMIN C) 500 mg, Oral, Daily, Begin 30 days prior to surgery.    aspirin 325 mg, Oral, 2 times daily, Take with food.    Chlorpheniramine Maleate (CHLOR-TRIMETON ALLERGY PO) Oral, As needed    clotrimazole (LOTRIMIN) 1 % cream Topical, 2 times daily    docusate sodium (COLACE) 100 mg, Oral, 2 times daily    esomeprazole (NEXIUM) 40 mg, Oral, Every morning before breakfast    febuxostat (ULORIC) 40 mg, Oral, Daily    ferrous sulfate 324 mg, Oral, Daily before breakfast, Begin 30 days prior to surgery.    fluticasone-salmeterol (Advair) 500-50 mcg/dose inhaler 1 puff, Inhalation, 2 times daily    folic acid (FOLVITE) 1 mg, Oral, Daily, Begin 30 days prior to surgery.    gabapentin (NEURONTIN) 100 mg, Oral, 3 times daily    ipratropium-albuterol (DUO-NEB) 0.5-2.5 mg/3 mL 3 mL, Inhalation, As needed    lisinopril (ZESTRIL) 20 mg, Oral, Daily    meclizine (ANTIVERT) 12.5 mg, Oral, Every 8 hours PRN    montelukast (SINGULAIR) 10 mg, Oral, Daily    Multiple Vitamin (multivitamin) tablet 1 tablet, Oral, Daily, Begin 30 days prior to surgery.    oxyCODONE (ROXICODONE) 10 mg, Oral, Every 4 hours PRN    promethazine (PHENERGAN) 12.5 mg, Oral, Every 6 hours PRN    Triamcinolone Acetonide (NASACORT ALLERGY 24HR NA) 1 spray, Nasal, Daily    verapamil (CALAN) 120 mg tablet TAKE 1 TABLET BY MOUTH EVERY DAY         Medication reviewed. All orders signed. Complete list is in the paper chart.     Allergies    Allergies   Allergen Reactions    Penicillins Rash and Anaphylaxis    Acetazolamide      Other reaction(s): Stomach Ache    Cephalosporins Other (See Comments)     headache    Other     Sulfa Antibiotics Other (See  Comments)     Category: Allergy; Annotation - [id number]: STOMACH UPSET    Famotidine Palpitations    Levofloxacin Palpitations    Omeprazole Palpitations     Painful urination       Labs/Diagnostics (reviewed by this provider):     I personally reviewed lab results and imaging studies. Full reports are in the paper chart.     Assessment/Plan:    Advance care planning  HCP and code status discussion as per note      At risk for delirium  Delirium precautions  -Patient is high risk of delirium due to age, comorbidities, hospitalization/unfamiliar environment  -delirium precautions  -maintain normal sleep/wake cycle  -minimize overnight interruptions, group overnight vitals/labs/nursing checks as possible  -dim lights, close blinds and turn off tv to minimize stimulation and encourage sleep environment in evenings  -ensure that pain is well controlled  -monitor for fecal and urinary retention which may precipitate delirium  -encourage early mobilization and ambulation  -provide frequent reorientation and redirection  -encourage family and friends at the bedside to help help calm patient if anxious  -avoid medications which may precipitate or worsen delirium such as tramadol, benzodiazepine, anticholinergics, and benadryl  -encourage hydration and nutrition   -redirect unwanted behaviors as first line, avoid physical restraints, use chemical restraint only if all other attempts have been unsuccessful      Acute post-operative pain  Monitor pain  Current regimen including: tylenol 1000mg TID, gabapentin 100mg TID, oxycodone 5/10mg q4hr PRN, methocarbamol 250mg TID PRN, topical ice  Adjust/wean regimen as appropriate. As of 9/20 added lower dose oxycodone option with goal to wean as much as possible while at rehab and also added low dose methocarbamol PRN due to associated muscle spasms    Essential hypertension  Monitor BP  Avoid hypotension  No acute cardiac complaints  Continue regimen including lisinopril, verapamil  with hold parameters  Follow up with PCP, Cardiology as appropriate      V-tach (HCC)  Noted history  No present acute cardiac complaints  Continue cardiac regimen. Does not appear to be on beta blocker at baseline, monitor for appropriateness  Following with Cardiology outpatient    Asthma  No acute exacerbation  Monitor respiratory status - stable on room air  Continue breathing regimen  Consider respiratory consult if needed  Follow up with PCP, Pulmonology as appropriate    GERD without esophagitis  -stable  -recommend OOB with meals, sit upright for at least 30 minutes afterwards, avoid trigger foods  -continue to monitor  -follow up with PCP, GI as appropriate  -continue PPI    Benign paroxysmal positional vertigo  Stable, continue meclizine (made PRN as patient does not take scheduled at baseline)    Primary osteoarthritis involving multiple joints  Noted chronic hx of arthritis  Per patient affecting multiple joints including bilateral knees and ankles as well as low back  Pain control as appropriate  PT/OT  Follow up with PCP, Ortho/Pain Management as appropriate    Status post total knee replacement, right  underwent right total knee arthroplasty on 9/17/24  Weight-bearing status: WBAT RLE  Pain control as appropriate  DVT ppx: lovenox at rehab; anticipate transition to aspirin BID on discharge to complete ~28 days post-op DVT ppx as per Orthopedic recommendation  PT/OT  Follow up with PCP, Orthopedics as appropriate          Gout  Stable on febuxostat, no recent/acute flare  (Used to be on allopurinol but reports he no longer takes that)  Follow up with PCP    Hyperlipidemia  Encourage lifestyle modifications including healthy diet, weight management, exercise as appropriate  Previously used to be on rosuvastatin but appears was stopped/discontinued some time ago. Defer to PCP  Follow up with PCP    Hyponatremia  Appears to have chronic hyponatremia in recent years on lab review  Appears  asymptomatic  Monitor on routine labs  Consider further workup, supplementation, Nephro consult if persistent/worsening    Other constipation  At risk due to hospitalization, relative immobility, comorbidities, pain medications  Monitor stool output - last BM several days ago  Bowel regimen at facility: miralax, docusate, senna regimen; adjust as appropriate; consider bisacodyl suppository PRN if no BM in 2-3 days  Encourage mobility as tolerated, PO hydration as appropriate, high fiber diet/prune juice (in outpatient setting as appropriate)  Goal is for 1 easy BM every 1-2 days      Leukemoid reaction  WBC recent peak 10.22 on 9/18  Likely reactive in setting of knee surgery  Monitor on routine labs - improved to WNL so far  Monitor for acute infectious symptoms - no present symptoms      Acute blood loss as cause of postoperative anemia  Baseline Hb around 13-14  Acutely lower in post-op setting  Normocytic though likely with low iron due to blood loss  Continue oral iron  -monitor on routine labs  -monitor for acute bleed - no present signs  -consider further workup, Heme consult if persistent/worsening  -transfuse PRN Hb <7       Pain: fluctuating around R knee post-op  Rehab Potential:Fair  Patient Informed of Medical Condition: yes   Patient is Capable of Understanding Their Right: yes   Discharge Plan: home  Vaccination:   Immunization History   Administered Date(s) Administered    COVID-19 PFIZER VACCINE 0.3 ML IM 07/23/2021, 08/13/2021    COVID-19 Pfizer vac (Basilio-sucrose, gray cap) 12 yr+ IM 07/29/2022    INFLUENZA 11/01/2003, 10/28/2005, 10/23/2006, 10/29/2007, 10/29/2008, 10/29/2008, 10/23/2009, 10/23/2009, 10/29/2012, 10/20/2015, 11/04/2016, 10/09/2019, 11/05/2020, 10/18/2021, 10/25/2022, 11/08/2023    Influenza Quadrivalent Preservative Free 3 years and older IM 10/20/2015    Influenza Quadrivalent, 6-35 Months IM 10/29/2014, 11/04/2016    Influenza, injectable, quadrivalent, preservative free 0.5 mL  10/02/2018, 11/08/2023    Influenza, recombinant, quadrivalent,injectable, preservative free 10/09/2019, 11/05/2020, 10/18/2021, 10/25/2022    Influenza, seasonal, injectable 10/06/2010, 10/06/2010, 10/11/2011, 10/11/2011, 09/27/2012, 09/27/2012, 11/06/2013, 11/15/2017    Td (adult), adsorbed 02/14/2006, 02/14/2006    Tdap 03/28/2023       DVT ppx: lovenox at rehab; anticipate transition to aspirin BID on discharge to complete ~28 days post-op DVT ppx as per Orthopedic recommendation    Advanced Directives: patient verbalizes HCP as aleer Valentin Haney  Code status:Full Code Extensive discussion/education with patient today regarding their wishes with respect to resuscitative measures; discussed as appropriate potential risks vs benefits of resuscitative measures; patient verbalizes understanding, appears to have capacity to make this decision presently, and clearly verbalizes a desire for Full Code  PCP: Jayme      -Total time spent on this encounter today including documentation and workup review, face to face time, history and exam, and documentation/orders was approximately 70 minutes.  -This note will be copied to [facility] EMR where vitals and medication orders are placed.    Siva Kessler D.O.  Geriatric Medicine  9/20/2024 3:08 PM

## 2024-09-20 NOTE — ASSESSMENT & PLAN NOTE
WBC recent peak 10.22 on 9/18  Likely reactive in setting of knee surgery  Monitor on routine labs - improved to WNL so far  Monitor for acute infectious symptoms - no present symptoms

## 2024-09-20 NOTE — CASE MANAGEMENT
Case Management Discharge Planning Note    Patient name Alexx Haney  Location 7[facility] 714/7[facility] 714-01 MRN [id number]  : 1965 Date 2024       Current Admission Date: 2024  Current Admission Diagnosis:Status post total knee replacement, right   Patient Active Problem List    Diagnosis Date Noted Date Diagnosed    Status post total knee replacement, right 2024     Class 1 obesity due to excess calories without serious comorbidity with body mass index (BMI) of 30.0 to 30.9 in adult 2024     Hyperglycemia 2023     Hyponatremia 2023     Bilateral primary osteoarthritis of knee 2023     Effusion of right knee 08/15/2022     Tinea corporis 2022     Dysfunction of left eustachian tube 10/05/2020     Epidermoid cyst 2020     Abnormal liver function test 2020     Chronic pain of both knees 2019     Anxiety 01/15/2019     Benign paroxysmal positional vertigo 11/15/2017     Gout 2017     Chronic bilateral low back pain without sciatica 2017     Thyroid disorder 2016     Tenosynovitis of foot 10/28/2016     Primary localized osteoarthritis of left knee 2016     Primary localized osteoarthritis of right knee 2016     Allergic rhinitis 2016     GERD without esophagitis 2016     V-tach (HCC) 2015     Essential hypertension 2015     DJD (degenerative joint disease) of knee 2015     Asthma 10/04/2013     Hyperlipidemia 10/04/2013       LOS (days): 3  Geometric Mean LOS (GMLOS) (days):   Days to GMLOS:     OBJECTIVE:  Risk of Unplanned Readmission Score: 9.38         Current admission status: Inpatient   Preferred Pharmacy:   [facility]/pharmacy #[id number] - [address] [address]  Phone: [phone] Fax: [phone]    Primary Care Provider: Javier Delacruz MD    Primary Insurance: MEDICARE  Secondary Insurance:     DISCHARGE DETAILS:    Discharge  planning discussed with:: Patient  Freedom of Choice: Yes     CM contacted family/caregiver?: No- see comments (AAOx3 declines call to EC)  Were Treatment Team discharge recommendations reviewed with patient/caregiver?: Yes  Did patient/caregiver verbalize understanding of patient care needs?: N/A- going to facility    Other Referral/Resources/Interventions Provided:  Interventions: Short Term Rehab      Treatment Team Recommendation: Short Term Rehab  Discharge Destination Plan:: Short Term Rehab  Transport at Discharge : Other (Comment) (Staff via WC)        Additional Comments: Met with patient at bedside and discussed discharge.  Medically cleared for TCU.  Patient will update family.  Patient will be transported via WC by T[facility] staff    Accepting Facility Name, City & State : \[facility]\"" [facility]  Receiving Facility/Agency Phone Number: [phone],  Facility/Agency Fax Number: [phone]

## 2024-09-20 NOTE — ASSESSMENT & PLAN NOTE
Noted chronic hx of arthritis  Per patient affecting multiple joints including bilateral knees and ankles as well as low back  Pain control as appropriate  PT/OT  Follow up with PCP, Ortho/Pain Management as appropriate

## 2024-09-20 NOTE — ASSESSMENT & PLAN NOTE
underwent right total knee arthroplasty on 9/17/24  Weight-bearing status: WBAT RLE  Pain control as appropriate  DVT ppx: lovenox at rehab; anticipate transition to aspirin BID on discharge to complete ~28 days post-op DVT ppx as per Orthopedic recommendation  PT/OT  Follow up with PCP, Orthopedics as appropriate

## 2024-09-20 NOTE — PLAN OF CARE
Problem: PAIN - ADULT  Goal: Verbalizes/displays adequate comfort level or baseline comfort level  Description: Interventions:  - Encourage patient to monitor pain and request assistance  - Assess pain using appropriate pain scale  - Administer analgesics based on type and severity of pain and evaluate response  - Implement non-pharmacological measures as appropriate and evaluate response  - Consider cultural and social influences on pain and pain management  - Notify physician/advanced practitioner if interventions unsuccessful or patient reports new pain  Outcome: Progressing     Problem: INFECTION - ADULT  Goal: Absence or prevention of progression during hospitalization  Description: INTERVENTIONS:  - Assess and monitor for signs and symptoms of infection  - Monitor lab/diagnostic results  - Monitor all insertion sites, i.e. indwelling lines, tubes, and drains  - Monitor endotracheal if appropriate and nasal secretions for changes in amount and color  - [place] appropriate cooling/warming therapies per order  - Administer medications as ordered  - Instruct and encourage patient and family to use good hand hygiene technique  - Identify and instruct in appropriate isolation precautions for identified infection/condition  Outcome: Progressing  Goal: Absence of fever/infection during neutropenic period  Description: INTERVENTIONS:  - Monitor WBC    Outcome: Progressing     Problem: SAFETY ADULT  Goal: Patient will remain free of falls  Description: INTERVENTIONS:  - Educate patient/family on patient safety including physical limitations  - Instruct patient to call for assistance with activity   - Consult OT/PT to assist with strengthening/mobility   - Keep Call bell within reach  - Keep bed low and locked with side rails adjusted as appropriate  - Keep care items and personal belongings within reach  - Initiate and maintain comfort rounds  - Make Fall Risk Sign visible to staff  - Apply yellow socks and bracelet  for high fall risk patients  - Consider moving patient to room near nurses station  Outcome: Progressing  Goal: Maintain or return to baseline ADL function  Description: INTERVENTIONS:  -  Assess patient's ability to carry out ADLs; assess patient's baseline for ADL function and identify physical deficits which impact ability to perform ADLs (bathing, care of mouth/teeth, toileting, grooming, dressing, etc.)  - Assess/evaluate cause of self-care deficits   - Assess range of motion  - Assess patient's mobility; develop plan if impaired  - Assess patient's need for assistive devices and provide as appropriate  - Encourage maximum independence but intervene and supervise when necessary  - Involve family in performance of ADLs  - Assess for home care needs following discharge   - Consider OT consult to assist with ADL evaluation and planning for discharge  - Provide patient education as appropriate  Outcome: Progressing  Goal: Maintains/Returns to pre admission functional level  Description: INTERVENTIONS:  - Perform AM-PAC 6 Click Basic Mobility/ Daily Activity assessment daily.  - Set and communicate daily mobility goal to care team and patient/family/caregiver.   - Collaborate with rehabilitation services on mobility goals if consulted  - Out of bed for toileting  - Record patient progress and toleration of activity level   Outcome: Progressing     Problem: DISCHARGE PLANNING  Goal: Discharge to home or other facility with appropriate resources  Description: INTERVENTIONS:  - Identify barriers to discharge w/patient and caregiver  - Arrange for needed discharge resources and transportation as appropriate  - Identify discharge learning needs (meds, wound care, etc.)  - Arrange for interpretive services to assist at discharge as needed  - Refer to Case Management Department for coordinating discharge planning if the patient needs post-hospital services based on physician/advanced practitioner order or complex needs  related to functional status, cognitive ability, or social support system  Outcome: Progressing     Problem: Knowledge Deficit  Goal: Patient/family/caregiver demonstrates understanding of disease process, treatment plan, medications, and discharge instructions  Description: Complete learning assessment and assess knowledge base.  Interventions:  - Provide teaching at level of understanding  - Provide teaching via preferred learning methods  Outcome: Progressing     Problem: SKIN/TISSUE INTEGRITY - ADULT  Goal: Incision(s), wounds(s) or drain site(s) healing without S/S of infection  Description: INTERVENTIONS  - Assess and document dressing, incision, wound bed, drain sites and surrounding tissue  - Provide patient and family education  Outcome: Progressing     Problem: MUSCULOSKELETAL - ADULT  Goal: Maintain or return mobility to safest level of function  Description: INTERVENTIONS:  - Assess patient's ability to carry out ADLs; assess patient's baseline for ADL function and identify physical deficits which impact ability to perform ADLs (bathing, care of mouth/teeth, toileting, grooming, dressing, etc.)  - Assess/evaluate cause of self-care deficits   - Assess range of motion  - Assess patient's mobility  - Assess patient's need for assistive devices and provide as appropriate  - Encourage maximum independence but intervene and supervise when necessary  - Involve family in performance of ADLs  - Assess for home care needs following discharge   - Consider OT consult to assist with ADL evaluation and planning for discharge  - Provide patient education as appropriate  Outcome: Progressing  Goal: Maintain proper alignment of affected body part  Description: INTERVENTIONS:  - Support, maintain and protect limb and body alignment  - Provide patient/ family with appropriate education  Outcome: Progressing

## 2024-09-20 NOTE — NURSING NOTE
IV removed with tip intact. Pressure held to control bleeding. Discharge instructions discussed with patient and verbalizes understanding. Report called to Rhonda on [facility]. He left with all their belongings, and discharge instructions.

## 2024-09-20 NOTE — ASSESSMENT & PLAN NOTE
Monitor pain  Current regimen including: tylenol 1000mg TID, gabapentin 100mg TID, oxycodone 5/10mg q4hr PRN, methocarbamol 250mg TID PRN, topical ice  Adjust/wean regimen as appropriate. As of 9/20 added lower dose oxycodone option with goal to wean as much as possible while at rehab and also added low dose methocarbamol PRN due to associated muscle spasms

## 2024-09-20 NOTE — ASSESSMENT & PLAN NOTE
Noted history  No present acute cardiac complaints  Continue cardiac regimen. Does not appear to be on beta blocker at baseline, monitor for appropriateness  Following with Cardiology outpatient

## 2024-09-20 NOTE — ASSESSMENT & PLAN NOTE
At risk due to hospitalization, relative immobility, comorbidities, pain meds  Monitor stool output  Bowel regimen at facility: miralax/docusate/senna as appropriate; consider bisacodyl suppository PRN if no BM in 2-3 days  Encourage mobility as tolerated, PO hydration as appropriate, high fiber diet/prune juice (in outpatient setting as appropriate)  Goal is for 1 easy BM every 1-2 days

## 2024-09-20 NOTE — ASSESSMENT & PLAN NOTE
Stable on febuxostat, no recent/acute flare  (Used to be on allopurinol but reports he no longer takes that)  Follow up with PCP

## 2024-09-20 NOTE — ASSESSMENT & PLAN NOTE
At risk due to hospitalization, relative immobility, comorbidities, pain medications  Monitor stool output - last BM several days ago  Bowel regimen at facility: miralax, docusate, senna regimen; adjust as appropriate; consider bisacodyl suppository PRN if no BM in 2-3 days  Encourage mobility as tolerated, PO hydration as appropriate, high fiber diet/prune juice (in outpatient setting as appropriate)  Goal is for 1 easy BM every 1-2 days

## 2024-09-20 NOTE — ASSESSMENT & PLAN NOTE
Appears to have chronic hyponatremia in recent years on lab review  Appears asymptomatic  Monitor on routine labs  Consider further workup, supplementation, Nephro consult if persistent/worsening

## 2024-09-20 NOTE — ASSESSMENT & PLAN NOTE
Encourage lifestyle modifications including healthy diet, weight management, exercise as appropriate  Previously used to be on rosuvastatin but appears was stopped/discontinued some time ago. Defer to PCP  Follow up with PCP

## 2024-09-20 NOTE — ASSESSMENT & PLAN NOTE
Baseline Hb around 13-14  Acutely lower in post-op setting  Normocytic though likely with low iron due to blood loss  Continue oral iron  -monitor on routine labs  -monitor for acute bleed - no present signs  -consider further workup, Heme consult if persistent/worsening  -transfuse PRN Hb <7

## 2024-09-23 ENCOUNTER — NURSING HOME VISIT (OUTPATIENT)
Dept: GERIATRICS | Facility: OTHER | Age: 59
End: 2024-09-23
Payer: MEDICARE

## 2024-09-23 DIAGNOSIS — J45.909 PERSISTENT ASTHMA WITHOUT COMPLICATION, UNSPECIFIED ASTHMA SEVERITY: ICD-10-CM

## 2024-09-23 DIAGNOSIS — G89.18 ACUTE POST-OPERATIVE PAIN: ICD-10-CM

## 2024-09-23 DIAGNOSIS — D62 ACUTE BLOOD LOSS AS CAUSE OF POSTOPERATIVE ANEMIA: ICD-10-CM

## 2024-09-23 DIAGNOSIS — I10 ESSENTIAL HYPERTENSION: ICD-10-CM

## 2024-09-23 DIAGNOSIS — E87.1 HYPONATREMIA: ICD-10-CM

## 2024-09-23 DIAGNOSIS — Z96.651 STATUS POST TOTAL KNEE REPLACEMENT, RIGHT: Primary | ICD-10-CM

## 2024-09-23 PROCEDURE — 99309 SBSQ NF CARE MODERATE MDM 30: CPT | Performed by: STUDENT IN AN ORGANIZED HEALTH CARE EDUCATION/TRAINING PROGRAM

## 2024-09-23 PROCEDURE — 88305 TISSUE EXAM BY PATHOLOGIST: CPT | Performed by: PATHOLOGY

## 2024-09-23 NOTE — PROGRESS NOTES
[facility]  Facility: [facility]    PROGRESS NOTE  Nursing Home Place of Service: nursing home place of service: [facility] Coverage    NAME: Alexx Haney  : 1965 AGE: 59 y.o. SEX: male MRN: [id number]  DATE OF ENCOUNTER: 2024    Assessment and Plan     Problem List Items Addressed This Visit       Asthma     No acute exacerbation  Monitor respiratory status - stable on room air  Continue breathing regimen. Clarified with patient  he uses albuterol nebulizer treatment every morning at baseline and prefers to keep duonebs PRN, have ordered at rehab  Continue montelukast  Consider respiratory consult if needed  Follow up with PCP, Pulmonology as appropriate         Essential hypertension     Monitor BP - generally stable borderline soft around 100s-120s systolic  Avoid hypotension  No acute cardiac complaints  Continue regimen including lisinopril 10mg daily (reduced from 20mg as of ), verapamil 120mg daily with hold parameters  Follow up with PCP, Cardiology as appropriate           Hyponatremia     Appears to have chronic hyponatremia in recent years on lab review  Appears asymptomatic  Monitor on routine labs  Consider further workup, supplementation, Nephro consult if worsening or associated symptoms         Status post total knee replacement, right - Primary     underwent right total knee arthroplasty on 24  Weight-bearing status: WBAT RLE  Pain control as appropriate  DVT ppx: lovenox at rehab; anticipate transition to aspirin BID on discharge to complete ~28 days post-op DVT ppx as per Orthopedic recommendation  PT/OT  Have ordered gentle compression/ACE wrap of RLE to assist with post-op edema  Follow up with PCP, Orthopedics as appropriate         Acute post-operative pain     Monitor pain  Current regimen including: tylenol 1000mg TID, gabapentin 100mg TID, oxycodone 5/10mg q4hr PRN, methocarbamol 250mg TID PRN,  lidocaine patch, topical ice  Adjust/wean regimen as appropriate. As of 9/20 added lower dose oxycodone option with goal to wean as much as possible while at rehab and also added low dose methocarbamol PRN due to associated muscle spasms. As of 9/23 patient not ready for weaning yet, added topical lidocaine patch         Acute blood loss as cause of postoperative anemia     Baseline Hb around 13-14  Acutely lower in post-op setting  Normocytic though likely with low iron due to blood loss  Continue oral iron  -monitor on routine labs  -monitor for acute bleed - no present signs  -consider further workup, Heme consult if persistent/worsening  -transfuse PRN Hb <7  -low threshold to hold DVT ppx if issue worsens                Chief Complaint     Follow up post-op    History of Present Illness     Alexx Haney is a 59 y.o. male who was seen today for follow up. PMH including allergies, asthma, HTN, BPPV, GERD, gout, HLD, OA, anxiety, hyponatremia     Observed patient ambulating in [place] with walker with therapy today.    Patient seen and examined in room  Others present: none  Patient sitting up in bed, able to reposition independently  Appears comfortable, awake, alert, oriented to situation, able to converse appropriately  Patient polite, appears in good spirits, Aox3, appears to be a reasonable historian, mentation seems stable compared to my prior visit. Notes he is doing well at rehab, overall better than last week.  R knee post-op pain overall stable and gradually improving, overall a bit better than last week but still up to severe level at times, worsens with certain movements and weight bearing and when pain meds wear off. Feels his pain is controlled on present regimen and also tolerable on ambulation with therapy. (Also has chronic baseline multijoint arthritic pain (mainly ankles and knees) and chronic back pain). No acute pain anywhere else.  Breathing OK, on room air, no acute SOB/CAMP, no orthopnea,  "feels breathing at his baseline.  No recent CP/palpitations or acute orthostatic lightheadedness (though some mild/intermittent orthostasis).  Appetite stable, no acute swallowing concerns  Urinating well without acute symptoms  Last BM Saturday (~2 days ago). No abd pain/N/V. He is passing gas.  Does not feel acutely sick or confused  No acute cardiopulmonary, abdominal, or urinary symptoms; see ROS for more details.     No further questions or acute concerns identified.     Lab Review:  9/20: BMP with Na 131 (stable, chronic hyponatremia on review), GFR 97; CBC with WBC WNL (recent peak 10.22 on 9/18), Hb 8.3 (reduced/stable, normocytic, baseline 13-14)  9/23: BMP with Na 128, GFR >100; CBC with Hb 7.9              Lab Results   Component Value Date     HGBA1C 5.4 08/21/2024            Lab Results   Component Value Date     OVP7RYLWMVTY 1.017 06/26/2023     TSH 0.62 07/18/2024      No results found for: \"VRHVILVG41\"        Lab Results   Component Value Date     IRON 74 08/21/2024     TIBC 309 08/21/2024     FERRITIN 695 (H) 08/21/2024            Lab Results   Component Value Date     CHOLESTEROL 236 (H) 12/08/2022            Lab Results   Component Value Date     HDL 59 12/08/2022            Lab Results   Component Value Date     TRIG 151 (H) 12/08/2022      No results found for: \"NONHDLC\"        Lab Results   Component Value Date     LDLCALC 147 (H) 12/08/2022      XR L knee 8/14/24  Progressive marked osteoarthritis.   No acute osseous abnormality.     XR R knee 8/14/24  Progressive marked osteoarthritis.   No acute osseous abnormality.           EKG 8/22/24: NSR, 77bpm, QTc 411  Echo Dec 2023: EF 65, normal systolic/diastolic function, mild MR, mild TR           [place] reviewed 9/20/2024  No patient matching the search criteria submitted was identified        The following portions of the patient's history were reviewed and updated as appropriate: allergies, current medications, past family history, past " medical history, past social history, past surgical history and problem list.    Review of Systems     Review of Systems   Constitutional:  Negative for appetite change, chills, diaphoresis and fever.   HENT:  Negative for drooling, ear pain, hearing loss, rhinorrhea, sore throat and trouble swallowing.    Eyes:  Negative for pain, discharge, redness, itching and visual disturbance.   Respiratory:  Negative for cough, chest tightness, shortness of breath and wheezing.    Cardiovascular:  Positive for leg swelling. Negative for chest pain and palpitations.   Gastrointestinal:  Positive for constipation (improved). Negative for abdominal pain, blood in stool, diarrhea, nausea and vomiting.   Genitourinary:  Negative for difficulty urinating, dysuria, flank pain and hematuria.   Musculoskeletal:  Positive for arthralgias (gradually improving post-op), back pain (chronic) and gait problem. Negative for neck pain.   Skin:  Negative for color change.   Neurological:  Positive for weakness (gradually improving). Negative for dizziness (none acute but has chronic intermittent), tremors, seizures, facial asymmetry and headaches.   Psychiatric/Behavioral:  Negative for agitation, behavioral problems and confusion. The patient is not nervous/anxious and is not hyperactive.        Active Problem List     Patient Active Problem List   Diagnosis    Allergic rhinitis    Asthma    Essential hypertension    Benign paroxysmal positional vertigo    Chronic bilateral low back pain without sciatica    DJD (degenerative joint disease) of knee    GERD without esophagitis    Gout    Hyperlipidemia    Primary localized osteoarthritis of left knee    Primary localized osteoarthritis of right knee    Tenosynovitis of foot    Thyroid disorder    V-tach (HCC)    Anxiety    Chronic pain of both knees    Abnormal liver function test    Epidermoid cyst    Dysfunction of left eustachian tube    Tinea corporis    Effusion of right knee    Bilateral  primary osteoarthritis of knee    Hyponatremia    Hyperglycemia    Class 1 obesity due to excess calories without serious comorbidity with body mass index (BMI) of 30.0 to 30.9 in adult    Status post total knee replacement, right    Advance care planning    At risk for constipation    At risk for delirium    Acute post-operative pain    Primary osteoarthritis involving multiple joints    Other constipation    Leukemoid reaction    Acute blood loss as cause of postoperative anemia       Objective     Vital Signs:     BP: 128/65 mmHg  9/23/2024 15:18   Temp:97.3 °F  9/23/2024 15:18 Pulse:78 bpm  9/23/2024 15:18 Weight:202.6 Lbs  9/21/2024 05:37   Resp:18 Breaths/min  9/23/2024 15:18 BS: O2:93 %  9/23/2024 15:21 Pain:8  9/23/2024 10:31       Physical Exam  Vitals reviewed.   Constitutional:       General: He is not in acute distress.     Appearance: He is not toxic-appearing or diaphoretic.   HENT:      Head: Normocephalic and atraumatic.      Right Ear: External ear normal.      Left Ear: External ear normal.      Nose: Nose normal. No rhinorrhea.      Mouth/Throat:      Mouth: Mucous membranes are dry.      Pharynx: Oropharynx is clear. No oropharyngeal exudate or posterior oropharyngeal erythema.   Eyes:      General: No scleral icterus.        Right eye: No discharge.         Left eye: No discharge.      Extraocular Movements: Extraocular movements intact.      Conjunctiva/sclera: Conjunctivae normal.      Pupils: Pupils are equal, round, and reactive to light.   Cardiovascular:      Rate and Rhythm: Normal rate and regular rhythm.   Pulmonary:      Effort: Pulmonary effort is normal. No respiratory distress.      Breath sounds: No wheezing or rales.   Abdominal:      General: Bowel sounds are normal.      Palpations: Abdomen is soft.      Tenderness: There is no abdominal tenderness. There is no guarding.   Musculoskeletal:      Cervical back: No rigidity.      Comments: No notable LLE edema  1+ RLE edema  R knee  post-op site with silver bandage appearing clean/dry; some surrounding brusing   Skin:     General: Skin is warm and dry.      Coloration: Skin is not jaundiced.   Neurological:      General: No focal deficit present.      Mental Status: He is alert and oriented to person, place, and time. Mental status is at baseline.   Psychiatric:         Mood and Affect: Mood normal.         Behavior: Behavior normal.         Thought Content: Thought content normal.         Judgment: Judgment normal.      Comments: Speech somewhat pressured/rapid         Pertinent Laboratory/Diagnostic Studies:  Laboratory and Imaging studies reviewed. Full report in the paper chart.     Current Medications   Medications reviewed and updated in facility chart.      -Total time spent on this encounter today including documentation and workup review, face to face time, history and exam, and documentation/orders was approximately 40 minutes.  -This note will be copied to [facility] EMR where vitals and medication orders are placed.    Siva Kessler D.O.  Geriatric Medicine  9/23/2024 4:28 PM

## 2024-09-23 NOTE — ASSESSMENT & PLAN NOTE
Monitor BP - generally stable borderline soft around 100s-120s systolic  Avoid hypotension  No acute cardiac complaints  Continue regimen including lisinopril 10mg daily (reduced from 20mg as of 9/23), verapamil 120mg daily with hold parameters  Follow up with PCP, Cardiology as appropriate

## 2024-09-23 NOTE — ASSESSMENT & PLAN NOTE
No acute exacerbation  Monitor respiratory status - stable on room air  Continue breathing regimen. Clarified with patient 9/23 he uses albuterol nebulizer treatment every morning at baseline and prefers to keep duonebs PRN, have ordered at rehab  Continue montelukast  Consider respiratory consult if needed  Follow up with PCP, Pulmonology as appropriate

## 2024-09-23 NOTE — ASSESSMENT & PLAN NOTE
Monitor pain  Current regimen including: tylenol 1000mg TID, gabapentin 100mg TID, oxycodone 5/10mg q4hr PRN, methocarbamol 250mg TID PRN, lidocaine patch, topical ice  Adjust/wean regimen as appropriate. As of 9/20 added lower dose oxycodone option with goal to wean as much as possible while at rehab and also added low dose methocarbamol PRN due to associated muscle spasms. As of 9/23 patient not ready for weaning yet, added topical lidocaine patch

## 2024-09-23 NOTE — ASSESSMENT & PLAN NOTE
Appears to have chronic hyponatremia in recent years on lab review  Appears asymptomatic  Monitor on routine labs  Consider further workup, supplementation, Nephro consult if worsening or associated symptoms

## 2024-09-23 NOTE — ASSESSMENT & PLAN NOTE
Baseline Hb around 13-14  Acutely lower in post-op setting  Normocytic though likely with low iron due to blood loss  Continue oral iron  -monitor on routine labs  -monitor for acute bleed - no present signs  -consider further workup, Heme consult if persistent/worsening  -transfuse PRN Hb <7  -low threshold to hold DVT ppx if issue worsens

## 2024-09-23 NOTE — ASSESSMENT & PLAN NOTE
underwent right total knee arthroplasty on 9/17/24  Weight-bearing status: WBAT RLE  Pain control as appropriate  DVT ppx: lovenox at rehab; anticipate transition to aspirin BID on discharge to complete ~28 days post-op DVT ppx as per Orthopedic recommendation  PT/OT  Have ordered gentle compression/ACE wrap of RLE to assist with post-op edema  Follow up with PCP, Orthopedics as appropriate

## 2024-09-25 ENCOUNTER — NURSING HOME VISIT (OUTPATIENT)
Age: 59
End: 2024-09-25
Payer: MEDICARE

## 2024-09-25 VITALS
BODY MASS INDEX: 28.68 KG/M2 | WEIGHT: 197 LBS | DIASTOLIC BLOOD PRESSURE: 64 MMHG | TEMPERATURE: 98.1 F | SYSTOLIC BLOOD PRESSURE: 127 MMHG | HEART RATE: 72 BPM | OXYGEN SATURATION: 98 % | RESPIRATION RATE: 21 BRPM

## 2024-09-25 DIAGNOSIS — D62 ACUTE BLOOD LOSS AS CAUSE OF POSTOPERATIVE ANEMIA: ICD-10-CM

## 2024-09-25 DIAGNOSIS — G89.18 ACUTE POST-OPERATIVE PAIN: ICD-10-CM

## 2024-09-25 DIAGNOSIS — J45.909 PERSISTENT ASTHMA WITHOUT COMPLICATION, UNSPECIFIED ASTHMA SEVERITY: ICD-10-CM

## 2024-09-25 DIAGNOSIS — E87.1 HYPONATREMIA: ICD-10-CM

## 2024-09-25 DIAGNOSIS — Z96.651 STATUS POST TOTAL KNEE REPLACEMENT, RIGHT: Primary | ICD-10-CM

## 2024-09-25 DIAGNOSIS — K21.9 GERD WITHOUT ESOPHAGITIS: ICD-10-CM

## 2024-09-25 DIAGNOSIS — I10 ESSENTIAL HYPERTENSION: ICD-10-CM

## 2024-09-25 PROCEDURE — 99309 SBSQ NF CARE MODERATE MDM 30: CPT

## 2024-09-25 RX ORDER — LISINOPRIL 20 MG/1
10 TABLET ORAL DAILY
Status: ON HOLD
Start: 2024-09-25

## 2024-09-25 RX ORDER — SODIUM CHLORIDE 1 G/1
2 TABLET ORAL 3 TIMES DAILY
Status: ON HOLD | COMMUNITY

## 2024-09-25 RX ORDER — MONTELUKAST SODIUM 10 MG/1
10 TABLET ORAL DAILY
Status: ON HOLD
Start: 2024-09-25

## 2024-09-25 RX ORDER — METHOCARBAMOL 500 MG/1
250 TABLET, FILM COATED ORAL EVERY 8 HOURS PRN
Status: ON HOLD | COMMUNITY

## 2024-09-25 NOTE — PROGRESS NOTES
[facility]  [address] [address] [phone]  FACILITY: [facility]  Code 31 (STR)  Follow up visit       NAME: Alexx Haney  AGE: 59 y.o. SEX: male CODE STATUS: CPR    DATE OF ENCOUNTER: 9/25/2024    Assessment and Plan     1. Status post total knee replacement, right  Assessment & Plan:  Patient underwent right total knee arthroplasty on 9/17/24  WBAT RLE  Pain control as appropriate  DVT ppx: lovenox at rehab; anticipate transition to aspirin BID on discharge to complete ~28 days post-op DVT ppx as per Orthopedic recommendation  PT/OT (through 10/15)  Continue compression/ACE wrap of RLE to assist with post-op edema  Follow up with PCP, Orthopedics as appropriate  2. Essential hypertension  Assessment & Plan:  BP stable  Continue to monitor BP   No acute cardiac complaints-  Avoid hypotension  Continue lisinopril 10mg daily (reduced from 20mg as of 9/23), verapamil 120mg daily with hold parameters  Follow up with PCP, Cardiology as appropriate    Orders:  -     lisinopril (ZESTRIL) 20 mg tablet; Take 0.5 tablets (10 mg total) by mouth daily  3. GERD without esophagitis  Assessment & Plan:  Stable per patient  Continue  Esomeprazole daily   Avoid citrus, spicy, caffeine, and chocolate  Avoid eating/drinking 1 hour prior to laying down  OOB with meals, sit upright for at least 30 minutes afterwards  Follow up with PCP, GI as appropriate   4. Acute blood loss as cause of postoperative anemia  Assessment & Plan:  Baseline Hgb around 13-14  Acutely lower in post-op setting  Normocytic though likely with low iron due to blood loss  Hgb 8.7>7.9  Continue ferrous sulfate  Continue to monitor on routine labs  Monitor for acute bleed - no present signs  Consider further workup, for persistent/worsening  Transfuse PRN Hgb <7  Continue to monitor for acute changes  Follow up with PCP as appropriate  (threshold to hold DVT ppx if issue worsens)  5. Acute post-operative  pain  Assessment & Plan:  Monitor pain; pain stable  Current regimen including: tylenol 1000mg TID, gabapentin 100mg TID, oxycodone 5/10mg q4hr PRN, methocarbamol 250mg TID PRN, lidocaine patch, topical ice  Adjust/wean regimen as appropriate. As of 9/20 added lower dose oxycodone option with goal to wean as much as possible while at rehab and also added low dose methocarbamol PRN due to associated muscle spasms. As of 9/23 patient not ready for weaning yet, added topical lidocaine patch. As of 9/25 patient willing to try oxycodone 5mg tab to assess if pain is controlled with lower dose. Advised patient to use Methocarbamol PRN if 5mg oxycodone doesn't fully control pain.   Follow up with PCP as appropriate   6. Hyponatremia  Assessment & Plan:  Appears to have chronic hyponatremia in recent years on lab review  Na 121<128  Patient reports weakness and fatigue today working with PT, denies nausea  Start sodium chloride 1g BID  Nephrology consult placed, appreciate recommendations  Monitor on routine labs  Follow up with PCP, nephrology as appropriate   7. Persistent asthma without complication, unspecified asthma severity  -     montelukast (SINGULAIR) 10 mg tablet; Take 1 tablet (10 mg total) by mouth daily       All medications and routine orders were reviewed and updated as needed.    Chief Complaint     STR follow up visit    Past Medical and Surgica History      Past Medical History:   Diagnosis Date    Acute bronchitis 01/29/2024    Anxiety 01/15/2019    Arthritis     Asthma     Chronic rhinitis     last assessed 2/21/14    Chronic serous otitis media     last assessed 11/20/13    Chronic sinusitis     last assessed 8/19/14    Functional heart murmur     GERD (gastroesophageal reflux disease)     Gout     Hearing problem     last assessed 12/1/16    Hiatal hernia     Hypercholesterolemia     Hypertension     Palpitations     Testicular hypogonadism     last assessed 10/4/13    V-tach (HCC)      Past Surgical  History:   Procedure Laterality Date    APPENDECTOMY      BICEPS TENDON REPAIR Left 2009    BICEPS TENODESIS      anesthesia for tenodesis- of ruptured long tendon of biceps     HERNIA REPAIR      [place] ARTHRP KNE CONDYLE&PLATU MEDIAL&LAT COMPARTMENTS Right 9/17/2024    Procedure: ARTHROPLASTY KNEE TOTAL, potential same day discharge, cemented;  Surgeon: Parvin Whipple DO;  Location:  MAIN OR;  Service: Orthopedics    THYROIDECTOMY      TONSILLECTOMY       Allergies   Allergen Reactions    Penicillins Rash and Anaphylaxis    Acetazolamide      Other reaction(s): Stomach Ache    Cephalosporins Other (See Comments)     headache    Other     Sulfa Antibiotics Other (See Comments)     Category: Allergy; Annotation - [id number]: STOMACH UPSET    Famotidine Palpitations    Levofloxacin Palpitations    Omeprazole Palpitations     Painful urination      History of Present Illness     Alexx Haney is a 59-year-old male with past medical history including allergies, asthma, HTN, BPPV, GERD, gout, HLD, OA, anxiety, and hyponatremia. Patient was hospitalized at [facility] from 9/17 to 9/20/24 for elective right knee surgery due to symptomatic osteoarthritis. Patient underwent right total knee arthroplasty on 9/17/24, WBAT RLE.     Patient being seen and examined for follow up on acute and chronic medical conditions. Upon exam patient is in good spirits, he states he feels his pain is slowly improving. He reports to weakness and fatigue while working with PT/OT this morning. He states he slept well but woke up sweating this morning. Patient states his appetite is good, having regular bowel movements, last BM today. Patient is in no acute distress, denies CP, increased SOB, abdominal pain, N/V/C/D.         The patient's allergies, past medical, surgical, social and family history were reviewed and unchanged.    Review of Systems     Review of Systems   Constitutional:  Positive for fatigue. Negative for appetite change, chills,  diaphoresis and fever.   HENT:  Positive for congestion. Negative for hearing loss, rhinorrhea, sore throat and trouble swallowing.    Respiratory:  Negative for cough, chest tightness, shortness of breath (intermittent due to asthma) and wheezing.    Cardiovascular:  Positive for leg swelling. Negative for chest pain and palpitations.   Gastrointestinal:  Negative for abdominal pain, blood in stool, constipation, diarrhea, nausea and vomiting.   Genitourinary:  Negative for difficulty urinating, dysuria, flank pain and hematuria.   Musculoskeletal:  Positive for arthralgias (gradually improving post-op), back pain (chronic) and gait problem.   Neurological:  Positive for weakness. Negative for dizziness (chronic intermittent, not presently), tremors, seizures, facial asymmetry and headaches.   All other systems reviewed and are negative.    Objective     Vitals:   Vitals:    09/25/24 1315   BP: 127/64   Pulse: 72   Resp: 21   Temp: 98.1 °F (36.7 °C)   SpO2: 98%       Labs Reviewed  CBC:   Results from Last 12 Months   Lab Units 09/25/24  0743 09/18/24 0449 08/21/24  1214   WBC Thousand/uL 7.74   < > 5.69   RBC Million/uL 2.76*   < > 4.34   HEMOGLOBIN g/dL 8.7*   < > 13.5   HEMATOCRIT % 25.2*   < > 38.5   MCV fL 91   < > 89   MCH pg 31.5   < > 31.1   MCHC g/dL 34.5   < > 35.1   RDW % 12.4   < > 12.7   MPV fL 8.4*   < > 8.6*   PLATELETS Thousands/uL 394*   < > 309   NRBC AUTO /100 WBCs  --   --  0   SEGS PCT %  --   --  59   LYMPHO PCT %  --   --  26   MONO PCT %  --   --  10   EOS PCT %  --   --  3   BASOS PCT %  --   --  1   TOTAL NEUT ABS Thousands/µL  --   --  3.47   LYMPHS ABS Thousands/µL  --   --  1.46   MONOS ABS Thousand/µL  --   --  0.54   EOS ABS Thousand/µL  --   --  0.15    < > = values in this interval not displayed.     Chemistry Profile:   Results from Last 12 Months   Lab Units 09/25/24  0743 09/18/24 0449 08/21/24  1214   POTASSIUM mmol/L 4.2   < > 4.0   CHLORIDE mmol/L 89*   < > 96   CO2 mmol/L  23   < > 24   BUN mg/dL 10   < > 11   CREATININE mg/dL 0.68   < > 0.85   GLUCOSE FASTING mg/dL  --   --  96   GLUCOSE RANDOM mg/dL 97   < >  --    CALCIUM mg/dL 9.3   < > 9.6   AST U/L  --   --  32   ALT U/L  --   --  30   ALK PHOS U/L  --   --  68   EGFR ml/min/1.73sq m 105   < > 96    < > = values in this interval not displayed.     Physical Exam  Vitals reviewed.   Constitutional:       General: He is not in acute distress.     Appearance: Normal appearance. He is not ill-appearing, toxic-appearing or diaphoretic.   HENT:      Head: Normocephalic and atraumatic.      Right Ear: External ear normal.      Left Ear: External ear normal.      Nose: Nose normal. No congestion or rhinorrhea.      Mouth/Throat:      Mouth: Mucous membranes are dry.   Eyes:      General: No scleral icterus.        Right eye: No discharge.         Left eye: No discharge.      Conjunctiva/sclera: Conjunctivae normal.   Cardiovascular:      Rate and Rhythm: Normal rate and regular rhythm.   Pulmonary:      Effort: Pulmonary effort is normal. No respiratory distress.      Breath sounds: No wheezing, rhonchi or rales.   Abdominal:      General: Bowel sounds are normal. There is no distension.      Palpations: Abdomen is soft.      Tenderness: There is no abdominal tenderness. There is no guarding or rebound.   Musculoskeletal:      Right lower leg: Edema present.      Left lower leg: Edema present.      Comments: Absent LLE edema  1+ RLE edema, compression stocking in place  Right knee incision with silver bandage, no drainage noted    Skin:     General: Skin is warm and dry.   Neurological:      General: No focal deficit present.      Mental Status: He is alert and oriented to person, place, and time. Mental status is at baseline.      Motor: Weakness present.      Gait: Gait abnormal.   Psychiatric:         Mood and Affect: Mood normal.         Behavior: Behavior normal.         Thought Content: Thought content normal.         Judgment:  "Judgment normal.         Pertinent Laboratory/Diagnostic Studies:   Reviewed in facility chart-stable      Current Medications   Medications reviewed and updated see facility MAR for details.      Current Outpatient Medications:     lisinopril (ZESTRIL) 20 mg tablet, Take 0.5 tablets (10 mg total) by mouth daily, Disp: , Rfl:     montelukast (SINGULAIR) 10 mg tablet, Take 1 tablet (10 mg total) by mouth daily, Disp: , Rfl:     meclizine (ANTIVERT) 12.5 MG tablet, Take 1 tablet (12.5 mg total) by mouth every 8 (eight) hours as needed for dizziness, Disp: , Rfl:        Please note:  Voice-recognition software may have been used in the preparation of this document.  Occasional wrong word or \"sound-alike\" substitutions may have occurred due to the inherent limitations of voice recognition software.  Interpretation should be guided by context.         JORDAN Zuniga    "

## 2024-09-25 NOTE — ASSESSMENT & PLAN NOTE
Appears to have chronic hyponatremia in recent years on lab review  Na 121<128  Patient reports weakness and fatigue today working with PT, denies nausea  Start sodium chloride 1g BID  Nephrology consult placed, appreciate recommendations  Monitor on routine labs  Follow up with PCP, nephrology as appropriate

## 2024-09-25 NOTE — ASSESSMENT & PLAN NOTE
Stable per patient  Continue  Esomeprazole daily   Avoid citrus, spicy, caffeine, and chocolate  Avoid eating/drinking 1 hour prior to laying down  OOB with meals, sit upright for at least 30 minutes afterwards  Follow up with PCP, GI as appropriate

## 2024-09-25 NOTE — ASSESSMENT & PLAN NOTE
BP stable  Continue to monitor BP   No acute cardiac complaints-  Avoid hypotension  Continue lisinopril 10mg daily (reduced from 20mg as of 9/23), verapamil 120mg daily with hold parameters  Follow up with PCP, Cardiology as appropriate

## 2024-09-25 NOTE — ASSESSMENT & PLAN NOTE
Baseline Hgb around 13-14  Acutely lower in post-op setting  Normocytic though likely with low iron due to blood loss  Hgb 8.7>7.9  Continue ferrous sulfate  Continue to monitor on routine labs  Monitor for acute bleed - no present signs  Consider further workup, for persistent/worsening  Transfuse PRN Hgb <7  Continue to monitor for acute changes  Follow up with PCP as appropriate  (threshold to hold DVT ppx if issue worsens)

## 2024-09-25 NOTE — ASSESSMENT & PLAN NOTE
Patient underwent right total knee arthroplasty on 9/17/24  WBAT RLE  Pain control as appropriate  DVT ppx: lovenox at rehab; anticipate transition to aspirin BID on discharge to complete ~28 days post-op DVT ppx as per Orthopedic recommendation  PT/OT (through 10/15)  Continue compression/ACE wrap of RLE to assist with post-op edema  Follow up with PCP, Orthopedics as appropriate

## 2024-09-25 NOTE — ASSESSMENT & PLAN NOTE
Monitor pain; pain stable  Current regimen including: tylenol 1000mg TID, gabapentin 100mg TID, oxycodone 5/10mg q4hr PRN, methocarbamol 250mg TID PRN, lidocaine patch, topical ice  Adjust/wean regimen as appropriate. As of 9/20 added lower dose oxycodone option with goal to wean as much as possible while at rehab and also added low dose methocarbamol PRN due to associated muscle spasms. As of 9/23 patient not ready for weaning yet, added topical lidocaine patch. As of 9/25 patient willing to try oxycodone 5mg tab to assess if pain is controlled with lower dose. Advised patient to use Methocarbamol PRN if 5mg oxycodone doesn't fully control pain.   Follow up with PCP as appropriate

## 2024-09-26 ENCOUNTER — HOSPITAL ENCOUNTER (INPATIENT)
Facility: HOSPITAL | Age: 59
LOS: 7 days | Discharge: HOME | End: 2024-10-03
Attending: FAMILY MEDICINE | Admitting: FAMILY MEDICINE
Payer: MEDICARE

## 2024-09-26 ENCOUNTER — NURSING HOME VISIT (OUTPATIENT)
Age: 59
End: 2024-09-26
Payer: MEDICARE

## 2024-09-26 VITALS
BODY MASS INDEX: 28.68 KG/M2 | HEART RATE: 75 BPM | OXYGEN SATURATION: 98 % | DIASTOLIC BLOOD PRESSURE: 83 MMHG | TEMPERATURE: 98.1 F | WEIGHT: 197 LBS | SYSTOLIC BLOOD PRESSURE: 145 MMHG | RESPIRATION RATE: 20 BRPM

## 2024-09-26 DIAGNOSIS — K59.09 OTHER CONSTIPATION: ICD-10-CM

## 2024-09-26 DIAGNOSIS — Z96.651 STATUS POST TOTAL KNEE REPLACEMENT, RIGHT: ICD-10-CM

## 2024-09-26 DIAGNOSIS — R79.89 ABNORMAL LFTS: ICD-10-CM

## 2024-09-26 DIAGNOSIS — G89.18 ACUTE POST-OPERATIVE PAIN: ICD-10-CM

## 2024-09-26 DIAGNOSIS — M15.0 PRIMARY OSTEOARTHRITIS INVOLVING MULTIPLE JOINTS: ICD-10-CM

## 2024-09-26 DIAGNOSIS — M15.9 PRIMARY OSTEOARTHRITIS INVOLVING MULTIPLE JOINTS: ICD-10-CM

## 2024-09-26 DIAGNOSIS — I10 ESSENTIAL HYPERTENSION: ICD-10-CM

## 2024-09-26 DIAGNOSIS — K21.9 GERD WITHOUT ESOPHAGITIS: ICD-10-CM

## 2024-09-26 DIAGNOSIS — M25.571 RIGHT ANKLE PAIN: ICD-10-CM

## 2024-09-26 DIAGNOSIS — E78.2 MIXED HYPERLIPIDEMIA: ICD-10-CM

## 2024-09-26 DIAGNOSIS — D62 ACUTE BLOOD LOSS AS CAUSE OF POSTOPERATIVE ANEMIA: ICD-10-CM

## 2024-09-26 DIAGNOSIS — M1A.9XX0 CHRONIC GOUT WITHOUT TOPHUS, UNSPECIFIED CAUSE, UNSPECIFIED SITE: ICD-10-CM

## 2024-09-26 DIAGNOSIS — E87.1 HYPONATREMIA: Primary | ICD-10-CM

## 2024-09-26 DIAGNOSIS — H81.10 BENIGN PAROXYSMAL POSITIONAL VERTIGO, UNSPECIFIED LATERALITY: ICD-10-CM

## 2024-09-26 DIAGNOSIS — D72.823 LEUKEMOID REACTION: ICD-10-CM

## 2024-09-26 DIAGNOSIS — J45.909 PERSISTENT ASTHMA WITHOUT COMPLICATION, UNSPECIFIED ASTHMA SEVERITY: ICD-10-CM

## 2024-09-26 DIAGNOSIS — I47.20 V-TACH (HCC): ICD-10-CM

## 2024-09-26 PROBLEM — D64.9 ANEMIA: Status: ACTIVE | Noted: 2024-09-26

## 2024-09-26 PROBLEM — Z96.652 STATUS POST LEFT KNEE REPLACEMENT: Status: ACTIVE | Noted: 2024-09-26

## 2024-09-26 LAB
ANION GAP SERPL CALCULATED.3IONS-SCNC: 10 MMOL/L (ref 4–13)
ANION GAP SERPL CALCULATED.3IONS-SCNC: 10 MMOL/L (ref 4–13)
BUN SERPL-MCNC: 11 MG/DL (ref 5–25)
BUN SERPL-MCNC: 11 MG/DL (ref 5–25)
CALCIUM SERPL-MCNC: 8.8 MG/DL (ref 8.4–10.2)
CALCIUM SERPL-MCNC: 8.8 MG/DL (ref 8.4–10.2)
CHLORIDE SERPL-SCNC: 88 MMOL/L (ref 96–108)
CHLORIDE SERPL-SCNC: 89 MMOL/L (ref 96–108)
CO2 SERPL-SCNC: 20 MMOL/L (ref 21–32)
CO2 SERPL-SCNC: 21 MMOL/L (ref 21–32)
CREAT SERPL-MCNC: 0.68 MG/DL (ref 0.6–1.3)
CREAT SERPL-MCNC: 0.7 MG/DL (ref 0.6–1.3)
GFR SERPL CREATININE-BSD FRML MDRD: 104 ML/MIN/1.73SQ M
GFR SERPL CREATININE-BSD FRML MDRD: 105 ML/MIN/1.73SQ M
GLUCOSE SERPL-MCNC: 94 MG/DL (ref 65–140)
GLUCOSE SERPL-MCNC: 99 MG/DL (ref 65–140)
POTASSIUM SERPL-SCNC: 3.9 MMOL/L (ref 3.5–5.3)
POTASSIUM SERPL-SCNC: 4 MMOL/L (ref 3.5–5.3)
SODIUM SERPL-SCNC: 119 MMOL/L (ref 135–147)
SODIUM SERPL-SCNC: 119 MMOL/L (ref 135–147)

## 2024-09-26 PROCEDURE — 80048 BASIC METABOLIC PNL TOTAL CA: CPT | Performed by: FAMILY MEDICINE

## 2024-09-26 PROCEDURE — 99223 1ST HOSP IP/OBS HIGH 75: CPT | Performed by: FAMILY MEDICINE

## 2024-09-26 PROCEDURE — 94760 N-INVAS EAR/PLS OXIMETRY 1: CPT

## 2024-09-26 PROCEDURE — 80048 BASIC METABOLIC PNL TOTAL CA: CPT | Performed by: INTERNAL MEDICINE

## 2024-09-26 PROCEDURE — 94640 AIRWAY INHALATION TREATMENT: CPT

## 2024-09-26 PROCEDURE — 99316 NF DSCHRG MGMT 30 MIN+: CPT

## 2024-09-26 RX ORDER — METHOCARBAMOL 500 MG/1
250 TABLET, FILM COATED ORAL EVERY 8 HOURS PRN
Status: DISCONTINUED | OUTPATIENT
Start: 2024-09-26 | End: 2024-10-03 | Stop reason: HOSPADM

## 2024-09-26 RX ORDER — DOCUSATE SODIUM 100 MG/1
100 CAPSULE, LIQUID FILLED ORAL 2 TIMES DAILY
Status: DISCONTINUED | OUTPATIENT
Start: 2024-09-26 | End: 2024-10-03 | Stop reason: HOSPADM

## 2024-09-26 RX ORDER — ONDANSETRON 2 MG/ML
4 INJECTION INTRAMUSCULAR; INTRAVENOUS EVERY 6 HOURS PRN
Status: DISCONTINUED | OUTPATIENT
Start: 2024-09-26 | End: 2024-10-03 | Stop reason: HOSPADM

## 2024-09-26 RX ORDER — PANTOPRAZOLE SODIUM 40 MG/1
40 TABLET, DELAYED RELEASE ORAL
Status: DISCONTINUED | OUTPATIENT
Start: 2024-09-27 | End: 2024-09-27

## 2024-09-26 RX ORDER — ALBUTEROL SULFATE 0.83 MG/ML
2.5 SOLUTION RESPIRATORY (INHALATION) EVERY 6 HOURS PRN
Status: DISCONTINUED | OUTPATIENT
Start: 2024-09-26 | End: 2024-10-03 | Stop reason: HOSPADM

## 2024-09-26 RX ORDER — OXYCODONE HYDROCHLORIDE 5 MG/1
5 CAPSULE ORAL EVERY 4 HOURS PRN
Status: ON HOLD | COMMUNITY

## 2024-09-26 RX ORDER — VERAPAMIL HYDROCHLORIDE 40 MG/1
120 TABLET ORAL DAILY
Status: DISCONTINUED | OUTPATIENT
Start: 2024-09-26 | End: 2024-10-03 | Stop reason: HOSPADM

## 2024-09-26 RX ORDER — GABAPENTIN 100 MG/1
100 CAPSULE ORAL 3 TIMES DAILY
Status: DISCONTINUED | OUTPATIENT
Start: 2024-09-26 | End: 2024-10-03 | Stop reason: HOSPADM

## 2024-09-26 RX ORDER — GUAIFENESIN/DEXTROMETHORPHAN 100-10MG/5
10 SYRUP ORAL EVERY 4 HOURS PRN
Status: DISCONTINUED | OUTPATIENT
Start: 2024-09-26 | End: 2024-10-03 | Stop reason: HOSPADM

## 2024-09-26 RX ORDER — ENOXAPARIN SODIUM 100 MG/ML
40 INJECTION SUBCUTANEOUS DAILY
Status: ON HOLD | COMMUNITY

## 2024-09-26 RX ORDER — FLUTICASONE FUROATE AND VILANTEROL 200; 25 UG/1; UG/1
1 POWDER RESPIRATORY (INHALATION)
Status: DISCONTINUED | OUTPATIENT
Start: 2024-09-27 | End: 2024-10-03 | Stop reason: HOSPADM

## 2024-09-26 RX ORDER — FLUTICASONE PROPIONATE 50 MCG
1 SPRAY, SUSPENSION (ML) NASAL 2 TIMES DAILY
Status: DISCONTINUED | OUTPATIENT
Start: 2024-09-26 | End: 2024-10-03 | Stop reason: HOSPADM

## 2024-09-26 RX ORDER — FERROUS SULFATE 325(65) MG
325 TABLET ORAL
Status: DISCONTINUED | OUTPATIENT
Start: 2024-09-27 | End: 2024-10-03 | Stop reason: HOSPADM

## 2024-09-26 RX ORDER — ASCORBIC ACID 500 MG
500 TABLET ORAL DAILY
Status: DISCONTINUED | OUTPATIENT
Start: 2024-09-26 | End: 2024-10-03 | Stop reason: HOSPADM

## 2024-09-26 RX ORDER — LORATADINE 10 MG/1
10 TABLET ORAL DAILY
Status: DISCONTINUED | OUTPATIENT
Start: 2024-09-27 | End: 2024-10-03 | Stop reason: HOSPADM

## 2024-09-26 RX ORDER — FOLIC ACID 1 MG/1
1 TABLET ORAL DAILY
Status: DISCONTINUED | OUTPATIENT
Start: 2024-09-26 | End: 2024-10-03 | Stop reason: HOSPADM

## 2024-09-26 RX ORDER — ALLOPURINOL 100 MG/1
200 TABLET ORAL DAILY
Status: DISCONTINUED | OUTPATIENT
Start: 2024-09-26 | End: 2024-09-27

## 2024-09-26 RX ORDER — IPRATROPIUM BROMIDE AND ALBUTEROL SULFATE 2.5; .5 MG/3ML; MG/3ML
3 SOLUTION RESPIRATORY (INHALATION) EVERY 6 HOURS PRN
Status: DISCONTINUED | OUTPATIENT
Start: 2024-09-26 | End: 2024-10-03 | Stop reason: HOSPADM

## 2024-09-26 RX ORDER — LISINOPRIL 10 MG/1
10 TABLET ORAL DAILY
Status: DISCONTINUED | OUTPATIENT
Start: 2024-09-26 | End: 2024-09-27

## 2024-09-26 RX ORDER — OXYCODONE HYDROCHLORIDE 5 MG/1
5 TABLET ORAL EVERY 6 HOURS PRN
Status: DISCONTINUED | OUTPATIENT
Start: 2024-09-26 | End: 2024-10-03 | Stop reason: HOSPADM

## 2024-09-26 RX ORDER — MECLIZINE HCL 12.5 MG 12.5 MG/1
12.5 TABLET ORAL EVERY 8 HOURS PRN
Status: DISCONTINUED | OUTPATIENT
Start: 2024-09-26 | End: 2024-10-03 | Stop reason: HOSPADM

## 2024-09-26 RX ORDER — MONTELUKAST SODIUM 10 MG/1
10 TABLET ORAL DAILY
Status: DISCONTINUED | OUTPATIENT
Start: 2024-09-26 | End: 2024-10-03 | Stop reason: HOSPADM

## 2024-09-26 RX ORDER — ENOXAPARIN SODIUM 100 MG/ML
40 INJECTION SUBCUTANEOUS DAILY
Status: DISCONTINUED | OUTPATIENT
Start: 2024-09-26 | End: 2024-10-03 | Stop reason: HOSPADM

## 2024-09-26 RX ORDER — ACETAMINOPHEN 325 MG/1
650 TABLET ORAL EVERY 6 HOURS PRN
Status: DISCONTINUED | OUTPATIENT
Start: 2024-09-26 | End: 2024-10-03 | Stop reason: HOSPADM

## 2024-09-26 RX ADMIN — SODIUM CHLORIDE 50 ML/HR: 4 INJECTION, SOLUTION, CONCENTRATE INTRAVENOUS at 18:16

## 2024-09-26 RX ADMIN — GABAPENTIN 100 MG: 100 CAPSULE ORAL at 20:47

## 2024-09-26 RX ADMIN — ENOXAPARIN SODIUM 40 MG: 100 INJECTION SUBCUTANEOUS at 16:22

## 2024-09-26 RX ADMIN — B-COMPLEX W/ C & FOLIC ACID TAB 1 TABLET: TAB at 16:23

## 2024-09-26 RX ADMIN — VERAPAMIL HYDROCHLORIDE 120 MG: 40 TABLET, FILM COATED ORAL at 16:37

## 2024-09-26 RX ADMIN — MONTELUKAST 10 MG: 10 TABLET, FILM COATED ORAL at 16:22

## 2024-09-26 RX ADMIN — MECLIZINE 12.5 MG: 12.5 TABLET ORAL at 20:47

## 2024-09-26 RX ADMIN — ACETAMINOPHEN 650 MG: 325 TABLET ORAL at 22:34

## 2024-09-26 RX ADMIN — ALBUTEROL SULFATE 2.5 MG: 2.5 SOLUTION RESPIRATORY (INHALATION) at 21:33

## 2024-09-26 NOTE — ASSESSMENT & PLAN NOTE
Appears to have chronic hyponatremia in recent years on lab review, although significantly lower as of recent  Na 120>119<121<128  As of 9/25 started sodium chloride 1g BID; as of 9/26 increased to 2g TID per nephrology recommendations.  1500mL fluid restriction  TSH WNL  Uric acid WNL  Urine electrolytes pending  Cortisol pending   Nephrology consulted and recommends inpatient transfer for 1.8% saline and close monitoring.  BMP q8hr  ADT order placed  Follow up  Nephrology as appropriate

## 2024-09-26 NOTE — PROGRESS NOTES
"[facility]  Facility: [facility]    DISCHARGE SUMMARY -Transfer  Nursing Home Place of Service: 31: SNF/Short Term Rehab    NAME: Alexx Haney  : 1965 AGE: 59 y.o. SEX: male MRN: [id number]  DATE OF ENCOUNTER: 2024    DATE OF ADMISSION: 24 Transferred: 24   DISCHARGE DISPOSITION: Stable     HPI: Alexx Haney is a 59-year-old male with past medical history including allergies, asthma, HTN, BPPV, GERD, gout, HLD, OA, anxiety, and hyponatremia.     Reason for admission: Patient was hospitalized at [facility] from  to 24 for elective right knee surgery due to symptomatic osteoarthritis. Patient underwent right total knee arthroplasty on 24, WBAT RLE.     Course of stay: Patient was admitted to [facility] for rehabilitation due to physical deconditioning and medical management. Significant events during the stay include: hyponatremia. The patient participated in PT/OT.     Upon exam patient is resting in bed. Patient states he overall feels \"good\", although feels weak, tired and foggy. His pain is controlled with current regime. He states his appetite is good, he reports 4 loose bowel movements since 3am this morning, no associated abdominal pain, denies N/V.   Patient is in no acute distress, denies chest pain, shortness of breath.  Patient is cleared for transfer.  Patient being transferred inpatient for 1.8% saline and close monitoring of sodium.     Labs Reviewed  CBC:   Results from Last 12 Months   Lab Units 24  0743 24  0449 24  1214   WBC Thousand/uL 7.74   < > 5.69   RBC Million/uL 2.76*   < > 4.34   HEMOGLOBIN g/dL 8.7*   < > 13.5   HEMATOCRIT % 25.2*   < > 38.5   MCV fL 91   < > 89   MCH pg 31.5   < > 31.1   MCHC g/dL 34.5   < > 35.1   RDW % 12.4   < > 12.7   MPV fL 8.4*   < > 8.6*   PLATELETS Thousands/uL 394*   < > 309   NRBC AUTO /100 WBCs  --   --  0   SEGS PCT %  --   --  59 "   LYMPHO PCT %  --   --  26   MONO PCT %  --   --  10   EOS PCT %  --   --  3   BASOS PCT %  --   --  1   TOTAL NEUT ABS Thousands/µL  --   --  3.47   LYMPHS ABS Thousands/µL  --   --  1.46   MONOS ABS Thousand/µL  --   --  0.54   EOS ABS Thousand/µL  --   --  0.15    < > = values in this interval not displayed.     Chemistry Profile:   Results from Last 12 Months   Lab Units 09/26/24  0957 09/18/24  0449 08/21/24  1214   POTASSIUM mmol/L 4.2   < > 4.0   CHLORIDE mmol/L 88*   < > 96   CO2 mmol/L 23   < > 24   BUN mg/dL 9   < > 11   CREATININE mg/dL 0.69   < > 0.85   GLUCOSE FASTING mg/dL  --   --  96   GLUCOSE RANDOM mg/dL 92   < >  --    CALCIUM mg/dL 9.3   < > 9.6   AST U/L  --   --  32   ALT U/L  --   --  30   ALK PHOS U/L  --   --  68   EGFR ml/min/1.73sq m 104   < > 96    < > = values in this interval not displayed.       Assessment/Plan:  Essential hypertension  BP stable  Continue to monitor BP   No acute cardiac complaints-  Avoid hypotension  Continue lisinopril 10mg daily (reduced from 20mg as of 9/23), verapamil 120mg daily with hold parameters  Follow up with PCP, Cardiology as appropriate      Acute post-operative pain  Monitor pain; pain stable  Current regimen including: tylenol 1000mg TID, gabapentin 100mg TID, oxycodone 5/10mg q4hr PRN, methocarbamol 250mg TID PRN, lidocaine patch, topical ice  Adjust/wean regimen as appropriate. As of 9/20 added lower dose oxycodone option with goal to wean as much as possible while at rehab and also added low dose methocarbamol PRN due to associated muscle spasms. As of 9/23 patient not ready for weaning yet, added topical lidocaine patch. As of 9/25 patient willing to try oxycodone 5mg tab to assess if pain is controlled with lower dose. Advised patient to use Methocarbamol PRN if 5mg oxycodone doesn't fully control pain.   Follow up with PCP as appropriate     Status post total knee replacement, right  Patient underwent right total knee arthroplasty on  9/17/24  WBAT RLE  Pain control as appropriate  DVT ppx: lovenox at rehab; anticipate transition to aspirin BID on discharge to complete ~28 days post-op DVT ppx as per Orthopedic recommendation (through 10/15)  Continue PT/OT   Continue compression/ACE wrap of RLE to assist with post-op edema  Follow up with PCP, Orthopedics as appropriate    V-tach (HCC)  Noted history  No present acute cardiac complaints  Continue cardiac regimen. Does not appear to be on beta blocker at baseline, monitor for appropriateness  Following with Cardiology outpatient    Asthma  No acute exacerbation  Monitor respiratory status - stable on room air  Continue breathing regimen. Clarified with patient 9/23 he uses albuterol nebulizer treatment every morning at baseline and prefers to keep duonebs PRN, have ordered at rehab  Continue montelukast  Consider respiratory consult if needed  Follow up with PCP, Pulmonology as appropriate    GERD without esophagitis  Stable per patient  Continue  Esomeprazole daily   Avoid citrus, spicy, caffeine, and chocolate  Avoid eating/drinking 1 hour prior to laying down  OOB with meals, sit upright for at least 30 minutes afterwards  Follow up with PCP, GI as appropriate     Benign paroxysmal positional vertigo  Stable, continue meclizine (made PRN as patient does not take scheduled at baseline)  Follow up with PCP as appropriate     Primary osteoarthritis involving multiple joints  Noted chronic history of arthritis  Per patient affecting multiple joints including bilateral knees and ankles as well as low back  Pain control as appropriate  Continue PT/OT  Follow up with PCP, Ortho/Pain Management as appropriate    Acute blood loss as cause of postoperative anemia  Baseline Hgb around 13-14  Acutely lower in post-op setting  Normocytic though likely with low iron due to blood loss  Hgb 8.7>7.9  Continue ferrous sulfate  Continue to monitor on routine labs  Monitor for acute bleed - no present  signs  Consider further workup, for persistent/worsening  Transfuse PRN Hgb <7  Continue to monitor for acute changes  Follow up with PCP as appropriate  (threshold to hold DVT ppx if issue worsens)    Leukemoid reaction  WBC recent peak 10.22 on 9/18  Likely reactive in setting of knee surgery  Monitor on routine labs - improved to WNL  Monitor for acute infectious symptoms - no present symptoms  Follow up with PCP as appropriate       Gout  Stable on febuxostat, no recent/acute flare  (Used to be on allopurinol but reports he no longer takes that)  Follow up with PCP as appropriate     Hyperlipidemia  Encourage lifestyle modifications including healthy diet, weight management, exercise as appropriate  Previously used to be on rosuvastatin but appears was stopped/discontinued some time ago. Defer to PCP  Follow up with PCP as appropriate     Other constipation  At risk due to hospitalization, relative immobility, comorbidities, pain medications  Monitor stool output - last BM several days ago  Bowel regimen at facility: miralax, docusate, senna regimen; adjust as appropriate; consider bisacodyl suppository PRN if no BM in 2-3 days  Encourage mobility as tolerated, PO hydration as appropriate, high fiber diet/prune juice (in outpatient setting as appropriate)  Goal is for 1 easy BM every 1-2 days  Follow up with PCP as appropriate     Hyponatremia  Appears to have chronic hyponatremia in recent years on lab review, although significantly lower as of recent  Na 120>119<121<128  As of 9/25 started sodium chloride 1g BID; as of 9/26 increased to 2g TID per nephrology recommendations.  1500mL fluid restriction  TSH WNL  Uric acid WNL  Urine electrolytes pending  Cortisol pending   Nephrology consulted and recommends inpatient transfer for 1.8% saline and close monitoring.  BMP q8hr  ADT order placed  Follow up  Nephrology as appropriate        Review of Systems   Constitutional:  Positive for fatigue. Negative for  appetite change, chills, diaphoresis and fever.   HENT:  Positive for congestion. Negative for hearing loss, rhinorrhea, sore throat and trouble swallowing.    Respiratory:  Negative for cough, chest tightness, shortness of breath (intermittent due to asthma) and wheezing.    Cardiovascular:  Positive for leg swelling. Negative for chest pain and palpitations.   Gastrointestinal:  Positive for diarrhea (loose x4). Negative for abdominal pain, blood in stool, constipation, nausea and vomiting.   Genitourinary:  Negative for difficulty urinating, dysuria, flank pain and hematuria.   Musculoskeletal:  Positive for arthralgias (improving), back pain (chronic) and gait problem.   Neurological:  Positive for weakness. Negative for dizziness (chronic intermittent, not presently), tremors, seizures, facial asymmetry and headaches.   All other systems reviewed and are negative.    Vital Signs:   Vitals:   Vitals:    09/26/24 0920   BP: 145/83   Pulse: 75   Resp: 20   Temp: 98.1 °F (36.7 °C)   SpO2: 98%     Exam:   Physical Exam  Vitals reviewed.   Constitutional:       General: He is not in acute distress.     Appearance: Normal appearance. He is not ill-appearing, toxic-appearing or diaphoretic.   HENT:      Head: Normocephalic and atraumatic.      Right Ear: External ear normal.      Left Ear: External ear normal.      Nose: Nose normal. No congestion or rhinorrhea.      Mouth/Throat:      Mouth: Mucous membranes are dry.   Eyes:      General: No scleral icterus.        Right eye: No discharge.         Left eye: No discharge.      Conjunctiva/sclera: Conjunctivae normal.   Cardiovascular:      Rate and Rhythm: Normal rate and regular rhythm.   Pulmonary:      Effort: Pulmonary effort is normal. No respiratory distress.      Breath sounds: No wheezing, rhonchi or rales.   Abdominal:      General: Bowel sounds are normal. There is no distension.      Palpations: Abdomen is soft.      Tenderness: There is no abdominal  tenderness. There is no guarding or rebound.   Musculoskeletal:      Right lower leg: Edema present.      Left lower leg: Edema present.      Comments: Absent LLE edema  1+ RLE edema  Right knee incision with silver bandage, no drainage noted    Skin:     General: Skin is warm and dry.   Neurological:      General: No focal deficit present.      Mental Status: He is alert and oriented to person, place, and time. Mental status is at baseline.      Motor: Weakness present.      Gait: Gait abnormal.   Psychiatric:         Mood and Affect: Mood normal.         Behavior: Behavior normal.         Thought Content: Thought content normal.         Judgment: Judgment normal.       Discharge Medications: See discharge medication list which was reviewed and signed.    Status at time of discharge: Stable    Discussion with patient/family as appropriate and further instructions:  -Fall precautions  -Aspiration precautions  -Bleeding precautions  -Monitor for signs/symptoms of infection  -Medication list was reviewed and signed  -DME form was completed    Follow-up Recommendations: Please follow-up with your primary care physician within 7-10 days of discharge to review medication changes and current status.     Problem List Follow-up Recommendations:      -Total time spent on this encounter today including documentation and workup review, face to face time, history and exam, and documentation/orders was approximately 50 minutes.  -This note will be copied to [facility] EMR where vitals and medication orders are placed.    JORDAN Zuniga  Geriatric Medicine  9/26/2024 1:41 PM

## 2024-09-26 NOTE — ASSESSMENT & PLAN NOTE
WBC recent peak 10.22 on 9/18  Likely reactive in setting of knee surgery  Monitor on routine labs - improved to WNL  Monitor for acute infectious symptoms - no present symptoms  Follow up with PCP as appropriate

## 2024-09-26 NOTE — H&P
H&P - Hospitalist   Name: Alexx Haney 59 y.o. male I MRN: [id number]  Unit/Bed#: 7T [facility] I Date of Admission: 9/26/2024   Date of Service: 9/26/2024 I Hospital Day: 0     Assessment & Plan  Hyponatremia  This is a 59-year-old male patient with history of osteoarthritis status post recent right total knee arthroplasty undergoing therapy at [facility], history of hypertension, asthma, allergies, BPPV, who is transferred for admission to internal medicine due to hyponatremia with sodium level of 120  Admitted to level to stepdown for close monitoring  Appreciate nephrology input, initiated on 1.8% saline  Monitor BMP every 6 hours  Fluid restriction  Follow nephrology's recommendations  Asthma  Not in exacerbation  Continue inhalers, Singulair  Essential hypertension  Continue Lisinopril, verapamil  Monitor BP trend  Benign paroxysmal positional vertigo  On meclizine as needed  DJD (degenerative joint disease) of knee  Per record review, the [facility] 9/20[facility]24 s/p right total knee arthroplasty on 9/17/24, WBAT RLE.   Undergoing therapy treatment at [facility] [place]  GERD without esophagitis  Continue PPI  Hyperlipidemia  Not on statin  Anemia  Likely due to blood loss following orthopedic procedure  Continue patient's iron and folic acid supplementation    VTE Pharmacologic Prophylaxis: Lovenox  Code Status: Level 1 - Full Code   Discussion with family: patient    Anticipated Length of Stay: Patient will be admitted on an inpatient basis with an anticipated length of stay of greater than 2 midnights secondary to hyponatremia requiring step down monitoring.    History of Present Illness   Chief Complaint: abnormal labs    Alexx Haney is a 59 y.o. male with a PMH of osteoarthritis status post right knee arthroplasty on 9/17, hypertension, vertigo GERD who presents with hyponatremia, recommended hospital admission from rehab facility for close monitoring and treatment.  The patient reports  "feeling well and voices no complaints. He has been working on his ambulation at [facility]. He denies pain, denies SOB or chest pain. The patient also denies abdominal pain, nausea, vomiting, diarrhea or constipation. Denies urinary symptoms. Remainder of 12-point review of systems is otherwise negative.    Review of Systems   Constitutional:  Negative for chills and fatigue.   HENT:  Negative for congestion.    Eyes:  Negative for visual disturbance.   Respiratory:  Negative for shortness of breath.    Cardiovascular:  Positive for leg swelling. Negative for chest pain and palpitations.   Gastrointestinal:  Negative for abdominal pain, constipation, diarrhea, nausea and vomiting.   Endocrine: Negative for polydipsia.   Genitourinary:  Negative for dysuria.   Musculoskeletal:  Positive for gait problem.   Skin:  Negative for rash.   Neurological:  Positive for dizziness.   Hematological:  Negative for adenopathy.   Psychiatric/Behavioral:  Negative for confusion.        I have reviewed the patient's PMH, PSH, Social History, Family History, Meds, and Allergies  Social History:  Marital Status: Single   Patient Pre-hospital Living Situation: undergoing rehab  Patient Pre-hospital Level of Mobility: walks with walker  Patient Pre-hospital Diet Restrictions: fluid restriction    Objective     Vitals:   Blood Pressure: 142/90 (09/26/24 1500)  Pulse: 88 (09/26/24 1500)  Temperature: 97.6 °F (36.4 °C) (09/26/24 1500)  Temp Source: Temporal (09/26/24 1500)  Respirations: 20 (09/26/24 1500)  Height: 5' 9\" (175.3 cm) (09/26/24 1500)  Weight - Scale: 89.7 kg (197 lb 12 oz) (09/26/24 1500)  SpO2: 96 % (09/26/24 1500)    Physical Exam  Constitutional:       General: He is not in acute distress.  HENT:      Head: Normocephalic and atraumatic.   Eyes:      Conjunctiva/sclera: Conjunctivae normal.   Cardiovascular:      Rate and Rhythm: Normal rate and regular rhythm.   Pulmonary:      Effort: No respiratory distress.      Breath sounds: " No wheezing or rales.   Abdominal:      General: There is no distension.      Tenderness: There is no abdominal tenderness. There is no guarding.   Musculoskeletal:      Right lower leg: Edema present.      Left lower leg: No edema.   Skin:     General: Skin is warm and dry.   Neurological:      Mental Status: He is oriented to person, place, and time. Mental status is at baseline.         Lines/Drains:  Lines/Drains/Airways       Active Status       None                        Additional Data:   Lab Results: I have reviewed the following results:   Results from last 7 days   Lab Units 09/25/24  0743   WBC Thousand/uL 7.74   HEMOGLOBIN g/dL 8.7*   HEMATOCRIT % 25.2*   PLATELETS Thousands/uL 394*     Results from last 7 days   Lab Units 09/26/24  0957   SODIUM mmol/L 120*   POTASSIUM mmol/L 4.2   CHLORIDE mmol/L 88*   CO2 mmol/L 23   BUN mg/dL 9   CREATININE mg/dL 0.69   ANION GAP mmol/L 9   CALCIUM mg/dL 9.3   GLUCOSE RANDOM mg/dL 92             Lab Results   Component Value Date    HGBA1C 5.4 08/21/2024           Imaging Review: no new      Administrative Statements   I have spent a total time of 75 minutes in caring for this patient on the day of the visit/encounter including Diagnostic results, Counseling / Coordination of care, Documenting in the medical record, Reviewing / ordering tests, medicine, procedures  , Obtaining or reviewing history  , and Communicating with other healthcare professionals .    ** Please Note: This note has been constructed using a voice recognition system. **

## 2024-09-26 NOTE — ASSESSMENT & PLAN NOTE
Encourage lifestyle modifications including healthy diet, weight management, exercise as appropriate  Previously used to be on rosuvastatin but appears was stopped/discontinued some time ago. Defer to PCP  Follow up with PCP as appropriate

## 2024-09-26 NOTE — ASSESSMENT & PLAN NOTE
Stable, continue meclizine (made PRN as patient does not take scheduled at baseline)  Follow up with PCP as appropriate

## 2024-09-26 NOTE — ASSESSMENT & PLAN NOTE
This is a 59-year-old male patient with history of osteoarthritis status post recent right total knee arthroplasty undergoing therapy at [facility], history of hypertension, asthma, allergies, BPPV, who is transferred for admission to internal medicine due to hyponatremia with sodium level of 120  Admitted to level to stepdown for close monitoring  Appreciate nephrology input, initiated on 1.8% saline  Monitor BMP every 6 hours  Fluid restriction  Follow nephrology's recommendations

## 2024-09-26 NOTE — PLAN OF CARE
Problem: PAIN - ADULT  Goal: Verbalizes/displays adequate comfort level or baseline comfort level  Description: Interventions:  - Encourage patient to monitor pain and request assistance  - Assess pain using appropriate pain scale  - Administer analgesics based on type and severity of pain and evaluate response  - Implement non-pharmacological measures as appropriate and evaluate response  - Consider cultural and social influences on pain and pain management  - Notify physician/advanced practitioner if interventions unsuccessful or patient reports new pain  Outcome: Progressing     Problem: INFECTION - ADULT  Goal: Absence or prevention of progression during hospitalization  Description: INTERVENTIONS:  - Assess and monitor for signs and symptoms of infection  - Monitor lab/diagnostic results  - Monitor all insertion sites, i.e. indwelling lines, tubes, and drains  - Monitor endotracheal if appropriate and nasal secretions for changes in amount and color  - [place] appropriate cooling/warming therapies per order  - Administer medications as ordered  - Instruct and encourage patient and family to use good hand hygiene technique  - Identify and instruct in appropriate isolation precautions for identified infection/condition  Outcome: Progressing     Problem: METABOLIC, FLUID AND ELECTROLYTES - ADULT  Goal: Electrolytes maintained within normal limits  Description: INTERVENTIONS:  - Monitor labs and assess patient for signs and symptoms of electrolyte imbalances  - Administer electrolyte replacement as ordered  - Monitor response to electrolyte replacements, including repeat lab results as appropriate  - Instruct patient on fluid and nutrition as appropriate  Outcome: Progressing     Problem: METABOLIC, FLUID AND ELECTROLYTES - ADULT  Goal: Fluid balance maintained  Description: INTERVENTIONS:  - Monitor labs   - Monitor I/O and WT  - Instruct patient on fluid and nutrition as appropriate  - Assess for signs &  symptoms of volume excess or deficit  Outcome: Progressing     Problem: METABOLIC, FLUID AND ELECTROLYTES - ADULT  Goal: Glucose maintained within target range  Description: INTERVENTIONS:  - Monitor Blood Glucose as ordered  - Assess for signs and symptoms of hyperglycemia and hypoglycemia  - Administer ordered medications to maintain glucose within target range  - Assess nutritional intake and initiate nutrition service referral as needed  Outcome: Progressing     Problem: Knowledge Deficit  Goal: Patient/family/caregiver demonstrates understanding of disease process, treatment plan, medications, and discharge instructions  Description: Complete learning assessment and assess knowledge base.  Interventions:  - Provide teaching at level of understanding  - Provide teaching via preferred learning methods  Outcome: Progressing     Problem: DISCHARGE PLANNING  Goal: Discharge to home or other facility with appropriate resources  Description: INTERVENTIONS:  - Identify barriers to discharge w/patient and caregiver  - Arrange for needed discharge resources and transportation as appropriate  - Identify discharge learning needs (meds, wound care, etc.)  - Arrange for interpretive services to assist at discharge as needed  - Refer to Case Management Department for coordinating discharge planning if the patient needs post-hospital services based on physician/advanced practitioner order or complex needs related to functional status, cognitive ability, or social support system  Outcome: Progressing

## 2024-09-26 NOTE — ASSESSMENT & PLAN NOTE
Per record review, the patient was hospitalized at  hospitals from 9/17 to 9/20/24 s/p right total knee arthroplasty on 9/17/24, WBAT RLE.   Undergoing therapy treatment at [facility]

## 2024-09-26 NOTE — ASSESSMENT & PLAN NOTE
Noted chronic history of arthritis  Per patient affecting multiple joints including bilateral knees and ankles as well as low back  Pain control as appropriate  Continue PT/OT  Follow up with PCP, Ortho/Pain Management as appropriate

## 2024-09-26 NOTE — ASSESSMENT & PLAN NOTE
Patient underwent right total knee arthroplasty on 9/17/24  WBAT RLE  Pain control as appropriate  DVT ppx: lovenox at rehab; anticipate transition to aspirin BID on discharge to complete ~28 days post-op DVT ppx as per Orthopedic recommendation (through 10/15)  Continue PT/OT   Continue compression/ACE wrap of RLE to assist with post-op edema  Follow up with PCP, Orthopedics as appropriate

## 2024-09-26 NOTE — ASSESSMENT & PLAN NOTE
Likely due to blood loss following orthopedic procedure  Continue patient's iron and folic acid supplementation

## 2024-09-26 NOTE — ASSESSMENT & PLAN NOTE
Stable on febuxostat, no recent/acute flare  (Used to be on allopurinol but reports he no longer takes that)  Follow up with PCP as appropriate

## 2024-09-27 LAB
ALBUMIN SERPL BCG-MCNC: 4 G/DL (ref 3.5–5)
ALP SERPL-CCNC: 164 U/L (ref 34–104)
ALT SERPL W P-5'-P-CCNC: 180 U/L (ref 7–52)
ANION GAP SERPL CALCULATED.3IONS-SCNC: 10 MMOL/L (ref 4–13)
ANION GAP SERPL CALCULATED.3IONS-SCNC: 11 MMOL/L (ref 4–13)
ANION GAP SERPL CALCULATED.3IONS-SCNC: 8 MMOL/L (ref 4–13)
ANION GAP SERPL CALCULATED.3IONS-SCNC: 8 MMOL/L (ref 4–13)
ANION GAP SERPL CALCULATED.3IONS-SCNC: 9 MMOL/L (ref 4–13)
ANION GAP SERPL CALCULATED.3IONS-SCNC: 9 MMOL/L (ref 4–13)
AST SERPL W P-5'-P-CCNC: 72 U/L (ref 13–39)
BILIRUB SERPL-MCNC: 1.34 MG/DL (ref 0.2–1)
BUN SERPL-MCNC: 10 MG/DL (ref 5–25)
BUN SERPL-MCNC: 11 MG/DL (ref 5–25)
BUN SERPL-MCNC: 9 MG/DL (ref 5–25)
CALCIUM SERPL-MCNC: 8.5 MG/DL (ref 8.4–10.2)
CALCIUM SERPL-MCNC: 8.6 MG/DL (ref 8.4–10.2)
CALCIUM SERPL-MCNC: 8.9 MG/DL (ref 8.4–10.2)
CALCIUM SERPL-MCNC: 9.2 MG/DL (ref 8.4–10.2)
CALCIUM SERPL-MCNC: 9.3 MG/DL (ref 8.4–10.2)
CALCIUM SERPL-MCNC: 9.6 MG/DL (ref 8.4–10.2)
CHLORIDE SERPL-SCNC: 90 MMOL/L (ref 96–108)
CHLORIDE SERPL-SCNC: 92 MMOL/L (ref 96–108)
CHLORIDE SERPL-SCNC: 97 MMOL/L (ref 96–108)
CHLORIDE SERPL-SCNC: 98 MMOL/L (ref 96–108)
CO2 SERPL-SCNC: 20 MMOL/L (ref 21–32)
CO2 SERPL-SCNC: 21 MMOL/L (ref 21–32)
CO2 SERPL-SCNC: 22 MMOL/L (ref 21–32)
CO2 SERPL-SCNC: 22 MMOL/L (ref 21–32)
CREAT SERPL-MCNC: 0.65 MG/DL (ref 0.6–1.3)
CREAT SERPL-MCNC: 0.66 MG/DL (ref 0.6–1.3)
CREAT SERPL-MCNC: 0.67 MG/DL (ref 0.6–1.3)
CREAT SERPL-MCNC: 0.69 MG/DL (ref 0.6–1.3)
CREAT SERPL-MCNC: 0.71 MG/DL (ref 0.6–1.3)
CREAT SERPL-MCNC: 0.74 MG/DL (ref 0.6–1.3)
GFR SERPL CREATININE-BSD FRML MDRD: 101 ML/MIN/1.73SQ M
GFR SERPL CREATININE-BSD FRML MDRD: 103 ML/MIN/1.73SQ M
GFR SERPL CREATININE-BSD FRML MDRD: 104 ML/MIN/1.73SQ M
GFR SERPL CREATININE-BSD FRML MDRD: 105 ML/MIN/1.73SQ M
GFR SERPL CREATININE-BSD FRML MDRD: 106 ML/MIN/1.73SQ M
GFR SERPL CREATININE-BSD FRML MDRD: 107 ML/MIN/1.73SQ M
GLUCOSE SERPL-MCNC: 123 MG/DL (ref 65–140)
GLUCOSE SERPL-MCNC: 74 MG/DL (ref 65–140)
GLUCOSE SERPL-MCNC: 84 MG/DL (ref 65–140)
GLUCOSE SERPL-MCNC: 91 MG/DL (ref 65–140)
GLUCOSE SERPL-MCNC: 92 MG/DL (ref 65–140)
GLUCOSE SERPL-MCNC: 92 MG/DL (ref 65–140)
HAV IGM SER QL: NORMAL
HBV CORE IGM SER QL: NORMAL
HBV SURFACE AG SER QL: NORMAL
HCV AB SER QL: NORMAL
POTASSIUM SERPL-SCNC: 3.8 MMOL/L (ref 3.5–5.3)
POTASSIUM SERPL-SCNC: 3.9 MMOL/L (ref 3.5–5.3)
POTASSIUM SERPL-SCNC: 4 MMOL/L (ref 3.5–5.3)
POTASSIUM SERPL-SCNC: 4.1 MMOL/L (ref 3.5–5.3)
PROT SERPL-MCNC: 6.5 G/DL (ref 6.4–8.4)
SODIUM SERPL-SCNC: 120 MMOL/L (ref 135–147)
SODIUM SERPL-SCNC: 122 MMOL/L (ref 135–147)
SODIUM SERPL-SCNC: 123 MMOL/L (ref 135–147)
SODIUM SERPL-SCNC: 123 MMOL/L (ref 135–147)
SODIUM SERPL-SCNC: 127 MMOL/L (ref 135–147)
SODIUM SERPL-SCNC: 128 MMOL/L (ref 135–147)

## 2024-09-27 PROCEDURE — 99223 1ST HOSP IP/OBS HIGH 75: CPT | Performed by: INTERNAL MEDICINE

## 2024-09-27 PROCEDURE — 99233 SBSQ HOSP IP/OBS HIGH 50: CPT | Performed by: FAMILY MEDICINE

## 2024-09-27 PROCEDURE — 97167 OT EVAL HIGH COMPLEX 60 MIN: CPT

## 2024-09-27 PROCEDURE — 80053 COMPREHEN METABOLIC PANEL: CPT | Performed by: FAMILY MEDICINE

## 2024-09-27 PROCEDURE — 94640 AIRWAY INHALATION TREATMENT: CPT

## 2024-09-27 PROCEDURE — 99232 SBSQ HOSP IP/OBS MODERATE 35: CPT | Performed by: INTERNAL MEDICINE

## 2024-09-27 PROCEDURE — NC001 PR NO CHARGE: Performed by: INTERNAL MEDICINE

## 2024-09-27 PROCEDURE — 80048 BASIC METABOLIC PNL TOTAL CA: CPT | Performed by: INTERNAL MEDICINE

## 2024-09-27 PROCEDURE — 97163 PT EVAL HIGH COMPLEX 45 MIN: CPT

## 2024-09-27 PROCEDURE — 80074 ACUTE HEPATITIS PANEL: CPT | Performed by: FAMILY MEDICINE

## 2024-09-27 PROCEDURE — 94760 N-INVAS EAR/PLS OXIMETRY 1: CPT

## 2024-09-27 RX ORDER — TOLVAPTAN 15 MG/1
15 TABLET ORAL ONCE
Status: COMPLETED | OUTPATIENT
Start: 2024-09-27 | End: 2024-09-27

## 2024-09-27 RX ORDER — FEBUXOSTAT 40 MG/1
40 TABLET, FILM COATED ORAL DAILY
Status: DISCONTINUED | OUTPATIENT
Start: 2024-09-27 | End: 2024-10-03 | Stop reason: HOSPADM

## 2024-09-27 RX ORDER — LIDOCAINE 50 MG/G
1 PATCH TOPICAL DAILY
Status: DISCONTINUED | OUTPATIENT
Start: 2024-09-27 | End: 2024-10-03 | Stop reason: HOSPADM

## 2024-09-27 RX ADMIN — ENOXAPARIN SODIUM 40 MG: 100 INJECTION SUBCUTANEOUS at 08:01

## 2024-09-27 RX ADMIN — DOCUSATE SODIUM 100 MG: 100 CAPSULE, LIQUID FILLED ORAL at 08:00

## 2024-09-27 RX ADMIN — PANTOPRAZOLE SODIUM 40 MG: 40 TABLET, DELAYED RELEASE ORAL at 05:25

## 2024-09-27 RX ADMIN — TOLVAPTAN 15 MG: 15 TABLET ORAL at 12:16

## 2024-09-27 RX ADMIN — ALBUTEROL SULFATE 2.5 MG: 2.5 SOLUTION RESPIRATORY (INHALATION) at 20:14

## 2024-09-27 RX ADMIN — GABAPENTIN 100 MG: 100 CAPSULE ORAL at 16:00

## 2024-09-27 RX ADMIN — GABAPENTIN 100 MG: 100 CAPSULE ORAL at 20:57

## 2024-09-27 RX ADMIN — FERROUS SULFATE TAB 325 MG (65 MG ELEMENTAL FE) 325 MG: 325 (65 FE) TAB at 07:52

## 2024-09-27 RX ADMIN — Medication 7.5 MG: at 00:29

## 2024-09-27 RX ADMIN — LIDOCAINE 5% 1 PATCH: 700 PATCH TOPICAL at 12:16

## 2024-09-27 RX ADMIN — OXYCODONE HYDROCHLORIDE AND ACETAMINOPHEN 500 MG: 500 TABLET ORAL at 08:00

## 2024-09-27 RX ADMIN — LISINOPRIL 10 MG: 10 TABLET ORAL at 08:00

## 2024-09-27 RX ADMIN — B-COMPLEX W/ C & FOLIC ACID TAB 1 TABLET: TAB at 08:00

## 2024-09-27 RX ADMIN — VERAPAMIL HYDROCHLORIDE 120 MG: 40 TABLET, FILM COATED ORAL at 08:00

## 2024-09-27 RX ADMIN — ACETAMINOPHEN 650 MG: 325 TABLET ORAL at 20:57

## 2024-09-27 RX ADMIN — FEBUXOSTAT 40 MG: 40 TABLET ORAL at 12:16

## 2024-09-27 RX ADMIN — GABAPENTIN 100 MG: 100 CAPSULE ORAL at 08:00

## 2024-09-27 RX ADMIN — MONTELUKAST 10 MG: 10 TABLET, FILM COATED ORAL at 08:00

## 2024-09-27 NOTE — PLAN OF CARE
Problem: PAIN - ADULT  Goal: Verbalizes/displays adequate comfort level or baseline comfort level  Description: Interventions:  - Encourage patient to monitor pain and request assistance  - Assess pain using appropriate pain scale  - Administer analgesics based on type and severity of pain and evaluate response  - Implement non-pharmacological measures as appropriate and evaluate response  - Consider cultural and social influences on pain and pain management  - Notify physician/advanced practitioner if interventions unsuccessful or patient reports new pain  Outcome: Progressing     Problem: INFECTION - ADULT  Goal: Absence or prevention of progression during hospitalization  Description: INTERVENTIONS:  - Assess and monitor for signs and symptoms of infection  - Monitor lab/diagnostic results  - Monitor all insertion sites, i.e. indwelling lines, tubes, and drains  - Monitor endotracheal if appropriate and nasal secretions for changes in amount and color  - Guild appropriate cooling/warming therapies per order  - Administer medications as ordered  - Instruct and encourage patient and family to use good hand hygiene technique  - Identify and instruct in appropriate isolation precautions for identified infection/condition  Outcome: Progressing     Problem: METABOLIC, FLUID AND ELECTROLYTES - ADULT  Goal: Electrolytes maintained within normal limits  Description: INTERVENTIONS:  - Monitor labs and assess patient for signs and symptoms of electrolyte imbalances  - Administer electrolyte replacement as ordered  - Monitor response to electrolyte replacements, including repeat lab results as appropriate  - Instruct patient on fluid and nutrition as appropriate  Outcome: Progressing     Problem: METABOLIC, FLUID AND ELECTROLYTES - ADULT  Goal: Fluid balance maintained  Description: INTERVENTIONS:  - Monitor labs   - Monitor I/O and WT  - Instruct patient on fluid and nutrition as appropriate  - Assess for signs &  symptoms of volume excess or deficit  Outcome: Progressing     Problem: METABOLIC, FLUID AND ELECTROLYTES - ADULT  Goal: Glucose maintained within target range  Description: INTERVENTIONS:  - Monitor Blood Glucose as ordered  - Assess for signs and symptoms of hyperglycemia and hypoglycemia  - Administer ordered medications to maintain glucose within target range  - Assess nutritional intake and initiate nutrition service referral as needed  Outcome: Progressing     Problem: Knowledge Deficit  Goal: Patient/family/caregiver demonstrates understanding of disease process, treatment plan, medications, and discharge instructions  Description: Complete learning assessment and assess knowledge base.  Interventions:  - Provide teaching at level of understanding  - Provide teaching via preferred learning methods  Outcome: Progressing     Problem: DISCHARGE PLANNING  Goal: Discharge to home or other facility with appropriate resources  Description: INTERVENTIONS:  - Identify barriers to discharge w/patient and caregiver  - Arrange for needed discharge resources and transportation as appropriate  - Identify discharge learning needs (meds, wound care, etc.)  - Arrange for interpretive services to assist at discharge as needed  - Refer to Case Management Department for coordinating discharge planning if the patient needs post-hospital services based on physician/advanced practitioner order or complex needs related to functional status, cognitive ability, or social support system  Outcome: Progressing

## 2024-09-27 NOTE — ASSESSMENT & PLAN NOTE
Status post right knee arthroplasty 9/17/2024.    Will require rehabilitation stay once sodium level has stabilized.

## 2024-09-27 NOTE — CONSULTS
Consultation -  [facility]  Alexx Haney 59 y.o. male MRN: [id number]  Unit/Bed#: 7T [facility] 704-01 Encounter: [id number]        Inpatient consult to gastroenterology  Consult performed by: JORDAN Dukes  Consult ordered by: Razia Mortensen MD          Reason for Consult / Principal Problem:     1.  Transaminitis      ASSESSMENT AND PLAN:      1.  Transaminitis  59-year-old male with recent admission for hyponatremia.  He is status post right knee arthroplasty on 9/17/202.  Was found to have elevated LFTs during admission with AST 72, , alk phos 164, albumin 4, total bilirubin 1.34, hemoglobin 8.7, WBC 7.74, RBCs 2.76, indices normal, platelets 394.  Denies any previous recreational drug use, tattoos and blood transfusions.  No herbal supplements or new medications.  Denies any abdominal pain.  Does not appear any ischemic that could have caused elevated LFTs.  Does use Tylenol approximately 2000 mg daily over the past several months.    -Will await ultrasound of right upper quadrant and hepatitis profile results  -Will trend LFTs.  If LFTs persistently elevated will expand on serological evaluation    GI will continue following patient    ______________________________________________________________________    HPI: 59-year-old male with past medical history of osteoarthritis status post right knee arthroplasty on 9/17/2024, HTN, vertigo, GERD who presented with hyponatremia, recommended hospital admission for rehab facility for close monitoring and treatment.    Patient reports feeling well with no complaints.  He denies any nausea, vomiting, abdominal pain, melena or hematochezia.  Upon admission, LFTs found to be elevated.  AST 72, , alk phos 164, albumin 4, total bilirubin 1.34.  Admits to drinking up to 12 drinks per week of bourbon.  Reports he has been doing this for at least 20 years.  Last alcohol use was 9/10/2024.  No family history of liver disease.   Upon reviewing previous labs, patient has had slight elevations in his LFTs over the past 8 to 9 years.  CT chest 7/19/2024 was normal.  Upper abdomen were within normal limits.  Labs upon admission sodium of 119 (127 today), potassium 3.8, creatinine 0.66, hemoglobin 8.7, MCV 91, WBC 7.74, RBCs 2.76, platelets 394.  Denies any previous recreational drug use, tattoos and blood transfusions.  No herbal supplement use.  No new medications.  Does report he uses Tylenol on a daily basis up to 2000 mg/day for the past several months.      REVIEW OF SYSTEMS:    CONSTITUTIONAL: Denies any fever, chills, rigors, and weight loss.  HEENT: No earache or tinnitus. Denies hearing loss or visual disturbances.  CARDIOVASCULAR: No chest pain or palpitations.   RESPIRATORY: Denies any cough, hemoptysis, shortness of breath or dyspnea on exertion.  GASTROINTESTINAL: As noted in the History of Present Illness.   GENITOURINARY: No problems with urination. Denies any hematuria or dysuria.  NEUROLOGIC: No dizziness or vertigo, denies headaches.   MUSCULOSKELETAL: Denies any muscle or joint pain.   SKIN: Denies skin rashes or itching.   ENDOCRINE: Denies excessive thirst. Denies intolerance to heat or cold.  PSYCHOSOCIAL: Denies depression or anxiety. Denies any recent memory loss.       Historical Information   Past Medical History:   Diagnosis Date    Acute bronchitis 01/29/2024    Anxiety 01/15/2019    Arthritis     Asthma     Chronic rhinitis     last assessed 2/21/14    Chronic serous otitis media     last assessed 11/20/13    Chronic sinusitis     last assessed 8/19/14    Functional heart murmur     GERD (gastroesophageal reflux disease)     Gout     Hearing problem     last assessed 12/1/16    Hiatal hernia     Hypercholesterolemia     Hypertension     Palpitations     Testicular hypogonadism     last assessed 10/4/13    V-tach (HCC)      Past Surgical History:   Procedure Laterality Date    APPENDECTOMY      BICEPS TENDON REPAIR  Left 2009    BICEPS TENODESIS      anesthesia for tenodesis- of ruptured long tendon of biceps     HERNIA REPAIR      OR ARTHRP KNE CONDYLE&PLATU MEDIAL&LAT COMPARTMENTS Right 9/17/2024    Procedure: ARTHROPLASTY KNEE TOTAL, potential same day discharge, cemented;  Surgeon: Parvin Whipple DO;  Location:  MAIN OR;  Service: Orthopedics    THYROIDECTOMY      TONSILLECTOMY       Social History   Social History     Substance and Sexual Activity   Alcohol Use Yes    Alcohol/week: 12.0 standard drinks of alcohol    Types: 12 Shots of liquor per week    Comment: occasionally; socially     Social History     Substance and Sexual Activity   Drug Use No     Social History     Tobacco Use   Smoking Status Never   Smokeless Tobacco Never     Family History   Problem Relation Age of Onset    Lung cancer Father     Coronary artery disease Father         blockages    Stroke Maternal Grandmother     Cancer Paternal Grandfather         metastasized    Heart disease Family     Lung cancer Cousin     No Known Problems Mother     No Known Problems Sister     No Known Problems Brother     No Known Problems Maternal Aunt     No Known Problems Maternal Uncle     No Known Problems Paternal Aunt     No Known Problems Paternal Uncle     No Known Problems Paternal Grandmother     ADD / ADHD Neg Hx     Anesthesia problems Neg Hx     Clotting disorder Neg Hx     Collagen disease Neg Hx     Diabetes Neg Hx     Dislocations Neg Hx     Learning disabilities Neg Hx     Neurological problems Neg Hx     Osteoporosis Neg Hx     Rheumatologic disease Neg Hx     Scoliosis Neg Hx     Vascular Disease Neg Hx        Meds/Allergies       Medications Prior to Admission:     acetaminophen (TYLENOL) 500 mg tablet    albuterol (2.5 mg/3 mL) 0.083 % nebulizer solution    albuterol (PROVENTIL HFA,VENTOLIN HFA) 90 mcg/act inhaler    ascorbic acid (VITAMIN C) 500 MG tablet    Chlorpheniramine Maleate (CHLOR-TRIMETON ALLERGY PO)    clotrimazole (LOTRIMIN)  1 % cream    docusate sodium (COLACE) 100 mg capsule    enoxaparin (Lovenox) 40 mg/0.4 mL    esomeprazole (NexIUM) 40 MG capsule    febuxostat (ULORIC) 40 mg tablet    ferrous sulfate 324 (65 Fe) mg    fluticasone-salmeterol (Advair) 500-50 mcg/dose inhaler    folic acid (FOLVITE) 1 mg tablet    gabapentin (NEURONTIN) 100 mg capsule    ipratropium-albuterol (DUO-NEB) 0.5-2.5 mg/3 mL    lisinopril (ZESTRIL) 20 mg tablet    meclizine (ANTIVERT) 12.5 MG tablet    methocarbamol (ROBAXIN) 500 mg tablet    montelukast (SINGULAIR) 10 mg tablet    Multiple Vitamin (multivitamin) tablet    oxyCODONE (OXY-IR) 5 MG capsule    oxyCODONE (ROXICODONE) 10 MG TABS    Pseudoephedrine-guaiFENesin (GUAIFENESIN 600/ PO)    sodium chloride 1 g tablet    Triamcinolone Acetonide (NASACORT ALLERGY 24HR NA)    verapamil (CALAN) 120 mg tablet    Current Facility-Administered Medications:     acetaminophen (TYLENOL) tablet 650 mg, Q6H PRN    albuterol inhalation solution 2.5 mg, Q6H PRN    ascorbic acid (VITAMIN C) tablet 500 mg, Daily    dextromethorphan-guaiFENesin (ROBITUSSIN DM) oral syrup 10 mL, Q4H PRN    docusate sodium (COLACE) capsule 100 mg, BID    enoxaparin (LOVENOX) subcutaneous injection 40 mg, Daily    febuxostat (ULORIC) tablet 40 mg, Daily    ferrous sulfate tablet 325 mg, Daily With Breakfast    fluticasone (FLONASE) 50 mcg/act nasal spray 1 spray, BID    fluticasone-vilanterol 200-25 mcg/actuation 1 puff, Daily    folic acid (FOLVITE) tablet 1 mg, Daily    gabapentin (NEURONTIN) capsule 100 mg, TID    ipratropium-albuterol (DUO-NEB) 0.5-2.5 mg/3 mL inhalation solution 3 mL, Q6H PRN    lidocaine (LIDODERM) 5 % patch 1 patch, Daily    loratadine (CLARITIN) tablet 10 mg, Daily    meclizine (ANTIVERT) tablet 12.5 mg, Q8H PRN    methocarbamol (ROBAXIN) tablet 250 mg, Q8H PRN    montelukast (SINGULAIR) tablet 10 mg, Daily    multivitamin stress formula tablet 1 tablet, Daily    [START ON 9/28/2024] NON FORMULARY, 0200     "ondansetron (ZOFRAN) injection 4 mg, Q6H PRN    oxyCODONE (ROXICODONE) IR tablet 5 mg, Q6H PRN **OR** oxyCODONE (ROXICODONE) split tablet 7.5 mg, Q6H PRN    tolvaptan (Samsca) tablet 15 mg, Once    verapamil (CALAN) tablet 120 mg, Daily    Allergies   Allergen Reactions    Penicillins Rash and Anaphylaxis    Acetazolamide      Other reaction(s): Stomach Ache    Cephalosporins Other (See Comments)     headache    Other     Sulfa Antibiotics Other (See Comments)     Category: Allergy; Annotation - [id number]: STOMACH UPSET    Famotidine Palpitations    Levofloxacin Palpitations    Omeprazole Palpitations     Painful urination           Objective     Blood pressure 150/76, pulse 68, temperature (!) 97.3 °F (36.3 °C), temperature source Temporal, resp. rate 20, height 5' 9\" (1.753 m), weight 88.3 kg (194 lb 10.7 oz), SpO2 98%. Body mass index is 28.75 kg/m².      Intake/Output Summary (Last 24 hours) at 9/27/2024 1112  Last data filed at 9/27/2024 1100  Gross per 24 hour   Intake 840 ml   Output 2050 ml   Net -1210 ml         PHYSICAL EXAM:      General Appearance:   Alert, cooperative, no distress   HEENT:   Normocephalic, atraumatic, anicteric.     Neck:  Supple, symmetrical, trachea midline   Lungs:   Clear to auscultation bilaterally; no rales, rhonchi or wheezing; respirations unlabored    Heart::   Regular rate and rhythm; no murmur, rub, or gallop.   Abdomen:   Soft, non-tender, non-distended; normal bowel sounds; no masses, no organomegaly    Genitalia:   Deferred    Rectal:   Deferred    Extremities:  No cyanosis, clubbing or edema    Pulses:  2+ and symmetric all extremities    Skin:  No jaundice, rashes, or lesions    Lymph nodes:  No palpable cervical lymphadenopathy        Lab Results:   Admission on 09/26/2024   Component Date Value    Sodium 09/26/2024 119 (LL)     Potassium 09/26/2024 4.0     Chloride 09/26/2024 88 (L)     CO2 09/26/2024 21     ANION GAP 09/26/2024 10     BUN 09/26/2024 11     Creatinine " 09/26/2024 0.70     Glucose 09/26/2024 94     Calcium 09/26/2024 8.8     eGFR 09/26/2024 104     Sodium 09/26/2024 119 (LL)     Potassium 09/26/2024 3.9     Chloride 09/26/2024 89 (L)     CO2 09/26/2024 20 (L)     ANION GAP 09/26/2024 10     BUN 09/26/2024 11     Creatinine 09/26/2024 0.68     Glucose 09/26/2024 99     Calcium 09/26/2024 8.8     eGFR 09/26/2024 105     Sodium 09/27/2024 120 (L)     Potassium 09/27/2024 3.9     Chloride 09/27/2024 90 (L)     CO2 09/27/2024 21     ANION GAP 09/27/2024 9     BUN 09/27/2024 10     Creatinine 09/27/2024 0.67     Glucose 09/27/2024 91     Calcium 09/27/2024 8.5     eGFR 09/27/2024 105     Sodium 09/27/2024 122 (L)     Potassium 09/27/2024 4.0     Chloride 09/27/2024 92 (L)     CO2 09/27/2024 21     ANION GAP 09/27/2024 9     BUN 09/27/2024 9     Creatinine 09/27/2024 0.65     Glucose 09/27/2024 92     Calcium 09/27/2024 8.9     eGFR 09/27/2024 107     Sodium 09/27/2024 123 (L)     Potassium 09/27/2024 4.1     Chloride 09/27/2024 92 (L)     CO2 09/27/2024 20 (L)     ANION GAP 09/27/2024 11     BUN 09/27/2024 10     Creatinine 09/27/2024 0.71     Glucose 09/27/2024 84     Calcium 09/27/2024 9.2     AST 09/27/2024 72 (H)     ALT 09/27/2024 180 (H)     Alkaline Phosphatase 09/27/2024 164 (H)     Total Protein 09/27/2024 6.5     Albumin 09/27/2024 4.0     Total Bilirubin 09/27/2024 1.34 (H)     eGFR 09/27/2024 103     Sodium 09/27/2024 123 (L)     Potassium 09/27/2024 4.0     Chloride 09/27/2024 92 (L)     CO2 09/27/2024 21     ANION GAP 09/27/2024 10     BUN 09/27/2024 10     Creatinine 09/27/2024 0.69     Glucose 09/27/2024 74     Calcium 09/27/2024 9.3     eGFR 09/27/2024 104     Sodium 09/27/2024 127 (L)     Potassium 09/27/2024 3.8     Chloride 09/27/2024 97     CO2 09/27/2024 22     ANION GAP 09/27/2024 8     BUN 09/27/2024 10     Creatinine 09/27/2024 0.66     Glucose 09/27/2024 92     Calcium 09/27/2024 8.6     eGFR 09/27/2024 106        Imaging Studies: I have  personally reviewed pertinent imaging studies.

## 2024-09-27 NOTE — CASE MANAGEMENT
Case Management Assessment & Discharge Planning Note    Patient name Alexx Haney  Location 7T [facility] 704/7T [facility] 704-01 MRN [id number]  : 1965 Date 2024       Current Admission Date: 2024  Current Admission Diagnosis:Hyponatremia   Patient Active Problem List    Diagnosis Date Noted Date Diagnosed    Primary hypertension 2024     Status post left knee replacement 2024     Anemia 2024     Advance care planning 2024     At risk for constipation 2024     At risk for delirium 2024     Acute post-operative pain 2024     Primary osteoarthritis involving multiple joints 2024     Other constipation 2024     Leukemoid reaction 2024     Acute blood loss as cause of postoperative anemia 2024     Status post total knee replacement, right 2024     Class 1 obesity due to excess calories without serious comorbidity with body mass index (BMI) of 30.0 to 30.9 in adult 2024     Hyperglycemia 2023     Hyponatremia 2023     Bilateral primary osteoarthritis of knee 2023     Effusion of right knee 08/15/2022     Tinea corporis 2022     Dysfunction of left eustachian tube 10/05/2020     Epidermoid cyst 2020     Elevated LFTs 2020     Chronic pain of both knees 2019     Anxiety 01/15/2019     Benign paroxysmal positional vertigo 11/15/2017     Gout 2017     Chronic bilateral low back pain without sciatica 2017     Thyroid disorder 2016     Tenosynovitis of foot 10/28/2016     Primary localized osteoarthritis of left knee 2016     Primary localized osteoarthritis of right knee 2016     Allergic rhinitis 2016     GERD without esophagitis 2016     V-tach (HCC) 2015     Essential hypertension 2015     DJD (degenerative joint disease) of knee 2015     Asthma 10/04/2013     Hyperlipidemia 10/04/2013       LOS (days): 1  Geometric Mean LOS (GMLOS)  (days): 2.6  Days to GMLOS:1.6     OBJECTIVE:    Risk of Unplanned Readmission Score: 14.9       Current admission status: Inpatient       Preferred Pharmacy:   [id number] - [place] - [address]  [address] [address]  Phone: [phone] Fax: [phone]    [facility] - [place] - [address]  [address] [facility] [address]  Phone: [phone] Fax: [phone]    Primary Care Provider: Jvaier Delacruz MD    Primary Insurance: MEDICARE  Secondary Insurance:     ASSESSMENT:  Active Health Care Proxies    There are no active Health Care Proxies on file.       Readmission Root Cause  30 Day Readmission: Yes  During your hospital stay, did someone (provider, nurse, ) explain your care to you in a way you could understand?:  (Was at [facility] was transferred to medical floor from rehab)  Did you feel medically stable to leave the hospital?:  (Was at [facility] was transferred to medical floor from rehab)  Were you able to pay for your medication at the pharmacy?:  (Was at [facility] was transferred to medical floor from rehab)  Did you have reliable transportation to take you to your appointments?: No  During previous admission, was a post-acute recommendation made?: Yes  What post-acute resources were offered?: STR  Patient was readmitted due to: Hyponatremia  Action Plan: Home with HHC vs OP therapy    Patient Information  Admitted from:: Facility (Was at [facility] for STR)  Mental Status: Alert  During Assessment patient was accompanied by: Not accompanied during assessment  Assessment information provided by:: Patient  Primary Caregiver: Self  Support Systems: Family members, Self, Parent  County of Residence: [place]  What city do you live in?: [place]  Home entry access options. Select all that apply.: Stairs  Number of steps to enter home.: 2  Do the steps have railings?: No  Type of Current Residence: 3 story  home  Upon entering residence, is there a bedroom on the main floor (no further steps)?: No  A bedroom is located on the following floor levels of residence (select all that apply):: 2nd Floor  Upon entering residence, is there a bathroom on the main floor (no further steps)?: No  Indicate which floors of current residence have a bathroom (select all the apply):: Basement  Number of steps to basement from main floor: One Flight  Number of steps to 2nd floor from main floor: One Flight  Living Arrangements: Lives w/ Parent(s)  Is patient a ?: No    Activities of Daily Living Prior to Admission  Functional Status: Independent  Completes ADLs independently?: Yes  Ambulates independently?: Yes  Does patient use assisted devices?: Yes  Assisted Devices (DME) used: Walker  Does patient currently own DME?: Yes  What DME does the patient currently own?: Walker  Does patient have a history of Outpatient Therapy (PT/OT)?: No  Does the patient have a history of Short-Term Rehab?: Yes ([facility])  Does patient have a history of HHC?: No  Does patient currently have HHC?: No    Patient Information Continued  Income Source: Unknown  Does patient have prescription coverage?: Yes  Does patient receive dialysis treatments?: No  Does patient have a history of substance abuse?: No  Does patient have a history of Mental Health Diagnosis?: No      Means of Transportation  Means of Transport to Appts:: Family transport    Social Determinants of Health (SDOH)      Flowsheet Row Most Recent Value   Housing Stability    In the last 12 months, was there a time when you were not able to pay the mortgage or rent on time? N   In the past 12 months, how many times have you moved where you were living? 0   At any time in the past 12 months, were you homeless or living in a shelter (including now)? N   Transportation Needs    In the past 12 months, has lack of transportation kept you from medical appointments or from getting medications? no    In the past 12 months, has lack of transportation kept you from meetings, work, or from getting things needed for daily living? No   Food Insecurity    Within the past 12 months, you worried that your food would run out before you got the money to buy more. Never true   Within the past 12 months, the food you bought just didn't last and you didn't have money to get more. Never true   Utilities    In the past 12 months has the electric, gas, oil, or water company threatened to shut off services in your home? No          DISCHARGE DETAILS:    Discharge planning discussed with:: Patient  Freedom of Choice: Yes     CM contacted family/caregiver?: No- see comments ([id number])  Were Treatment Team discharge recommendations reviewed with patient/caregiver?: Yes       Additional Comments: Met with patient at bedside and completed CM assessment Was at [facility] following Right TKR transferred to medical unit for hyponatremia.  Reports mother is having railing put on steps to basement where bathroom is located on Tuesday.  Discussed tehrapy recs level III either HHC or OP therapy.  Patient declines HHC and wants to go to OP therapy.  CM department following thru discharge

## 2024-09-27 NOTE — ASSESSMENT & PLAN NOTE
This is a 59-year-old male patient with history of osteoarthritis status post recent right total [facility] arthroplasty [facility], history of hypertension, asthma, allergies, BPPV, who is transferred for admission to internal medicine due to hyponatremia with sodium level of 120  Admitted to level 2 stepdown for close monitoring  Appreciate nephrology input, initiated on 1.8% saline at 50 mL/hr  Monitor BMP closely  Fluid restriction  Follow nephrology's recommendations

## 2024-09-27 NOTE — ASSESSMENT & PLAN NOTE
Unclear cause, LFTs were wnl last month  Check hepatitis panel, RUQ US  Consulted GI, appreciate input   Adequate: hears normal conversation without difficulty

## 2024-09-27 NOTE — OCCUPATIONAL THERAPY NOTE
Occupational Therapy Evaluation     Patient Name: Alexx Haney  Today's Date: 9/27/2024  Problem List  Principal Problem:    Hyponatremia  Active Problems:    Asthma    Essential hypertension    Benign paroxysmal positional vertigo    DJD (degenerative joint disease) of knee    GERD without esophagitis    Hyperlipidemia    Elevated LFTs    Anemia    Past Medical History  Past Medical History:   Diagnosis Date    Acute bronchitis 01/29/2024    Anxiety 01/15/2019    Arthritis     Asthma     Chronic rhinitis     last assessed 2/21/14    Chronic serous otitis media     last assessed 11/20/13    Chronic sinusitis     last assessed 8/19/14    Functional heart murmur     GERD (gastroesophageal reflux disease)     Gout     Hearing problem     last assessed 12/1/16    Hiatal hernia     Hypercholesterolemia     Hypertension     Palpitations     Testicular hypogonadism     last assessed 10/4/13    V-tach (HCC)      Past Surgical History  Past Surgical History:   Procedure Laterality Date    APPENDECTOMY      BICEPS TENDON REPAIR Left 2009    BICEPS TENODESIS      anesthesia for tenodesis- of ruptured long tendon of biceps     HERNIA REPAIR      [place] ARTHRP KNE CONDYLE&PLATU MEDIAL&LAT COMPARTMENTS Right 9/17/2024    Procedure: ARTHROPLASTY KNEE TOTAL, potential same day discharge, cemented;  Surgeon: Parvin Whipple DO;  Location:  MAIN OR;  Service: Orthopedics    THYROIDECTOMY      TONSILLECTOMY               09/27/24 1335   OT Last Visit   OT Visit Date 09/27/24   Note Type   Note type Evaluation   Pain Assessment   Pain Assessment Tool 0-10   Pain Score 4   Pain Location/Orientation Orientation: Right;Location: Knee   Patient's Stated Pain Goal No pain   Restrictions/Precautions   Weight Bearing Precautions Per Order No   RLE Weight Bearing Per Order WBAT   Other Precautions Fall Risk;Pain;Multiple lines   Home Living   Type of Home House   Home Layout Two level  (2 SH w/ 2 EMEKA and no HR.)   Bathroom  "Shower/Tub Tub/shower unit  (Located in basement w/ FF steps down and no HR's.)   Bathroom Toilet Standard   Bathroom Equipment Other (Comment)  (None per pt)   Home Equipment Walker;Cane   Prior Function   Level of [place] Independent with ADLs;Independent with functional mobility;Independent with IADLS   Lives With Other (Comment)  (Mother)   Falls in the last 6 months 0   Subjective   Subjective \" I'm feeling better with this IV\"   ADL   Eating Assistance 7  Independent   Grooming Assistance 7  Independent   UB Bathing Assistance 7  Independent   LB Bathing Assistance 6  Modified Independent   UB Dressing Assistance 7  Independent   LB Dressing Assistance 6  Modified independent   Toileting Assistance  5  Supervision/Setup   Bed Mobility   Supine to Sit 6  Modified independent   Additional items Increased time required   Sit to Supine 6  Modified independent   Additional items Increased time required   Additional Comments Remains in bed at end of session   Transfers   Sit to Stand 6  Modified independent   Stand to Sit 6  Modified independent   Stand pivot 6  Modified independent   Additional Comments MI for transfers and SPT using RW. (S) for longer distances. Cues to use RW as he tries to push to the side.   Balance   Static Sitting Good   Dynamic Sitting Fair +   Static Standing Fair +   Dynamic Standing Fair +   Ambulatory Fair +   Activity Tolerance   Activity Tolerance Patient limited by pain   Medical Staff Made Aware spoke to PT   Nurse Made Aware Stephanie BOLIVAR Assessment   RUE Assessment WFL  (4/5)   LUE Assessment   LUE Assessment WFL  (4/5)   Hand Function   Gross Motor Coordination Functional   Fine Motor Coordination Functional   Vision-Basic Assessment   Current Vision Wears glasses all the time   Psychosocial   Psychosocial (WDL) WDL   Patient Behaviors/Mood Anxious   Cognition   Arousal/Participation Alert   Attention Other (Comment)  (Diffiuclties remaining focused at times.)   Orientation " Level Oriented to person;Oriented to place;Oriented to situation   Memory Decreased short term memory   Following Commands Follows multistep commands with increased time or repetition   Assessment   Limitation Decreased endurance;Decreased self-care trans;Decreased high-level ADLs   Prognosis Good   Assessment Alexx is s/p R TKR on 9/17/24; receiving rehab on [facility] w/ anticipated DC home early next week. Transferred to medical 9/26/24 due to hyponatremia w/ a sodium level of 120. Pt w/ PMH significant for asthma, HTN, BPPV, DJD, GERD, anemia, and gout. Lives w/ mother in 2 SH w/ 2 EMEKA and no HR. Bathroom in basement w/ FF steps and no HR. (I) PLOF. Currently MI w/ ADLS,  toileting, and bed mobility. MI for  STS and S/MI for ADL txfs and functional mobility. Does endorse feeling dizzy at times and is being closely monitored due to sodium levels. Receiving IV's at this time. Reports general stiffness in R knee. Would benefit from OT to consistently perform ADLS (I)'ly and complete functional mobility w/ F+/G- balance. Pt tends to abandon RW and demonstrates decreased safety at times. Based on findings, Pt is of high complexity. Recommend minimal level resources (Home w/ therapy) once medically cleared.   Goals   Patient Goals To go home   Plan   Treatment Interventions ADL retraining;Functional transfer training;UE strengthening/ROM;Endurance training;Patient/family training;Equipment evaluation/education;Neuromuscular reeducation;Activityengagement   Goal Expiration Date 10/07/24   OT Treatment Day 0   OT Frequency 2-3x/wk   Discharge Recommendation   Rehab Resource Intensity Level, OT III (Minimum Resource Intensity)   AM-PAC Daily Activity Inpatient   Lower Body Dressing 3   Bathing 3   Toileting 3   Upper Body Dressing 4   Grooming 4   Eating 4   Daily Activity Raw Score 21   Daily Activity Standardized Score (Calc for Raw Score >=11) 44.27   AM-PAC Applied Cognition Inpatient   Following a Speech/Presentation 4    Understanding Ordinary Conversation 4   Taking Medications 4   Remembering Where Things Are Placed or Put Away 3   Remembering List of 4-5 Errands 3   Taking Care of Complicated Tasks 3   Applied Cognition Raw Score 21   Applied Cognition Standardized Score 44.3     GOALS to be completed in 10 days:       Pt will consistently complete LB ADL's w/ MI     Pt will consistently complete toileting including hygiene and clothing management w/ MI     Pt will complete functional transfers w/ MI using RW    Pt will complete dynamic and unsupported stand balance w/ G- for safe clothing management     Pt will improve stand tolerance to 5-10 mins for safety w/ ADL tasks     Pt will improve activity tolerance to G for 20- 30 min tx session for increased engagement in functional tasks     Pt will perform simulated IADLS w/ G- balance       Debbie Topete MS, OTR/L

## 2024-09-27 NOTE — PHYSICAL THERAPY NOTE
Physical Therapy Evaluation    Patient's Name: Alexx Haney    Admitting Diagnosis  Hyponatremia    Problem List  Patient Active Problem List   Diagnosis    Allergic rhinitis    Asthma    Essential hypertension    Benign paroxysmal positional vertigo    Chronic bilateral low back pain without sciatica    DJD (degenerative joint disease) of knee    GERD without esophagitis    Gout    Hyperlipidemia    Primary localized osteoarthritis of left knee    Primary localized osteoarthritis of right knee    Tenosynovitis of foot    Thyroid disorder    V-tach (HCC)    Anxiety    Chronic pain of both knees    Elevated LFTs    Epidermoid cyst    Dysfunction of left eustachian tube    Tinea corporis    Effusion of right knee    Bilateral primary osteoarthritis of knee    Hyponatremia    Hyperglycemia    Class 1 obesity due to excess calories without serious comorbidity with body mass index (BMI) of 30.0 to 30.9 in adult    Status post total knee replacement, right    Advance care planning    At risk for constipation    At risk for delirium    Acute post-operative pain    Primary osteoarthritis involving multiple joints    Other constipation    Leukemoid reaction    Acute blood loss as cause of postoperative anemia    Primary hypertension    Status post left knee replacement    Anemia       Past Medical History  Past Medical History:   Diagnosis Date    Acute bronchitis 01/29/2024    Anxiety 01/15/2019    Arthritis     Asthma     Chronic rhinitis     last assessed 2/21/14    Chronic serous otitis media     last assessed 11/20/13    Chronic sinusitis     last assessed 8/19/14    Functional heart murmur     GERD (gastroesophageal reflux disease)     Gout     Hearing problem     last assessed 12/1/16    Hiatal hernia     Hypercholesterolemia     Hypertension     Palpitations     Testicular hypogonadism     last assessed 10/4/13    V-tach (HCC)        Past Surgical History  Past Surgical History:   Procedure Laterality Date     APPENDECTOMY      BICEPS TENDON REPAIR Left 2009    BICEPS TENODESIS      anesthesia for tenodesis- of ruptured long tendon of biceps     HERNIA REPAIR      [place] ARTHRP KNE CONDYLE&PLATU MEDIAL&LAT COMPARTMENTS Right 9/17/2024    Procedure: ARTHROPLASTY KNEE TOTAL, potential same day discharge, cemented;  Surgeon: Parvin Whipple DO;  Location:  MAIN OR;  Service: Orthopedics    THYROIDECTOMY      TONSILLECTOMY         Recent Imaging  US right upper quadrant    (Results Pending)       Recent Vital Signs  Vitals:    09/27/24 0700 09/27/24 0725 09/27/24 1100 09/27/24 1500   BP:  155/78 150/76 119/71   BP Location:  Right arm Right arm Right arm   Pulse:  80 68 74   Resp:  20 20 20   Temp:  (!) 97 °F (36.1 °C) (!) 97.3 °F (36.3 °C) 98.1 °F (36.7 °C)   TempSrc:  Temporal Temporal Temporal   SpO2:  96% 98% 98%   Weight: 88.3 kg (194 lb 10.7 oz)      Height:            09/27/24 1140   PT Last Visit   PT Visit Date 09/27/24   Note Type   Note type Evaluation   Pain Assessment   Pain Assessment Tool 0-10   Pain Score 3   Pain Location/Orientation Orientation: Right;Location: Knee   Restrictions/Precautions   Weight Bearing Precautions Per Order Yes   RLE Weight Bearing Per Order WBAT   Other Precautions Pain;Multiple lines   Home Living   Type of Home House   Home Layout Two level   Bathroom Shower/Tub Tub/shower unit   Bathroom Toilet Standard   Home Equipment Walker;Cane   Prior Function   Level of [place] Independent with ADLs;Independent with functional mobility   Lives With Family   Receives Help From Family   General   Family/Caregiver Present No   Cognition   Overall Cognitive Status WFL   Arousal/Participation Alert   Attention Within functional limits   Orientation Level Oriented X4   Memory Within functional limits   Following Commands Follows all commands and directions without difficulty   RLE Assessment   RLE Assessment   (3+/5)   LLE Assessment   LLE Assessment   (4/5)   Coordination   Movements  are Fluid and Coordinated 0   Coordination and Movement Description decreased gross motor   Sensation X   Light Touch   RLE Light Touch Impaired   LLE Light Touch Impaired   Bed Mobility   Supine to Sit 6  Modified independent   Additional items Increased time required   Sit to Supine 6  Modified independent   Additional items Increased time required   Transfers   Sit to Stand 6  Modified independent   Additional items Increased time required   Stand to Sit 6  Modified independent   Additional items Increased time required   Ambulation/Elevation   Gait pattern Step through pattern;Decreased toe off;Decreased heel strike;Decreased foot clearance;Improper Weight shift;Antalgic   Gait Assistance 6  Modified independent   Additional items Verbal cues   Assistive Device Rolling walker   Distance 250ft   Balance   Static Sitting Good   Dynamic Sitting Fair +   Static Standing Fair +   Dynamic Standing Fair +   Ambulatory Fair +   Endurance Deficit   Endurance Deficit Yes   Endurance Deficit Description pain and fatigue   Activity Tolerance   Activity Tolerance Patient limited by pain   Medical Staff Made Aware spoke to CM   Nurse Made Aware spoke to RN   Assessment   Prognosis Good   Problem List Decreased strength;Decreased endurance;Decreased range of motion;Impaired balance;Decreased mobility;Decreased coordination;Impaired sensation;Pain   Barriers to Discharge Inaccessible home environment;Decreased caregiver support   Goals   Patient Goals to get home soon   STG Expiration Date 10/07/24   Short Term Goal #1 see eval note   Plan   Treatment/Interventions ADL retraining;Functional transfer training;LE strengthening/ROM;Elevations;Therapeutic exercise;Endurance training;Patient/family training;Equipment eval/education;Bed mobility;Gait training;Spoke to nursing;Spoke to case management;OT   PT Frequency 3-5x/wk   Discharge Recommendation   Rehab Resource Intensity Level, PT III (Minimum Resource Intensity)   Equipment  Recommended Walker   Walker Package Recommended Wheeled walker   AM-PAC Basic Mobility Inpatient   Turning in Flat Bed Without Bedrails 4   Lying on Back to Sitting on Edge of Flat Bed Without Bedrails 4   Moving Bed to Chair 4   Standing Up From Chair Using Arms 4   Walk in Room 4   Climb 3-5 Stairs With Railing 3   Basic Mobility Inpatient Raw Score 23   Basic Mobility Standardized Score 50.88   [facility] Highest Level Of Mobility   -HL Goal 7: Walk 25 feet or more   JH-HLM Achieved 8: Walk 250 feet ot more   End of Consult   Patient Position at End of Consult Supine;All needs within reach         ASSESSMENT                                                                                                                     Alexx LUCILLE Haney is a 59 y.o. male admitted to [facility] on 9/26/2024 for Hyponatremia. Pt  has a past medical history of Acute bronchitis (01/29/2024), Anxiety (01/15/2019), Arthritis, Asthma, Chronic rhinitis, Chronic serous otitis media, Chronic sinusitis, Functional heart murmur, GERD (gastroesophageal reflux disease), Gout, Hearing problem, Hiatal hernia, Hypercholesterolemia, Hypertension, Palpitations, Testicular hypogonadism, and V-tach (HCC).. PT was consulted and pt was seen on 9/27/2024 for mobility assessment and d/c planning.  Impairments limiting pt at this time include decreased ROM, impaired balance, decreased endurance, decreased coordination, decreased sensation, and decreased strength. Pt is currently functioning at a modified independent assistance level for bed mobility, modified independent assistance level for transfers, modified independent assistance level for ambulation with Rolling Walker. The patient's AM-[facility] Basic Mobility Inpatient Short Form Raw Score is 23. A Raw score of greater than 16 suggests the patient may benefit from discharge to home. Please also refer to the recommendation of the Physical Therapist for safe discharge planning.    Goals                                                                                                                                     1) Bed mobility skills with modified independent assistance to facilitate safe return to previous living environment 2) Functional transfers with modified independent assistance to facilitate safe return to previous living environment  3) Ambulation with least restrictive AD modified independent assistance without LOB and stable vitals for safe ambulation home/ community distances. 4) Stair training up/down flight 12 step/s with appropriate rail/s  and modified independent assistance for safe access to previous living environment. 5) Improve balance grades to fair + to reduce risk of falls. 6)Improve LE strength grades by 1 to increase independence w/ transfers and gait.  7) PT for ongoing pt and family education; DME needs and D/C planning to promote highest level of function in least restrictive environment.     Recommendations                                                                                                              Pt will benefit from continued skilled IP PT to address the above mentioned impairments in order to maximize recovery and increase functional independence when completing mobility and ADLs. See flow sheet for goals and POC.     DME: Rolling Walker    Discharge Disposition:  Home with Outpatient Physical Therapy       Syed Mcclure PT, DPT

## 2024-09-27 NOTE — PLAN OF CARE
Problem: PHYSICAL THERAPY ADULT  Goal: Performs mobility at highest level of function for planned discharge setting.  See evaluation for individualized goals.  Description: Treatment/Interventions: ADL retraining, Functional transfer training, LE strengthening/ROM, Elevations, Therapeutic exercise, Endurance training, Patient/family training, Equipment eval/education, Bed mobility, Gait training, Spoke to nursing, Spoke to case management, OT  Equipment Recommended: Walker       See flowsheet documentation for full assessment, interventions and recommendations.  Outcome: Progressing  Note: Prognosis: Good  Problem List: Decreased strength, Decreased endurance, Decreased range of motion, Impaired balance, Decreased mobility, Decreased coordination, Impaired sensation, Pain     Barriers to Discharge: Inaccessible home environment, Decreased caregiver support     Rehab Resource Intensity Level, PT: III (Minimum Resource Intensity)    See flowsheet documentation for full assessment.

## 2024-09-27 NOTE — PLAN OF CARE
Problem: PAIN - ADULT  Goal: Verbalizes/displays adequate comfort level or baseline comfort level  Description: Interventions:  - Encourage patient to monitor pain and request assistance  - Assess pain using appropriate pain scale  - Administer analgesics based on type and severity of pain and evaluate response  - Implement non-pharmacological measures as appropriate and evaluate response  - Consider cultural and social influences on pain and pain management  - Notify physician/advanced practitioner if interventions unsuccessful or patient reports new pain  Outcome: Progressing     Problem: METABOLIC, FLUID AND ELECTROLYTES - ADULT  Goal: Electrolytes maintained within normal limits  Description: INTERVENTIONS:  - Monitor labs and assess patient for signs and symptoms of electrolyte imbalances  - Administer electrolyte replacement as ordered  - Monitor response to electrolyte replacements, including repeat lab results as appropriate  - Instruct patient on fluid and nutrition as appropriate  Outcome: Progressing     Problem: NEUROSENSORY - ADULT  Goal: Achieves stable or improved neurological status  Description: INTERVENTIONS  - Monitor and report changes in neurological status  - Monitor vital signs such as temperature, blood pressure, glucose, and any other labs ordered   - Initiate measures to prevent increased intracranial pressure  - Monitor for seizure activity and implement precautions if appropriate      Outcome: Progressing     Problem: NEUROSENSORY - ADULT  Goal: Remains free of injury related to seizures activity  Description: INTERVENTIONS  - Maintain airway, patient safety  and administer oxygen as ordered  - Monitor patient for seizure activity, document and report duration and description of seizure to physician/advanced practitioner  - If seizure occurs,  ensure patient safety during seizure  - Reorient patient post seizure  - Seizure pads on all 4 side rails  - Instruct patient/family to notify RN  of any seizure activity including if an aura is experienced  - Instruct patient/family to call for assistance with activity based on nursing assessment  - Administer anti-seizure medications if ordered    Outcome: Progressing     Problem: CARDIOVASCULAR - ADULT  Goal: Maintains optimal cardiac output and hemodynamic stability  Description: INTERVENTIONS:  - Monitor I/O, vital signs and rhythm  - Monitor for S/S and trends of decreased cardiac output  - Administer and titrate ordered vasoactive medications to optimize hemodynamic stability  - Assess quality of pulses, skin color and temperature  - Assess for signs of decreased coronary artery perfusion  - Instruct patient to report change in severity of symptoms  Outcome: Progressing

## 2024-09-27 NOTE — PROGRESS NOTES
Progress Note - Hospitalist   Name: Alexx Haney 59 y.o. male I MRN: [id number]  Unit/Bed#: 7T [facility] 704-01 I Date of Admission: 9/26/2024   Date of Service: 9/27/2024 I Hospital Day: 1    Assessment & Plan  Hyponatremia  This is a 59-year-old male patient with history of osteoarthritis status post recent right total knee arthroplasty undergoing therapy at [facility], history of hypertension, asthma, allergies, BPPV, who is transferred for admission to internal medicine due to hyponatremia with sodium level of 120  Admitted to level 2 stepdown for close monitoring  Appreciate nephrology input, initiated on 1.8% saline at 50 mL/hr  Monitor BMP closely  Fluid restriction  Follow nephrology's recommendations  Asthma  Not in exacerbation  Continue inhalers, Singulair  Essential hypertension  Continue Lisinopril, verapamil  Monitor BP trend  Benign paroxysmal positional vertigo  On meclizine as needed  DJD (degenerative joint disease) of knee  Per record review, the patient was hospitalized at  [facility] from 9/17 to 9/20/24 s/p right total knee arthroplasty on 9/17/24, WBAT RLE.   Undergoing therapy treatment at [facility]  PT/OT while here  GERD without esophagitis  Continue PPI - patient prefers home Nexium, brought in med  Hyperlipidemia  Not on statin  Anemia  Likely due to blood loss following orthopedic procedure  Continue patient's iron and folic acid supplementation  Elevated LFTs  Unclear cause, LFTs were wnl last month  Check hepatitis panel, RUQ US  Consulted GI, appreciate input    VTE Pharmacologic Prophylaxis: VTE Score: 2 Lovenox    Mobility:   Basic Mobility Inpatient Raw Score: 17  JH-HLM Goal: 5: Stand one or more mins  JH-HLM Achieved: 6: Walk 10 steps or more    Patient Centered Rounds: I performed bedside rounds with nursing staff today.   Discussions with Specialists or Other Care Team Provider: Nephrology, GI    Education and Discussions with Family / Patient: Patient    Current Length of Stay: 1 day(s)  Current  Patient Status: Inpatient   Certification Statement: The patient will continue to require additional inpatient hospital stay due to close monitoirng, IV Rx  Discharge Plan: Anticipate discharge in 48-72 hrs to discharge location to be determined pending rehab evaluations.    Code Status: Level 1 - Full Code    Subjective   No acute complaints or overnight events.    Objective     Vitals:   Temp (24hrs), Av.7 °F (36.5 °C), Min:97 °F (36.1 °C), Max:98.1 °F (36.7 °C)    Temp:  [97 °F (36.1 °C)-98.1 °F (36.7 °C)] 97 °F (36.1 °C)  HR:  [75-88] 80  Resp:  [18-20] 20  BP: (138-155)/(75-90) 155/78  SpO2:  [95 %-99 %] 96 %  Body mass index is 28.75 kg/m².     Input and Output Summary (last 24 hours):     Intake/Output Summary (Last 24 hours) at 2024 0919  Last data filed at 2024 0843  Gross per 24 hour   Intake 840 ml   Output 1900 ml   Net -1060 ml       Physical Exam  Constitutional:       General: He is not in acute distress.  HENT:      Head: Normocephalic and atraumatic.   Eyes:      Conjunctiva/sclera: Conjunctivae normal.   Cardiovascular:      Rate and Rhythm: Normal rate and regular rhythm.   Pulmonary:      Effort: No respiratory distress.      Breath sounds: No wheezing or rales.   Abdominal:      General: There is no distension.      Tenderness: There is no abdominal tenderness. There is no guarding.   Musculoskeletal:      Right lower leg: Edema present.   Skin:     Findings: Bruising (R calf) present.   Neurological:      Mental Status: Mental status is at baseline.          Lines/Drains:  Lines/Drains/Airways       Active Status       None                            Lab Results: I have reviewed the following results:    Results from last 7 days   Lab Units 24  0743   WBC Thousand/uL 7.74   HEMOGLOBIN g/dL 8.7*   HEMATOCRIT % 25.2*   PLATELETS Thousands/uL 394*     Results from last 7 days   Lab Units 24  0719 24  0514   SODIUM mmol/L 122* 123*  123*   POTASSIUM mmol/L 4.0 4.1   4.0   CHLORIDE mmol/L 92* 92*  92*   CO2 mmol/L 21 20*  21   BUN mg/dL 9 10  10   CREATININE mg/dL 0.65 0.71  0.69   ANION GAP mmol/L 9 11  10   CALCIUM mg/dL 8.9 9.2  9.3   ALBUMIN g/dL  --  4.0   TOTAL BILIRUBIN mg/dL  --  1.34*   ALK PHOS U/L  --  164*   ALT U/L  --  180*   AST U/L  --  72*   GLUCOSE RANDOM mg/dL 92 84  74                       Recent Cultures (last 7 days):         Imaging Review: will review US RUQ      Last 24 Hours Medication List:     Current Facility-Administered Medications:     acetaminophen (TYLENOL) tablet 650 mg, Q6H PRN    albuterol inhalation solution 2.5 mg, Q6H PRN    ascorbic acid (VITAMIN C) tablet 500 mg, Daily    dextromethorphan-guaiFENesin (ROBITUSSIN DM) oral syrup 10 mL, Q4H PRN    docusate sodium (COLACE) capsule 100 mg, BID    enoxaparin (LOVENOX) subcutaneous injection 40 mg, Daily    febuxostat (ULORIC) tablet 40 mg, Daily    ferrous sulfate tablet 325 mg, Daily With Breakfast    fluticasone (FLONASE) 50 mcg/act nasal spray 1 spray, BID    fluticasone-vilanterol 200-25 mcg/actuation 1 puff, Daily    folic acid (FOLVITE) tablet 1 mg, Daily    gabapentin (NEURONTIN) capsule 100 mg, TID    ipratropium-albuterol (DUO-NEB) 0.5-2.5 mg/3 mL inhalation solution 3 mL, Q6H PRN    lisinopril (ZESTRIL) tablet 10 mg, Daily    loratadine (CLARITIN) tablet 10 mg, Daily    meclizine (ANTIVERT) tablet 12.5 mg, Q8H PRN    methocarbamol (ROBAXIN) tablet 250 mg, Q8H PRN    montelukast (SINGULAIR) tablet 10 mg, Daily    multivitamin stress formula tablet 1 tablet, Daily    [START ON 9/28/2024] NON FORMULARY, 0200    ondansetron (ZOFRAN) injection 4 mg, Q6H PRN    oxyCODONE (ROXICODONE) IR tablet 5 mg, Q6H PRN **OR** oxyCODONE (ROXICODONE) split tablet 7.5 mg, Q6H PRN    sodium chloride (HYPERTONIC) infusion 1.8%, Continuous, Last Rate: 50 mL/hr (09/26/24 2231)    verapamil (CALAN) tablet 120 mg, Daily    Administrative Statements   Today, Patient Was Seen By: Razia Mortensen,  MD      **Please Note: This note may have been constructed using a voice recognition system.**

## 2024-09-27 NOTE — ASSESSMENT & PLAN NOTE
Per record review, the patient was hospitalized at  \[facility]"" from 9/17 to 9/20/24 s/p right total knee arthroplasty on 9/17/24, WBAT RLE.   Undergoing therapy treatment at [facility]  PT/OT while here

## 2024-09-27 NOTE — PROGRESS NOTES
Progress Note - Nephrology   Name: Alexx Haney 59 y.o. male I MRN: [id number]  Unit/Bed#: 7T [facility] 704-01 I Date of Admission: 9/26/2024   Date of Service: 9/27/2024 I Hospital Day: 1     Assessment & Plan  Hyponatremia  Acute on chronic in nature.  Baseline sodium in the low 130s.  Sodium level noted to be 119 with normal glucose on 9/25/2024.  Started on salt tablets and fluid restriction without much improvement.  Transferred to [facility] for hypertonic saline administration.  Workup: Cortisol/uric acid/TSH level normal, serum osmolality 253, urine osmolality 425, urine sodium 9.  Repeat sodium level this morning 122, minimally improved.  Recommend discontinuing hypertonic saline and administering tolvaptan 15 mg x 1.  Lisinopril discontinued.  Fluid restriction slightly lifted to 1.8 L per 24 hours.  CT scan negative for malignancy.  Trending BMP every 6 hours.   Essential hypertension  Blood pressure remains stable.  Above goal for age.  Continue verapamil 120 mg daily.  Elevated LFTs  GI team is following.  Checking hepatitis studies.  Checking right upper quadrant ultrasound.  Anemia  Continue to monitor and transfuse as needed for hemoglobin less than 7.0.  DJD (degenerative joint disease) of knee  Status post right knee arthroplasty 9/17/2024.    Will require rehabilitation stay once sodium level has stabilized.    Other: Asthma, hyperlipidemia, GERD, DJD, vertigo.      History of Present Illness   Brief History of Admission -59-year-old male who was transferred from rehabilitation for concerns of hyponatremia.  Originally had right knee arthroplasty 9/17/2024.  Found to have hyponatremia while in rehab.  Nephrology is consulted for management of hyponatremia.    Subjective:    Objective      Temp:  [97 °F (36.1 °C)-97.9 °F (36.6 °C)] 97.3 °F (36.3 °C)  HR:  [68-88] 68  Resp:  [18-20] 20  BP: (138-155)/(75-90) 150/76  O2 Device: None (Room air)         Vitals:    09/26/24 1500 09/27/24 0700   Weight: 89.7  kg (197 lb 12 oz) 88.3 kg (194 lb 10.7 oz)     I/O last 24 hours:  In: 1180 [P.O.:1180]  Out: 2350 [Urine:2350]  Lines/Drains/Airways       Active Status       None                  Physical Exam  Vitals reviewed.   HENT:      Head: Normocephalic.      Nose: Nose normal.      Mouth/Throat:      Mouth: Mucous membranes are moist.   Cardiovascular:      Heart sounds: Normal heart sounds.   Pulmonary:      Breath sounds: Normal breath sounds.   Abdominal:      Palpations: Abdomen is soft.   Musculoskeletal:      Right lower leg: No edema.      Left lower leg: No edema.   Skin:     General: Skin is warm and dry.   Neurological:      General: No focal deficit present.      Mental Status: He is alert.   Psychiatric:         Mood and Affect: Mood normal.        Medications:    Current Facility-Administered Medications:     acetaminophen (TYLENOL) tablet 650 mg, 650 mg, Oral, Q6H PRN, Razia Mortensen MD, 650 mg at 09/26/24 2234    albuterol inhalation solution 2.5 mg, 2.5 mg, Nebulization, Q6H PRN, Razia Mortensen MD, 2.5 mg at 09/26/24 2133    ascorbic acid (VITAMIN C) tablet 500 mg, 500 mg, Oral, Daily, Razia Mortensen MD, 500 mg at 09/27/24 0800    dextromethorphan-guaiFENesin (ROBITUSSIN DM) oral syrup 10 mL, 10 mL, Oral, Q4H PRN, Razia Mortensen MD    docusate sodium (COLACE) capsule 100 mg, 100 mg, Oral, BID, Razia Mortensen MD, 100 mg at 09/27/24 0800    enoxaparin (LOVENOX) subcutaneous injection 40 mg, 40 mg, Subcutaneous, Daily, Razia Mortensen MD, 40 mg at 09/27/24 0801    febuxostat (ULORIC) tablet 40 mg, 40 mg, Oral, Daily, Razia Mortensen MD, 40 mg at 09/27/24 1216    ferrous sulfate tablet 325 mg, 325 mg, Oral, Daily With Breakfast, Razia Mortensen MD, 325 mg at 09/27/24 0752    fluticasone (FLONASE) 50 mcg/act nasal spray 1 spray, 1 spray, Each Nare, BID, Razia Mortensen MD    fluticasone-vilanterol 200-25 mcg/actuation 1 puff, 1 puff, Inhalation, Daily,  Razia Mortensen MD    folic acid (FOLVITE) tablet 1 mg, 1 mg, Oral, Daily, Razia Mortensen MD    gabapentin (NEURONTIN) capsule 100 mg, 100 mg, Oral, TID, Razia Mortensen MD, 100 mg at 09/27/24 0800    ipratropium-albuterol (DUO-NEB) 0.5-2.5 mg/3 mL inhalation solution 3 mL, 3 mL, Nebulization, Q6H PRN, Razia Mortensen MD    lidocaine (LIDODERM) 5 % patch 1 patch, 1 patch, Topical, Daily, Razia Mortensen MD, 1 patch at 09/27/24 1216    loratadine (CLARITIN) tablet 10 mg, 10 mg, Oral, Daily, Razia Mortensne MD    meclizine (ANTIVERT) tablet 12.5 mg, 12.5 mg, Oral, Q8H PRN, Razia Mortensen MD, 12.5 mg at 09/26/24 2047    methocarbamol (ROBAXIN) tablet 250 mg, 250 mg, Oral, Q8H PRN, Razia Mortensen MD    montelukast (SINGULAIR) tablet 10 mg, 10 mg, Oral, Daily, Razia Mortensen MD, 10 mg at 09/27/24 0800    multivitamin stress formula tablet 1 tablet, 1 tablet, Oral, Daily, Razia Mortensen MD, 1 tablet at 09/27/24 0800    [START ON 9/28/2024] NON FORMULARY, 40 mg, Oral, 0200, Razia Mortensen MD    ondansetron (ZOFRAN) injection 4 mg, 4 mg, Intravenous, Q6H PRN, Razia Mortensen MD    oxyCODONE (ROXICODONE) IR tablet 5 mg, 5 mg, Oral, Q6H PRN **OR** oxyCODONE (ROXICODONE) split tablet 7.5 mg, 7.5 mg, Oral, Q6H PRN, Razia Mortensen MD, 7.5 mg at 09/27/24 0029    verapamil (CALAN) tablet 120 mg, 120 mg, Oral, Daily, Razia Mortensen MD, 120 mg at 09/27/24 0800      Lab Results: I have reviewed the following results:   Results from last 7 days   Lab Units 09/27/24  1036 09/27/24  0719 09/27/24  0514 09/27/24  0049 09/26/24  2126 09/26/24  1703 09/26/24  0957 09/25/24  2150 09/25/24  0743 09/23/24  1143   WBC Thousand/uL  --   --   --   --   --   --   --   --  7.74 8.17   HEMOGLOBIN g/dL  --   --   --   --   --   --   --   --  8.7* 7.9*   HEMATOCRIT %  --   --   --   --   --   --   --   --  25.2* 22.8*   PLATELETS Thousands/uL  --   --   --    "--   --   --   --   --  394* 282   POTASSIUM mmol/L 3.8 4.0 4.1  4.0 3.9 3.9 4.0 4.2   < > 4.2  --    CHLORIDE mmol/L 97 92* 92*  92* 90* 89* 88* 88*   < > 89*  --    CO2 mmol/L 22 21 20*  21 21 20* 21 23   < > 23  --    BUN mg/dL 10 9 10  10 10 11 11 9   < > 10  --    CREATININE mg/dL 0.66 0.65 0.71  0.69 0.67 0.68 0.70 0.69   < > 0.68  --    CALCIUM mg/dL 8.6 8.9 9.2  9.3 8.5 8.8 8.8 9.3   < > 9.3  --    ALBUMIN g/dL  --   --  4.0  --   --   --   --   --   --   --     < > = values in this interval not displayed.       Administrative Statements     Portions of the record may have been created with voice recognition software. Occasional wrong word or \"sound a like\" substitutions may have occurred due to the inherent limitations of voice recognition software. Read the chart carefully and recognize, using context, where substitutions have occurred.If you have any questions, please contact the dictating provider.  "

## 2024-09-27 NOTE — ASSESSMENT & PLAN NOTE
Acute on chronic in nature.  Baseline sodium in the low 130s.  Sodium level noted to be 119 with normal glucose on 9/25/2024.  Started on salt tablets and fluid restriction without much improvement.  Transferred to [facility] for hypertonic saline administration.  Workup: Cortisol/uric acid/TSH level normal, serum osmolality 253, urine osmolality 425, urine sodium 9.  Repeat sodium level this morning 122, minimally improved.  Recommend discontinuing hypertonic saline and administering tolvaptan 15 mg x 1.  Lisinopril discontinued.  Fluid restriction slightly lifted to 1.8 L per 24 hours.  CT scan negative for malignancy.  Trending BMP every 6 hours.

## 2024-09-28 ENCOUNTER — APPOINTMENT (OUTPATIENT)
Dept: ULTRASOUND IMAGING | Facility: HOSPITAL | Age: 59
End: 2024-09-28
Payer: MEDICARE

## 2024-09-28 LAB
ALBUMIN SERPL BCG-MCNC: 4.1 G/DL (ref 3.5–5)
ALP SERPL-CCNC: 160 U/L (ref 34–104)
ALT SERPL W P-5'-P-CCNC: 141 U/L (ref 7–52)
ANION GAP SERPL CALCULATED.3IONS-SCNC: 11 MMOL/L (ref 4–13)
ANION GAP SERPL CALCULATED.3IONS-SCNC: 9 MMOL/L (ref 4–13)
AST SERPL W P-5'-P-CCNC: 46 U/L (ref 13–39)
BASOPHILS # BLD AUTO: 0.03 THOUSANDS/ΜL (ref 0–0.1)
BASOPHILS NFR BLD AUTO: 0 % (ref 0–1)
BILIRUB SERPL-MCNC: 1.18 MG/DL (ref 0.2–1)
BUN SERPL-MCNC: 12 MG/DL (ref 5–25)
BUN SERPL-MCNC: 12 MG/DL (ref 5–25)
BUN SERPL-MCNC: 13 MG/DL (ref 5–25)
BUN SERPL-MCNC: 18 MG/DL (ref 5–25)
CALCIUM SERPL-MCNC: 9.6 MG/DL (ref 8.4–10.2)
CALCIUM SERPL-MCNC: 9.7 MG/DL (ref 8.4–10.2)
CALCIUM SERPL-MCNC: 9.7 MG/DL (ref 8.4–10.2)
CALCIUM SERPL-MCNC: 9.9 MG/DL (ref 8.4–10.2)
CHLORIDE SERPL-SCNC: 97 MMOL/L (ref 96–108)
CHLORIDE SERPL-SCNC: 98 MMOL/L (ref 96–108)
CO2 SERPL-SCNC: 21 MMOL/L (ref 21–32)
CO2 SERPL-SCNC: 24 MMOL/L (ref 21–32)
CREAT SERPL-MCNC: 0.82 MG/DL (ref 0.6–1.3)
CREAT SERPL-MCNC: 0.83 MG/DL (ref 0.6–1.3)
CREAT SERPL-MCNC: 0.83 MG/DL (ref 0.6–1.3)
CREAT SERPL-MCNC: 0.94 MG/DL (ref 0.6–1.3)
EOSINOPHIL # BLD AUTO: 0.06 THOUSAND/ΜL (ref 0–0.61)
EOSINOPHIL NFR BLD AUTO: 1 % (ref 0–6)
ERYTHROCYTE [DISTWIDTH] IN BLOOD BY AUTOMATED COUNT: 13 % (ref 11.6–15.1)
GFR SERPL CREATININE-BSD FRML MDRD: 89 ML/MIN/1.73SQ M
GFR SERPL CREATININE-BSD FRML MDRD: 96 ML/MIN/1.73SQ M
GFR SERPL CREATININE-BSD FRML MDRD: 96 ML/MIN/1.73SQ M
GFR SERPL CREATININE-BSD FRML MDRD: 97 ML/MIN/1.73SQ M
GLUCOSE SERPL-MCNC: 103 MG/DL (ref 65–140)
GLUCOSE SERPL-MCNC: 103 MG/DL (ref 65–140)
GLUCOSE SERPL-MCNC: 111 MG/DL (ref 65–140)
GLUCOSE SERPL-MCNC: 112 MG/DL (ref 65–140)
HCT VFR BLD AUTO: 26.1 % (ref 36.5–49.3)
HGB BLD-MCNC: 8.8 G/DL (ref 12–17)
IMM GRANULOCYTES # BLD AUTO: 0.11 THOUSAND/UL (ref 0–0.2)
IMM GRANULOCYTES NFR BLD AUTO: 2 % (ref 0–2)
LYMPHOCYTES # BLD AUTO: 0.89 THOUSANDS/ΜL (ref 0.6–4.47)
LYMPHOCYTES NFR BLD AUTO: 12 % (ref 14–44)
MCH RBC QN AUTO: 31.2 PG (ref 26.8–34.3)
MCHC RBC AUTO-ENTMCNC: 33.7 G/DL (ref 31.4–37.4)
MCV RBC AUTO: 93 FL (ref 82–98)
MONOCYTES # BLD AUTO: 0.7 THOUSAND/ΜL (ref 0.17–1.22)
MONOCYTES NFR BLD AUTO: 10 % (ref 4–12)
NEUTROPHILS # BLD AUTO: 5.38 THOUSANDS/ΜL (ref 1.85–7.62)
NEUTS SEG NFR BLD AUTO: 75 % (ref 43–75)
NRBC BLD AUTO-RTO: 0 /100 WBCS
PLATELET # BLD AUTO: 446 THOUSANDS/UL (ref 149–390)
PMV BLD AUTO: 7.8 FL (ref 8.9–12.7)
POTASSIUM SERPL-SCNC: 3.8 MMOL/L (ref 3.5–5.3)
POTASSIUM SERPL-SCNC: 4 MMOL/L (ref 3.5–5.3)
POTASSIUM SERPL-SCNC: 4.1 MMOL/L (ref 3.5–5.3)
POTASSIUM SERPL-SCNC: 4.2 MMOL/L (ref 3.5–5.3)
PROT SERPL-MCNC: 6.7 G/DL (ref 6.4–8.4)
RBC # BLD AUTO: 2.82 MILLION/UL (ref 3.88–5.62)
SODIUM SERPL-SCNC: 130 MMOL/L (ref 135–147)
WBC # BLD AUTO: 7.17 THOUSAND/UL (ref 4.31–10.16)

## 2024-09-28 PROCEDURE — 80053 COMPREHEN METABOLIC PANEL: CPT | Performed by: FAMILY MEDICINE

## 2024-09-28 PROCEDURE — 99232 SBSQ HOSP IP/OBS MODERATE 35: CPT | Performed by: FAMILY MEDICINE

## 2024-09-28 PROCEDURE — 80048 BASIC METABOLIC PNL TOTAL CA: CPT | Performed by: INTERNAL MEDICINE

## 2024-09-28 PROCEDURE — 80048 BASIC METABOLIC PNL TOTAL CA: CPT | Performed by: NURSE PRACTITIONER

## 2024-09-28 PROCEDURE — 85025 COMPLETE CBC W/AUTO DIFF WBC: CPT | Performed by: FAMILY MEDICINE

## 2024-09-28 PROCEDURE — 76705 ECHO EXAM OF ABDOMEN: CPT

## 2024-09-28 RX ORDER — PANTOPRAZOLE SODIUM 40 MG/1
40 TABLET, DELAYED RELEASE ORAL
Status: DISCONTINUED | OUTPATIENT
Start: 2024-09-28 | End: 2024-10-03 | Stop reason: HOSPADM

## 2024-09-28 RX ADMIN — METHOCARBAMOL TABLETS 250 MG: 500 TABLET, COATED ORAL at 00:42

## 2024-09-28 RX ADMIN — MONTELUKAST 10 MG: 10 TABLET, FILM COATED ORAL at 08:23

## 2024-09-28 RX ADMIN — GABAPENTIN 100 MG: 100 CAPSULE ORAL at 20:03

## 2024-09-28 RX ADMIN — OXYCODONE HYDROCHLORIDE AND ACETAMINOPHEN 500 MG: 500 TABLET ORAL at 08:23

## 2024-09-28 RX ADMIN — GABAPENTIN 100 MG: 100 CAPSULE ORAL at 08:23

## 2024-09-28 RX ADMIN — ACETAMINOPHEN 650 MG: 325 TABLET ORAL at 16:28

## 2024-09-28 RX ADMIN — LIDOCAINE 5% 1 PATCH: 700 PATCH TOPICAL at 08:22

## 2024-09-28 RX ADMIN — FEBUXOSTAT 40 MG: 40 TABLET ORAL at 08:23

## 2024-09-28 RX ADMIN — FERROUS SULFATE TAB 325 MG (65 MG ELEMENTAL FE) 325 MG: 325 (65 FE) TAB at 08:23

## 2024-09-28 RX ADMIN — DOCUSATE SODIUM 100 MG: 100 CAPSULE, LIQUID FILLED ORAL at 08:23

## 2024-09-28 RX ADMIN — ENOXAPARIN SODIUM 40 MG: 100 INJECTION SUBCUTANEOUS at 08:22

## 2024-09-28 RX ADMIN — FLUTICASONE FUROATE AND VILANTEROL TRIFENATATE 1 PUFF: 200; 25 POWDER RESPIRATORY (INHALATION) at 08:24

## 2024-09-28 RX ADMIN — GABAPENTIN 100 MG: 100 CAPSULE ORAL at 16:26

## 2024-09-28 RX ADMIN — VERAPAMIL HYDROCHLORIDE 120 MG: 40 TABLET, FILM COATED ORAL at 08:22

## 2024-09-28 RX ADMIN — B-COMPLEX W/ C & FOLIC ACID TAB 1 TABLET: TAB at 08:22

## 2024-09-28 RX ADMIN — LORATADINE 10 MG: 10 TABLET ORAL at 08:23

## 2024-09-28 RX ADMIN — PANTOPRAZOLE SODIUM 40 MG: 40 TABLET, DELAYED RELEASE ORAL at 05:37

## 2024-09-28 RX ADMIN — FOLIC ACID 1 MG: 1 TABLET ORAL at 08:23

## 2024-09-28 NOTE — ASSESSMENT & PLAN NOTE
Unclear cause, LFTs were wnl last month  History of alcohol use, may be contirbuting  Improved   Check hepatitis panel, RUQ US  Consulted GI, appreciate input

## 2024-09-28 NOTE — ASSESSMENT & PLAN NOTE
This is a 59-year-old male patient with history of osteoarthritis status post recent right total knee arthroplasty undergoing therapy at [facility], history of hypertension, asthma, allergies, BPPV, who is transferred for admission to internal medicine due to hyponatremia with sodium level of 120  Admitted to level 2 stepdown for close monitoring  Appreciate nephrology input, received 1.8% saline now off IVF  Monitor BMP closely  Follow nephrology's recommendations

## 2024-09-28 NOTE — ASSESSMENT & PLAN NOTE
Per record review, the patient was hospitalized at  [facility] from 9/17 to 9/20/24 s/p right total knee arthroplasty on 9/17/24, WBAT RLE.   Undergoing therapy treatment at [facility]  PT/OT while here

## 2024-09-28 NOTE — PLAN OF CARE
Problem: PAIN - ADULT  Goal: Verbalizes/displays adequate comfort level or baseline comfort level  Description: Interventions:  - Encourage patient to monitor pain and request assistance  - Assess pain using appropriate pain scale  - Administer analgesics based on type and severity of pain and evaluate response  - Implement non-pharmacological measures as appropriate and evaluate response  - Consider cultural and social influences on pain and pain management  - Notify physician/advanced practitioner if interventions unsuccessful or patient reports new pain  Outcome: Progressing     Problem: INFECTION - ADULT  Goal: Absence or prevention of progression during hospitalization  Description: INTERVENTIONS:  - Assess and monitor for signs and symptoms of infection  - Monitor lab/diagnostic results  - Monitor all insertion sites, i.e. indwelling lines, tubes, and drains  - Monitor endotracheal if appropriate and nasal secretions for changes in amount and color  - [place] appropriate cooling/warming therapies per order  - Administer medications as ordered  - Instruct and encourage patient and family to use good hand hygiene technique  - Identify and instruct in appropriate isolation precautions for identified infection/condition  Outcome: Progressing     Problem: METABOLIC, FLUID AND ELECTROLYTES - ADULT  Goal: Electrolytes maintained within normal limits  Description: INTERVENTIONS:  - Monitor labs and assess patient for signs and symptoms of electrolyte imbalances  - Administer electrolyte replacement as ordered  - Monitor response to electrolyte replacements, including repeat lab results as appropriate  - Instruct patient on fluid and nutrition as appropriate  Outcome: Progressing     Problem: MUSCULOSKELETAL - ADULT  Goal: Maintain proper alignment of affected body part  Description: INTERVENTIONS:  - Support, maintain and protect limb and body alignment  - Provide patient/ family with appropriate  education  Outcome: Progressing

## 2024-09-28 NOTE — QUICK NOTE
Labs reviewed - sodium stable at 130 this AM, restart FR 1.8 Liters daily, follow labs every 12 hours, discussed with IM attending and she agrees with the plan

## 2024-09-28 NOTE — PROGRESS NOTES
Progress Note - Hospitalist   Name: Alexx Haney 59 y.o. male I MRN: [id number]  Unit/Bed#: 7T [facility] 704-01 I Date of Admission: 9/26/2024   Date of Service: 9/28/2024 I Hospital Day: 2    Assessment & Plan  Hyponatremia  This is a 59-year-old male patient with history of osteoarthritis status post recent right total knee arthroplasty undergoing therapy at [facility], history of hypertension, asthma, allergies, BPPV, who is transferred for admission to internal medicine due to hyponatremia with sodium level of 120  Admitted to level 2 stepdown for close monitoring  Appreciate nephrology input, received 1.8% saline now off IVF  Monitor BMP closely  Follow nephrology's recommendations  Asthma  Not in exacerbation  Continue inhalers, Singulair  Essential hypertension  Continue Lisinopril, verapamil  Monitor BP trend  Benign paroxysmal positional vertigo  On meclizine as needed  DJD (degenerative joint disease) of knee  Per record review, the patient was hospitalized at  \[facility]\"" from 9/17 to 9/20/24 s/p right total knee arthroplasty on 9/17/24, WBAT RLE.   Undergoing therapy treatment at [facility]  PT/OT while here  GERD without esophagitis  Continue PPI - patient prefers home Nexium, brought in med  Hyperlipidemia  Not on statin  Anemia  Likely due to blood loss following orthopedic procedure  Continue patient's iron and folic acid supplementation  Elevated LFTs  Unclear cause, LFTs were wnl last month  History of alcohol use, may be contirbuting  Improved   Check hepatitis panel, RUQ US  Consulted GI, appreciate input    VTE Pharmacologic Prophylaxis: VTE Score: 2 Low Risk (Score 0-2) - Encourage Ambulation.    Mobility:   Basic Mobility Inpatient Raw Score: 23  -HLM Goal: 7: Walk 25 feet or more  JH-HLM Achieved: 7: Walk 25 feet or more      Patient Centered Rounds: I performed bedside rounds with nursing staff today.   Discussions with Specialists or Other Care Team Provider: will d/w Nephrology    Education and Discussions with  Family / Patient: patient    Current Length of Stay: 2 day(s)  Current Patient Status: Inpatient   Certification Statement: The patient will continue to require additional inpatient hospital stay due to close monitoring  Discharge Plan: TBD    Code Status: Level 1 - Full Code    Subjective   No acute complaints or overnight events.    Objective     Vitals:   Temp (24hrs), Av °F (36.7 °C), Min:97.3 °F (36.3 °C), Max:98.7 °F (37.1 °C)    Temp:  [97.3 °F (36.3 °C)-98.7 °F (37.1 °C)] 98.7 °F (37.1 °C)  HR:  [68-82] 82  Resp:  [18-20] 18  BP: (106-150)/(55-80) 117/55  SpO2:  [92 %-98 %] 92 %  Body mass index is 27.77 kg/m².     Input and Output Summary (last 24 hours):     Intake/Output Summary (Last 24 hours) at 2024 1008  Last data filed at 2024 0919  Gross per 24 hour   Intake 1600 ml   Output 1520 ml   Net 80 ml       Physical Exam  Constitutional:       General: He is not in acute distress.  HENT:      Head: Normocephalic and atraumatic.   Eyes:      Conjunctiva/sclera: Conjunctivae normal.   Cardiovascular:      Rate and Rhythm: Normal rate and regular rhythm.   Pulmonary:      Effort: No respiratory distress.      Breath sounds: No wheezing or rales.   Abdominal:      General: There is no distension.      Tenderness: There is no abdominal tenderness. There is no guarding.   Musculoskeletal:      Right lower leg: Edema present.      Left lower leg: No edema.      Comments: Dressing intact   Skin:     General: Skin is warm and dry.   Neurological:      Mental Status: He is oriented to person, place, and time.   Psychiatric:         Mood and Affect: Mood normal.          Lines/Drains:  Lines/Drains/Airways       Active Status       None                            Lab Results: I have reviewed the following results:    Results from last 7 days   Lab Units 24  0622   WBC Thousand/uL 7.17   HEMOGLOBIN g/dL 8.8*   HEMATOCRIT % 26.1*   PLATELETS Thousands/uL 446*   SEGS PCT % 75   LYMPHO PCT % 12*    MONO PCT % 10   EOS PCT % 1     Results from last 7 days   Lab Units 09/28/24  0622   SODIUM mmol/L 130*  130*   POTASSIUM mmol/L 4.1  4.0   CHLORIDE mmol/L 97  97   CO2 mmol/L 24  24   BUN mg/dL 12  12   CREATININE mg/dL 0.83  0.83   ANION GAP mmol/L 9  9   CALCIUM mg/dL 9.7  9.6   ALBUMIN g/dL 4.1   TOTAL BILIRUBIN mg/dL 1.18*   ALK PHOS U/L 160*   ALT U/L 141*   AST U/L 46*   GLUCOSE RANDOM mg/dL 103  103                       Recent Cultures (last 7 days):         Imaging Review: no new      Last 24 Hours Medication List:     Current Facility-Administered Medications:     acetaminophen (TYLENOL) tablet 650 mg, Q6H PRN    albuterol inhalation solution 2.5 mg, Q6H PRN    ascorbic acid (VITAMIN C) tablet 500 mg, Daily    dextromethorphan-guaiFENesin (ROBITUSSIN DM) oral syrup 10 mL, Q4H PRN    docusate sodium (COLACE) capsule 100 mg, BID    enoxaparin (LOVENOX) subcutaneous injection 40 mg, Daily    febuxostat (ULORIC) tablet 40 mg, Daily    ferrous sulfate tablet 325 mg, Daily With Breakfast    fluticasone (FLONASE) 50 mcg/act nasal spray 1 spray, BID    fluticasone-vilanterol 200-25 mcg/actuation 1 puff, Daily    folic acid (FOLVITE) tablet 1 mg, Daily    gabapentin (NEURONTIN) capsule 100 mg, TID    ipratropium-albuterol (DUO-NEB) 0.5-2.5 mg/3 mL inhalation solution 3 mL, Q6H PRN    lidocaine (LIDODERM) 5 % patch 1 patch, Daily    loratadine (CLARITIN) tablet 10 mg, Daily    meclizine (ANTIVERT) tablet 12.5 mg, Q8H PRN    methocarbamol (ROBAXIN) tablet 250 mg, Q8H PRN    montelukast (SINGULAIR) tablet 10 mg, Daily    multivitamin stress formula tablet 1 tablet, Daily    ondansetron (ZOFRAN) injection 4 mg, Q6H PRN    oxyCODONE (ROXICODONE) IR tablet 5 mg, Q6H PRN **OR** oxyCODONE (ROXICODONE) split tablet 7.5 mg, Q6H PRN    pantoprazole (PROTONIX) EC tablet 40 mg, Early Morning    verapamil (CALAN) tablet 120 mg, Daily    Administrative Statements   Today, Patient Was Seen By: Razia Mortensen,  MD      **Please Note: This note may have been constructed using a voice recognition system.**

## 2024-09-29 LAB
ALBUMIN SERPL BCG-MCNC: 4 G/DL (ref 3.5–5)
ALP SERPL-CCNC: 144 U/L (ref 34–104)
ALT SERPL W P-5'-P-CCNC: 103 U/L (ref 7–52)
ANION GAP SERPL CALCULATED.3IONS-SCNC: 8 MMOL/L (ref 4–13)
ANION GAP SERPL CALCULATED.3IONS-SCNC: 9 MMOL/L (ref 4–13)
AST SERPL W P-5'-P-CCNC: 31 U/L (ref 13–39)
BILIRUB DIRECT SERPL-MCNC: 0.29 MG/DL (ref 0–0.2)
BILIRUB SERPL-MCNC: 1.2 MG/DL (ref 0.2–1)
BUN SERPL-MCNC: 14 MG/DL (ref 5–25)
BUN SERPL-MCNC: 15 MG/DL (ref 5–25)
CALCIUM SERPL-MCNC: 9.2 MG/DL (ref 8.4–10.2)
CALCIUM SERPL-MCNC: 9.4 MG/DL (ref 8.4–10.2)
CHLORIDE SERPL-SCNC: 93 MMOL/L (ref 96–108)
CHLORIDE SERPL-SCNC: 97 MMOL/L (ref 96–108)
CO2 SERPL-SCNC: 24 MMOL/L (ref 21–32)
CO2 SERPL-SCNC: 26 MMOL/L (ref 21–32)
CREAT SERPL-MCNC: 0.75 MG/DL (ref 0.6–1.3)
CREAT SERPL-MCNC: 0.87 MG/DL (ref 0.6–1.3)
GFR SERPL CREATININE-BSD FRML MDRD: 101 ML/MIN/1.73SQ M
GFR SERPL CREATININE-BSD FRML MDRD: 95 ML/MIN/1.73SQ M
GLUCOSE SERPL-MCNC: 101 MG/DL (ref 65–140)
GLUCOSE SERPL-MCNC: 123 MG/DL (ref 65–140)
POTASSIUM SERPL-SCNC: 4 MMOL/L (ref 3.5–5.3)
POTASSIUM SERPL-SCNC: 4.2 MMOL/L (ref 3.5–5.3)
PROT SERPL-MCNC: 6.3 G/DL (ref 6.4–8.4)
SODIUM SERPL-SCNC: 127 MMOL/L (ref 135–147)
SODIUM SERPL-SCNC: 130 MMOL/L (ref 135–147)

## 2024-09-29 PROCEDURE — 80076 HEPATIC FUNCTION PANEL: CPT | Performed by: FAMILY MEDICINE

## 2024-09-29 PROCEDURE — 94640 AIRWAY INHALATION TREATMENT: CPT

## 2024-09-29 PROCEDURE — 80048 BASIC METABOLIC PNL TOTAL CA: CPT | Performed by: INTERNAL MEDICINE

## 2024-09-29 PROCEDURE — 99232 SBSQ HOSP IP/OBS MODERATE 35: CPT | Performed by: FAMILY MEDICINE

## 2024-09-29 PROCEDURE — 94760 N-INVAS EAR/PLS OXIMETRY 1: CPT

## 2024-09-29 RX ADMIN — Medication 7.5 MG: at 16:57

## 2024-09-29 RX ADMIN — LIDOCAINE 5% 1 PATCH: 700 PATCH TOPICAL at 08:29

## 2024-09-29 RX ADMIN — DOCUSATE SODIUM 100 MG: 100 CAPSULE, LIQUID FILLED ORAL at 17:00

## 2024-09-29 RX ADMIN — FLUTICASONE FUROATE AND VILANTEROL TRIFENATATE 1 PUFF: 200; 25 POWDER RESPIRATORY (INHALATION) at 08:31

## 2024-09-29 RX ADMIN — MONTELUKAST 10 MG: 10 TABLET, FILM COATED ORAL at 08:31

## 2024-09-29 RX ADMIN — PANTOPRAZOLE SODIUM 40 MG: 40 TABLET, DELAYED RELEASE ORAL at 06:11

## 2024-09-29 RX ADMIN — GABAPENTIN 100 MG: 100 CAPSULE ORAL at 20:05

## 2024-09-29 RX ADMIN — ENOXAPARIN SODIUM 40 MG: 100 INJECTION SUBCUTANEOUS at 08:29

## 2024-09-29 RX ADMIN — FERROUS SULFATE TAB 325 MG (65 MG ELEMENTAL FE) 325 MG: 325 (65 FE) TAB at 08:31

## 2024-09-29 RX ADMIN — FOLIC ACID 1 MG: 1 TABLET ORAL at 08:31

## 2024-09-29 RX ADMIN — LORATADINE 10 MG: 10 TABLET ORAL at 08:31

## 2024-09-29 RX ADMIN — GUAIFENESIN AND DEXTROMETHORPHAN 10 ML: 20; 200 SYRUP ORAL at 16:17

## 2024-09-29 RX ADMIN — Medication 7.5 MG: at 01:05

## 2024-09-29 RX ADMIN — GABAPENTIN 100 MG: 100 CAPSULE ORAL at 15:12

## 2024-09-29 RX ADMIN — ACETAMINOPHEN 650 MG: 325 TABLET ORAL at 03:45

## 2024-09-29 RX ADMIN — FEBUXOSTAT 40 MG: 40 TABLET ORAL at 08:31

## 2024-09-29 RX ADMIN — METHOCARBAMOL TABLETS 250 MG: 500 TABLET, COATED ORAL at 11:21

## 2024-09-29 RX ADMIN — OXYCODONE HYDROCHLORIDE AND ACETAMINOPHEN 500 MG: 500 TABLET ORAL at 08:31

## 2024-09-29 RX ADMIN — GABAPENTIN 100 MG: 100 CAPSULE ORAL at 08:31

## 2024-09-29 RX ADMIN — GUAIFENESIN AND DEXTROMETHORPHAN 10 ML: 20; 200 SYRUP ORAL at 20:05

## 2024-09-29 RX ADMIN — B-COMPLEX W/ C & FOLIC ACID TAB 1 TABLET: TAB at 08:30

## 2024-09-29 RX ADMIN — DOCUSATE SODIUM 100 MG: 100 CAPSULE, LIQUID FILLED ORAL at 08:30

## 2024-09-29 RX ADMIN — ALBUTEROL SULFATE 2.5 MG: 2.5 SOLUTION RESPIRATORY (INHALATION) at 20:17

## 2024-09-29 NOTE — ASSESSMENT & PLAN NOTE
This is a 59-year-old male patient with history of osteoarthritis status post recent right total knee arthroplasty undergoing therapy at [facility], history of hypertension, asthma, allergies, BPPV, who is transferred for admission to internal medicine due to hyponatremia with sodium level of 120  Admitted to level 2 stepdown for close monitoring, now downgraded to Med-Surg with improvement in Na level  Appreciate nephrology input, received 1.8% saline now off IVF  Monitor BMP closely  Follow nephrology's recommendations

## 2024-09-29 NOTE — PLAN OF CARE
Problem: PAIN - ADULT  Goal: Verbalizes/displays adequate comfort level or baseline comfort level  Description: Interventions:  - Encourage patient to monitor pain and request assistance  - Assess pain using appropriate pain scale  - Administer analgesics based on type and severity of pain and evaluate response  - Implement non-pharmacological measures as appropriate and evaluate response  - Consider cultural and social influences on pain and pain management  - Notify physician/advanced practitioner if interventions unsuccessful or patient reports new pain  Outcome: Progressing     Problem: DISCHARGE PLANNING  Goal: Discharge to home or other facility with appropriate resources  Description: INTERVENTIONS:  - Identify barriers to discharge w/patient and caregiver  - Arrange for needed discharge resources and transportation as appropriate  - Identify discharge learning needs (meds, wound care, etc.)  - Arrange for interpretive services to assist at discharge as needed  - Refer to Case Management Department for coordinating discharge planning if the patient needs post-hospital services based on physician/advanced practitioner order or complex needs related to functional status, cognitive ability, or social support system  Outcome: Progressing     Problem: Knowledge Deficit  Goal: Patient/family/caregiver demonstrates understanding of disease process, treatment plan, medications, and discharge instructions  Description: Complete learning assessment and assess knowledge base.  Interventions:  - Provide teaching at level of understanding  - Provide teaching via preferred learning methods  Outcome: Progressing     Problem: METABOLIC, FLUID AND ELECTROLYTES - ADULT  Goal: Electrolytes maintained within normal limits  Description: INTERVENTIONS:  - Monitor labs and assess patient for signs and symptoms of electrolyte imbalances  - Administer electrolyte replacement as ordered  - Monitor response to electrolyte  replacements, including repeat lab results as appropriate  - Instruct patient on fluid and nutrition as appropriate  Outcome: Progressing  Goal: Fluid balance maintained  Description: INTERVENTIONS:  - Monitor labs   - Monitor I/O and WT  - Instruct patient on fluid and nutrition as appropriate  - Assess for signs & symptoms of volume excess or deficit  Outcome: Progressing  Goal: Glucose maintained within target range  Description: INTERVENTIONS:  - Monitor Blood Glucose as ordered  - Assess for signs and symptoms of hyperglycemia and hypoglycemia  - Administer ordered medications to maintain glucose within target range  - Assess nutritional intake and initiate nutrition service referral as needed  Outcome: Progressing

## 2024-09-29 NOTE — PROGRESS NOTES
Progress Note - Hospitalist   Name: Alexx Haney 59 y.o. male I MRN: [id number]  Unit/Bed#: 7T [facility] 704-01 I Date of Admission: 9/26/2024   Date of Service: 9/29/2024 I Hospital Day: 3    Assessment & Plan  Hyponatremia  This is a 59-year-old male patient with history of osteoarthritis status post recent right total knee arthroplasty undergoing therapy at [facility], history of hypertension, asthma, allergies, BPPV, who is transferred for admission to internal medicine due to hyponatremia with sodium level of 120  Admitted to level 2 stepdown for close monitoring, now downgraded to Med-Surg with improvement in Na level  Appreciate nephrology input, received 1.8% saline now off IVF  Monitor BMP closely  Follow nephrology's recommendations  Asthma  Not in exacerbation  Continue inhalers, Singulair  Essential hypertension  Continue Lisinopril, verapamil  Monitor BP trend  Benign paroxysmal positional vertigo  On meclizine as needed  DJD (degenerative joint disease) of knee  Per record review, the patient was hospitalized at  [facility] from 9/17 to 9/20/24 s/p right total knee arthroplasty on 9/17/24, WBAT RLE.   Undergoing therapy treatment at [facility]  PT/OT while here  Plan to discharge home following hospitalization  GERD without esophagitis  Continue PPI - patient prefers home Nexium, brought in med  Hyperlipidemia  Not on statin  Anemia  Likely due to blood loss following orthopedic procedure  Continue patient's iron and folic acid supplementation  Elevated LFTs  History of alcohol use, may be contirbuting  Continues to improve  RUQ US unremarkable  Hepatitis panel normal  Consulted GI, appreciate input    VTE Pharmacologic Prophylaxis: Lovenox    Mobility:   Basic Mobility Inpatient Raw Score: 23  JH-HLM Goal: 7: Walk 25 feet or more  JH-HLM Achieved: 7: Walk 25 feet or more  JH-HLM Goal achieved. Continue to encourage appropriate mobility.    Patient Centered Rounds: I performed bedside rounds with nursing staff today.    Discussions with Specialists or Other Care Team Provider: will d/w Nephrology    Education and Discussions with Family / Patient: patient    Current Length of Stay: 3 day(s)  Current Patient Status: Inpatient   Certification Statement: The patient will continue to require additional inpatient hospital stay due to need for close monitoring  Discharge Plan: Anticipate discharge in 24-48 hrs to home with home services.    Code Status: Level 1 - Full Code    Subjective   Patient reports pain in RLE. No other complaints.    Objective     Vitals:   Temp (24hrs), Av.8 °F (36.6 °C), Min:97.4 °F (36.3 °C), Max:98 °F (36.7 °C)    Temp:  [97.4 °F (36.3 °C)-98 °F (36.7 °C)] 98 °F (36.7 °C)  HR:  [75-78] 77  Resp:  [18] 18  BP: (103-119)/(69-76) 103/69  SpO2:  [97 %-98 %] 98 %  Body mass index is 27.77 kg/m².     Input and Output Summary (last 24 hours):     Intake/Output Summary (Last 24 hours) at 2024 1240  Last data filed at 2024 0906  Gross per 24 hour   Intake 880 ml   Output 400 ml   Net 480 ml       Physical Exam  Constitutional:       General: He is not in acute distress.  HENT:      Head: Normocephalic and atraumatic.   Cardiovascular:      Rate and Rhythm: Normal rate and regular rhythm.   Pulmonary:      Effort: No respiratory distress.   Abdominal:      General: There is no distension.      Tenderness: There is no abdominal tenderness.   Musculoskeletal:      Right lower leg: Edema present.   Skin:     Findings: Bruising (RLE) present.   Neurological:      Mental Status: He is oriented to person, place, and time.          Lines/Drains:  Lines/Drains/Airways       Active Status       None                            Lab Results: I have reviewed the following results:    Results from last 7 days   Lab Units 24  0622   WBC Thousand/uL 7.17   HEMOGLOBIN g/dL 8.8*   HEMATOCRIT % 26.1*   PLATELETS Thousands/uL 446*   SEGS PCT % 75   LYMPHO PCT % 12*   MONO PCT % 10   EOS PCT % 1     Results from last 7  days   Lab Units 09/29/24  0517   SODIUM mmol/L 130*   POTASSIUM mmol/L 4.0   CHLORIDE mmol/L 97   CO2 mmol/L 24   BUN mg/dL 15   CREATININE mg/dL 0.75   ANION GAP mmol/L 9   CALCIUM mg/dL 9.4   ALBUMIN g/dL 4.0   TOTAL BILIRUBIN mg/dL 1.20*   ALK PHOS U/L 144*   ALT U/L 103*   AST U/L 31   GLUCOSE RANDOM mg/dL 101                       Recent Cultures (last 7 days):         Imaging Review: no new      Last 24 Hours Medication List:     Current Facility-Administered Medications:     acetaminophen (TYLENOL) tablet 650 mg, Q6H PRN    albuterol inhalation solution 2.5 mg, Q6H PRN    ascorbic acid (VITAMIN C) tablet 500 mg, Daily    dextromethorphan-guaiFENesin (ROBITUSSIN DM) oral syrup 10 mL, Q4H PRN    docusate sodium (COLACE) capsule 100 mg, BID    enoxaparin (LOVENOX) subcutaneous injection 40 mg, Daily    febuxostat (ULORIC) tablet 40 mg, Daily    ferrous sulfate tablet 325 mg, Daily With Breakfast    fluticasone (FLONASE) 50 mcg/act nasal spray 1 spray, BID    fluticasone-vilanterol 200-25 mcg/actuation 1 puff, Daily    folic acid (FOLVITE) tablet 1 mg, Daily    gabapentin (NEURONTIN) capsule 100 mg, TID    ipratropium-albuterol (DUO-NEB) 0.5-2.5 mg/3 mL inhalation solution 3 mL, Q6H PRN    lidocaine (LIDODERM) 5 % patch 1 patch, Daily    loratadine (CLARITIN) tablet 10 mg, Daily    meclizine (ANTIVERT) tablet 12.5 mg, Q8H PRN    methocarbamol (ROBAXIN) tablet 250 mg, Q8H PRN    montelukast (SINGULAIR) tablet 10 mg, Daily    multivitamin stress formula tablet 1 tablet, Daily    ondansetron (ZOFRAN) injection 4 mg, Q6H PRN    oxyCODONE (ROXICODONE) IR tablet 5 mg, Q6H PRN **OR** oxyCODONE (ROXICODONE) split tablet 7.5 mg, Q6H PRN    pantoprazole (PROTONIX) EC tablet 40 mg, Early Morning    verapamil (CALAN) tablet 120 mg, Daily    Administrative Statements   Today, Patient Was Seen By: Razia Mortensen MD      **Please Note: This note may have been constructed using a voice recognition system.**

## 2024-09-29 NOTE — ASSESSMENT & PLAN NOTE
History of alcohol use, may be contirbuting  Continues to improve  RUQ US unremarkable  Hepatitis panel normal  Consulted GI, appreciate input

## 2024-09-29 NOTE — ASSESSMENT & PLAN NOTE
Per record review, the patient was hospitalized at  hospitals from 9/17 to 9/20/24 s/p right total knee arthroplasty on 9/17/24, WBAT RLE.   Undergoing therapy treatment at [facility]  PT/OT while here  Plan to discharge home following hospitalization

## 2024-09-30 ENCOUNTER — APPOINTMENT (INPATIENT)
Dept: RADIOLOGY | Facility: HOSPITAL | Age: 59
End: 2024-09-30
Payer: MEDICARE

## 2024-09-30 PROBLEM — M79.671 RIGHT FOOT PAIN: Status: ACTIVE | Noted: 2024-09-30

## 2024-09-30 LAB
ALBUMIN SERPL BCG-MCNC: 3.7 G/DL (ref 3.5–5)
ALP SERPL-CCNC: 140 U/L (ref 34–104)
ALT SERPL W P-5'-P-CCNC: 81 U/L (ref 7–52)
ANION GAP SERPL CALCULATED.3IONS-SCNC: 10 MMOL/L (ref 4–13)
AST SERPL W P-5'-P-CCNC: 39 U/L (ref 13–39)
BASOPHILS # BLD AUTO: 0.03 THOUSANDS/ΜL (ref 0–0.1)
BASOPHILS NFR BLD AUTO: 0 % (ref 0–1)
BILIRUB SERPL-MCNC: 1.44 MG/DL (ref 0.2–1)
BUN SERPL-MCNC: 13 MG/DL (ref 5–25)
CALCIUM SERPL-MCNC: 9.3 MG/DL (ref 8.4–10.2)
CHLORIDE SERPL-SCNC: 93 MMOL/L (ref 96–108)
CO2 SERPL-SCNC: 24 MMOL/L (ref 21–32)
CREAT SERPL-MCNC: 0.75 MG/DL (ref 0.6–1.3)
EOSINOPHIL # BLD AUTO: 0.02 THOUSAND/ΜL (ref 0–0.61)
EOSINOPHIL NFR BLD AUTO: 0 % (ref 0–6)
ERYTHROCYTE [DISTWIDTH] IN BLOOD BY AUTOMATED COUNT: 12.7 % (ref 11.6–15.1)
GFR SERPL CREATININE-BSD FRML MDRD: 101 ML/MIN/1.73SQ M
GLUCOSE SERPL-MCNC: 115 MG/DL (ref 65–140)
HCT VFR BLD AUTO: 27 % (ref 36.5–49.3)
HGB BLD-MCNC: 9 G/DL (ref 12–17)
IMM GRANULOCYTES # BLD AUTO: 0.08 THOUSAND/UL (ref 0–0.2)
IMM GRANULOCYTES NFR BLD AUTO: 1 % (ref 0–2)
LYMPHOCYTES # BLD AUTO: 0.86 THOUSANDS/ΜL (ref 0.6–4.47)
LYMPHOCYTES NFR BLD AUTO: 8 % (ref 14–44)
MCH RBC QN AUTO: 30.4 PG (ref 26.8–34.3)
MCHC RBC AUTO-ENTMCNC: 33.3 G/DL (ref 31.4–37.4)
MCV RBC AUTO: 91 FL (ref 82–98)
MONOCYTES # BLD AUTO: 1.09 THOUSAND/ΜL (ref 0.17–1.22)
MONOCYTES NFR BLD AUTO: 10 % (ref 4–12)
NEUTROPHILS # BLD AUTO: 8.67 THOUSANDS/ΜL (ref 1.85–7.62)
NEUTS SEG NFR BLD AUTO: 81 % (ref 43–75)
NRBC BLD AUTO-RTO: 0 /100 WBCS
PLATELET # BLD AUTO: 494 THOUSANDS/UL (ref 149–390)
PMV BLD AUTO: 7.7 FL (ref 8.9–12.7)
POTASSIUM SERPL-SCNC: 4.4 MMOL/L (ref 3.5–5.3)
PROT SERPL-MCNC: 6.6 G/DL (ref 6.4–8.4)
RBC # BLD AUTO: 2.96 MILLION/UL (ref 3.88–5.62)
SODIUM SERPL-SCNC: 127 MMOL/L (ref 135–147)
URATE SERPL-MCNC: 4 MG/DL (ref 3.5–8.5)
WBC # BLD AUTO: 10.75 THOUSAND/UL (ref 4.31–10.16)

## 2024-09-30 PROCEDURE — 99232 SBSQ HOSP IP/OBS MODERATE 35: CPT | Performed by: INTERNAL MEDICINE

## 2024-09-30 PROCEDURE — 97110 THERAPEUTIC EXERCISES: CPT

## 2024-09-30 PROCEDURE — 84550 ASSAY OF BLOOD/URIC ACID: CPT | Performed by: INTERNAL MEDICINE

## 2024-09-30 PROCEDURE — 80053 COMPREHEN METABOLIC PANEL: CPT | Performed by: FAMILY MEDICINE

## 2024-09-30 PROCEDURE — 97535 SELF CARE MNGMENT TRAINING: CPT

## 2024-09-30 PROCEDURE — 97530 THERAPEUTIC ACTIVITIES: CPT

## 2024-09-30 PROCEDURE — 73630 X-RAY EXAM OF FOOT: CPT

## 2024-09-30 PROCEDURE — 73610 X-RAY EXAM OF ANKLE: CPT

## 2024-09-30 PROCEDURE — 85025 COMPLETE CBC W/AUTO DIFF WBC: CPT | Performed by: INTERNAL MEDICINE

## 2024-09-30 PROCEDURE — 94640 AIRWAY INHALATION TREATMENT: CPT

## 2024-09-30 RX ORDER — PREDNISONE 20 MG/1
40 TABLET ORAL DAILY
Status: DISCONTINUED | OUTPATIENT
Start: 2024-09-30 | End: 2024-10-03 | Stop reason: HOSPADM

## 2024-09-30 RX ORDER — SODIUM CHLORIDE 1 G/1
1 TABLET ORAL
Status: DISCONTINUED | OUTPATIENT
Start: 2024-09-30 | End: 2024-10-01

## 2024-09-30 RX ADMIN — PANTOPRAZOLE SODIUM 40 MG: 40 TABLET, DELAYED RELEASE ORAL at 05:10

## 2024-09-30 RX ADMIN — MONTELUKAST 10 MG: 10 TABLET, FILM COATED ORAL at 08:45

## 2024-09-30 RX ADMIN — GABAPENTIN 100 MG: 100 CAPSULE ORAL at 15:11

## 2024-09-30 RX ADMIN — SODIUM CHLORIDE 1 G: 1 TABLET ORAL at 15:46

## 2024-09-30 RX ADMIN — ALBUTEROL SULFATE 2.5 MG: 2.5 SOLUTION RESPIRATORY (INHALATION) at 20:15

## 2024-09-30 RX ADMIN — ENOXAPARIN SODIUM 40 MG: 100 INJECTION SUBCUTANEOUS at 08:45

## 2024-09-30 RX ADMIN — FERROUS SULFATE TAB 325 MG (65 MG ELEMENTAL FE) 325 MG: 325 (65 FE) TAB at 08:45

## 2024-09-30 RX ADMIN — PREDNISONE 40 MG: 20 TABLET ORAL at 12:52

## 2024-09-30 RX ADMIN — ACETAMINOPHEN 650 MG: 325 TABLET ORAL at 12:52

## 2024-09-30 RX ADMIN — GABAPENTIN 100 MG: 100 CAPSULE ORAL at 20:33

## 2024-09-30 RX ADMIN — DOCUSATE SODIUM 100 MG: 100 CAPSULE, LIQUID FILLED ORAL at 08:45

## 2024-09-30 RX ADMIN — GUAIFENESIN AND DEXTROMETHORPHAN 10 ML: 20; 200 SYRUP ORAL at 19:23

## 2024-09-30 RX ADMIN — GABAPENTIN 100 MG: 100 CAPSULE ORAL at 08:45

## 2024-09-30 RX ADMIN — FOLIC ACID 1 MG: 1 TABLET ORAL at 08:45

## 2024-09-30 RX ADMIN — FLUTICASONE FUROATE AND VILANTEROL TRIFENATATE 1 PUFF: 200; 25 POWDER RESPIRATORY (INHALATION) at 08:48

## 2024-09-30 RX ADMIN — FEBUXOSTAT 40 MG: 40 TABLET ORAL at 08:45

## 2024-09-30 RX ADMIN — OXYCODONE HYDROCHLORIDE AND ACETAMINOPHEN 500 MG: 500 TABLET ORAL at 08:45

## 2024-09-30 RX ADMIN — VERAPAMIL HYDROCHLORIDE 120 MG: 40 TABLET, FILM COATED ORAL at 08:45

## 2024-09-30 RX ADMIN — SODIUM CHLORIDE 1 G: 1 TABLET ORAL at 12:52

## 2024-09-30 RX ADMIN — B-COMPLEX W/ C & FOLIC ACID TAB 1 TABLET: TAB at 08:45

## 2024-09-30 RX ADMIN — Medication 7.5 MG: at 05:13

## 2024-09-30 NOTE — CASE MANAGEMENT
Case Management Progress Note    Patient name Alexx Haney  Location 7T /7T -01 MRN [id number]  : 1965 Date 2024       LOS (days): 4  Geometric Mean LOS (GMLOS) (days): 2.6  Days to GMLOS:-1.4        OBJECTIVE:        Current admission status: Inpatient  Preferred Pharmacy:   [id number] - [place], [place] - [address]  [address] [address]  Phone: [phone] Fax: [phone]    [facility] [address]  [address] [facility] [address]  Phone: [phone] Fax: [phone]    Primary Care Provider: Javier Delacruz MD    Primary Insurance: MEDICARE  Secondary Insurance:     PROGRESS NOTE:    Met with patient at bedside and discussed discharge planning.  Patient needs a BSC.  BSC was ordered from TCU in [place] was never delivered to patient.  Provided patient with BSC and discussed co-pay and that [facility] health will bill.  Patient agreeable to same.  Railing be installed on steps to bathroom which is located in basement

## 2024-09-30 NOTE — ASSESSMENT & PLAN NOTE
History of alcohol use, may be contirbuting  Continues to improve  RUQ US unremarkable  Hepatitis panel normal  Consulted GI, appreciate input- likely gilbert syndrome with recent increase in lfts due to antibiotic use. No further work up needed as lfts are down trending

## 2024-09-30 NOTE — PLAN OF CARE
Problem: OCCUPATIONAL THERAPY ADULT  Goal: Performs self-care activities at highest level of function for planned discharge setting.  See evaluation for individualized goals.  Description: Treatment Interventions: ADL retraining, Functional transfer training, UE strengthening/ROM, Endurance training, Patient/family training, Equipment evaluation/education, Neuromuscular reeducation, Activityengagement          See flowsheet documentation for full assessment, interventions and recommendations.   Outcome: Not Progressing  Note: Limitation: Decreased endurance, Decreased self-care trans, Decreased high-level ADLs  Prognosis: Good  Assessment: Alexx was seen for OT tx session, which was limited. Reports significant R foot pain over the weekend and unable/didn't get up. Rated pain 10/10. OT assessed and R foot swollen, slightly red, and was warm to touch compared to opposite LE. Encouragement to sit on EOB. MD came to assess pt as well. MI for bed mobility 2 times despite stating he couldn't do it. Declined transfers or ambulation. Max today to change socks. Perseverating on R foot pain and not causing increased back pain. Plan is for labs and steroids. Will need to continue to assess function and monitor ability to return to MI level. Continue OT to achieve goals. Plan remains home w/ home therapy pending pain management of R foot.     Rehab Resource Intensity Level, OT: III (Minimum Resource Intensity)

## 2024-09-30 NOTE — OCCUPATIONAL THERAPY NOTE
"  Occupational Therapy Progress Note     Patient Name: Alexx Haney  Today's Date: 9/30/2024  Problem List  Principal Problem:    Hyponatremia  Active Problems:    Asthma    Essential hypertension    Benign paroxysmal positional vertigo    DJD (degenerative joint disease) of knee    GERD without esophagitis    Hyperlipidemia    Elevated LFTs    Anemia    Right foot pain          09/30/24 1010   OT Last Visit   OT Visit Date 09/30/24   Note Type   Note Type Treatment   Pain Assessment   Pain Assessment Tool 0-10   Pain Score 10 - Worst Possible Pain   Pain Location/Orientation Orientation: Right;Location: Foot   Patient's Stated Pain Goal No pain   Restrictions/Precautions   Weight Bearing Precautions Per Order Yes   RUE Weight Bearing Per Order WBAT   Other Precautions Fall Risk;Pain   ADL   LB Dressing Assistance 2  Maximal Assistance   LB Dressing Deficit Don/doff R sock;Don/doff L sock   LB Dressing Comments Changed socks but 2' pain unable to manage today.   Bed Mobility   Supine to Sit 6  Modified independent   Additional items Bedrails   Sit to Supine 6  Modified independent   Additional items Bedrails   Additional Comments Performed bed mobility x 2 times. Remains in bed w/ all needs.   Transfers   Sit to Stand Unable to assess   Stand to Sit Unable to assess   Additional Comments Declined attempt at transfers or mobility 2' pain in R foot; r/o gout.   Subjective   Subjective \" I had gout in the past\"   Cognition   Overall Cognitive Status WFL   Arousal/Participation Alert   Attention Other (Comment)  (Talkative and difficulites focusing at times.)   Memory Decreased short term memory   Following Commands Follows multistep commands with increased time or repetition   Additional Activities   Additional Activities Comments Ed on pt on rolling side to side in bed and sitting on EOB until r/o gout and receiving meds to decrease risk for respiratory issues especially since he's already fighting a cold. " Encouraged use of spiromemter every hour.   Activity Tolerance   Activity Tolerance Patient limited by pain   Medical Staff Made Aware MD, RN, PT.   Assessment   Assessment Alexx was seen for OT tx session, which was limited. Reports significant R foot pain over the weekend and unable/didn't get up. Rated pain 10/10. OT assessed and R foot swollen, slightly red, and was warm to touch compared to opposite LE. Encouragement to sit on EOB. MD came to assess pt as well. MI for bed mobility 2 times despite stating he couldn't do it. Declined transfers or ambulation. Max today to change socks. Perseverating on R foot pain and not causing increased back pain. Plan is for labs and steroids. Will need to continue to assess function and monitor ability to return to MI level. Continue OT to achieve goals. Plan remains home w/ home therapy pending pain management of R foot.   Plan   Treatment Interventions ADL retraining;Patient/family training;Equipment evaluation/education;Activityengagement   Goal Expiration Date 10/07/24   OT Treatment Day 1   OT Frequency 2-3x/wk   Discharge Recommendation   Rehab Resource Intensity Level, OT III (Minimum Resource Intensity)   AM-PAC Daily Activity Inpatient   Lower Body Dressing 2   Bathing 2   Toileting 3   Upper Body Dressing 4   Grooming 4   Eating 4   Daily Activity Raw Score 19   Daily Activity Standardized Score (Calc for Raw Score >=11) 40.22   AM-PAC Applied Cognition Inpatient   Following a Speech/Presentation 4   Understanding Ordinary Conversation 4   Taking Medications 4   Remembering Where Things Are Placed or Put Away 4   Remembering List of 4-5 Errands 3   Taking Care of Complicated Tasks 3   Applied Cognition Raw Score 22   Applied Cognition Standardized Score 47.83         Debbie Topete MS, OTR/L

## 2024-09-30 NOTE — PLAN OF CARE
Problem: PHYSICAL THERAPY ADULT  Goal: Performs mobility at highest level of function for planned discharge setting.  See evaluation for individualized goals.  Description: Treatment/Interventions: Functional transfer training, LE strengthening/ROM, Elevations, Therapeutic exercise, Endurance training, Patient/family training, Equipment eval/education, Bed mobility, Gait training, Compensatory technique education, Spoke to MD, Spoke to nursing  Equipment Recommended: Walker       See flowsheet documentation for full assessment, interventions and recommendations.  Outcome: Not Progressing  Note: Prognosis: Good  Problem List: Decreased strength, Decreased endurance, Decreased range of motion, Impaired balance, Decreased mobility, Decreased coordination, Impaired sensation, Pain  Assessment: Pt demonstrates functional R knee ROM, able to perform SLR without quad lag (w/ verbal cues). Pt primarily limited by pain in B feet + ankles today. Pt reports he has received steroid injection to ankles in the past w/ resolved sx. Per MD podiatry to give steroid injection tomorrow. Anticipate w/ decreased pain levels pt will be able to function at [place] level as he has been and be able to d/c home. Will continue to follow.  Barriers to Discharge: Inaccessible home environment, Decreased caregiver support     Rehab Resource Intensity Level, PT: (S) III (Minimum Resource Intensity) (pending progress)    See flowsheet documentation for full assessment.

## 2024-09-30 NOTE — ASSESSMENT & PLAN NOTE
Per record review, the patient was [facility] from [facility] to 9/20/24 s/p right total knee arthroplasty on 9/17/24, WBAT RLE.   Had been at [facility]  PT/OT while here- anticipate d/c to home with hhpt. Awaiting a railing to be installed and gout treatment

## 2024-09-30 NOTE — ASSESSMENT & PLAN NOTE
Acute on chronic in nature.  Baseline sodium in the low 130s.  Suspecting component of SIADH, urine osmolality 425, urine sodium 89, serum uric acid 4.0, cortisol 7.9  Recommend starting sodium chloride tablets 1 g 3 times daily  Maintain fluid restriction

## 2024-09-30 NOTE — PHYSICAL THERAPY NOTE
"   Physical Therapy Treatment Note    Patient's Name: Alexx Haney  : 1965     09/30/24 1441   PT Last Visit   PT Visit Date 24   Note Type   Note Type Treatment   Pain Assessment   Pain Assessment Tool 0-10   Pain Score 10 - Worst Possible Pain  (3/10 R knee)   Pain Location/Orientation Orientation: Right;Location: Foot   Restrictions/Precautions   Weight Bearing Precautions Per Order Yes   RLE Weight Bearing Per Order WBAT   Other Precautions Fall Risk;Pain   General   Chart Reviewed Yes   Response to Previous Treatment Patient reporting fatigue but able to participate.   Family/Caregiver Present No   Subjective   Subjective \"If I get the shot I'll be able to walk the next day.\" Pt agreeable to attempt mobility.   Bed Mobility   Supine to Sit 6  Modified independent   Additional items HOB elevated   Additional Comments Pt greeted in supine.   Transfers   Sit to Stand 5  Supervision   Stand to Sit 5  Supervision   Additional Comments RW   Ambulation/Elevation   Gait pattern Antalgic;Decreased R stance;Decreased foot clearance   Gait Assistance   (CGA)   Additional items Assist x 1;Verbal cues;Tactile cues   Assistive Device Rolling walker   Distance 1 step   Balance   Static Sitting Good   Dynamic Sitting Fair +   Static Standing Fair -   Dynamic Standing Poor +   Ambulatory Poor +  (RW)   Endurance Deficit   Endurance Deficit Yes   Endurance Deficit Description pain   Activity Tolerance   Activity Tolerance Patient limited by pain   Nurse Made Aware yes - cleared + updated   Exercises   Quad Sets Supine;10 reps;AROM;Right  (5 SH)   Heelslides Supine;10 reps;AROM;Right  (10 SH)   Hip Flexion Supine;10 reps;AROM;Right  (SLR (cues to prevent quad lag))   Knee AROM Long Arc Quad Sitting;10 reps;AROM;Right   Assessment   Prognosis Good   Problem List Decreased strength;Decreased endurance;Decreased range of motion;Impaired balance;Decreased mobility;Decreased coordination;Impaired sensation;Pain "   Assessment Pt demonstrates functional R knee ROM, able to perform SLR without quad lag (w/ verbal cues). Pt primarily limited by pain in B feet + ankles today. Pt reports he has received steroid injection to ankles in the past w/ resolved sx. Per MD podiatry to give steroid injection tomorrow. Anticipate w/ decreased pain levels pt will be able to function at [place] level as he has been and be able to d/c home. Will continue to follow.   Barriers to Discharge Inaccessible home environment;Decreased caregiver support   Goals   Patient Goals go home   PT Treatment Day 4   Plan   Treatment/Interventions Functional transfer training;LE strengthening/ROM;Elevations;Therapeutic exercise;Endurance training;Patient/family training;Equipment eval/education;Bed mobility;Gait training;Compensatory technique education;Spoke to MD;Spoke to nursing   Progress   (slow progress, pain)   PT Frequency 3-5x/wk   Discharge Recommendation   Rehab Resource Intensity Level, PT (S)  III (Minimum Resource Intensity)  (pending progress)   Equipment Recommended Walker   Walker Package Recommended Wheeled walker   Change/add to Walker Package? No   AM-PAC Basic Mobility Inpatient   Turning in Flat Bed Without Bedrails 4   Lying on Back to Sitting on Edge of Flat Bed Without Bedrails 4   Moving Bed to Chair 3   Standing Up From Chair Using Arms 3   Walk in Room 1   Climb 3-5 Stairs With Railing 1   Basic Mobility Inpatient Raw Score 16   Basic Mobility Standardized Score 38.32   [facility] Highest Level Of Mobility   -[facility] Goal 5: Stand one or more mins   -HL Achieved 3: Sit at edge of bed   Education   Education Provided Mobility training;Home exercise program;Assistive device   Patient Reinforcement needed   End of Consult   Patient Position at End of Consult Seated edge of bed;All needs within reach  (encouraged pt to sit in bedside chair but pt declined, agreeable to sit EOB)     Galilea Negro, PT, DPT

## 2024-09-30 NOTE — PLAN OF CARE
Problem: PAIN - ADULT  Goal: Verbalizes/displays adequate comfort level or baseline comfort level  Description: Interventions:  - Encourage patient to monitor pain and request assistance  - Assess pain using appropriate pain scale  - Administer analgesics based on type and severity of pain and evaluate response  - Implement non-pharmacological measures as appropriate and evaluate response  - Consider cultural and social influences on pain and pain management  - Notify physician/advanced practitioner if interventions unsuccessful or patient reports new pain  Outcome: Progressing     Problem: INFECTION - ADULT  Goal: Absence or prevention of progression during hospitalization  Description: INTERVENTIONS:  - Assess and monitor for signs and symptoms of infection  - Monitor lab/diagnostic results  - Monitor all insertion sites, i.e. indwelling lines, tubes, and drains  - Monitor endotracheal if appropriate and nasal secretions for changes in amount and color  - [place] appropriate cooling/warming therapies per order  - Administer medications as ordered  - Instruct and encourage patient and family to use good hand hygiene technique  - Identify and instruct in appropriate isolation precautions for identified infection/condition  Outcome: Progressing     Problem: SAFETY ADULT  Goal: Patient will remain free of falls  Description: INTERVENTIONS:  - Educate patient/family on patient safety including physical limitations  - Instruct patient to call for assistance with activity   - Consult OT/PT to assist with strengthening/mobility   - Keep Call bell within reach  - Keep bed low and locked with side rails adjusted as appropriate  - Keep care items and personal belongings within reach  - Initiate and maintain comfort rounds  - Make Fall Risk Sign visible to staff  - Apply yellow socks and bracelet for high fall risk patients  - Consider moving patient to room near nurses station  Outcome: Progressing  Goal: Maintain or  return to baseline ADL function  Description: INTERVENTIONS:  -  Assess patient's ability to carry out ADLs; assess patient's baseline for ADL function and identify physical deficits which impact ability to perform ADLs (bathing, care of mouth/teeth, toileting, grooming, dressing, etc.)  - Assess/evaluate cause of self-care deficits   - Assess range of motion  - Assess patient's mobility; develop plan if impaired  - Assess patient's need for assistive devices and provide as appropriate  - Encourage maximum independence but intervene and supervise when necessary  - Involve family in performance of ADLs  - Assess for home care needs following discharge   - Consider OT consult to assist with ADL evaluation and planning for discharge  - Provide patient education as appropriate  Outcome: Progressing  Goal: Maintains/Returns to pre admission functional level  Description: INTERVENTIONS:  - Perform AM-PAC 6 Click Basic Mobility/ Daily Activity assessment daily.  - Set and communicate daily mobility goal to care team and patient/family/caregiver.   - Collaborate with rehabilitation services on mobility goals if consulted  - Out of bed for toileting  - Record patient progress and toleration of activity level   Outcome: Progressing     Problem: DISCHARGE PLANNING  Goal: Discharge to home or other facility with appropriate resources  Description: INTERVENTIONS:  - Identify barriers to discharge w/patient and caregiver  - Arrange for needed discharge resources and transportation as appropriate  - Identify discharge learning needs (meds, wound care, etc.)  - Arrange for interpretive services to assist at discharge as needed  - Refer to Case Management Department for coordinating discharge planning if the patient needs post-hospital services based on physician/advanced practitioner order or complex needs related to functional status, cognitive ability, or social support system  Outcome: Progressing     Problem: Knowledge  Deficit  Goal: Patient/family/caregiver demonstrates understanding of disease process, treatment plan, medications, and discharge instructions  Description: Complete learning assessment and assess knowledge base.  Interventions:  - Provide teaching at level of understanding  - Provide teaching via preferred learning methods  Outcome: Progressing     Problem: METABOLIC, FLUID AND ELECTROLYTES - ADULT  Goal: Electrolytes maintained within normal limits  Description: INTERVENTIONS:  - Monitor labs and assess patient for signs and symptoms of electrolyte imbalances  - Administer electrolyte replacement as ordered  - Monitor response to electrolyte replacements, including repeat lab results as appropriate  - Instruct patient on fluid and nutrition as appropriate  Outcome: Progressing  Goal: Fluid balance maintained  Description: INTERVENTIONS:  - Monitor labs   - Monitor I/O and WT  - Instruct patient on fluid and nutrition as appropriate  - Assess for signs & symptoms of volume excess or deficit  Outcome: Progressing  Goal: Glucose maintained within target range  Description: INTERVENTIONS:  - Monitor Blood Glucose as ordered  - Assess for signs and symptoms of hyperglycemia and hypoglycemia  - Administer ordered medications to maintain glucose within target range  - Assess nutritional intake and initiate nutrition service referral as needed  Outcome: Progressing     Problem: NEUROSENSORY - ADULT  Goal: Achieves stable or improved neurological status  Description: INTERVENTIONS  - Monitor and report changes in neurological status  - Monitor vital signs such as temperature, blood pressure, glucose, and any other labs ordered   - Initiate measures to prevent increased intracranial pressure  - Monitor for seizure activity and implement precautions if appropriate      Outcome: Progressing  Goal: Remains free of injury related to seizures activity  Description: INTERVENTIONS  - Maintain airway, patient safety  and administer  oxygen as ordered  - Monitor patient for seizure activity, document and report duration and description of seizure to physician/advanced practitioner  - If seizure occurs,  ensure patient safety during seizure  - Reorient patient post seizure  - Seizure pads on all 4 side rails  - Instruct patient/family to notify RN of any seizure activity including if an aura is experienced  - Instruct patient/family to call for assistance with activity based on nursing assessment  - Administer anti-seizure medications if ordered    Outcome: Progressing     Problem: CARDIOVASCULAR - ADULT  Goal: Maintains optimal cardiac output and hemodynamic stability  Description: INTERVENTIONS:  - Monitor I/O, vital signs and rhythm  - Monitor for S/S and trends of decreased cardiac output  - Administer and titrate ordered vasoactive medications to optimize hemodynamic stability  - Assess quality of pulses, skin color and temperature  - Assess for signs of decreased coronary artery perfusion  - Instruct patient to report change in severity of symptoms  Outcome: Progressing  Goal: Absence of cardiac dysrhythmias or at baseline rhythm  Description: INTERVENTIONS:  - Continuous cardiac monitoring, vital signs, obtain 12 lead EKG if ordered  - Administer antiarrhythmic and heart rate control medications as ordered  - Monitor electrolytes and administer replacement therapy as ordered  Outcome: Progressing     Problem: MUSCULOSKELETAL - ADULT  Goal: Maintain or return mobility to safest level of function  Description: INTERVENTIONS:  - Assess patient's ability to carry out ADLs; assess patient's baseline for ADL function and identify physical deficits which impact ability to perform ADLs (bathing, care of mouth/teeth, toileting, grooming, dressing, etc.)  - Assess/evaluate cause of self-care deficits   - Assess range of motion  - Assess patient's mobility  - Assess patient's need for assistive devices and provide as appropriate  - Encourage maximum  independence but intervene and supervise when necessary  - Involve family in performance of ADLs  - Assess for home care needs following discharge   - Consider OT consult to assist with ADL evaluation and planning for discharge  - Provide patient education as appropriate  Outcome: Progressing  Goal: Maintain proper alignment of affected body part  Description: INTERVENTIONS:  - Support, maintain and protect limb and body alignment  - Provide patient/ family with appropriate education  Outcome: Progressing     Problem: PAIN - ADULT  Goal: Verbalizes/displays adequate comfort level or baseline comfort level  Description: Interventions:  - Encourage patient to monitor pain and request assistance  - Assess pain using appropriate pain scale  - Administer analgesics based on type and severity of pain and evaluate response  - Implement non-pharmacological measures as appropriate and evaluate response  - Consider cultural and social influences on pain and pain management  - Notify physician/advanced practitioner if interventions unsuccessful or patient reports new pain  Outcome: Progressing     Problem: INFECTION - ADULT  Goal: Absence or prevention of progression during hospitalization  Description: INTERVENTIONS:  - Assess and monitor for signs and symptoms of infection  - Monitor lab/diagnostic results  - Monitor all insertion sites, i.e. indwelling lines, tubes, and drains  - Monitor endotracheal if appropriate and nasal secretions for changes in amount and color  - [place] appropriate cooling/warming therapies per order  - Administer medications as ordered  - Instruct and encourage patient and family to use good hand hygiene technique  - Identify and instruct in appropriate isolation precautions for identified infection/condition  Outcome: Progressing     Problem: SAFETY ADULT  Goal: Patient will remain free of falls  Description: INTERVENTIONS:  - Educate patient/family on patient safety including physical  limitations  - Instruct patient to call for assistance with activity   - Consult OT/PT to assist with strengthening/mobility   - Keep Call bell within reach  - Keep bed low and locked with side rails adjusted as appropriate  - Keep care items and personal belongings within reach  - Initiate and maintain comfort rounds  - Make Fall Risk Sign visible to staff  - Apply yellow socks and bracelet for high fall risk patients  - Consider moving patient to room near nurses station  Outcome: Progressing  Goal: Maintain or return to baseline ADL function  Description: INTERVENTIONS:  -  Assess patient's ability to carry out ADLs; assess patient's baseline for ADL function and identify physical deficits which impact ability to perform ADLs (bathing, care of mouth/teeth, toileting, grooming, dressing, etc.)  - Assess/evaluate cause of self-care deficits   - Assess range of motion  - Assess patient's mobility; develop plan if impaired  - Assess patient's need for assistive devices and provide as appropriate  - Encourage maximum independence but intervene and supervise when necessary  - Involve family in performance of ADLs  - Assess for home care needs following discharge   - Consider OT consult to assist with ADL evaluation and planning for discharge  - Provide patient education as appropriate  Outcome: Progressing  Goal: Maintains/Returns to pre admission functional level  Description: INTERVENTIONS:  - Perform AM-PAC 6 Click Basic Mobility/ Daily Activity assessment daily.  - Set and communicate daily mobility goal to care team and patient/family/caregiver.   - Collaborate with rehabilitation services on mobility goals if consulted  - Out of bed for toileting  - Record patient progress and toleration of activity level   Outcome: Progressing     Problem: DISCHARGE PLANNING  Goal: Discharge to home or other facility with appropriate resources  Description: INTERVENTIONS:  - Identify barriers to discharge w/patient and  caregiver  - Arrange for needed discharge resources and transportation as appropriate  - Identify discharge learning needs (meds, wound care, etc.)  - Arrange for interpretive services to assist at discharge as needed  - Refer to Case Management Department for coordinating discharge planning if the patient needs post-hospital services based on physician/advanced practitioner order or complex needs related to functional status, cognitive ability, or social support system  Outcome: Progressing     Problem: Knowledge Deficit  Goal: Patient/family/caregiver demonstrates understanding of disease process, treatment plan, medications, and discharge instructions  Description: Complete learning assessment and assess knowledge base.  Interventions:  - Provide teaching at level of understanding  - Provide teaching via preferred learning methods  Outcome: Progressing     Problem: METABOLIC, FLUID AND ELECTROLYTES - ADULT  Goal: Electrolytes maintained within normal limits  Description: INTERVENTIONS:  - Monitor labs and assess patient for signs and symptoms of electrolyte imbalances  - Administer electrolyte replacement as ordered  - Monitor response to electrolyte replacements, including repeat lab results as appropriate  - Instruct patient on fluid and nutrition as appropriate  Outcome: Progressing  Goal: Fluid balance maintained  Description: INTERVENTIONS:  - Monitor labs   - Monitor I/O and WT  - Instruct patient on fluid and nutrition as appropriate  - Assess for signs & symptoms of volume excess or deficit  Outcome: Progressing  Goal: Glucose maintained within target range  Description: INTERVENTIONS:  - Monitor Blood Glucose as ordered  - Assess for signs and symptoms of hyperglycemia and hypoglycemia  - Administer ordered medications to maintain glucose within target range  - Assess nutritional intake and initiate nutrition service referral as needed  Outcome: Progressing     Problem: NEUROSENSORY - ADULT  Goal:  Achieves stable or improved neurological status  Description: INTERVENTIONS  - Monitor and report changes in neurological status  - Monitor vital signs such as temperature, blood pressure, glucose, and any other labs ordered   - Initiate measures to prevent increased intracranial pressure  - Monitor for seizure activity and implement precautions if appropriate      Outcome: Progressing  Goal: Remains free of injury related to seizures activity  Description: INTERVENTIONS  - Maintain airway, patient safety  and administer oxygen as ordered  - Monitor patient for seizure activity, document and report duration and description of seizure to physician/advanced practitioner  - If seizure occurs,  ensure patient safety during seizure  - Reorient patient post seizure  - Seizure pads on all 4 side rails  - Instruct patient/family to notify RN of any seizure activity including if an aura is experienced  - Instruct patient/family to call for assistance with activity based on nursing assessment  - Administer anti-seizure medications if ordered    Outcome: Progressing     Problem: CARDIOVASCULAR - ADULT  Goal: Maintains optimal cardiac output and hemodynamic stability  Description: INTERVENTIONS:  - Monitor I/O, vital signs and rhythm  - Monitor for S/S and trends of decreased cardiac output  - Administer and titrate ordered vasoactive medications to optimize hemodynamic stability  - Assess quality of pulses, skin color and temperature  - Assess for signs of decreased coronary artery perfusion  - Instruct patient to report change in severity of symptoms  Outcome: Progressing  Goal: Absence of cardiac dysrhythmias or at baseline rhythm  Description: INTERVENTIONS:  - Continuous cardiac monitoring, vital signs, obtain 12 lead EKG if ordered  - Administer antiarrhythmic and heart rate control medications as ordered  - Monitor electrolytes and administer replacement therapy as ordered  Outcome: Progressing     Problem:  MUSCULOSKELETAL - ADULT  Goal: Maintain or return mobility to safest level of function  Description: INTERVENTIONS:  - Assess patient's ability to carry out ADLs; assess patient's baseline for ADL function and identify physical deficits which impact ability to perform ADLs (bathing, care of mouth/teeth, toileting, grooming, dressing, etc.)  - Assess/evaluate cause of self-care deficits   - Assess range of motion  - Assess patient's mobility  - Assess patient's need for assistive devices and provide as appropriate  - Encourage maximum independence but intervene and supervise when necessary  - Involve family in performance of ADLs  - Assess for home care needs following discharge   - Consider OT consult to assist with ADL evaluation and planning for discharge  - Provide patient education as appropriate  Outcome: Progressing  Goal: Maintain proper alignment of affected body part  Description: INTERVENTIONS:  - Support, maintain and protect limb and body alignment  - Provide patient/ family with appropriate education  Outcome: Progressing

## 2024-09-30 NOTE — PROGRESS NOTES
NEPHROLOGY HOSPITAL PROGRESS NOTE   Alexx Haney 59 y.o. male MRN: [id number]  Unit/Bed#: 7T [facility] 704-01 Encounter: [id number]  Reason for Consult: Hyponatremia    Brief History of Admission -postoperative right knee arthroplasty on 9/17, transferred to the medical floor given worsening hyponatremia.    Assessment & Plan  Hyponatremia  Acute on chronic in nature.  Baseline sodium in the low 130s.  Suspecting component of SIADH, urine osmolality 425, urine sodium 89, serum uric acid 4.0, cortisol 7.9  Recommend starting sodium chloride tablets 1 g 3 times daily  Maintain fluid restriction  Essential hypertension  Blood pressure stable, no changes in his current regimen.  Elevated LFTs  History of alcohol use, improving.  Hepatitis negative.  GI signed off.  Anemia  Hemoglobin stable at 9.0.  DJD (degenerative joint disease) of knee  Status post right knee arthroplasty 9/17/2024.      Right foot pain  Suspected gout, discussed with primary service.    Summary: Overall sodium level has improved but appears to have plateaued at 127.  Recommend starting sodium chloride tablets 1 g 3 times daily.  Consider checking cosyntropin stim testing as an outpatient, patient currently on prednisone for gout flare.    SUBJECTIVE / 24H INTERVAL HISTORY:  Complains of right foot pain consistent with likely gout.  No reported chest pain, shortness of breath.  Appetite appears to be stable.    OBJECTIVE:  Current Weight: Weight - Scale: 85.3 kg (188 lb 0.8 oz)  Vitals:    09/29/24 1457 09/29/24 2020 09/29/24 2246 09/30/24 0700   BP: 117/77  112/63 116/70   BP Location: Right arm  Right arm Right arm   Pulse: 80  93 89   Resp: 18  18 18   Temp: 97.8 °F (36.6 °C)  98 °F (36.7 °C) 98.8 °F (37.1 °C)   TempSrc: Temporal  Temporal Temporal   SpO2: 96% 95% 96% 95%   Weight:       Height:           Intake/Output Summary (Last 24 hours) at 9/30/2024 1324  Last data filed at 9/30/2024 0501  Gross per 24 hour   Intake 1000 ml   Output 1250 ml    Net -250 ml       Physical Exam  Constitutional:       Appearance: He is not ill-appearing.   Eyes:      General: No scleral icterus.  Cardiovascular:      Rate and Rhythm: Normal rate and regular rhythm.   Pulmonary:      Effort: Pulmonary effort is normal.      Breath sounds: Normal breath sounds.   Abdominal:      General: There is distension.      Palpations: Abdomen is soft.   Musculoskeletal:      Right lower leg: Edema present.      Left lower leg: No edema.   Skin:     General: Skin is warm and dry.      Findings: No rash.   Neurological:      Mental Status: He is alert and oriented to person, place, and time.         Medications:    Current Facility-Administered Medications:     acetaminophen (TYLENOL) tablet 650 mg, 650 mg, Oral, Q6H PRN, Razia Mortensen MD, 650 mg at 09/30/24 1252    albuterol inhalation solution 2.5 mg, 2.5 mg, Nebulization, Q6H PRN, Razia Mortensen MD, 2.5 mg at 09/29/24 2017    ascorbic acid (VITAMIN C) tablet 500 mg, 500 mg, Oral, Daily, Razia Mortensen MD, 500 mg at 09/30/24 0845    dextromethorphan-guaiFENesin (ROBITUSSIN DM) oral syrup 10 mL, 10 mL, Oral, Q4H PRN, Razia Mortensen MD, 10 mL at 09/29/24 2005    docusate sodium (COLACE) capsule 100 mg, 100 mg, Oral, BID, Razia Mortensen MD, 100 mg at 09/30/24 0845    enoxaparin (LOVENOX) subcutaneous injection 40 mg, 40 mg, Subcutaneous, Daily, Razia Mortensen MD, 40 mg at 09/30/24 0845    febuxostat (ULORIC) tablet 40 mg, 40 mg, Oral, Daily, Razia Mortensen MD, 40 mg at 09/30/24 0845    ferrous sulfate tablet 325 mg, 325 mg, Oral, Daily With Breakfast, Razia Mortensen MD, 325 mg at 09/30/24 0845    fluticasone (FLONASE) 50 mcg/act nasal spray 1 spray, 1 spray, Each Nare, BID, Razia Mortensen MD    fluticasone-vilanterol 200-25 mcg/actuation 1 puff, 1 puff, Inhalation, Daily, Razia Mortensen MD, 1 puff at 09/30/24 0848    folic acid (FOLVITE) tablet 1 mg, 1 mg, Oral, Daily,  Razia Mortensen MD, 1 mg at 09/30/24 0845    gabapentin (NEURONTIN) capsule 100 mg, 100 mg, Oral, TID, Razia Mortensen MD, 100 mg at 09/30/24 0845    ipratropium-albuterol (DUO-NEB) 0.5-2.5 mg/3 mL inhalation solution 3 mL, 3 mL, Nebulization, Q6H PRN, Razia Mortensen MD    lidocaine (LIDODERM) 5 % patch 1 patch, 1 patch, Topical, Daily, Razia Mortensen MD, 1 patch at 09/29/24 0829    loratadine (CLARITIN) tablet 10 mg, 10 mg, Oral, Daily, Razia Mortensen MD, 10 mg at 09/29/24 0831    meclizine (ANTIVERT) tablet 12.5 mg, 12.5 mg, Oral, Q8H PRN, Razia Mortensen MD, 12.5 mg at 09/26/24 2047    methocarbamol (ROBAXIN) tablet 250 mg, 250 mg, Oral, Q8H PRN, Razia Mortensen MD, 250 mg at 09/29/24 1121    montelukast (SINGULAIR) tablet 10 mg, 10 mg, Oral, Daily, Razia Mortensen MD, 10 mg at 09/30/24 0845    multivitamin stress formula tablet 1 tablet, 1 tablet, Oral, Daily, Razia Mortensen MD, 1 tablet at 09/30/24 0845    ondansetron (ZOFRAN) injection 4 mg, 4 mg, Intravenous, Q6H PRN, Razia Mortensen MD    oxyCODONE (ROXICODONE) IR tablet 5 mg, 5 mg, Oral, Q6H PRN **OR** oxyCODONE (ROXICODONE) split tablet 7.5 mg, 7.5 mg, Oral, Q6H PRN, Razia Mortensen MD, 7.5 mg at 09/30/24 0513    pantoprazole (PROTONIX) EC tablet 40 mg, 40 mg, Oral, Early Morning, Shirlenekelsy Watson MD, 40 mg at 09/30/24 0510    predniSONE tablet 40 mg, 40 mg, Oral, Daily, Ana Liz DO, 40 mg at 09/30/24 1252    sodium chloride tablet 1 g, 1 g, Oral, TID With Meals, Nahid Nobles DO, 1 g at 09/30/24 1252    verapamil (CALAN) tablet 120 mg, 120 mg, Oral, Daily, Razia Mortensen MD, 120 mg at 09/30/24 0845    Laboratory Results:  Results from last 7 days   Lab Units 09/30/24  1017 09/30/24  0455 09/29/24  1947 09/29/24  0517 09/28/24  1958 09/28/24  0622 09/28/24  0005 09/27/24  1730 09/25/24  2150 09/25/24  0743   WBC Thousand/uL 10.75*  --   --   --   --  7.17  --   --  "  --  7.74   HEMOGLOBIN g/dL 9.0*  --   --   --   --  8.8*  --   --   --  8.7*   HEMATOCRIT % 27.0*  --   --   --   --  26.1*  --   --   --  25.2*   PLATELETS Thousands/uL 494*  --   --   --   --  446*  --   --   --  394*   POTASSIUM mmol/L  --  4.4 4.2 4.0 4.2 4.1  4.0 3.8 4.0   < > 4.2   CHLORIDE mmol/L  --  93* 93* 97 97 97  97 98 98   < > 89*   CO2 mmol/L  --  24 26 24 24 24  24 21 22   < > 23   BUN mg/dL  --  13 14 15 18 12  12 13 11   < > 10   CREATININE mg/dL  --  0.75 0.87 0.75 0.94 0.83  0.83 0.82 0.74   < > 0.68   CALCIUM mg/dL  --  9.3 9.2 9.4 9.7 9.7  9.6 9.9 9.6   < > 9.3    < > = values in this interval not displayed.       Portions of the record may have been created with voice recognition software. Occasional wrong word or \"sound a like\" substitutions may have occurred due to the inherent limitations of voice recognition software. Read the chart carefully and recognize, using context, where substitutions have occurred.If you have any questions, please contact the dictating provider.  "

## 2024-09-30 NOTE — QUICK NOTE
GI QUICK NOTE       Patient with recent right knee arthroplasty 9/17/2024 was admitted to the hospital from rehab with labs demonstrating significant hyponatremia for which he is admitted.  GI was consulted for elevated LFTs.  He was receiving antibiotics for 4 days prior to LFT elevation, no longer.  Right upper quadrant ultrasound was completed and normal.  Acute hepatitis panel also normal/negative.  Patient does have slightly elevated total bilirubin but with indirect bilirubin> direct consistent with pattern of benign Gilbert's syndrome.  His LFTs are otherwise downtrending.  No plans for further workup at this time from a GI standpoint.  Suspect LFT elevation may have been secondary to antibiotics.  We recommend ongoing monitoring of LFTs and eventual follow-up in the office as an outpatient in about 2 months time.  GI will sign off, please reach out with any questions, concerns, changes in patient's clinical status.    Ximena Cunningham PA-C   Gastroenterology

## 2024-09-30 NOTE — ASSESSMENT & PLAN NOTE
This is a 59-year-old male patient with history of osteoarthritis status post recent right total knee arthroplasty undergoing therapy at [facility], history of hypertension, asthma, allergies, BPPV, who is transferred for admission to internal medicine due to hyponatremia with sodium level of 120  Admitted to level 2 stepdown for close monitoring, now downgraded to Med-Surg with improvement in Na level  Appreciate nephrology input, received 1.8% saline now off IVF  Sodium increased to 130 now back down to 127.   Continue with fluid restriction  Await nephrology follow up.   Continue to monitor bmp

## 2024-09-30 NOTE — PLAN OF CARE
Problem: PAIN - ADULT  Goal: Verbalizes/displays adequate comfort level or baseline comfort level  Description: Interventions:  - Encourage patient to monitor pain and request assistance  - Assess pain using appropriate pain scale  - Administer analgesics based on type and severity of pain and evaluate response  - Implement non-pharmacological measures as appropriate and evaluate response  - Consider cultural and social influences on pain and pain management  - Notify physician/advanced practitioner if interventions unsuccessful or patient reports new pain  Outcome: Progressing     Problem: INFECTION - ADULT  Goal: Absence or prevention of progression during hospitalization  Description: INTERVENTIONS:  - Assess and monitor for signs and symptoms of infection  - Monitor lab/diagnostic results  - Monitor all insertion sites, i.e. indwelling lines, tubes, and drains  - Monitor endotracheal if appropriate and nasal secretions for changes in amount and color  - [place] appropriate cooling/warming therapies per order  - Administer medications as ordered  - Instruct and encourage patient and family to use good hand hygiene technique  - Identify and instruct in appropriate isolation precautions for identified infection/condition  Outcome: Progressing     Problem: DISCHARGE PLANNING  Goal: Discharge to home or other facility with appropriate resources  Description: INTERVENTIONS:  - Identify barriers to discharge w/patient and caregiver  - Arrange for needed discharge resources and transportation as appropriate  - Identify discharge learning needs (meds, wound care, etc.)  - Arrange for interpretive services to assist at discharge as needed  - Refer to Case Management Department for coordinating discharge planning if the patient needs post-hospital services based on physician/advanced practitioner order or complex needs related to functional status, cognitive ability, or social support system  Outcome: Progressing      Problem: Knowledge Deficit  Goal: Patient/family/caregiver demonstrates understanding of disease process, treatment plan, medications, and discharge instructions  Description: Complete learning assessment and assess knowledge base.  Interventions:  - Provide teaching at level of understanding  - Provide teaching via preferred learning methods  Outcome: Progressing

## 2024-09-30 NOTE — PROGRESS NOTES
Progress Note - Hospitalist   Name: Alexx Haney 59 y.o. male I MRN: [id number]  Unit/Bed#: 7T [facility] 704-01 I Date of Admission: 9/26/2024   Date of Service: 9/30/2024 I Hospital Day: 4    Assessment & Plan  Hyponatremia  This is a 59-year-old male patient with history of osteoarthritis status post recent right total knee arthroplasty undergoing therapy at [facility], history of hypertension, asthma, allergies, BPPV, who is transferred for admission to internal medicine due to hyponatremia with sodium level of 120  Admitted to level 2 stepdown for close monitoring, now downgraded to Med-Surg with improvement in Na level  Appreciate nephrology input, received 1.8% saline now off IVF  Sodium increased to 130 now back down to 127.   Continue with fluid restriction  Await nephrology follow up.   Continue to monitor bmp   Asthma  Not in exacerbation  Continue inhalers, Singulair  Essential hypertension  Continue Lisinopril, verapamil  Monitor BP trend  Benign paroxysmal positional vertigo  On meclizine as needed  DJD (degenerative joint disease) of knee  Per record review, the patient was hospitalized at  [facility] from 9/17 to 9/20/24 s/p right total knee arthroplasty on 9/17/24, WBAT RLE.   Had been at [facility]  PT/OT while here- anticipate d/c to home with hhpt. 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 No further work up needed as lfts are down trending   Right foot pain  Right foot/ankle pain  Started approximately two days ago and worsened  Decrease rom  of ankle, very ttp, warm to touch and edematous  Will get xray and uric acid  Likely acute gout flare  Will consult podiatry for possible corticosteroid steriod injection  Pt is on uloric and missed a few doses while attempting to get medication from home.     VTE Pharmacologic Prophylaxis: VTE Score: 2 lovenox     Mobility:   Basic Mobility Inpatient Raw Score: 23  JH-HLM Goal: 7: Walk 25 feet or more  JH-HLM Achieved: 1: Laying in bed    Patient Centered Rounds:  discussed with his nurse       Education and Discussions with Family / Patient:  will call his mother after podiatry eval.     Current Length of Stay: 4 day(s)  Current Patient Status: Inpatient     Discharge Plan: Anticipate discharge in 48-72 hrs to home with home services.    Code Status: Level 1 - Full Code    Subjective   Pt seen and examined. He is c/o pain in his foot and ankle. Started about two days ago. He can barely put weight on his foot. Its hurts to touch his foot. He has a history of gout and it feels like his last gout flare. He missed a couple doses of his uloric while working on bringing it from home. No other problems were noted.     Objective     Vitals:   Temp (24hrs), Av.2 °F (36.8 °C), Min:97.8 °F (36.6 °C), Max:98.8 °F (37.1 °C)    Temp:  [97.8 °F (36.6 °C)-98.8 °F (37.1 °C)] 98.8 °F (37.1 °C)  HR:  [80-93] 89  Resp:  [18] 18  BP: (112-117)/(63-77) 116/70  SpO2:  [95 %-96 %] 95 %  Body mass index is 27.77 kg/m².     Input and Output Summary (last 24 hours):     Intake/Output Summary (Last 24 hours) at 2024 1014  Last data filed at 2024 0501  Gross per 24 hour   Intake 1000 ml   Output 1250 ml   Net -250 ml       Physical Exam  Constitutional:       Appearance: Normal appearance.   HENT:      Head: Normocephalic and atraumatic.   Eyes:      Extraocular Movements: Extraocular movements intact.      Pupils: Pupils are equal, round, and reactive to light.   Cardiovascular:      Rate and Rhythm: Normal rate and regular  rhythm.      Heart sounds: No murmur heard.     No friction rub. No gallop.   Pulmonary:      Effort: Pulmonary effort is normal. No respiratory distress.      Breath sounds: Normal breath sounds. No wheezing, rhonchi or rales.   Abdominal:      General: Bowel sounds are normal. There is no distension.      Palpations: Abdomen is soft.      Tenderness: There is no abdominal tenderness. There is no guarding or rebound.   Musculoskeletal:      Right lower leg: Edema present.   Skin:     Comments: Right lower ext ankle decrease rom, warm to touch   Neurological:      Mental Status: He is alert and oriented to person, place, and time.          Lines/Drains:  Lines/Drains/Airways       Active Status       None                      Lab Results:   Results from last 7 days   Lab Units 09/28/24  0622   WBC Thousand/uL 7.17   HEMOGLOBIN g/dL 8.8*   HEMATOCRIT % 26.1*   PLATELETS Thousands/uL 446*   SEGS PCT % 75   LYMPHO PCT % 12*   MONO PCT % 10   EOS PCT % 1     Results from last 7 days   Lab Units 09/30/24  0455   SODIUM mmol/L 127*   POTASSIUM mmol/L 4.4   CHLORIDE mmol/L 93*   CO2 mmol/L 24   BUN mg/dL 13   CREATININE mg/dL 0.75   ANION GAP mmol/L 10   CALCIUM mg/dL 9.3   ALBUMIN g/dL 3.7   TOTAL BILIRUBIN mg/dL 1.44*   ALK PHOS U/L 140*   ALT U/L 81*   AST U/L 39   GLUCOSE RANDOM mg/dL 115                       Recent Cultures (last 7 days):         Imaging Review: No pertinent imaging studies reviewed.  Other Studies: No additional pertinent studies reviewed.    Last 24 Hours Medication List:     Current Facility-Administered Medications:     acetaminophen (TYLENOL) tablet 650 mg, Q6H PRN    albuterol inhalation solution 2.5 mg, Q6H PRN    ascorbic acid (VITAMIN C) tablet 500 mg, Daily    dextromethorphan-guaiFENesin (ROBITUSSIN DM) oral syrup 10 mL, Q4H PRN    docusate sodium (COLACE) capsule 100 mg, BID    enoxaparin (LOVENOX) subcutaneous injection 40 mg, Daily    febuxostat (ULORIC) tablet 40 mg, Daily    ferrous  sulfate tablet 325 mg, Daily With Breakfast    fluticasone (FLONASE) 50 mcg/act nasal spray 1 spray, BID    fluticasone-vilanterol 200-25 mcg/actuation 1 puff, Daily    folic acid (FOLVITE) tablet 1 mg, Daily    gabapentin (NEURONTIN) capsule 100 mg, TID    ipratropium-albuterol (DUO-NEB) 0.5-2.5 mg/3 mL inhalation solution 3 mL, Q6H PRN    lidocaine (LIDODERM) 5 % patch 1 patch, Daily    loratadine (CLARITIN) tablet 10 mg, Daily    meclizine (ANTIVERT) tablet 12.5 mg, Q8H PRN    methocarbamol (ROBAXIN) tablet 250 mg, Q8H PRN    montelukast (SINGULAIR) tablet 10 mg, Daily    multivitamin stress formula tablet 1 tablet, Daily    ondansetron (ZOFRAN) injection 4 mg, Q6H PRN    oxyCODONE (ROXICODONE) IR tablet 5 mg, Q6H PRN **OR** oxyCODONE (ROXICODONE) split tablet 7.5 mg, Q6H PRN    pantoprazole (PROTONIX) EC tablet 40 mg, Early Morning    verapamil (CALAN) tablet 120 mg, Daily    Administrative Statements   Today, Patient Was Seen By: Ana Liz DO

## 2024-09-30 NOTE — ASSESSMENT & PLAN NOTE
Right foot/ankle pain  Started approximately two days ago and worsened  Decrease rom of ankle, very ttp, warm to touch and edematous  Will get xray and uric acid  Likely acute gout flare  Will consult podiatry for possible corticosteroid steriod injection  Pt is on uloric and missed a few doses while attempting to get medication from home.

## 2024-10-01 LAB
ALBUMIN SERPL BCG-MCNC: 3.7 G/DL (ref 3.5–5)
ALP SERPL-CCNC: 132 U/L (ref 34–104)
ALT SERPL W P-5'-P-CCNC: 68 U/L (ref 7–52)
ANION GAP SERPL CALCULATED.3IONS-SCNC: 9 MMOL/L (ref 4–13)
AST SERPL W P-5'-P-CCNC: 21 U/L (ref 13–39)
BASOPHILS # BLD AUTO: 0.01 THOUSANDS/ΜL (ref 0–0.1)
BASOPHILS NFR BLD AUTO: 0 % (ref 0–1)
BILIRUB SERPL-MCNC: 0.99 MG/DL (ref 0.2–1)
BUN SERPL-MCNC: 14 MG/DL (ref 5–25)
CALCIUM SERPL-MCNC: 9.5 MG/DL (ref 8.4–10.2)
CHLORIDE SERPL-SCNC: 94 MMOL/L (ref 96–108)
CO2 SERPL-SCNC: 25 MMOL/L (ref 21–32)
CREAT SERPL-MCNC: 0.67 MG/DL (ref 0.6–1.3)
EOSINOPHIL # BLD AUTO: 0.01 THOUSAND/ΜL (ref 0–0.61)
EOSINOPHIL NFR BLD AUTO: 0 % (ref 0–6)
ERYTHROCYTE [DISTWIDTH] IN BLOOD BY AUTOMATED COUNT: 12.5 % (ref 11.6–15.1)
GFR SERPL CREATININE-BSD FRML MDRD: 105 ML/MIN/1.73SQ M
GLUCOSE SERPL-MCNC: 116 MG/DL (ref 65–140)
HCT VFR BLD AUTO: 24.5 % (ref 36.5–49.3)
HGB BLD-MCNC: 8.6 G/DL (ref 12–17)
IMM GRANULOCYTES # BLD AUTO: 0.06 THOUSAND/UL (ref 0–0.2)
IMM GRANULOCYTES NFR BLD AUTO: 1 % (ref 0–2)
LYMPHOCYTES # BLD AUTO: 0.75 THOUSANDS/ΜL (ref 0.6–4.47)
LYMPHOCYTES NFR BLD AUTO: 8 % (ref 14–44)
MCH RBC QN AUTO: 30.5 PG (ref 26.8–34.3)
MCHC RBC AUTO-ENTMCNC: 35.1 G/DL (ref 31.4–37.4)
MCV RBC AUTO: 87 FL (ref 82–98)
MONOCYTES # BLD AUTO: 0.83 THOUSAND/ΜL (ref 0.17–1.22)
MONOCYTES NFR BLD AUTO: 9 % (ref 4–12)
NEUTROPHILS # BLD AUTO: 7.55 THOUSANDS/ΜL (ref 1.85–7.62)
NEUTS SEG NFR BLD AUTO: 82 % (ref 43–75)
NRBC BLD AUTO-RTO: 0 /100 WBCS
PLATELET # BLD AUTO: 513 THOUSANDS/UL (ref 149–390)
PMV BLD AUTO: 8.1 FL (ref 8.9–12.7)
POTASSIUM SERPL-SCNC: 3.9 MMOL/L (ref 3.5–5.3)
PROT SERPL-MCNC: 6.6 G/DL (ref 6.4–8.4)
RBC # BLD AUTO: 2.82 MILLION/UL (ref 3.88–5.62)
SODIUM SERPL-SCNC: 128 MMOL/L (ref 135–147)
WBC # BLD AUTO: 9.21 THOUSAND/UL (ref 4.31–10.16)

## 2024-10-01 PROCEDURE — 85025 COMPLETE CBC W/AUTO DIFF WBC: CPT | Performed by: INTERNAL MEDICINE

## 2024-10-01 PROCEDURE — 80053 COMPREHEN METABOLIC PANEL: CPT | Performed by: INTERNAL MEDICINE

## 2024-10-01 PROCEDURE — 94760 N-INVAS EAR/PLS OXIMETRY 1: CPT

## 2024-10-01 PROCEDURE — 99232 SBSQ HOSP IP/OBS MODERATE 35: CPT | Performed by: INTERNAL MEDICINE

## 2024-10-01 PROCEDURE — 94640 AIRWAY INHALATION TREATMENT: CPT

## 2024-10-01 RX ORDER — COLCHICINE 0.6 MG/1
0.6 TABLET ORAL DAILY
Status: DISCONTINUED | OUTPATIENT
Start: 2024-10-01 | End: 2024-10-03 | Stop reason: HOSPADM

## 2024-10-01 RX ORDER — SODIUM CHLORIDE 1 G/1
2 TABLET ORAL
Status: DISCONTINUED | OUTPATIENT
Start: 2024-10-01 | End: 2024-10-02

## 2024-10-01 RX ADMIN — FLUTICASONE PROPIONATE 1 SPRAY: 50 SPRAY, METERED NASAL at 09:24

## 2024-10-01 RX ADMIN — LORATADINE 10 MG: 10 TABLET ORAL at 09:21

## 2024-10-01 RX ADMIN — VERAPAMIL HYDROCHLORIDE 120 MG: 40 TABLET, FILM COATED ORAL at 09:20

## 2024-10-01 RX ADMIN — FLUTICASONE PROPIONATE 1 SPRAY: 50 SPRAY, METERED NASAL at 17:34

## 2024-10-01 RX ADMIN — OXYCODONE HYDROCHLORIDE AND ACETAMINOPHEN 500 MG: 500 TABLET ORAL at 09:21

## 2024-10-01 RX ADMIN — FEBUXOSTAT 40 MG: 40 TABLET ORAL at 09:21

## 2024-10-01 RX ADMIN — GUAIFENESIN AND DEXTROMETHORPHAN 10 ML: 20; 200 SYRUP ORAL at 05:04

## 2024-10-01 RX ADMIN — COLCHICINE 0.6 MG: 0.6 TABLET ORAL at 11:02

## 2024-10-01 RX ADMIN — LIDOCAINE 5% 1 PATCH: 700 PATCH TOPICAL at 09:20

## 2024-10-01 RX ADMIN — ACETAMINOPHEN 650 MG: 325 TABLET ORAL at 17:13

## 2024-10-01 RX ADMIN — GUAIFENESIN AND DEXTROMETHORPHAN 10 ML: 20; 200 SYRUP ORAL at 20:31

## 2024-10-01 RX ADMIN — ALBUTEROL SULFATE 2.5 MG: 2.5 SOLUTION RESPIRATORY (INHALATION) at 19:41

## 2024-10-01 RX ADMIN — FOLIC ACID 1 MG: 1 TABLET ORAL at 09:22

## 2024-10-01 RX ADMIN — DOCUSATE SODIUM 100 MG: 100 CAPSULE, LIQUID FILLED ORAL at 17:13

## 2024-10-01 RX ADMIN — MONTELUKAST 10 MG: 10 TABLET, FILM COATED ORAL at 09:23

## 2024-10-01 RX ADMIN — GABAPENTIN 100 MG: 100 CAPSULE ORAL at 17:13

## 2024-10-01 RX ADMIN — DOCUSATE SODIUM 100 MG: 100 CAPSULE, LIQUID FILLED ORAL at 09:21

## 2024-10-01 RX ADMIN — PANTOPRAZOLE SODIUM 40 MG: 40 TABLET, DELAYED RELEASE ORAL at 05:02

## 2024-10-01 RX ADMIN — B-COMPLEX W/ C & FOLIC ACID TAB 1 TABLET: TAB at 09:21

## 2024-10-01 RX ADMIN — SODIUM CHLORIDE 2 G: 1 TABLET ORAL at 17:13

## 2024-10-01 RX ADMIN — GABAPENTIN 100 MG: 100 CAPSULE ORAL at 09:21

## 2024-10-01 RX ADMIN — SODIUM CHLORIDE 1 G: 1 TABLET ORAL at 08:19

## 2024-10-01 RX ADMIN — ENOXAPARIN SODIUM 40 MG: 100 INJECTION SUBCUTANEOUS at 09:20

## 2024-10-01 RX ADMIN — FERROUS SULFATE TAB 325 MG (65 MG ELEMENTAL FE) 325 MG: 325 (65 FE) TAB at 08:19

## 2024-10-01 RX ADMIN — SODIUM CHLORIDE 2 G: 1 TABLET ORAL at 11:02

## 2024-10-01 RX ADMIN — GABAPENTIN 100 MG: 100 CAPSULE ORAL at 20:46

## 2024-10-01 NOTE — ASSESSMENT & PLAN NOTE
Per record review, the patient was hospitalized at  [facility] from 9/17 to 9/20/24 s/p right total knee arthroplasty on 9/17/24, WBAT RLE.   Had been at [facility]  PT/OT while here- anticipate d/c to home with hhpt. Awaiting a railing to be installed and gout treatment

## 2024-10-01 NOTE — CONSULTS
Consult Note- Podiatry   Alexx Haney 59 y.o. male MRN: [id number]  Unit/Bed#: 7T [facility] 704-01 Encounter: [id number]    Assessment & Plan     Assessment:  1. Pain right foot  2. Edema right foot  3. Gout flare right foot     Plan:  - -pt eval and managed    - Number and complexity of problems addressed:  1 undiagnosed new problem with uncertain prognosis   as shown    - Amount/complexity of data reviewed and analyzed:     Category 1: prior patient notes were analyzed today before evaluating and managing patient. All PMH were discussed with pt today.       - Risk of complications: moderate risk of morbidity from additional testing or treatment involved with this patient, which includes but not limited to:    - discussed anatomy, condition, treatment plan and options. They were instructed on proper foot care. The patient was seen today for greater than total of  45-59 minutes   . This is total time spent today involving both face-to-face time and non face-to-face time. This time spent includes  reviewing their past medical history  , performing a medically appropriate examination and evaluation of the patient, counseling and educating the patient,  documenting all findings in EMR, and independently interpreting results and communicating results with  patient     and discussing their condition and treatment options, risks, and potential complications. I have discussed the findings of this examination with the patient. The discussion included a complete verbal explanation of the examination results, diagnosis and planned treatment(s). A schedule for future care needs was explained. The patient has verbalized the understanding of these instructions at this time. If any questions should arise after returning home I have encouraged the patient to feel free to call the office.    - d/w pt that discomfort is secondary to gout flare  - lab work reviewed  - xrays reviewed by me with no osseous involvement  - continue with  Prednisone and colchicine  - I d/w Primary team and recommended possible addition of Indocin  - pt states he has started to feel better since starting medication  - wbat in surgical shoe  - follow up with Podiatrist as outpt for possible steroid injection, which is not warranted at this time.     - all questions and concerns addressed.    - Podiatry signing off, thank you for the consult.     History of Present Illness     HPI:  Alexx Haney is a 59 y.o. male who presents with painful R foot. Pt states this started on Saturday. Pt states he had extreme redness and swelling. Could not walk. Pt does have history of gout. Has had gout flares in the past. Pt does take Allopurinol. Pt has outpt Podiatrist. No trauma noted. Pain is 4/10 and worse with ambulation.        Inpatient consult to Podiatry  Consult performed by: Liam Varela DPM  Consult ordered by: Ana Liz DO        Review of Systems   Constitutional: Negative.    HENT: Negative.    Eyes: Negative.    Respiratory: Negative.    Cardiovascular: Negative.    Gastrointestinal: Negative.    Musculoskeletal: pain and swelling R foot   Skin: Negative  Neurological: Negative.        Historical Information   Past Medical History:   Diagnosis Date    Acute bronchitis 01/29/2024    Anxiety 01/15/2019    Arthritis     Asthma     Chronic rhinitis     last assessed 2/21/14    Chronic serous otitis media     last assessed 11/20/13    Chronic sinusitis     last assessed 8/19/14    Functional heart murmur     GERD (gastroesophageal reflux disease)     Gout     Hearing problem     last assessed 12/1/16    Hiatal hernia     Hypercholesterolemia     Hypertension     Palpitations     Testicular hypogonadism     last assessed 10/4/13    V-tach (HCC)      Past Surgical History:   Procedure Laterality Date    APPENDECTOMY      BICEPS TENDON REPAIR Left 2009    BICEPS TENODESIS      anesthesia for tenodesis- of ruptured long tendon of biceps     HERNIA REPAIR      UT ARTHRP  KNE CONDYLE&PLATU MEDIAL&LAT COMPARTMENTS Right 9/17/2024    Procedure: ARTHROPLASTY KNEE TOTAL, potential same day discharge, cemented;  Surgeon: Parvin Whipple DO;  Location:  MAIN OR;  Service: Orthopedics    THYROIDECTOMY      TONSILLECTOMY       Social History   Social History     Substance and Sexual Activity   Alcohol Use Yes    Alcohol/week: 12.0 standard drinks of alcohol    Types: 12 Shots of liquor per week    Comment: occasionally; socially     Social History     Substance and Sexual Activity   Drug Use No     Social History     Tobacco Use   Smoking Status Never   Smokeless Tobacco Never     Family History:   Family History   Problem Relation Age of Onset    Lung cancer Father     Coronary artery disease Father         blockages    Stroke Maternal Grandmother     Cancer Paternal Grandfather         metastasized    Heart disease Family     Lung cancer Cousin     No Known Problems Mother     No Known Problems Sister     No Known Problems Brother     No Known Problems Maternal Aunt     No Known Problems Maternal Uncle     No Known Problems Paternal Aunt     No Known Problems Paternal Uncle     No Known Problems Paternal Grandmother     ADD / ADHD Neg Hx     Anesthesia problems Neg Hx     Clotting disorder Neg Hx     Collagen disease Neg Hx     Diabetes Neg Hx     Dislocations Neg Hx     Learning disabilities Neg Hx     Neurological problems Neg Hx     Osteoporosis Neg Hx     Rheumatologic disease Neg Hx     Scoliosis Neg Hx     Vascular Disease Neg Hx        Meds/Allergies     Medications Prior to Admission:     acetaminophen (TYLENOL) 500 mg tablet    albuterol (2.5 mg/3 mL) 0.083 % nebulizer solution    albuterol (PROVENTIL HFA,VENTOLIN HFA) 90 mcg/act inhaler    ascorbic acid (VITAMIN C) 500 MG tablet    Chlorpheniramine Maleate (CHLOR-TRIMETON ALLERGY PO)    clotrimazole (LOTRIMIN) 1 % cream    docusate sodium (COLACE) 100 mg capsule    enoxaparin (Lovenox) 40 mg/0.4 mL    esomeprazole  "(NexIUM) 40 MG capsule    febuxostat (ULORIC) 40 mg tablet    ferrous sulfate 324 (65 Fe) mg    fluticasone-salmeterol (Advair) 500-50 mcg/dose inhaler    folic acid (FOLVITE) 1 mg tablet    gabapentin (NEURONTIN) 100 mg capsule    ipratropium-albuterol (DUO-NEB) 0.5-2.5 mg/3 mL    lisinopril (ZESTRIL) 20 mg tablet    meclizine (ANTIVERT) 12.5 MG tablet    methocarbamol (ROBAXIN) 500 mg tablet    montelukast (SINGULAIR) 10 mg tablet    Multiple Vitamin (multivitamin) tablet    oxyCODONE (OXY-IR) 5 MG capsule    [] oxyCODONE (ROXICODONE) 10 MG TABS    Pseudoephedrine-guaiFENesin (GUAIFENESIN 600/ PO)    sodium chloride 1 g tablet    Triamcinolone Acetonide (NASACORT ALLERGY 24HR NA)    verapamil (CALAN) 120 mg tablet  Allergies   Allergen Reactions    Penicillins Rash and Anaphylaxis    Acetazolamide      Other reaction(s): Stomach Ache    Cephalosporins Other (See Comments)     headache    Other     Sulfa Antibiotics Other (See Comments)     Category: Allergy; Annotation - [id number]: STOMACH UPSET    Famotidine Palpitations    Levofloxacin Palpitations    Omeprazole Palpitations     Painful urination       Objective   First Vitals:   Blood Pressure: 142/90 (24)  Pulse: 88 (24)  Temperature: 97.6 °F (36.4 °C) (24)  Temp Source: Temporal (24)  Respirations: 20 (24)  Height: 5' 9\" (175.3 cm) (24)  Weight - Scale: 89.7 kg (197 lb 12 oz) (24)  SpO2: 96 % (24)    Current Vitals:   Blood Pressure: 119/76 (10/01/24 0735)  Pulse: 72 (10/01/24 0735)  Temperature: 97.5 °F (36.4 °C) (10/01/24 0735)  Temp Source: Temporal (10/01/24 0735)  Respirations: 18 (10/01/24 0735)  Height: 5' 9\" (175.3 cm) (24 1500)  Weight - Scale: 85.3 kg (188 lb 0.8 oz) (24 08)  SpO2: 94 % (10/01/24 0735)    /76 (BP Location: Right arm)   Pulse 72   Temp 97.5 °F (36.4 °C) (Temporal)   Resp 18   Ht 5' 9\" (1.753 m)   Wt 85.3 kg " (188 lb 0.8 oz)   SpO2 94%   BMI 27.77 kg/m²     General Appearance:    Alert, cooperative, no distress   Head:    Normocephalic, without obvious abnormality, atraumatic   Eyes:    PERRL, conjunctiva/corneas clear, EOM's intact            Nose:   Moist mucous membranes, no drainage or sinus tenderness   Throat:   No tenderness, no exudates   Neck:   Supple, symmetrical, trachea midline, no JVD   Back:     Symmetric, no CVA tenderness   Lungs:     Clear to auscultation bilaterally, respirations unlabored   Chest wall:    No tenderness or deformity   Heart:    Regular rate and rhythm, S1 and S2 normal, no murmur, rub   or gallop   Abdomen:     Soft, non-tender, bowel sounds active all four quadrants,     no masses, no organomegaly     Extremities:   MMT is 5/5 to all compartments of the LE, +2/4 edema R, Digital ROM is intact,       Swelling and pain to the R foot. I am able to manipulate R foot joints today with little to no pain by the patient. Crepitus noted       Pulses:   R DP is +1/4, R PT is +1/4, L DP is +1/4, L PT is +1/4, CFT< 3sec to all digits.    Skin:   No open Lesions.          Neurologic:   CNII-XII intact. Normal strength, sensation and reflexes       Throughout. Gross sensation is intact. Protective sensation is intact       Lab Results:   No results displayed because visit has over 200 results.        Imaging: I have personally reviewed pertinent films in PACS  EKG, Pathology, and Other Studies: I have personally reviewed pertinent reports.      Code Status: Level 1 - Full Code  Advance Directive and Living Will:      Power of :    POLST:

## 2024-10-01 NOTE — ASSESSMENT & PLAN NOTE
Acute on chronic in nature.  Baseline sodium in the low 130s.  Suspecting component of SIADH, urine osmolality 425, urine sodium 89, serum uric acid 4.0, cortisol 7.9  Discussed with primary service, salt tablets recently increased to 2 g 3 times daily.  Maintain fluid restriction  Sodium stable at 128.

## 2024-10-01 NOTE — CASE MANAGEMENT
Case Management Discharge Planning Note    Patient name Alexx Haney  Location 7T [facility] 704/7T [facility] 704-01 MRN [id number]  : 1965 Date 10/1/2024       Current Admission Date: 2024  Current Admission Diagnosis:Hyponatremia   Patient Active Problem List    Diagnosis Date Noted Date Diagnosed    Right foot pain 2024     Primary hypertension 2024     Status post left knee replacement 2024     Anemia 2024     Advance care planning 2024     At risk for constipation 2024     At risk for delirium 2024     Acute post-operative pain 2024     Primary osteoarthritis involving multiple joints 2024     Other constipation 2024     Leukemoid reaction 2024     Acute blood loss as cause of postoperative anemia 2024     Status post total knee replacement, right 2024     Class 1 obesity due to excess calories without serious comorbidity with body mass index (BMI) of 30.0 to 30.9 in adult 2024     Hyperglycemia 2023     Hyponatremia 2023     Bilateral primary osteoarthritis of knee 2023     Effusion of right knee 08/15/2022     Tinea corporis 2022     Dysfunction of left eustachian tube 10/05/2020     Epidermoid cyst 2020     Elevated LFTs 2020     Chronic pain of both knees 2019     Anxiety 01/15/2019     Benign paroxysmal positional vertigo 11/15/2017     Gout 2017     Chronic bilateral low back pain without sciatica 2017     Thyroid disorder 2016     Tenosynovitis of foot 10/28/2016     Primary localized osteoarthritis of left knee 2016     Primary localized osteoarthritis of right knee 2016     Allergic rhinitis 2016     GERD without esophagitis 2016     V-tach (HCC) 2015     Essential hypertension 2015     DJD (degenerative joint disease) of knee 2015     Asthma 10/04/2013     Hyperlipidemia 10/04/2013       LOS (days): 5  Geometric  Mean LOS (GMLOS) (days): 2.6  Days to GMLOS:-2.4     OBJECTIVE:  Risk of Unplanned Readmission Score: 17.48         Current admission status: Inpatient   Preferred Pharmacy:   [id number] - [place], [place] - [address]  [address] [address]  Phone: [phone] Fax: [phone]    [facility] [place] [id number] [place] [address] [facility] [address]  Phone: [phone] Fax: [phone]    Primary Care Provider: Javier Delacruz MD    Primary Insurance: MEDICARE  Secondary Insurance:     DISCHARGE DETAILS:    Discharge planning discussed with:: Patient  Freedom of Choice: Yes    Other Referral/Resources/Interventions Provided:  Interventions: Outpatient PT         Treatment Team Recommendation: Home       IMM Given (Date):: 10/01/24 (Copy provided to patient and media)  IMM Given to:: Patient     Additional Comments: Met with patient to discuss discharge planning.  Rail installed at house today to basement where bathroom is.  Patient with multiple comlaints re right foot pain and gout.  Message to SLIM same

## 2024-10-01 NOTE — PLAN OF CARE
Problem: SAFETY ADULT  Goal: Maintain or return to baseline ADL function  Description: INTERVENTIONS:  -  Assess patient's ability to carry out ADLs; assess patient's baseline for ADL function and identify physical deficits which impact ability to perform ADLs (bathing, care of mouth/teeth, toileting, grooming, dressing, etc.)  - Assess/evaluate cause of self-care deficits   - Assess range of motion  - Assess patient's mobility; develop plan if impaired  - Assess patient's need for assistive devices and provide as appropriate  - Encourage maximum independence but intervene and supervise when necessary  - Involve family in performance of ADLs  - Assess for home care needs following discharge   - Consider OT consult to assist with ADL evaluation and planning for discharge  - Provide patient education as appropriate  Outcome: Progressing     Problem: METABOLIC, FLUID AND ELECTROLYTES - ADULT  Goal: Electrolytes maintained within normal limits  Description: INTERVENTIONS:  - Monitor labs and assess patient for signs and symptoms of electrolyte imbalances  - Administer electrolyte replacement as ordered  - Monitor response to electrolyte replacements, including repeat lab results as appropriate  - Instruct patient on fluid and nutrition as appropriate  Outcome: Progressing     Problem: MUSCULOSKELETAL - ADULT  Goal: Maintain or return mobility to safest level of function  Description: INTERVENTIONS:  - Assess patient's ability to carry out ADLs; assess patient's baseline for ADL function and identify physical deficits which impact ability to perform ADLs (bathing, care of mouth/teeth, toileting, grooming, dressing, etc.)  - Assess/evaluate cause of self-care deficits   - Assess range of motion  - Assess patient's mobility  - Assess patient's need for assistive devices and provide as appropriate  - Encourage maximum independence but intervene and supervise when necessary  - Involve family in performance of ADLs  -  Assess for home care needs following discharge   - Consider OT consult to assist with ADL evaluation and planning for discharge  - Provide patient education as appropriate  Outcome: Progressing      Retention Suture Text: Retention sutures were placed to support the closure and prevent dehiscence.

## 2024-10-01 NOTE — ASSESSMENT & PLAN NOTE
This is a 59-year-old male patient with history of osteoarthritis status post recent right total knee arthroplasty undergoing therapy at [facility], history of hypertension, asthma, allergies, BPPV, who is transferred for admission to internal medicine due to hyponatremia with sodium level of 120  Admitted to level 2 stepdown for close monitoring, now downgraded to Med-Surg with improvement in Na level  Appreciate nephrology input, received 1.8% saline now off IVF  Sodium increased to 130 now back down to 128-will increase sodium tabs to 2g tid  Continue with fluid restriction  Await nephrology follow up.   Continue to monitor bmp

## 2024-10-01 NOTE — PLAN OF CARE
Problem: PAIN - ADULT  Goal: Verbalizes/displays adequate comfort level or baseline comfort level  Description: Interventions:  - Encourage patient to monitor pain and request assistance  - Assess pain using appropriate pain scale  - Administer analgesics based on type and severity of pain and evaluate response  - Implement non-pharmacological measures as appropriate and evaluate response  - Consider cultural and social influences on pain and pain management  - Notify physician/advanced practitioner if interventions unsuccessful or patient reports new pain  Outcome: Progressing     Problem: INFECTION - ADULT  Goal: Absence or prevention of progression during hospitalization  Description: INTERVENTIONS:  - Assess and monitor for signs and symptoms of infection  - Monitor lab/diagnostic results  - Monitor all insertion sites, i.e. indwelling lines, tubes, and drains  - Monitor endotracheal if appropriate and nasal secretions for changes in amount and color  - [place] appropriate cooling/warming therapies per order  - Administer medications as ordered  - Instruct and encourage patient and family to use good hand hygiene technique  - Identify and instruct in appropriate isolation precautions for identified infection/condition  Outcome: Progressing     Problem: SAFETY ADULT  Goal: Patient will remain free of falls  Description: INTERVENTIONS:  - Educate patient/family on patient safety including physical limitations  - Instruct patient to call for assistance with activity   - Consult OT/PT to assist with strengthening/mobility   - Keep Call bell within reach  - Keep bed low and locked with side rails adjusted as appropriate  - Keep care items and personal belongings within reach  - Initiate and maintain comfort rounds  - Make Fall Risk Sign visible to staff  - Offer Toileting every 2 Hours, in advance of need  - Initiate/Maintain bed alarm  - Apply yellow socks and bracelet for high fall risk patients  - Consider  moving patient to room near nurses station  Outcome: Progressing     Problem: METABOLIC, FLUID AND ELECTROLYTES - ADULT  Goal: Electrolytes maintained within normal limits  Description: INTERVENTIONS:  - Monitor labs and assess patient for signs and symptoms of electrolyte imbalances  - Administer electrolyte replacement as ordered  - Monitor response to electrolyte replacements, including repeat lab results as appropriate  - Instruct patient on fluid and nutrition as appropriate  Outcome: Progressing     Problem: Knowledge Deficit  Goal: Patient/family/caregiver demonstrates understanding of disease process, treatment plan, medications, and discharge instructions  Description: Complete learning assessment and assess knowledge base.  Interventions:  - Provide teaching at level of understanding  - Provide teaching via preferred learning methods  Outcome: Progressing     Problem: DISCHARGE PLANNING  Goal: Discharge to home or other facility with appropriate resources  Description: INTERVENTIONS:  - Identify barriers to discharge w/patient and caregiver  - Arrange for needed discharge resources and transportation as appropriate  - Identify discharge learning needs (meds, wound care, etc.)  - Arrange for interpretive services to assist at discharge as needed  - Refer to Case Management Department for coordinating discharge planning if the patient needs post-hospital services based on physician/advanced practitioner order or complex needs related to functional status, cognitive ability, or social support system  Outcome: Progressing

## 2024-10-01 NOTE — PROGRESS NOTES
Progress Note - Hospitalist   Name: Alexx Haney 59 y.o. male I MRN: [id number]  Unit/Bed#: 7T [facility] 704-01 I Date of Admission: 9/26/2024   Date of Service: 10/1/2024 I Hospital Day: 5    Assessment & Plan  Hyponatremia  This is a 59-year-old male patient with history of osteoarthritis status post recent right total knee arthroplasty undergoing therapy at [facility], history of hypertension, asthma, allergies, BPPV, who is transferred for admission to internal medicine due to hyponatremia with sodium level of 120  Admitted to level 2 stepdown for close monitoring, now downgraded to Med-Surg with improvement in Na level  Appreciate nephrology input, received 1.8% saline now off IVF  Sodium increased to 130 now back down to 128-will increase sodium tabs to 2g tid  Continue with fluid restriction  Await nephrology follow up.   Continue to monitor bmp   Asthma  Not in exacerbation  Continue inhalers, Singulair  Essential hypertension  Continue Lisinopril, verapamil  Monitor BP trend  Benign paroxysmal positional vertigo  On meclizine as needed  DJD (degenerative joint disease) of knee  Per record review, the patient was hospitalized at  [facility] from 9/17 to 9/20/24 s/p right total knee arthroplasty on 9/17/24, WBAT RLE.   Had been at [facility]  PT/OT while here- anticipate d/c to home with hhpt. 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 No further work up needed as lfts are down trending   Right foot pain  Right foot/ankle pain  Started approximately two  days ago and worsened  Decrease rom of ankle, very ttp, warm to touch and edematous  Will get xray and uric acid  Likely acute gout flare  Will add colchicine  Continue prednisone  Pt is on uloric and missed a few doses while attempting to get medication from home.     VTE Pharmacologic Prophylaxis: VTE Score: 2 Moderate Risk (Score 3-4) - Pharmacological DVT Prophylaxis Ordered: enoxaparin (Lovenox).    Mobility:   Basic Mobility Inpatient Raw Score: 16  JH-HLM Goal: 5: Stand one or more mins  JH-HLM Achieved: 6: Walk 10 steps or more  JH-HLM Goal achieved. Continue to encourage appropriate mobility.    Patient Centered Rounds: I performed bedside rounds with nursing staff today.   Discussions with Specialists or Other Care Team Provider:     Education and Discussions with Family / Patient: Patient declined call to .     Current Length of Stay: 5 day(s)  Current Patient Status: Inpatient   Certification Statement: The patient will continue to require additional inpatient hospital stay due to hyponatremia  Discharge Plan: Anticipate discharge in 48 hrs to home with home services.    Code Status: Level 1 - Full Code    Subjective   Patient denies any acute complaints apart from pain in his foot slightly improved today    Objective     Vitals:   Temp (24hrs), Av.8 °F (36.6 °C), Min:97.5 °F (36.4 °C), Max:98 °F (36.7 °C)    Temp:  [97.5 °F (36.4 °C)-98 °F (36.7 °C)] 97.5 °F (36.4 °C)  HR:  [72-83] 72  Resp:  [18] 18  BP: (106-131)/(76-83) 119/76  SpO2:  [94 %-97 %] 94 %  Body mass index is 27.77 kg/m².     Input and Output Summary (last 24 hours):     Intake/Output Summary (Last 24 hours) at 10/1/2024 1100  Last data filed at 10/1/2024 0924  Gross per 24 hour   Intake 1020 ml   Output 1000 ml   Net 20 ml       Physical Exam  Constitutional:       General: He is not in acute distress.     Appearance: He is not toxic-appearing or diaphoretic.   Eyes:      General: No scleral icterus.  Cardiovascular:       Rate and Rhythm: Normal rate and regular rhythm.      Heart sounds: No murmur heard.     No friction rub. No gallop.   Pulmonary:      Effort: No respiratory distress.      Breath sounds: No stridor. No wheezing, rhonchi or rales.   Chest:      Chest wall: No tenderness.   Abdominal:      General: There is no distension.      Palpations: There is no mass.      Tenderness: There is no abdominal tenderness. There is no guarding or rebound.      Hernia: No hernia is present.   Musculoskeletal:         General: No swelling or tenderness.      Comments: Right  to touch erythematous   Neurological:      Mental Status: He is oriented to person, place, and time.          Lines/Drains:  Lines/Drains/Airways       Active Status       None                            Lab Results: I have reviewed the following results: CBC/BMP:   .     10/01/24  0506   WBC 9.21   HGB 8.6*   HCT 24.5*   *   SODIUM 128*   K 3.9   CL 94*   CO2 25   BUN 14   CREATININE 0.67   GLUC 116       Results from last 7 days   Lab Units 10/01/24  0506   WBC Thousand/uL 9.21   HEMOGLOBIN g/dL 8.6*   HEMATOCRIT % 24.5*   PLATELETS Thousands/uL 513*   SEGS PCT % 82*   LYMPHO PCT % 8*   MONO PCT % 9   EOS PCT % 0     Results from last 7 days   Lab Units 10/01/24  0506   SODIUM mmol/L 128*   POTASSIUM mmol/L 3.9   CHLORIDE mmol/L 94*   CO2 mmol/L 25   BUN mg/dL 14   CREATININE mg/dL 0.67   ANION GAP mmol/L 9   CALCIUM mg/dL 9.5   ALBUMIN g/dL 3.7   TOTAL BILIRUBIN mg/dL 0.99   ALK PHOS U/L 132*   ALT U/L 68*   AST U/L 21   GLUCOSE RANDOM mg/dL 116                       Recent Cultures (last 7 days):         Imaging Review: No pertinent imaging studies reviewed.  Other Studies: No additional pertinent studies reviewed.    Last 24 Hours Medication List:     Current Facility-Administered Medications:     acetaminophen (TYLENOL) tablet 650 mg, Q6H PRN    albuterol inhalation solution 2.5 mg, Q6H PRN    ascorbic acid (VITAMIN C) tablet 500 mg,  Daily    colchicine (COLCRYS) tablet 0.6 mg, Daily    dextromethorphan-guaiFENesin (ROBITUSSIN DM) oral syrup 10 mL, Q4H PRN    docusate sodium (COLACE) capsule 100 mg, BID    enoxaparin (LOVENOX) subcutaneous injection 40 mg, Daily    febuxostat (ULORIC) tablet 40 mg, Daily    ferrous sulfate tablet 325 mg, Daily With Breakfast    fluticasone (FLONASE) 50 mcg/act nasal spray 1 spray, BID    fluticasone-vilanterol 200-25 mcg/actuation 1 puff, Daily    folic acid (FOLVITE) tablet 1 mg, Daily    gabapentin (NEURONTIN) capsule 100 mg, TID    ipratropium-albuterol (DUO-NEB) 0.5-2.5 mg/3 mL inhalation solution 3 mL, Q6H PRN    lidocaine (LIDODERM) 5 % patch 1 patch, Daily    loratadine (CLARITIN) tablet 10 mg, Daily    meclizine (ANTIVERT) tablet 12.5 mg, Q8H PRN    methocarbamol (ROBAXIN) tablet 250 mg, Q8H PRN    montelukast (SINGULAIR) tablet 10 mg, Daily    multivitamin stress formula tablet 1 tablet, Daily    ondansetron (ZOFRAN) injection 4 mg, Q6H PRN    oxyCODONE (ROXICODONE) IR tablet 5 mg, Q6H PRN **OR** oxyCODONE (ROXICODONE) split tablet 7.5 mg, Q6H PRN    pantoprazole (PROTONIX) EC tablet 40 mg, Early Morning    predniSONE tablet 40 mg, Daily    sodium chloride tablet 2 g, TID With Meals    verapamil (CALAN) tablet 120 mg, Daily    Administrative Statements   Today, Patient Was Seen By: Vick Llanos DO      **Please Note: This note may have been constructed using a voice recognition system.**

## 2024-10-01 NOTE — PROGRESS NOTES
NEPHROLOGY HOSPITAL PROGRESS NOTE   Alexx Haney 59 y.o. male MRN: [id number]  Unit/Bed#: 7T [facility] 704-01 Encounter: [id number]  Reason for Consult: Hyponatremia    Brief History of Admission -postoperative right knee arthroplasty on 9/17.  Transferred to medical floor from rehab given worsening hyponatremia     Assessment & Plan  Hyponatremia  Acute on chronic in nature.  Baseline sodium in the low 130s.  Suspecting component of SIADH, urine osmolality 425, urine sodium 89, serum uric acid 4.0, cortisol 7.9  Discussed with primary service, salt tablets recently increased to 2 g 3 times daily.  Maintain fluid restriction  Sodium stable at 128.  Essential hypertension  Blood pressure stable, no changes in his current regimen.  Elevated LFTs  History of alcohol use, improving.  Hepatitis negative.  GI signed off.  Anemia  Hemoglobin stable at 9.0.  DJD (degenerative joint disease) of knee  Status post right knee arthroplasty 9/17/2024.    Right foot pain  Suspected gout, discussed with primary service.  Remains on colchicine, prednisone and Uloric.    Summary: Overall sodium level appears fairly stable.  Sodium chloride tablets recent increase to 2 g 3 times daily.  Maintain fluid restriction.  No changes from nephrology standpoint.  If sodium level remains fairly stable can be discharged home from nephrology standpoint with close monitoring as an outpatient    SUBJECTIVE / 24H INTERVAL HISTORY:  Overall doing reasonably well.  Still with significant right foot pain and swelling.  Denies any chest pain, shortness of breath.  No nausea vomiting diarrhea.      OBJECTIVE:  Current Weight: Weight - Scale: 85.3 kg (188 lb 0.8 oz)  Vitals:    09/30/24 1500 09/30/24 2015 09/30/24 2323 10/01/24 0735   BP: 106/76  131/83 119/76   BP Location: Right arm  Right arm Right arm   Pulse: 81  83 72   Resp: 18  18 18   Temp: 98 °F (36.7 °C)  97.8 °F (36.6 °C) 97.5 °F (36.4 °C)   TempSrc: Temporal  Temporal Temporal   SpO2: 97% 96%  96% 94%   Weight:       Height:           Intake/Output Summary (Last 24 hours) at 10/1/2024 1416  Last data filed at 10/1/2024 1325  Gross per 24 hour   Intake 900 ml   Output 1300 ml   Net -400 ml       Physical Exam  Constitutional:       Appearance: He is not ill-appearing.   Eyes:      General: No scleral icterus.  Cardiovascular:      Rate and Rhythm: Normal rate and regular rhythm.   Pulmonary:      Effort: Pulmonary effort is normal.      Breath sounds: Normal breath sounds.   Abdominal:      General: There is no distension.      Palpations: Abdomen is soft.      Tenderness: There is no abdominal tenderness.   Musculoskeletal:      Right lower leg: No edema.      Left lower leg: No edema.   Skin:     General: Skin is warm and dry.      Findings: No rash.   Neurological:      Mental Status: He is alert and oriented to person, place, and time.         Medications:    Current Facility-Administered Medications:     acetaminophen (TYLENOL) tablet 650 mg, 650 mg, Oral, Q6H PRN, Razia Mortensen MD, 650 mg at 09/30/24 1252    albuterol inhalation solution 2.5 mg, 2.5 mg, Nebulization, Q6H PRN, Razia Mortensen MD, 2.5 mg at 09/30/24 2015    ascorbic acid (VITAMIN C) tablet 500 mg, 500 mg, Oral, Daily, Razia Mortensen MD, 500 mg at 10/01/24 0921    colchicine (COLCRYS) tablet 0.6 mg, 0.6 mg, Oral, Daily, Vick Llanos, DO, 0.6 mg at 10/01/24 1102    dextromethorphan-guaiFENesin (ROBITUSSIN DM) oral syrup 10 mL, 10 mL, Oral, Q4H PRN, Razia Mortensen MD, 10 mL at 10/01/24 0504    docusate sodium (COLACE) capsule 100 mg, 100 mg, Oral, BID, Razia Mortensen MD, 100 mg at 10/01/24 0921    enoxaparin (LOVENOX) subcutaneous injection 40 mg, 40 mg, Subcutaneous, Daily, Razia Mortensen MD, 40 mg at 10/01/24 0920    febuxostat (ULORIC) tablet 40 mg, 40 mg, Oral, Daily, Razia Mortensen MD, 40 mg at 10/01/24 0921    ferrous sulfate tablet 325 mg, 325 mg, Oral, Daily With Breakfast, Razia  Nisreen Mortensen MD, 325 mg at 10/01/24 0819    fluticasone (FLONASE) 50 mcg/act nasal spray 1 spray, 1 spray, Each Nare, BID, Razia Mortensen MD, 1 spray at 10/01/24 0924    fluticasone-vilanterol 200-25 mcg/actuation 1 puff, 1 puff, Inhalation, Daily, Razia Mortensen MD, 1 puff at 09/30/24 0848    folic acid (FOLVITE) tablet 1 mg, 1 mg, Oral, Daily, Razia Mortensen MD, 1 mg at 10/01/24 0922    gabapentin (NEURONTIN) capsule 100 mg, 100 mg, Oral, TID, Razia Mortensen MD, 100 mg at 10/01/24 0921    ipratropium-albuterol (DUO-NEB) 0.5-2.5 mg/3 mL inhalation solution 3 mL, 3 mL, Nebulization, Q6H PRN, Razia Mortensen MD    lidocaine (LIDODERM) 5 % patch 1 patch, 1 patch, Topical, Daily, Razia Mortensen MD, 1 patch at 10/01/24 0920    loratadine (CLARITIN) tablet 10 mg, 10 mg, Oral, Daily, Razia Mortensen MD, 10 mg at 10/01/24 0921    meclizine (ANTIVERT) tablet 12.5 mg, 12.5 mg, Oral, Q8H PRN, Razia Mortensen MD, 12.5 mg at 09/26/24 2047    methocarbamol (ROBAXIN) tablet 250 mg, 250 mg, Oral, Q8H PRN, Razia Mortensen MD, 250 mg at 09/29/24 1121    montelukast (SINGULAIR) tablet 10 mg, 10 mg, Oral, Daily, Razia Mortensen MD, 10 mg at 10/01/24 0923    multivitamin stress formula tablet 1 tablet, 1 tablet, Oral, Daily, Razia Mortensen MD, 1 tablet at 10/01/24 0921    ondansetron (ZOFRAN) injection 4 mg, 4 mg, Intravenous, Q6H PRN, Razia Mortensen MD    oxyCODONE (ROXICODONE) IR tablet 5 mg, 5 mg, Oral, Q6H PRN **OR** oxyCODONE (ROXICODONE) split tablet 7.5 mg, 7.5 mg, Oral, Q6H PRN, Razia Mortensen MD, 7.5 mg at 09/30/24 0513    pantoprazole (PROTONIX) EC tablet 40 mg, 40 mg, Oral, Early Morning, Shirlene Watson MD, 40 mg at 10/01/24 0502    predniSONE tablet 40 mg, 40 mg, Oral, Daily, Ana Liz DO, 40 mg at 09/30/24 1252    sodium chloride tablet 2 g, 2 g, Oral, TID With Meals, Vick Llanos DO, 2 g at 10/01/24 1102    verapamil (CALAN)  "tablet 120 mg, 120 mg, Oral, Daily, Razia Mortensen MD, 120 mg at 10/01/24 0920    Laboratory Results:  Results from last 7 days   Lab Units 10/01/24  0506 09/30/24  1017 09/30/24  0455 09/29/24  1947 09/29/24  0517 09/28/24  1958 09/28/24  0622 09/28/24  0005 09/25/24  2150 09/25/24  0743   WBC Thousand/uL 9.21 10.75*  --   --   --   --  7.17  --   --  7.74   HEMOGLOBIN g/dL 8.6* 9.0*  --   --   --   --  8.8*  --   --  8.7*   HEMATOCRIT % 24.5* 27.0*  --   --   --   --  26.1*  --   --  25.2*   PLATELETS Thousands/uL 513* 494*  --   --   --   --  446*  --   --  394*   POTASSIUM mmol/L 3.9  --  4.4 4.2 4.0 4.2 4.1  4.0 3.8   < > 4.2   CHLORIDE mmol/L 94*  --  93* 93* 97 97 97  97 98   < > 89*   CO2 mmol/L 25  --  24 26 24 24 24  24 21   < > 23   BUN mg/dL 14  --  13 14 15 18 12  12 13   < > 10   CREATININE mg/dL 0.67  --  0.75 0.87 0.75 0.94 0.83  0.83 0.82   < > 0.68   CALCIUM mg/dL 9.5  --  9.3 9.2 9.4 9.7 9.7  9.6 9.9   < > 9.3    < > = values in this interval not displayed.       Portions of the record may have been created with voice recognition software. Occasional wrong word or \"sound a like\" substitutions may have occurred due to the inherent limitations of voice recognition software. Read the chart carefully and recognize, using context, where substitutions have occurred.If you have any questions, please contact the dictating provider.  "

## 2024-10-01 NOTE — ASSESSMENT & PLAN NOTE
Right foot/ankle pain  Started approximately two days ago and worsened  Decrease rom of ankle, very ttp, warm to touch and edematous  Will get xray and uric acid  Likely acute gout flare  Will add colchicine  Continue prednisone  Pt is on uloric and missed a few doses while attempting to get medication from home.

## 2024-10-02 ENCOUNTER — TELEPHONE (OUTPATIENT)
Age: 59
End: 2024-10-02

## 2024-10-02 ENCOUNTER — APPOINTMENT (INPATIENT)
Dept: RADIOLOGY | Facility: HOSPITAL | Age: 59
End: 2024-10-02
Payer: MEDICARE

## 2024-10-02 LAB
ANION GAP SERPL CALCULATED.3IONS-SCNC: 10 MMOL/L (ref 4–13)
BASOPHILS # BLD AUTO: 0.02 THOUSANDS/ΜL (ref 0–0.1)
BASOPHILS NFR BLD AUTO: 0 % (ref 0–1)
BUN SERPL-MCNC: 16 MG/DL (ref 5–25)
CALCIUM SERPL-MCNC: 9 MG/DL (ref 8.4–10.2)
CHLORIDE SERPL-SCNC: 97 MMOL/L (ref 96–108)
CO2 SERPL-SCNC: 25 MMOL/L (ref 21–32)
CREAT SERPL-MCNC: 0.71 MG/DL (ref 0.6–1.3)
EOSINOPHIL # BLD AUTO: 0.04 THOUSAND/ΜL (ref 0–0.61)
EOSINOPHIL NFR BLD AUTO: 1 % (ref 0–6)
ERYTHROCYTE [DISTWIDTH] IN BLOOD BY AUTOMATED COUNT: 12.8 % (ref 11.6–15.1)
GFR SERPL CREATININE-BSD FRML MDRD: 103 ML/MIN/1.73SQ M
GLUCOSE SERPL-MCNC: 94 MG/DL (ref 65–140)
HCT VFR BLD AUTO: 22.6 % (ref 36.5–49.3)
HGB BLD-MCNC: 7.8 G/DL (ref 12–17)
IMM GRANULOCYTES # BLD AUTO: 0.07 THOUSAND/UL (ref 0–0.2)
IMM GRANULOCYTES NFR BLD AUTO: 1 % (ref 0–2)
LYMPHOCYTES # BLD AUTO: 0.91 THOUSANDS/ΜL (ref 0.6–4.47)
LYMPHOCYTES NFR BLD AUTO: 13 % (ref 14–44)
MCH RBC QN AUTO: 30.5 PG (ref 26.8–34.3)
MCHC RBC AUTO-ENTMCNC: 34.5 G/DL (ref 31.4–37.4)
MCV RBC AUTO: 88 FL (ref 82–98)
MONOCYTES # BLD AUTO: 0.55 THOUSAND/ΜL (ref 0.17–1.22)
MONOCYTES NFR BLD AUTO: 8 % (ref 4–12)
NEUTROPHILS # BLD AUTO: 5.28 THOUSANDS/ΜL (ref 1.85–7.62)
NEUTS SEG NFR BLD AUTO: 77 % (ref 43–75)
NRBC BLD AUTO-RTO: 0 /100 WBCS
PLATELET # BLD AUTO: 493 THOUSANDS/UL (ref 149–390)
PMV BLD AUTO: 8.6 FL (ref 8.9–12.7)
POTASSIUM SERPL-SCNC: 4.1 MMOL/L (ref 3.5–5.3)
RBC # BLD AUTO: 2.56 MILLION/UL (ref 3.88–5.62)
SODIUM SERPL-SCNC: 132 MMOL/L (ref 135–147)
WBC # BLD AUTO: 6.87 THOUSAND/UL (ref 4.31–10.16)

## 2024-10-02 PROCEDURE — 94640 AIRWAY INHALATION TREATMENT: CPT

## 2024-10-02 PROCEDURE — 80048 BASIC METABOLIC PNL TOTAL CA: CPT | Performed by: INTERNAL MEDICINE

## 2024-10-02 PROCEDURE — 73630 X-RAY EXAM OF FOOT: CPT

## 2024-10-02 PROCEDURE — 99232 SBSQ HOSP IP/OBS MODERATE 35: CPT | Performed by: INTERNAL MEDICINE

## 2024-10-02 PROCEDURE — 85025 COMPLETE CBC W/AUTO DIFF WBC: CPT | Performed by: INTERNAL MEDICINE

## 2024-10-02 RX ORDER — SODIUM CHLORIDE 1 G/1
1 TABLET ORAL
Status: DISCONTINUED | OUTPATIENT
Start: 2024-10-02 | End: 2024-10-03 | Stop reason: HOSPADM

## 2024-10-02 RX ADMIN — DOCUSATE SODIUM 100 MG: 100 CAPSULE, LIQUID FILLED ORAL at 08:26

## 2024-10-02 RX ADMIN — GUAIFENESIN AND DEXTROMETHORPHAN 10 ML: 20; 200 SYRUP ORAL at 18:15

## 2024-10-02 RX ADMIN — FLUTICASONE FUROATE AND VILANTEROL TRIFENATATE 1 PUFF: 200; 25 POWDER RESPIRATORY (INHALATION) at 07:43

## 2024-10-02 RX ADMIN — GABAPENTIN 100 MG: 100 CAPSULE ORAL at 08:28

## 2024-10-02 RX ADMIN — DOCUSATE SODIUM 100 MG: 100 CAPSULE, LIQUID FILLED ORAL at 17:06

## 2024-10-02 RX ADMIN — FERROUS SULFATE TAB 325 MG (65 MG ELEMENTAL FE) 325 MG: 325 (65 FE) TAB at 07:37

## 2024-10-02 RX ADMIN — OXYCODONE HYDROCHLORIDE AND ACETAMINOPHEN 500 MG: 500 TABLET ORAL at 08:28

## 2024-10-02 RX ADMIN — SODIUM CHLORIDE 1 G: 1 TABLET ORAL at 11:36

## 2024-10-02 RX ADMIN — SODIUM CHLORIDE 2 G: 1 TABLET ORAL at 07:37

## 2024-10-02 RX ADMIN — SODIUM CHLORIDE 1 G: 1 TABLET ORAL at 15:32

## 2024-10-02 RX ADMIN — B-COMPLEX W/ C & FOLIC ACID TAB 1 TABLET: TAB at 08:28

## 2024-10-02 RX ADMIN — GABAPENTIN 100 MG: 100 CAPSULE ORAL at 15:32

## 2024-10-02 RX ADMIN — PANTOPRAZOLE SODIUM 40 MG: 40 TABLET, DELAYED RELEASE ORAL at 05:55

## 2024-10-02 RX ADMIN — MONTELUKAST 10 MG: 10 TABLET, FILM COATED ORAL at 08:26

## 2024-10-02 RX ADMIN — ALBUTEROL SULFATE 2.5 MG: 2.5 SOLUTION RESPIRATORY (INHALATION) at 22:45

## 2024-10-02 RX ADMIN — ACETAMINOPHEN 650 MG: 325 TABLET ORAL at 08:29

## 2024-10-02 RX ADMIN — PREDNISONE 40 MG: 20 TABLET ORAL at 08:27

## 2024-10-02 RX ADMIN — ENOXAPARIN SODIUM 40 MG: 100 INJECTION SUBCUTANEOUS at 08:26

## 2024-10-02 RX ADMIN — COLCHICINE 0.6 MG: 0.6 TABLET ORAL at 08:27

## 2024-10-02 RX ADMIN — FEBUXOSTAT 40 MG: 40 TABLET ORAL at 08:36

## 2024-10-02 RX ADMIN — GABAPENTIN 100 MG: 100 CAPSULE ORAL at 23:48

## 2024-10-02 RX ADMIN — VERAPAMIL HYDROCHLORIDE 120 MG: 40 TABLET, FILM COATED ORAL at 08:26

## 2024-10-02 NOTE — PLAN OF CARE
Problem: PAIN - ADULT  Goal: Verbalizes/displays adequate comfort level or baseline comfort level  Description: Interventions:  - Encourage patient to monitor pain and request assistance  - Assess pain using appropriate pain scale  - Administer analgesics based on type and severity of pain and evaluate response  - Implement non-pharmacological measures as appropriate and evaluate response  - Consider cultural and social influences on pain and pain management  - Notify physician/advanced practitioner if interventions unsuccessful or patient reports new pain  Outcome: Progressing     Problem: INFECTION - ADULT  Goal: Absence or prevention of progression during hospitalization  Description: INTERVENTIONS:  - Assess and monitor for signs and symptoms of infection  - Monitor lab/diagnostic results  - Monitor all insertion sites, i.e. indwelling lines, tubes, and drains  - Monitor endotracheal if appropriate and nasal secretions for changes in amount and color  - [place] appropriate cooling/warming therapies per order  - Administer medications as ordered  - Instruct and encourage patient and family to use good hand hygiene technique  - Identify and instruct in appropriate isolation precautions for identified infection/condition  Outcome: Progressing     Problem: NEUROSENSORY - ADULT  Goal: Achieves stable or improved neurological status  Description: INTERVENTIONS  - Monitor and report changes in neurological status  - Monitor vital signs such as temperature, blood pressure, glucose, and any other labs ordered   - Initiate measures to prevent increased intracranial pressure  - Monitor for seizure activity and implement precautions if appropriate      Outcome: Progressing     Problem: METABOLIC, FLUID AND ELECTROLYTES - ADULT  Goal: Electrolytes maintained within normal limits  Description: INTERVENTIONS:  - Monitor labs and assess patient for signs and symptoms of electrolyte imbalances  - Administer electrolyte  replacement as ordered  - Monitor response to electrolyte replacements, including repeat lab results as appropriate  - Instruct patient on fluid and nutrition as appropriate  Outcome: Progressing     Problem: METABOLIC, FLUID AND ELECTROLYTES - ADULT  Goal: Fluid balance maintained  Description: INTERVENTIONS:  - Monitor labs   - Monitor I/O and WT  - Instruct patient on fluid and nutrition as appropriate  - Assess for signs & symptoms of volume excess or deficit  Outcome: Progressing     Problem: Knowledge Deficit  Goal: Patient/family/caregiver demonstrates understanding of disease process, treatment plan, medications, and discharge instructions  Description: Complete learning assessment and assess knowledge base.  Interventions:  - Provide teaching at level of understanding  - Provide teaching via preferred learning methods  Outcome: Progressing     Problem: DISCHARGE PLANNING  Goal: Discharge to home or other facility with appropriate resources  Description: INTERVENTIONS:  - Identify barriers to discharge w/patient and caregiver  - Arrange for needed discharge resources and transportation as appropriate  - Identify discharge learning needs (meds, wound care, etc.)  - Arrange for interpretive services to assist at discharge as needed  - Refer to Case Management Department for coordinating discharge planning if the patient needs post-hospital services based on physician/advanced practitioner order or complex needs related to functional status, cognitive ability, or social support system  Outcome: Progressing

## 2024-10-02 NOTE — PROGRESS NOTES
Progress Note - Hospitalist   Name: Alexx Haney 59 y.o. male I MRN: [id number]  Unit/Bed#: 7T [facility] 704-01 I Date of Admission: 9/26/2024   Date of Service: 10/2/2024 I Hospital Day: 6    Assessment & Plan  Hyponatremia  This is a 59-year-old male patient with history of osteoarthritis status post recent right total knee arthroplasty undergoing therapy at [facility], history of hypertension, asthma, allergies, BPPV, who is transferred for admission to internal medicine due to hyponatremia with sodium level of 120  Admitted to level 2 stepdown for close monitoring, now downgraded to Med-Surg with improvement in Na level      Continue with fluid restriction  Await nephrology follow up.   Continue to monitor bmp   Sodium improved to 132-continue sodium tablets to 1 g 3 times daily  Asthma  Not in exacerbation  Continue inhalers, Singulair  Essential hypertension  Continue Lisinopril, verapamil  Monitor BP trend  Benign paroxysmal positional vertigo  On meclizine as needed  DJD (degenerative joint disease) of knee  Per record review, the patient was hospitalized at  [facility]\"" from 9/17 to 9/20/24 s/p right total knee arthroplasty on 9/17/24, WBAT RLE.   Had been at [facility]  PT/OT while here- anticipate d/c to home with hhpt. 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 No further work up needed as lfts are down trending   Right foot pain  Right foot/ankle pain  Started approximately two days ago and worsened  Decrease rom of ankle, very ttp, warm to touch and  edematous  Will get xray and uric acid  Likely acute gout flare  Will add colchicine  Continue prednisone  Pt is on uloric and missed a few doses while attempting to get medication from home.     VTE Pharmacologic Prophylaxis: VTE Score: 2 Moderate Risk (Score 3-4) - Pharmacological DVT Prophylaxis Ordered: enoxaparin (Lovenox).    Mobility:   Basic Mobility Inpatient Raw Score: 16  JH-HLM Goal: 5: Stand one or more mins  JH-HLM Achieved: 5: Stand (1 or more minutes)  JH-HLM Goal achieved. Continue to encourage appropriate mobility.    Patient Centered Rounds: I performed bedside rounds with nursing staff today.   Discussions with Specialists or Other Care Team Provider:     Education and Discussions with Family / Patient: Patient declined call to .     Current Length of Stay: 6 day(s)  Current Patient Status: Inpatient   Certification Statement: The patient will continue to require additional inpatient hospital stay due to left foot pain    Discharge Plan: Anticipate discharge in 24-48 hrs to home with home services.    Code Status: Level 1 - Full Code    Subjective   he reports improvement in right foot pain, however when ambulating today started having worsening left ankle pain    Objective :  Temp:  [97.1 °F (36.2 °C)-98 °F (36.7 °C)] 98 °F (36.7 °C)  HR:  [67-80] 80  BP: (119-140)/(60-73) 127/70  Resp:  [18] 18  SpO2:  [94 %-97 %] 97 %  O2 Device: None (Room air)    Body mass index is 27.77 kg/m².     Input and Output Summary (last 24 hours):     Intake/Output Summary (Last 24 hours) at 10/2/2024 1000  Last data filed at 10/2/2024 0425  Gross per 24 hour   Intake 360 ml   Output 1000 ml   Net -640 ml       Physical Exam  Constitutional:       General: He is not in acute distress.     Appearance: He is not toxic-appearing or diaphoretic.   Eyes:      General: No scleral icterus.  Cardiovascular:      Rate and Rhythm: Normal rate and regular rhythm.      Heart sounds: No murmur heard.     No  friction rub. No gallop.   Pulmonary:      Effort: No respiratory distress.      Breath sounds: No stridor. No wheezing, rhonchi or rales.   Chest:      Chest wall: No tenderness.   Abdominal:      General: There is no distension.      Palpations: There is no mass.      Tenderness: There is no abdominal tenderness. There is no guarding or rebound.      Hernia: No hernia is present.   Musculoskeletal:         General: Tenderness present. No swelling.      Comments: Left ankle tenderness to palpation and with movement           Lines/Drains:              Lab Results: I have reviewed the following results:   Results from last 7 days   Lab Units 10/02/24  0431   WBC Thousand/uL 6.87   HEMOGLOBIN g/dL 7.8*   HEMATOCRIT % 22.6*   PLATELETS Thousands/uL 493*   SEGS PCT % 77*   LYMPHO PCT % 13*   MONO PCT % 8   EOS PCT % 1     Results from last 7 days   Lab Units 10/02/24  0431 10/01/24  0506   SODIUM mmol/L 132* 128*   POTASSIUM mmol/L 4.1 3.9   CHLORIDE mmol/L 97 94*   CO2 mmol/L 25 25   BUN mg/dL 16 14   CREATININE mg/dL 0.71 0.67   ANION GAP mmol/L 10 9   CALCIUM mg/dL 9.0 9.5   ALBUMIN g/dL  --  3.7   TOTAL BILIRUBIN mg/dL  --  0.99   ALK PHOS U/L  --  132*   ALT U/L  --  68*   AST U/L  --  21   GLUCOSE RANDOM mg/dL 94 116                       Recent Cultures (last 7 days):         Imaging Results Review: No pertinent imaging studies reviewed.  Other Study Results Review: No additional pertinent studies reviewed.    Last 24 Hours Medication List:     Current Facility-Administered Medications:     acetaminophen (TYLENOL) tablet 650 mg, Q6H PRN    albuterol inhalation solution 2.5 mg, Q6H PRN    ascorbic acid (VITAMIN C) tablet 500 mg, Daily    colchicine (COLCRYS) tablet 0.6 mg, Daily    dextromethorphan-guaiFENesin (ROBITUSSIN DM) oral syrup 10 mL, Q4H PRN    docusate sodium (COLACE) capsule 100 mg, BID    enoxaparin (LOVENOX) subcutaneous injection 40 mg, Daily    febuxostat (ULORIC) tablet 40 mg, Daily    ferrous  sulfate tablet 325 mg, Daily With Breakfast    fluticasone (FLONASE) 50 mcg/act nasal spray 1 spray, BID    fluticasone-vilanterol 200-25 mcg/actuation 1 puff, Daily    folic acid (FOLVITE) tablet 1 mg, Daily    gabapentin (NEURONTIN) capsule 100 mg, TID    ipratropium-albuterol (DUO-NEB) 0.5-2.5 mg/3 mL inhalation solution 3 mL, Q6H PRN    lidocaine (LIDODERM) 5 % patch 1 patch, Daily    loratadine (CLARITIN) tablet 10 mg, Daily    meclizine (ANTIVERT) tablet 12.5 mg, Q8H PRN    methocarbamol (ROBAXIN) tablet 250 mg, Q8H PRN    montelukast (SINGULAIR) tablet 10 mg, Daily    multivitamin stress formula tablet 1 tablet, Daily    ondansetron (ZOFRAN) injection 4 mg, Q6H PRN    oxyCODONE (ROXICODONE) IR tablet 5 mg, Q6H PRN **OR** oxyCODONE (ROXICODONE) split tablet 7.5 mg, Q6H PRN    pantoprazole (PROTONIX) EC tablet 40 mg, Early Morning    predniSONE tablet 40 mg, Daily    sodium chloride tablet 1 g, TID With Meals    verapamil (CALAN) tablet 120 mg, Daily    Administrative Statements   Today, Patient Was Seen By: Vick Llanos DO      **Please Note: This note may have been constructed using a voice recognition system.**

## 2024-10-02 NOTE — TELEPHONE ENCOUNTER
Armond, can you please call patient and get him on the schedule for next Wednesday 10/9/24 for post op appt (assuming he will be out of the hospital by then)? I want to get him on the schedule so we do no lose track of him. He does not need to do anything with his incision except keep it covered with clean dry gauze for now. No lotions, creams, or getting it wet until we see him. We will do wound care at his appt. He will just have the suture tails to trim so he does not need to worry about wound care. Thanks!

## 2024-10-02 NOTE — TELEPHONE ENCOUNTER
Caller: Patient     Doctor: day    Reason for franklyn Will be discharged from hospital tomorrow and will call back in the morning with a time that is good  for him, He does not have his phone with him right now    Call back#:

## 2024-10-02 NOTE — PLAN OF CARE
Problem: PAIN - ADULT  Goal: Verbalizes/displays adequate comfort level or baseline comfort level  Description: Interventions:  - Encourage patient to monitor pain and request assistance  - Assess pain using appropriate pain scale  - Administer analgesics based on type and severity of pain and evaluate response  - Implement non-pharmacological measures as appropriate and evaluate response  - Consider cultural and social influences on pain and pain management  - Notify physician/advanced practitioner if interventions unsuccessful or patient reports new pain  Outcome: Progressing     Problem: INFECTION - ADULT  Goal: Absence or prevention of progression during hospitalization  Description: INTERVENTIONS:  - Assess and monitor for signs and symptoms of infection  - Monitor lab/diagnostic results  - Monitor all insertion sites, i.e. indwelling lines, tubes, and drains  - Monitor endotracheal if appropriate and nasal secretions for changes in amount and color  - [place] appropriate cooling/warming therapies per order  - Administer medications as ordered  - Instruct and encourage patient and family to use good hand hygiene technique  - Identify and instruct in appropriate isolation precautions for identified infection/condition  Outcome: Progressing     Problem: MUSCULOSKELETAL - ADULT  Goal: Maintain or return mobility to safest level of function  Description: INTERVENTIONS:  - Assess patient's ability to carry out ADLs; assess patient's baseline for ADL function and identify physical deficits which impact ability to perform ADLs (bathing, care of mouth/teeth, toileting, grooming, dressing, etc.)  - Assess/evaluate cause of self-care deficits   - Assess range of motion  - Assess patient's mobility  - Assess patient's need for assistive devices and provide as appropriate  - Encourage maximum independence but intervene and supervise when necessary  - Involve family in performance of ADLs  - Assess for home care needs  following discharge   - Consider OT consult to assist with ADL evaluation and planning for discharge  - Provide patient education as appropriate  Outcome: Progressing     Problem: MUSCULOSKELETAL - ADULT  Goal: Maintain proper alignment of affected body part  Description: INTERVENTIONS:  - Support, maintain and protect limb and body alignment  - Provide patient/ family with appropriate education  Outcome: Progressing

## 2024-10-02 NOTE — ASSESSMENT & PLAN NOTE
Per record review, the patient was hospitalized at  [facility] from 9/17 to 9/20/24 s/p right total knee arthroplasty on 9/17/24, WBAT RLE.   Had been at [facility]  PT/OT while here- anticipate d/c to home with hhpt. Awaiting a railing to be installed and gout treatment

## 2024-10-02 NOTE — ASSESSMENT & PLAN NOTE
This is a 59-year-old male patient with history of osteoarthritis status post recent right total knee arthroplasty undergoing therapy at [facility], history of hypertension, asthma, allergies, BPPV, who is transferred for admission to internal medicine due to hyponatremia with sodium level of 120  Admitted to level 2 stepdown for close monitoring, now downgraded to Med-Surg with improvement in Na level      Continue with fluid restriction  Await nephrology follow up.   Continue to monitor bmp   Sodium improved to 132-continue sodium tablets to 1 g 3 times daily

## 2024-10-02 NOTE — PROGRESS NOTES
NEPHROLOGY HOSPITAL PROGRESS NOTE   Alexx Haney 59 y.o. male MRN: [id number]  Unit/Bed#: 7T [facility] 704-01 Encounter: [id number]  Reason for Consult: Hyponatremia    Brief History of Admission -postoperative right knee arthroplasty on 9/17.  Transferred back to the medical floor from rehab given worsening hyponatremia.    Assessment & Plan  Hyponatremia  Acute on chronic in nature.  Baseline sodium in the low 130s.  Suspecting component of SIADH, urine osmolality 425, urine sodium 89, serum uric acid 4.0, cortisol 7.9  Discussed with primary service, salt tablets recently increased to 2 g 3 times daily, most recent sodium level 132.  Maintain fluid restriction  Essential hypertension  Blood pressure stable, no changes in his current regimen.  Elevated LFTs  History of alcohol use, improving.  Hepatitis negative.  GI signed off.  Anemia  Hemoglobin stable at 7.8, continue to monitor closely.  DJD (degenerative joint disease) of knee  Status post right knee arthroplasty 9/17/2024.    Right foot pain  Suspected gout, discussed with primary service.  Remains on colchicine, prednisone and Uloric.    Summary: Overall sodium level remained stable at 132.  Continue with sodium chloride tablets and fluid restriction.  No signs of volume overload.  Stable for discharge from nephrology standpoint.    SUBJECTIVE / 24H INTERVAL HISTORY:  In and examined.  Patient awake and alert.  Complains of left foot pain.  Gout appears to be improving.  Denies any chest pain occasional cough.    OBJECTIVE:  Current Weight: Weight - Scale: 85.3 kg (188 lb 0.8 oz)  Vitals:    10/01/24 0735 10/01/24 1509 10/01/24 2135 10/02/24 0700   BP: 119/76 119/73 140/60 127/70   BP Location: Right arm Right arm Right arm Right arm   Pulse: 72 67 76 80   Resp: 18 18 18 18   Temp: 97.5 °F (36.4 °C) (!) 97.1 °F (36.2 °C) 97.5 °F (36.4 °C) 98 °F (36.7 °C)   TempSrc: Temporal Temporal Temporal Temporal   SpO2: 94% 95% 94% 97%   Weight:       Height:            Intake/Output Summary (Last 24 hours) at 10/2/2024 1304  Last data filed at 10/2/2024 0900  Gross per 24 hour   Intake 480 ml   Output 1300 ml   Net -820 ml       Physical Exam  Constitutional:       Appearance: He is not ill-appearing.   Eyes:      General: No scleral icterus.  Cardiovascular:      Rate and Rhythm: Normal rate and regular rhythm.   Pulmonary:      Effort: Pulmonary effort is normal.      Breath sounds: Normal breath sounds.   Abdominal:      General: There is no distension.      Palpations: Abdomen is soft.      Tenderness: There is no abdominal tenderness.   Musculoskeletal:      Right lower leg: Edema present.      Left lower leg: No edema.   Skin:     General: Skin is warm and dry.   Neurological:      Mental Status: He is alert and oriented to person, place, and time.         Medications:    Current Facility-Administered Medications:     acetaminophen (TYLENOL) tablet 650 mg, 650 mg, Oral, Q6H PRN, Razia Mortensen MD, 650 mg at 10/02/24 0829    albuterol inhalation solution 2.5 mg, 2.5 mg, Nebulization, Q6H PRN, Razia Mortensen MD, 2.5 mg at 10/01/24 1941    ascorbic acid (VITAMIN C) tablet 500 mg, 500 mg, Oral, Daily, Razia Mortensen MD, 500 mg at 10/02/24 0828    colchicine (COLCRYS) tablet 0.6 mg, 0.6 mg, Oral, Daily, Vick Llanos DO, 0.6 mg at 10/02/24 0827    dextromethorphan-guaiFENesin (ROBITUSSIN DM) oral syrup 10 mL, 10 mL, Oral, Q4H PRN, Razia Mortensen MD, 10 mL at 10/01/24 2031    docusate sodium (COLACE) capsule 100 mg, 100 mg, Oral, BID, Razia Mortensen MD, 100 mg at 10/02/24 0826    enoxaparin (LOVENOX) subcutaneous injection 40 mg, 40 mg, Subcutaneous, Daily, Razia Mortensen MD, 40 mg at 10/02/24 0826    febuxostat (ULORIC) tablet 40 mg, 40 mg, Oral, Daily, Razia Mortensen MD, 40 mg at 10/02/24 0836    ferrous sulfate tablet 325 mg, 325 mg, Oral, Daily With Breakfast, Razia Mortensen MD, 325 mg at 10/02/24 0710     fluticasone (FLONASE) 50 mcg/act nasal spray 1 spray, 1 spray, Each Nare, BID, Razia Mortensen MD, 1 spray at 10/01/24 1734    fluticasone-vilanterol 200-25 mcg/actuation 1 puff, 1 puff, Inhalation, Daily, Razia Mortensen MD, 1 puff at 10/02/24 0743    folic acid (FOLVITE) tablet 1 mg, 1 mg, Oral, Daily, Razia Mortenesn MD, 1 mg at 10/01/24 0922    gabapentin (NEURONTIN) capsule 100 mg, 100 mg, Oral, TID, Razia Mortensen MD, 100 mg at 10/02/24 0828    ipratropium-albuterol (DUO-NEB) 0.5-2.5 mg/3 mL inhalation solution 3 mL, 3 mL, Nebulization, Q6H PRN, Razia Mortensen MD    lidocaine (LIDODERM) 5 % patch 1 patch, 1 patch, Topical, Daily, Razia Mortensen MD, 1 patch at 10/01/24 0920    loratadine (CLARITIN) tablet 10 mg, 10 mg, Oral, Daily, Razia Mortensen MD, 10 mg at 10/01/24 0921    meclizine (ANTIVERT) tablet 12.5 mg, 12.5 mg, Oral, Q8H PRN, Razia Mortensen MD, 12.5 mg at 09/26/24 2047    methocarbamol (ROBAXIN) tablet 250 mg, 250 mg, Oral, Q8H PRN, Razia Mortensen MD, 250 mg at 09/29/24 1121    montelukast (SINGULAIR) tablet 10 mg, 10 mg, Oral, Daily, Razia Mortensen MD, 10 mg at 10/02/24 0826    multivitamin stress formula tablet 1 tablet, 1 tablet, Oral, Daily, Razia Mortensen MD, 1 tablet at 10/02/24 0828    ondansetron (ZOFRAN) injection 4 mg, 4 mg, Intravenous, Q6H PRN, Razia Mortensen MD    oxyCODONE (ROXICODONE) IR tablet 5 mg, 5 mg, Oral, Q6H PRN **OR** oxyCODONE (ROXICODONE) split tablet 7.5 mg, 7.5 mg, Oral, Q6H PRN, Razia Mortensen MD, 7.5 mg at 09/30/24 0513    pantoprazole (PROTONIX) EC tablet 40 mg, 40 mg, Oral, Early Morning, Shirlene Watson MD, 40 mg at 10/02/24 0555    predniSONE tablet 40 mg, 40 mg, Oral, Daily, Ana Liz DO, 40 mg at 10/02/24 0827    sodium chloride tablet 1 g, 1 g, Oral, TID With Meals, Vick Llanos DO, 1 g at 10/02/24 1136    verapamil (CALAN) tablet 120 mg, 120 mg, Oral, Daily, Razia  "Nisreen Mortensen MD, 120 mg at 10/02/24 0826    Laboratory Results:  Results from last 7 days   Lab Units 10/02/24  0431 10/01/24  0506 09/30/24  1017 09/30/24  0455 09/29/24  1947 09/29/24  0517 09/28/24 1958 09/28/24  0622   WBC Thousand/uL 6.87 9.21 10.75*  --   --   --   --  7.17   HEMOGLOBIN g/dL 7.8* 8.6* 9.0*  --   --   --   --  8.8*   HEMATOCRIT % 22.6* 24.5* 27.0*  --   --   --   --  26.1*   PLATELETS Thousands/uL 493* 513* 494*  --   --   --   --  446*   POTASSIUM mmol/L 4.1 3.9  --  4.4 4.2 4.0 4.2 4.1  4.0   CHLORIDE mmol/L 97 94*  --  93* 93* 97 97 97  97   CO2 mmol/L 25 25  --  24 26 24 24 24  24   BUN mg/dL 16 14  --  13 14 15 18 12  12   CREATININE mg/dL 0.71 0.67  --  0.75 0.87 0.75 0.94 0.83  0.83   CALCIUM mg/dL 9.0 9.5  --  9.3 9.2 9.4 9.7 9.7  9.6       Portions of the record may have been created with voice recognition software. Occasional wrong word or \"sound a like\" substitutions may have occurred due to the inherent limitations of voice recognition software. Read the chart carefully and recognize, using context, where substitutions have occurred.If you have any questions, please contact the dictating provider.  "

## 2024-10-02 NOTE — ASSESSMENT & PLAN NOTE
Acute on chronic in nature.  Baseline sodium in the low 130s.  Suspecting component of SIADH, urine osmolality 425, urine sodium 89, serum uric acid 4.0, cortisol 7.9  Discussed with primary service, salt tablets recently increased to 2 g 3 times daily, most recent sodium level 132.  Maintain fluid restriction

## 2024-10-02 NOTE — TELEPHONE ENCOUNTER
Caller: patient    Doctor: Sole     Reason for call: Patient is currently admitted in the hospital, patient called to reschedule his post op appointment but there was nothing available until 10/23, patient would like to know what he should do regarding his stitches     Call back#: [phone]

## 2024-10-02 NOTE — TELEPHONE ENCOUNTER
Called & left detailed message for patient.   I let him know we are going to make him a post-op appt for next week. Wednesday 10/09/2024. Since we will be making time for him, I asked him to call back with a  time that works for him & I will make the appt.

## 2024-10-03 ENCOUNTER — TELEPHONE (OUTPATIENT)
Dept: NEPHROLOGY | Facility: HOSPITAL | Age: 59
End: 2024-10-03

## 2024-10-03 VITALS
HEART RATE: 71 BPM | WEIGHT: 188.05 LBS | SYSTOLIC BLOOD PRESSURE: 122 MMHG | BODY MASS INDEX: 27.85 KG/M2 | DIASTOLIC BLOOD PRESSURE: 82 MMHG | HEIGHT: 69 IN | RESPIRATION RATE: 18 BRPM | TEMPERATURE: 97.3 F | OXYGEN SATURATION: 95 %

## 2024-10-03 DIAGNOSIS — I10 PRIMARY HYPERTENSION: ICD-10-CM

## 2024-10-03 DIAGNOSIS — I10 ESSENTIAL HYPERTENSION: Primary | ICD-10-CM

## 2024-10-03 LAB
ANION GAP SERPL CALCULATED.3IONS-SCNC: 9 MMOL/L (ref 4–13)
BASOPHILS # BLD AUTO: 0.02 THOUSANDS/ΜL (ref 0–0.1)
BASOPHILS NFR BLD AUTO: 0 % (ref 0–1)
BUN SERPL-MCNC: 15 MG/DL (ref 5–25)
CALCIUM SERPL-MCNC: 9.2 MG/DL (ref 8.4–10.2)
CHLORIDE SERPL-SCNC: 101 MMOL/L (ref 96–108)
CO2 SERPL-SCNC: 25 MMOL/L (ref 21–32)
CREAT SERPL-MCNC: 0.67 MG/DL (ref 0.6–1.3)
CRP SERPL QL: 117 MG/L
EOSINOPHIL # BLD AUTO: 0.04 THOUSAND/ΜL (ref 0–0.61)
EOSINOPHIL NFR BLD AUTO: 1 % (ref 0–6)
ERYTHROCYTE [DISTWIDTH] IN BLOOD BY AUTOMATED COUNT: 12.8 % (ref 11.6–15.1)
GFR SERPL CREATININE-BSD FRML MDRD: 105 ML/MIN/1.73SQ M
GLUCOSE SERPL-MCNC: 107 MG/DL (ref 65–140)
HCT VFR BLD AUTO: 23.6 % (ref 36.5–49.3)
HGB BLD-MCNC: 7.7 G/DL (ref 12–17)
IMM GRANULOCYTES # BLD AUTO: 0.04 THOUSAND/UL (ref 0–0.2)
IMM GRANULOCYTES NFR BLD AUTO: 1 % (ref 0–2)
LYMPHOCYTES # BLD AUTO: 1.04 THOUSANDS/ΜL (ref 0.6–4.47)
LYMPHOCYTES NFR BLD AUTO: 15 % (ref 14–44)
MCH RBC QN AUTO: 30.3 PG (ref 26.8–34.3)
MCHC RBC AUTO-ENTMCNC: 32.6 G/DL (ref 31.4–37.4)
MCV RBC AUTO: 93 FL (ref 82–98)
MONOCYTES # BLD AUTO: 0.63 THOUSAND/ΜL (ref 0.17–1.22)
MONOCYTES NFR BLD AUTO: 9 % (ref 4–12)
NEUTROPHILS # BLD AUTO: 5.19 THOUSANDS/ΜL (ref 1.85–7.62)
NEUTS SEG NFR BLD AUTO: 74 % (ref 43–75)
NRBC BLD AUTO-RTO: 0 /100 WBCS
PLATELET # BLD AUTO: 481 THOUSANDS/UL (ref 149–390)
PMV BLD AUTO: 8.1 FL (ref 8.9–12.7)
POTASSIUM SERPL-SCNC: 4 MMOL/L (ref 3.5–5.3)
RBC # BLD AUTO: 2.54 MILLION/UL (ref 3.88–5.62)
SODIUM SERPL-SCNC: 135 MMOL/L (ref 135–147)
WBC # BLD AUTO: 6.96 THOUSAND/UL (ref 4.31–10.16)

## 2024-10-03 PROCEDURE — 99239 HOSP IP/OBS DSCHRG MGMT >30: CPT | Performed by: INTERNAL MEDICINE

## 2024-10-03 PROCEDURE — 80048 BASIC METABOLIC PNL TOTAL CA: CPT | Performed by: INTERNAL MEDICINE

## 2024-10-03 PROCEDURE — 85025 COMPLETE CBC W/AUTO DIFF WBC: CPT | Performed by: INTERNAL MEDICINE

## 2024-10-03 PROCEDURE — 86140 C-REACTIVE PROTEIN: CPT | Performed by: INTERNAL MEDICINE

## 2024-10-03 RX ORDER — COLCHICINE 0.6 MG/1
0.6 TABLET ORAL DAILY
Qty: 7 TABLET | Refills: 0 | Status: SHIPPED | OUTPATIENT
Start: 2024-10-04

## 2024-10-03 RX ORDER — SODIUM CHLORIDE 1 G/1
1 TABLET ORAL
Qty: 90 TABLET | Refills: 1 | Status: SHIPPED | OUTPATIENT
Start: 2024-10-03

## 2024-10-03 RX ORDER — PREDNISONE 20 MG/1
TABLET ORAL
Qty: 15 TABLET | Refills: 0 | Status: SHIPPED | OUTPATIENT
Start: 2024-10-04 | End: 2024-10-14

## 2024-10-03 RX ADMIN — FERROUS SULFATE TAB 325 MG (65 MG ELEMENTAL FE) 325 MG: 325 (65 FE) TAB at 08:29

## 2024-10-03 RX ADMIN — PREDNISONE 40 MG: 20 TABLET ORAL at 08:29

## 2024-10-03 RX ADMIN — OXYCODONE HYDROCHLORIDE AND ACETAMINOPHEN 500 MG: 500 TABLET ORAL at 08:29

## 2024-10-03 RX ADMIN — SODIUM CHLORIDE 1 G: 1 TABLET ORAL at 08:29

## 2024-10-03 RX ADMIN — COLCHICINE 0.6 MG: 0.6 TABLET ORAL at 08:29

## 2024-10-03 RX ADMIN — DOCUSATE SODIUM 100 MG: 100 CAPSULE, LIQUID FILLED ORAL at 08:29

## 2024-10-03 RX ADMIN — SODIUM CHLORIDE 1 G: 1 TABLET ORAL at 11:47

## 2024-10-03 RX ADMIN — PANTOPRAZOLE SODIUM 40 MG: 40 TABLET, DELAYED RELEASE ORAL at 05:18

## 2024-10-03 NOTE — DISCHARGE SUMMARY
Discharge Summary - Hospitalist   Name: Alexx Haney 59 y.o. male I MRN: [id number]  Unit/Bed#: 7T [facility] 704-01 I Date of Admission: 9/26/2024   Date of Service: 10/3/2024 I Hospital Day: 7     Assessment & Plan  Hyponatremia  This is a 59-year-old male patient with history of osteoarthritis status post recent right total knee arthroplasty undergoing therapy at [facility], history of hypertension, asthma, allergies, BPPV, who is transferred for admission to internal medicine due to hyponatremia with sodium level of 120  Admitted to level 2 stepdown for close monitoring, now downgraded to Med-Surg with improvement in Na level      Continue with fluid restriction  Await nephrology follow up.   Continue to monitor bmp   Sodium improved to 132-continue sodium tablets to 1 g 3 times daily  Asthma  Not in exacerbation  Continue inhalers, Singulair  Essential hypertension  Continue Lisinopril, verapamil  Monitor BP trend  Benign paroxysmal positional vertigo  On meclizine as needed  DJD (degenerative joint disease) of knee  Per record review, the patient was hospitalized at  [facility] from 9/17 to 9/20/24 s/p right total knee arthroplasty on 9/17/24, WBAT RLE.   Had been at [facility]  PT/OT while here- anticipate d/c to home with hhpt. 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 No further work up needed as lfts are down trending   Right foot pain  Right foot/ankle pain  Started approximately two days ago and worsened  Decrease rom of ankle, very ttp, warm to  "touch and edematous  Will get xray and uric acid  Likely acute gout flare  Will add colchicine  Continue prednisone  Pt is on uloric and missed a few doses while attempting to get medication from home.      Medical Problems       Resolved Problems  Date Reviewed: 10/2/2024   None       Discharging Physician / Practitioner: Vick Llanos DO  PCP: Javier Delacruz MD  Admission Date:   Admission Orders (From admission, onward)       Ordered        09/26/24 1515  INPATIENT ADMISSION  Once                          Discharge Date: 10/03/24        Reason for Admission: Hyponatremia    Hospital Course:   Alexx Haney is a 59 y.o. male patient who originally presented to the hospital on 9/26/2024 due to hyponatremia which improved with fluid restriction and salt tabs 1 g 3 times daily on which she was discharged with.  He also had a gout flare and improved with prednisone and colchicine          Please see above list of diagnoses and related plan for additional information.     Condition at Discharge: stable    Discharge Day Visit / Exam:   SPatientubjective: Denies any acute complaints  Vitals: Blood Pressure: 122/82 (10/03/24 0651)  Pulse: 71 (10/03/24 0651)  Temperature: (!) 97.3 °F (36.3 °C) (10/03/24 0651)  Temp Source: Temporal (10/03/24 0651)  Respirations: 18 (10/03/24 0651)  Height: 5' 9\" (175.3 cm) (09/26/24 1500)  Weight - Scale: 85.3 kg (188 lb 0.8 oz) (09/28/24 0803)  SpO2: 95 % (10/03/24 0651)  Physical Exam  Constitutional:       General: He is not in acute distress.     Appearance: He is not toxic-appearing or diaphoretic.   Eyes:      General: No scleral icterus.  Cardiovascular:      Rate and Rhythm: Normal rate and regular rhythm.      Heart sounds: No murmur heard.     No friction rub. No gallop.   Pulmonary:      Effort: No respiratory distress.      Breath sounds: No stridor. No wheezing, rhonchi or rales.   Chest:      Chest wall: No tenderness.   Abdominal:      General: There is no distension.     "  Palpations: There is no mass.      Tenderness: There is no abdominal tenderness. There is no guarding or rebound.      Hernia: No hernia is present.   Musculoskeletal:         General: No swelling or tenderness.   Neurological:      Mental Status: He is oriented to person, place, and time.          Discussion with Family: Patient declined call to .     Discharge instructions/Information to patient and family:   See after visit summary for information provided to patient and family.      Provisions for Follow-Up Care:  See after visit summary for information related to follow-up care and any pertinent home health orders.      Mobility at time of Discharge:   Basic Mobility Inpatient Raw Score: 22  JH-HLM Goal: 7: Walk 25 feet or more  JH-HLM Achieved: 8: Walk 250 feet ot more (Simultaneous filing. User may not have seen previous data.)  HLM Goal achieved. Continue to encourage appropriate mobility.     Disposition:   Home    Planned Readmission: no    Discharge Medications:  See after visit summary for reconciled discharge medications provided to patient and/or family.      Administrative Statements   Discharge Statement:  I have spent a total time of  minutes in caring for this patient on the day of the visit/encounter. .    **Please Note: This note may have been constructed using a voice recognition system**

## 2024-10-03 NOTE — ASSESSMENT & PLAN NOTE
This is a 59-year-old male patient with history of osteoarthritis status post recent right total knee arthroplasty undergoing therapy at [facility], history of hypertension, asthma, allergies, BPPV, who is transferred for admission to internal medicine due to hyponatremia with sodium level of 120  Admitted to level 2 stepdown for close monitoring, now downgraded to Med-Surg with improvement in Na level      Continue with fluid restriction  Await nephrology follow up.   Continue to monitor bmp   Sodium improved to 132-continue sodium tablets to 1 g 3 times daily

## 2024-10-03 NOTE — TELEPHONE ENCOUNTER
Called patient and left a voicemail stating that Dr. Nobles would like blood work in about 2 weeks to make sure everything is stable. I have added the labs to the patients chart.

## 2024-10-03 NOTE — TELEPHONE ENCOUNTER
----- Message from Nahid Nobles DO sent at 10/3/2024  1:11 PM EDT -----  Recently discharged from [facility].  Diagnosis hyponatremia.  Currently on sodium chloride tablets 1 g 3 times daily.  Recommend repeat BMP in 1 to 2 weeks.  Follow-up will depend on repeat laboratory studies.  Thank you.

## 2024-10-03 NOTE — PLAN OF CARE
Problem: PAIN - ADULT  Goal: Verbalizes/displays adequate comfort level or baseline comfort level  Description: Interventions:  - Encourage patient to monitor pain and request assistance  - Assess pain using appropriate pain scale  - Administer analgesics based on type and severity of pain and evaluate response  - Implement non-pharmacological measures as appropriate and evaluate response  - Consider cultural and social influences on pain and pain management  - Notify physician/advanced practitioner if interventions unsuccessful or patient reports new pain  Outcome: Progressing     Problem: INFECTION - ADULT  Goal: Absence or prevention of progression during hospitalization  Description: INTERVENTIONS:  - Assess and monitor for signs and symptoms of infection  - Monitor lab/diagnostic results  - Monitor all insertion sites, i.e. indwelling lines, tubes, and drains  - Monitor endotracheal if appropriate and nasal secretions for changes in amount and color  - [place] appropriate cooling/warming therapies per order  - Administer medications as ordered  - Instruct and encourage patient and family to use good hand hygiene technique  - Identify and instruct in appropriate isolation precautions for identified infection/condition  Outcome: Progressing     Problem: MUSCULOSKELETAL - ADULT  Goal: Maintain or return mobility to safest level of function  Description: INTERVENTIONS:  - Assess patient's ability to carry out ADLs; assess patient's baseline for ADL function and identify physical deficits which impact ability to perform ADLs (bathing, care of mouth/teeth, toileting, grooming, dressing, etc.)  - Assess/evaluate cause of self-care deficits   - Assess range of motion  - Assess patient's mobility  - Assess patient's need for assistive devices and provide as appropriate  - Encourage maximum independence but intervene and supervise when necessary  - Involve family in performance of ADLs  - Assess for home care needs  following discharge   - Consider OT consult to assist with ADL evaluation and planning for discharge  - Provide patient education as appropriate  Outcome: Progressing     Problem: NEUROSENSORY - ADULT  Goal: Achieves stable or improved neurological status  Description: INTERVENTIONS  - Monitor and report changes in neurological status  - Monitor vital signs such as temperature, blood pressure, glucose, and any other labs ordered   - Initiate measures to prevent increased intracranial pressure  - Monitor for seizure activity and implement precautions if appropriate      Outcome: Progressing

## 2024-10-03 NOTE — PLAN OF CARE
Problem: PAIN - ADULT  Goal: Verbalizes/displays adequate comfort level or baseline comfort level  Description: Interventions:  - Encourage patient to monitor pain and request assistance  - Assess pain using appropriate pain scale  - Administer analgesics based on type and severity of pain and evaluate response  - Implement non-pharmacological measures as appropriate and evaluate response  - Consider cultural and social influences on pain and pain management  - Notify physician/advanced practitioner if interventions unsuccessful or patient reports new pain  Outcome: Progressing     Problem: INFECTION - ADULT  Goal: Absence or prevention of progression during hospitalization  Description: INTERVENTIONS:  - Assess and monitor for signs and symptoms of infection  - Monitor lab/diagnostic results  - Monitor all insertion sites, i.e. indwelling lines, tubes, and drains  - Monitor endotracheal if appropriate and nasal secretions for changes in amount and color  - [place] appropriate cooling/warming therapies per order  - Administer medications as ordered  - Instruct and encourage patient and family to use good hand hygiene technique  - Identify and instruct in appropriate isolation precautions for identified infection/condition  Outcome: Progressing     Problem: METABOLIC, FLUID AND ELECTROLYTES - ADULT  Goal: Electrolytes maintained within normal limits  Description: INTERVENTIONS:  - Monitor labs and assess patient for signs and symptoms of electrolyte imbalances  - Administer electrolyte replacement as ordered  - Monitor response to electrolyte replacements, including repeat lab results as appropriate  - Instruct patient on fluid and nutrition as appropriate  Outcome: Progressing     Problem: METABOLIC, FLUID AND ELECTROLYTES - ADULT  Goal: Fluid balance maintained  Description: INTERVENTIONS:  - Monitor labs   - Monitor I/O and WT  - Instruct patient on fluid and nutrition as appropriate  - Assess for signs &  symptoms of volume excess or deficit  Outcome: Progressing     Problem: METABOLIC, FLUID AND ELECTROLYTES - ADULT  Goal: Glucose maintained within target range  Description: INTERVENTIONS:  - Monitor Blood Glucose as ordered  - Assess for signs and symptoms of hyperglycemia and hypoglycemia  - Administer ordered medications to maintain glucose within target range  - Assess nutritional intake and initiate nutrition service referral as needed  Outcome: Progressing     Problem: NEUROSENSORY - ADULT  Goal: Achieves stable or improved neurological status  Description: INTERVENTIONS  - Monitor and report changes in neurological status  - Monitor vital signs such as temperature, blood pressure, glucose, and any other labs ordered   - Initiate measures to prevent increased intracranial pressure  - Monitor for seizure activity and implement precautions if appropriate      Outcome: Progressing     Problem: NEUROSENSORY - ADULT  Goal: Remains free of injury related to seizures activity  Description: INTERVENTIONS  - Maintain airway, patient safety  and administer oxygen as ordered  - Monitor patient for seizure activity, document and report duration and description of seizure to physician/advanced practitioner  - If seizure occurs,  ensure patient safety during seizure  - Reorient patient post seizure  - Seizure pads on all 4 side rails  - Instruct patient/family to notify RN of any seizure activity including if an aura is experienced  - Instruct patient/family to call for assistance with activity based on nursing assessment  - Administer anti-seizure medications if ordered    Outcome: Progressing     Problem: CARDIOVASCULAR - ADULT  Goal: Maintains optimal cardiac output and hemodynamic stability  Description: INTERVENTIONS:  - Monitor I/O, vital signs and rhythm  - Monitor for S/S and trends of decreased cardiac output  - Administer and titrate ordered vasoactive medications to optimize hemodynamic stability  - Assess  quality of pulses, skin color and temperature  - Assess for signs of decreased coronary artery perfusion  - Instruct patient to report change in severity of symptoms  Outcome: Progressing     Problem: CARDIOVASCULAR - ADULT  Goal: Absence of cardiac dysrhythmias or at baseline rhythm  Description: INTERVENTIONS:  - Continuous cardiac monitoring, vital signs, obtain 12 lead EKG if ordered  - Administer antiarrhythmic and heart rate control medications as ordered  - Monitor electrolytes and administer replacement therapy as ordered  Outcome: Progressing     Problem: Knowledge Deficit  Goal: Patient/family/caregiver demonstrates understanding of disease process, treatment plan, medications, and discharge instructions  Description: Complete learning assessment and assess knowledge base.  Interventions:  - Provide teaching at level of understanding  - Provide teaching via preferred learning methods  Outcome: Progressing     Problem: DISCHARGE PLANNING  Goal: Discharge to home or other facility with appropriate resources  Description: INTERVENTIONS:  - Identify barriers to discharge w/patient and caregiver  - Arrange for needed discharge resources and transportation as appropriate  - Identify discharge learning needs (meds, wound care, etc.)  - Arrange for interpretive services to assist at discharge as needed  - Refer to Case Management Department for coordinating discharge planning if the patient needs post-hospital services based on physician/advanced practitioner order or complex needs related to functional status, cognitive ability, or social support system  Outcome: Progressing

## 2024-10-03 NOTE — TELEPHONE ENCOUNTER
Patient is calling back with a time for his appt next week.   10/9/24 at 12pm with Dr. Whipple in the [place] office.

## 2024-10-04 ENCOUNTER — TELEPHONE (OUTPATIENT)
Age: 59
End: 2024-10-04

## 2024-10-04 DIAGNOSIS — M10.9 GOUTY ARTHRITIS: ICD-10-CM

## 2024-10-04 RX ORDER — FEBUXOSTAT 40 MG/1
40 TABLET, FILM COATED ORAL DAILY
Qty: 90 TABLET | Refills: 1 | Status: SHIPPED | OUTPATIENT
Start: 2024-10-04

## 2024-10-04 NOTE — TELEPHONE ENCOUNTER
Patient calling with questions about his medications. Placed patient on hold to verify if he was our patient or not and get CTS if applicable.     Patient was only seen in hospital.     Patient hung up. Attempted to call back. Call would not go through.     Patient has questions for Dr. Nobles in regards lisinopril and his sodium.   Please call patient back in regards

## 2024-10-04 NOTE — TELEPHONE ENCOUNTER
Pt called in, in regards to the previous message put in by Katlyn. I was unable to reach a team member at this moment. Please call patient back.

## 2024-10-04 NOTE — TELEPHONE ENCOUNTER
Medication: febuxostat (ULORIC) 40 mg tablet     Dose/Frequency: Take 1 tablet (40 mg total) by mouth daily     Quantity: 90    Pharmacy: [facility]/pharmacy #[id number] - [address]        Office:   [x] PCP/Provider - Erlinda Delacruz MD  [] Speciality/Provider -     Does the patient have enough for 3 days?   [] Yes   [x] No - Send as HP to POD     Patient had a gout attack, needs meds as soon as possible

## 2024-10-04 NOTE — TELEPHONE ENCOUNTER
Alexx would like to speak to a provider or nurse at the office that can help him with some questions. This is in regards to his blood pressure medication and when should  he stop It and restart it.

## 2024-10-07 ENCOUNTER — TRANSITIONAL CARE MANAGEMENT (OUTPATIENT)
Dept: INTERNAL MEDICINE CLINIC | Age: 59
End: 2024-10-07

## 2024-10-07 NOTE — TELEPHONE ENCOUNTER
Called patient and asked he call back with his questions. If patient calls back, please ask him what his questions are thank you.

## 2024-10-07 NOTE — TELEPHONE ENCOUNTER
Patient called- he wants to know if he is supposed to stop taking Lisinopril or not? He does have some edema in his foot from the gout flare up so he wanted to check before stopping. Please advise, [phone].

## 2024-10-09 ENCOUNTER — OFFICE VISIT (OUTPATIENT)
Dept: INTERNAL MEDICINE CLINIC | Facility: OTHER | Age: 59
End: 2024-10-09
Payer: MEDICARE

## 2024-10-09 ENCOUNTER — OFFICE VISIT (OUTPATIENT)
Dept: OBGYN CLINIC | Facility: MEDICAL CENTER | Age: 59
End: 2024-10-09

## 2024-10-09 ENCOUNTER — APPOINTMENT (OUTPATIENT)
Dept: RADIOLOGY | Facility: MEDICAL CENTER | Age: 59
End: 2024-10-09
Payer: MEDICARE

## 2024-10-09 VITALS
SYSTOLIC BLOOD PRESSURE: 144 MMHG | BODY MASS INDEX: 28.73 KG/M2 | OXYGEN SATURATION: 96 % | WEIGHT: 194 LBS | HEART RATE: 92 BPM | HEIGHT: 69 IN | TEMPERATURE: 98 F | DIASTOLIC BLOOD PRESSURE: 90 MMHG

## 2024-10-09 VITALS
HEART RATE: 92 BPM | DIASTOLIC BLOOD PRESSURE: 82 MMHG | SYSTOLIC BLOOD PRESSURE: 160 MMHG | BODY MASS INDEX: 27.7 KG/M2 | HEIGHT: 69 IN | WEIGHT: 187 LBS

## 2024-10-09 DIAGNOSIS — I10 PRIMARY HYPERTENSION: ICD-10-CM

## 2024-10-09 DIAGNOSIS — D64.9 ANEMIA, UNSPECIFIED TYPE: ICD-10-CM

## 2024-10-09 DIAGNOSIS — Z96.652 STATUS POST TOTAL KNEE REPLACEMENT, LEFT: ICD-10-CM

## 2024-10-09 DIAGNOSIS — J45.909 PERSISTENT ASTHMA WITHOUT COMPLICATION, UNSPECIFIED ASTHMA SEVERITY: ICD-10-CM

## 2024-10-09 DIAGNOSIS — M1A.9XX0 CHRONIC GOUT WITHOUT TOPHUS, UNSPECIFIED CAUSE, UNSPECIFIED SITE: ICD-10-CM

## 2024-10-09 DIAGNOSIS — Z96.651 HISTORY OF TOTAL KNEE ARTHROPLASTY, RIGHT: ICD-10-CM

## 2024-10-09 DIAGNOSIS — B37.2 CANDIDAL SKIN INFECTION: Primary | ICD-10-CM

## 2024-10-09 DIAGNOSIS — E87.1 HYPONATREMIA: ICD-10-CM

## 2024-10-09 DIAGNOSIS — Z96.652 STATUS POST LEFT KNEE REPLACEMENT: ICD-10-CM

## 2024-10-09 DIAGNOSIS — Z96.651 STATUS POST TOTAL KNEE REPLACEMENT, RIGHT: Primary | ICD-10-CM

## 2024-10-09 PROCEDURE — 73562 X-RAY EXAM OF KNEE 3: CPT

## 2024-10-09 PROCEDURE — 99496 TRANSJ CARE MGMT HIGH F2F 7D: CPT | Performed by: INTERNAL MEDICINE

## 2024-10-09 PROCEDURE — 99024 POSTOP FOLLOW-UP VISIT: CPT | Performed by: ORTHOPAEDIC SURGERY

## 2024-10-09 RX ORDER — CLOTRIMAZOLE AND BETAMETHASONE DIPROPIONATE 10; .64 MG/G; MG/G
CREAM TOPICAL 2 TIMES DAILY
Qty: 90 G | Refills: 1 | Status: SHIPPED | OUTPATIENT
Start: 2024-10-09

## 2024-10-09 NOTE — ASSESSMENT & PLAN NOTE
Patient is on sodium chloride tablets.  Improved.  Will get BMP next week.  Also followed by a nephrologist

## 2024-10-09 NOTE — ASSESSMENT & PLAN NOTE
Fungal rash noted over both buttocks.  Will treat with Lotrisone cream twice daily for couple of weeks or until healed

## 2024-10-09 NOTE — ASSESSMENT & PLAN NOTE
Patient underwent right total knee arthroplasty.  Doing well.  Stitches removed today by orthopedic surgeon and Steri-Strips applied.  Patient is also on aspirin 325 mg p.o. twice daily for DVT prophylaxis as per orthopedics services.  Continue with physical therapy

## 2024-10-09 NOTE — PROGRESS NOTES
Assessment/Plan:  1. Status post total knee replacement, right      Orders Placed This Encounter   Procedures    XR knee 3 vw right non injury    Ambulatory Referral to Physical Therapy       Patient is doing well 2 weeks status post L TKA on 9/17/24.   Patient is currently on an oral steroid for treatment of acute gout flare. Patient was advised of the increased risk of infection while on steroid in the post op period. Patient advised to monitor the knee very closely for any signs of infection and will call if he has any concerns.   He should follow up with podiatry or PCP for management of gout.   Transition to outpatient PT. Script provided.   Pain control prn- gabapentin and tylenol.   Continue with  BID for DVT prophylaxis x 6 weeks.   JAYDON stockings as needed for swelling.  May shower and let water run over incision, do not submerge incision.  Patient should call ahead for abx prior to dental appts.     Return in about 3 weeks (around 10/30/2024) for R TKA post op 2.    I answered all of the patient's questions during the visit and provided education of the patient's condition during the visit.  The patient verbalized understanding of the information given and agrees with the plan.  This note was dictated using [employer] software.  It may contain errors including improperly dictated words.  Please contact physician directly for any questions.    Subjective   Chief Complaint:   Chief Complaint   Patient presents with    Right Knee - Post-op       Alexx Haney is a 59 y.o. male who presents for 3 week  follow up s/p right TKA.  Patient is doing well. Patient states he has been discharged from rehab and is home again. Patient notes he has follow up with PCP and nephrology due to the issues he had in the hospital. Patient states his pain is well controlled. He is taking tylenol and gabapentin. He discontinued the oxycodone. Patient is taking ASA for DVT ppx. Patient has not started PT yet since AR. He notes  he made significant improvements in rehab. He is using a cane for assistance. Of note, patient was diagnosed with an acute gout attack in the hospital and is currently still on oral prednisone. Patient states his foot is feeling improved. Denies fevers, chills, distal numbness.       Review of Systems  ROS:    See HPI for musculoskeletal review.   All other systems reviewed are negative     History:  Past Medical History:   Diagnosis Date    Acute bronchitis 01/29/2024    Anxiety 01/15/2019    Arthritis     Asthma     Chronic rhinitis     last assessed 2/21/14    Chronic serous otitis media     last assessed 11/20/13    Chronic sinusitis     last assessed 8/19/14    Functional heart murmur     GERD (gastroesophageal reflux disease)     Gout     Hearing problem     last assessed 12/1/16    Hiatal hernia     Hypercholesterolemia     Hypertension     Palpitations     Testicular hypogonadism     last assessed 10/4/13    V-tach (HCC)      Past Surgical History:   Procedure Laterality Date    APPENDECTOMY      BICEPS TENDON REPAIR Left 2009    BICEPS TENODESIS      anesthesia for tenodesis- of ruptured long tendon of biceps     HERNIA REPAIR      [facility] ARTHRP KNE CONDYLE&PLATU MEDIAL&LAT COMPARTMENTS Right 9/17/2024    Procedure: ARTHROPLASTY KNEE TOTAL, potential same day discharge, cemented;  Surgeon: Parvin Whipple DO;  Location:  MAIN OR;  Service: Orthopedics    THYROIDECTOMY      TONSILLECTOMY       Social History   Social History     Substance and Sexual Activity   Alcohol Use Yes    Alcohol/week: 12.0 standard drinks of alcohol    Types: 12 Shots of liquor per week    Comment: occasionally; socially     Social History     Substance and Sexual Activity   Drug Use No     Social History     Tobacco Use   Smoking Status Never   Smokeless Tobacco Never     Family History:   Family History   Problem Relation Age of Onset    Lung cancer Father     Coronary artery disease Father         blockages    Stroke Maternal  Grandmother     Cancer Paternal Grandfather         metastasized    Heart disease Family     Lung cancer Cousin     No Known Problems Mother     No Known Problems Sister     No Known Problems Brother     No Known Problems Maternal Aunt     No Known Problems Maternal Uncle     No Known Problems Paternal Aunt     No Known Problems Paternal Uncle     No Known Problems Paternal Grandmother     ADD / ADHD Neg Hx     Anesthesia problems Neg Hx     Clotting disorder Neg Hx     Collagen disease Neg Hx     Diabetes Neg Hx     Dislocations Neg Hx     Learning disabilities Neg Hx     Neurological problems Neg Hx     Osteoporosis Neg Hx     Rheumatologic disease Neg Hx     Scoliosis Neg Hx     Vascular Disease Neg Hx        Current Outpatient Medications on File Prior to Visit   Medication Sig Dispense Refill    acetaminophen (TYLENOL) 500 mg tablet Take 2 tablets (1,000 mg total) by mouth every 8 (eight) hours 1500 at times      albuterol (2.5 mg/3 mL) 0.083 % nebulizer solution Take 3 mL (2.5 mg total) by nebulization every 6 (six) hours as needed for wheezing or shortness of breath 240 mL 2    albuterol (PROVENTIL HFA,VENTOLIN HFA) 90 mcg/act inhaler Inhale 1 puff if needed for wheezing or shortness of breath 48 g 1    ascorbic acid (VITAMIN C) 500 MG tablet Take 1 tablet (500 mg total) by mouth daily Begin 30 days prior to surgery. 30 tablet 1    Chlorpheniramine Maleate (CHLOR-TRIMETON ALLERGY PO) Take by mouth as needed      clotrimazole (LOTRIMIN) 1 % cream Apply topically 2 (two) times a day for 21 days 60 g 3    colchicine (COLCRYS) 0.6 mg tablet Take 1 tablet (0.6 mg total) by mouth daily 7 tablet 0    docusate sodium (COLACE) 100 mg capsule Take 1 capsule (100 mg total) by mouth 2 (two) times a day 20 capsule 0    enoxaparin (Lovenox) 40 mg/0.4 mL Inject 40 mg under the skin in the morning      esomeprazole (NexIUM) 40 MG capsule Take 40 mg by mouth every morning before breakfast      febuxostat (ULORIC) 40 mg tablet  Take 1 tablet (40 mg total) by mouth daily 90 tablet 1    ferrous sulfate 324 (65 Fe) mg TAKE 1 TABLET (324 MG TOTAL) BY MOUTH DAILY BEFORE BREAKFAST BEGIN 30 DAYS PRIOR TO SURGERY. 90 tablet 1    fluticasone-salmeterol (Advair) 500-50 mcg/dose inhaler Inhale 1 puff 2 (two) times a day      folic acid (FOLVITE) 1 mg tablet TAKE 1 TABLET (1 MG TOTAL) BY MOUTH DAILY BEGIN 30 DAYS PRIOR TO SURGERY. 90 tablet 1    gabapentin (NEURONTIN) 100 mg capsule Take 1 capsule (100 mg total) by mouth 3 (three) times a day 90 capsule 0    ipratropium-albuterol (DUO-NEB) 0.5-2.5 mg/3 mL Inhale 3 mL if needed      meclizine (ANTIVERT) 12.5 MG tablet Take 1 tablet (12.5 mg total) by mouth every 8 (eight) hours as needed for dizziness      methocarbamol (ROBAXIN) 500 mg tablet Take 250 mg by mouth every 8 (eight) hours as needed for muscle spasms      montelukast (SINGULAIR) 10 mg tablet Take 1 tablet (10 mg total) by mouth daily      Multiple Vitamin (multivitamin) tablet Take 1 tablet by mouth daily Begin 30 days prior to surgery. 30 tablet 1    oxyCODONE (OXY-IR) 5 MG capsule Take 5 mg by mouth every 4 (four) hours as needed for moderate pain      predniSONE 20 mg tablet Take 2 tablets (40 mg total) by mouth daily for 5 days, THEN 1 tablet (20 mg total) daily for 5 days. 15 tablet 0    Pseudoephedrine-guaiFENesin (GUAIFENESIN 600/ PO) Take 600 mg by mouth 2 (two) times a day      sodium chloride 1 g tablet Take 1 tablet (1 g total) by mouth 3 (three) times a day with meals 90 tablet 1    Triamcinolone Acetonide (NASACORT ALLERGY 24HR NA) 1 spray into each nostril daily        verapamil (CALAN) 120 mg tablet TAKE 1 TABLET BY MOUTH EVERY DAY 90 tablet 3     No current facility-administered medications on file prior to visit.     Allergies   Allergen Reactions    Penicillins Rash and Anaphylaxis    Acetazolamide      Other reaction(s): Stomach Ache    Cephalosporins Other (See Comments)     headache    Other     Sulfa  "Antibiotics Other (See Comments)     Category: Allergy; Annotation - [id number]: STOMACH UPSET    Famotidine Palpitations    Levofloxacin Palpitations    Omeprazole Palpitations     Painful urination        Objective     /82   Pulse 92   Ht 5' 9\" (1.753 m)   Wt 84.8 kg (187 lb)   BMI 27.62 kg/m²      PE:  AAOx 3  WDWN  Hearing intact, no drainage from eyes  no audible wheezing  no abdominal distension  LE compartments soft, AT/GS intact    Ortho Exam:  right Knee:   INC: C/D/I, No erythema, mild swelling, ecchymosis over the medial knee  AROM: 2 - 120 degrees      Imaging Studies: Results Review Statement: I personally reviewed the following image studies in PACS and associated radiology reports: x-rays right knee. My interpretation of the radiology images/reports is: status post cemented right total knee replacement with hardware intact and well aligned.    "

## 2024-10-09 NOTE — ASSESSMENT & PLAN NOTE
Patient's lisinopril was on hold since discharge from the hospital.  Advised him to restart lisinopril 20 mg daily and continue with the verapamil 120 mg daily.  Continue to monitor blood pressure at home

## 2024-10-09 NOTE — PROGRESS NOTES
Assessment/Plan:    Primary hypertension  Patient's lisinopril was on hold since discharge from the hospital.  Advised him to restart lisinopril 20 mg daily and continue with the verapamil 120 mg daily.  Continue to monitor blood pressure at home    Asthma  No rhonchi on lung examination.  Continue with present regimen    Candidal skin infection  Fungal rash noted over both buttocks.  Will treat with Lotrisone cream twice daily for couple of weeks or until healed    Anemia  Postop anemia.  Will request CBC in 1 week    Gout  Recovering from acute gouty arthritis.  Continue with Uloric and colchicine    Hyponatremia  Patient is on sodium chloride tablets.  Improved.  Will get BMP next week.  Also followed by a nephrologist    Status post left knee replacement  Patient underwent right total knee arthroplasty.  Doing well.  Stitches removed today by orthopedic surgeon and Steri-Strips applied.  Patient is also on aspirin 325 mg p.o. twice daily for DVT prophylaxis as per orthopedics services.  Continue with physical therapy       Diagnoses and all orders for this visit:    Candidal skin infection  -     clotrimazole-betamethasone (LOTRISONE) 1-0.05 % cream; Apply topically 2 (two) times a day    History of total knee arthroplasty, right    Primary hypertension    Persistent asthma without complication, unspecified asthma severity    Anemia, unspecified type  -     CBC; Future    Chronic gout without tophus, unspecified cause, unspecified site    Hyponatremia    Status post left knee replacement          Subjective:          Patient ID: Alexx Haney is a 59 y.o. male.    [facility] Call       Date and time call was made  10/7/2024 11:35 AM    Hospital care reviewed  Records reviewed    Patient was hospitialized at  [facility] (formerly [facility]    Date of Admission  09/26/24    Date of discharge  10/03/24    Diagnosis  hyponatremia    Disposition  Home    Were the patients medications reviewed and updated  Yes    Current Symptoms   Dizziness; Cough    Dizziness severity  Mild          TCM Call       Post hospital issues  Reduced activity    Should patient be enrolled in anticoag monitoring?  No    Scheduled for follow up?  Yes    Did you obtain your prescribed medications  Yes    Do you need help managing your prescriptions or medications  No    Is transportation to your appointment needed  No    I have advised the patient to call PCP with any new or worsening symptoms  Luciano Adalbertochristopher CMA    Living Arrangements  Family members    Support System  Family    The type of support provided  Other (comment)    Do you have social support  Yes, as much as I need    Are you recieving any outpatient services  No    Are you recieving home care services  No    Are you using any community resources  No    Current waiver services  No    Have you fallen in the last 12 months  No    Interperter language line needed  No    Counseling  Patient    Counseling topics  Activities of daily living; Importance of RX compliance; instructions for management            This is a follow-up after hospitalization.  Patient was admitted to hospital for right total knee arthroplasty.  Underwent procedure very well.  Patient developed episode of gout in right foot and patient was also transferred to rehab and then eventually to home on October 3, 2024.        The following portions of the patient's history were reviewed and updated as appropriate: allergies, current medications, past family history, past medical history, past social history, past surgical history, and problem list.    Review of Systems   Constitutional:  Negative for fatigue and fever.   HENT:  Negative for congestion, ear discharge, ear pain, postnasal drip, sinus pressure, sore throat, tinnitus and trouble swallowing.    Eyes:  Negative for discharge, itching and visual disturbance.   Respiratory:  Negative for cough and shortness of breath.    Cardiovascular:  Negative for chest pain and palpitations.    Gastrointestinal:  Negative for abdominal pain, diarrhea, nausea and vomiting.   Endocrine: Negative for cold intolerance and polyuria.   Genitourinary:  Negative for difficulty urinating, dysuria and urgency.   Musculoskeletal:  Positive for arthralgias. Negative for neck pain.   Skin:  Negative for rash.   Allergic/Immunologic: Negative for environmental allergies.   Neurological:  Negative for dizziness, weakness and headaches.   Psychiatric/Behavioral:  Negative for agitation. The patient is not nervous/anxious.          Past Medical History:   Diagnosis Date    Acute bronchitis 01/29/2024    Anxiety 01/15/2019    Arthritis     Asthma     Chronic rhinitis     last assessed 2/21/14    Chronic serous otitis media     last assessed 11/20/13    Chronic sinusitis     last assessed 8/19/14    Functional heart murmur     GERD (gastroesophageal reflux disease)     Gout     Hearing problem     last assessed 12/1/16    Hiatal hernia     Hypercholesterolemia     Hypertension     Palpitations     Testicular hypogonadism     last assessed 10/4/13    V-tach ([facility])          Current Outpatient Medications:     acetaminophen (TYLENOL) 500 mg tablet, Take 2 tablets (1,000 mg total) by mouth every 8 (eight) hours 1500 at times, Disp: , Rfl:     albuterol (2.5 mg/3 mL) 0.083 % nebulizer solution, Take 3 mL (2.5 mg total) by nebulization every 6 (six) hours as needed for wheezing or shortness of breath, Disp: 240 mL, Rfl: 2    albuterol (PROVENTIL HFA,VENTOLIN HFA) 90 mcg/act inhaler, Inhale 1 puff if needed for wheezing or shortness of breath, Disp: 48 g, Rfl: 1    ascorbic acid (VITAMIN C) 500 MG tablet, Take 1 tablet (500 mg total) by mouth daily Begin 30 days prior to surgery., Disp: 30 tablet, Rfl: 1    Chlorpheniramine Maleate (CHLOR-TRIMETON ALLERGY PO), Take by mouth as needed, Disp: , Rfl:     clotrimazole-betamethasone (LOTRISONE) 1-0.05 % cream, Apply topically 2 (two) times a day, Disp: 90 g, Rfl: 1    colchicine  (COLCRYS) 0.6 mg tablet, Take 1 tablet (0.6 mg total) by mouth daily, Disp: 7 tablet, Rfl: 0    docusate sodium (COLACE) 100 mg capsule, Take 1 capsule (100 mg total) by mouth 2 (two) times a day, Disp: 20 capsule, Rfl: 0    enoxaparin (Lovenox) 40 mg/0.4 mL, Inject 40 mg under the skin in the morning, Disp: , Rfl:     esomeprazole (NexIUM) 40 MG capsule, Take 40 mg by mouth every morning before breakfast, Disp: , Rfl:     febuxostat (ULORIC) 40 mg tablet, Take 1 tablet (40 mg total) by mouth daily, Disp: 90 tablet, Rfl: 1    ferrous sulfate 324 (65 Fe) mg, TAKE 1 TABLET (324 MG TOTAL) BY MOUTH DAILY BEFORE BREAKFAST BEGIN 30 DAYS PRIOR TO SURGERY., Disp: 90 tablet, Rfl: 1    fluticasone-salmeterol (Advair) 500-50 mcg/dose inhaler, Inhale 1 puff 2 (two) times a day, Disp: , Rfl:     folic acid (FOLVITE) 1 mg tablet, TAKE 1 TABLET (1 MG TOTAL) BY MOUTH DAILY BEGIN 30 DAYS PRIOR TO SURGERY., Disp: 90 tablet, Rfl: 1    gabapentin (NEURONTIN) 100 mg capsule, Take 1 capsule (100 mg total) by mouth 3 (three) times a day, Disp: 90 capsule, Rfl: 0    ipratropium-albuterol (DUO-NEB) 0.5-2.5 mg/3 mL, Inhale 3 mL if needed, Disp: , Rfl:     meclizine (ANTIVERT) 12.5 MG tablet, Take 1 tablet (12.5 mg total) by mouth every 8 (eight) hours as needed for dizziness, Disp: , Rfl:     methocarbamol (ROBAXIN) 500 mg tablet, Take 250 mg by mouth every 8 (eight) hours as needed for muscle spasms, Disp: , Rfl:     montelukast (SINGULAIR) 10 mg tablet, Take 1 tablet (10 mg total) by mouth daily, Disp: , Rfl:     Multiple Vitamin (multivitamin) tablet, Take 1 tablet by mouth daily Begin 30 days prior to surgery., Disp: 30 tablet, Rfl: 1    predniSONE 20 mg tablet, Take 2 tablets (40 mg total) by mouth daily for 5 days, THEN 1 tablet (20 mg total) daily for 5 days., Disp: 15 tablet, Rfl: 0    Pseudoephedrine-guaiFENesin (GUAIFENESIN 600/ PO), Take 600 mg by mouth 2 (two) times a day, Disp: , Rfl:     sodium chloride 1 g tablet, Take  1 tablet (1 g total) by mouth 3 (three) times a day with meals, Disp: 90 tablet, Rfl: 1    Triamcinolone Acetonide (NASACORT ALLERGY 24HR NA), 1 spray into each nostril daily  , Disp: , Rfl:     verapamil (CALAN) 120 mg tablet, TAKE 1 TABLET BY MOUTH EVERY DAY, Disp: 90 tablet, Rfl: 3    clotrimazole (LOTRIMIN) 1 % cream, Apply topically 2 (two) times a day for 21 days, Disp: 60 g, Rfl: 3    Allergies   Allergen Reactions    Penicillins Rash and Anaphylaxis    Acetazolamide      Other reaction(s): Stomach Ache    Cephalosporins Other (See Comments)     headache    Other     Sulfa Antibiotics Other (See Comments)     Category: Allergy; Annotation - [id number]: STOMACH UPSET    Famotidine Palpitations    Levofloxacin Palpitations    Omeprazole Palpitations     Painful urination       Social History   Past Surgical History:   Procedure Laterality Date    APPENDECTOMY      BICEPS TENDON REPAIR Left 2009    BICEPS TENODESIS      anesthesia for tenodesis- of ruptured long tendon of biceps     HERNIA REPAIR      [facility] ARTHRP KNE CONDYLE&PLATU MEDIAL&LAT COMPARTMENTS Right 9/17/2024    Procedure: ARTHROPLASTY KNEE TOTAL, potential same day discharge, cemented;  Surgeon: Parvin Whipple DO;  Location:  MAIN OR;  Service: Orthopedics    THYROIDECTOMY      TONSILLECTOMY       Family History   Problem Relation Age of Onset    Lung cancer Father     Coronary artery disease Father         blockages    Stroke Maternal Grandmother     Cancer Paternal Grandfather         metastasized    Heart disease Family     Lung cancer Cousin     No Known Problems Mother     No Known Problems Sister     No Known Problems Brother     No Known Problems Maternal Aunt     No Known Problems Maternal Uncle     No Known Problems Paternal Aunt     No Known Problems Paternal Uncle     No Known Problems Paternal Grandmother     ADD / ADHD Neg Hx     Anesthesia problems Neg Hx     Clotting disorder Neg Hx     Collagen disease Neg Hx     Diabetes Neg  "Hx     Dislocations Neg Hx     Learning disabilities Neg Hx     Neurological problems Neg Hx     Osteoporosis Neg Hx     Rheumatologic disease Neg Hx     Scoliosis Neg Hx     Vascular Disease Neg Hx        Objective:  /90 (BP Location: Left arm, Patient Position: Sitting, Cuff Size: Adult)   Pulse 92   Temp 98 °F (36.7 °C)   Ht 5' 9\" (1.753 m)   Wt 88 kg (194 lb)   SpO2 96%   BMI 28.65 kg/m²   Body mass index is 28.65 kg/m².     Physical Exam  Constitutional:       General: He is not in acute distress.     Appearance: He is well-developed.   HENT:      Head: Normocephalic.      Right Ear: External ear normal. There is no impacted cerumen.      Left Ear: External ear normal. There is no impacted cerumen.      Nose: No rhinorrhea.      Mouth/Throat:      Pharynx: No oropharyngeal exudate or posterior oropharyngeal erythema.   Eyes:      General: No scleral icterus.     Pupils: Pupils are equal, round, and reactive to light.   Neck:      Thyroid: No thyromegaly.      Trachea: No tracheal deviation.   Cardiovascular:      Rate and Rhythm: Normal rate and regular rhythm.      Heart sounds: Normal heart sounds.   Pulmonary:      Effort: Pulmonary effort is normal. No respiratory distress.      Breath sounds: Normal breath sounds.   Chest:      Chest wall: No tenderness.   Abdominal:      General: Bowel sounds are normal.      Palpations: Abdomen is soft. There is no mass.      Tenderness: There is no abdominal tenderness.   Musculoskeletal:         General: Normal range of motion.      Cervical back: Normal range of motion and neck supple. No rigidity.      Right lower leg: No edema.      Left lower leg: No edema.   Lymphadenopathy:      Cervical: No cervical adenopathy.   Skin:     General: Skin is warm.      Findings: Rash present. No erythema.      Comments: Rash over both buttocks area noted compatible with fungal skin infection.    Surgical wound over right knee with Steri-Strips healing well.  No sign of " infection.   Neurological:      Mental Status: He is alert and oriented to person, place, and time.      Cranial Nerves: No cranial nerve deficit.      Gait: Gait abnormal.   Psychiatric:         Mood and Affect: Mood normal.         Behavior: Behavior normal.

## 2024-10-09 NOTE — PATIENT INSTRUCTIONS
*You should continue the aspirin 325 mg twice daily for 3 more week. Please take with food to prevent stomach upset.   *You had new steri strips placed on your incision today. You may now get your incision wet with soap and water. NO scrubbing, submerging, lotions or creams. Your steri strips will likely fall off over the next week. Just allow them to peel off on their own.   *You have been given a script for PT. Please call to set that up so you can continue with therapy.

## 2024-10-14 ENCOUNTER — OFFICE VISIT (OUTPATIENT)
Dept: URGENT CARE | Age: 59
End: 2024-10-14
Payer: MEDICARE

## 2024-10-14 VITALS
OXYGEN SATURATION: 99 % | DIASTOLIC BLOOD PRESSURE: 72 MMHG | RESPIRATION RATE: 18 BRPM | SYSTOLIC BLOOD PRESSURE: 130 MMHG | TEMPERATURE: 98.6 F | HEART RATE: 87 BPM | BODY MASS INDEX: 27.11 KG/M2 | HEIGHT: 70 IN | WEIGHT: 189.4 LBS

## 2024-10-14 DIAGNOSIS — R05.1 ACUTE COUGH: Primary | ICD-10-CM

## 2024-10-14 PROCEDURE — 99213 OFFICE O/P EST LOW 20 MIN: CPT | Performed by: EMERGENCY MEDICINE

## 2024-10-14 PROCEDURE — G0463 HOSPITAL OUTPT CLINIC VISIT: HCPCS | Performed by: EMERGENCY MEDICINE

## 2024-10-14 RX ORDER — DOXYCYCLINE 100 MG/1
100 TABLET ORAL 2 TIMES DAILY
Qty: 14 TABLET | Refills: 0 | Status: CANCELLED | OUTPATIENT
Start: 2024-10-14 | End: 2024-10-21

## 2024-10-14 RX ORDER — DOXYCYCLINE 100 MG/1
100 TABLET ORAL 2 TIMES DAILY
Qty: 14 TABLET | Refills: 0 | Status: SHIPPED | OUTPATIENT
Start: 2024-10-14 | End: 2024-10-21

## 2024-10-14 RX ORDER — BENZONATATE 200 MG/1
200 CAPSULE ORAL 3 TIMES DAILY PRN
Qty: 20 CAPSULE | Refills: 0 | Status: SHIPPED | OUTPATIENT
Start: 2024-10-14

## 2024-10-14 NOTE — PROGRESS NOTES
"  [facility]        NAME: Alexx Haney is a 59 y.o. male  : 1965    MRN: [id number]  DATE: 2024  TIME: 2:27 PM    Assessment and Plan   Acute cough [R05.1]  1. Acute cough  benzonatate (TESSALON) 200 MG capsule    doxycycline (ADOXA) 100 MG tablet        Ongoing cough for 2 weeks. Was recently in hospital [facility] 1.5 weeks ago for unrelated. No fevers. Taking OTC meds. No wheezing. BS clear. Discussed abx and symptom management. Discussed benefit vs risk bn doxy and multivitamins.     Patient Instructions       Follow up with PCP in 3-5 days.  Proceed to  ER if symptoms worsen.    If tests have been performed at [facility], our office will contact you with results if changes need to be made to the care plan discussed with you at the visit.  You can review your full results on [facility].    Chief Complaint     Chief Complaint   Patient presents with    Cough     Pt c/o dry cough intermittently for the past 2 weeks - now with nasal congestion and feels like \"my lungs are crashing\"   Used nebulizer today and feels worse.  Denies fever.         History of Present Illness       Ongoing cough for 2 weeks. Was recently in hospital [facility] 1.5 weeks ago for unrelated. No fevers. Taking OTC meds. No wheezing. BS clear. Discussed abx and symptom management. Discussed benefit vs risk bn doxy and multivitamins.     Cough  Associated symptoms include postnasal drip. Pertinent negatives include no fever, shortness of breath or wheezing.       Review of Systems   Review of Systems   Constitutional:  Negative for appetite change and fever.   HENT:  Positive for congestion and postnasal drip.    Respiratory:  Positive for cough. Negative for chest tightness, shortness of breath and wheezing.    All other systems reviewed and are negative.        Current Medications       Current Outpatient Medications:     acetaminophen (TYLENOL) 500 mg tablet, Take 2 tablets (1,000 mg total) by mouth every 8 " (eight) hours 1500 at times, Disp: , Rfl:     albuterol (2.5 mg/3 mL) 0.083 % nebulizer solution, Take 3 mL (2.5 mg total) by nebulization every 6 (six) hours as needed for wheezing or shortness of breath, Disp: 240 mL, Rfl: 2    albuterol (PROVENTIL HFA,VENTOLIN HFA) 90 mcg/act inhaler, Inhale 1 puff if needed for wheezing or shortness of breath, Disp: 48 g, Rfl: 1    ascorbic acid (VITAMIN C) 500 MG tablet, Take 1 tablet (500 mg total) by mouth daily Begin 30 days prior to surgery., Disp: 30 tablet, Rfl: 1    benzonatate (TESSALON) 200 MG capsule, Take 1 capsule (200 mg total) by mouth 3 (three) times a day as needed for cough, Disp: 20 capsule, Rfl: 0    doxycycline (ADOXA) 100 MG tablet, Take 1 tablet (100 mg total) by mouth 2 (two) times a day for 7 days, Disp: 14 tablet, Rfl: 0    esomeprazole (NexIUM) 40 MG capsule, Take 40 mg by mouth every morning before breakfast, Disp: , Rfl:     febuxostat (ULORIC) 40 mg tablet, Take 1 tablet (40 mg total) by mouth daily, Disp: 90 tablet, Rfl: 1    fluticasone-salmeterol (Advair) 500-50 mcg/dose inhaler, Inhale 1 puff 2 (two) times a day, Disp: , Rfl:     gabapentin (NEURONTIN) 100 mg capsule, Take 1 capsule (100 mg total) by mouth 3 (three) times a day, Disp: 90 capsule, Rfl: 0    meclizine (ANTIVERT) 12.5 MG tablet, Take 1 tablet (12.5 mg total) by mouth every 8 (eight) hours as needed for dizziness, Disp: , Rfl:     methocarbamol (ROBAXIN) 500 mg tablet, Take 250 mg by mouth every 8 (eight) hours as needed for muscle spasms, Disp: , Rfl:     montelukast (SINGULAIR) 10 mg tablet, Take 1 tablet (10 mg total) by mouth daily, Disp: , Rfl:     Multiple Vitamin (multivitamin) tablet, Take 1 tablet by mouth daily Begin 30 days prior to surgery., Disp: 30 tablet, Rfl: 1    Pseudoephedrine-guaiFENesin (GUAIFENESIN 600/ PO), Take 600 mg by mouth 2 (two) times a day, Disp: , Rfl:     sodium chloride 1 g tablet, Take 1 tablet (1 g total) by mouth 3 (three) times a day with  meals, Disp: 90 tablet, Rfl: 1    Triamcinolone Acetonide (NASACORT ALLERGY 24HR NA), 1 spray into each nostril daily  , Disp: , Rfl:     verapamil (CALAN) 120 mg tablet, TAKE 1 TABLET BY MOUTH EVERY DAY, Disp: 90 tablet, Rfl: 3    Chlorpheniramine Maleate (CHLOR-TRIMETON ALLERGY PO), Take by mouth as needed (Patient not taking: Reported on 10/14/2024), Disp: , Rfl:     clotrimazole (LOTRIMIN) 1 % cream, Apply topically 2 (two) times a day for 21 days, Disp: 60 g, Rfl: 3    clotrimazole-betamethasone (LOTRISONE) 1-0.05 % cream, Apply topically 2 (two) times a day (Patient not taking: Reported on 10/14/2024), Disp: 90 g, Rfl: 1    colchicine (COLCRYS) 0.6 mg tablet, Take 1 tablet (0.6 mg total) by mouth daily (Patient not taking: Reported on 10/14/2024), Disp: 7 tablet, Rfl: 0    docusate sodium (COLACE) 100 mg capsule, Take 1 capsule (100 mg total) by mouth 2 (two) times a day (Patient not taking: Reported on 10/14/2024), Disp: 20 capsule, Rfl: 0    enoxaparin (Lovenox) 40 mg/0.4 mL, Inject 40 mg under the skin in the morning (Patient not taking: Reported on 10/14/2024), Disp: , Rfl:     ferrous sulfate 324 (65 Fe) mg, TAKE 1 TABLET (324 MG TOTAL) BY MOUTH DAILY BEFORE BREAKFAST BEGIN 30 DAYS PRIOR TO SURGERY. (Patient not taking: Reported on 10/14/2024), Disp: 90 tablet, Rfl: 1    folic acid (FOLVITE) 1 mg tablet, TAKE 1 TABLET (1 MG TOTAL) BY MOUTH DAILY BEGIN 30 DAYS PRIOR TO SURGERY. (Patient not taking: Reported on 10/14/2024), Disp: 90 tablet, Rfl: 1    ipratropium-albuterol (DUO-NEB) 0.5-2.5 mg/3 mL, Inhale 3 mL if needed (Patient not taking: Reported on 10/14/2024), Disp: , Rfl:     predniSONE 20 mg tablet, Take 2 tablets (40 mg total) by mouth daily for 5 days, THEN 1 tablet (20 mg total) daily for 5 days. (Patient not taking: Reported on 10/14/2024), Disp: 15 tablet, Rfl: 0    Current Allergies     Allergies as of 10/14/2024 - Reviewed 10/14/2024   Allergen Reaction Noted    Penicillins Rash and  Anaphylaxis 08/17/2004    Acetazolamide  10/25/2016    Cephalosporins Other (See Comments) 08/17/2004    Other  04/28/2014    Sulfa antibiotics Other (See Comments) 08/17/2004    Famotidine Palpitations 09/05/2016    Levofloxacin Palpitations 11/12/2021    Omeprazole Palpitations 09/05/2016            The following portions of the patient's history were reviewed and updated as appropriate: allergies, current medications, past family history, past medical history, past social history, past surgical history and problem list.     Past Medical History:   Diagnosis Date    Acute bronchitis 01/29/2024    Anxiety 01/15/2019    Arthritis     Asthma     Chronic rhinitis     last assessed 2/21/14    Chronic serous otitis media     last assessed 11/20/13    Chronic sinusitis     last assessed 8/19/14    Functional heart murmur     GERD (gastroesophageal reflux disease)     Gout     Hearing problem     last assessed 12/1/16    Hiatal hernia     Hypercholesterolemia     Hypertension     Palpitations     Testicular hypogonadism     last assessed 10/4/13    V-tach (HCC)        Past Surgical History:   Procedure Laterality Date    APPENDECTOMY      BICEPS TENDON REPAIR Left 2009    BICEPS TENODESIS      anesthesia for tenodesis- of ruptured long tendon of biceps     HERNIA REPAIR      KS ARTHRP KNE CONDYLE&PLATU MEDIAL&LAT COMPARTMENTS Right 9/17/2024    Procedure: ARTHROPLASTY KNEE TOTAL, potential same day discharge, cemented;  Surgeon: Parvin Whipple DO;  Location:  MAIN OR;  Service: Orthopedics    THYROIDECTOMY      TONSILLECTOMY         Family History   Problem Relation Age of Onset    Lung cancer Father     Coronary artery disease Father         blockages    Stroke Maternal Grandmother     Cancer Paternal Grandfather         metastasized    Heart disease Family     Lung cancer Cousin     No Known Problems Mother     No Known Problems Sister     No Known Problems Brother     No Known Problems Maternal Aunt     No  "Known Problems Maternal Uncle     No Known Problems Paternal Aunt     No Known Problems Paternal Uncle     No Known Problems Paternal Grandmother     ADD / ADHD Neg Hx     Anesthesia problems Neg Hx     Clotting disorder Neg Hx     Collagen disease Neg Hx     Diabetes Neg Hx     Dislocations Neg Hx     Learning disabilities Neg Hx     Neurological problems Neg Hx     Osteoporosis Neg Hx     Rheumatologic disease Neg Hx     Scoliosis Neg Hx     Vascular Disease Neg Hx          Medications have been verified.        Objective   /72   Pulse 87   Temp 98.6 °F (37 °C) (Oral) Comment (Src): refused tympanic  Resp 18   Ht 5' 9.5\" (1.765 m)   Wt 85.9 kg (189 lb 6.4 oz)   SpO2 99%   BMI 27.57 kg/m²   No LMP for male patient.       Physical Exam     Physical Exam  Vitals reviewed.   Constitutional:       Appearance: Normal appearance.   HENT:      Right Ear: Tympanic membrane normal.      Left Ear: Tympanic membrane normal.      Nose: Congestion present.   Cardiovascular:      Rate and Rhythm: Normal rate and regular rhythm.      Pulses: Normal pulses.      Heart sounds: Normal heart sounds.   Pulmonary:      Effort: Pulmonary effort is normal.      Breath sounds: Normal breath sounds.   Lymphadenopathy:      Cervical: No cervical adenopathy.   Neurological:      Mental Status: He is alert.                   "

## 2024-10-15 ENCOUNTER — TELEPHONE (OUTPATIENT)
Dept: OBGYN CLINIC | Facility: MEDICAL CENTER | Age: 59
End: 2024-10-15

## 2024-10-15 NOTE — TELEPHONE ENCOUNTER
Peng stating this was regarding his appointment on 10/30. Stated Dr. Whipple will not be in the office in the afternoon and we will need to reschedule his appointment. Please give us a call back.

## 2024-10-17 ENCOUNTER — TELEPHONE (OUTPATIENT)
Dept: PULMONOLOGY | Facility: CLINIC | Age: 59
End: 2024-10-17

## 2024-10-17 ENCOUNTER — APPOINTMENT (OUTPATIENT)
Dept: PHYSICAL THERAPY | Facility: REHABILITATION | Age: 59
End: 2024-10-17
Payer: MEDICARE

## 2024-10-17 ENCOUNTER — HOSPITAL ENCOUNTER (EMERGENCY)
Facility: HOSPITAL | Age: 59
Discharge: HOME | End: 2024-10-17
Attending: EMERGENCY MEDICINE
Payer: MEDICARE

## 2024-10-17 VITALS
DIASTOLIC BLOOD PRESSURE: 61 MMHG | SYSTOLIC BLOOD PRESSURE: 108 MMHG | RESPIRATION RATE: 20 BRPM | OXYGEN SATURATION: 99 % | BODY MASS INDEX: 28.65 KG/M2 | TEMPERATURE: 99.5 F | HEART RATE: 90 BPM | WEIGHT: 196.8 LBS

## 2024-10-17 DIAGNOSIS — R42 DIZZINESS: Primary | ICD-10-CM

## 2024-10-17 LAB
ALBUMIN SERPL BCG-MCNC: 4.3 G/DL (ref 3.5–5)
ALP SERPL-CCNC: 100 U/L (ref 34–104)
ALT SERPL W P-5'-P-CCNC: 24 U/L (ref 7–52)
ANION GAP SERPL CALCULATED.3IONS-SCNC: 9 MMOL/L (ref 4–13)
AST SERPL W P-5'-P-CCNC: 20 U/L (ref 13–39)
BASOPHILS # BLD AUTO: 0.04 THOUSANDS/ΜL (ref 0–0.1)
BASOPHILS NFR BLD AUTO: 1 % (ref 0–1)
BILIRUB SERPL-MCNC: 0.8 MG/DL (ref 0.2–1)
BUN SERPL-MCNC: 11 MG/DL (ref 5–25)
CALCIUM SERPL-MCNC: 9.2 MG/DL (ref 8.4–10.2)
CARDIAC TROPONIN I PNL SERPL HS: 4 NG/L
CHLORIDE SERPL-SCNC: 96 MMOL/L (ref 96–108)
CO2 SERPL-SCNC: 25 MMOL/L (ref 21–32)
CREAT SERPL-MCNC: 0.81 MG/DL (ref 0.6–1.3)
EOSINOPHIL # BLD AUTO: 0.12 THOUSAND/ΜL (ref 0–0.61)
EOSINOPHIL NFR BLD AUTO: 1 % (ref 0–6)
ERYTHROCYTE [DISTWIDTH] IN BLOOD BY AUTOMATED COUNT: 14.9 % (ref 11.6–15.1)
FLUAV AG UPPER RESP QL IA.RAPID: NEGATIVE
FLUBV AG UPPER RESP QL IA.RAPID: NEGATIVE
GFR SERPL CREATININE-BSD FRML MDRD: 97 ML/MIN/1.73SQ M
GLUCOSE SERPL-MCNC: 87 MG/DL (ref 65–140)
HCT VFR BLD AUTO: 33 % (ref 36.5–49.3)
HGB BLD-MCNC: 10.9 G/DL (ref 12–17)
IMM GRANULOCYTES # BLD AUTO: 0.07 THOUSAND/UL (ref 0–0.2)
IMM GRANULOCYTES NFR BLD AUTO: 1 % (ref 0–2)
LYMPHOCYTES # BLD AUTO: 1.14 THOUSANDS/ΜL (ref 0.6–4.47)
LYMPHOCYTES NFR BLD AUTO: 13 % (ref 14–44)
MCH RBC QN AUTO: 30.7 PG (ref 26.8–34.3)
MCHC RBC AUTO-ENTMCNC: 33 G/DL (ref 31.4–37.4)
MCV RBC AUTO: 93 FL (ref 82–98)
MONOCYTES # BLD AUTO: 0.84 THOUSAND/ΜL (ref 0.17–1.22)
MONOCYTES NFR BLD AUTO: 10 % (ref 4–12)
NEUTROPHILS # BLD AUTO: 6.65 THOUSANDS/ΜL (ref 1.85–7.62)
NEUTS SEG NFR BLD AUTO: 74 % (ref 43–75)
NRBC BLD AUTO-RTO: 0 /100 WBCS
PLATELET # BLD AUTO: 264 THOUSANDS/UL (ref 149–390)
PMV BLD AUTO: 7.8 FL (ref 8.9–12.7)
POTASSIUM SERPL-SCNC: 4 MMOL/L (ref 3.5–5.3)
PROT SERPL-MCNC: 6.5 G/DL (ref 6.4–8.4)
RBC # BLD AUTO: 3.55 MILLION/UL (ref 3.88–5.62)
SARS-COV+SARS-COV-2 AG RESP QL IA.RAPID: NEGATIVE
SODIUM SERPL-SCNC: 130 MMOL/L (ref 135–147)
WBC # BLD AUTO: 8.86 THOUSAND/UL (ref 4.31–10.16)

## 2024-10-17 PROCEDURE — 93005 ELECTROCARDIOGRAM TRACING: CPT

## 2024-10-17 PROCEDURE — 99284 EMERGENCY DEPT VISIT MOD MDM: CPT | Performed by: EMERGENCY MEDICINE

## 2024-10-17 PROCEDURE — 80053 COMPREHEN METABOLIC PANEL: CPT | Performed by: EMERGENCY MEDICINE

## 2024-10-17 PROCEDURE — 99284 EMERGENCY DEPT VISIT MOD MDM: CPT

## 2024-10-17 PROCEDURE — 84484 ASSAY OF TROPONIN QUANT: CPT | Performed by: EMERGENCY MEDICINE

## 2024-10-17 PROCEDURE — 87804 INFLUENZA ASSAY W/OPTIC: CPT | Performed by: EMERGENCY MEDICINE

## 2024-10-17 PROCEDURE — 36415 COLL VENOUS BLD VENIPUNCTURE: CPT | Performed by: EMERGENCY MEDICINE

## 2024-10-17 PROCEDURE — 87811 SARS-COV-2 COVID19 W/OPTIC: CPT | Performed by: EMERGENCY MEDICINE

## 2024-10-17 PROCEDURE — 85025 COMPLETE CBC W/AUTO DIFF WBC: CPT | Performed by: EMERGENCY MEDICINE

## 2024-10-17 NOTE — ED PROVIDER NOTES
Time reflects when diagnosis was documented in both MDM as applicable and the Disposition within this note       Time User Action Codes Description Comment    10/17/2024  5:51 PM Franklin Oropeza Add [R42] Dizziness           ED Disposition       ED Disposition   Discharge    Condition   Stable    Date/Time   Thu Oct 17, 2024  5:51 PM    Comment   Alexx Haney discharge to home/self care.                   Assessment & Plan       Medical Decision Making  Problems Addressed:  Dizziness: chronic illness or injury     Details: Ecg wnl, labs wnl.  Sodium improved.      Amount and/or Complexity of Data Reviewed  Labs: ordered. Decision-making details documented in ED Course.  ECG/medicine tests: ordered and independent interpretation performed. Decision-making details documented in ED Course.        ED Course as of 10/17/24 2210   Thu Oct 17, 2024   1656 Hgb 10.9, up from 2 weeks ago in 7s   1711 hs TnI 0hr: 4   1711 Sodium(!): 130   1711 SARS COV Rapid Antigen: Negative   1711 Influenza A Rapid Antigen: Negative   1711 Influenza B Rapid Antigen: Negative   1734 hs TnI 0hr: 4   1734 Sodium(!): 130   1749 Wetn over results with patient.  Again asking if I can change is anticoagulation schedule/meds.  And asking for steroids because they have helped with his presumed sinusitis.  Explained would prefer to prescribe anythign further at this time.  Must also call his orthopod with questions about changing.  And to follow up with his doctor abotu prednisone.  Patient admits to taking his two asa together last night.         Medications - No data to display    ED Risk Strat Scores                                               History of Present Illness       Chief Complaint   Patient presents with    Medical Problem     States that he wants his blood thinner changed due to it bothering his stomach       Past Medical History:   Diagnosis Date    Acute bronchitis 01/29/2024    Anxiety 01/15/2019    Arthritis     Asthma      Chronic rhinitis     last assessed 2/21/14    Chronic serous otitis media     last assessed 11/20/13    Chronic sinusitis     last assessed 8/19/14    Functional heart murmur     GERD (gastroesophageal reflux disease)     Gout     Hearing problem     last assessed 12/1/16    Hiatal hernia     Hypercholesterolemia     Hypertension     Palpitations     Testicular hypogonadism     last assessed 10/4/13    V-tach (HCC)       Past Surgical History:   Procedure Laterality Date    APPENDECTOMY      BICEPS TENDON REPAIR Left 2009    BICEPS TENODESIS      anesthesia for tenodesis- of ruptured long tendon of biceps     HERNIA REPAIR      NH ARTHRP KNE CONDYLE&PLATU MEDIAL&LAT COMPARTMENTS Right 9/17/2024    Procedure: ARTHROPLASTY KNEE TOTAL, potential same day discharge, cemented;  Surgeon: Parvin Whipple DO;  Location:  MAIN OR;  Service: Orthopedics    THYROIDECTOMY      TONSILLECTOMY        Family History   Problem Relation Age of Onset    Lung cancer Father     Coronary artery disease Father         blockages    Stroke Maternal Grandmother     Cancer Paternal Grandfather         metastasized    Heart disease Family     Lung cancer Cousin     No Known Problems Mother     No Known Problems Sister     No Known Problems Brother     No Known Problems Maternal Aunt     No Known Problems Maternal Uncle     No Known Problems Paternal Aunt     No Known Problems Paternal Uncle     No Known Problems Paternal Grandmother     ADD / ADHD Neg Hx     Anesthesia problems Neg Hx     Clotting disorder Neg Hx     Collagen disease Neg Hx     Diabetes Neg Hx     Dislocations Neg Hx     Learning disabilities Neg Hx     Neurological problems Neg Hx     Osteoporosis Neg Hx     Rheumatologic disease Neg Hx     Scoliosis Neg Hx     Vascular Disease Neg Hx       Social History     Tobacco Use    Smoking status: Never    Smokeless tobacco: Never   Vaping Use    Vaping status: Never Used   Substance Use Topics    Alcohol use: Yes      Alcohol/week: 12.0 standard drinks of alcohol     Types: 12 Shots of liquor per week     Comment: occasionally; socially    Drug use: No      E-Cigarette/Vaping    E-Cigarette Use Never User       E-Cigarette/Vaping Substances    Nicotine No     THC No     CBD No     Flavoring No     Other No     Unknown No       I have reviewed and agree with the history as documented.     Patient is a 59-year-old male with a h/o BPPV, hyponatremia c/o several issues.  States dizziness, constant, no worse with positional change.  H/o similar, was just admitted for hyponatremia.  Restricted to 2L daily with salt tabs.  States exceeded limit a few days ago to 3L when feeling sick.  +non productive cough and congestion.  Also with GI upset.  Taking 2 324 ASA daily since RTKR last month.  Wanting to know if he can be on different anticoagulation.  Also states gout improving, recently treated as well.  Swelling down, just with some intermittent numbness in foot.         Review of Systems   Constitutional: Negative.    HENT:  Positive for congestion.    Eyes: Negative.    Respiratory:  Positive for cough.    Cardiovascular: Negative.    Gastrointestinal:  Positive for abdominal pain.   Endocrine: Negative.    Genitourinary: Negative.    Musculoskeletal: Negative.    Skin: Negative.    Allergic/Immunologic: Negative.    Neurological:  Positive for dizziness.   Hematological: Negative.    Psychiatric/Behavioral: Negative.     All other systems reviewed and are negative.          Objective       ED Triage Vitals [10/17/24 1610]   Temperature Pulse Blood Pressure Respirations SpO2 Patient Position - Orthostatic VS   99.5 °F (37.5 °C) 98 143/73 20 99 % Sitting      Temp Source Heart Rate Source BP Location FiO2 (%) Pain Score    Oral Monitor Right arm -- --      Vitals      Date and Time Temp Pulse SpO2 Resp BP Pain Score FACES Pain Rating User   10/17/24 1800 -- 90 99 % -- 108/61 -- -- MB   10/17/24 1610 99.5 °F (37.5 °C) 98 99 % 20 143/73  -- --             Physical Exam  Vitals and nursing note reviewed.   Constitutional:       Appearance: Normal appearance. He is normal weight.   HENT:      Head: Normocephalic and atraumatic.      Right Ear: Tympanic membrane, ear canal and external ear normal.      Left Ear: Tympanic membrane, ear canal and external ear normal.      Nose: Nose normal.      Mouth/Throat:      Mouth: Mucous membranes are moist.      Pharynx: Oropharynx is clear.   Eyes:      Extraocular Movements: Extraocular movements intact.      Conjunctiva/sclera: Conjunctivae normal.      Pupils: Pupils are equal, round, and reactive to light.   Cardiovascular:      Rate and Rhythm: Normal rate and regular rhythm.      Pulses: Normal pulses.      Heart sounds: Normal heart sounds.   Pulmonary:      Effort: Pulmonary effort is normal.      Breath sounds: Normal breath sounds.   Abdominal:      General: Bowel sounds are normal. There is no distension.      Palpations: Abdomen is soft.      Tenderness: There is no abdominal tenderness. There is no right CVA tenderness, left CVA tenderness, guarding or rebound.   Musculoskeletal:         General: No swelling or tenderness.      Cervical back: Normal range of motion and neck supple.   Skin:     Comments: Incision on r knee, CDI.no warmth, fluctuance, drainage.    Neurological:      General: No focal deficit present.      Mental Status: He is alert and oriented to person, place, and time.      Cranial Nerves: No cranial nerve deficit.      Sensory: No sensory deficit.         Results Reviewed       Procedure Component Value Units Date/Time    HS Troponin 0hr (reflex protocol) [[id number]]  (Normal) Collected: 10/17/24 1645    Lab Status: Final result Specimen: Blood from Arm, Right Updated: 10/17/24 1710     hs TnI 0hr 4 ng/L     FLU/COVID Rapid Antigen (30 min. TAT) - Preferred screening test in ED [[id number]]  (Normal) Collected: 10/17/24 1638    Lab Status: Final result Specimen: Nares from Nose  Updated: 10/17/24 1706     SARS COV Rapid Antigen Negative     Influenza A Rapid Antigen Negative     Influenza B Rapid Antigen Negative    Narrative:      This test has been performed using the [employer] Fay 2 FLU+SARS Antigen test under the Emergency Use Authorization (EUA). This test has been validated by the  and verified by the performing laboratory. The Fay uses lateral flow immunofluorescent sandwich assay to detect SARS-COV, Influenza A and Influenza B Antigen.     The [employer] Fay 2 SARS Antigen test does not differentiate between SARS-CoV and SARS-CoV-2.     Negative results are presumptive and may be confirmed with a molecular assay, if necessary, for patient management. Negative results do not rule out SARS-CoV-2 or influenza infection and should not be used as the sole basis for treatment or patient management decisions. A negative test result may occur if the level of antigen in a sample is below the limit of detection of this test.     Positive results are indicative of the presence of viral antigens, but do not rule out bacterial infection or co-infection with other viruses.     All test results should be used as an adjunct to clinical observations and other information available to the provider.    FOR PEDIATRIC PATIENTS - copy/paste COVID Guidelines URL to browser: [email or web address]    Comprehensive metabolic panel [[id number]]  (Abnormal) Collected: 10/17/24 1645    Lab Status: Final result Specimen: Blood from Arm, Right Updated: 10/17/24 1704     Sodium 130 mmol/L      Potassium 4.0 mmol/L      Chloride 96 mmol/L      CO2 25 mmol/L      ANION GAP 9 mmol/L      BUN 11 mg/dL      Creatinine 0.81 mg/dL      Glucose 87 mg/dL      Calcium 9.2 mg/dL      AST 20 U/L      ALT 24 U/L      Alkaline Phosphatase 100 U/L      Total Protein 6.5 g/dL      Albumin 4.3 g/dL      Total Bilirubin 0.80 mg/dL      eGFR 97 ml/min/1.73sq m      Narrative:      National Kidney Disease Foundation guidelines for Chronic Kidney Disease (CKD):     Stage 1 with normal or high GFR (GFR > 90 mL/min/1.73 square meters)    Stage 2 Mild CKD (GFR = 60-89 mL/min/1.73 square meters)    Stage 3A Moderate CKD (GFR = 45-59 mL/min/1.73 square meters)    Stage 3B Moderate CKD (GFR = 30-44 mL/min/1.73 square meters)    Stage 4 Severe CKD (GFR = 15-29 mL/min/1.73 square meters)    Stage 5 End Stage CKD (GFR <15 mL/min/1.73 square meters)  Note: GFR calculation is accurate only with a steady state creatinine    CBC and differential [[id number]]  (Abnormal) Collected: 10/17/24 1645    Lab Status: Final result Specimen: Blood from Arm, Right Updated: 10/17/24 1650     WBC 8.86 Thousand/uL      RBC 3.55 Million/uL      Hemoglobin 10.9 g/dL      Hematocrit 33.0 %      MCV 93 fL      MCH 30.7 pg      MCHC 33.0 g/dL      RDW 14.9 %      MPV 7.8 fL      Platelets 264 Thousands/uL      nRBC 0 /100 WBCs      Segmented % 74 %      Immature Grans % 1 %      Lymphocytes % 13 %      Monocytes % 10 %      Eosinophils Relative 1 %      Basophils Relative 1 %      Absolute Neutrophils 6.65 Thousands/µL      Absolute Immature Grans 0.07 Thousand/uL      Absolute Lymphocytes 1.14 Thousands/µL      Absolute Monocytes 0.84 Thousand/µL      Eosinophils Absolute 0.12 Thousand/µL      Basophils Absolute 0.04 Thousands/µL             No orders to display       Procedures    ED Medication and Procedure Management   Prior to Admission Medications   Prescriptions Last Dose Informant Patient Reported? Taking?   Chlorpheniramine Maleate (CHLOR-TRIMETON ALLERGY PO)  Self Yes No   Sig: Take by mouth as needed   Patient not taking: Reported on 10/14/2024   Multiple Vitamin (multivitamin) tablet  Self No No   Sig: Take 1 tablet by mouth daily Begin 30 days prior to surgery.   Pseudoephedrine-guaiFENesin (GUAIFENESIN 600/ PO)  Self Yes No   Sig: Take 600 mg by mouth 2 (two) times a day   Triamcinolone  Acetonide (NASACORT ALLERGY 24HR NA)  Self Yes No   Si spray into each nostril daily     acetaminophen (TYLENOL) 500 mg tablet  Self No No   Sig: Take 2 tablets (1,000 mg total) by mouth every 8 (eight) hours 1500 at times   albuterol (2.5 mg/3 mL) 0.083 % nebulizer solution  Self No No   Sig: Take 3 mL (2.5 mg total) by nebulization every 6 (six) hours as needed for wheezing or shortness of breath   albuterol (PROVENTIL HFA,VENTOLIN HFA) 90 mcg/act inhaler  Self No No   Sig: Inhale 1 puff if needed for wheezing or shortness of breath   ascorbic acid (VITAMIN C) 500 MG tablet  Self No No   Sig: Take 1 tablet (500 mg total) by mouth daily Begin 30 days prior to surgery.   benzonatate (TESSALON) 200 MG capsule   No No   Sig: Take 1 capsule (200 mg total) by mouth 3 (three) times a day as needed for cough   clotrimazole (LOTRIMIN) 1 % cream   No No   Sig: Apply topically 2 (two) times a day for 21 days   clotrimazole-betamethasone (LOTRISONE) 1-0.05 % cream   No No   Sig: Apply topically 2 (two) times a day   Patient not taking: Reported on 10/14/2024   colchicine (COLCRYS) 0.6 mg tablet  Self No No   Sig: Take 1 tablet (0.6 mg total) by mouth daily   Patient not taking: Reported on 10/14/2024   docusate sodium (COLACE) 100 mg capsule  Self No No   Sig: Take 1 capsule (100 mg total) by mouth 2 (two) times a day   Patient not taking: Reported on 10/14/2024   doxycycline (ADOXA) 100 MG tablet   No No   Sig: Take 1 tablet (100 mg total) by mouth 2 (two) times a day for 7 days   enoxaparin (Lovenox) 40 mg/0.4 mL  Self Yes No   Sig: Inject 40 mg under the skin in the morning   Patient not taking: Reported on 10/14/2024   esomeprazole (NexIUM) 40 MG capsule  Self Yes No   Sig: Take 40 mg by mouth every morning before breakfast   febuxostat (ULORIC) 40 mg tablet  Self No No   Sig: Take 1 tablet (40 mg total) by mouth daily   ferrous sulfate 324 (65 Fe) mg  Self No No   Sig: TAKE 1 TABLET (324 MG TOTAL) BY MOUTH DAILY  BEFORE BREAKFAST BEGIN 30 DAYS PRIOR TO SURGERY.   Patient not taking: Reported on 10/14/2024   fluticasone-salmeterol (Advair) 500-50 mcg/dose inhaler  Self Yes No   Sig: Inhale 1 puff 2 (two) times a day   folic acid (FOLVITE) 1 mg tablet  Self No No   Sig: TAKE 1 TABLET (1 MG TOTAL) BY MOUTH DAILY BEGIN 30 DAYS PRIOR TO SURGERY.   Patient not taking: Reported on 10/14/2024   gabapentin (NEURONTIN) 100 mg capsule  Self No No   Sig: Take 1 capsule (100 mg total) by mouth 3 (three) times a day   ipratropium-albuterol (DUO-NEB) 0.5-2.5 mg/3 mL  Self Yes No   Sig: Inhale 3 mL if needed   Patient not taking: Reported on 10/14/2024   meclizine (ANTIVERT) 12.5 MG tablet  Self No No   Sig: Take 1 tablet (12.5 mg total) by mouth every 8 (eight) hours as needed for dizziness   methocarbamol (ROBAXIN) 500 mg tablet  Self Yes No   Sig: Take 250 mg by mouth every 8 (eight) hours as needed for muscle spasms   montelukast (SINGULAIR) 10 mg tablet  Self No No   Sig: Take 1 tablet (10 mg total) by mouth daily   sodium chloride 1 g tablet  Self No No   Sig: Take 1 tablet (1 g total) by mouth 3 (three) times a day with meals   verapamil (CALAN) 120 mg tablet  Self No No   Sig: TAKE 1 TABLET BY MOUTH EVERY DAY      Facility-Administered Medications: None     Discharge Medication List as of 10/17/2024  5:51 PM        CONTINUE these medications which have NOT CHANGED    Details   acetaminophen (TYLENOL) 500 mg tablet Take 2 tablets (1,000 mg total) by mouth every 8 (eight) hours 1500 at times, Starting Tue 9/17/2024, No Print      albuterol (2.5 mg/3 mL) 0.083 % nebulizer solution Take 3 mL (2.5 mg total) by nebulization every 6 (six) hours as needed for wheezing or shortness of breath, Starting Tue 5/2/2023, Normal      albuterol (PROVENTIL HFA,VENTOLIN HFA) 90 mcg/act inhaler Inhale 1 puff if needed for wheezing or shortness of breath, Starting Fri 7/19/2024, Normal      ascorbic acid (VITAMIN C) 500 MG tablet Take 1 tablet (500 mg  total) by mouth daily Begin 30 days prior to surgery., Starting Fri 8/16/2024, Normal      benzonatate (TESSALON) 200 MG capsule Take 1 capsule (200 mg total) by mouth 3 (three) times a day as needed for cough, Starting Mon 10/14/2024, Normal      Chlorpheniramine Maleate (CHLOR-TRIMETON ALLERGY PO) Take by mouth as needed, Historical Med      clotrimazole (LOTRIMIN) 1 % cream Apply topically 2 (two) times a day for 21 days, Starting Mon 5/6/2024, Until Mon 5/27/2024, Normal      clotrimazole-betamethasone (LOTRISONE) 1-0.05 % cream Apply topically 2 (two) times a day, Starting Wed 10/9/2024, Normal      colchicine (COLCRYS) 0.6 mg tablet Take 1 tablet (0.6 mg total) by mouth daily, Starting Fri 10/4/2024, Normal      docusate sodium (COLACE) 100 mg capsule Take 1 capsule (100 mg total) by mouth 2 (two) times a day, Starting Tue 9/17/2024, Normal      doxycycline (ADOXA) 100 MG tablet Take 1 tablet (100 mg total) by mouth 2 (two) times a day for 7 days, Starting Mon 10/14/2024, Until Mon 10/21/2024, Normal      enoxaparin (Lovenox) 40 mg/0.4 mL Inject 40 mg under the skin in the morning, Historical Med      esomeprazole (NexIUM) 40 MG capsule Take 40 mg by mouth every morning before breakfast, Historical Med      febuxostat (ULORIC) 40 mg tablet Take 1 tablet (40 mg total) by mouth daily, Starting Fri 10/4/2024, Normal      ferrous sulfate 324 (65 Fe) mg TAKE 1 TABLET (324 MG TOTAL) BY MOUTH DAILY BEFORE BREAKFAST BEGIN 30 DAYS PRIOR TO SURGERY., Starting Sun 9/8/2024, Normal      fluticasone-salmeterol (Advair) 500-50 mcg/dose inhaler Inhale 1 puff 2 (two) times a day, Starting Thu 10/17/2013, Historical Med      folic acid (FOLVITE) 1 mg tablet TAKE 1 TABLET (1 MG TOTAL) BY MOUTH DAILY BEGIN 30 DAYS PRIOR TO SURGERY., Starting Sun 9/8/2024, Normal      gabapentin (NEURONTIN) 100 mg capsule Take 1 capsule (100 mg total) by mouth 3 (three) times a day, Starting Tue 9/17/2024, Normal      ipratropium-albuterol  (DUO-NEB) 0.5-2.5 mg/3 mL Inhale 3 mL if needed, Historical Med      meclizine (ANTIVERT) 12.5 MG tablet Take 1 tablet (12.5 mg total) by mouth every 8 (eight) hours as needed for dizziness, Starting Fri 9/20/2024, No Print      methocarbamol (ROBAXIN) 500 mg tablet Take 250 mg by mouth every 8 (eight) hours as needed for muscle spasms, Historical Med      montelukast (SINGULAIR) 10 mg tablet Take 1 tablet (10 mg total) by mouth daily, Starting Wed 9/25/2024, No Print      Multiple Vitamin (multivitamin) tablet Take 1 tablet by mouth daily Begin 30 days prior to surgery., Starting Fri 8/16/2024, Normal      Pseudoephedrine-guaiFENesin (GUAIFENESIN 600/ PO) Take 600 mg by mouth 2 (two) times a day, Historical Med      sodium chloride 1 g tablet Take 1 tablet (1 g total) by mouth 3 (three) times a day with meals, Starting u 10/3/2024, Normal      Triamcinolone Acetonide (NASACORT ALLERGY 24HR NA) 1 spray into each nostril daily  , Starting Wed 9/7/2016, Historical Med      verapamil (CALAN) 120 mg tablet TAKE 1 TABLET BY MOUTH EVERY DAY, Normal           No discharge procedures on file.  ED SEPSIS DOCUMENTATION   Time reflects when diagnosis was documented in both MDM as applicable and the Disposition within this note       Time User Action Codes Description Comment    10/17/2024  5:51 PM Franklin Oropeza Add [R42] Dizziness                  Franklin Oropeza MD  10/17/24 1033

## 2024-10-17 NOTE — ED PROCEDURE NOTE
PROCEDURE  ECG 12 Lead Documentation Only    Date/Time: 10/17/2024 4:16 PM    Performed by: Franklin Oropeza MD  Authorized by: Franklin Oropeza MD    Indications / Diagnosis:  Dizziness  ECG reviewed by me, the ED Provider: yes    Patient location:  ED  Interpretation:     Interpretation: normal    Rate:     ECG rate:  96    ECG rate assessment: normal    Rhythm:     Rhythm: sinus rhythm    Ectopy:     Ectopy: none    QRS:     QRS axis:  Normal    QRS intervals:  Normal  Conduction:     Conduction: normal    ST segments:     ST segments:  Normal  T waves:     T waves: normal         Franklin Oropeza MD  10/17/24 1638

## 2024-10-18 ENCOUNTER — TELEPHONE (OUTPATIENT)
Dept: OBGYN CLINIC | Facility: HOSPITAL | Age: 59
End: 2024-10-18

## 2024-10-18 ENCOUNTER — TELEPHONE (OUTPATIENT)
Age: 59
End: 2024-10-18

## 2024-10-18 ENCOUNTER — NURSE TRIAGE (OUTPATIENT)
Dept: OTHER | Facility: OTHER | Age: 59
End: 2024-10-18

## 2024-10-18 LAB
ATRIAL RATE: 96 BPM
P AXIS: 64 DEGREES
PR INTERVAL: 128 MS
QRS AXIS: 25 DEGREES
QRSD INTERVAL: 84 MS
QT INTERVAL: 330 MS
QTC INTERVAL: 416 MS
T WAVE AXIS: 48 DEGREES
VENTRICULAR RATE: 96 BPM

## 2024-10-18 PROCEDURE — 93010 ELECTROCARDIOGRAM REPORT: CPT | Performed by: INTERNAL MEDICINE

## 2024-10-18 NOTE — TELEPHONE ENCOUNTER
Paco Wheatley, thanks for trying to speak with him. I also tried calling patient and he did not respond so I had to leave a voicemail. I explained that he may get one of the nurses when he calls back but that you are part of Dr Husain team.     If he calls back: I can send a different med like eliquis if he is really that concerned however eliquis can be expensive so I have no idea what his cost would be for that. I would have low suspicion that the aspirin is actually the cause of his dizziness. He needs to be on some DVT ppx for two more weeks roughly. I am concerned that his dizziness may be related to other medial issues such as BP changes or low sodium. Please confirm he is still taking the sodium tablets as was recommended at his last PCP visit. Please have him check his BP with cuff. I would have him call his PCP for close follow up, especially since it is the weekend. Thank you!

## 2024-10-18 NOTE — TELEPHONE ENCOUNTER
Caller: Alexx    Doctor: Dr Whipple    Reason for call: Spoke with NN and informed the patient that Christa will be calling him back. He will await the call.     Call back#: [phone]

## 2024-10-18 NOTE — TELEPHONE ENCOUNTER
Patient calling in with concerns over Aspirin. He is requesting a call back to discuss what is going on as he is having symptoms and has multiple questions regarding this. Would like to know if he can request Eliquis instead.     Please call home number at [phone]  to discuss. Thank you!

## 2024-10-18 NOTE — TELEPHONE ENCOUNTER
Caller: Patient    Doctor: Sole    Reason for call: Alexx is taking aspirin 325 mg twice a day and is asking if he could be switched to Eloquis. He said the aspiring is making him very tired and dizzy at times. He said the longer he is taking the worse he feels. Please call patient. Thank you.    Call back#: [phone]

## 2024-10-18 NOTE — TELEPHONE ENCOUNTER
Spoke to patient. Stated that he spoke to ortho and they were making medication changes.   No further action needed from [facility].

## 2024-10-18 NOTE — TELEPHONE ENCOUNTER
Paco Tabor, could you please call patient and get some more info? These are not typical side effects from the aspirin so I would like to know if these symptoms have been associated with anything else? Also does he have a BP cuff he could use to check his BP when he feels dizzy. He may need to see his PCP for eval of this issue. Thanks!

## 2024-10-18 NOTE — TELEPHONE ENCOUNTER
Pt called stating he called his PCP and Orthopedics several times asking to be placed on Eliquis to replace the Aspirin he is taking.  Pt states he called his insurance company and was advised that the cost of Eliquis would be subsidized.  Pt states Aspirin is making his dizzy.  Pt initially very upset stating office lied to him and told him they would prescribe Eliquis but they did not. Advised pt that office is likely just being certain that his symptom of dizziness is not being caused by any other factors, as well as making sure that Eliquis would be an appropriate substitution.  Also advised pt that coordination between Orthopedics and his PCP is optimal.  Pt states he was evaluated at the ER last night and thinks his blood work indicates that Eliquis would be okay for him.  Advice offered per protocol and advised pt not to suddenly discontinue taking Aspirin until he discusses this further with his doctor.  Pt agrees and states he has an appointment on Tuesday with his PCP and he will discuss it at that time.

## 2024-10-18 NOTE — TELEPHONE ENCOUNTER
"Reason for Disposition   Prescription request for new medicine (not a refill)    Answer Assessment - Initial Assessment Questions  1. NAME of MEDICINE: \"What medicine(s) are you calling about?\"      Eliquis   2. QUESTION: \"What is your question?\" (e.g., double dose of medicine, side effect)      Why didn't they call in the Eliquis?  I called back and told them it would be covered  3. PRESCRIBER: \"Who prescribed the medicine?\" Reason: if prescribed by specialist, call should be referred to that group.      It would be a new medication to replace Aspirin which pt states is causing him to feel dizzy  4. SYMPTOMS: \"Do you have any symptoms?\" If Yes, ask: \"What symptoms are you having?\"  \"How bad are the symptoms (e.g., mild, moderate, severe)      Dizziness when taking Aspirin    Protocols used: Medication Question Call-Adult-    "

## 2024-10-21 NOTE — TELEPHONE ENCOUNTER
Attempted to call patient again with no response. Left voicemail that I am trying to get some updated info from him and see if he is still experiencing the dizziness. I again would like to encourage him to see his PCP to ensure the symptoms are not related to another medical issue rather than attribute it to the ASA. Will await a call back before switching to eliquis as I would like to discuss risks and benefits.

## 2024-10-22 ENCOUNTER — OFFICE VISIT (OUTPATIENT)
Dept: INTERNAL MEDICINE CLINIC | Facility: OTHER | Age: 59
End: 2024-10-22
Payer: MEDICARE

## 2024-10-22 ENCOUNTER — OFFICE VISIT (OUTPATIENT)
Dept: PHYSICAL THERAPY | Facility: REHABILITATION | Age: 59
End: 2024-10-22
Payer: MEDICARE

## 2024-10-22 VITALS
WEIGHT: 187 LBS | SYSTOLIC BLOOD PRESSURE: 128 MMHG | HEART RATE: 88 BPM | TEMPERATURE: 98.6 F | DIASTOLIC BLOOD PRESSURE: 80 MMHG | BODY MASS INDEX: 26.77 KG/M2 | HEIGHT: 70 IN | OXYGEN SATURATION: 96 %

## 2024-10-22 DIAGNOSIS — K21.9 GERD WITHOUT ESOPHAGITIS: ICD-10-CM

## 2024-10-22 DIAGNOSIS — J45.909 PERSISTENT ASTHMA WITHOUT COMPLICATION, UNSPECIFIED ASTHMA SEVERITY: ICD-10-CM

## 2024-10-22 DIAGNOSIS — E87.1 HYPONATREMIA: ICD-10-CM

## 2024-10-22 DIAGNOSIS — Z23 ENCOUNTER FOR IMMUNIZATION: ICD-10-CM

## 2024-10-22 DIAGNOSIS — I47.20 V-TACH (HCC): ICD-10-CM

## 2024-10-22 DIAGNOSIS — I10 PRIMARY HYPERTENSION: Primary | ICD-10-CM

## 2024-10-22 DIAGNOSIS — Z96.651 STATUS POST TOTAL KNEE REPLACEMENT, RIGHT: Primary | ICD-10-CM

## 2024-10-22 DIAGNOSIS — J30.89 NON-SEASONAL ALLERGIC RHINITIS, UNSPECIFIED TRIGGER: ICD-10-CM

## 2024-10-22 PROCEDURE — 97110 THERAPEUTIC EXERCISES: CPT | Performed by: PHYSICAL THERAPIST

## 2024-10-22 PROCEDURE — 97140 MANUAL THERAPY 1/> REGIONS: CPT | Performed by: PHYSICAL THERAPIST

## 2024-10-22 PROCEDURE — 99214 OFFICE O/P EST MOD 30 MIN: CPT | Performed by: INTERNAL MEDICINE

## 2024-10-22 PROCEDURE — G2211 COMPLEX E/M VISIT ADD ON: HCPCS | Performed by: INTERNAL MEDICINE

## 2024-10-22 PROCEDURE — 90673 RIV3 VACCINE NO PRESERV IM: CPT

## 2024-10-22 PROCEDURE — 97164 PT RE-EVAL EST PLAN CARE: CPT | Performed by: PHYSICAL THERAPIST

## 2024-10-22 PROCEDURE — G0008 ADMIN INFLUENZA VIRUS VAC: HCPCS

## 2024-10-22 RX ORDER — LISINOPRIL 10 MG/1
1 TABLET ORAL EVERY 24 HOURS
COMMUNITY
Start: 2024-09-24 | End: 2024-10-22

## 2024-10-22 RX ORDER — ASPIRIN 325 MG
650 TABLET ORAL DAILY
COMMUNITY

## 2024-10-22 NOTE — PLAN OF CARE
Problem: OCCUPATIONAL THERAPY ADULT  Goal: Performs self-care activities at highest level of function for planned discharge setting.  See evaluation for individualized goals.  Description: Treatment Interventions: ADL retraining, Functional transfer training, UE strengthening/ROM, Endurance training, Patient/family training, Equipment evaluation/education, Neuromuscular reeducation, Activityengagement          See flowsheet documentation for full assessment, interventions and recommendations.   9/27/2024 1405 by Debbie Topete OT  Note: Limitation: Decreased endurance, Decreased self-care trans, Decreased high-level ADLs  Prognosis: Good  Assessment: Alexx is s/p R TKR on 9/17/24; receiving rehab on [facility] w/ anticipated DC home early next week. Transferred to medical 9/26/24 due to hyponatremia w/ a sodium level of 120. Pt w/ PMH significant for asthma, HTN, BPPV, DJD, GERD, anemia, and gout. Lives w/ mother in 2 SH w/ 2 EMEKA and no HR. Bathroom in basement w/ FF steps and no HR. (I) PLOF. Currently MI w/ ADLS,  toileting, and bed mobility. MI for  STS and S/MI for ADL txfs and functional mobility. Does endorse feeling dizzy at times and is being closely monitored due to sodium levels. Receiving IV's at this time. Reports general stiffness in R knee. Would benefit from OT to consistently perform ADLS (I)'ly and complete functional mobility w/ F+/G- balance. Pt tends to abandon RW and demonstrates decreased safety at times. Based on findings, Pt is of high complexity. Recommend minimal level resources (Home w/ therapy) once medically cleared.     Rehab Resource Intensity Level, OT: III (Minimum Resource Intensity)       9/27/2024 1404 by Debbie Topete OT  Note: Limitation: Decreased endurance, Decreased self-care trans, Decreased high-level ADLs  Prognosis: Good  Assessment: Alexx is s/p R TKR on 9/17/24; receiving rehab on [facility] w/ anticipated DC home early next week. Transferred to medical 9/26/24 due to  hyponatremia w/ a sodium level of 120. Pt w/ PMH significant for asthma, HTN, BPPV, DJD, GERD, anemia, and gout. Lives w/ mother in 2 SH w/ 2 EMEKA and no HR. Bathroom in basement w/ FF steps and no HR. (I) PLOF. Currently MI w/ ADLS,  toileting, and bed mobility. MI for  STS and S/MI for ADL txfs and functional mobility. Does endorse feeling dizzy at times and is being closely monitored due to sodium levels. Receiving IV's at this time. Reports general stiffness in R knee. Would benefit from OT to consistently perform ADLS (I)'ly and complete functional mobility w/ F+/G- balance. Pt tends to abandon RW and demonstrates decreased safety at times. Based on findings, Pt is of high complexity. Recommend minimal level resources (Home w/ therapy) once medically cleared.     Rehab Resource Intensity Level, OT: III (Minimum Resource Intensity)           yes

## 2024-10-22 NOTE — PROGRESS NOTES
Assessment/Plan:    No problem-specific Assessment & Plan notes found for this encounter.       There are no diagnoses linked to this encounter.           Subjective:          Patient ID: Alexx Haney is a 59 y.o. male.    Patient is here for follow-up after hospitalization he was seen for sinusitis and treated with antibiotic.  Also noted to have hyponatremia was started on sodium chloride 3 g daily along with 2 L of water daily.  Feeling better.        The following portions of the patient's history were reviewed and updated as appropriate: allergies, current medications, past family history, past medical history, past social history, past surgical history, and problem list.    Review of Systems   Constitutional:  Negative for fatigue and fever.   HENT:  Positive for congestion. Negative for ear discharge, ear pain, postnasal drip, sinus pressure, sore throat, tinnitus and trouble swallowing.    Eyes:  Negative for discharge, itching and visual disturbance.   Respiratory:  Negative for cough and shortness of breath.    Cardiovascular:  Negative for chest pain and palpitations.   Gastrointestinal:  Negative for abdominal pain, diarrhea, nausea and vomiting.   Endocrine: Negative for cold intolerance and polyuria.   Genitourinary:  Negative for difficulty urinating, dysuria and urgency.   Musculoskeletal:  Positive for arthralgias. Negative for neck pain.   Skin:  Negative for rash.   Allergic/Immunologic: Negative for environmental allergies.   Neurological:  Negative for dizziness, weakness and headaches.   Psychiatric/Behavioral:  The patient is not nervous/anxious.          Past Medical History:   Diagnosis Date    Acute bronchitis 01/29/2024    Anxiety 01/15/2019    Arthritis     Asthma     Chronic rhinitis     last assessed 2/21/14    Chronic serous otitis media     last assessed 11/20/13    Chronic sinusitis     last assessed 8/19/14    Functional heart murmur     GERD (gastroesophageal reflux disease)      Gout     Hearing problem     last assessed 12/1/16    Hiatal hernia     Hypercholesterolemia     Hypertension     Palpitations     Testicular hypogonadism     last assessed 10/4/13    V-tach ([facility])          Current Outpatient Medications:     acetaminophen (TYLENOL) 500 mg tablet, Take 2 tablets (1,000 mg total) by mouth every 8 (eight) hours 1500 at times (Patient taking differently: Take 1,000 mg by mouth every 8 (eight) hours), Disp: , Rfl:     albuterol (2.5 mg/3 mL) 0.083 % nebulizer solution, Take 3 mL (2.5 mg total) by nebulization every 6 (six) hours as needed for wheezing or shortness of breath, Disp: 240 mL, Rfl: 2    albuterol (PROVENTIL HFA,VENTOLIN HFA) 90 mcg/act inhaler, Inhale 1 puff if needed for wheezing or shortness of breath, Disp: 48 g, Rfl: 1    ascorbic acid (VITAMIN C) 500 MG tablet, Take 1 tablet (500 mg total) by mouth daily Begin 30 days prior to surgery., Disp: 30 tablet, Rfl: 1    aspirin 325 mg tablet, Take 650 mg by mouth daily, Disp: , Rfl:     clotrimazole-betamethasone (LOTRISONE) 1-0.05 % cream, Apply topically 2 (two) times a day, Disp: 90 g, Rfl: 1    esomeprazole (NexIUM) 40 MG capsule, Take 40 mg by mouth every morning before breakfast, Disp: , Rfl:     febuxostat (ULORIC) 40 mg tablet, Take 1 tablet (40 mg total) by mouth daily, Disp: 90 tablet, Rfl: 1    fluticasone-salmeterol (Advair) 500-50 mcg/dose inhaler, Inhale 1 puff 2 (two) times a day, Disp: , Rfl:     gabapentin (NEURONTIN) 100 mg capsule, Take 1 capsule (100 mg total) by mouth 3 (three) times a day (Patient taking differently: Take 100 mg by mouth 3 (three) times a day as needed), Disp: 90 capsule, Rfl: 0    meclizine (ANTIVERT) 12.5 MG tablet, Take 1 tablet (12.5 mg total) by mouth every 8 (eight) hours as needed for dizziness, Disp: , Rfl:     methocarbamol (ROBAXIN) 500 mg tablet, Take 250 mg by mouth every 8 (eight) hours as needed for muscle spasms, Disp: , Rfl:     montelukast (SINGULAIR) 10 mg tablet, Take 1  tablet (10 mg total) by mouth daily, Disp: , Rfl:     Multiple Vitamin (multivitamin) tablet, Take 1 tablet by mouth daily Begin 30 days prior to surgery., Disp: 30 tablet, Rfl: 1    sodium chloride 1 g tablet, Take 1 tablet (1 g total) by mouth 3 (three) times a day with meals, Disp: 90 tablet, Rfl: 1    Triamcinolone Acetonide (NASACORT ALLERGY 24HR NA), 1 spray into each nostril daily  , Disp: , Rfl:     verapamil (CALAN) 120 mg tablet, TAKE 1 TABLET BY MOUTH EVERY DAY, Disp: 90 tablet, Rfl: 3    benzonatate (TESSALON) 200 MG capsule, Take 1 capsule (200 mg total) by mouth 3 (three) times a day as needed for cough (Patient not taking: Reported on 10/22/2024), Disp: 20 capsule, Rfl: 0    Chlorpheniramine Maleate (CHLOR-TRIMETON ALLERGY PO), Take by mouth as needed (Patient not taking: Reported on 10/14/2024), Disp: , Rfl:     clotrimazole (LOTRIMIN) 1 % cream, Apply topically 2 (two) times a day for 21 days (Patient not taking: Reported on 10/22/2024), Disp: 60 g, Rfl: 3    colchicine (COLCRYS) 0.6 mg tablet, Take 1 tablet (0.6 mg total) by mouth daily (Patient not taking: Reported on 10/14/2024), Disp: 7 tablet, Rfl: 0    docusate sodium (COLACE) 100 mg capsule, Take 1 capsule (100 mg total) by mouth 2 (two) times a day (Patient not taking: Reported on 10/14/2024), Disp: 20 capsule, Rfl: 0    enoxaparin (Lovenox) 40 mg/0.4 mL, Inject 40 mg under the skin in the morning (Patient not taking: Reported on 10/14/2024), Disp: , Rfl:     ferrous sulfate 324 (65 Fe) mg, TAKE 1 TABLET (324 MG TOTAL) BY MOUTH DAILY BEFORE BREAKFAST BEGIN 30 DAYS PRIOR TO SURGERY. (Patient not taking: Reported on 10/14/2024), Disp: 90 tablet, Rfl: 1    folic acid (FOLVITE) 1 mg tablet, TAKE 1 TABLET (1 MG TOTAL) BY MOUTH DAILY BEGIN 30 DAYS PRIOR TO SURGERY. (Patient not taking: Reported on 10/14/2024), Disp: 90 tablet, Rfl: 1    ipratropium-albuterol (DUO-NEB) 0.5-2.5 mg/3 mL, Inhale 3 mL if needed (Patient not taking: Reported on  10/22/2024), Disp: , Rfl:     lisinopril (ZESTRIL) 10 mg tablet, Take 1 tablet by mouth every 24 hours, Disp: , Rfl:     Pseudoephedrine-guaiFENesin (GUAIFENESIN 600/ PO), Take 600 mg by mouth 2 (two) times a day (Patient not taking: Reported on 10/22/2024), Disp: , Rfl:     Allergies   Allergen Reactions    Penicillins Rash and Anaphylaxis    Acetazolamide      Other reaction(s): Stomach Ache    Cephalosporins Other (See Comments)     headache    Other     Sulfa Antibiotics Other (See Comments)     Category: Allergy; Annotation - [id number]: STOMACH UPSET    Famotidine Palpitations    Levofloxacin Palpitations    Omeprazole Palpitations     Painful urination       Social History   Past Surgical History:   Procedure Laterality Date    APPENDECTOMY      BICEPS TENDON REPAIR Left 2009    BICEPS TENODESIS      anesthesia for tenodesis- of ruptured long tendon of biceps     HERNIA REPAIR      KS ARTHRP KNE CONDYLE&PLATU MEDIAL&LAT COMPARTMENTS Right 9/17/2024    Procedure: ARTHROPLASTY KNEE TOTAL, potential same day discharge, cemented;  Surgeon: Parvin Whipple DO;  Location: [facility];  Service: Orthopedics    THYROIDECTOMY      TONSILLECTOMY       Family History   Problem Relation Age of Onset    Lung cancer Father     Coronary artery disease Father         blockages    Stroke Maternal Grandmother     Cancer Paternal Grandfather         metastasized    Heart disease Family     Lung cancer Cousin     No Known Problems Mother     No Known Problems Sister     No Known Problems Brother     No Known Problems Maternal Aunt     No Known Problems Maternal Uncle     No Known Problems Paternal Aunt     No Known Problems Paternal Uncle     No Known Problems Paternal Grandmother     ADD / ADHD Neg Hx     Anesthesia problems Neg Hx     Clotting disorder Neg Hx     Collagen disease Neg Hx     Diabetes Neg Hx     Dislocations Neg Hx     Learning disabilities Neg Hx     Neurological problems Neg Hx     Osteoporosis Neg  "Hx     Rheumatologic disease Neg Hx     Scoliosis Neg Hx     Vascular Disease Neg Hx        Objective:  /80 (BP Location: Left arm, Patient Position: Sitting, Cuff Size: Adult)   Pulse 88   Temp 98.6 °F (37 °C) (Temporal)   Ht 5' 9.5\" (1.765 m)   Wt 84.8 kg (187 lb)   SpO2 96% Comment: ra  BMI 27.22 kg/m²   Body mass index is 27.22 kg/m².     Physical Exam  Constitutional:       General: He is not in acute distress.     Appearance: He is well-developed.   HENT:      Head: Normocephalic.      Right Ear: External ear normal. There is no impacted cerumen.      Left Ear: External ear normal. There is no impacted cerumen.      Nose: No rhinorrhea.      Mouth/Throat:      Pharynx: No posterior oropharyngeal erythema.   Eyes:      General: No scleral icterus.     Pupils: Pupils are equal, round, and reactive to light.   Neck:      Thyroid: No thyromegaly.      Trachea: No tracheal deviation.   Cardiovascular:      Rate and Rhythm: Normal rate and regular rhythm.      Heart sounds: Normal heart sounds. No murmur heard.  Pulmonary:      Effort: Pulmonary effort is normal. No respiratory distress.      Breath sounds: Normal breath sounds.   Chest:      Chest wall: No tenderness.   Abdominal:      General: Bowel sounds are normal.      Palpations: Abdomen is soft. There is no mass.      Tenderness: There is no abdominal tenderness.   Musculoskeletal:         General: Normal range of motion.      Cervical back: Normal range of motion and neck supple. No rigidity.      Right lower leg: Edema present.      Left lower leg: No edema.   Lymphadenopathy:      Cervical: No cervical adenopathy.   Skin:     General: Skin is warm.      Findings: No erythema or rash.   Neurological:      Mental Status: He is alert and oriented to person, place, and time.      Cranial Nerves: No cranial nerve deficit.   Psychiatric:         Mood and Affect: Mood normal.         Behavior: Behavior normal.                   "

## 2024-10-22 NOTE — PROGRESS NOTES
PT Re-Evaluation     Today's date: 10/22/2024  Patient name: Alexx Haney  : 1965  MRN: [id number]  Referring provider: Christa Reyes PA-C  Dx:   Encounter Diagnosis     ICD-10-CM    1. Status post total knee replacement, right  Z96.651 Ambulatory Referral to Physical Therapy                       Assessment  Impairments: abnormal coordination, abnormal gait, abnormal or restricted ROM, activity intolerance, impaired balance, impaired physical strength and pain with function    Assessment details: Pt is a pleasant 59 y.o. male presenting to outpatient physical therapy with Status post total knee replacement, right  (primary encounter diagnosis) . Pt presents with pain, decreased range of motion, decreased strength, abnormal gait, and decreased tolerance to activity. Pt educated on nature of procedure, associated precautions, and expected rehabilitation progression. Issued patient HEP. Pt would benefit from skilled physical therapy to address limitations and to achieve goals. Thank you for this referral.   Understanding of Dx/Px/POC: good     Prognosis: good    Goals  ST. Patient will report 25% decrease in pain with ambulation in 4 weeks.  2. Patient will demonstrate 25% improvement in ROM in 4 weeks.  3. Patient will be able to ambulate 200ft with SPC independently in 4 weeks.    LT. Patient will be able to perform IADLS without restriction or pain by discharge.  2. Patient will be independent in HEP by discharge.  3. Patient will be able to return to recreational/work duties without restriction or pain by discharge.  4. Patient will be able to ambulate community distances without compensation by discharge.     Plan  Patient would benefit from: PT eval and skilled PT  Planned modality interventions: cryotherapy    Planned therapy interventions: IADL retraining, body mechanics training, flexibility, functional ROM exercises, home exercise program, neuromuscular re-education, manual therapy,  postural training, strengthening, stretching, therapeutic activities, therapeutic exercise, gait training, transfer training, self care, patient education, balance/weight bearing training and ADL retraining    Frequency: 2x week  Duration in weeks: 12  Treatment plan discussed with: patient      Subjective Evaluation    History of Present Illness  Mechanism of injury: 10/22/24  Pt comes to therapy 5-weeks s/p right TKA. States he had some complications following surgery, resulting in a brief rehab stint and treatment for gout. States he still has some right foot pain, but much less than before. Reports he is able to complete most of his ADLs independently. Some difficulty noted with walking (using SPC currently), getting off the toilet/performing sit to stand transfers. Reports he takes Gabapentin to aid with sleep. Numbness noted on occasion in plantar aspect of right foot. Follows up with surgeon on 24.     24  Pt comes to therapy for pre-operative TKA appointment consultation. Notes he has had long standing knee pain, which has led to the decision to have surgery. Reports he lives with his mother in a two-story home, with bedroom on second floor, but states he may try to arrange for first floor living after surgery. Reports he owns a SPC at present time. Notes he will be prescribed medications for pain and Aspirin for DVT prophylaxis. States his home has two steps to enter from the outside. Reports his brother will be able to assist with transportation.   Patient Goals  Patient goals for therapy: decreased pain, increased motion, increased strength and independence with ADLs/IADLs    Pain  Current pain rating: 3  At worst pain ratin      Objective     Active Range of Motion     Right Knee   Flexion: 133 degrees   Extensor lag: 3 degrees     Passive Range of Motion     Right Knee   Flexion: WFL  Extension: 3 degrees     Tests     Additional Tests Details  10/22/24  Increased lateral trunk sway, SPC,  decreased stance time on RLE   Incision is clean, dry, and negative for redness, drainage, or s/sx's of infection. Steristrips intact.               Precautions:   Patient Active Problem List   Diagnosis    Allergic rhinitis    Asthma    Essential hypertension    Benign paroxysmal positional vertigo    Chronic bilateral low back pain without sciatica    DJD (degenerative joint disease) of knee    GERD without esophagitis    Gout    Hyperlipidemia    Primary localized osteoarthritis of left knee    Primary localized osteoarthritis of right knee    Tenosynovitis of foot    Thyroid disorder    V-tach (HCC)    Anxiety    Chronic pain of both knees    Elevated LFTs    Epidermoid cyst    Dysfunction of left eustachian tube    Tinea corporis    Effusion of right knee    Bilateral primary osteoarthritis of knee    Hyponatremia    Hyperglycemia    Class 1 obesity due to excess calories without serious comorbidity with body mass index (BMI) of 30.0 to 30.9 in adult    History of total knee arthroplasty, right    Advance care planning    At risk for constipation    At risk for delirium    Acute post-operative pain    Primary osteoarthritis involving multiple joints    Other constipation    Leukemoid reaction    Acute blood loss as cause of postoperative anemia    Primary hypertension    Status post left knee replacement    Anemia    Right foot pain    Candidal skin infection      Allergies   Allergen Reactions    Penicillins Rash and Anaphylaxis    Acetazolamide      Other reaction(s): Stomach Ache    Cephalosporins Other (See Comments)     headache    Other     Sulfa Antibiotics Other (See Comments)     Category: Allergy; Annotation - [id number]: STOMACH UPSET    Famotidine Palpitations    Levofloxacin Palpitations    Omeprazole Palpitations     Painful urination        Daily Treatment Diary    Date 10/22            FOTO             Re-Eval IE               Manuals    PROM flex/ext             Patellar mobs             PROM  "hip                          Neuro Re-Ed                                                                                                Ther Ex                 Mini squats 5\"x10            Stand hip abd, ext 5\"x10 ea B            HR/TR 1x10 ea            Standing gastroc stretch             Knee flex/ext stretch on step                                       Quad sets              Heel slides             Bridges             SAQ                          Ther Activity    Bike  5'                         Gait Training                              Modalities    CP PRN                                   "

## 2024-10-28 ENCOUNTER — OFFICE VISIT (OUTPATIENT)
Dept: PHYSICAL THERAPY | Facility: REHABILITATION | Age: 59
End: 2024-10-28
Payer: MEDICARE

## 2024-10-28 DIAGNOSIS — Z96.651 STATUS POST TOTAL KNEE REPLACEMENT, RIGHT: Primary | ICD-10-CM

## 2024-10-28 PROCEDURE — 97110 THERAPEUTIC EXERCISES: CPT

## 2024-10-28 PROCEDURE — 97140 MANUAL THERAPY 1/> REGIONS: CPT

## 2024-10-28 PROCEDURE — 97530 THERAPEUTIC ACTIVITIES: CPT

## 2024-10-28 NOTE — PROGRESS NOTES
Daily Note     Today's date: 10/28/2024  Patient name: Alexx Haney  : 1965  MRN: [id number]  Referring provider: Christa Reyes PA-C  Dx:   Encounter Diagnosis     ICD-10-CM    1. Status post total knee replacement, right  Z96.651                      Subjective: pt reports stiffness in his knee after last visit especially more in the L knee than R knee.       Objective: See treatment diary below      Assessment: pt tolerated treatment well and without complaints. Good response with addition of exercises and manuals today. Patient demonstrated fatigue post treatment, exhibited good technique with therapeutic exercises, and would benefit from continued PT      Plan: Continue per plan of care.  Progress treatment as tolerated.       Precautions:   Patient Active Problem List   Diagnosis    Allergic rhinitis    Asthma    Essential hypertension    Benign paroxysmal positional vertigo    Chronic bilateral low back pain without sciatica    DJD (degenerative joint disease) of knee    GERD without esophagitis    Gout    Hyperlipidemia    Primary localized osteoarthritis of left knee    Primary localized osteoarthritis of right knee    Tenosynovitis of foot    Thyroid disorder    V-tach (HCC)    Anxiety    Chronic pain of both knees    Elevated LFTs    Epidermoid cyst    Dysfunction of left eustachian tube    Tinea corporis    Effusion of right knee    Bilateral primary osteoarthritis of knee    Hyponatremia    Hyperglycemia    Class 1 obesity due to excess calories without serious comorbidity with body mass index (BMI) of 30.0 to 30.9 in adult    History of total knee arthroplasty, right    Advance care planning    At risk for constipation    At risk for delirium    Acute post-operative pain    Primary osteoarthritis involving multiple joints    Other constipation    Leukemoid reaction    Acute blood loss as cause of postoperative anemia    Primary hypertension    Status post left knee replacement    Anemia     "Right foot pain    Candidal skin infection      Allergies   Allergen Reactions    Penicillins Rash and Anaphylaxis    Acetazolamide      Other reaction(s): Stomach Ache    Cephalosporins Other (See Comments)     headache    Other     Sulfa Antibiotics Other (See Comments)     Category: Allergy; Annotation - [id number]: STOMACH UPSET    Famotidine Palpitations    Levofloxacin Palpitations    Omeprazole Palpitations     Painful urination        Daily Treatment Diary    Date 10/22 10/28           FOTO             Re-Eval IE               Manuals    PROM flex/ext  TE           Patellar mobs  TE           PROM hip  TE                        Neuro Re-Ed                                                                                                Ther Ex                 Mini squats 5\"x10 5\"2x10           Stand hip abd, ext 5\"x10 ea B 5\"x10 ea B           HR/TR 1x10 ea 2x10 ea           Standing gastroc stretch  30\"x3           Knee flex/ext stretch on step  10\"x10 ea                                     Quad sets   5\"x10           Heel slides  10\"x10           Bridges             SAQ                          Ther Activity    Bike  5' 5'                        Gait Training                              Modalities    CP PRN  5'                                 "

## 2024-10-30 ENCOUNTER — LAB (OUTPATIENT)
Dept: LAB | Facility: IMAGING CENTER | Age: 59
End: 2024-10-30
Payer: MEDICARE

## 2024-10-30 DIAGNOSIS — E87.1 HYPONATREMIA: ICD-10-CM

## 2024-10-30 DIAGNOSIS — D64.9 ANEMIA, UNSPECIFIED TYPE: ICD-10-CM

## 2024-10-30 DIAGNOSIS — I10 ESSENTIAL HYPERTENSION: ICD-10-CM

## 2024-10-30 DIAGNOSIS — K21.9 GERD WITHOUT ESOPHAGITIS: ICD-10-CM

## 2024-10-30 DIAGNOSIS — I10 PRIMARY HYPERTENSION: ICD-10-CM

## 2024-10-30 LAB
ANION GAP SERPL CALCULATED.3IONS-SCNC: 10 MMOL/L (ref 4–13)
BUN SERPL-MCNC: 14 MG/DL (ref 5–25)
CALCIUM SERPL-MCNC: 9.3 MG/DL (ref 8.4–10.2)
CHLORIDE SERPL-SCNC: 103 MMOL/L (ref 96–108)
CO2 SERPL-SCNC: 25 MMOL/L (ref 21–32)
CREAT SERPL-MCNC: 1.02 MG/DL (ref 0.6–1.3)
ERYTHROCYTE [DISTWIDTH] IN BLOOD BY AUTOMATED COUNT: 14.9 % (ref 11.6–15.1)
GFR SERPL CREATININE-BSD FRML MDRD: 80 ML/MIN/1.73SQ M
GLUCOSE SERPL-MCNC: 96 MG/DL (ref 65–140)
HCT VFR BLD AUTO: 38 % (ref 36.5–49.3)
HGB BLD-MCNC: 12.2 G/DL (ref 12–17)
MCH RBC QN AUTO: 30.5 PG (ref 26.8–34.3)
MCHC RBC AUTO-ENTMCNC: 32.1 G/DL (ref 31.4–37.4)
MCV RBC AUTO: 95 FL (ref 82–98)
PLATELET # BLD AUTO: 310 THOUSANDS/UL (ref 149–390)
PMV BLD AUTO: 8.8 FL (ref 8.9–12.7)
POTASSIUM SERPL-SCNC: 3.8 MMOL/L (ref 3.5–5.3)
RBC # BLD AUTO: 4 MILLION/UL (ref 3.88–5.62)
SODIUM SERPL-SCNC: 138 MMOL/L (ref 135–147)
WBC # BLD AUTO: 6.27 THOUSAND/UL (ref 4.31–10.16)

## 2024-10-30 PROCEDURE — 80048 BASIC METABOLIC PNL TOTAL CA: CPT

## 2024-10-30 PROCEDURE — 36415 COLL VENOUS BLD VENIPUNCTURE: CPT

## 2024-10-30 PROCEDURE — 85027 COMPLETE CBC AUTOMATED: CPT

## 2024-10-31 ENCOUNTER — APPOINTMENT (OUTPATIENT)
Dept: PHYSICAL THERAPY | Facility: REHABILITATION | Age: 59
End: 2024-10-31
Payer: MEDICARE

## 2024-11-01 ENCOUNTER — OFFICE VISIT (OUTPATIENT)
Dept: OBGYN CLINIC | Facility: MEDICAL CENTER | Age: 59
End: 2024-11-01
Payer: MEDICARE

## 2024-11-01 VITALS
DIASTOLIC BLOOD PRESSURE: 77 MMHG | SYSTOLIC BLOOD PRESSURE: 136 MMHG | WEIGHT: 192.4 LBS | BODY MASS INDEX: 27.54 KG/M2 | HEIGHT: 70 IN | HEART RATE: 86 BPM

## 2024-11-01 DIAGNOSIS — M17.12 PRIMARY OSTEOARTHRITIS OF LEFT KNEE: ICD-10-CM

## 2024-11-01 DIAGNOSIS — G89.29 CHRONIC PAIN OF LEFT KNEE: ICD-10-CM

## 2024-11-01 DIAGNOSIS — Z96.651 STATUS POST TOTAL KNEE REPLACEMENT, RIGHT: Primary | ICD-10-CM

## 2024-11-01 DIAGNOSIS — M25.562 CHRONIC PAIN OF LEFT KNEE: ICD-10-CM

## 2024-11-01 PROCEDURE — 20610 DRAIN/INJ JOINT/BURSA W/O US: CPT | Performed by: PHYSICIAN ASSISTANT

## 2024-11-01 PROCEDURE — 99024 POSTOP FOLLOW-UP VISIT: CPT | Performed by: PHYSICIAN ASSISTANT

## 2024-11-01 RX ORDER — TRIAMCINOLONE ACETONIDE 40 MG/ML
40 INJECTION, SUSPENSION INTRA-ARTICULAR; INTRAMUSCULAR
Status: COMPLETED | OUTPATIENT
Start: 2024-11-01 | End: 2024-11-01

## 2024-11-01 RX ORDER — BUPIVACAINE HYDROCHLORIDE 2.5 MG/ML
2 INJECTION, SOLUTION INFILTRATION; PERINEURAL
Status: COMPLETED | OUTPATIENT
Start: 2024-11-01 | End: 2024-11-01

## 2024-11-01 RX ADMIN — BUPIVACAINE HYDROCHLORIDE 2 ML: 2.5 INJECTION, SOLUTION INFILTRATION; PERINEURAL at 13:00

## 2024-11-01 RX ADMIN — TRIAMCINOLONE ACETONIDE 40 MG: 40 INJECTION, SUSPENSION INTRA-ARTICULAR; INTRAMUSCULAR at 13:00

## 2024-11-01 NOTE — PROGRESS NOTES
Message left.  Recent sodium level normal at 138.  Would recommend decreasing sodium chloride tablets to 1 twice a day with repeat BMP in 2 weeks.

## 2024-11-01 NOTE — PROGRESS NOTES
Assessment/Plan:  1. Status post total knee replacement, right    2. Primary osteoarthritis of left knee    3. Chronic pain of left knee      Orders Placed This Encounter   Procedures    Large joint arthrocentesis       Patient is doing well 6 weeks status post R TKA on 9/17/24. He also has severe left knee osteoarthritis.   Continue PT. May transition to home exercises once comfortable.   Pain control prn- tylenonl  Patient should call ahead for abx prior to dental appts.   Patient received left knee steroid injection today. Tolerated the procedure well. Post injection instructions reviewed.   Patient aware he can have L TKA 3 months after left knee CSI. He will consider surgical dates and call the surgery schedulers if he would like to select a date.     Return in about 4 weeks (around 11/29/2024).    I answered all of the patient's questions during the visit and provided education of the patient's condition during the visit.  The patient verbalized understanding of the information given and agrees with the plan.  This note was dictated using Compu[employer] software.  It may contain errors including improperly dictated words.  Please contact physician directly for any questions.    Subjective   Chief Complaint:   Chief Complaint   Patient presents with    Right Knee - Post-op    Left Knee - Follow-up, Pain       Alexx Haney is a 59 y.o. male who presents for 6 week follow up s/p right TKA.  Patient states his right knee is doing well. He has minimal pain but does get some soreness after his PT sessions. Patient notes he has persistent left knee pain which is worsening over time. He had a left knee CSI last in June and would like to repeat this today. He is considering L TKA next spring. He states he has follow up with his PCP after having sinusitis and hyponatremia and is doing better.     Review of Systems  ROS:    See HPI for musculoskeletal review.   All other systems reviewed are negative     History:  Past  Medical History:   Diagnosis Date    Acute bronchitis 01/29/2024    Anxiety 01/15/2019    Arthritis     Asthma     Chronic rhinitis     last assessed 2/21/14    Chronic serous otitis media     last assessed 11/20/13    Chronic sinusitis     last assessed 8/19/14    Functional heart murmur     GERD (gastroesophageal reflux disease)     Gout     Hearing problem     last assessed 12/1/16    Hiatal hernia     Hypercholesterolemia     Hypertension     Palpitations     Testicular hypogonadism     last assessed 10/4/13    V-tach (HCC)      Past Surgical History:   Procedure Laterality Date    APPENDECTOMY      BICEPS TENDON REPAIR Left 2009    BICEPS TENODESIS      anesthesia for tenodesis- of ruptured long tendon of biceps     HERNIA REPAIR      [place] ARTHRP KNE CONDYLE&PLATU MEDIAL&LAT COMPARTMENTS Right 9/17/2024    Procedure: ARTHROPLASTY KNEE TOTAL, potential same day discharge, cemented;  Surgeon: Parvin Whipple DO;  Location:  MAIN OR;  Service: Orthopedics    THYROIDECTOMY      TONSILLECTOMY       Social History   Social History     Substance and Sexual Activity   Alcohol Use Yes    Alcohol/week: 12.0 standard drinks of alcohol    Types: 12 Shots of liquor per week    Comment: occasionally; socially     Social History     Substance and Sexual Activity   Drug Use No     Social History     Tobacco Use   Smoking Status Never   Smokeless Tobacco Never     Family History:   Family History   Problem Relation Age of Onset    Lung cancer Father     Coronary artery disease Father         blockages    Stroke Maternal Grandmother     Cancer Paternal Grandfather         metastasized    Heart disease Family     Lung cancer Cousin     No Known Problems Mother     No Known Problems Sister     No Known Problems Brother     No Known Problems Maternal Aunt     No Known Problems Maternal Uncle     No Known Problems Paternal Aunt     No Known Problems Paternal Uncle     No Known Problems Paternal Grandmother     ADD / ADHD Neg  Hx     Anesthesia problems Neg Hx     Clotting disorder Neg Hx     Collagen disease Neg Hx     Diabetes Neg Hx     Dislocations Neg Hx     Learning disabilities Neg Hx     Neurological problems Neg Hx     Osteoporosis Neg Hx     Rheumatologic disease Neg Hx     Scoliosis Neg Hx     Vascular Disease Neg Hx        Current Outpatient Medications on File Prior to Visit   Medication Sig Dispense Refill    acetaminophen (TYLENOL) 500 mg tablet Take 2 tablets (1,000 mg total) by mouth every 8 (eight) hours 1500 at times (Patient taking differently: Take 1,000 mg by mouth every 8 (eight) hours)      albuterol (2.5 mg/3 mL) 0.083 % nebulizer solution Take 3 mL (2.5 mg total) by nebulization every 6 (six) hours as needed for wheezing or shortness of breath 240 mL 2    albuterol (PROVENTIL HFA,VENTOLIN HFA) 90 mcg/act inhaler Inhale 1 puff if needed for wheezing or shortness of breath 48 g 1    ascorbic acid (VITAMIN C) 500 MG tablet Take 1 tablet (500 mg total) by mouth daily Begin 30 days prior to surgery. 30 tablet 1    aspirin 325 mg tablet Take 650 mg by mouth daily      benzonatate (TESSALON) 200 MG capsule Take 1 capsule (200 mg total) by mouth 3 (three) times a day as needed for cough (Patient not taking: Reported on 10/22/2024) 20 capsule 0    Chlorpheniramine Maleate (CHLOR-TRIMETON ALLERGY PO) Take by mouth as needed (Patient not taking: Reported on 10/14/2024)      clotrimazole (LOTRIMIN) 1 % cream Apply topically 2 (two) times a day for 21 days (Patient not taking: Reported on 10/22/2024) 60 g 3    clotrimazole-betamethasone (LOTRISONE) 1-0.05 % cream Apply topically 2 (two) times a day 90 g 1    colchicine (COLCRYS) 0.6 mg tablet Take 1 tablet (0.6 mg total) by mouth daily (Patient not taking: Reported on 10/14/2024) 7 tablet 0    docusate sodium (COLACE) 100 mg capsule Take 1 capsule (100 mg total) by mouth 2 (two) times a day (Patient not taking: Reported on 10/14/2024) 20 capsule 0    enoxaparin (Lovenox) 40  mg/0.4 mL Inject 40 mg under the skin in the morning (Patient not taking: Reported on 10/14/2024)      esomeprazole (NexIUM) 40 MG capsule Take 40 mg by mouth every morning before breakfast      febuxostat (ULORIC) 40 mg tablet Take 1 tablet (40 mg total) by mouth daily 90 tablet 1    ferrous sulfate 324 (65 Fe) mg TAKE 1 TABLET (324 MG TOTAL) BY MOUTH DAILY BEFORE BREAKFAST BEGIN 30 DAYS PRIOR TO SURGERY. (Patient not taking: Reported on 10/14/2024) 90 tablet 1    fluticasone-salmeterol (Advair) 500-50 mcg/dose inhaler Inhale 1 puff 2 (two) times a day      folic acid (FOLVITE) 1 mg tablet TAKE 1 TABLET (1 MG TOTAL) BY MOUTH DAILY BEGIN 30 DAYS PRIOR TO SURGERY. (Patient not taking: Reported on 10/14/2024) 90 tablet 1    gabapentin (NEURONTIN) 100 mg capsule Take 1 capsule (100 mg total) by mouth 3 (three) times a day (Patient taking differently: Take 100 mg by mouth 3 (three) times a day as needed) 90 capsule 0    ipratropium-albuterol (DUO-NEB) 0.5-2.5 mg/3 mL Inhale 3 mL if needed (Patient not taking: Reported on 10/22/2024)      meclizine (ANTIVERT) 12.5 MG tablet Take 1 tablet (12.5 mg total) by mouth every 8 (eight) hours as needed for dizziness      methocarbamol (ROBAXIN) 500 mg tablet Take 250 mg by mouth every 8 (eight) hours as needed for muscle spasms      montelukast (SINGULAIR) 10 mg tablet Take 1 tablet (10 mg total) by mouth daily      Multiple Vitamin (multivitamin) tablet Take 1 tablet by mouth daily Begin 30 days prior to surgery. 30 tablet 1    Pseudoephedrine-guaiFENesin (GUAIFENESIN 600/ PO) Take 600 mg by mouth 2 (two) times a day (Patient not taking: Reported on 10/22/2024)      sodium chloride 1 g tablet Take 1 tablet (1 g total) by mouth 3 (three) times a day with meals 90 tablet 1    Triamcinolone Acetonide (NASACORT ALLERGY 24HR NA) 1 spray into each nostril daily        verapamil (CALAN) 120 mg tablet TAKE 1 TABLET BY MOUTH EVERY DAY 90 tablet 3     No current  "facility-administered medications on file prior to visit.     Allergies   Allergen Reactions    Penicillins Rash and Anaphylaxis    Acetazolamide      Other reaction(s): Stomach Ache    Cephalosporins Other (See Comments)     headache    Other     Sulfa Antibiotics Other (See Comments)     Category: Allergy; Annotation - [id number]: STOMACH UPSET    Famotidine Palpitations    Levofloxacin Palpitations    Omeprazole Palpitations     Painful urination        Objective     /77   Pulse 86   Ht 5' 9.5\" (1.765 m)   Wt 87.3 kg (192 lb 6.4 oz)   BMI 28.01 kg/m²      PE:  AAOx 3  WDWN  Hearing intact, no drainage from eyes  no audible wheezing  no abdominal distension  LE compartments soft, AT/GS intact    Ortho Exam:  right Knee:   INC: healed, No erythema, mild swelling  AROM: 0 - 125 degrees    Left knee:  Varus deformity  ROM 3- 120  Stable to varus and valgus stress      Large joint arthrocentesis: L knee  Universal Protocol:  procedure performed by consultantConsent: Verbal consent obtained.  Risks and benefits: risks, benefits and alternatives were discussed  Consent given by: patient  Site marked: the operative site was marked  Supporting Documentation  Indications: pain   Procedure Details  Location: knee - L knee  Preparation: Patient was prepped and draped in the usual sterile fashion  Needle size: 22 G  Ultrasound guidance: no  Approach: superior  Medications administered: 2 mL bupivacaine 0.25 %; 40 mg triamcinolone acetonide 40 mg/mL    Patient tolerance: patient tolerated the procedure well with no immediate complications  Dressing:  Sterile dressing applied             "

## 2024-11-04 ENCOUNTER — OFFICE VISIT (OUTPATIENT)
Dept: PHYSICAL THERAPY | Facility: REHABILITATION | Age: 59
End: 2024-11-04
Payer: MEDICARE

## 2024-11-04 DIAGNOSIS — Z96.651 STATUS POST TOTAL KNEE REPLACEMENT, RIGHT: Primary | ICD-10-CM

## 2024-11-04 PROCEDURE — 97110 THERAPEUTIC EXERCISES: CPT

## 2024-11-04 PROCEDURE — 97530 THERAPEUTIC ACTIVITIES: CPT

## 2024-11-04 PROCEDURE — 97140 MANUAL THERAPY 1/> REGIONS: CPT

## 2024-11-04 NOTE — PROGRESS NOTES
Daily Note     Today's date: 2024  Patient name: Alexx Haney  : 1965  MRN: [id number]  Referring provider: Christa Reyes PA-C  Dx:   Encounter Diagnosis     ICD-10-CM    1. Status post total knee replacement, right  Z96.651                      Subjective: pt reports receiving an injection in L knee on Friday and has stopped using his brace because he feels it doesn't fit right. He offered no significant complaints       Objective: See treatment diary below      Assessment: pt tolerated treatment well and without complaints. Good response with addition of exercises and manuals today. Patient demonstrated fatigue post treatment, exhibited good technique with therapeutic exercises, and would benefit from continued PT      Plan: Continue per plan of care.  Progress treatment as tolerated.       No Authorization Required   Billing Guidelines CMS    Copay/Co-Insurance  $0   Visit Limit  N/A     Precautions:   Patient Active Problem List   Diagnosis    Allergic rhinitis    Asthma    Essential hypertension    Benign paroxysmal positional vertigo    Chronic bilateral low back pain without sciatica    DJD (degenerative joint disease) of knee    GERD without esophagitis    Gout    Hyperlipidemia    Primary localized osteoarthritis of left knee    Primary localized osteoarthritis of right knee    Tenosynovitis of foot    Thyroid disorder    V-tach (HCC)    Anxiety    Chronic pain of both knees    Elevated LFTs    Epidermoid cyst    Dysfunction of left eustachian tube    Tinea corporis    Effusion of right knee    Bilateral primary osteoarthritis of knee    Hyponatremia    Hyperglycemia    Class 1 obesity due to excess calories without serious comorbidity with body mass index (BMI) of 30.0 to 30.9 in adult    History of total knee arthroplasty, right    Advance care planning    At risk for constipation    At risk for delirium    Acute post-operative pain    Primary osteoarthritis involving multiple joints     "Other constipation    Leukemoid reaction    Acute blood loss as cause of postoperative anemia    Primary hypertension    Status post left knee replacement    Anemia    Right foot pain    Candidal skin infection      Allergies   Allergen Reactions    Penicillins Rash and Anaphylaxis    Acetazolamide      Other reaction(s): Stomach Ache    Cephalosporins Other (See Comments)     headache    Other     Sulfa Antibiotics Other (See Comments)     Category: Allergy; Annotation - [id number]: STOMACH UPSET    Famotidine Palpitations    Levofloxacin Palpitations    Omeprazole Palpitations     Painful urination        Daily Treatment Diary    Date 10/22 10/28 11/4          FOTO             Re-Eval IE               Manuals    PROM flex/ext  TE TE          Patellar mobs  TE TE          PROM hip  TE TE                       Neuro Re-Ed                                                                                                Ther Ex                 Mini squats 5\"x10 5\"2x10 5\"2x10          Stand hip abd, ext 5\"x10 ea B 5\"x10 ea B 5\"x10 ea          HR/TR 1x10 ea 2x10 ea 2x10 ea          Standing gastroc stretch  30\"x3 30\"x3           Knee flex/ext stretch on step  10\"x10 ea 10\"x10 ea                                    Quad sets   5\"x10           Heel slides  10\"x10           Bridges             SAQ   5\"x10                       Ther Activity    Bike  5' 5' 5'                       Gait Training                              Modalities    CP PRN  5'                                 "

## 2024-11-05 ENCOUNTER — TELEPHONE (OUTPATIENT)
Dept: NEPHROLOGY | Facility: CLINIC | Age: 59
End: 2024-11-05

## 2024-11-05 DIAGNOSIS — I10 PRIMARY HYPERTENSION: Primary | ICD-10-CM

## 2024-11-05 DIAGNOSIS — I10 ESSENTIAL HYPERTENSION: ICD-10-CM

## 2024-11-05 NOTE — TELEPHONE ENCOUNTER
----- Message from Nahid Nobles, DO sent at 11/5/2024  3:19 PM EST -----  I have tried multiple times to reach patient.  Unfortunately no answer.  Message was previously left without return phone call.  I wanted to confirm what he is currently taking in regards to his sodium chloride tablets.  His most recent sodium level was within normal range.  Thank you.

## 2024-11-06 NOTE — TELEPHONE ENCOUNTER
Patient called back, he said he received Dr Nobles's message and is taking the 2000 mg per day. He said that some days he has drank more that 2L of fluids.  He is asking when should he have labs next?  Please advise and call patient.

## 2024-11-07 ENCOUNTER — OFFICE VISIT (OUTPATIENT)
Dept: PHYSICAL THERAPY | Facility: REHABILITATION | Age: 59
End: 2024-11-07
Payer: MEDICARE

## 2024-11-07 DIAGNOSIS — Z96.651 STATUS POST TOTAL KNEE REPLACEMENT, RIGHT: Primary | ICD-10-CM

## 2024-11-07 PROCEDURE — 97140 MANUAL THERAPY 1/> REGIONS: CPT | Performed by: PHYSICAL THERAPIST

## 2024-11-07 PROCEDURE — 97110 THERAPEUTIC EXERCISES: CPT | Performed by: PHYSICAL THERAPIST

## 2024-11-07 NOTE — TELEPHONE ENCOUNTER
Called patient and left a voicemail stating the following information:    Please repeat a BMP Monday of next week.     I asked the patient please call back with further questions.   Labs have been added to the patients chart.

## 2024-11-07 NOTE — PROGRESS NOTES
Daily Note     Today's date: 2024  Patient name: Alexx Haney  : 1965  MRN: [id number]  Referring provider: Christa Reyes PA-C  Dx:   Encounter Diagnosis     ICD-10-CM    1. Status post total knee replacement, right  Z96.651                      Subjective: Pt comes to therapy denying pain or discomfort. Denies discomfort following last treatment session. States his left knee has been more bothersome than his right.      Objective: See treatment diary below      Assessment: Tolerated treatment well. Challenged with PREs. Cues for set up. Patient exhibited good technique with therapeutic exercises and would benefit from continued PT      Plan: Progress treatment as tolerated.       No Authorization Required   Billing Guidelines CMS    Copay/Co-Insurance  $0   Visit Limit  N/A     Precautions:   Patient Active Problem List   Diagnosis    Allergic rhinitis    Asthma    Essential hypertension    Benign paroxysmal positional vertigo    Chronic bilateral low back pain without sciatica    DJD (degenerative joint disease) of knee    GERD without esophagitis    Gout    Hyperlipidemia    Primary localized osteoarthritis of left knee    Primary localized osteoarthritis of right knee    Tenosynovitis of foot    Thyroid disorder    V-tach (HCC)    Anxiety    Chronic pain of both knees    Elevated LFTs    Epidermoid cyst    Dysfunction of left eustachian tube    Tinea corporis    Effusion of right knee    Bilateral primary osteoarthritis of knee    Hyponatremia    Hyperglycemia    Class 1 obesity due to excess calories without serious comorbidity with body mass index (BMI) of 30.0 to 30.9 in adult    History of total knee arthroplasty, right    Advance care planning    At risk for constipation    At risk for delirium    Acute post-operative pain    Primary osteoarthritis involving multiple joints    Other constipation    Leukemoid reaction    Acute blood loss as cause of postoperative anemia    Primary  "hypertension    Status post left knee replacement    Anemia    Right foot pain    Candidal skin infection      Allergies   Allergen Reactions    Penicillins Rash and Anaphylaxis    Acetazolamide      Other reaction(s): Stomach Ache    Cephalosporins Other (See Comments)     headache    Other     Sulfa Antibiotics Other (See Comments)     Category: Allergy; Annotation - [id number]: STOMACH UPSET    Famotidine Palpitations    Levofloxacin Palpitations    Omeprazole Palpitations     Painful urination        Daily Treatment Diary    Date 10/22 10/28 11/4 11/7         FOTO             Re-Eval IE               Manuals    PROM flex/ext  TE TE CRISPIN         Patellar mobs  TE TE CRISPIN         PROM hip  TE TE CRISPIN                      Neuro Re-Ed                                                                                                Ther Ex                 Mini squats 5\"x10 5\"2x10 5\"2x10 5\" 2x10         Stand hip abd, ext 5\"x10 ea B 5\"x10 ea B 5\"x10 ea 5\" 2x10 ea B         HR/TR 1x10 ea 2x10 ea 2x10 ea          Standing gastroc stretch  30\"x3 30\"x3           Knee flex/ext stretch on step  10\"x10 ea 10\"x10 ea 10\"x10 ea         LAQ    5\" 2x10                      Quad sets   5\"x10           Heel slides  10\"x10           Bridges    5\" 2x10         SAQ   5\"x10                       Ther Activity    Bike  5' 5' 5' 5'                      Gait Training                              Modalities    CP PRN  5'                                   "

## 2024-11-11 ENCOUNTER — OFFICE VISIT (OUTPATIENT)
Dept: PHYSICAL THERAPY | Facility: REHABILITATION | Age: 59
End: 2024-11-11
Payer: MEDICARE

## 2024-11-11 ENCOUNTER — APPOINTMENT (OUTPATIENT)
Dept: LAB | Facility: IMAGING CENTER | Age: 59
End: 2024-11-11
Payer: MEDICARE

## 2024-11-11 DIAGNOSIS — I10 ESSENTIAL HYPERTENSION: ICD-10-CM

## 2024-11-11 DIAGNOSIS — Z96.651 STATUS POST TOTAL KNEE REPLACEMENT, RIGHT: Primary | ICD-10-CM

## 2024-11-11 DIAGNOSIS — I10 PRIMARY HYPERTENSION: ICD-10-CM

## 2024-11-11 LAB
ANION GAP SERPL CALCULATED.3IONS-SCNC: 8 MMOL/L (ref 4–13)
BUN SERPL-MCNC: 19 MG/DL (ref 5–25)
CALCIUM SERPL-MCNC: 9.4 MG/DL (ref 8.4–10.2)
CHLORIDE SERPL-SCNC: 99 MMOL/L (ref 96–108)
CO2 SERPL-SCNC: 29 MMOL/L (ref 21–32)
CREAT SERPL-MCNC: 0.87 MG/DL (ref 0.6–1.3)
GFR SERPL CREATININE-BSD FRML MDRD: 95 ML/MIN/1.73SQ M
GLUCOSE SERPL-MCNC: 92 MG/DL (ref 65–140)
POTASSIUM SERPL-SCNC: 4.1 MMOL/L (ref 3.5–5.3)
SODIUM SERPL-SCNC: 136 MMOL/L (ref 135–147)

## 2024-11-11 PROCEDURE — 97140 MANUAL THERAPY 1/> REGIONS: CPT

## 2024-11-11 PROCEDURE — 36415 COLL VENOUS BLD VENIPUNCTURE: CPT

## 2024-11-11 PROCEDURE — 80048 BASIC METABOLIC PNL TOTAL CA: CPT

## 2024-11-11 PROCEDURE — 97110 THERAPEUTIC EXERCISES: CPT

## 2024-11-11 NOTE — PROGRESS NOTES
Daily Note     Today's date: 2024  Patient name: Alexx Haney  : 1965  MRN: [id number]  Referring provider: Christa Reyes PA-C  Dx:   Encounter Diagnosis     ICD-10-CM    1. Status post total knee replacement, right  Z96.651                      Subjective: Patient noted crepitus in L knee especially noticeable with performing squats. Patient noted no pain R knee currently.        Objective: See treatment diary below      Assessment: Tolerated treatment fair. VC for correction of form with exercises listed. Patient performed listed exercises. Patient noted pain with mini squats VC for patient to perform to his tolerance. Patient would benefit from continued PT      Plan: Continue per plan of care.      No Authorization Required   Billing Guidelines CMS    Copay/Co-Insurance  $0   Visit Limit  N/A     Precautions:   Patient Active Problem List   Diagnosis    Allergic rhinitis    Asthma    Essential hypertension    Benign paroxysmal positional vertigo    Chronic bilateral low back pain without sciatica    DJD (degenerative joint disease) of knee    GERD without esophagitis    Gout    Hyperlipidemia    Primary localized osteoarthritis of left knee    Primary localized osteoarthritis of right knee    Tenosynovitis of foot    Thyroid disorder    V-tach (HCC)    Anxiety    Chronic pain of both knees    Elevated LFTs    Epidermoid cyst    Dysfunction of left eustachian tube    Tinea corporis    Effusion of right knee    Bilateral primary osteoarthritis of knee    Hyponatremia    Hyperglycemia    Class 1 obesity due to excess calories without serious comorbidity with body mass index (BMI) of 30.0 to 30.9 in adult    History of total knee arthroplasty, right    Advance care planning    At risk for constipation    At risk for delirium    Acute post-operative pain    Primary osteoarthritis involving multiple joints    Other constipation    Leukemoid reaction    Acute blood loss as cause of postoperative  "anemia    Primary hypertension    Status post left knee replacement    Anemia    Right foot pain    Candidal skin infection      Allergies   Allergen Reactions    Penicillins Rash and Anaphylaxis    Acetazolamide      Other reaction(s): Stomach Ache    Cephalosporins Other (See Comments)     headache    Other     Sulfa Antibiotics Other (See Comments)     Category: Allergy; Annotation - [id number]: STOMACH UPSET    Famotidine Palpitations    Levofloxacin Palpitations    Omeprazole Palpitations     Painful urination        Daily Treatment Diary    Date 10/22 10/28 11/4 11/7 11/11        FOTO             Re-Eval IE               Manuals    PROM flex/ext  TE TE CRISPIN AC        Patellar mobs  TE TE CRISPIN AC        PROM hip  TE TE CRISPIN AC                     Neuro Re-Ed                                                                                                Ther Ex                 Mini squats 5\"x10 5\"2x10 5\"2x10 5\" 2x10 5\" 2 x 10        Stand hip abd, ext 5\"x10 ea B 5\"x10 ea B 5\"x10 ea 5\" 2x10 ea B 5\" 2 x 10 ea B         HR/TR 1x10 ea 2x10 ea 2x10 ea  2 x 10 ea        Standing gastroc stretch  30\"x3 30\"x3           Knee flex/ext stretch on step  10\"x10 ea 10\"x10 ea 10\"x10 ea 10\" x 10 ea         LAQ    5\" 2x10 5\" 2 x 10                     Quad sets   5\"x10           Heel slides  10\"x10           Bridges    5\" 2x10 5\" 2 x 10         SAQ   5\"x10                       Ther Activity    Bike  5' 5' 5' 5' 5'                      Gait Training                              Modalities    CP PRN  5'                                     "

## 2024-11-12 ENCOUNTER — TELEPHONE (OUTPATIENT)
Dept: NEPHROLOGY | Facility: CLINIC | Age: 59
End: 2024-11-12

## 2024-11-12 DIAGNOSIS — E87.1 HYPONATREMIA: Primary | ICD-10-CM

## 2024-11-12 NOTE — TELEPHONE ENCOUNTER
Recently called patient to review laboratory studies.  Sodium level remained stable at 136.  Okay to stop sodium chloride tablets altogether.  Recommend repeat BMP in 2 weeks.  He is to call if there is any questions or concerns.

## 2024-11-13 NOTE — TELEPHONE ENCOUNTER
Patient calling back, relayed the above message he states he will stop the sodium. He asked what he should do if he develops symptoms of low sodium. Instructed him to call  the office if that happens.  No further action needed

## 2024-11-14 ENCOUNTER — OFFICE VISIT (OUTPATIENT)
Dept: PHYSICAL THERAPY | Facility: REHABILITATION | Age: 59
End: 2024-11-14
Payer: MEDICARE

## 2024-11-14 DIAGNOSIS — Z96.651 STATUS POST TOTAL KNEE REPLACEMENT, RIGHT: Primary | ICD-10-CM

## 2024-11-14 PROCEDURE — 97110 THERAPEUTIC EXERCISES: CPT

## 2024-11-14 PROCEDURE — 97530 THERAPEUTIC ACTIVITIES: CPT

## 2024-11-14 PROCEDURE — 97140 MANUAL THERAPY 1/> REGIONS: CPT

## 2024-11-14 NOTE — PROGRESS NOTES
Daily Note     Today's date: 2024  Patient name: Alexx Haney  : 1965  MRN: [id number]  Referring provider: Christa Reyes PA-C  Dx:   Encounter Diagnosis     ICD-10-CM    1. Status post total knee replacement, right  Z96.651                      Subjective: pt offered no new complaints. Pt wishes to make this his last visit.      Objective: See treatment diary below      Assessment: Pt tolerated treatment well. Patient demonstrated fatigue post treatment and exhibited good technique with therapeutic exercises. Issued/reviewed updated HEP and instructed to perform 3x/week.      Plan:  pt currently discharged to be independent in HEP       No Authorization Required   Billing Guidelines CMS    Copay/Co-Insurance  $0   Visit Limit  N/A     Precautions:   Patient Active Problem List   Diagnosis    Allergic rhinitis    Asthma    Essential hypertension    Benign paroxysmal positional vertigo    Chronic bilateral low back pain without sciatica    DJD (degenerative joint disease) of knee    GERD without esophagitis    Gout    Hyperlipidemia    Primary localized osteoarthritis of left knee    Primary localized osteoarthritis of right knee    Tenosynovitis of foot    Thyroid disorder    V-tach (HCC)    Anxiety    Chronic pain of both knees    Elevated LFTs    Epidermoid cyst    Dysfunction of left eustachian tube    Tinea corporis    Effusion of right knee    Bilateral primary osteoarthritis of knee    Hyponatremia    Hyperglycemia    Class 1 obesity due to excess calories without serious comorbidity with body mass index (BMI) of 30.0 to 30.9 in adult    History of total knee arthroplasty, right    Advance care planning    At risk for constipation    At risk for delirium    Acute post-operative pain    Primary osteoarthritis involving multiple joints    Other constipation    Leukemoid reaction    Acute blood loss as cause of postoperative anemia    Primary hypertension    Status post left knee replacement     "Anemia    Right foot pain    Candidal skin infection      Allergies   Allergen Reactions    Penicillins Rash and Anaphylaxis    Acetazolamide      Other reaction(s): Stomach Ache    Cephalosporins Other (See Comments)     headache    Other     Sulfa Antibiotics Other (See Comments)     Category: Allergy; Annotation - [id number]: STOMACH UPSET    Famotidine Palpitations    Levofloxacin Palpitations    Omeprazole Palpitations     Painful urination        Daily Treatment Diary    Date 10/22 10/28 11/4 11/7 11/11 11/14       FOTO             Re-Eval IE               Manuals    PROM flex/ext  TE TE CRISPIN AC TE       Patellar mobs  TE TE CRISPIN AC TE       PROM hip  TE TE CRISPIN AC TE                    Neuro Re-Ed                                                                                                Ther Ex                 Mini squats 5\"x10 5\"2x10 5\"2x10 5\" 2x10 5\" 2 x 10 5\" 2x10       Stand hip abd, ext 5\"x10 ea B 5\"x10 ea B 5\"x10 ea 5\" 2x10 ea B 5\" 2 x 10 ea B  3# 3x10 ea B       HR/TR 1x10 ea 2x10 ea 2x10 ea  2 x 10 ea        Standing gastroc stretch  30\"x3 30\"x3           Knee flex/ext stretch on step  10\"x10 ea 10\"x10 ea 10\"x10 ea 10\" x 10 ea  10\"x10       LAQ    5\" 2x10 5\" 2 x 10 2.5# 2x10                    Quad sets   5\"x10           Heel slides  10\"x10           Bridges    5\" 2x10 5\" 2 x 10  5\"2x10       SAQ   5\"x10                       Ther Activity    Bike  5' 5' 5' 5' 5'  5'                    Gait Training                              Modalities    CP PRN  5'                            Access Code: [email or web address]  URL: [email or web address]  Date: 11/14/2024  Prepared by: Kylah Escueta    Exercises  - Supine Bridge  - 3 x weekly - 3 sets - 10 reps  - Gastroc Stretch on Wall  - 3 x weekly - 3 sets - 10 reps  - Supine Quad Set  - 3 x weekly - 3 sets - 10 reps  - Standing Hip Abduction with Counter Support  - 3 x weekly - 3 sets - 10 reps  - Standing Hip Extension with Counter Support  - 3 x weekly - 3 sets " - 10 reps  - Mini Squat with Counter Support  - 3 x weekly - 3 sets - 10 reps  - Standing Knee Flexion Stretch on Step  - 3 x weekly - 2 sets - 10 reps - 10 hold

## 2024-11-18 ENCOUNTER — CLINICAL SUPPORT (OUTPATIENT)
Dept: INTERNAL MEDICINE CLINIC | Facility: OTHER | Age: 59
End: 2024-11-18
Payer: MEDICARE

## 2024-11-18 DIAGNOSIS — Z23 NEED FOR COVID-19 VACCINE: Primary | ICD-10-CM

## 2024-11-18 PROCEDURE — 90480 ADMN SARSCOV2 VAC 1/ONLY CMP: CPT

## 2024-11-18 PROCEDURE — 91320 SARSCV2 VAC 30MCG TRS-SUC IM: CPT

## 2024-11-26 ENCOUNTER — TELEPHONE (OUTPATIENT)
Age: 59
End: 2024-11-26

## 2024-11-26 ENCOUNTER — APPOINTMENT (OUTPATIENT)
Dept: LAB | Facility: IMAGING CENTER | Age: 59
End: 2024-11-26
Payer: MEDICARE

## 2024-11-26 DIAGNOSIS — E87.1 HYPONATREMIA: ICD-10-CM

## 2024-11-26 LAB
ANION GAP SERPL CALCULATED.3IONS-SCNC: 6 MMOL/L (ref 4–13)
BUN SERPL-MCNC: 17 MG/DL (ref 5–25)
CALCIUM SERPL-MCNC: 9.1 MG/DL (ref 8.4–10.2)
CHLORIDE SERPL-SCNC: 102 MMOL/L (ref 96–108)
CO2 SERPL-SCNC: 28 MMOL/L (ref 21–32)
CREAT SERPL-MCNC: 1.05 MG/DL (ref 0.6–1.3)
GFR SERPL CREATININE-BSD FRML MDRD: 77 ML/MIN/1.73SQ M
GLUCOSE P FAST SERPL-MCNC: 78 MG/DL (ref 65–99)
POTASSIUM SERPL-SCNC: 4.1 MMOL/L (ref 3.5–5.3)
SODIUM SERPL-SCNC: 136 MMOL/L (ref 135–147)

## 2024-11-26 PROCEDURE — 80048 BASIC METABOLIC PNL TOTAL CA: CPT

## 2024-11-26 PROCEDURE — 36415 COLL VENOUS BLD VENIPUNCTURE: CPT

## 2024-11-26 NOTE — TELEPHONE ENCOUNTER
Caller: patient     Doctor: Sole     Reason for call: Calling to schedule rt knee surgery.     Call back#: [phone]

## 2024-11-26 NOTE — TELEPHONE ENCOUNTER
I did try to call the patient to discuss scheduling surgery but no answer. I did leave a VM to give me a call back to schedule. Left my direct p#.

## 2024-11-29 ENCOUNTER — TELEPHONE (OUTPATIENT)
Dept: NEPHROLOGY | Facility: CLINIC | Age: 59
End: 2024-11-29

## 2024-11-29 NOTE — TELEPHONE ENCOUNTER
----- Message from Nahid Nobles DO sent at 11/27/2024  3:34 PM EST -----  I recently reviewed his laboratory studies.  Please inform him that his sodium level remained stable.  I believe that he should be off all sodium chloride tablets prior to this lab test.  If that is the case he does not require follow-up at this point in time.  He can follow-up with his primary care doctor thank you.

## 2024-11-29 NOTE — TELEPHONE ENCOUNTER
CLAY for pt advising message below from  - if pt has any questions or concerns asked pt to contact office     I recently reviewed his laboratory studies.  Please inform him that his sodium level remained stable.  I believe that he should be off all sodium chloride tablets prior to this lab test.  If that is the case he does not require follow-up at this point in time.  He can follow-up with his primary care doctor thank you.

## 2024-12-06 ENCOUNTER — OFFICE VISIT (OUTPATIENT)
Dept: OBGYN CLINIC | Facility: MEDICAL CENTER | Age: 59
End: 2024-12-06

## 2024-12-06 VITALS
WEIGHT: 204.6 LBS | HEIGHT: 70 IN | HEART RATE: 80 BPM | DIASTOLIC BLOOD PRESSURE: 86 MMHG | BODY MASS INDEX: 29.29 KG/M2 | SYSTOLIC BLOOD PRESSURE: 145 MMHG

## 2024-12-06 DIAGNOSIS — Z96.651 STATUS POST TOTAL KNEE REPLACEMENT, RIGHT: Primary | ICD-10-CM

## 2024-12-06 PROCEDURE — 99024 POSTOP FOLLOW-UP VISIT: CPT | Performed by: ORTHOPAEDIC SURGERY

## 2024-12-06 RX ORDER — CLINDAMYCIN HYDROCHLORIDE 300 MG/1
600 CAPSULE ORAL ONCE
Qty: 2 CAPSULE | Refills: 2 | Status: SHIPPED | OUTPATIENT
Start: 2024-12-06 | End: 2024-12-06

## 2024-12-06 NOTE — PROGRESS NOTES
Assessment/Plan:  1. Status post total knee replacement, right      No orders of the defined types were placed in this encounter.      Patient is doing very well 11 weeks status post R TKA on 9/17/24. He also has severe left knee osteoarthritis.   Transition to home exercises.   Pain control prn-OTC pain medication   Patient should call ahead for abx prior to dental appts. Script provided for clindamycin today.   Patient is scheduled for L TKA in 4/14/25. We will plan for SOC, cardiology, and nephrology clearances pre op. He will get these appts set up today and will return in Feb for full pre op visit.     Return in about 2 months (around 2/5/2025) for L TKA pre op visit.    I answered all of the patient's questions during the visit and provided education of the patient's condition during the visit.  The patient verbalized understanding of the information given and agrees with the plan.  This note was dictated using [employer] software.  It may contain errors including improperly dictated words.  Please contact physician directly for any questions.    Subjective   Chief Complaint:   Chief Complaint   Patient presents with    Right Knee - Post-op, Follow-up       Alexx Haney is a 59 y.o. male who presents for 11 week follow up s/p right TKA.  Patient states his right knee is doing very well. He is not taking anything for knee pain. He has completed outpatient PT. He is not using an assistive device. Patient is overall pleased with his right total knee.     Patient is scheduled for L TKA on 4/14/25. Patient received left knee steroid injection at last visit and reports pain relief. He will plan to see cardio, nephro, and SOC prior to L TKA.     Review of Systems  ROS:    See HPI for musculoskeletal review.   All other systems reviewed are negative     History:  Past Medical History:   Diagnosis Date    Acute bronchitis 01/29/2024    Anxiety 01/15/2019    Arthritis     Asthma     Chronic rhinitis     last assessed  2/21/14    Chronic serous otitis media     last assessed 11/20/13    Chronic sinusitis     last assessed 8/19/14    Functional heart murmur     GERD (gastroesophageal reflux disease)     Gout     Hearing problem     last assessed 12/1/16    Hiatal hernia     Hypercholesterolemia     Hypertension     Palpitations     Testicular hypogonadism     last assessed 10/4/13    V-tach (HCC)      Past Surgical History:   Procedure Laterality Date    APPENDECTOMY      BICEPS TENDON REPAIR Left 2009    BICEPS TENODESIS      anesthesia for tenodesis- of ruptured long tendon of biceps     HERNIA REPAIR      WA ARTHRP KNE CONDYLE&PLATU MEDIAL&LAT COMPARTMENTS Right 9/17/2024    Procedure: ARTHROPLASTY KNEE TOTAL, potential same day discharge, cemented;  Surgeon: Parvin Whipple DO;  Location:  MAIN OR;  Service: Orthopedics    THYROIDECTOMY      TONSILLECTOMY       Social History   Social History     Substance and Sexual Activity   Alcohol Use Yes    Alcohol/week: 12.0 standard drinks of alcohol    Types: 12 Shots of liquor per week    Comment: occasionally; socially     Social History     Substance and Sexual Activity   Drug Use No     Social History     Tobacco Use   Smoking Status Never   Smokeless Tobacco Never     Family History:   Family History   Problem Relation Age of Onset    Lung cancer Father     Coronary artery disease Father         blockages    Stroke Maternal Grandmother     Cancer Paternal Grandfather         metastasized    Heart disease Family     Lung cancer Cousin     No Known Problems Mother     No Known Problems Sister     No Known Problems Brother     No Known Problems Maternal Aunt     No Known Problems Maternal Uncle     No Known Problems Paternal Aunt     No Known Problems Paternal Uncle     No Known Problems Paternal Grandmother     ADD / ADHD Neg Hx     Anesthesia problems Neg Hx     Clotting disorder Neg Hx     Collagen disease Neg Hx     Diabetes Neg Hx     Dislocations Neg Hx     Learning  disabilities Neg Hx     Neurological problems Neg Hx     Osteoporosis Neg Hx     Rheumatologic disease Neg Hx     Scoliosis Neg Hx     Vascular Disease Neg Hx        Current Outpatient Medications on File Prior to Visit   Medication Sig Dispense Refill    acetaminophen (TYLENOL) 500 mg tablet Take 2 tablets (1,000 mg total) by mouth every 8 (eight) hours 1500 at times (Patient taking differently: Take 1,000 mg by mouth every 8 (eight) hours)      albuterol (2.5 mg/3 mL) 0.083 % nebulizer solution Take 3 mL (2.5 mg total) by nebulization every 6 (six) hours as needed for wheezing or shortness of breath 240 mL 2    albuterol (PROVENTIL HFA,VENTOLIN HFA) 90 mcg/act inhaler Inhale 1 puff if needed for wheezing or shortness of breath 48 g 1    ascorbic acid (VITAMIN C) 500 MG tablet Take 1 tablet (500 mg total) by mouth daily Begin 30 days prior to surgery. 30 tablet 1    aspirin 325 mg tablet Take 650 mg by mouth daily      benzonatate (TESSALON) 200 MG capsule Take 1 capsule (200 mg total) by mouth 3 (three) times a day as needed for cough (Patient not taking: Reported on 10/22/2024) 20 capsule 0    Chlorpheniramine Maleate (CHLOR-TRIMETON ALLERGY PO) Take by mouth as needed (Patient not taking: Reported on 10/14/2024)      clotrimazole (LOTRIMIN) 1 % cream Apply topically 2 (two) times a day for 21 days (Patient not taking: Reported on 10/22/2024) 60 g 3    clotrimazole-betamethasone (LOTRISONE) 1-0.05 % cream Apply topically 2 (two) times a day 90 g 1    colchicine (COLCRYS) 0.6 mg tablet Take 1 tablet (0.6 mg total) by mouth daily (Patient not taking: Reported on 10/14/2024) 7 tablet 0    docusate sodium (COLACE) 100 mg capsule Take 1 capsule (100 mg total) by mouth 2 (two) times a day (Patient not taking: Reported on 10/14/2024) 20 capsule 0    enoxaparin (Lovenox) 40 mg/0.4 mL Inject 40 mg under the skin in the morning (Patient not taking: Reported on 10/14/2024)      esomeprazole (NexIUM) 40 MG capsule Take 40 mg  by mouth every morning before breakfast      febuxostat (ULORIC) 40 mg tablet Take 1 tablet (40 mg total) by mouth daily 90 tablet 1    ferrous sulfate 324 (65 Fe) mg TAKE 1 TABLET (324 MG TOTAL) BY MOUTH DAILY BEFORE BREAKFAST BEGIN 30 DAYS PRIOR TO SURGERY. (Patient not taking: Reported on 10/14/2024) 90 tablet 1    fluticasone-salmeterol (Advair) 500-50 mcg/dose inhaler Inhale 1 puff 2 (two) times a day      folic acid (FOLVITE) 1 mg tablet TAKE 1 TABLET (1 MG TOTAL) BY MOUTH DAILY BEGIN 30 DAYS PRIOR TO SURGERY. (Patient not taking: Reported on 10/14/2024) 90 tablet 1    gabapentin (NEURONTIN) 100 mg capsule Take 1 capsule (100 mg total) by mouth 3 (three) times a day (Patient taking differently: Take 100 mg by mouth 3 (three) times a day as needed) 90 capsule 0    ipratropium-albuterol (DUO-NEB) 0.5-2.5 mg/3 mL Inhale 3 mL if needed (Patient not taking: Reported on 10/22/2024)      meclizine (ANTIVERT) 12.5 MG tablet Take 1 tablet (12.5 mg total) by mouth every 8 (eight) hours as needed for dizziness      methocarbamol (ROBAXIN) 500 mg tablet Take 250 mg by mouth every 8 (eight) hours as needed for muscle spasms      montelukast (SINGULAIR) 10 mg tablet Take 1 tablet (10 mg total) by mouth daily      Multiple Vitamin (multivitamin) tablet Take 1 tablet by mouth daily Begin 30 days prior to surgery. 30 tablet 1    Pseudoephedrine-guaiFENesin (GUAIFENESIN 600/ PO) Take 600 mg by mouth 2 (two) times a day (Patient not taking: Reported on 10/22/2024)      sodium chloride 1 g tablet Take 1 tablet (1 g total) by mouth 3 (three) times a day with meals 90 tablet 1    Triamcinolone Acetonide (NASACORT ALLERGY 24HR NA) 1 spray into each nostril daily        verapamil (CALAN) 120 mg tablet TAKE 1 TABLET BY MOUTH EVERY DAY 90 tablet 3     No current facility-administered medications on file prior to visit.     Allergies   Allergen Reactions    Penicillins Rash and Anaphylaxis    Acetazolamide      Other reaction(s):  "Stomach Ache    Cephalosporins Other (See Comments)     headache    Other     Sulfa Antibiotics Other (See Comments)     Category: Allergy; Annotation - [id number]: STOMACH UPSET    Famotidine Palpitations    Levofloxacin Palpitations    Omeprazole Palpitations     Painful urination        Objective     /86   Pulse 80   Ht 5' 9.5\" (1.765 m)   Wt 92.8 kg (204 lb 9.6 oz)   BMI 29.78 kg/m²      PE:  AAOx 3  WDWN  Hearing intact, no drainage from eyes  no audible wheezing  no abdominal distension  LE compartments soft, AT/GS intact    Ortho Exam:  right Knee:   INC: healed, No erythema, mild swelling  AROM:0 - 125 degrees      Scribe Attestation      I,:  Christa Reyes PA-C am acting as a scribe while in the presence of the attending physician.:       I,:  Parvin Whipple DO personally performed the services described in this documentation    as scribed in my presence.:                "

## 2024-12-17 ENCOUNTER — RA CDI HCC (OUTPATIENT)
Dept: OTHER | Facility: HOSPITAL | Age: 59
End: 2024-12-17

## 2024-12-22 DIAGNOSIS — I10 ESSENTIAL HYPERTENSION: ICD-10-CM

## 2024-12-23 ENCOUNTER — OFFICE VISIT (OUTPATIENT)
Dept: INTERNAL MEDICINE CLINIC | Facility: OTHER | Age: 59
End: 2024-12-23
Payer: MEDICARE

## 2024-12-23 VITALS
SYSTOLIC BLOOD PRESSURE: 138 MMHG | DIASTOLIC BLOOD PRESSURE: 82 MMHG | OXYGEN SATURATION: 98 % | BODY MASS INDEX: 29.49 KG/M2 | HEART RATE: 92 BPM | WEIGHT: 206 LBS | HEIGHT: 70 IN | TEMPERATURE: 98.5 F | RESPIRATION RATE: 20 BRPM

## 2024-12-23 DIAGNOSIS — Z12.12 SCREENING FOR COLORECTAL CANCER: ICD-10-CM

## 2024-12-23 DIAGNOSIS — Z71.89 ADVANCE CARE PLANNING: ICD-10-CM

## 2024-12-23 DIAGNOSIS — Z12.5 SCREENING FOR PROSTATE CANCER: ICD-10-CM

## 2024-12-23 DIAGNOSIS — J45.50 SEVERE PERSISTENT ASTHMA WITHOUT COMPLICATION: ICD-10-CM

## 2024-12-23 DIAGNOSIS — B35.4 TINEA CORPORIS: ICD-10-CM

## 2024-12-23 DIAGNOSIS — E87.1 HYPONATREMIA: ICD-10-CM

## 2024-12-23 DIAGNOSIS — J30.89 NON-SEASONAL ALLERGIC RHINITIS, UNSPECIFIED TRIGGER: ICD-10-CM

## 2024-12-23 DIAGNOSIS — Z13.1 SCREENING FOR DIABETES MELLITUS: ICD-10-CM

## 2024-12-23 DIAGNOSIS — E78.2 MIXED HYPERLIPIDEMIA: ICD-10-CM

## 2024-12-23 DIAGNOSIS — H69.92 DYSFUNCTION OF LEFT EUSTACHIAN TUBE: ICD-10-CM

## 2024-12-23 DIAGNOSIS — Z13.6 SCREENING FOR CARDIOVASCULAR CONDITION: ICD-10-CM

## 2024-12-23 DIAGNOSIS — Z12.11 SCREENING FOR COLORECTAL CANCER: ICD-10-CM

## 2024-12-23 DIAGNOSIS — I10 ESSENTIAL HYPERTENSION: Primary | ICD-10-CM

## 2024-12-23 DIAGNOSIS — K21.9 GERD WITHOUT ESOPHAGITIS: ICD-10-CM

## 2024-12-23 DIAGNOSIS — Z00.00 MEDICARE ANNUAL WELLNESS VISIT, SUBSEQUENT: ICD-10-CM

## 2024-12-23 DIAGNOSIS — F41.9 ANXIETY: ICD-10-CM

## 2024-12-23 PROBLEM — G89.18 ACUTE POST-OPERATIVE PAIN: Status: RESOLVED | Noted: 2024-09-20 | Resolved: 2024-12-23

## 2024-12-23 PROBLEM — D64.9 ANEMIA: Status: RESOLVED | Noted: 2024-09-26 | Resolved: 2024-12-23

## 2024-12-23 PROBLEM — R79.89 ELEVATED LFTS: Status: RESOLVED | Noted: 2020-06-02 | Resolved: 2024-12-23

## 2024-12-23 PROBLEM — D72.823 LEUKEMOID REACTION: Status: RESOLVED | Noted: 2024-09-20 | Resolved: 2024-12-23

## 2024-12-23 PROBLEM — R73.9 HYPERGLYCEMIA: Status: RESOLVED | Noted: 2023-12-20 | Resolved: 2024-12-23

## 2024-12-23 PROBLEM — D62 ACUTE BLOOD LOSS AS CAUSE OF POSTOPERATIVE ANEMIA: Status: RESOLVED | Noted: 2024-09-20 | Resolved: 2024-12-23

## 2024-12-23 PROCEDURE — 99214 OFFICE O/P EST MOD 30 MIN: CPT | Performed by: INTERNAL MEDICINE

## 2024-12-23 PROCEDURE — G0438 PPPS, INITIAL VISIT: HCPCS | Performed by: INTERNAL MEDICINE

## 2024-12-23 RX ORDER — VERAPAMIL HYDROCHLORIDE 120 MG/1
120 TABLET ORAL DAILY
Qty: 90 TABLET | Refills: 1 | Status: SHIPPED | OUTPATIENT
Start: 2024-12-23

## 2024-12-23 NOTE — ASSESSMENT & PLAN NOTE
Controlled, continue current antihypertensive regimen.   Orders:  •  Comprehensive metabolic panel; Future  •  Albumin / creatinine urine ratio; Future  •  CBC; Future  •  Lipid panel; Future

## 2024-12-23 NOTE — PATIENT INSTRUCTIONS
Medicare Preventive Visit Patient Instructions  Thank you for completing your Welcome to Medicare Visit or Medicare Annual Wellness Visit today. Your next wellness visit will be due in one year (12/24/2025).  The screening/preventive services that you may require over the next 5-10 years are detailed below. Some tests may not apply to you based off risk factors and/or age. Screening tests ordered at today's visit but not completed yet may show as past due. Also, please note that scanned in results may not display below.  Preventive Screenings:  Service Recommendations Previous Testing/Comments   Colorectal Cancer Screening  Colonoscopy    Fecal Occult Blood Test (FOBT)/Fecal Immunochemical Test (FIT)  Fecal DNA/Cologuard Test  Flexible Sigmoidoscopy Age: 45-75 years old   Colonoscopy: every 10 years (May be performed more frequently if at higher risk)  OR  FOBT/FIT: every 1 year  OR  Cologuard: every 3 years  OR  Sigmoidoscopy: every 5 years  Screening may be recommended earlier than age 45 if at higher risk for colorectal cancer. Also, an individualized decision between you and your healthcare provider will decide whether screening between the ages of 76-85 would be appropriate. Colonoscopy: Not on file  FOBT/FIT: Not on file  Cologuard: Not on file  Sigmoidoscopy: Not on file          Prostate Cancer Screening Individualized decision between patient and health care provider in men between ages of 55-69   Medicare will cover every 12 months beginning on the day after your 50th birthday PSA: 0.8 ng/mL           Hepatitis C Screening Once for adults born between 1945 and 1965  More frequently in patients at high risk for Hepatitis C Hep C Antibody: 09/27/2024    Screening Current   Diabetes Screening 1-2 times per year if you're at risk for diabetes or have pre-diabetes Fasting glucose: 78 mg/dL (11/26/2024)  A1C: 5.4 % (8/21/2024)  Screening Current   Cholesterol Screening Once every 5 years if you don't have a  lipid disorder. May order more often based on risk factors. Lipid panel: 12/08/2022  Screening Not Indicated  History Lipid Disorder      Other Preventive Screenings Covered by Medicare:  Abdominal Aortic Aneurysm (AAA) Screening: covered once if your at risk. You're considered to be at risk if you have a family history of AAA or a male between the age of 65-75 who smoking at least 100 cigarettes in your lifetime.  Lung Cancer Screening: covers low dose CT scan once per year if you meet all of the following conditions: (1) Age 55-77; (2) No signs or symptoms of lung cancer; (3) Current smoker or have quit smoking within the last 15 years; (4) You have a tobacco smoking history of at least 20 pack years (packs per day x number of years you smoked); (5) You get a written order from a healthcare provider.  Glaucoma Screening: covered annually if you're considered high risk: (1) You have diabetes OR (2) Family history of glaucoma OR (3)  aged 50 and older OR (4)  American aged 65 and older  Osteoporosis Screening: covered every 2 years if you meet one of the following conditions: (1) Have a vertebral abnormality; (2) On glucocorticoid therapy for more than 3 months; (3) Have primary hyperparathyroidism; (4) On osteoporosis medications and need to assess response to drug therapy.  HIV Screening: covered annually if you're between the age of 15-65. Also covered annually if you are younger than 15 and older than 65 with risk factors for HIV infection. For pregnant patients, it is covered up to 3 times per pregnancy.    Immunizations:  Immunization Recommendations   Influenza Vaccine Annual influenza vaccination during flu season is recommended for all persons aged >= 6 months who do not have contraindications   Pneumococcal Vaccine   * Pneumococcal conjugate vaccine = PCV13 (Prevnar 13), PCV15 (Vaxneuvance), PCV20 (Prevnar 20)  * Pneumococcal polysaccharide vaccine = PPSV23 (Pneumovax) Adults 19-64  yo with certain risk factors or if 65+ yo  If never received any pneumonia vaccine: recommend Prevnar 20 (PCV20)  Give PCV20 if previously received 1 dose of PCV13 or PPSV23   Hepatitis B Vaccine 3 dose series if at intermediate or high risk (ex: diabetes, end stage renal disease, liver disease)   Respiratory syncytial virus (RSV) Vaccine - COVERED BY MEDICARE PART D  * RSVPreF3 (Arexvy) CDC recommends that adults 60 years of age and older may receive a single dose of RSV vaccine using shared clinical decision-making (SCDM)   Tetanus (Td) Vaccine - COST NOT COVERED BY MEDICARE PART B Following completion of primary series, a booster dose should be given every 10 years to maintain immunity against tetanus. Td may also be given as tetanus wound prophylaxis.   Tdap Vaccine - COST NOT COVERED BY MEDICARE PART B Recommended at least once for all adults. For pregnant patients, recommended with each pregnancy.   Shingles Vaccine (Shingrix) - COST NOT COVERED BY MEDICARE PART B  2 shot series recommended in those 19 years and older who have or will have weakened immune systems or those 50 years and older     Health Maintenance Due:      Topic Date Due   • HIV Screening  Never done   • Colorectal Cancer Screening  Never done   • Hepatitis C Screening  Completed     Immunizations Due:      Topic Date Due   • Pneumococcal Vaccine: Pediatrics (0 to 5 Years) and At-Risk Patients (6 to 64 Years) (1 of 2 - PCV) Never done     Advance Directives   What are advance directives?  Advance directives are legal documents that state your wishes and plans for medical care. These plans are made ahead of time in case you lose your ability to make decisions for yourself. Advance directives can apply to any medical decision, such as the treatments you want, and if you want to donate organs.   What are the types of advance directives?  There are many types of advance directives, and each state has rules about how to use them. You may choose a  combination of any of the following:  Living will:  This is a written record of the treatment you want. You can also choose which treatments you do not want, which to limit, and which to stop at a certain time. This includes surgery, medicine, IV fluid, and tube feedings.   Durable power of  for healthcare (DPAHC):  This is a written record that states who you want to make healthcare choices for you when you are unable to make them for yourself. This person, called a proxy, is usually a family member or a friend. You may choose more than 1 proxy.  Do not resuscitate (DNR) order:  A DNR order is used in case your heart stops beating or you stop breathing. It is a request not to have certain forms of treatment, such as CPR. A DNR order may be included in other types of advance directives.  Medical directive:  This covers the care that you want if you are in a coma, near death, or unable to make decisions for yourself. You can list the treatments you want for each condition. Treatment may include pain medicine, surgery, blood transfusions, dialysis, IV or tube feedings, and a ventilator (breathing machine).  Values history:  This document has questions about your views, beliefs, and how you feel and think about life. This information can help others choose the care that you would choose.  Why are advance directives important?  An advance directive helps you control your care. Although spoken wishes may be used, it is better to have your wishes written down. Spoken wishes can be misunderstood, or not followed. Treatments may be given even if you do not want them. An advance directive may make it easier for your family to make difficult choices about your care.   Weight Management   Why it is important to manage your weight:  Being overweight increases your risk of health conditions such as heart disease, high blood pressure, type 2 diabetes, and certain types of cancer. It can also increase your risk for  osteoarthritis, sleep apnea, and other respiratory problems. Aim for a slow, steady weight loss. Even a small amount of weight loss can lower your risk of health problems.  How to lose weight safely:  A safe and healthy way to lose weight is to eat fewer calories and get regular exercise. You can lose up about 1 pound a week by decreasing the number of calories you eat by 500 calories each day.   Healthy meal plan for weight management:  A healthy meal plan includes a variety of foods, contains fewer calories, and helps you stay healthy. A healthy meal plan includes the following:  Eat whole-grain foods more often.  A healthy meal plan should contain fiber. Fiber is the part of grains, fruits, and vegetables that is not broken down by your body. Whole-grain foods are healthy and provide extra fiber in your diet. Some examples of whole-grain foods are whole-wheat breads and pastas, oatmeal, brown rice, and bulgur.  Eat a variety of vegetables every day.  Include dark, leafy greens such as spinach, kale, latonya greens, and mustard greens. Eat yellow and orange vegetables such as carrots, sweet potatoes, and winter squash.   Eat a variety of fruits every day.  Choose fresh or canned fruit (canned in its own juice or light syrup) instead of juice. Fruit juice has very little or no fiber.  Eat low-fat dairy foods.  Drink fat-free (skim) milk or 1% milk. Eat fat-free yogurt and low-fat cottage cheese. Try low-fat cheeses such as mozzarella and other reduced-fat cheeses.  Choose meat and other protein foods that are low in fat.  Choose beans or other legumes such as split peas or lentils. Choose fish, skinless poultry (chicken or turkey), or lean cuts of red meat (beef or pork). Before you cook meat or poultry, cut off any visible fat.   Use less fat and oil.  Try baking foods instead of frying them. Add less fat, such as margarine, sour cream, regular salad dressing and mayonnaise to foods. Eat fewer high-fat foods. Some  examples of high-fat foods include french fries, doughnuts, ice cream, and cakes.  Eat fewer sweets.  Limit foods and drinks that are high in sugar. This includes candy, cookies, regular soda, and sweetened drinks.  Exercise:  Exercise at least 30 minutes per day on most days of the week. Some examples of exercise include walking, biking, dancing, and swimming. You can also fit in more physical activity by taking the stairs instead of the elevator or parking farther away from stores. Ask your healthcare provider about the best exercise plan for you.      © Copyright [employer] 2018 Information is for End User's use only and may not be sold, redistributed or otherwise used for commercial purposes. All illustrations and images included in CareNotes® are the copyrighted property of [employer] or [employer]

## 2024-12-23 NOTE — ASSESSMENT & PLAN NOTE
Continue to trend lipid panel.   Orders:  •  Comprehensive metabolic panel; Future  •  Albumin / creatinine urine ratio; Future  •  CBC; Future  •  Lipid panel; Future

## 2024-12-23 NOTE — ASSESSMENT & PLAN NOTE
Alexx Haney and I had a thorough discussion regarding advance care planning.  he  does not have a Living Will..  ACP documentation provided to patient today.  he  understands that today's visit is a billable service.  Refer to ACP note.

## 2024-12-23 NOTE — PROGRESS NOTES
Name: Alexx Haney      : 1965      MRN: [id number]  Encounter Provider: Kaiden Beltrán MD  Encounter Date: 2024   Encounter department: [facility] PRIMARY [facility]    Assessment & Plan  Non-seasonal allergic rhinitis, unspecified trigger  Discussed continuing OTC antihistamines and Nasacort.        Essential hypertension  Controlled, continue current antihypertensive regimen.   Orders:  •  Comprehensive metabolic panel; Future  •  Albumin / creatinine urine ratio; Future  •  CBC; Future  •  Lipid panel; Future    GERD without esophagitis  Controlled with nexium.        Dysfunction of left eustachian tube  Continue current allergy regimen.        Anxiety  Stable, continue to monitor clinically.        Advance care planning  Alexx Haney and I had a thorough discussion regarding advance care planning.  he  does not have a Living Will..  ACP documentation provided to patient today.  he  understands that today's visit is a billable service.  Refer to ACP note.         Hyponatremia  Resolved. Continue to trend.   Orders:  •  Comprehensive metabolic panel; Future    Mixed hyperlipidemia  Continue to trend lipid panel.   Orders:  •  Comprehensive metabolic panel; Future  •  Albumin / creatinine urine ratio; Future  •  CBC; Future  •  Lipid panel; Future    Tinea corporis  Recommend that he start clotrimazole-betamethasone cream.       Medicare annual wellness visit, subsequent         Screening for diabetes mellitus         Screening for cardiovascular condition         Screening for colorectal cancer         Screening for prostate cancer    Orders:  •  PSA, Total Screen; Future    Severe persistent asthma without complication         Serious Illness Conversation    1. What is your understanding now of where you are with your illness?  Prognostic Understanding: appropriate understanding of prognosis     2. How much information about what is likely to be ahead with your illness  would you like to have?  Information: patient wants to be fully informed     3. What did you (clinician) communicate to the patient?     4. If your health situation worsens, what are your most important goals?  Goals: have my medical decisions respected, be mentally aware, be at home, be physically comfortable, be spiritually and emotionally at peace, not be a burden, be emotionally at peace     5. What are the biggest fears and worries about the future and your health?  Fears/Worries: loss of control, being a financial burden, being a physical burden, burdening others     6. What abilities are so critical to your life that you cannot imagine living without them?  Unacceptable Function: being unconscious     7. What gives you strength as you think about the future with your illness?     8. If you become sicker, how much are you willing to go through for the possibility of gaining more time?  Be in the hospital: Yes Have a feeding tube: Yes   Be in the ICU: Yes Live in a nursing home: Yes   Be on a ventilator: Yes Be uncomfortable: Yes   Be on dialysis: Yes Undergo aggressive test and/or procedures: Yes   9. How much does your proxy and family know about your priorities and wishes?  Discussion Discussion: extensive discussion with family about goals and wishes        How does this plan sound to you? I will do everything I can to help you through this.  Patient verbalized understanding of the plan     I have spent 5minutes speaking with my patient on advanced care planning today or during this visit     Advanced directives  Five Wishes: Patient does not have Five Wishes- would like information        Preventive health issues were discussed with patient, and age appropriate screening tests were ordered as noted in patient's After Visit Summary. Personalized health advice and appropriate referrals for health education or preventive services given if needed, as noted in patient's After Visit Summary.    History of  Present Illness     Alexx Haney is seen today for follow up of chronic conditions.   Recent laboratory studies reviewed today with the patient.   he has been compliant with his medication regimen.   He has persistent rash on his right forearm.  He was prescribed clotrimazole-betamethasone cream however never started treatment.  Allergic rhinitis: Controlled with current regimen.  Gout: No recent flares or exacerbation with Uloric.   he has no complaints or concerns at this time.       Heartburn  He reports no abdominal pain, no chest pain, no choking, no coughing, no nausea, no sore throat or no wheezing. This is a chronic problem. The current episode started more than 1 year ago. The problem occurs rarely. The problem has been unchanged. The symptoms are aggravated by certain foods. Pertinent negatives include no fatigue. He has tried a PPI for the symptoms. The treatment provided moderate relief.   Hypertension  This is a chronic problem. The current episode started more than 1 year ago. The problem is unchanged. Pertinent negatives include no chest pain, headaches, palpitations or shortness of breath. Past treatments include calcium channel blockers. The current treatment provides moderate improvement. Compliance problems include exercise and diet.       Patient Care Team:  Javier Delacruz MD as PCP - General  DO Fred Hills MD Glenn Short, MD (Gastroenterology)  Sharona Martinez RN as Nurse Navigator (Orthopedics)    Review of Systems   Constitutional:  Negative for activity change, appetite change, chills, diaphoresis, fatigue and fever.   HENT:  Negative for congestion, postnasal drip, rhinorrhea, sinus pressure, sinus pain, sneezing and sore throat.    Eyes:  Negative for visual disturbance.   Respiratory:  Negative for apnea, cough, choking, chest tightness, shortness of breath and wheezing.    Cardiovascular:  Negative for chest pain, palpitations and leg swelling.    Gastrointestinal:  Negative for abdominal distention, abdominal pain, anal bleeding, blood in stool, constipation, diarrhea, nausea and vomiting.   Endocrine: Negative for cold intolerance and heat intolerance.   Genitourinary:  Negative for difficulty urinating, dysuria and hematuria.   Musculoskeletal: Negative.    Skin: Negative.    Neurological:  Negative for dizziness, weakness, light-headedness, numbness and headaches.   Hematological:  Negative for adenopathy.   Psychiatric/Behavioral:  Negative for agitation, sleep disturbance and suicidal ideas.    All other systems reviewed and are negative.    Medical History Reviewed by provider this encounter:  Tobacco  Allergies  Meds  Problems  Med Hx  Surg Hx  Fam Hx       Annual Wellness Visit Questionnaire   Alexx is here for his Subsequent Wellness visit. Last Medicare Wellness visit information reviewed, patient interviewed and updates made to the record today.      Health Risk Assessment:   Patient rates overall health as good. Patient feels that their physical health rating is same. Patient is very satisfied with their life. Eyesight was rated as same. Hearing was rated as slightly worse. Patient feels that their emotional and mental health rating is same. Patients states they are never, rarely angry. Patient states they are sometimes unusually tired/fatigued. Pain experienced in the last 7 days has been some. Patient's pain rating has been 7/10. Patient states that he has experienced weight loss or gain in last 6 months.     Depression Screening:   PHQ-2 Score: 1      Fall Risk Screening:   In the past year, patient has experienced: no history of falling in past year      Home Safety:  Patient does not have trouble with stairs inside or outside of their home. Patient has working smoke alarms and has no working carbon monoxide detector. Home safety hazards include: none.     Nutrition:   Current diet is Limited junk food.     Medications:   Patient is  currently taking over-the-counter supplements. OTC medications include: see medication list. Patient is able to manage medications.     Activities of Daily Living (ADLs)/Instrumental Activities of Daily Living (IADLs):   Walk and transfer into and out of bed and chair?: Yes  Dress and groom yourself?: Yes    Bathe or shower yourself?: Yes    Feed yourself? Yes  Do your laundry/housekeeping?: Yes  Manage your money, pay your bills and track your expenses?: Yes  Make your own meals?: Yes    Do your own shopping?: Yes    Previous Hospitalizations:   Any hospitalizations or ED visits within the last 12 months?: Yes    How many hospitalizations have you had in the last year?: 1-2    Advance Care Planning:   Living will: No    Durable POA for healthcare: No    Advanced directive: No    Advanced directive counseling given: Yes    End of Life Decisions reviewed with patient: Yes    Provider agrees with end of life decisions: Yes      Cognitive Screening:   Provider or family/friend/caregiver concerned regarding cognition?: No    PREVENTIVE SCREENINGS      Cardiovascular Screening:    General: Screening Not Indicated, History Lipid Disorder and Risks and Benefits Discussed    Due for: Lipid Panel      Diabetes Screening:     General: Screening Current and Risks and Benefits Discussed    Due for: Blood Glucose      Colorectal Cancer Screening:     General: Risks and Benefits Discussed      Prostate Cancer Screening:    General: Risks and Benefits Discussed and Screening Current    Due for: PSA      Osteoporosis Screening:    General: Screening Not Indicated      Abdominal Aortic Aneurysm (AAA) Screening:        General: Screening Not Indicated      Lung Cancer Screening:     General: Screening Not Indicated      Hepatitis C Screening:    General: Screening Current and Risks and Benefits Discussed    Screening, Brief Intervention, and Referral to Treatment (SBIRT)    Screening  Typical number of drinks in a day: 0  Typical  "number of drinks in a week: 12  Interpretation: Low risk drinking behavior.    Single Item Drug Screening:  How often have you used an illegal drug (including marijuana) or a prescription medication for non-medical reasons in the past year? never    Single Item Drug Screen Score: 0  Interpretation: Negative screen for possible drug use disorder    Brief Intervention  Alcohol & drug use screenings were reviewed. No concerns regarding substance use disorder identified.     Other Counseling Topics:   Car/seat belt/driving safety, skin self-exam, sunscreen and calcium and vitamin D intake and regular weightbearing exercise.     Social Drivers of Health     Financial Resource Strain: Low Risk  (12/20/2023)    Overall Financial Resource Strain (CARDIA)    • Difficulty of Paying Living Expenses: Not hard at all   Food Insecurity: No Food Insecurity (12/23/2024)    Hunger Vital Sign    • Worried About Running Out of Food in the Last Year: Never true    • Ran Out of Food in the Last Year: Never true   Transportation Needs: No Transportation Needs (12/23/2024)    PRAPARE - Transportation    • Lack of Transportation (Medical): No    • Lack of Transportation (Non-Medical): No   Housing Stability: Low Risk  (12/23/2024)    Housing Stability Vital Sign    • Unable to Pay for Housing in the Last Year: No    • Number of Times Moved in the Last Year: 1    • Homeless in the Last Year: No   Utilities: Not At Risk (9/27/2024)    [facility] Utilities    • Threatened with loss of utilities: No     No results found.    Objective   /82 (BP Location: Left arm, Patient Position: Sitting, Cuff Size: Standard)   Pulse 92   Temp 98.5 °F (36.9 °C) (Temporal)   Resp 20   Ht 5' 9.5\" (1.765 m)   Wt 93.4 kg (206 lb)   SpO2 98%   BMI 29.98 kg/m²     Physical Exam  Vitals and nursing note reviewed.   Constitutional:       General: He is not in acute distress.     Appearance: Normal appearance. He is normal weight. He is not ill-appearing, " toxic-appearing or diaphoretic.   HENT:      Head: Normocephalic and atraumatic.      Right Ear: Tympanic membrane, ear canal and external ear normal. There is no impacted cerumen.      Left Ear: Tympanic membrane, ear canal and external ear normal. There is no impacted cerumen.      Nose: Nose normal. No congestion or rhinorrhea.      Mouth/Throat:      Mouth: Mucous membranes are moist.      Pharynx: Oropharynx is clear. No oropharyngeal exudate or posterior oropharyngeal erythema.   Eyes:      General: No scleral icterus.        Right eye: No discharge.         Left eye: No discharge.      Extraocular Movements: Extraocular movements intact.      Conjunctiva/sclera: Conjunctivae normal.      Pupils: Pupils are equal, round, and reactive to light.   Neck:      Vascular: No carotid bruit.   Cardiovascular:      Rate and Rhythm: Normal rate and regular rhythm.      Pulses: Normal pulses.      Heart sounds: Normal heart sounds. No murmur heard.     No friction rub. No gallop.   Pulmonary:      Effort: Pulmonary effort is normal. No respiratory distress.      Breath sounds: Normal breath sounds. No wheezing or rales.   Abdominal:      General: Abdomen is flat. Bowel sounds are normal. There is no distension.      Palpations: Abdomen is soft. There is no mass.      Tenderness: There is no abdominal tenderness. There is no guarding.      Hernia: No hernia is present.   Musculoskeletal:         General: No swelling, tenderness, deformity or signs of injury. Normal range of motion.      Cervical back: Normal range of motion and neck supple. No rigidity. No muscular tenderness.      Right lower leg: No edema.      Left lower leg: No edema.   Lymphadenopathy:      Cervical: No cervical adenopathy.   Skin:     General: Skin is warm and dry.      Capillary Refill: Capillary refill takes less than 2 seconds.      Coloration: Skin is not jaundiced or pale.      Findings: No bruising, erythema, lesion or rash.   Neurological:       General: No focal deficit present.      Mental Status: He is alert and oriented to person, place, and time. Mental status is at baseline.      Cranial Nerves: No cranial nerve deficit.      Sensory: No sensory deficit.      Motor: No weakness.      Coordination: Coordination normal.      Gait: Gait normal.      Deep Tendon Reflexes: Reflexes normal.   Psychiatric:         Mood and Affect: Mood normal.         Behavior: Behavior normal.         Thought Content: Thought content normal.         Judgment: Judgment normal.

## 2024-12-24 NOTE — ASSESSMENT & PLAN NOTE
Serious Illness Conversation    1. What is your understanding now of where you are with your illness?  Prognostic Understanding: appropriate understanding of prognosis     2. How much information about what is likely to be ahead with your illness would you like to have?  Information: patient wants to be fully informed     3. What did you (clinician) communicate to the patient?     4. If your health situation worsens, what are your most important goals?  Goals: have my medical decisions respected, be mentally aware, be at home, be physically comfortable, be spiritually and emotionally at peace, not be a burden, be emotionally at peace     5. What are the biggest fears and worries about the future and your health?  Fears/Worries: loss of control, being a financial burden, being a physical burden, burdening others     6. What abilities are so critical to your life that you cannot imagine living without them?  Unacceptable Function: being unconscious     7. What gives you strength as you think about the future with your illness?     8. If you become sicker, how much are you willing to go through for the possibility of gaining more time?  Be in the hospital: Yes Have a feeding tube: Yes   Be in the ICU: Yes Live in a nursing home: Yes   Be on a ventilator: Yes Be uncomfortable: Yes   Be on dialysis: Yes Undergo aggressive test and/or procedures: Yes   9. How much does your proxy and family know about your priorities and wishes?  Discussion Discussion: extensive discussion with family about goals and wishes        How does this plan sound to you? I will do everything I can to help you through this.  Patient verbalized understanding of the plan     I have spent 5minutes speaking with my patient on advanced care planning today or during this visit     Advanced directives  Five Wishes: Patient does not have Five Wishes- would like information

## 2025-02-03 ENCOUNTER — TELEPHONE (OUTPATIENT)
Age: 60
End: 2025-02-03

## 2025-02-03 NOTE — TELEPHONE ENCOUNTER
Caller: Alexx    Doctor: Dr. Whipple    Reason for call: needs to reschedule up coming SX  Please advise   Thank you    Call back#: [phone]

## 2025-02-06 DIAGNOSIS — I10 ESSENTIAL HYPERTENSION: Primary | ICD-10-CM

## 2025-02-06 NOTE — TELEPHONE ENCOUNTER
Pt called for new order for:  lisinopril (ZESTRIL) 20 mg tablet   Sig: Take 1 tablet by mouth daily last dispensed 10/13/2024. Pt requesting 90-day supply to be sent to:    [facility]/pharmacy #[id number] - [place] [address]  Phone: [phone]  Fax: [phone]  LYLY #: [id number]

## 2025-02-07 ENCOUNTER — OFFICE VISIT (OUTPATIENT)
Dept: URGENT CARE | Age: 60
End: 2025-02-07
Payer: MEDICARE

## 2025-02-07 VITALS
BODY MASS INDEX: 30.9 KG/M2 | HEIGHT: 69 IN | SYSTOLIC BLOOD PRESSURE: 140 MMHG | TEMPERATURE: 98.5 F | RESPIRATION RATE: 20 BRPM | HEART RATE: 97 BPM | OXYGEN SATURATION: 99 % | DIASTOLIC BLOOD PRESSURE: 90 MMHG | WEIGHT: 208.6 LBS

## 2025-02-07 DIAGNOSIS — L03.211 CELLULITIS OF FACE: Primary | ICD-10-CM

## 2025-02-07 DIAGNOSIS — J30.89 NON-SEASONAL ALLERGIC RHINITIS, UNSPECIFIED TRIGGER: ICD-10-CM

## 2025-02-07 DIAGNOSIS — J45.909 PERSISTENT ASTHMA WITHOUT COMPLICATION, UNSPECIFIED ASTHMA SEVERITY: ICD-10-CM

## 2025-02-07 PROCEDURE — 99213 OFFICE O/P EST LOW 20 MIN: CPT | Performed by: STUDENT IN AN ORGANIZED HEALTH CARE EDUCATION/TRAINING PROGRAM

## 2025-02-07 PROCEDURE — G0463 HOSPITAL OUTPT CLINIC VISIT: HCPCS | Performed by: STUDENT IN AN ORGANIZED HEALTH CARE EDUCATION/TRAINING PROGRAM

## 2025-02-07 RX ORDER — CEPHALEXIN 500 MG/1
500 CAPSULE ORAL EVERY 12 HOURS SCHEDULED
Qty: 14 CAPSULE | Refills: 0 | Status: SHIPPED | OUTPATIENT
Start: 2025-02-07 | End: 2025-02-14

## 2025-02-07 RX ORDER — PREDNISONE 20 MG/1
40 TABLET ORAL DAILY
Qty: 10 TABLET | Refills: 0 | Status: SHIPPED | OUTPATIENT
Start: 2025-02-07 | End: 2025-02-12

## 2025-02-07 RX ORDER — LISINOPRIL 10 MG/1
10 TABLET ORAL DAILY
Qty: 90 TABLET | Refills: 0 | Status: SHIPPED | OUTPATIENT
Start: 2025-02-07

## 2025-02-08 NOTE — PROGRESS NOTES
[facility]        NAME: Alexx Haney is a 59 y.o. male  : 1965    MRN: [id number]  DATE: 2025  TIME: 8:01 PM    Assessment and Orders   Cellulitis of face [L03.211]  1. Cellulitis of face  cephalexin (KEFLEX) 500 mg capsule      2. Persistent asthma without complication, unspecified asthma severity  predniSONE 20 mg tablet      3. Non-seasonal allergic rhinitis, unspecified trigger  predniSONE 20 mg tablet            Plan and Discussion      Pustule on the face easily expressed with minimal pressure. Area cleaned and covered. Will treat cellulitis with Keflex. Patient also complaining of persistent congestion and intermittent wheezing. Will prescribe short course of oral steroids.      Risks and benefits discussed. Patient understands and agrees with the plan.     PATIENT INSTRUCTIONS        If tests have been performed at [facility], our office will contact you with results if changes need to be made to the care plan discussed with you at the visit.  [facility]    Follow up with PCP.     If any of the following occur, please report to your nearest ED for evaluation or call 911.   Difficultly breathing or shortness of breath  Chest pain  Acutely worsening symptoms.         Chief Complaint     Chief Complaint   Patient presents with    Epidermal Cyst     Left side of face has a cyst, draining clear drainage started about a week ago. Now red, with some discomfort when pressed.          History of Present Illness       Has pustule on the left cheek for a few days. Has been getting worse. Bigger and more painful.  Some drainage already. No fevers.     Also having persistent congestion despite OTC therapies. Also having cough with intermittent wheezing from asthma. Has been using his inhaler.             Review of Systems   Review of Systems  As stated above    Current Medications       Current Outpatient Medications:     albuterol (2.5 mg/3 mL)  0.083 % nebulizer solution, Take 3 mL (2.5 mg total) by nebulization every 6 (six) hours as needed for wheezing or shortness of breath, Disp: 240 mL, Rfl: 2    albuterol (PROVENTIL HFA,VENTOLIN HFA) 90 mcg/act inhaler, Inhale 1 puff if needed for wheezing or shortness of breath, Disp: 48 g, Rfl: 1    cephalexin (KEFLEX) 500 mg capsule, Take 1 capsule (500 mg total) by mouth every 12 (twelve) hours for 7 days, Disp: 14 capsule, Rfl: 0    esomeprazole (NexIUM) 40 MG capsule, Take 40 mg by mouth every morning before breakfast, Disp: , Rfl:     febuxostat (ULORIC) 40 mg tablet, Take 1 tablet (40 mg total) by mouth daily, Disp: 90 tablet, Rfl: 1    fluticasone-salmeterol (Advair) 500-50 mcg/dose inhaler, Inhale 1 puff 2 (two) times a day, Disp: , Rfl:     lisinopril (ZESTRIL) 10 mg tablet, Take 1 tablet by mouth every 24 hours, Disp: , Rfl:     lisinopril (ZESTRIL) 10 mg tablet, Take 1 tablet (10 mg total) by mouth daily, Disp: 90 tablet, Rfl: 0    methocarbamol (ROBAXIN) 500 mg tablet, Take 250 mg by mouth every 8 (eight) hours as needed for muscle spasms, Disp: , Rfl:     montelukast (SINGULAIR) 10 mg tablet, Take 1 tablet (10 mg total) by mouth daily, Disp: , Rfl:     Multiple Vitamin (multivitamin) tablet, Take 1 tablet by mouth daily Begin 30 days prior to surgery., Disp: 30 tablet, Rfl: 1    predniSONE 20 mg tablet, Take 2 tablets (40 mg total) by mouth daily for 5 days, Disp: 10 tablet, Rfl: 0    Triamcinolone Acetonide (NASACORT ALLERGY 24HR NA), 1 spray into each nostril daily  , Disp: , Rfl:     verapamil (CALAN) 120 mg tablet, TAKE 1 TABLET BY MOUTH EVERY DAY, Disp: 90 tablet, Rfl: 1    acetaminophen (TYLENOL) 500 mg tablet, Take 2 tablets (1,000 mg total) by mouth every 8 (eight) hours 1500 at times (Patient not taking: Reported on 2/7/2025), Disp: , Rfl:     ascorbic acid (VITAMIN C) 500 MG tablet, Take 1 tablet (500 mg total) by mouth daily Begin 30 days prior to surgery. (Patient not taking: Reported on  2/7/2025), Disp: 30 tablet, Rfl: 1    clotrimazole-betamethasone (LOTRISONE) 1-0.05 % cream, Apply topically 2 (two) times a day (Patient not taking: Reported on 2/7/2025), Disp: 90 g, Rfl: 1    meclizine (ANTIVERT) 12.5 MG tablet, Take 1 tablet (12.5 mg total) by mouth every 8 (eight) hours as needed for dizziness (Patient not taking: Reported on 2/7/2025), Disp: , Rfl:     Current Allergies     Allergies as of 02/07/2025 - Reviewed 02/07/2025   Allergen Reaction Noted    Penicillins Rash and Anaphylaxis 08/17/2004    Acetazolamide  10/25/2016    Cephalosporins Other (See Comments) 08/17/2004    Other  04/28/2014    Sulfa antibiotics Other (See Comments) 08/17/2004    Famotidine Palpitations 09/05/2016    Levofloxacin Palpitations 11/12/2021    Omeprazole Palpitations 09/05/2016            The following portions of the patient's history were reviewed and updated as appropriate: allergies, current medications, past family history, past medical history, past social history, past surgical history and problem list.     Past Medical History:   Diagnosis Date    Acute bronchitis 01/29/2024    Anxiety 01/15/2019    Arthritis     Asthma     Chronic rhinitis     last assessed 2/21/14    Chronic serous otitis media     last assessed 11/20/13    Chronic sinusitis     last assessed 8/19/14    Functional heart murmur     GERD (gastroesophageal reflux disease)     Gout     Hearing problem     last assessed 12/1/16    Hiatal hernia     Hypercholesterolemia     Hypertension     Palpitations     Testicular hypogonadism     last assessed 10/4/13    V-tach ([facility])        Past Surgical History:   Procedure Laterality Date    APPENDECTOMY      BICEPS TENDON REPAIR Left 2009    BICEPS TENODESIS      anesthesia for tenodesis- of ruptured long tendon of biceps     HERNIA REPAIR      [place] ARTHRP KNE CONDYLE&PLATU MEDIAL&LAT COMPARTMENTS Right 9/17/2024    Procedure: ARTHROPLASTY KNEE TOTAL, potential same day discharge, cemented;  Surgeon:  "Parvin Dasilva SantiagojohanDO;  Location:  MAIN OR;  Service: Orthopedics    THYROIDECTOMY      TONSILLECTOMY         Family History   Problem Relation Age of Onset    Lung cancer Father     Coronary artery disease Father         blockages    Stroke Maternal Grandmother     Cancer Paternal Grandfather         metastasized    Heart disease Family     Lung cancer Cousin     No Known Problems Mother     No Known Problems Sister     No Known Problems Brother     No Known Problems Maternal Aunt     No Known Problems Maternal Uncle     No Known Problems Paternal Aunt     No Known Problems Paternal Uncle     No Known Problems Paternal Grandmother     ADD / ADHD Neg Hx     Anesthesia problems Neg Hx     Clotting disorder Neg Hx     Collagen disease Neg Hx     Diabetes Neg Hx     Dislocations Neg Hx     Learning disabilities Neg Hx     Neurological problems Neg Hx     Osteoporosis Neg Hx     Rheumatologic disease Neg Hx     Scoliosis Neg Hx     Vascular Disease Neg Hx          Medications have been verified.        Objective   /90   Pulse 97   Temp 98.5 °F (36.9 °C)   Resp 20   Ht 5' 9\" (1.753 m)   Wt 94.6 kg (208 lb 9.6 oz)   SpO2 99%   BMI 30.80 kg/m²   No LMP for male patient.       Physical Exam     Physical Exam  HENT:      Head:        Nose: Congestion present. No rhinorrhea.   Cardiovascular:      Rate and Rhythm: Normal rate and regular rhythm.   Pulmonary:      Effort: No respiratory distress.      Breath sounds: No wheezing or rhonchi.   Skin:     Findings: Erythema and lesion present.   Neurological:      Mental Status: He is alert and oriented to person, place, and time.   Psychiatric:      Comments: +anxious                 Vi Galaviz DO       "

## 2025-02-10 NOTE — TELEPHONE ENCOUNTER
Patient states he's been on 20mg for lisinopril. He was unaware the change and wants to know what the doctor wants him to be on. He believes the 10mg was a mistake. Please advise back to patient to further assist.

## 2025-02-10 NOTE — TELEPHONE ENCOUNTER
Spoke with the patient. He would like to have his knee injected on 3/12 so selected 6/17 as his new surgery date

## 2025-02-27 ENCOUNTER — TELEPHONE (OUTPATIENT)
Age: 60
End: 2025-02-27

## 2025-02-27 NOTE — TELEPHONE ENCOUNTER
Alexx called to schedule a TCM after his  admission. Advised that Luciano would contact him shortly to schedule but patient was insistent on being scheduled at this time.    I scheduled him an OVL with Dr. Taylor tomorrow 2/28 @ 2:15. Patient is aware that this visit may be tentative as Luciano still needs to ask him some clinical questions.    Please contact to advise.

## 2025-02-28 RX ORDER — RISPERIDONE 4 MG/1
4 TABLET ORAL DAILY
COMMUNITY
Start: 2025-02-28 | End: 2026-02-28

## 2025-02-28 RX ORDER — HYDROXYZINE HYDROCHLORIDE 25 MG/1
25 TABLET, FILM COATED ORAL DAILY PRN
COMMUNITY
Start: 2025-02-28 | End: 2026-02-28

## 2025-03-03 ENCOUNTER — OFFICE VISIT (OUTPATIENT)
Dept: INTERNAL MEDICINE CLINIC | Facility: OTHER | Age: 60
End: 2025-03-03
Payer: MEDICARE

## 2025-03-03 VITALS
DIASTOLIC BLOOD PRESSURE: 82 MMHG | OXYGEN SATURATION: 95 % | HEIGHT: 69 IN | TEMPERATURE: 98.8 F | WEIGHT: 208 LBS | SYSTOLIC BLOOD PRESSURE: 140 MMHG | BODY MASS INDEX: 30.81 KG/M2 | HEART RATE: 85 BPM

## 2025-03-03 DIAGNOSIS — R71.8 ABNORMAL HEMATOCRIT: ICD-10-CM

## 2025-03-03 DIAGNOSIS — E78.2 MIXED HYPERLIPIDEMIA: ICD-10-CM

## 2025-03-03 DIAGNOSIS — I10 ESSENTIAL HYPERTENSION: ICD-10-CM

## 2025-03-03 DIAGNOSIS — I47.20 V-TACH (HCC): ICD-10-CM

## 2025-03-03 DIAGNOSIS — R44.3 HALLUCINATIONS: Primary | ICD-10-CM

## 2025-03-03 PROCEDURE — 99496 TRANSJ CARE MGMT HIGH F2F 7D: CPT | Performed by: INTERNAL MEDICINE

## 2025-03-03 RX ORDER — LISINOPRIL 10 MG/1
20 TABLET ORAL DAILY
Qty: 180 TABLET | Refills: 0 | Status: SHIPPED | OUTPATIENT
Start: 2025-03-03

## 2025-03-03 NOTE — ASSESSMENT & PLAN NOTE
Continue prior antihypertensive regimen with lisinopril 20 mg daily.   Orders:    lisinopril (ZESTRIL) 10 mg tablet; Take 2 tablets (20 mg total) by mouth daily

## 2025-03-03 NOTE — PROGRESS NOTES
Transition of Care Visit  Name: Alexx Haney      : 1965      MRN: [id number]  Encounter Provider: Javier Delacruz MD  Encounter Date: 3/3/2025   Encounter department: [facility]    Assessment & Plan  Hallucinations  Hospitalized with hallucinations and paranoia. No hallucinations in office today.        Essential hypertension  Continue prior antihypertensive regimen with lisinopril 20 mg daily.   Orders:    lisinopril (ZESTRIL) 10 mg tablet; Take 2 tablets (20 mg total) by mouth daily    Abnormal hematocrit  Hct down to 36.5 during hospitalization. Will repeat.   Orders:    CBC and differential; Future    V-tach (HCC)         Mixed hyperlipidemia              History of Present Illness     Transitional Care Management Review:   Alexx Haney is a 59 y.o. male here for TCM follow up. Patient was last seen at our office on 24 with Dr. Beltrán for follow up on chronic conditions. They have a PMH notable for gout, HTN, and bipolar I.    He was hospitalized 2/10- for paranoia and auditory hallucinations. He was discharged on risperidone. Hospital course was notable for one near syncopal episode on . Echo showed LVEF 61-65%; AV mildly thickened, trace MV and TV regurg. CBC showed Hct down to 36.5 with normal MCV. CMP with Na of 133.   Discharge summary mentions significant orthostatic drop in BP. CT Head obtained  reviewed, notable for microangiopathic changes.     Patient states that his near syncopal episode occurred while he was having a bowel movement. He was constipated at the time. He states he has a peripheral vestibular deficit which sometimes makes him feel dizzy. Orthostatic BP was evaluated in office today. His seated BP was 132/76 and standing BP was 140/82. No dizziness on standing in the office today. Etiology of near syncope appears to be vasovagal.     During the TCM phone call patient stated:  TCM Call       Date and time call was  made  2/27/2025  1:12 PM    Hospital care reviewed  Records reviewed    Patient was hospitialized at  Other (comment)    Comment  [facility]    Date of Admission  02/10/25    Date of discharge  02/27/25    Diagnosis  paranoid delusion    Disposition  Home    Were the patients medications reviewed and updated  Yes    Current Symptoms  None    Dizziness severity  Mild          TCM Call       Post hospital issues  None    Should patient be enrolled in anticoag monitoring?  No    Scheduled for follow up?  Yes    Did you obtain your prescribed medications  Yes    Do you need help managing your prescriptions or medications  No    Is transportation to your appointment needed  No    I have advised the patient to call PCP with any new or worsening symptoms  Luciano Sheldon CMA    Living Arrangements  Family members    Support System  Family    The type of support provided  Other (comment)    Do you have social support  Yes, as much as I need    Are you recieving any outpatient services  No    Are you recieving home care services  No    Are you using any community resources  No    Current waiver services  No    Have you fallen in the last 12 months  No    Interperter language line needed  No    Counseling  Patient    Counseling topics  Activities of daily living; Importance of RX compliance; instructions for management          See: TCM Review      Review of Systems   Constitutional:  Negative for chills, fatigue and fever.   Eyes:  Negative for visual disturbance.   Respiratory:  Positive for cough. Negative for chest tightness, shortness of breath and wheezing.    Cardiovascular:  Positive for leg swelling. Negative for chest pain and palpitations.   Gastrointestinal:  Positive for constipation. Negative for abdominal distention, abdominal pain and diarrhea.   Neurological:  Negative for dizziness and facial asymmetry.     Objective   /82 (BP Location: Right arm, Patient Position: Standing)   Pulse 85    "Temp 98.8 °F (37.1 °C) (Temporal)   Ht 5' 9\" (1.753 m)   Wt 94.3 kg (208 lb)   SpO2 95%   BMI 30.72 kg/m²     Physical Exam  Vitals reviewed.   Constitutional:       General: He is not in acute distress.     Appearance: Normal appearance. He is obese. He is not ill-appearing or diaphoretic.   HENT:      Head: Normocephalic.      Right Ear: External ear normal.      Left Ear: External ear normal.      Mouth/Throat:      Mouth: Mucous membranes are moist.   Eyes:      General: No scleral icterus.        Right eye: No discharge.         Left eye: No discharge.      Extraocular Movements: Extraocular movements intact.   Cardiovascular:      Rate and Rhythm: Normal rate and regular rhythm.      Pulses: Normal pulses.      Heart sounds: No murmur heard.     No friction rub. No gallop.   Pulmonary:      Effort: Pulmonary effort is normal. No respiratory distress.      Breath sounds: No wheezing.   Abdominal:      General: Abdomen is flat. There is no distension.      Palpations: Abdomen is soft.   Musculoskeletal:         General: No swelling.      Right lower leg: Edema present.      Left lower leg: Edema present.   Skin:     General: Skin is warm and dry.      Capillary Refill: Capillary refill takes less than 2 seconds.   Neurological:      General: No focal deficit present.      Mental Status: He is alert.   Psychiatric:         Attention and Perception: He does not perceive auditory or visual hallucinations.         Mood and Affect: Mood is not anxious or elated. Affect is labile. Affect is not blunt, angry or inappropriate.         Speech: He is communicative. Speech is rapid and pressured and tangential. Speech is not slurred.         Behavior: Behavior is cooperative.         Thought Content: Thought content is paranoid.       Medications have been reviewed by provider in current encounter      "

## 2025-03-12 ENCOUNTER — APPOINTMENT (OUTPATIENT)
Dept: RADIOLOGY | Facility: MEDICAL CENTER | Age: 60
End: 2025-03-12
Payer: MEDICARE

## 2025-03-12 ENCOUNTER — OFFICE VISIT (OUTPATIENT)
Dept: OBGYN CLINIC | Facility: MEDICAL CENTER | Age: 60
End: 2025-03-12
Payer: MEDICARE

## 2025-03-12 VITALS — WEIGHT: 211.2 LBS | BODY MASS INDEX: 31.28 KG/M2 | HEIGHT: 69 IN

## 2025-03-12 DIAGNOSIS — M17.11 PRIMARY OSTEOARTHRITIS OF RIGHT KNEE: ICD-10-CM

## 2025-03-12 DIAGNOSIS — M17.12 PRIMARY OSTEOARTHRITIS OF LEFT KNEE: ICD-10-CM

## 2025-03-12 DIAGNOSIS — M17.12 PRIMARY OSTEOARTHRITIS OF LEFT KNEE: Primary | ICD-10-CM

## 2025-03-12 DIAGNOSIS — Z01.818 PREOPERATIVE TESTING: ICD-10-CM

## 2025-03-12 DIAGNOSIS — M25.59 PAIN IN OTHER SPECIFIED JOINT: ICD-10-CM

## 2025-03-12 PROCEDURE — 73564 X-RAY EXAM KNEE 4 OR MORE: CPT

## 2025-03-12 PROCEDURE — 99213 OFFICE O/P EST LOW 20 MIN: CPT | Performed by: ORTHOPAEDIC SURGERY

## 2025-03-12 PROCEDURE — 77073 BONE LENGTH STUDIES: CPT

## 2025-03-12 PROCEDURE — 20610 DRAIN/INJ JOINT/BURSA W/O US: CPT | Performed by: ORTHOPAEDIC SURGERY

## 2025-03-12 RX ORDER — FERROUS SULFATE 324(65)MG
324 TABLET, DELAYED RELEASE (ENTERIC COATED) ORAL
Qty: 30 TABLET | Refills: 1 | Status: SHIPPED | OUTPATIENT
Start: 2025-03-12

## 2025-03-12 RX ORDER — MUPIROCIN 20 MG/G
OINTMENT TOPICAL 2 TIMES DAILY
Qty: 15 G | Refills: 0 | Status: SHIPPED | OUTPATIENT
Start: 2025-03-12

## 2025-03-12 RX ORDER — ASCORBIC ACID 500 MG
500 TABLET ORAL DAILY
Qty: 30 TABLET | Refills: 1 | Status: SHIPPED | OUTPATIENT
Start: 2025-03-12

## 2025-03-12 RX ORDER — FOLIC ACID 1 MG/1
1 TABLET ORAL DAILY
Qty: 30 TABLET | Refills: 1 | Status: SHIPPED | OUTPATIENT
Start: 2025-03-12

## 2025-03-12 RX ORDER — MULTIVITAMIN
1 TABLET ORAL DAILY
Qty: 30 TABLET | Refills: 1 | Status: SHIPPED | OUTPATIENT
Start: 2025-03-12

## 2025-03-12 RX ADMIN — TRIAMCINOLONE ACETONIDE 40 MG: 40 INJECTION, SUSPENSION INTRA-ARTICULAR; INTRAMUSCULAR at 15:00

## 2025-03-12 RX ADMIN — BUPIVACAINE HYDROCHLORIDE 2 ML: 2.5 INJECTION, SOLUTION INFILTRATION; PERINEURAL at 15:00

## 2025-03-12 NOTE — ASSESSMENT & PLAN NOTE
Patient has severe left knee arthritis.  he has tried conservative treatment without adequate relief.  We discussed treatment options as well as risks and benefits of treatment options.  The patient would like to proceed with a left total knee arthroplasty.  The risks of surgery include, but are not limited to infection, blood clot, wound healing problems, blood loss, damage to blood vessels and nerves, persistent pain and stiffness, fracture, need for additional surgery, need for revision surgery, failure of hardware, heart attack, stroke, death.  The patient understood and agreed to by oral and written consent.  I answered all questions regarding surgery.  Reviewed with patient to expect some numbness over the lateral aspect of the knee after surgery.  We let the patient know to avoid kneeling while the incision is healing, typically for the first 4-6 weeks.  Once incision is healed kneeling if permitted but may still be uncomfortable for some patients long term.    Reviewed no driving for 4-6 weeks for right sided surgery once off narcotics.  No driving until off narcotics for left sided surgery.    Preoperative vitamins were prescribed.  Patient instructed to  what they are not already taking and start 30 days before surgery.  We let the patient know that some people experience constipation while on iron.  If that occurs can take iron every other day.  If still an issue can stop the iron.  Can also add in OTC medication and/or prune juice for constipation.    We also let the patient know that some people experience constipation after surgery while on narcotics.  We suggest to have OTC medications and/or prune juice available if needed.    Patient is scheduled for left TKA on 6/17/2025  DVT Prophylaxis: ASA  The patient will obtain clearance from: SOC, cardiology, nephrology  Injections: Patient received left knee steroid injection today. Tolerated the procedure well. Post injection instructions reviewed  including information on glucose monitoring for diabetic patients. Patient aware that they may repeat steroid injection every 3 months if needed.  Patient is allowed to have surgical intervention to his left knee anytime after 6/12/2025.  Patient was in rehab after surgery.  Patient will follow-up 2 weeks after surgery instead of 3 days after surgery as he will be in rehab after left TKA.  Patient will sign consent at his 1 week preop appointment.  Orders:    XR bone length (scanogram); Future    XR knee 4+ vw left injury; Future    Large joint arthrocentesis: L knee    Comprehensive metabolic panel; Future    Hemoglobin A1C W/EAG Estimation; Future    CBC and differential; Future    MRSA culture; Future    Iron Panel (Includes Iron Saturation, Iron, and TIBC); Future    C-reactive protein; Future    Anemia Panel w/Reflex; Future    Protime-INR; Future    APTT; Future    Type and screen; Future    Ambulatory Referral to [facility]; Future    Ambulatory referral to Physical Therapy; Future    Ambulatory referral to Cardiology; Future    Ambulatory referral to Nephrology; Future    EKG 12 lead; Future    Case request operating room: ARTHROPLASTY KNEE TOTAL, overnight; Standing

## 2025-03-12 NOTE — PROGRESS NOTES
Name: Alexx Haney      : 1965      MRN: [id number]  Encounter Provider: Parvin Whipple DO  Encounter Date: 3/12/2025   Encounter department: [facility] [place]  :  Assessment & Plan  Primary osteoarthritis of left knee  Patient has severe left knee arthritis.  he has tried conservative treatment without adequate relief.  We discussed treatment options as well as risks and benefits of treatment options.  The patient would like to proceed with a left total knee arthroplasty.  The risks of surgery include, but are not limited to infection, blood clot, wound healing problems, blood loss, damage to blood vessels and nerves, persistent pain and stiffness, fracture, need for additional surgery, need for revision surgery, failure of hardware, heart attack, stroke, death.  The patient understood and agreed to by oral and written consent.  I answered all questions regarding surgery.  Reviewed with patient to expect some numbness over the lateral aspect of the knee after surgery.  We let the patient know to avoid kneeling while the incision is healing, typically for the first 4-6 weeks.  Once incision is healed kneeling if permitted but may still be uncomfortable for some patients long term.    Reviewed no driving for 4-6 weeks for right sided surgery once off narcotics.  No driving until off narcotics for left sided surgery.    Preoperative vitamins were prescribed.  Patient instructed to  what they are not already taking and start 30 days before surgery.  We let the patient know that some people experience constipation while on iron.  If that occurs can take iron every other day.  If still an issue can stop the iron.  Can also add in OTC medication and/or prune juice for constipation.    We also let the patient know that some people experience constipation after surgery while on narcotics.  We suggest to have OTC medications and/or prune juice available if needed.     Patient is scheduled for left TKA on 6/17/2025  DVT Prophylaxis: ASA  The patient will obtain clearance from: SOC, cardiology, nephrology  Injections: Patient received left knee steroid injection today. Tolerated the procedure well. Post injection instructions reviewed including information on glucose monitoring for diabetic patients. Patient aware that they may repeat steroid injection every 3 months if needed.  Patient is allowed to have surgical intervention to his left knee anytime after 6/12/2025.  Patient was in rehab after surgery.  Patient will follow-up 2 weeks after surgery instead of 3 days after surgery as he will be in rehab after left TKA.  Patient will sign consent at his 1 week preop appointment.  Orders:    XR bone length (scanogram); Future    XR knee 4+ vw left injury; Future    Large joint arthrocentesis: L knee    Comprehensive metabolic panel; Future    Hemoglobin A1C W/EAG Estimation; Future    CBC and differential; Future    MRSA culture; Future    Iron Panel (Includes Iron Saturation, Iron, and TIBC); Future    C-reactive protein; Future    Anemia Panel w/Reflex; Future    Protime-INR; Future    APTT; Future    Type and screen; Future    Ambulatory Referral to [facility]; Future    Ambulatory referral to Physical Therapy; Future    Ambulatory referral to Cardiology; Future    Ambulatory referral to Nephrology; Future    EKG 12 lead; Future    Case request operating room: ARTHROPLASTY KNEE TOTAL, overnight; Standing    Primary osteoarthritis of right knee  Patient has severe left knee arthritis.  he has tried conservative treatment without adequate relief.  We discussed treatment options as well as risks and benefits of treatment options.  The patient would like to proceed with a left total knee arthroplasty.  The risks of surgery include, but are not limited to infection, blood clot, wound healing problems, blood loss, damage to blood vessels and nerves, persistent  pain and stiffness, fracture, need for additional surgery, need for revision surgery, failure of hardware, heart attack, stroke, death.  The patient understood and agreed to by oral and written consent.  I answered all questions regarding surgery.  Reviewed with patient to expect some numbness over the lateral aspect of the knee after surgery.  We let the patient know to avoid kneeling while the incision is healing, typically for the first 4-6 weeks.  Once incision is healed kneeling if permitted but may still be uncomfortable for some patients long term.    Reviewed no driving for 4-6 weeks for right sided surgery once off narcotics.  No driving until off narcotics for left sided surgery.    Preoperative vitamins were prescribed.  Patient instructed to  what they are not already taking and start 30 days before surgery.  We let the patient know that some people experience constipation while on iron.  If that occurs can take iron every other day.  If still an issue can stop the iron.  Can also add in OTC medication and/or prune juice for constipation.    We also let the patient know that some people experience constipation after surgery while on narcotics.  We suggest to have OTC medications and/or prune juice available if needed.    Patient is scheduled for left TKA on 6/17/2025  DVT Prophylaxis: ASA  The patient will obtain clearance from: SOC, cardiology, nephrology  Injections: Patient received left knee steroid injection today. Tolerated the procedure well. Post injection instructions reviewed including information on glucose monitoring for diabetic patients. Patient aware that they may repeat steroid injection every 3 months if needed.  Patient is allowed to have surgical intervention to his left knee anytime after 6/12/2025.  Patient was in rehab after surgery.  Patient will follow-up 2 weeks after surgery instead of 3 days after surgery as he will be in rehab after left TKA.  Orders:    ferrous sulfate  324 (65 Fe) mg; Take 1 tablet (324 mg total) by mouth daily before breakfast Begin 30 days prior to surgery    ascorbic acid (VITAMIN C) 500 MG tablet; Take 1 tablet (500 mg total) by mouth daily Begin 30 days prior to surgery    folic acid (KP Folic Acid) 1 mg tablet; Take 1 tablet (1 mg total) by mouth daily Begin 30 days prior to surgery    Multiple Vitamin (multivitamin) tablet; Take 1 tablet by mouth daily Begin 30 days prior to surgery    mupirocin (BACTROBAN) 2 % ointment; Apply topically 2 (two) times a day Apply to each nostril 2 times a day with Q-tip 5 days prior to surgery    Preoperative testing    Orders:    Comprehensive metabolic panel; Future    Hemoglobin A1C W/EAG Estimation; Future    CBC and differential; Future    MRSA culture; Future    Iron Panel (Includes Iron Saturation, Iron, and TIBC); Future    C-reactive protein; Future    Anemia Panel w/Reflex; Future    Protime-INR; Future    APTT; Future    Type and screen; Future    Ambulatory Referral to [facility]; Future    Ambulatory referral to Physical Therapy; Future    Ambulatory referral to Cardiology; Future    Ambulatory referral to Nephrology; Future    EKG 12 lead; Future    Pain in other specified joint    Orders:    Iron Panel (Includes Iron Saturation, Iron, and TIBC); Future    Protime-INR; Future    APTT; Future        Return for 1 week prior to surgery.    I answered all of the patient's questions during the visit and provided education of the patient's condition during the visit.  The patient verbalized understanding of the information given and agrees with the plan.  This note was dictated using [employer] software.  It may contain errors including improperly dictated words.  Please contact physician directly for any questions.    Subjective   Chief Complaint:   Chief Complaint   Patient presents with    Left Knee - Follow-up         History of Present Illness     HPI    Alexx Haney is a 60 y.o. male who  presents for follow up for severe left knee osteoarthritis.  Patient states today that he was scheduled for left TKA on April 2025 however he states that due to transportation and his confidence, patient feels he would do better postoperatively in June 2025.  Patient states he rescheduled his left TKA for 6/17/2025.  Patient states in the meantime he has been taking over-the-counter medications doing HEP that he learned from his right TKA.  Patient states he is interested in left knee CSI at today's visit.    Patient is 6-month status post right TKA.  Patient states he is noticing crunching sensations when doing some activities.  Patient states he has little pain within his right knee and is overall pleased with the progress.    History of MRSA: no  History of blood clots: no  Family history of blood clots: no  History of Hepatitis C:no  History of HIV: no  Are you a smoker:no  Are you diabetic:no  Do you see a cardiologist: yes  Have you seen a dentist in the past year: yes  Do you have a spinal cord stimulator: no  Review allergies     Review of Systems  ROS:    See [facility] for musculoskeletal review.   All other systems reviewed are negative     History:  Past Medical History:   Diagnosis Date    Acute bronchitis 01/29/2024    Anxiety 01/15/2019    Arthritis     Asthma     Chronic rhinitis     last assessed 2/21/14    Chronic serous otitis media     last assessed 11/20/13    Chronic sinusitis     last assessed 8/19/14    Functional heart murmur     GERD (gastroesophageal reflux disease)     Gout     Hearing problem     last assessed 12/1/16    Hiatal hernia     Hypercholesterolemia     Hypertension     Palpitations     Testicular hypogonadism     last assessed 10/4/13    [facility]ach ([facility])      Past Surgical History:   Procedure Laterality Date    APPENDECTOMY      BICEPS TENDON REPAIR Left 2009    BICEPS TENODESIS      anesthesia for tenodesis- of ruptured long tendon of biceps     HERNIA REPAIR      IL ARTHRP LAZARA  CONDYLE&PLATU MEDIAL&LAT COMPARTMENTS Right 9/17/2024    Procedure: ARTHROPLASTY KNEE TOTAL, potential same day discharge, cemented;  Surgeon: Parvin Whipple DO;  Location:  MAIN OR;  Service: Orthopedics    THYROIDECTOMY      TONSILLECTOMY       Social History   Social History     Substance and Sexual Activity   Alcohol Use Yes    Alcohol/week: 12.0 standard drinks of alcohol    Types: 12 Shots of liquor per week    Comment: occasionally; socially     Social History     Substance and Sexual Activity   Drug Use No     Social History     Tobacco Use   Smoking Status Never   Smokeless Tobacco Never     Family History:   Family History   Problem Relation Age of Onset    Lung cancer Father     Coronary artery disease Father         blockages    Stroke Maternal Grandmother     Cancer Paternal Grandfather         metastasized    Heart disease Family     Lung cancer Cousin     No Known Problems Mother     No Known Problems Sister     No Known Problems Brother     No Known Problems Maternal Aunt     No Known Problems Maternal Uncle     No Known Problems Paternal Aunt     No Known Problems Paternal Uncle     No Known Problems Paternal Grandmother     ADD / ADHD Neg Hx     Anesthesia problems Neg Hx     Clotting disorder Neg Hx     Collagen disease Neg Hx     Diabetes Neg Hx     Dislocations Neg Hx     Learning disabilities Neg Hx     Neurological problems Neg Hx     Osteoporosis Neg Hx     Rheumatologic disease Neg Hx     Scoliosis Neg Hx     Vascular Disease Neg Hx        Current Outpatient Medications on File Prior to Visit   Medication Sig Dispense Refill    acetaminophen (TYLENOL) 500 mg tablet Take 2 tablets (1,000 mg total) by mouth every 8 (eight) hours 1500 at times (Patient taking differently: Take 650 mg by mouth if needed for mild pain)      albuterol (2.5 mg/3 mL) 0.083 % nebulizer solution Take 3 mL (2.5 mg total) by nebulization every 6 (six) hours as needed for wheezing or shortness of breath 240 mL  "2    albuterol (PROVENTIL HFA,VENTOLIN HFA) 90 mcg/act inhaler Inhale 1 puff if needed for wheezing or shortness of breath 48 g 1    ascorbic acid (VITAMIN C) 500 MG tablet Take 1 tablet (500 mg total) by mouth daily Begin 30 days prior to surgery. 30 tablet 1    clotrimazole-betamethasone (LOTRISONE) 1-0.05 % cream Apply topically 2 (two) times a day 90 g 1    esomeprazole (NexIUM) 40 MG capsule Take 40 mg by mouth every morning before breakfast      fluticasone-salmeterol (Advair) 500-50 mcg/dose inhaler Inhale 1 puff 2 (two) times a day      hydrOXYzine HCL (ATARAX) 25 mg tablet Take 25 mg by mouth daily as needed      lisinopril (ZESTRIL) 10 mg tablet Take 2 tablets (20 mg total) by mouth daily 180 tablet 0    meclizine (ANTIVERT) 12.5 MG tablet Take 1 tablet (12.5 mg total) by mouth every 8 (eight) hours as needed for dizziness (Patient not taking: Reported on 2/7/2025)      methocarbamol (ROBAXIN) 500 mg tablet Take 250 mg by mouth every 8 (eight) hours as needed for muscle spasms      montelukast (SINGULAIR) 10 mg tablet Take 1 tablet (10 mg total) by mouth daily      Multiple Vitamin (multivitamin) tablet Take 1 tablet by mouth daily Begin 30 days prior to surgery. 30 tablet 1    risperiDONE (RisperDAL) 4 mg tablet Take 4 mg by mouth daily      Triamcinolone Acetonide (NASACORT ALLERGY 24HR NA) 1 spray into each nostril daily        verapamil (CALAN) 120 mg tablet TAKE 1 TABLET BY MOUTH EVERY DAY 90 tablet 1     No current facility-administered medications on file prior to visit.     Allergies   Allergen Reactions    Penicillins Rash and Anaphylaxis    Acetazolamide      Other reaction(s): Stomach Ache    Cephalosporins Other (See Comments)     headache    Other     Sulfa Antibiotics Other (See Comments)     Category: Allergy; Annotation - [id number]: STOMACH UPSET    Famotidine Palpitations    Levofloxacin Palpitations    Omeprazole Palpitations     Painful urination          Objective   Ht 5' 9\" (1.753 m)  "  Wt 95.8 kg (211 lb 3.2 oz)   BMI 31.19 kg/m²          PE:  AAOx 3  WDWN  Hearing intact, no drainage from eyes  no audible wheezing  no abdominal distension  LE compartments soft, skin intact    leftknee:    Appearance:  no swelling   No bruising  no obvious joint deformity   No effusion  Palpation/Tenderness:  +TTP over medial joint line, no TTP over lateral joint line or over patella/patellar tendon  Active Range of Motion:  AROM: 2-110  Special Tests:  Medial Rebeca's Test:  Positive  Lateral Rebeca's Test:  Negative  Apley's compression test:  Negative  Lachman's Test:  negative  Anterior Drawer Test:  negative  Valgus Stress Test:  negative  Varus Stress Test:  negative       Imaging Studies: Results Review Statement: I personally reviewed the following image studies in PACS and associated radiology reports: xray(s). My interpretation of the radiology images/reports is: Severe left knee arthritis with bone-on-bone contact.    Large joint arthrocentesis: L knee  Universal Protocol:  procedure performed by consultantConsent: Verbal consent obtained.  Risks and benefits: risks, benefits and alternatives were discussed  Consent given by: patient  Patient understanding: patient states understanding of the procedure being performed  Site marked: the operative site was marked  Patient identity confirmed: verbally with patient  Supporting Documentation  Indications: pain   Procedure Details  Location: knee - L knee  Needle size: 22 G  Ultrasound guidance: no  Approach: anterolateral  Medications administered: 2 mL bupivacaine 0.25 %; 40 mg triamcinolone acetonide 40 mg/mL    Patient tolerance: patient tolerated the procedure well with no immediate complications  Dressing:  Sterile dressing applied          Scribe Attestation      I,:  Yoel Greer am acting as a scribe while in the presence of the attending physician.:       I,:  Parvin Whipple DO personally performed the services described in this  documentation    as scribed in my presence.:

## 2025-03-12 NOTE — ASSESSMENT & PLAN NOTE
Patient has severe left knee arthritis.  he has tried conservative treatment without adequate relief.  We discussed treatment options as well as risks and benefits of treatment options.  The patient would like to proceed with a left total knee arthroplasty.  The risks of surgery include, but are not limited to infection, blood clot, wound healing problems, blood loss, damage to blood vessels and nerves, persistent pain and stiffness, fracture, need for additional surgery, need for revision surgery, failure of hardware, heart attack, stroke, death.  The patient understood and agreed to by oral and written consent.  I answered all questions regarding surgery.  Reviewed with patient to expect some numbness over the lateral aspect of the knee after surgery.  We let the patient know to avoid kneeling while the incision is healing, typically for the first 4-6 weeks.  Once incision is healed kneeling if permitted but may still be uncomfortable for some patients long term.    Reviewed no driving for 4-6 weeks for right sided surgery once off narcotics.  No driving until off narcotics for left sided surgery.    Preoperative vitamins were prescribed.  Patient instructed to  what they are not already taking and start 30 days before surgery.  We let the patient know that some people experience constipation while on iron.  If that occurs can take iron every other day.  If still an issue can stop the iron.  Can also add in OTC medication and/or prune juice for constipation.    We also let the patient know that some people experience constipation after surgery while on narcotics.  We suggest to have OTC medications and/or prune juice available if needed.    Patient is scheduled for left TKA on 6/17/2025  DVT Prophylaxis: ASA  The patient will obtain clearance from: SOC, cardiology, nephrology  Injections: Patient received left knee steroid injection today. Tolerated the procedure well. Post injection instructions reviewed  including information on glucose monitoring for diabetic patients. Patient aware that they may repeat steroid injection every 3 months if needed.  Patient is allowed to have surgical intervention to his left knee anytime after 6/12/2025.  Patient was in rehab after surgery.  Patient will follow-up 2 weeks after surgery instead of 3 days after surgery as he will be in rehab after left TKA.  Orders:    ferrous sulfate 324 (65 Fe) mg; Take 1 tablet (324 mg total) by mouth daily before breakfast Begin 30 days prior to surgery    ascorbic acid (VITAMIN C) 500 MG tablet; Take 1 tablet (500 mg total) by mouth daily Begin 30 days prior to surgery    folic acid (KP Folic Acid) 1 mg tablet; Take 1 tablet (1 mg total) by mouth daily Begin 30 days prior to surgery    Multiple Vitamin (multivitamin) tablet; Take 1 tablet by mouth daily Begin 30 days prior to surgery    mupirocin (BACTROBAN) 2 % ointment; Apply topically 2 (two) times a day Apply to each nostril 2 times a day with Q-tip 5 days prior to surgery

## 2025-03-14 DIAGNOSIS — M10.9 GOUTY ARTHRITIS: ICD-10-CM

## 2025-03-17 RX ORDER — FEBUXOSTAT 40 MG/1
40 TABLET, FILM COATED ORAL DAILY
Qty: 90 TABLET | Refills: 1 | Status: SHIPPED | OUTPATIENT
Start: 2025-03-17

## 2025-03-21 RX ORDER — TRANEXAMIC ACID 10 MG/ML
1000 INJECTION, SOLUTION INTRAVENOUS ONCE
OUTPATIENT
Start: 2025-03-21 | End: 2025-03-21

## 2025-03-21 RX ORDER — BUPIVACAINE HYDROCHLORIDE 2.5 MG/ML
2 INJECTION, SOLUTION INFILTRATION; PERINEURAL
Status: COMPLETED | OUTPATIENT
Start: 2025-03-12 | End: 2025-03-12

## 2025-03-21 RX ORDER — CEFAZOLIN SODIUM 2 G/50ML
2000 SOLUTION INTRAVENOUS ONCE
OUTPATIENT
Start: 2025-03-21 | End: 2025-03-21

## 2025-03-21 RX ORDER — SODIUM CHLORIDE 9 MG/ML
75 INJECTION, SOLUTION INTRAVENOUS CONTINUOUS
OUTPATIENT
Start: 2025-03-21

## 2025-03-21 RX ORDER — CHLORHEXIDINE GLUCONATE ORAL RINSE 1.2 MG/ML
15 SOLUTION DENTAL ONCE
OUTPATIENT
Start: 2025-03-21 | End: 2025-03-21

## 2025-03-21 RX ORDER — TRIAMCINOLONE ACETONIDE 40 MG/ML
40 INJECTION, SUSPENSION INTRA-ARTICULAR; INTRAMUSCULAR
Status: COMPLETED | OUTPATIENT
Start: 2025-03-12 | End: 2025-03-12

## 2025-03-21 RX ORDER — ACETAMINOPHEN 325 MG/1
975 TABLET ORAL ONCE
OUTPATIENT
Start: 2025-03-21 | End: 2025-03-21

## 2025-03-21 RX ORDER — CHLORHEXIDINE GLUCONATE 40 MG/ML
SOLUTION TOPICAL DAILY PRN
OUTPATIENT
Start: 2025-03-21

## 2025-03-25 ENCOUNTER — TELEPHONE (OUTPATIENT)
Dept: CARDIOLOGY CLINIC | Facility: CLINIC | Age: 60
End: 2025-03-25

## 2025-03-25 NOTE — TELEPHONE ENCOUNTER
Established Doctor:   Call Back Number: [phone]    Does this patient require an office visit for Cardiac Clearance for upcomming surgery or can clearance be given without an office visit?       Date of Last Office Visit: 8/22/24  Date of last ecg: 10/17/24    Date Of Surgery: n/a  Surgical Procedure: ARTHROPLASTY KNEE TOTAL, overnight [55]   Name of Facility: [facility] [place]    Name of Surgeon: Dr. Whipple  Phone Number for Surgical Facility (If the caller does not have this info, please put N/A): n/a  Fax Number for Surgical Facility (If the caller does not have this info, please put N/A: n/a    Other Comments:    Pt seems to be have seen for a pre op clearance on 8/22/24. Pt had a ARTHROPLASTY KNEE TOTAL, potential same day discharge, cemented (Right: Knee) on 9/17/24      Thank You

## 2025-04-01 ENCOUNTER — TELEPHONE (OUTPATIENT)
Dept: NEPHROLOGY | Facility: CLINIC | Age: 60
End: 2025-04-01

## 2025-04-14 ENCOUNTER — TELEPHONE (OUTPATIENT)
Age: 60
End: 2025-04-14

## 2025-04-14 NOTE — TELEPHONE ENCOUNTER
Pt called and stated that he was prescribed   febuxostat (ULORIC) 40 mg tablet and the insurance only gave him a 30 day supply and stated that he needs a formulary exception letter before they would give him any more medication. He said that he does not know what it all entails or what is needed or how to submit it, whether its calling the insurance or submitting through a website. He said he has a few days of the medication left yet.    He asked to please call him with any updates. He said to please call his cell phone and leave a message on there if he doesn't answer.

## 2025-04-15 DIAGNOSIS — I10 ESSENTIAL HYPERTENSION: ICD-10-CM

## 2025-04-15 NOTE — TELEPHONE ENCOUNTER
Alexx called in to ask for a new script. He was told to finished up the Lisinopril 10mg twice a day and to call in the Lisinopril 20mg one a day. He said he only has 2 days left. Please order the Lisinopril 20mg one a day to the [facility] pharmacy in [address] 90 day supply

## 2025-04-16 RX ORDER — LISINOPRIL 20 MG/1
20 TABLET ORAL DAILY
Qty: 90 TABLET | Refills: 1 | Status: SHIPPED | OUTPATIENT
Start: 2025-04-16

## 2025-04-16 NOTE — TELEPHONE ENCOUNTER
PA for Febuxostat (ULORIC) 40 mg tablet SUBMITTED to [facility]    via    []CMM-KEY:   [x]Surescripts-Case ID #: [id number]     []Availity-Auth ID #   []Faxed to plan   []Other website   []Phone call Case ID #     [x]PA sent as URGENT    All office notes, labs and other pertaining documents and studies sent. Clinical questions answered. Awaiting determination from insurance company.     Turnaround time for your insurance to make a decision on your Prior Authorization can take 7-21 business days.

## 2025-04-17 NOTE — TELEPHONE ENCOUNTER
PA for Febuxostat (ULORIC) 40 mg tablet APPROVED     Date(s) approved January 1, 2025 to April 16, 2026     Case #: [id number]       Patient advised by          []MyChart Message  []Phone call   []LMOM  []L/M to call office as no active Communication consent on file  []Unable to leave detailed message as VM not approved on Communication consent       Pharmacy advised by    []Fax  [x]Phone call- Called to see if a paid claim was received- pharmacist states they do not see the script and requested that a new one be sent over- will then call them back  []Secure Chat       Approval letter scanned into Media Yes

## 2025-04-21 ENCOUNTER — OFFICE VISIT (OUTPATIENT)
Dept: URGENT CARE | Age: 60
End: 2025-04-21
Payer: MEDICARE

## 2025-04-21 VITALS
HEART RATE: 87 BPM | RESPIRATION RATE: 18 BRPM | SYSTOLIC BLOOD PRESSURE: 129 MMHG | TEMPERATURE: 98.7 F | DIASTOLIC BLOOD PRESSURE: 78 MMHG | OXYGEN SATURATION: 98 %

## 2025-04-21 DIAGNOSIS — B00.1 RECURRENT COLD SORES: Primary | ICD-10-CM

## 2025-04-21 PROCEDURE — G0463 HOSPITAL OUTPT CLINIC VISIT: HCPCS

## 2025-04-21 PROCEDURE — 99213 OFFICE O/P EST LOW 20 MIN: CPT

## 2025-04-21 RX ORDER — VALACYCLOVIR HYDROCHLORIDE 500 MG/1
1000 TABLET, FILM COATED ORAL DAILY
Qty: 10 TABLET | Refills: 0 | Status: SHIPPED | OUTPATIENT
Start: 2025-04-21 | End: 2025-04-26

## 2025-04-21 NOTE — PROGRESS NOTES
[facility]        NAME: Alexx Haney is a 60 y.o. male  : 1965    MRN: [id number]  DATE: 2025  TIME: 6:36 PM      Assessment and Plan     Recurrent cold sores [B00.1]  1. Recurrent cold sores  valACYclovir (VALTREX) 500 mg tablet        Recommend patient to avoid drinking alcohol with this medication. Recommend mucinex for cough and congestion.     Patient Instructions     Use antiviral medication as directed.  Recommend to avoid use of alcohol with this medication.  Mucinex OTC for cough and congestion.  PCP follow-up in 2-3 days.  Proceed to the ER if symptoms worsen.     Chief Complaint     Chief Complaint   Patient presents with    Mouth Lesions     Onset  States cold sores on his top and bottom lip that have been draining          History of Present Illness     Patient is a 60-year-old male who presents with worsening cold sores to his mouth over the past 2 days. Reports chills, congestion, and cough. Reports body aches and fatigue. States he has been taking pseudoephedrine and reports intermittent dizziness. Reports he was on medication in the past for cold sores.     Mouth Lesions   Associated symptoms include congestion, mouth sores and cough. Pertinent negatives include no fever, no vomiting and no wheezing.       Review of Systems     Review of Systems   Constitutional:  Positive for chills. Negative for fever.   HENT:  Positive for congestion and mouth sores.    Respiratory:  Positive for cough. Negative for shortness of breath and wheezing.    Gastrointestinal:  Negative for vomiting.   Neurological:  Positive for dizziness.   All other systems reviewed and are negative.        Current Medications       Current Outpatient Medications:     valACYclovir (VALTREX) 500 mg tablet, Take 2 tablets (1,000 mg total) by mouth daily for 5 days, Disp: 10 tablet, Rfl: 0    acetaminophen (TYLENOL) 500 mg tablet, Take 2 tablets (1,000 mg total) by mouth every 8 (eight) hours 1500 at  times (Patient taking differently: Take 650 mg by mouth if needed for mild pain), Disp: , Rfl:     albuterol (2.5 mg/3 mL) 0.083 % nebulizer solution, Take 3 mL (2.5 mg total) by nebulization every 6 (six) hours as needed for wheezing or shortness of breath, Disp: 240 mL, Rfl: 2    albuterol (PROVENTIL HFA,VENTOLIN HFA) 90 mcg/act inhaler, Inhale 1 puff if needed for wheezing or shortness of breath, Disp: 48 g, Rfl: 1    ascorbic acid (VITAMIN C) 500 MG tablet, Take 1 tablet (500 mg total) by mouth daily Begin 30 days prior to surgery., Disp: 30 tablet, Rfl: 1    ascorbic acid (VITAMIN C) 500 MG tablet, Take 1 tablet (500 mg total) by mouth daily Begin 30 days prior to surgery, Disp: 30 tablet, Rfl: 1    clotrimazole-betamethasone (LOTRISONE) 1-0.05 % cream, Apply topically 2 (two) times a day, Disp: 90 g, Rfl: 1    esomeprazole (NexIUM) 40 MG capsule, Take 40 mg by mouth every morning before breakfast, Disp: , Rfl:     febuxostat (ULORIC) 40 mg tablet, Take 1 tablet (40 mg total) by mouth daily, Disp: 90 tablet, Rfl: 1    ferrous sulfate 324 (65 Fe) mg, Take 1 tablet (324 mg total) by mouth daily before breakfast Begin 30 days prior to surgery, Disp: 30 tablet, Rfl: 1    fluticasone-salmeterol (Advair) 500-50 mcg/dose inhaler, Inhale 1 puff 2 (two) times a day, Disp: , Rfl:     folic acid (KP Folic Acid) 1 mg tablet, Take 1 tablet (1 mg total) by mouth daily Begin 30 days prior to surgery, Disp: 30 tablet, Rfl: 1    hydrOXYzine HCL (ATARAX) 25 mg tablet, Take 25 mg by mouth daily as needed, Disp: , Rfl:     lisinopril (ZESTRIL) 20 mg tablet, Take 1 tablet (20 mg total) by mouth daily, Disp: 90 tablet, Rfl: 1    meclizine (ANTIVERT) 12.5 MG tablet, Take 1 tablet (12.5 mg total) by mouth every 8 (eight) hours as needed for dizziness (Patient not taking: Reported on 2/7/2025), Disp: , Rfl:     methocarbamol (ROBAXIN) 500 mg tablet, Take 250 mg by mouth every 8 (eight) hours as needed for muscle spasms, Disp: , Rfl:      montelukast (SINGULAIR) 10 mg tablet, Take 1 tablet (10 mg total) by mouth daily, Disp: , Rfl:     Multiple Vitamin (multivitamin) tablet, Take 1 tablet by mouth daily Begin 30 days prior to surgery., Disp: 30 tablet, Rfl: 1    Multiple Vitamin (multivitamin) tablet, Take 1 tablet by mouth daily Begin 30 days prior to surgery, Disp: 30 tablet, Rfl: 1    mupirocin (BACTROBAN) 2 % ointment, Apply topically 2 (two) times a day Apply to each nostril 2 times a day with Q-tip 5 days prior to surgery, Disp: 15 g, Rfl: 0    risperiDONE (RisperDAL) 4 mg tablet, Take 4 mg by mouth daily, Disp: , Rfl:     Triamcinolone Acetonide (NASACORT ALLERGY 24HR NA), 1 spray into each nostril daily  , Disp: , Rfl:     verapamil (CALAN) 120 mg tablet, TAKE 1 TABLET BY MOUTH EVERY DAY, Disp: 90 tablet, Rfl: 1    Current Allergies     Allergies as of 04/21/2025 - Reviewed 04/21/2025   Allergen Reaction Noted    Penicillins Rash and Anaphylaxis 08/17/2004    Acetazolamide  10/25/2016    Cephalosporins Other (See Comments) 08/17/2004    Other  04/28/2014    Sulfa antibiotics Other (See Comments) 08/17/2004    Famotidine Palpitations 09/05/2016    Levofloxacin Palpitations 11/12/2021    Omeprazole Palpitations 09/05/2016              The following portions of the patient's history were reviewed and updated as appropriate: allergies, current medications, past family history, past medical history, past social history, past surgical history and problem list.     Past Medical History:   Diagnosis Date    Acute bronchitis 01/29/2024    Anxiety 01/15/2019    Arthritis     Asthma     Chronic rhinitis     last assessed 2/21/14    Chronic serous otitis media     last assessed 11/20/13    Chronic sinusitis     last assessed 8/19/14    Functional heart murmur     GERD (gastroesophageal reflux disease)     Gout     Hearing problem     last assessed 12/1/16    Hiatal hernia     Hypercholesterolemia     Hypertension     Palpitations     Testicular  hypogonadism     last assessed 10/4/13    [facility]ach ([facility])        Past Surgical History:   Procedure Laterality Date    APPENDECTOMY      BICEPS TENDON REPAIR Left 2009    BICEPS TENODESIS      anesthesia for tenodesis- of ruptured long tendon of biceps     HERNIA REPAIR      KS ARTHRP KNE CONDYLE&PLATU MEDIAL&LAT COMPARTMENTS Right 9/17/2024    Procedure: ARTHROPLASTY KNEE TOTAL, potential same day discharge, cemented;  Surgeon: Parvin Whipple DO;  Location:  MAIN OR;  Service: Orthopedics    THYROIDECTOMY      TONSILLECTOMY         Family History   Problem Relation Age of Onset    Lung cancer Father     Coronary artery disease Father         blockages    Stroke Maternal Grandmother     Cancer Paternal Grandfather         metastasized    Heart disease Family     Lung cancer Cousin     No Known Problems Mother     No Known Problems Sister     No Known Problems Brother     No Known Problems Maternal Aunt     No Known Problems Maternal Uncle     No Known Problems Paternal Aunt     No Known Problems Paternal Uncle     No Known Problems Paternal Grandmother     ADD / ADHD Neg Hx     Anesthesia problems Neg Hx     Clotting disorder Neg Hx     Collagen disease Neg Hx     Diabetes Neg Hx     Dislocations Neg Hx     Learning disabilities Neg Hx     Neurological problems Neg Hx     Osteoporosis Neg Hx     Rheumatologic disease Neg Hx     Scoliosis Neg Hx     Vascular Disease Neg Hx          Medications have been verified.        Objective     /78   Pulse 87   Resp 18   SpO2 98%   No LMP for male patient.         Physical Exam     Physical Exam  Vitals and nursing note reviewed.   Constitutional:       General: He is not in acute distress.     Appearance: Normal appearance. He is not ill-appearing, toxic-appearing or diaphoretic.   HENT:      Right Ear: Tympanic membrane, ear canal and external ear normal.      Left Ear: Tympanic membrane, ear canal and external ear normal.      Nose: Congestion present.       Mouth/Throat:      Lips: Pink.      Mouth: Mucous membranes are moist. Oral lesions (multiple erythematous lesions noted to upper lip. no discharge noted) present.      Pharynx: Oropharynx is clear. Uvula midline.      Comments: Airway patent, no drooling.   Cardiovascular:      Rate and Rhythm: Normal rate.      Pulses: Normal pulses.      Heart sounds: Normal heart sounds, S1 normal and S2 normal.   Pulmonary:      Effort: Pulmonary effort is normal.      Breath sounds: Normal breath sounds and air entry. No stridor, decreased air movement or transmitted upper airway sounds. No decreased breath sounds, wheezing, rhonchi or rales.   Skin:     General: Skin is warm.      Capillary Refill: Capillary refill takes less than 2 seconds.   Neurological:      General: No focal deficit present.      Mental Status: He is alert.   Psychiatric:         Mood and Affect: Mood normal.         Behavior: Behavior normal.         Thought Content: Thought content normal.         Judgment: Judgment normal.

## 2025-04-21 NOTE — PATIENT INSTRUCTIONS
Use antiviral medication as directed.  Recommend to avoid use of alcohol with this medication.  Mucinex OTC for cough and congestion.  PCP follow-up in 2-3 days.  Proceed to the ER if symptoms worsen.

## 2025-04-22 ENCOUNTER — TELEPHONE (OUTPATIENT)
Age: 60
End: 2025-04-22

## 2025-04-22 NOTE — TELEPHONE ENCOUNTER
Patient needs a hospital follow   with the doctor to have a surgery  clearance  . Dr Nobles had something In  may but he stated it was to early  it has to be around  may  29,25.

## 2025-04-30 ENCOUNTER — RA CDI HCC (OUTPATIENT)
Dept: OTHER | Facility: HOSPITAL | Age: 60
End: 2025-04-30

## 2025-05-06 ENCOUNTER — OFFICE VISIT (OUTPATIENT)
Dept: INTERNAL MEDICINE CLINIC | Facility: OTHER | Age: 60
End: 2025-05-06
Payer: MEDICARE

## 2025-05-06 VITALS
BODY MASS INDEX: 31.1 KG/M2 | WEIGHT: 210 LBS | HEART RATE: 82 BPM | SYSTOLIC BLOOD PRESSURE: 126 MMHG | TEMPERATURE: 99.1 F | DIASTOLIC BLOOD PRESSURE: 72 MMHG | HEIGHT: 69 IN | OXYGEN SATURATION: 97 %

## 2025-05-06 DIAGNOSIS — I10 ESSENTIAL HYPERTENSION: ICD-10-CM

## 2025-05-06 DIAGNOSIS — K21.9 GERD WITHOUT ESOPHAGITIS: ICD-10-CM

## 2025-05-06 DIAGNOSIS — J45.50 SEVERE PERSISTENT ASTHMA WITHOUT COMPLICATION: ICD-10-CM

## 2025-05-06 DIAGNOSIS — I47.20 V-TACH (HCC): Primary | ICD-10-CM

## 2025-05-06 DIAGNOSIS — F22 PARANOID DELUSION (HCC): ICD-10-CM

## 2025-05-06 DIAGNOSIS — J30.89 NON-SEASONAL ALLERGIC RHINITIS, UNSPECIFIED TRIGGER: ICD-10-CM

## 2025-05-06 PROCEDURE — G2211 COMPLEX E/M VISIT ADD ON: HCPCS | Performed by: INTERNAL MEDICINE

## 2025-05-06 PROCEDURE — 99214 OFFICE O/P EST MOD 30 MIN: CPT | Performed by: INTERNAL MEDICINE

## 2025-05-06 RX ORDER — PREDNISONE 20 MG/1
20 TABLET ORAL DAILY
Qty: 5 TABLET | Refills: 0 | Status: SHIPPED | OUTPATIENT
Start: 2025-05-06 | End: 2025-05-11

## 2025-05-06 NOTE — ASSESSMENT & PLAN NOTE
As per patient he discontinue his risperidone and also cancel appointment with the psychiatrist.  Advised him to call his psychiatrist office for follow-up visit and if he was not able to tolerate risperidone they can describe another alternative medicine.

## 2025-05-06 NOTE — ASSESSMENT & PLAN NOTE
Continue with the Nasonex and also advised to add either Claritin or Allegra once a day will prescribe prednisone 20 mg daily for 5 days  Orders:    predniSONE 20 mg tablet; Take 1 tablet (20 mg total) by mouth daily for 5 days

## 2025-05-06 NOTE — PROGRESS NOTES
Name: Alexx Haney      : 1965      MRN: [id number]  Encounter Provider: Javier Delacruz MD  Encounter Date: 2025   Encounter department: [facility]  :  Assessment & Plan  V-tach (HCC)  Continue on present dose of verapamil and also followed by cardiologist       Essential hypertension  Blood pressure is stable on present regimen       GERD without esophagitis  Continue with present dose of PPI       Non-seasonal allergic rhinitis, unspecified trigger  Continue with the Nasonex and also advised to add either Claritin or Allegra once a day will prescribe prednisone 20 mg daily for 5 days  Orders:    predniSONE 20 mg tablet; Take 1 tablet (20 mg total) by mouth daily for 5 days    Paranoid delusion (HCC)  As per patient he discontinue his risperidone and also cancel appointment with the psychiatrist.  Advised him to call his psychiatrist office for follow-up visit and if he was not able to tolerate risperidone they can describe another alternative medicine.       Severe persistent asthma without complication  Continue with present inhaler              History of Present Illness   Patient is here for follow-up.  Also complaining of stuffy nose sinus congestion.    Sinus Problem  Associated symptoms include congestion. Pertinent negatives include no coughing, ear pain, headaches, neck pain, shortness of breath, sinus pressure or sore throat.     Review of Systems   Constitutional:  Negative for fatigue and fever.   HENT:  Positive for congestion and postnasal drip. Negative for ear discharge, ear pain, sinus pressure, sore throat, tinnitus and trouble swallowing.    Eyes:  Negative for discharge, itching and visual disturbance.   Respiratory:  Negative for cough and shortness of breath.    Cardiovascular:  Negative for chest pain and palpitations.   Gastrointestinal:  Negative for abdominal pain, diarrhea, nausea and vomiting.   Endocrine: Negative for cold  "intolerance and polyuria.   Genitourinary:  Negative for difficulty urinating, dysuria and urgency.   Musculoskeletal:  Negative for arthralgias and neck pain.   Skin:  Negative for rash.   Allergic/Immunologic: Negative for environmental allergies.   Neurological:  Negative for dizziness, weakness and headaches.   Psychiatric/Behavioral:  Negative for agitation. The patient is not nervous/anxious.        Objective   /72 (BP Location: Left arm, Patient Position: Sitting, Cuff Size: Adult)   Pulse 82   Temp 99.1 °F (37.3 °C) (Temporal)   Ht 5' 9\" (1.753 m)   Wt 95.3 kg (210 lb)   SpO2 97%   BMI 31.01 kg/m²      Physical Exam  Constitutional:       General: He is not in acute distress.     Appearance: He is well-developed.   HENT:      Head: Normocephalic.      Right Ear: External ear normal. There is no impacted cerumen.      Left Ear: External ear normal. There is no impacted cerumen.      Nose: Congestion and rhinorrhea present.      Mouth/Throat:      Pharynx: No posterior oropharyngeal erythema.   Eyes:      General: No scleral icterus.     Pupils: Pupils are equal, round, and reactive to light.   Neck:      Thyroid: No thyromegaly.      Trachea: No tracheal deviation.   Cardiovascular:      Rate and Rhythm: Normal rate and regular rhythm.      Heart sounds: Normal heart sounds. No murmur heard.  Pulmonary:      Effort: Pulmonary effort is normal. No respiratory distress.      Breath sounds: Normal breath sounds.   Chest:      Chest wall: No tenderness.   Abdominal:      General: Bowel sounds are normal.      Palpations: Abdomen is soft. There is no mass.      Tenderness: There is no abdominal tenderness.   Musculoskeletal:         General: Normal range of motion.      Cervical back: Normal range of motion and neck supple. No rigidity.      Right lower leg: No edema.      Left lower leg: No edema.   Lymphadenopathy:      Cervical: No cervical adenopathy.   Skin:     General: Skin is warm.      " Findings: No erythema or rash.   Neurological:      Mental Status: He is alert and oriented to person, place, and time.      Cranial Nerves: No cranial nerve deficit.   Psychiatric:         Mood and Affect: Mood normal.         Behavior: Behavior normal.

## 2025-05-08 ENCOUNTER — OFFICE VISIT (OUTPATIENT)
Dept: URGENT CARE | Age: 60
End: 2025-05-08
Payer: MEDICARE

## 2025-05-08 VITALS
HEART RATE: 84 BPM | SYSTOLIC BLOOD PRESSURE: 155 MMHG | TEMPERATURE: 99.2 F | RESPIRATION RATE: 18 BRPM | BODY MASS INDEX: 24.76 KG/M2 | HEIGHT: 69 IN | OXYGEN SATURATION: 97 % | WEIGHT: 167.2 LBS | DIASTOLIC BLOOD PRESSURE: 87 MMHG

## 2025-05-08 DIAGNOSIS — M62.830 BACK SPASM: Primary | ICD-10-CM

## 2025-05-08 PROCEDURE — 99213 OFFICE O/P EST LOW 20 MIN: CPT | Performed by: PHYSICIAN ASSISTANT

## 2025-05-08 PROCEDURE — G0463 HOSPITAL OUTPT CLINIC VISIT: HCPCS | Performed by: PHYSICIAN ASSISTANT

## 2025-05-08 RX ORDER — METHOCARBAMOL 500 MG/1
500 TABLET, FILM COATED ORAL
Qty: 10 TABLET | Refills: 0 | Status: SHIPPED | OUTPATIENT
Start: 2025-05-08

## 2025-05-08 NOTE — PATIENT INSTRUCTIONS
Patient was educated on back spasm.     Educated on muscle relaxer . NO driving on this medications.    Physical therapy and ortho referral placed.    Educated on hydration.    Any chest pain or shortness of breath go to ED

## 2025-05-08 NOTE — PROGRESS NOTES
[facility]        NAME: Alexx Haney is a 60 y.o. male  : 1965    MRN: [id number]  DATE: May 8, 2025  TIME: 7:49 PM    Assessment and Plan   Back spasm [M62.830]  1. Back spasm  methocarbamol (ROBAXIN) 500 mg tablet    Ambulatory Referral to Orthopedic Surgery    Ambulatory Referral to Physical Therapy        Educated on high blood pressure.     Patient Instructions   Patient was educated on back spasm.     Educated on muscle relaxer . NO driving on this medications.    Physical therapy and ortho referral placed.    Educated on hydration.    Any chest pain or shortness of breath go to ED    Follow up with PCP in 3-5 days.  Proceed to  ER if symptoms worsen.    If tests have been performed at [facility], our office will contact you with results if changes need to be made to the care plan discussed with you at the visit.  [facility]    Chief Complaint     Chief Complaint   Patient presents with    Back Pain     Patient has history of back problems and keeps Robaxin on hand, patient went to PCP and forgot to ask for a script to get a refill to keep on hand in case of emergency.  Patient is currently on Prednisone for a sinus infection.         History of Present Illness       Patient reports to the urgent care complaining of spasms and cramping in back. Patient reports in the past he was given Robaxin and this helps. Allergies to Pencillins, acetazolamide, cephalosporin, sulfa, famotidine, levofloxacin.     Back Pain        Review of Systems   Review of Systems   Constitutional: Negative.    Respiratory: Negative.     Cardiovascular: Negative.    Musculoskeletal:  Positive for back pain.        Muscle spasm     Psychiatric/Behavioral: Negative.           Current Medications       Current Outpatient Medications:     acetaminophen (TYLENOL) 500 mg tablet, Take 2 tablets (1,000 mg total) by mouth every 8 (eight) hours 1500 at times, Disp: , Rfl:      albuterol (2.5 mg/3 mL) 0.083 % nebulizer solution, Take 3 mL (2.5 mg total) by nebulization every 6 (six) hours as needed for wheezing or shortness of breath, Disp: 240 mL, Rfl: 2    albuterol (PROVENTIL HFA,VENTOLIN HFA) 90 mcg/act inhaler, Inhale 1 puff if needed for wheezing or shortness of breath, Disp: 48 g, Rfl: 1    ascorbic acid (VITAMIN C) 500 MG tablet, Take 1 tablet (500 mg total) by mouth daily Begin 30 days prior to surgery, Disp: 30 tablet, Rfl: 1    clotrimazole-betamethasone (LOTRISONE) 1-0.05 % cream, Apply topically 2 (two) times a day, Disp: 90 g, Rfl: 1    esomeprazole (NexIUM) 40 MG capsule, Take 40 mg by mouth every morning before breakfast, Disp: , Rfl:     febuxostat (ULORIC) 40 mg tablet, Take 1 tablet (40 mg total) by mouth daily, Disp: 90 tablet, Rfl: 1    fluticasone-salmeterol (Advair) 500-50 mcg/dose inhaler, Inhale 1 puff 2 (two) times a day, Disp: , Rfl:     lisinopril (ZESTRIL) 20 mg tablet, Take 1 tablet (20 mg total) by mouth daily, Disp: 90 tablet, Rfl: 1    methocarbamol (ROBAXIN) 500 mg tablet, Take 250 mg by mouth every 8 (eight) hours as needed for muscle spasms, Disp: , Rfl:     methocarbamol (ROBAXIN) 500 mg tablet, Take 1 tablet (500 mg total) by mouth daily at bedtime as needed for muscle spasms, Disp: 10 tablet, Rfl: 0    montelukast (SINGULAIR) 10 mg tablet, Take 1 tablet (10 mg total) by mouth daily, Disp: , Rfl:     predniSONE 20 mg tablet, Take 1 tablet (20 mg total) by mouth daily for 5 days, Disp: 5 tablet, Rfl: 0    verapamil (CALAN) 120 mg tablet, TAKE 1 TABLET BY MOUTH EVERY DAY, Disp: 90 tablet, Rfl: 1    hydrOXYzine HCL (ATARAX) 25 mg tablet, Take 25 mg by mouth daily as needed (Patient not taking: Reported on 5/6/2025), Disp: , Rfl:     valACYclovir (VALTREX) 500 mg tablet, Take 2 tablets (1,000 mg total) by mouth daily for 5 days, Disp: 10 tablet, Rfl: 0    Current Allergies     Allergies as of 05/08/2025 - Reviewed 05/08/2025   Allergen Reaction Noted     Penicillins Rash and Anaphylaxis 08/17/2004    Acetazolamide  10/25/2016    Cephalosporins Other (See Comments) 08/17/2004    Other  04/28/2014    Sulfa antibiotics Other (See Comments) 08/17/2004    Famotidine Palpitations 09/05/2016    Levofloxacin Palpitations 11/12/2021    Omeprazole Palpitations 09/05/2016            The following portions of the patient's history were reviewed and updated as appropriate: allergies, current medications, past family history, past medical history, past social history, past surgical history and problem list.     Past Medical History:   Diagnosis Date    Acute bronchitis 01/29/2024    Anxiety 01/15/2019    Arthritis     Asthma     Chronic rhinitis     last assessed 2/21/14    Chronic serous otitis media     last assessed 11/20/13    Chronic sinusitis     last assessed 8/19/14    Functional heart murmur     GERD (gastroesophageal reflux disease)     Gout     Hearing problem     last assessed 12/1/16    Hiatal hernia     Hypercholesterolemia     Hypertension     Palpitations     Testicular hypogonadism     last assessed 10/4/13    V-tach (HCC)        Past Surgical History:   Procedure Laterality Date    APPENDECTOMY      BICEPS TENDON REPAIR Left 2009    BICEPS TENODESIS      anesthesia for tenodesis- of ruptured long tendon of biceps     HERNIA REPAIR      VT ARTHRP KNE CONDYLE&PLATU MEDIAL&LAT COMPARTMENTS Right 9/17/2024    Procedure: ARTHROPLASTY KNEE TOTAL, potential same day discharge, cemented;  Surgeon: Parvin Whipple DO;  Location:  MAIN OR;  Service: Orthopedics    THYROIDECTOMY      TONSILLECTOMY         Family History   Problem Relation Age of Onset    Lung cancer Father     Coronary artery disease Father         blockages    Stroke Maternal Grandmother     Cancer Paternal Grandfather         metastasized    Heart disease Family     Lung cancer Cousin     No Known Problems Mother     No Known Problems Sister     No Known Problems Brother     No Known Problems  "Maternal Aunt     No Known Problems Maternal Uncle     No Known Problems Paternal Aunt     No Known Problems Paternal Uncle     No Known Problems Paternal Grandmother     ADD / ADHD Neg Hx     Anesthesia problems Neg Hx     Clotting disorder Neg Hx     Collagen disease Neg Hx     Diabetes Neg Hx     Dislocations Neg Hx     Learning disabilities Neg Hx     Neurological problems Neg Hx     Osteoporosis Neg Hx     Rheumatologic disease Neg Hx     Scoliosis Neg Hx     Vascular Disease Neg Hx          Medications have been verified.        Objective   /87 (BP Location: Left arm, Patient Position: Sitting)   Pulse 84   Temp 99.2 °F (37.3 °C) (Oral)   Resp 18   Ht 5' 9\" (1.753 m)   Wt 75.8 kg (167 lb 3.2 oz)   SpO2 97%   BMI 24.69 kg/m²   No LMP for male patient.       Physical Exam     Physical Exam  Vitals and nursing note reviewed.   Constitutional:       Appearance: Normal appearance.   HENT:      Head: Normocephalic.   Cardiovascular:      Rate and Rhythm: Normal rate and regular rhythm.      Heart sounds: Normal heart sounds.   Pulmonary:      Breath sounds: Normal breath sounds. No wheezing.   Musculoskeletal:      Comments: No pain over cervical spin or cervical para-spinals. Spasms noted in Thoracic spine and para-spinals. Spasms noted in  lumbar spine and lumbar para-spinals. Upper and lower body motor intact. No pain over right or left calf.    Neurological:      General: No focal deficit present.      Mental Status: He is alert and oriented to person, place, and time.   Psychiatric:         Mood and Affect: Mood normal.         Behavior: Behavior normal.                   "

## 2025-05-09 DIAGNOSIS — J45.909 ASTHMA, UNSPECIFIED ASTHMA SEVERITY, UNSPECIFIED WHETHER COMPLICATED, UNSPECIFIED WHETHER PERSISTENT: ICD-10-CM

## 2025-05-12 RX ORDER — ALBUTEROL SULFATE 90 UG/1
AEROSOL, METERED RESPIRATORY (INHALATION)
Refills: 1 | OUTPATIENT
Start: 2025-05-12

## 2025-05-13 NOTE — PATIENT INSTRUCTIONS
1  Drink plenty fluids  2   Take probiotics [i e  Yogurt, Acidophilus, Florastor (liquid)] daily  3   Over-the-counter medications as needed for symptomatic care  4    Advance activities as tolerated  5    Follow-up with your primary care physician in 3-4 days  6   Go to emergency room if symptoms are worsening  Reason for Visit: 20 year EDMOND Carvalho is a 20 year old female who presents for well adolescent exam. Patient presents alone .    Concerns raised today include:   Bloating: Janet states that in the last few months, she has been having issues with bloating and alternating constipation and diarrhea. She feels like it is related to dairy. She has tried limiting her dairy intake. She has tried Lactaid. She feels like if she doesn't eat dairy, her symptoms are better but not completely gone. She has never had issues with dairy in the past. She typically goes a few days between bowel movements. Sometimes stools are hard and sometimes loose. There is no blood in the stools. She just started taking fiber and probiotic shake. No family history of celiac disease or inflammatory bowel disease.    Interval History:  Birth history, medical history, surgical history, and family history reviewed and updated. No chronic medical conditions. No recent illnesses or injuries other than above. No prior surgeries. No known allergies. medications include birth control. Family history reviewed and updated in Epic.     Diet: well balanced. Trying to limit dairy. [place] water    Sleeping: sleeping well    Elimination: Normal urine output. Stools alternating between constipation and diarrhea.     Activity: regular    Puberty:  having regular periods on birth control.     Vision/Hearing: Has contacts and sees eye doctor. No hearing concerns    Dental: Brushes teeth. Sees dentist    Safety: wears seat belt    SOCIAL:  Lives with: Parents during the summer; at college during the school year  Recent changes/stressors: none  School: Just finished freshman year at [employer]  Sports/Activities:  will be working this summer  Substance use: No alcohol, tobacco, marijuana or other drug use  Sexual activity: is sexually active with 1 partner. Uses condoms and birth control. Declines STI testing  Mood: No concerns. No suicidal or  homicidal ideation  Body Image: No concerns    Tobacco Use: Never             DEVELOPMENT/BEHAVIOR CONCERNS:  No developmental or behavioral concerns    PHYSICAL EXAM:  Blood pressure 116/66, pulse 80, temperature 98.2 °F (36.8 °C), temperature source Oral, height 6' 0.25\" (1.835 m), weight 81.8 kg (180 lb 7.1 oz), last menstrual period 05/07/2025.  GEN:  Well appearing female adolescent, nontoxic, no acute distress.  Alert and oriented.  SKIN: Warm, normal turgor.  No cyanosis.  No bruises or lesions.    HEAD:  Normocephalic, atraumatic.   EYES:  Conjunctiva appear normal, non-injected, non-icteric.  EOMI, PERRLA.  NOSE:  Appears normal, no flaring.  EARS:  Normal pinnae, no preauricular skin tags or pit.  TMs are transparent with good landmarks.  THROAT:  Oropharynx with moist mucus membranes and no lesions.  NECK:  Supple, no lymphadenopathy or masses.  No thyromegaly.  HEART:  Regular rate and rhythm.  Quiet precordium.  Normal S1, S2.  No murmurs, rubs, gallops.  2+ peripheral pulses.  LUNGS:  Clear to auscultation bilaterally.  No wheezes, rales, rhonchi.  Normal work of breathing.  ABD:  Soft, nontender.  No organomegaly or masses.  : normal female  Amish stage V  MSK:  Spine straight, no scoliosis.  EXT:  Warm, dry, without abnormalities.  NEURO/PSYCH:  Normal tone, bulk, strength.  2+ DTRs bilaterally.  Normal affect and eye contact, answers questions appropriately, normal speech.    ASSESSMENT:  20 year old female well adolescent.  Normal growth and development  Abdominal pain and bloating    PLAN:   1. All parental concerns and questions discussed.  2. Anticipatory guidance provided,including nutrition, sleep, safety and development.  3. Non-fasting cholesterol screening done previously  4. Immunizations: None needed. Risks, benefits, and side effects discussed.  5. Discussed that people can develop lactose intolerance over time. However, there could be something else going on. We will do screening  labs today (CMP, CBC, ESR, CRP, TTG, IgA, TSH and Free T4). If these are normal, then I would recommend eliminating dairy from the diet for at least 2 weeks. If no improvement, then we can refer to GI  6. Janet declines birth control testing  7. Continue probiotic/fiber  8. Continue current birth control. Call with refill is needed    Follow-up:  RTC in 1 year for WCE or sooner prn illness/concerns.    Teodora Sales MD  5/13/25  DEPRESSION ASSESSMENT/PLAN:  Depression screening is negative no further plan needed.

## 2025-05-19 NOTE — PROGRESS NOTES
Internal Medicine Pre-Operative Evaluation:     Reason for Visit: Pre-operative Evaluation for Risk Stratification and Optimization    Patient ID: Alexx Haney is a 59 y.o. male.     Surgery: Arthroplasty of right knee  Referring Provider: Dr. Whipple      Recommendations to Proceed withSurgery    Patient is considered to be Low risk for Medium risk procedure.     After evaluation and discussion with patient with emphasis that all surgery has some degree of inherent risk it is acknowledged by patient this risk is Acceptable.    Patient is optimized and may proceed with planned procedure.     Assessment    Pre-operative Medical Evaluation for planned surgery  Recommendations as listed in PLAN section below  Contact surgical nurse  navigator with any questions regarding preoperative plan or schedule.      Problem List Items Addressed This Visit          Cardiovascular and Mediastinum    Essential hypertension     Stable  Monitor post operative BP   Avoid hypotension if at all possible  Refer to PAT instructions regarding medication administration the morning of surgery           V-tach (HCC)       Respiratory    Asthma     Stable  Continue inhalers the morning of surgery as directed            Musculoskeletal and Integument    Primary localized osteoarthritis of right knee     Failed outpatient conservative measures  Electing to undergo arthroplasty              Other    Class 1 obesity due to excess calories without serious comorbidity with body mass index (BMI) of 30.0 to 30.9 in adult     Recommend ongoing attempts at weight loss  Current BMI meets criteria of <40 per [facility] preoperative qualifications            Other Visit Diagnoses       Preoperative clearance    -  Primary        Hyponatremia  Chronic  stable         Plan:     1. Further preoperative workup as follows:   - none no further testing required may proceed with surgery    2. Preoperative Medication Management Review performed by OWEN  "nursing  YES    3. Patient requires further consultation with:   No Consults Required    4. Discharge Planning / Barriers to Discharge  none identified - patients has post discharge therapy plan in place, transportation arranged for discharge day, adequate family support at home to assist with discharge to home.        Subjective:           History of Present Illness:     Alexx Haney is a 59 y.o. male who presents to the office today for a preoperative consultation at the request of surgeon. The patient understands this is an elective procedure and not emergent. They are electing to undergo planned procedure with an understanding that all surgery has inherent risk. They have worked with their surgeon and failed conservative treatment measures. Today they present for preoperative risk assessment and recommendations for optimization in preparation for surgery.    Pt seen in surgical optimization center for upcoming proposed surgery. They have failed previous conservative measures and have elected surgical intervention.     Pt meets presurgical lab and BMI optimization goals.    Upon interview questioning patient is able to perform greater than 4 METs workload in daily life without any reported cardio-pulmonary symptoms.Patient has a history of SVT, he is followed by cardiology.  He has not had any documented recent episodes.  He was cleared by cardiology with no additional testing on  8/22/2024.          ROS: No TIA's or unusual headaches, no dysphagia.  No prolonged cough. No dyspnea or chest pain on exertion.  No abdominal pain, change in bowel habits, black or bloody stools.  No urinary tract or BPH symptoms.  Positive reported pain in arthritic joint. Positive difficulty with gait. No skin rashes or issues.      Objective:    /78   Pulse 85   Ht 5' 9\" (1.753 m)   Wt 90.7 kg (200 lb)   BMI 29.53 kg/m²       General Appearance: no distress, conversive  HEENT: PERRLA, conjuctiva normal; oropharynx " clear; mucous membranes moist;   Neck:  Supple, no lymphadenopathy or thyromegaly  Lungs: breath sounds normal, normal respiratory effort, no retractions, expiratory effort normal  CV: normal heart sounds S1/S2, PMI normal   ABD: soft non tender, no masses , no hepatic or splenomegaly  EXT: DP pulses intact, no lymphadenopathy, no edema  Skin: normal turgor, normal texture, no rash  Psych: affect normal, mood normal  Neuro: AAOx3        The following portions of the patient's history were reviewed and updated as appropriate: allergies, current medications, past family history, past medical history, past social history, past surgical history and problem list.     Past History:       Past Medical History:   Diagnosis Date    Acute bronchitis 01/29/2024    Anxiety 01/15/2019    Arthritis     Chronic rhinitis     last assessed 2/21/14    Chronic serous otitis media     last assessed 11/20/13    Chronic sinusitis     last assessed 8/19/14    Functional heart murmur     GERD (gastroesophageal reflux disease)     Gout     Hearing problem     last assessed 12/1/16    Hiatal hernia     Hypercholesterolemia     Hypertension     Palpitations     Testicular hypogonadism     last assessed 10/4/13    V-tach (HCC)     Past Surgical History:   Procedure Laterality Date    APPENDECTOMY      BICEPS TENODESIS      anesthesia for tenodesis- of ruptured long tendon of biceps     HERNIA REPAIR      THYROIDECTOMY      TONSILLECTOMY            Social History     Tobacco Use    Smoking status: Never    Smokeless tobacco: Never   Vaping Use    Vaping status: Never Used   Substance Use Topics    Alcohol use: Yes     Comment: occasionally    Drug use: No     Family History   Problem Relation Age of Onset    Lung cancer Father     Coronary artery disease Father         blockages    Stroke Maternal Grandmother     Cancer Paternal Grandfather         metastasized    Heart disease Family     Lung cancer Cousin     No Known Problems Mother     No  Known Problems Sister     No Known Problems Brother     No Known Problems Maternal Aunt     No Known Problems Maternal Uncle     No Known Problems Paternal Aunt     No Known Problems Paternal Uncle     No Known Problems Paternal Grandmother     ADD / ADHD Neg Hx     Anesthesia problems Neg Hx     Clotting disorder Neg Hx     Collagen disease Neg Hx     Diabetes Neg Hx     Dislocations Neg Hx     Learning disabilities Neg Hx     Neurological problems Neg Hx     Osteoporosis Neg Hx     Rheumatologic disease Neg Hx     Scoliosis Neg Hx     Vascular Disease Neg Hx           Allergies:     Allergies   Allergen Reactions    Penicillins Rash and Anaphylaxis    Acetazolamide      Other reaction(s): Stomach Ache    Cephalosporins Other (See Comments)     headache    Other     Sulfa Antibiotics Other (See Comments)     Category: Allergy; Annotation - [id number]: STOMACH UPSET    Famotidine Palpitations    Levofloxacin Palpitations    Omeprazole Palpitations     Painful urination        Current Medications:     Current Outpatient Medications   Medication Instructions    acetaminophen (TYLENOL) 750 mg, Oral, Daily, 1500 at times    albuterol (PROVENTIL HFA,VENTOLIN HFA) 90 mcg/act inhaler 1 puff, Inhalation, As needed    albuterol 2.5 mg, Nebulization, Every 6 hours PRN    ascorbic acid (VITAMIN C) 500 mg, Oral, Daily, Begin 30 days prior to surgery.    Chlorpheniramine Maleate (CHLOR-TRIMETON ALLERGY PO) Oral, As needed    clotrimazole (LOTRIMIN) 1 % cream Topical, 2 times daily    esomeprazole (NEXIUM) 40 mg, Oral, Every morning before breakfast    febuxostat (ULORIC) 40 mg, Oral, Daily    ferrous sulfate 324 mg, Oral, Daily before breakfast, Begin 30 days prior to surgery.    fluticasone-salmeterol (Advair) 500-50 mcg/dose inhaler 1 puff, Inhalation, 2 times daily    folic acid (FOLVITE) 1 mg, Oral, Daily, Begin 30 days prior to surgery.    ibuprofen (MOTRIN) 600 mg, Oral, Every 8 hours PRN    ipratropium-albuterol (DUO-NEB)  "0.5-2.5 mg/3 mL 3 mL, Inhalation, As needed    lisinopril (ZESTRIL) 20 mg, Oral, Daily    meclizine (ANTIVERT) 12.5 mg, Oral, Every 8 hours scheduled    montelukast (SINGULAIR) 10 mg, Oral, Daily    Multiple Vitamin (multivitamin) tablet 1 tablet, Oral, Daily, Begin 30 days prior to surgery.    Triamcinolone Acetonide (NASACORT ALLERGY 24HR NA) 1 spray, Nasal, Daily    verapamil (CALAN) 120 mg tablet TAKE 1 TABLET BY MOUTH EVERY DAY           PRE-OP WORKSHEET DATA    Assessment of Pre-Operative Risks     MLJ Quality Hard Stops:    BMI (<40) : Estimated body mass index is 29.53 kg/m² as calculated from the following:    Height as of this encounter: 5' 9\" (1.753 m).    Weight as of this encounter: 90.7 kg (200 lb).    Hgb ( >11):   Lab Results   Component Value Date    HGB 13.5 08/21/2024    HGB 13.6 06/26/2023    HGB 13.8 12/08/2022       HbA1c (<7.5) :   Lab Results   Component Value Date    HGBA1C 5.4 08/21/2024       GFR (>60) (Less then 45 = Nephrology consult):    Lab Results   Component Value Date    EGFR 96 08/21/2024    EGFR 95 07/18/2024    EGFR 102 04/20/2024         Active Decompensated Chronic Conditions which would delay surgery  No acutely decompensated medical issues such as recent CVA, MI, new onset arrhythmia, severe aortic stenosis, CHF, uncontrolled COPD       Functional capacity: PUSH MOWING LAWN, BRISK WALKING                                   5 METS  Pick the highest level patient can comfortably perform   (if less the 4 mets consider functional assessment via cardiac stress testing or consultation)    Assessment of intra and post operative respiratory, hemodynamic and thrombotic risks     Prior Anesthesia Reactions: No     Personal history of venous thromboembolic disease? No    History of steroid use > 5 mg for >2 weeks within last year? No      The patient's risk factors for cardiac complications include :  none    Alexx Haney has an IN HOSPITAL cardiac risk of RCI RISK CLASS I (0 risk " Detail Level: Detailed factors, risk of major cardiac compl. appr. 0.5%) based on RCRI calculator    Cardiac Risk Estimation: per the Revised Cardiac Risk Index (Circ. 100:1043, 1999),        Pre-Op Data Reviewed:       Laboratory Results: I have personally reviewed the pertinent laboratory results/reports     EKG:I have personally reviewed pertinent reports.  . I personally reviewed and interpreted available tracings in the electronic medical record    Normal sinus rhythm, normal EKG      Specimen Collected: 08/22/24          OLD RECORDS: reviewed old records in the chart review section if EHR on day of visit.    Previous cardiopulmonary studies within the past year:  Echocardiogram: yes   Lab Results   Component Value Date    LVEF 65 12/18/2023     Cardiac Catheterization: no  Stress Test: no      Time of visit including pre-visit chart review, visit and post-visit coordination of plan and care , review of pre-surgical lab work, preparation and time spent documenting note in electronic medical record, time spent face-to-face in physical examination answering patient questions by care team 35 minutes             [facility]     Detail Level: Zone

## 2025-05-23 ENCOUNTER — TELEPHONE (OUTPATIENT)
Age: 60
End: 2025-05-23

## 2025-05-23 NOTE — TELEPHONE ENCOUNTER
Caller: Patient    Call back#: [phone]     Best call back time am/pm/all day: all day    Doctor: Dr Whipple    Surgery: yes    Date of Surgery: 6/17/25    Reason for call: Has questions re:medications/preop testing      Urgent matters - Please Teams message Nurse Navigator Team Chat & send phone note to Orthopedic Nurse Navigator Pool as high priority before transferring call.   Non-Urgent matters - Please send a phone note to Orthopedic Nurse Navigator Pool only. Nurse Navigators will call patients back asap.

## 2025-05-28 ENCOUNTER — TELEPHONE (OUTPATIENT)
Dept: OBGYN CLINIC | Facility: HOSPITAL | Age: 60
End: 2025-05-28

## 2025-05-29 ENCOUNTER — TELEPHONE (OUTPATIENT)
Dept: OBGYN CLINIC | Facility: MEDICAL CENTER | Age: 60
End: 2025-05-29

## 2025-05-29 ENCOUNTER — RA CDI HCC (OUTPATIENT)
Dept: OTHER | Facility: HOSPITAL | Age: 60
End: 2025-05-29

## 2025-05-29 ENCOUNTER — OFFICE VISIT (OUTPATIENT)
Dept: URGENT CARE | Age: 60
End: 2025-05-29
Payer: MEDICARE

## 2025-05-29 VITALS
TEMPERATURE: 98.8 F | HEART RATE: 78 BPM | DIASTOLIC BLOOD PRESSURE: 82 MMHG | RESPIRATION RATE: 20 BRPM | OXYGEN SATURATION: 96 % | SYSTOLIC BLOOD PRESSURE: 138 MMHG

## 2025-05-29 DIAGNOSIS — J01.00 ACUTE NON-RECURRENT MAXILLARY SINUSITIS: Primary | ICD-10-CM

## 2025-05-29 PROCEDURE — 99213 OFFICE O/P EST LOW 20 MIN: CPT

## 2025-05-29 PROCEDURE — G0463 HOSPITAL OUTPT CLINIC VISIT: HCPCS

## 2025-05-29 RX ORDER — DOXYCYCLINE 100 MG/1
100 TABLET ORAL 2 TIMES DAILY
Qty: 14 TABLET | Refills: 0 | Status: SHIPPED | OUTPATIENT
Start: 2025-05-29 | End: 2025-06-05

## 2025-05-29 RX ORDER — ONDANSETRON 4 MG/1
4 TABLET, ORALLY DISINTEGRATING ORAL EVERY 8 HOURS PRN
Qty: 21 TABLET | Refills: 0 | Status: SHIPPED | OUTPATIENT
Start: 2025-05-29 | End: 2025-06-05

## 2025-05-29 RX ORDER — PREDNISONE 20 MG/1
40 TABLET ORAL DAILY
Qty: 8 TABLET | Refills: 0 | Status: SHIPPED | OUTPATIENT
Start: 2025-05-29 | End: 2025-06-02

## 2025-05-29 NOTE — TELEPHONE ENCOUNTER
I tried calling the patient but no answer. I did leave a VM to notify him of BW and EKG that needs to be done prior to his medical clearance appointment scheduled for 6/4. I did leave detailed information on VM and left my direct p# if he needs to call me back.

## 2025-05-30 ENCOUNTER — TELEPHONE (OUTPATIENT)
Age: 60
End: 2025-05-30

## 2025-05-30 NOTE — TELEPHONE ENCOUNTER
Caller: Alexx     Doctor: Dr. Whipple    Reason for call: Please cancel surgery on 6/17 all PO appt's have been canceled at patient's request. He will be keeping the appt on 6/11 and changed to f/u    Call back#: [phone]

## 2025-05-31 NOTE — PROGRESS NOTES
"  [facility]        NAME: Alexx Haney is a 60 y.o. male  : 1965    MRN: [id number]  DATE: May 31, 2025  TIME: 8:07 AM    Assessment and Plan   Acute non-recurrent maxillary sinusitis [J01.00]  1. Acute non-recurrent maxillary sinusitis  doxycycline (ADOXA) 100 MG tablet    predniSONE 20 mg tablet    ondansetron (ZOFRAN-ODT) 4 mg disintegrating tablet        Ongoing nasal congestion, pressure for weeks. No fevers. Discussed symptom management, PCP f/u and ER for worsening    Patient Instructions       Follow up with PCP in 3-5 days.  Proceed to  ER if symptoms worsen.    If tests have been performed at [facility], our office will contact you with results if changes need to be made to the care plan discussed with you at the visit.  [facility]    Chief Complaint     Chief Complaint   Patient presents with    Nasal Congestion     Pt has had nasal congestion, sinus \"problems\", and stating his glands are swollen on and off for the past couple weeks          History of Present Illness       Ongoing nasal congestion, pressure for weeks. No fevers. Discussed symptom management, PCP f/u and ER for worsening        Review of Systems   Review of Systems   Constitutional:  Negative for activity change, appetite change and fever.   HENT:  Positive for congestion and sinus pressure.    Respiratory:  Positive for cough. Negative for shortness of breath and wheezing.    All other systems reviewed and are negative.        Current Medications     Current Medications[1]    Current Allergies     Allergies as of 2025 - Reviewed 2025   Allergen Reaction Noted    Penicillins Rash and Anaphylaxis 2004    Acetazolamide  10/25/2016    Cephalosporins Other (See Comments) 2004    Other  2014    Sulfa antibiotics Other (See Comments) 2004    Famotidine Palpitations 2016    Levofloxacin Palpitations 2021    Omeprazole Palpitations " 09/05/2016            The following portions of the patient's history were reviewed and updated as appropriate: allergies, current medications, past family history, past medical history, past social history, past surgical history and problem list.     Past Medical History[2]    Past Surgical History[3]    Family History[4]      Medications have been verified.        Objective   /82   Pulse 78   Temp 98.8 °F (37.1 °C)   Resp 20   SpO2 96%   No LMP for male patient.       Physical Exam     Physical Exam  Vitals reviewed.   Constitutional:       Appearance: Normal appearance.   HENT:      Right Ear: Tympanic membrane normal.      Left Ear: Tympanic membrane normal.      Nose: Congestion present.      Right Sinus: No maxillary sinus tenderness.      Left Sinus: Maxillary sinus tenderness present.     Cardiovascular:      Rate and Rhythm: Normal rate and regular rhythm.      Pulses: Normal pulses.      Heart sounds: Normal heart sounds.   Pulmonary:      Effort: Pulmonary effort is normal.      Breath sounds: Normal breath sounds.   Lymphadenopathy:      Cervical: Cervical adenopathy present.     Neurological:      Mental Status: He is alert.                        [1]   Current Outpatient Medications:     doxycycline (ADOXA) 100 MG tablet, Take 1 tablet (100 mg total) by mouth 2 (two) times a day for 7 days, Disp: 14 tablet, Rfl: 0    ondansetron (ZOFRAN-ODT) 4 mg disintegrating tablet, Take 1 tablet (4 mg total) by mouth every 8 (eight) hours as needed for nausea or vomiting for up to 7 days, Disp: 21 tablet, Rfl: 0    predniSONE 20 mg tablet, Take 2 tablets (40 mg total) by mouth daily for 4 days, Disp: 8 tablet, Rfl: 0    acetaminophen (TYLENOL) 500 mg tablet, Take 2 tablets (1,000 mg total) by mouth every 8 (eight) hours 1500 at times, Disp: , Rfl:     albuterol (2.5 mg/3 mL) 0.083 % nebulizer solution, Take 3 mL (2.5 mg total) by nebulization every 6 (six) hours as needed for wheezing or shortness of  breath, Disp: 240 mL, Rfl: 2    albuterol (PROVENTIL HFA,VENTOLIN HFA) 90 mcg/act inhaler, Inhale 1 puff if needed for wheezing or shortness of breath, Disp: 48 g, Rfl: 1    ascorbic acid (VITAMIN C) 500 MG tablet, Take 1 tablet (500 mg total) by mouth daily Begin 30 days prior to surgery, Disp: 30 tablet, Rfl: 1    clotrimazole-betamethasone (LOTRISONE) 1-0.05 % cream, Apply topically 2 (two) times a day, Disp: 90 g, Rfl: 1    esomeprazole (NexIUM) 40 MG capsule, Take 40 mg by mouth every morning before breakfast, Disp: , Rfl:     febuxostat (ULORIC) 40 mg tablet, Take 1 tablet (40 mg total) by mouth daily, Disp: 90 tablet, Rfl: 1    ferrous sulfate 325 (65 Fe) mg tablet, Take 325 mg by mouth daily with breakfast, Disp: , Rfl:     fluticasone-salmeterol (Advair) 500-50 mcg/dose inhaler, Inhale 1 puff in the morning and 1 puff in the evening., Disp: , Rfl:     folic acid (FOLVITE) 1 mg tablet, Take by mouth daily, Disp: , Rfl:     hydrOXYzine HCL (ATARAX) 25 mg tablet, Take 25 mg by mouth daily as needed (Patient not taking: Reported on 5/6/2025), Disp: , Rfl:     lisinopril (ZESTRIL) 20 mg tablet, Take 1 tablet (20 mg total) by mouth daily, Disp: 90 tablet, Rfl: 1    methocarbamol (ROBAXIN) 500 mg tablet, Take 250 mg by mouth every 8 (eight) hours as needed for muscle spasms, Disp: , Rfl:     methocarbamol (ROBAXIN) 500 mg tablet, Take 1 tablet (500 mg total) by mouth daily at bedtime as needed for muscle spasms, Disp: 10 tablet, Rfl: 0    montelukast (SINGULAIR) 10 mg tablet, Take 1 tablet (10 mg total) by mouth daily, Disp: , Rfl:     Multiple Vitamin (multivitamin) tablet, Take 1 tablet by mouth daily, Disp: , Rfl:     valACYclovir (VALTREX) 500 mg tablet, Take 2 tablets (1,000 mg total) by mouth daily for 5 days, Disp: 10 tablet, Rfl: 0    verapamil (CALAN) 120 mg tablet, TAKE 1 TABLET BY MOUTH EVERY DAY, Disp: 90 tablet, Rfl: 1  [2]   Past Medical History:  Diagnosis Date    Acute bronchitis 01/29/2024     Anxiety 01/15/2019    Arthritis     Asthma     Chronic rhinitis     last assessed 2/21/14    Chronic serous otitis media     last assessed 11/20/13    Chronic sinusitis     last assessed 8/19/14    Functional heart murmur     GERD (gastroesophageal reflux disease)     Gout     Hearing problem     last assessed 12/1/16    Hiatal hernia     Hypercholesterolemia     Hypertension     Palpitations     Testicular hypogonadism     last assessed 10/4/13    V-tach (HCC)    [3]   Past Surgical History:  Procedure Laterality Date    APPENDECTOMY      BICEPS TENDON REPAIR Left 2009    BICEPS TENODESIS      anesthesia for tenodesis- of ruptured long tendon of biceps     HERNIA REPAIR      [place] ARTHRP KNE CONDYLE&PLATU MEDIAL&LAT COMPARTMENTS Right 9/17/2024    Procedure: ARTHROPLASTY KNEE TOTAL, potential same day discharge, cemented;  Surgeon: Parvin Whipple DO;  Location:  MAIN OR;  Service: Orthopedics    THYROIDECTOMY      TONSILLECTOMY     [4]   Family History  Problem Relation Name Age of Onset    Lung cancer Father      Coronary artery disease Father          blockages    Stroke Maternal Grandmother      Cancer Paternal Grandfather          metastasized    Heart disease Family      Lung cancer Cousin      No Known Problems Mother      No Known Problems Sister      No Known Problems Brother      No Known Problems Maternal Aunt      No Known Problems Maternal Uncle      No Known Problems Paternal Aunt      No Known Problems Paternal Uncle      No Known Problems Paternal Grandmother      ADD / ADHD Neg Hx      Anesthesia problems Neg Hx      Clotting disorder Neg Hx      Collagen disease Neg Hx      Diabetes Neg Hx      Dislocations Neg Hx      Learning disabilities Neg Hx      Neurological problems Neg Hx      Osteoporosis Neg Hx      Rheumatologic disease Neg Hx      Scoliosis Neg Hx      Vascular Disease Neg Hx

## 2025-06-02 NOTE — TELEPHONE ENCOUNTER
Dr. Whipple surgery was canceled per patient.     I did try to call the patient but no answer, I did leave a VM to give me a call back to see if he wanted to reschedule.

## 2025-06-02 NOTE — TELEPHONE ENCOUNTER
Caller: Patient    Doctor: Dr. Whipple    Reason for call: Patient called back requested to reschedule surgery. Please advise.    Call back#: [phone]

## 2025-06-10 ENCOUNTER — TELEPHONE (OUTPATIENT)
Dept: OTHER | Facility: OTHER | Age: 60
End: 2025-06-10

## 2025-06-10 DIAGNOSIS — M10.9 GOUTY ARTHRITIS: ICD-10-CM

## 2025-06-10 NOTE — TELEPHONE ENCOUNTER
"Patient is calling regarding cancelling an appointment.    Date/Time: 6/11/25 1:45    Patient was rescheduled: YES [] NO [x]    Patient requesting call back to reschedule: YES [] NO [x]    Pt stated, \"I will call back when I am ready to r/s.\"    "

## 2025-06-11 RX ORDER — FEBUXOSTAT 40 MG/1
40 TABLET, FILM COATED ORAL DAILY
Qty: 90 TABLET | Refills: 2 | Status: SHIPPED | OUTPATIENT
Start: 2025-06-11

## 2025-07-01 ENCOUNTER — TELEPHONE (OUTPATIENT)
Age: 60
End: 2025-07-01

## 2025-07-06 DIAGNOSIS — I10 ESSENTIAL HYPERTENSION: ICD-10-CM

## 2025-07-08 VITALS — OXYGEN SATURATION: 98 % | HEIGHT: 69 IN | WEIGHT: 205.4 LBS | BODY MASS INDEX: 30.42 KG/M2

## 2025-07-08 DIAGNOSIS — M17.12 PRIMARY OSTEOARTHRITIS OF LEFT KNEE: Primary | ICD-10-CM

## 2025-07-08 PROCEDURE — 99213 OFFICE O/P EST LOW 20 MIN: CPT | Performed by: ORTHOPAEDIC SURGERY

## 2025-07-08 PROCEDURE — 20610 DRAIN/INJ JOINT/BURSA W/O US: CPT | Performed by: ORTHOPAEDIC SURGERY

## 2025-07-08 RX ORDER — VERAPAMIL HYDROCHLORIDE 120 MG/1
120 TABLET ORAL DAILY
Qty: 90 TABLET | Refills: 1 | Status: SHIPPED | OUTPATIENT
Start: 2025-07-08

## 2025-07-08 RX ORDER — TRIAMCINOLONE ACETONIDE 40 MG/ML
40 INJECTION, SUSPENSION INTRA-ARTICULAR; INTRAMUSCULAR
Status: COMPLETED | OUTPATIENT
Start: 2025-07-08 | End: 2025-07-08

## 2025-07-08 RX ORDER — BUPIVACAINE HYDROCHLORIDE 2.5 MG/ML
4 INJECTION, SOLUTION INFILTRATION; PERINEURAL
Status: COMPLETED | OUTPATIENT
Start: 2025-07-08 | End: 2025-07-08

## 2025-07-08 RX ADMIN — BUPIVACAINE HYDROCHLORIDE 4 ML: 2.5 INJECTION, SOLUTION INFILTRATION; PERINEURAL at 11:45

## 2025-07-08 RX ADMIN — TRIAMCINOLONE ACETONIDE 40 MG: 40 INJECTION, SUSPENSION INTRA-ARTICULAR; INTRAMUSCULAR at 11:45

## 2025-07-08 NOTE — PROGRESS NOTES
Assessment/Plan:  1. Primary osteoarthritis of left knee      Patient has severe left knee osteoarthritis.   Discuss about continued conservative vs surgical intervention in way left total knee replacement discussed.   He would like CSI today in left knee and schedule surgery for October time period.   Received steroid injection today.  Patient knows to ice and avoid strenuous activity for 1-2 days if needed.     He will pick surgical date for left total knee replacement 11/11/25. See back in 2 months to consent, post op orders.   DVT Prophylaxis: ASA  The patient will need to obtain clearance from: SOC, cardiology, nephrology  Advil, ibuprofen as needed for pain, tylenol 1000mg TID  See back in 2 months    Orders Placed This Encounter   Procedures    Large joint arthrocentesis: L knee       Return in about 2 months (around 9/8/2025).      Subjective   Chief Complaint:   Chief Complaint   Patient presents with    Left Knee - Follow-up    Right Knee - Post-op, Follow-up       Alexx Haney is a 60 y.o. male who presents for follow up for left knee severe osteoarthritis. He states recently due to weather he has not been as active and pain in knee is not sharp but more mild ache in nature. Denies any instability or locking. Pain anterior and medial based with crepitation. Walking unassisted. Use nsaid if needed for pain only. He is requesting repeat CSI today and would like to discuss left knee replacement for around fall time.     10 s/p right TKA.     Review of Systems  ROS:    See HPI for musculoskeletal review.   All other systems reviewed are negative     History:  Past Medical History[1]  Past Surgical History[2]  Social History   Social History     Substance and Sexual Activity   Alcohol Use Yes    Alcohol/week: 12.0 standard drinks of alcohol    Types: 12 Shots of liquor per week    Comment: occasionally; socially     Social History     Substance and Sexual Activity   Drug Use No     Tobacco Use  "History[3]  Family History: Family History[4]    Meds/Allergies   Not in a hospital admission.  Allergies[5]       Objective     Ht 5' 9\" (1.753 m)   Wt 93.2 kg (205 lb 6.4 oz)   SpO2 98%   BMI 30.33 kg/m²      PE:  AAOx 3  WDWN  Hearing intact, no drainage from eyes  no audible wheezing  no abdominal distension  LE compartments soft, skin intact    Ortho Exam:  left Knee:   No erythema  mild swelling  no effusion  no warmth  AROM: 0-120 with varus alignment  Stable to varus/valgus stress  +crepitation    Imaging Studies: Results Review Statement: No pertinent imaging studies reviewed.      Large joint arthrocentesis: L knee    Performed by: Parvin Whipple DO  Authorized by: Parvin Whipple DO    Universal Protocol:  Consent: Verbal consent obtained  Risks and benefits: risks, benefits and alternatives were discussed  Consent given by: patient  Patient understanding: patient states understanding of the procedure being performed  Site marked: the operative site was marked  Patient identity confirmed: verbally with patient  Supporting Documentation  Indications: pain     Is this a Visco injection? NoProcedure Details  Location: knee - L knee  Preparation: Patient was prepped and draped in the usual sterile fashion  Needle size: 22 G  Ultrasound guidance: no  Approach: anterolateral  Medications administered: 4 mL bupivacaine 0.25 %; 40 mg triamcinolone acetonide 40 mg/mL    Patient tolerance: patient tolerated the procedure well with no immediate complications  Dressing:  Sterile dressing applied              Scribe Attestation      I,:  Dejan Espinosa am acting as a scribe while in the presence of the attending physician.:       I,:  Parvin Whipple DO personally performed the services described in this documentation    as scribed in my presence.:                   [1]   Past Medical History:  Diagnosis Date    Acute bronchitis 01/29/2024    Anxiety 01/15/2019    Arthritis     Asthma     " Chronic rhinitis     last assessed 2/21/14    Chronic serous otitis media     last assessed 11/20/13    Chronic sinusitis     last assessed 8/19/14    Functional heart murmur     GERD (gastroesophageal reflux disease)     Gout     Hearing problem     last assessed 12/1/16    Hiatal hernia     Hypercholesterolemia     Hypertension     Palpitations     Testicular hypogonadism     last assessed 10/4/13    V-tach (HCC)    [2]   Past Surgical History:  Procedure Laterality Date    APPENDECTOMY      BICEPS TENDON REPAIR Left 2009    BICEPS TENODESIS      anesthesia for tenodesis- of ruptured long tendon of biceps     HERNIA REPAIR      [place] ARTHRP KNE CONDYLE&PLATU MEDIAL&LAT COMPARTMENTS Right 9/17/2024    Procedure: ARTHROPLASTY KNEE TOTAL, potential same day discharge, cemented;  Surgeon: Parvin Whipple DO;  Location:  MAIN OR;  Service: Orthopedics    THYROIDECTOMY      TONSILLECTOMY     [3]   Social History  Tobacco Use   Smoking Status Never   Smokeless Tobacco Never   [4]   Family History  Problem Relation Name Age of Onset    Lung cancer Father      Coronary artery disease Father          blockages    Stroke Maternal Grandmother      Cancer Paternal Grandfather          metastasized    Heart disease Family      Lung cancer Cousin      No Known Problems Mother      No Known Problems Sister      No Known Problems Brother      No Known Problems Maternal Aunt      No Known Problems Maternal Uncle      No Known Problems Paternal Aunt      No Known Problems Paternal Uncle      No Known Problems Paternal Grandmother      ADD / ADHD Neg Hx      Anesthesia problems Neg Hx      Clotting disorder Neg Hx      Collagen disease Neg Hx      Diabetes Neg Hx      Dislocations Neg Hx      Learning disabilities Neg Hx      Neurological problems Neg Hx      Osteoporosis Neg Hx      Rheumatologic disease Neg Hx      Scoliosis Neg Hx      Vascular Disease Neg Hx     [5]   Allergies  Allergen Reactions    Penicillins Rash and  Anaphylaxis    Acetazolamide      Other reaction(s): Stomach Ache    Cephalosporins Other (See Comments)     headache    Other     Sulfa Antibiotics Other (See Comments)     Category: Allergy; Annotation - [id number]: STOMACH UPSET    Famotidine Palpitations    Levofloxacin Palpitations    Omeprazole Palpitations     Painful urination

## 2025-07-15 ENCOUNTER — OFFICE VISIT (OUTPATIENT)
Dept: URGENT CARE | Age: 60
End: 2025-07-15
Payer: MEDICARE

## 2025-07-15 VITALS
DIASTOLIC BLOOD PRESSURE: 80 MMHG | WEIGHT: 204 LBS | RESPIRATION RATE: 18 BRPM | HEART RATE: 89 BPM | BODY MASS INDEX: 30.13 KG/M2 | SYSTOLIC BLOOD PRESSURE: 128 MMHG | TEMPERATURE: 98.1 F | OXYGEN SATURATION: 98 %

## 2025-07-15 DIAGNOSIS — H60.332 ACUTE SWIMMER'S EAR OF LEFT SIDE: Primary | ICD-10-CM

## 2025-07-15 PROCEDURE — 99213 OFFICE O/P EST LOW 20 MIN: CPT

## 2025-07-15 PROCEDURE — G0463 HOSPITAL OUTPT CLINIC VISIT: HCPCS

## 2025-07-15 RX ORDER — NEOMYCIN SULFATE, POLYMYXIN B SULFATE AND HYDROCORTISONE 10; 3.5; 1 MG/ML; MG/ML; [USP'U]/ML
4 SUSPENSION/ DROPS AURICULAR (OTIC) 4 TIMES DAILY
Qty: 10 ML | Refills: 0 | Status: SHIPPED | OUTPATIENT
Start: 2025-07-15 | End: 2025-07-22

## 2025-07-21 ENCOUNTER — TELEPHONE (OUTPATIENT)
Age: 60
End: 2025-07-21

## 2025-07-21 NOTE — TELEPHONE ENCOUNTER
Called and left a voicemail to schedule consult in [place]    Primary osteoarthritis of left knee - Preoperative testing  schedule Surgical Clearance for upcoming Surgery in November     Try to schedule if still open; Thu 7/31 3 pm TERRELL Chapman.

## 2025-07-21 NOTE — TELEPHONE ENCOUNTER
Phone call from Rhonda at [facility] ([phone]) to schedule Surgical Clearance for upcoming Surgery in November. However, patient never had a Hospital Follow (see 4/22/25 Encounter).    Patient not schedule for surgical clearance appointment pending advise from Clinical Team if patient needs Hospital Follow Up in the upcoming weeks, and then additional appointment in November for Surgical Clearance.     Please review chart, and contact Rhonda @ [facility] with appropriate appointment.

## 2025-08-20 ENCOUNTER — OFFICE VISIT (OUTPATIENT)
Dept: INTERNAL MEDICINE CLINIC | Facility: OTHER | Age: 60
End: 2025-08-20
Payer: MEDICARE

## 2025-08-20 VITALS
HEART RATE: 84 BPM | HEIGHT: 69 IN | OXYGEN SATURATION: 96 % | SYSTOLIC BLOOD PRESSURE: 124 MMHG | DIASTOLIC BLOOD PRESSURE: 54 MMHG | BODY MASS INDEX: 31.25 KG/M2 | TEMPERATURE: 97.5 F | WEIGHT: 211 LBS

## 2025-08-20 DIAGNOSIS — B35.4 TINEA CORPORIS: ICD-10-CM

## 2025-08-20 DIAGNOSIS — I47.29 NSVT (NONSUSTAINED VENTRICULAR TACHYCARDIA) (HCC): ICD-10-CM

## 2025-08-20 DIAGNOSIS — I10 ESSENTIAL HYPERTENSION: Primary | ICD-10-CM

## 2025-08-20 DIAGNOSIS — R42 VERTIGO: ICD-10-CM

## 2025-08-20 DIAGNOSIS — K21.9 GERD WITHOUT ESOPHAGITIS: ICD-10-CM

## 2025-08-20 DIAGNOSIS — E78.2 MIXED HYPERLIPIDEMIA: ICD-10-CM

## 2025-08-20 DIAGNOSIS — F22 PARANOID DELUSION (HCC): ICD-10-CM

## 2025-08-20 PROCEDURE — 99214 OFFICE O/P EST MOD 30 MIN: CPT | Performed by: INTERNAL MEDICINE

## 2025-08-20 PROCEDURE — G2211 COMPLEX E/M VISIT ADD ON: HCPCS | Performed by: INTERNAL MEDICINE

## 2025-08-20 RX ORDER — CLOTRIMAZOLE AND BETAMETHASONE DIPROPIONATE 10; .64 MG/G; MG/G
CREAM TOPICAL 2 TIMES DAILY
Qty: 90 G | Refills: 1 | Status: SHIPPED | OUTPATIENT
Start: 2025-08-20

## 2025-08-20 RX ORDER — MECLIZINE HCL 12.5 MG 12.5 MG/1
12.5 TABLET ORAL 3 TIMES DAILY PRN
Qty: 90 TABLET | Refills: 0 | Status: SHIPPED | OUTPATIENT
Start: 2025-08-20

## (undated) DEVICE — DUAL CUT SAGITTAL BLADE

## (undated) DEVICE — HOOD: Brand: T7PLUS

## (undated) DEVICE — GLOVE SRG BIOGEL ORTHOPEDIC 6.5

## (undated) DEVICE — SUT MONOCRYL 4-0 PS-2 27 IN Y426H

## (undated) DEVICE — RECIPROCATING BLADE, DOUBLE SIDED, OFFSET  (70.0 X 0.8 X 12.5MM)

## (undated) DEVICE — 2108 SERIES SAGITTAL BLADE (28.9 X 0.64 X 58.7MM)

## (undated) DEVICE — SPECIMEN CONTAINER STERILE PEEL PACK

## (undated) DEVICE — CUFF TOURNIQUET 30 X 4 IN QUICK CONNECT DISP 1BLA

## (undated) DEVICE — CEMENT MIXING SYSTEM WITH FEMORAL BREAKWAY NOZZLE: Brand: REVOLUTION

## (undated) DEVICE — GLOVE INDICATOR PI UNDERGLOVE SZ 7 BLUE

## (undated) DEVICE — STOCKINETTE,IMPERVIOUS,12X48,STERILE: Brand: MEDLINE

## (undated) DEVICE — GLOVE SRG BIOGEL PI ORTHOPEDIC 7

## (undated) DEVICE — DRAPE EQUIPMENT RF WAND

## (undated) DEVICE — SUT VICRYL 2-0 CT-1 36 IN J945H

## (undated) DEVICE — STERILE POLYISOPRENE POWDER-FREE SURGICAL GLOVES: Brand: PROTEXIS

## (undated) DEVICE — WEBRIL 6 IN UNSTERILE

## (undated) DEVICE — HANDPIECE SET WITH HIGH FLOW TIP AND SUCTION TUBE: Brand: INTERPULSE

## (undated) DEVICE — SYRINGE 30ML LL

## (undated) DEVICE — SMARTSLEEVE SURGICAL GOWN, 2XL LONG: Brand: CONVERTORS

## (undated) DEVICE — ASTOUND STANDARD SURGICAL GOWN, XXL: Brand: CONVERTORS

## (undated) DEVICE — NEPTUNE E-SEP SMOKE EVACUATION PENCIL, COATED, 70MM BLADE, PUSH BUTTON SWITCH: Brand: NEPTUNE E-SEP

## (undated) DEVICE — 450 ML BOTTLE OF 0.05% CHLORHEXIDINE GLUCONATE IN 99.95% STERILE WATER FOR IRRIGATION, USP AND APPLICATOR.: Brand: IRRISEPT ANTIMICROBIAL WOUND LAVAGE

## (undated) DEVICE — MAYO STAND COVER: Brand: CONVERTORS

## (undated) DEVICE — CHLORAPREP HI-LITE 26ML ORANGE

## (undated) DEVICE — SUT VICRYL 1 CTX 36 IN J977H

## (undated) DEVICE — SURGICAL GOWN, XL SMARTSLEEVE: Brand: CONVERTORS

## (undated) DEVICE — 3M™ STERI-DRAPE™ U-DRAPE 1015: Brand: STERI-DRAPE™

## (undated) DEVICE — STANDARD SURGICAL GOWN, L: Brand: CONVERTORS

## (undated) DEVICE — CAPIT KNEE ATTUNE FB W/DOM AOX

## (undated) DEVICE — SIGMOIDOSCOPIC SUCTION INSTRUMENT 18 FR W/WINGED CAP CONTROL AND 6 FOOT (1.8M) TUBING: Brand: SIGMOIDOSCOPIC

## (undated) DEVICE — GLOVE PI ULTRA TOUCH SZ.6.5

## (undated) DEVICE — GLOVE PI ULTRA TOUCH SZ.8.5

## (undated) DEVICE — STERILE POLYISOPRENE POWDER-FREE SURGICAL GLOVES WITH EMOLLIENT COATING: Brand: PROTEXIS

## (undated) DEVICE — NEEDLE 18 G X 1 1/2

## (undated) DEVICE — 2108 SERIES SAGITTAL BLADE, OFFSET (12.4 X 1.24 X 73.7MM)

## (undated) DEVICE — FRAZIER SUCTION INSTRUMENT 18 FR W/OBTURATOR, NO CONTROL VENT: Brand: FRAZIER

## (undated) DEVICE — GLOVE INDICATOR PI UNDERGLOVE SZ 6.5 BLUE

## (undated) DEVICE — INTENDED FOR TISSUE SEPARATION, AND OTHER PROCEDURES THAT REQUIRE A SHARP SURGICAL BLADE TO PUNCTURE OR CUT.: Brand: BARD-PARKER ® SAFETYLOCK CARBON RIB-BACK BLADES

## (undated) DEVICE — COBAN 4 IN STERILE

## (undated) DEVICE — DISPOSABLE OR TOWEL: Brand: CARDINAL HEALTH

## (undated) DEVICE — STIRRUP STRAP ADULT DISP

## (undated) DEVICE — DRESSING MEPILEX AG BORDER POST-OP 4 X 8 IN

## (undated) DEVICE — DRESSING MEPILEX AG BORDER POST-OP 4 X 12 IN

## (undated) DEVICE — GLOVE INDICATOR PI UNDERGLOVE SZ 8.5 BLUE

## (undated) DEVICE — 3M™ STERI-STRIP™ REINFORCED ADHESIVE SKIN CLOSURES, R1547, 1/2 IN X 4 IN (12 MM X 100 MM), 6 STRIPS/ENVELOPE: Brand: 3M™ STERI-STRIP™

## (undated) DEVICE — 4-PORT MANIFOLD: Brand: NEPTUNE 2

## (undated) DEVICE — BASIC SINGLE BASIN 2-LF: Brand: MEDLINE INDUSTRIES, INC.

## (undated) DEVICE — BETHLEHEM UNIV TOTAL KNEE, KIT: Brand: CARDINAL HEALTH